# Patient Record
Sex: MALE | Race: BLACK OR AFRICAN AMERICAN | NOT HISPANIC OR LATINO | URBAN - METROPOLITAN AREA
[De-identification: names, ages, dates, MRNs, and addresses within clinical notes are randomized per-mention and may not be internally consistent; named-entity substitution may affect disease eponyms.]

---

## 2018-01-07 ENCOUNTER — INPATIENT (INPATIENT)
Facility: HOSPITAL | Age: 68
LOS: 0 days | Discharge: LEFT BEFORE DISCHARGE | End: 2018-01-07
Attending: INTERNAL MEDICINE

## 2018-01-07 DIAGNOSIS — I50.9 HEART FAILURE, UNSPECIFIED: ICD-10-CM

## 2018-01-11 DIAGNOSIS — I50.9 HEART FAILURE, UNSPECIFIED: ICD-10-CM

## 2018-02-17 ENCOUNTER — INPATIENT (INPATIENT)
Facility: HOSPITAL | Age: 68
LOS: 5 days | Discharge: HOME HEALTH | End: 2018-02-23
Attending: INTERNAL MEDICINE

## 2018-02-17 VITALS — RESPIRATION RATE: 18 BRPM

## 2018-02-17 LAB
ALBUMIN SERPL ELPH-MCNC: 3.1 G/DL — SIGNIFICANT CHANGE UP (ref 3–5.5)
ALP SERPL-CCNC: 67 U/L — SIGNIFICANT CHANGE UP (ref 30–115)
ALT FLD-CCNC: 92 U/L — HIGH (ref 0–41)
ANION GAP SERPL CALC-SCNC: 4 MMOL/L — LOW (ref 7–14)
APTT BLD: 25.9 SEC — LOW (ref 27–39.2)
AST SERPL-CCNC: 81 U/L — HIGH (ref 0–41)
B-TYPE NATRIURETIC PEPTIDE BNP RESULT: 1949 PG/ML — HIGH (ref 0–99)
BASOPHILS # BLD AUTO: 0.02 K/UL — SIGNIFICANT CHANGE UP (ref 0–0.2)
BASOPHILS NFR BLD AUTO: 0.5 % — SIGNIFICANT CHANGE UP (ref 0–1)
BILIRUB SERPL-MCNC: 0.8 MG/DL — SIGNIFICANT CHANGE UP (ref 0.2–1.2)
BUN SERPL-MCNC: 16 MG/DL — SIGNIFICANT CHANGE UP (ref 10–20)
CALCIUM SERPL-MCNC: 8.2 MG/DL — LOW (ref 8.5–10.1)
CHLORIDE SERPL-SCNC: 107 MMOL/L — SIGNIFICANT CHANGE UP (ref 98–110)
CK MB BLD-MCNC: 1 % — SIGNIFICANT CHANGE UP (ref 0–4)
CK MB CFR SERPL CALC: 2.4 NG/ML — SIGNIFICANT CHANGE UP (ref 0.6–6.3)
CK SERPL-CCNC: 298 U/L — HIGH (ref 0–225)
CO2 SERPL-SCNC: 24 MMOL/L — SIGNIFICANT CHANGE UP (ref 17–32)
CREAT SERPL-MCNC: 1.2 MG/DL — SIGNIFICANT CHANGE UP (ref 0.7–1.5)
D DIMER BLD IA.RAPID-MCNC: 375 NG/ML DDU — HIGH (ref 0–230)
EOSINOPHIL # BLD AUTO: 0.1 K/UL — SIGNIFICANT CHANGE UP (ref 0–0.7)
EOSINOPHIL NFR BLD AUTO: 2.7 % — SIGNIFICANT CHANGE UP (ref 0–8)
GLUCOSE SERPL-MCNC: 98 MG/DL — SIGNIFICANT CHANGE UP (ref 70–110)
HCT VFR BLD CALC: 36.4 % — LOW (ref 42–52)
HGB BLD-MCNC: 12.3 G/DL — LOW (ref 14–18)
IMM GRANULOCYTES NFR BLD AUTO: 0.3 % — SIGNIFICANT CHANGE UP (ref 0.1–0.3)
INR BLD: 1.19 RATIO — SIGNIFICANT CHANGE UP (ref 0.65–1.3)
LYMPHOCYTES # BLD AUTO: 0.96 K/UL — LOW (ref 1.2–3.4)
LYMPHOCYTES # BLD AUTO: 26.3 % — SIGNIFICANT CHANGE UP (ref 20.5–51.1)
MCHC RBC-ENTMCNC: 32.3 PG — HIGH (ref 27–31)
MCHC RBC-ENTMCNC: 33.8 G/DL — SIGNIFICANT CHANGE UP (ref 32–37)
MCV RBC AUTO: 95.5 FL — HIGH (ref 80–94)
MONOCYTES # BLD AUTO: 0.28 K/UL — SIGNIFICANT CHANGE UP (ref 0.1–0.6)
MONOCYTES NFR BLD AUTO: 7.7 % — SIGNIFICANT CHANGE UP (ref 1.7–9.3)
NEUTROPHILS # BLD AUTO: 2.28 K/UL — SIGNIFICANT CHANGE UP (ref 1.4–6.5)
NEUTROPHILS NFR BLD AUTO: 62.5 % — SIGNIFICANT CHANGE UP (ref 42.2–75.2)
NRBC # BLD: 0 /100 WBCS — SIGNIFICANT CHANGE UP (ref 0–0)
PLATELET # BLD AUTO: 157 K/UL — SIGNIFICANT CHANGE UP (ref 130–400)
POTASSIUM SERPL-MCNC: 4.4 MMOL/L — SIGNIFICANT CHANGE UP (ref 3.5–5)
POTASSIUM SERPL-SCNC: 4.4 MMOL/L — SIGNIFICANT CHANGE UP (ref 3.5–5)
PROT SERPL-MCNC: 5.3 G/DL — LOW (ref 6–8)
PROTHROM AB SERPL-ACNC: 12.9 SEC — HIGH (ref 9.95–12.87)
RBC # BLD: 3.81 M/UL — LOW (ref 4.7–6.1)
RBC # FLD: 13.6 % — SIGNIFICANT CHANGE UP (ref 11.5–14.5)
SODIUM SERPL-SCNC: 135 MMOL/L — SIGNIFICANT CHANGE UP (ref 135–146)
TROPONIN I SERPL-MCNC: 0.13 NG/ML — HIGH (ref 0–0.05)
WBC # BLD: 3.65 K/UL — LOW (ref 4.8–10.8)
WBC # FLD AUTO: 3.65 K/UL — LOW (ref 4.8–10.8)

## 2018-02-17 RX ORDER — SODIUM CHLORIDE 9 MG/ML
3 INJECTION INTRAMUSCULAR; INTRAVENOUS; SUBCUTANEOUS ONCE
Qty: 0 | Refills: 0 | Status: COMPLETED | OUTPATIENT
Start: 2018-02-17 | End: 2018-02-17

## 2018-02-17 RX ADMIN — SODIUM CHLORIDE 3 MILLILITER(S): 9 INJECTION INTRAMUSCULAR; INTRAVENOUS; SUBCUTANEOUS at 21:05

## 2018-02-18 DIAGNOSIS — R74.8 ABNORMAL LEVELS OF OTHER SERUM ENZYMES: ICD-10-CM

## 2018-02-18 DIAGNOSIS — R06.09 OTHER FORMS OF DYSPNEA: ICD-10-CM

## 2018-02-18 LAB
CK MB CFR SERPL CALC: 2 NG/ML — SIGNIFICANT CHANGE UP (ref 0.6–6.3)
HCT VFR BLD CALC: 36.7 % — LOW (ref 42–52)
HGB BLD-MCNC: 12.2 G/DL — LOW (ref 14–18)
MCHC RBC-ENTMCNC: 32.2 PG — HIGH (ref 27–31)
MCHC RBC-ENTMCNC: 33.2 G/DL — SIGNIFICANT CHANGE UP (ref 32–37)
MCV RBC AUTO: 96.8 FL — HIGH (ref 80–94)
NRBC # BLD: 0 /100 WBCS — SIGNIFICANT CHANGE UP (ref 0–0)
PLATELET # BLD AUTO: 151 K/UL — SIGNIFICANT CHANGE UP (ref 130–400)
RBC # BLD: 3.79 M/UL — LOW (ref 4.7–6.1)
RBC # FLD: 13.8 % — SIGNIFICANT CHANGE UP (ref 11.5–14.5)
TROPONIN I SERPL-MCNC: 0.14 NG/ML — HIGH (ref 0–0.05)
WBC # BLD: 3.51 K/UL — LOW (ref 4.8–10.8)
WBC # FLD AUTO: 3.51 K/UL — LOW (ref 4.8–10.8)

## 2018-02-18 RX ORDER — ASPIRIN/CALCIUM CARB/MAGNESIUM 324 MG
81 TABLET ORAL DAILY
Qty: 0 | Refills: 0 | Status: DISCONTINUED | OUTPATIENT
Start: 2018-02-18 | End: 2018-02-23

## 2018-02-18 RX ORDER — FUROSEMIDE 40 MG
40 TABLET ORAL DAILY
Qty: 0 | Refills: 0 | Status: DISCONTINUED | OUTPATIENT
Start: 2018-02-18 | End: 2018-02-20

## 2018-02-18 RX ORDER — ENOXAPARIN SODIUM 100 MG/ML
40 INJECTION SUBCUTANEOUS AT BEDTIME
Qty: 0 | Refills: 0 | Status: DISCONTINUED | OUTPATIENT
Start: 2018-02-18 | End: 2018-02-23

## 2018-02-18 RX ORDER — FUROSEMIDE 40 MG
20 TABLET ORAL ONCE
Qty: 0 | Refills: 0 | Status: COMPLETED | OUTPATIENT
Start: 2018-02-18 | End: 2018-02-18

## 2018-02-18 RX ORDER — INFLUENZA VIRUS VACCINE 15; 15; 15; 15 UG/.5ML; UG/.5ML; UG/.5ML; UG/.5ML
0.5 SUSPENSION INTRAMUSCULAR ONCE
Qty: 0 | Refills: 0 | Status: COMPLETED | OUTPATIENT
Start: 2018-02-18 | End: 2018-02-18

## 2018-02-18 RX ORDER — ASPIRIN/CALCIUM CARB/MAGNESIUM 324 MG
325 TABLET ORAL ONCE
Qty: 0 | Refills: 0 | Status: COMPLETED | OUTPATIENT
Start: 2018-02-18 | End: 2018-02-18

## 2018-02-18 RX ADMIN — Medication 40 MILLIGRAM(S): at 05:57

## 2018-02-18 RX ADMIN — Medication 20 MILLIGRAM(S): at 01:51

## 2018-02-18 RX ADMIN — Medication 325 MILLIGRAM(S): at 04:39

## 2018-02-18 RX ADMIN — ENOXAPARIN SODIUM 40 MILLIGRAM(S): 100 INJECTION SUBCUTANEOUS at 21:19

## 2018-02-18 RX ADMIN — Medication 81 MILLIGRAM(S): at 11:00

## 2018-02-18 NOTE — ED PROVIDER NOTE - MEDICAL DECISION MAKING DETAILS
Patient with new onset CHF and elevated troponin, consulted cardiology fellow Dr. Garzon, as recommended is admitted to Tele and cardiology will follow patient with medicine team.

## 2018-02-18 NOTE — ED PROVIDER NOTE - OBJECTIVE STATEMENT
Patient is BIB his sister for evaluation of MUELLER/orthopnea/PND x several days, denies any URI symptoms, denies any cp/abd pain, denies any other associated symptoms.

## 2018-02-18 NOTE — ED PROVIDER NOTE - PROGRESS NOTE DETAILS
patient remained stable in ED, discussed with cardiology fellow Dr. Garzon, recommended admission to Tele and serial troponins.   Discussed with Dr. George and MAR, Pt is admitted and patient care is transferred.

## 2018-02-18 NOTE — H&P ADULT - NSHPPHYSICALEXAM_GEN_ALL_CORE
PHYSICAL EXAM:  GENERAL: NAD, well-groomed, well-developed  HEAD:  Atraumatic, Normocephalic  EYES: EOMI, PERRLA, conjunctiva and sclera clear  NECK: Supple, No JVD, Normal thyroid  NERVOUS SYSTEM:  Alert & Oriented X3, Good concentration; Motor Strength 5/5 B/L upper and lower extremities; DTRs 2+ intact and symmetric  CHEST/LUNG: Clear to percussion bilaterally; No rales, rhonchi, wheezing, or rubs  HEART: Regular rate and rhythm; No murmurs, rubs, or gallops  ABDOMEN: Soft, Nontender, Nondistended; Bowel sounds present  EXTREMITIES:  2+ Peripheral Pulses, No clubbing, cyanosis, or edema  SKIN: No rashes or lesions PHYSICAL EXAM:  GENERAL: NAD, well-groomed, well-developed  HEAD:  Atraumatic, Normocephalic  EYES: EOMI, PERRLA, conjunctiva and sclera clear  NECK: Supple, No JVD, Normal thyroid  NERVOUS SYSTEM:  Alert & Oriented X3, Good concentration; Motor Strength 5/5 B/L upper and lower extremities; DTRs 2+ intact and symmetric  CHEST/LUNG: Clear to percussion bilaterally; Decreased respiratory effort.  HEART: Regular rate and rhythm; No murmurs, rubs, or gallops  ABDOMEN: Soft, Nontender, Nondistended; Bowel sounds present  EXTREMITIES:  2+ Peripheral Pulses, No clubbing, cyanosis, or edema  SKIN: No rashes or lesions

## 2018-02-18 NOTE — H&P ADULT - ATTENDING COMMENTS
seen and examined pt independently. agree with the above resident note except as documented below.     67 yr old male with no PMH p/w worsening MUELLER for last 2-3 days. denies chest pain/fever/chills/vomiting/diarrhea/palpitations/abd pain. pt p/w similar complaint last month, diagnosed with CHF but pt left AMA at that time. on physical exam, aaox3, bibasilar crackles, s1s2 regular no murmur, no LE edema/cyanosis. Labs showed mild elevation of troponin and transaminases. CT chest showed heart failure. unable to find ecg in chart.   A: Exacerbation of (biventricular) CHF -? systolic vs diastolic., elevated troponin r/o CAD., elevated transaminases likely due to RHF  P: iv lasix, ASA,  monitor Is&Os, low sodium diet, check TTE, lipids, HBA1C, trend CE, will need ischemia work up, cardiology consult, dvt prophylaxis. seen and examined pt independently. agree with the above resident note except as documented below.     67 yr old male with no PMH p/w worsening MUELLER for last 2-3 days. denies chest pain/fever/chills/vomiting/diarrhea/palpitations/abd pain. pt p/w similar complaint last month, diagnosed with CHF but pt left AMA at that time. on physical exam, aaox3, bibasilar crackles, s1s2 regular no murmur, no LE edema/cyanosis. Labs showed mild elevation of troponin and transaminases. CT chest showed heart failure. Ecg- SR, 1st degree AV block, left atrial enlargement, non specific IVCD (my interpretation)  A: Exacerbation of (biventricular) CHF -? systolic vs diastolic., elevated troponin r/o CAD., elevated transaminases likely due to RHF  P: iv lasix, ASA,  monitor Is&Os, low sodium diet, check TTE, lipids, HBA1C, trend CE, will need ischemia work up, cardiology consult, dvt prophylaxis.

## 2018-02-18 NOTE — H&P ADULT - NSHPLABSRESULTS_GEN_ALL_CORE
12.3   3.65  )-----------( 157      ( 17 Feb 2018 20:33 )             36.4     02-17    135  |  107  |  16  ----------------------------<  98  4.4   |  24  |  1.2    Ca    8.2<L>      17 Feb 2018 20:33    TPro  5.3<L>  /  Alb  3.1  /  TBili  0.8  /  DBili  x   /  AST  81<H>  /  ALT  92<H>  /  AlkPhos  67  02-17    PT/INR - ( 17 Feb 2018 20:33 )   PT: 12.90 sec;   INR: 1.19 ratio         PTT - ( 17 Feb 2018 20:33 )  PTT:25.9 sec  CARDIAC MARKERS ( 17 Feb 2018 20:33 )  0.13 ng/mL / x     / 298 U/L / x     / 2.4 ng/mL      CT Chest with IV contrast: 1.  No central or segmental pulmonary emboli. 2.  Cardiomegaly, bilateral upper lobe interlobular septal thickening,   bilateral pleural effusions and reflux of contrast into the IVC and hepatic veins are suggestive of right heart failure and fluid-overload   state.

## 2018-02-18 NOTE — ED PROVIDER NOTE - CARE PLAN
Principal Discharge DX:	Troponin level elevated  Secondary Diagnosis:	Chronic congestive heart failure, unspecified congestive heart failure type

## 2018-02-18 NOTE — H&P ADULT - NSHPREVIEWOFSYSTEMS_GEN_ALL_CORE
REVIEW OF SYSTEMS:  CONSTITUTIONAL: No fever, weight loss, or fatigue  EYES: No eye pain, visual disturbances, or discharge  ENMT:  No difficulty hearing, tinnitus, vertigo; No sinus or throat pain  NECK: No pain or stiffness  RESPIRATORY: (+) SOB, MUELLER. No cough, wheezing, chills or hemoptysis;   CARDIOVASCULAR: No chest pain, palpitations, dizziness, or leg swelling  GASTROINTESTINAL: No abdominal or epigastric pain. No nausea, vomiting, or hematemesis; No diarrhea or constipation.   GENITOURINARY: No dysuria, frequency, hematuria, or incontinence  NEUROLOGICAL: No headaches, memory loss, loss of strength, numbness, or tremors  SKIN: No itching, burning, rashes, or lesions   LYMPH NODES: No enlarged glands  MUSCULOSKELETAL: No joint pain or swelling; No muscle, back, or extremity pain

## 2018-02-18 NOTE — H&P ADULT - PROBLEM SELECTOR PLAN 2
- f/u Echo  - strict I+Os  - hold further lasix for now - f/u Echo  - strict I+Os, daily weight  - continue with lasix 40 mg iv once daily for now, adjust dose if clinical improvement - finding of Pulmonary Edema, right sided heart failure on CT Chest  - f/u Echo  - strict I+Os, daily weight  - continue with lasix 40 mg iv once daily for now, adjust dose if clinical improvement

## 2018-02-18 NOTE — H&P ADULT - PROBLEM SELECTOR PLAN 3
- likely angina equivalent  - consider further cardiac testing to rule out underlying coronary artery disease  - start on aspirin   - f/u lipid profile - consider further cardiac testing to rule out underlying coronary artery disease  - start on aspirin   - f/u lipid profile
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
## 2018-02-19 LAB
ALBUMIN SERPL ELPH-MCNC: 3.1 G/DL — SIGNIFICANT CHANGE UP (ref 3–5.5)
ALP SERPL-CCNC: 64 U/L — SIGNIFICANT CHANGE UP (ref 30–115)
ALT FLD-CCNC: 71 U/L — HIGH (ref 0–41)
ANION GAP SERPL CALC-SCNC: 8 MMOL/L — SIGNIFICANT CHANGE UP (ref 7–14)
AST SERPL-CCNC: 34 U/L — SIGNIFICANT CHANGE UP (ref 0–41)
BILIRUB DIRECT SERPL-MCNC: 0.1 MG/DL — SIGNIFICANT CHANGE UP (ref 0–0.2)
BILIRUB INDIRECT FLD-MCNC: 0.9 MG/DL — SIGNIFICANT CHANGE UP
BILIRUB SERPL-MCNC: 1 MG/DL — SIGNIFICANT CHANGE UP (ref 0.2–1.2)
BUN SERPL-MCNC: 19 MG/DL — SIGNIFICANT CHANGE UP (ref 10–20)
CALCIUM SERPL-MCNC: 8.4 MG/DL — LOW (ref 8.5–10.1)
CHLORIDE SERPL-SCNC: 108 MMOL/L — SIGNIFICANT CHANGE UP (ref 98–110)
CHOLEST SERPL-MCNC: 183 MG/DL — SIGNIFICANT CHANGE UP (ref 100–200)
CO2 SERPL-SCNC: 26 MMOL/L — SIGNIFICANT CHANGE UP (ref 17–32)
CREAT SERPL-MCNC: 1.2 MG/DL — SIGNIFICANT CHANGE UP (ref 0.7–1.5)
GLUCOSE SERPL-MCNC: 101 MG/DL — SIGNIFICANT CHANGE UP (ref 70–110)
HCT VFR BLD CALC: 40.3 % — LOW (ref 42–52)
HDLC SERPL-MCNC: 44 MG/DL — SIGNIFICANT CHANGE UP (ref 40–60)
HGB BLD-MCNC: 13.4 G/DL — LOW (ref 14–18)
LIPID PNL WITH DIRECT LDL SERPL: 132 MG/DL — HIGH (ref 50–100)
MAGNESIUM SERPL-MCNC: 2.2 MG/DL — SIGNIFICANT CHANGE UP (ref 1.8–2.4)
MCHC RBC-ENTMCNC: 32.8 PG — HIGH (ref 27–31)
MCHC RBC-ENTMCNC: 33.3 G/DL — SIGNIFICANT CHANGE UP (ref 32–37)
MCV RBC AUTO: 98.5 FL — HIGH (ref 80–94)
NRBC # BLD: 0 /100 WBCS — SIGNIFICANT CHANGE UP (ref 0–0)
PLATELET # BLD AUTO: 168 K/UL — SIGNIFICANT CHANGE UP (ref 130–400)
POTASSIUM SERPL-MCNC: 4.6 MMOL/L — SIGNIFICANT CHANGE UP (ref 3.5–5)
POTASSIUM SERPL-SCNC: 4.6 MMOL/L — SIGNIFICANT CHANGE UP (ref 3.5–5)
PROT SERPL-MCNC: 5.6 G/DL — LOW (ref 6–8)
RBC # BLD: 4.09 M/UL — LOW (ref 4.7–6.1)
RBC # FLD: 14 % — SIGNIFICANT CHANGE UP (ref 11.5–14.5)
SODIUM SERPL-SCNC: 142 MMOL/L — SIGNIFICANT CHANGE UP (ref 135–146)
TOTAL CHOLESTEROL/HDL RATIO MEASUREMENT: 4.2 RATIO — SIGNIFICANT CHANGE UP (ref 4–5.5)
TRIGL SERPL-MCNC: 53 MG/DL — SIGNIFICANT CHANGE UP (ref 40–150)
WBC # BLD: 3.07 K/UL — LOW (ref 4.8–10.8)
WBC # FLD AUTO: 3.07 K/UL — LOW (ref 4.8–10.8)

## 2018-02-19 RX ADMIN — Medication 81 MILLIGRAM(S): at 13:33

## 2018-02-19 RX ADMIN — Medication 40 MILLIGRAM(S): at 06:08

## 2018-02-19 RX ADMIN — ENOXAPARIN SODIUM 40 MILLIGRAM(S): 100 INJECTION SUBCUTANEOUS at 21:23

## 2018-02-19 NOTE — PROGRESS NOTE ADULT - ASSESSMENT
Problem/Plan - 1:  ·  Problem: Troponin level elevated.  0.13-.14, f/u cardio     Problem/Plan - 2:  ·  Problem: R/O Congestive heart failure.  Plan: - finding of Pulmonary Edema, right sided heart failure on CT Chest  - f/u Echo  - strict I+Os, daily weight  - continue with lasix 40 mg iv once daily      Problem/Plan - 3:  ·  Problem: R/O Coronary artery disease.  Plan: - consider further cardiac testing to rule out underlying coronary artery disease  - start on aspirin, f/u cardio  - f/u lipid profile.      Problem/Plan - 4:  ·  Problem: Elevated liver enzymes.  Plan: - likely secondary to congestive liver from right sided heart failure  f/u LDT

## 2018-02-19 NOTE — CONSULT NOTE ADULT - ASSESSMENT
-HFrEF, newly diagnosed, now patient is compensating, likely nonischemic cardiomyopathy but need to R/O ischemic etiology.   -Syncope, related to ? hypoglycemia  as by patient however underlying arrhythmia still in the differential    Plan:  -tele  -Start low dose BB metoprolol xl 25mg daily  -Start lisinopril 5 mg daily  -Switch to lasix 20 mg po daily  -consider life vest prior to discharge  -CT angio heart to evaluate for ischemic etiology  -Monitor kidney function and maintain electrolytes within normal ranges. -HFrEF, newly diagnosed, now patient is compensating, likely nonischemic cardiomyopathy but need to R/O ischemic etiology.   -Syncope, related to ? hypoglycemia  as by patient however underlying arrhythmia still in the differential    Plan:  -tele  -Start low dose BB metoprolol succinate xl 25mg daily  -Start lisinopril 5 mg daily  -Switch to lasix 20 mg po daily  - recommend [facility] Life Vest prior to discharge  -Coronary CT angio heart to evaluate for ischemic etiology  -Monitor kidney function and maintain electrolytes within normal ranges.

## 2018-02-19 NOTE — PROGRESS NOTE ADULT - SUBJECTIVE AND OBJECTIVE BOX
Denies chest pain    T(F): , Max: 97.8 (02-19-18 @ 04:46)  HR: 85 (02-19-18 @ 13:55) (85 - 94)  BP: 129/76 (02-19-18 @ 13:55)  RR: 18 (02-19-18 @ 13:55)  SpO2: 100% (02-18-18 @ 22:35)  General: No apparent distress  Cardiovascular: S1, S2  Gastrointestinal: Soft, Non-tender, Non-distended  Respiratory: Good air entry bilaterally  Musculoskeletal: Moves all extremities  Lymphatic: No edema  Neurologic: No gross motor deficit  Dermatologic: Skin dry  PT/INR - ( 17 Feb 2018 20:33 )   PT: 12.90 sec;   INR: 1.19 ratio      PTT - ( 17 Feb 2018 20:33 )  PTT:25.9 sec                        13.4   3.07  )-----------( 168      ( 19 Feb 2018 06:05 )             40.3     02-19    142  |  108  |  19  ----------------------------<  101  4.6   |  26  |  1.2    Ca    8.4<L>      19 Feb 2018 06:05  Mg     2.2     02-19    TPro  5.6<L>  /  Alb  3.1  /  TBili  1.0  /  DBili  0.1  /  AST  34  /  ALT  71<H>  /  AlkPhos  64  02-19
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
## 2018-02-19 NOTE — PROGRESS NOTE ADULT - SUBJECTIVE AND OBJECTIVE BOX
SUBJECTIVE:    Patient is a 67y old  Male who presents with a chief complaint of   Currently admitted to medicine with the primary diagnosis of Troponin level elevated     Today is hospital day 1d. This morning he is resting comfortably in bed and reports no new issues or overnight events.     PAST MEDICAL & SURGICAL HISTORY  PAST MEDICAL & SURGICAL HISTORY:  No pertinent past medical history  No significant past surgical history    SOCIAL HISTORY:    ALLERGIES:  penicillin (Rash)    MEDICATIONS:  STANDING MEDICATIONS  aspirin enteric coated 81 milliGRAM(s) Oral daily  enoxaparin Injectable 40 milliGRAM(s) SubCutaneous at bedtime  furosemide   Injectable 40 milliGRAM(s) IV Push daily    PRN MEDICATIONS    VITALS:   T(F): 97.8  HR: 94  BP: 132/83  RR: 18  SpO2: 100%    LABS:                        13.4   3.07  )-----------( 168      ( 19 Feb 2018 06:05 )             40.3     02-19    142  |  108  |  19  ----------------------------<  101  4.6   |  26  |  1.2    Ca    8.4<L>      19 Feb 2018 06:05  Mg     2.2     02-19    TPro  5.6<L>  /  Alb  3.1  /  TBili  1.0  /  DBili  0.1  /  AST  34  /  ALT  71<H>  /  AlkPhos  64  02-19    PT/INR - ( 17 Feb 2018 20:33 )   PT: 12.90 sec;   INR: 1.19 ratio         PTT - ( 17 Feb 2018 20:33 )  PTT:25.9 sec          CARDIAC MARKERS ( 18 Feb 2018 07:40 )  0.14 ng/mL / x     / x     / x     / 2.0 ng/mL  CARDIAC MARKERS ( 17 Feb 2018 20:33 )  0.13 ng/mL / x     / 298 U/L / x     / 2.4 ng/mL      RADIOLOGY:    PHYSICAL EXAM:  GEN: No acute distress  LUNGS: Clear to auscultation bilaterally   HEART: S1/S2 present. RRR.   ABD: Soft, non-tender, non-distended. Bowel sounds present  EXT: 2+ pedal pulses  NEURO: AAOX3

## 2018-02-20 DIAGNOSIS — I47.2 VENTRICULAR TACHYCARDIA: ICD-10-CM

## 2018-02-20 DIAGNOSIS — I50.9 HEART FAILURE, UNSPECIFIED: ICD-10-CM

## 2018-02-20 LAB
ANION GAP SERPL CALC-SCNC: 5 MMOL/L — LOW (ref 7–14)
BUN SERPL-MCNC: 16 MG/DL — SIGNIFICANT CHANGE UP (ref 10–20)
CALCIUM SERPL-MCNC: 8.8 MG/DL — SIGNIFICANT CHANGE UP (ref 8.5–10.1)
CHLORIDE SERPL-SCNC: 106 MMOL/L — SIGNIFICANT CHANGE UP (ref 98–110)
CO2 SERPL-SCNC: 29 MMOL/L — SIGNIFICANT CHANGE UP (ref 17–32)
CREAT SERPL-MCNC: 1.3 MG/DL — SIGNIFICANT CHANGE UP (ref 0.7–1.5)
GLUCOSE SERPL-MCNC: 101 MG/DL — SIGNIFICANT CHANGE UP (ref 70–110)
POTASSIUM SERPL-MCNC: 4.4 MMOL/L — SIGNIFICANT CHANGE UP (ref 3.5–5)
POTASSIUM SERPL-SCNC: 4.4 MMOL/L — SIGNIFICANT CHANGE UP (ref 3.5–5)
SODIUM SERPL-SCNC: 140 MMOL/L — SIGNIFICANT CHANGE UP (ref 135–146)

## 2018-02-20 RX ORDER — FUROSEMIDE 40 MG
20 TABLET ORAL DAILY
Qty: 0 | Refills: 0 | Status: DISCONTINUED | OUTPATIENT
Start: 2018-02-20 | End: 2018-02-23

## 2018-02-20 RX ORDER — LISINOPRIL 2.5 MG/1
5 TABLET ORAL DAILY
Qty: 0 | Refills: 0 | Status: DISCONTINUED | OUTPATIENT
Start: 2018-02-20 | End: 2018-02-23

## 2018-02-20 RX ORDER — METOPROLOL TARTRATE 50 MG
25 TABLET ORAL DAILY
Qty: 0 | Refills: 0 | Status: DISCONTINUED | OUTPATIENT
Start: 2018-02-20 | End: 2018-02-21

## 2018-02-20 RX ADMIN — Medication 25 MILLIGRAM(S): at 13:24

## 2018-02-20 RX ADMIN — ENOXAPARIN SODIUM 40 MILLIGRAM(S): 100 INJECTION SUBCUTANEOUS at 21:30

## 2018-02-20 RX ADMIN — Medication 81 MILLIGRAM(S): at 11:12

## 2018-02-20 RX ADMIN — Medication 40 MILLIGRAM(S): at 06:20

## 2018-02-20 RX ADMIN — LISINOPRIL 5 MILLIGRAM(S): 2.5 TABLET ORAL at 13:24

## 2018-02-20 RX ADMIN — Medication 20 MILLIGRAM(S): at 13:24

## 2018-02-20 NOTE — CONSULT NOTE ADULT - PROBLEM SELECTOR PROBLEM 1
Dyspnea on exertion Chronic congestive heart failure, unspecified congestive heart failure type R/O Chronic congestive heart failure, unspecified congestive heart failure type
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
## 2018-02-20 NOTE — PROGRESS NOTE ADULT - ASSESSMENT
Acute on chronic systolic CHF - lasix,  asa  right sided heart failure on CT Chest  25% EF on echo, strict I+Os, daily weight, 20 mg lasix PO daily, started on lisinopril, metoprolol, EP c/s-lifevest       NSTEMI - ordered CT angio.   - start on aspirin, f/u cardio  - f/u lipid profile.     dvt ppx: lovenox Acute Systolic CHF   - lasix,  asa   -started on lisinopril, metoprolol   -right sided heart failure on CT Chest  - 25% EF on echo  -strict I+Os, daily weight,   -  EP c/s-lifevest       NSTEMI  on admission - planned for CT angio.   - on aspirin, f/u cardio  - f/u lipid profile.     dvt ppx: lovenox
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
## 2018-02-20 NOTE — PROGRESS NOTE ADULT - ASSESSMENT
-HFrEF, newly diagnosed, now patient is compensating, likely nonischemic cardiomyopathy but need to R/O ischemic etiology.   -Syncope, related to ? hypoglycemia  as by patient however underlying arrhythmia still in the differential    Plan:  -tele  increase BB metoprolol succinate xl to 50 mg  C/w lisinopril 5 mg daily  -Switch to lasix 20 mg po daily  - recommend [facility] Life Vest prior to discharge  -Coronary CT angio heart to evaluate for ischemic etiology - pending for today  -Monitor kidney function and maintain electrolytes within normal ranges.

## 2018-02-20 NOTE — PROGRESS NOTE ADULT - SUBJECTIVE AND OBJECTIVE BOX
SUBJECTIVE:    Patient is a 67y old  Male who presents with a chief complaint of dyspnea on exertion.  Currently admitted to medicine with the primary diagnosis of Troponin level elevated.     Today is hospital day 2d. This morning he is resting comfortably in bed and reports no new issues or overnight events. Patient was ordered a CT coronary as per cardiology. EP was consulted for 25% EF and life vest.    PAST MEDICAL & SURGICAL HISTORY  PAST MEDICAL & SURGICAL HISTORY:  No pertinent past medical history  No significant past surgical history    SOCIAL HISTORY:    ALLERGIES:  penicillin (Rash)    MEDICATIONS:  STANDING MEDICATIONS  aspirin enteric coated 81 milliGRAM(s) Oral daily  enoxaparin Injectable 40 milliGRAM(s) SubCutaneous at bedtime  furosemide    Tablet 20 milliGRAM(s) Oral daily  lisinopril 5 milliGRAM(s) Oral daily  metoprolol succinate ER 25 milliGRAM(s) Oral daily    PRN MEDICATIONS    VITALS:   T(F): 98.1  HR: 95  BP: 134/85  RR: 18  SpO2: --    LABS:                        13.4   3.07  )-----------( 168      ( 19 Feb 2018 06:05 )             40.3     02-20    140  |  106  |  16  ----------------------------<  101  4.4   |  29  |  1.3    Ca    8.8      20 Feb 2018 07:11  Mg     2.2     02-19    TPro  5.6<L>  /  Alb  3.1  /  TBili  1.0  /  DBili  0.1  /  AST  34  /  ALT  71<H>  /  AlkPhos  64  02-19        PHYSICAL EXAM:  GEN: AAO x 3, NAD  HEENT: NC/AT, PERR  Neck: No JVD, no bruits  CV: Reg, S1-S2, no murmur  Lungs: CTAB  Abd: Soft, non-tender  Ext: No edema SUBJECTIVE:    Patient is a 67y old  Male who presents with a chief complaint of dyspnea on exertion.     Today is hospital day 2d. This morning he is resting comfortably in bed and reports no new issues or overnight events. Patient was ordered a CT coronary as per cardiology. EP was consulted for 25% EF and life vest.    PAST MEDICAL & SURGICAL HISTORY  PAST MEDICAL & SURGICAL HISTORY:  No pertinent past medical history  No significant past surgical history    SOCIAL HISTORY:    ALLERGIES:  penicillin (Rash)    MEDICATIONS:  STANDING MEDICATIONS  aspirin enteric coated 81 milliGRAM(s) Oral daily  enoxaparin Injectable 40 milliGRAM(s) SubCutaneous at bedtime  furosemide    Tablet 20 milliGRAM(s) Oral daily  lisinopril 5 milliGRAM(s) Oral daily  metoprolol succinate ER 25 milliGRAM(s) Oral daily    PRN MEDICATIONS    VITALS:   T(F): 98.1  HR: 95  BP: 134/85  RR: 18  SpO2: --    LABS:                        13.4   3.07  )-----------( 168      ( 19 Feb 2018 06:05 )             40.3     02-20    140  |  106  |  16  ----------------------------<  101  4.4   |  29  |  1.3    Ca    8.8      20 Feb 2018 07:11  Mg     2.2     02-19    TPro  5.6<L>  /  Alb  3.1  /  TBili  1.0  /  DBili  0.1  /  AST  34  /  ALT  71<H>  /  AlkPhos  64  02-19        PHYSICAL EXAM:  GEN: AAO x 3, NAD  HEENT: NC/AT, PERR  Neck: No JVD, no bruits  CV: Reg, S1-S2, no murmur  Lungs: CTAB  Abd: Soft, non-tender  Ext: No edema

## 2018-02-20 NOTE — CONSULT NOTE ADULT - SUBJECTIVE AND OBJECTIVE BOX
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 Interval 378 ms    QTC Calculation(Bezet) 477 ms    P Axis 82 degrees    R Axis 155 degrees    T Axis 74 degrees    Diagnosis Line Normal sinus rhythm  Left atrial enlargement  Left ventricular hypertrophy  Non-specific intra-ventricular conduction block  Anterolateral infarct , age undetermined  Abnormal ECG    Confirmed by ERWIN STUART MD ([id number]) on 2018 6:13:22 AM    < end of copied text >      LABS:                        13.4   3.07  )-----------( 168      ( 2018 06:05 )             40.3     02-20    140  |  106  |  16  ----------------------------<  101  4.4   |  29  |  1.3    Ca    8.8      2018 07:11  Mg     2.2         TPro  5.6<L>  /  Alb  3.1  /  TBili  1.0  /  DBili  0.1  /  AST  34  /  ALT  71<H>  /  AlkPhos  64     LIVER FUNCTIONS - ( 2018 06:05 )  Alb: 3.1 g/dL / Pro: 5.6 g/dL / ALK PHOS: 64 U/L / ALT: 71 U/L / AST: 34 U/L / GGT: x               Daily     Daily Weight in k.4 (2018 05:43)    RADIOLOGY & ADDITIONAL STUDIES:  < from: Xray Chest 2 Views PA/Lat (18 @ 19:59) >  EXAM:  XR CHEST PA LAT 2V            PROCEDURE DATE:  2018      INTERPRETATION:  Clinical History / Reason for exam: Chest pain.    Comparison : Chest radiograph dated 2018.    Technique/Positioning: Satisfactory.    Findings:    Support devices: None.    Cardiac/mediastinum/hilum: Unchanged cardiomegaly.    Lung parenchyma/Pleura: Mild interstitial edema. No lobar consolidation,   pleural effusion or pneumothorax.    Skeleton/soft tissues: No acute osseous abnormal rounded.    Impression:      1. Mild interstitial edema.    2. Unchanged cardiomegaly.    EDENILSON NOLAN M.D., ATTENDING RADIOLOGIST  This document has been electronically signed. 2018  9:48AM
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
## 2018-02-21 ENCOUNTER — TRANSCRIPTION ENCOUNTER (OUTPATIENT)
Age: 68
End: 2018-02-21

## 2018-02-21 LAB
ANION GAP SERPL CALC-SCNC: 5 MMOL/L — LOW (ref 7–14)
BUN SERPL-MCNC: 21 MG/DL — HIGH (ref 10–20)
CALCIUM SERPL-MCNC: 9.1 MG/DL — SIGNIFICANT CHANGE UP (ref 8.5–10.1)
CHLORIDE SERPL-SCNC: 105 MMOL/L — SIGNIFICANT CHANGE UP (ref 98–110)
CO2 SERPL-SCNC: 29 MMOL/L — SIGNIFICANT CHANGE UP (ref 17–32)
CREAT SERPL-MCNC: 1.3 MG/DL — SIGNIFICANT CHANGE UP (ref 0.7–1.5)
GLUCOSE SERPL-MCNC: 94 MG/DL — SIGNIFICANT CHANGE UP (ref 70–110)
HCT VFR BLD CALC: 44.6 % — SIGNIFICANT CHANGE UP (ref 42–52)
HGB BLD-MCNC: 14.6 G/DL — SIGNIFICANT CHANGE UP (ref 14–18)
INR BLD: 1.16 RATIO — SIGNIFICANT CHANGE UP (ref 0.65–1.3)
MCHC RBC-ENTMCNC: 31.9 PG — HIGH (ref 27–31)
MCHC RBC-ENTMCNC: 32.7 G/DL — SIGNIFICANT CHANGE UP (ref 32–37)
MCV RBC AUTO: 97.6 FL — HIGH (ref 80–94)
NRBC # BLD: 0 /100 WBCS — SIGNIFICANT CHANGE UP (ref 0–0)
PLATELET # BLD AUTO: 205 K/UL — SIGNIFICANT CHANGE UP (ref 130–400)
POTASSIUM SERPL-MCNC: 4.5 MMOL/L — SIGNIFICANT CHANGE UP (ref 3.5–5)
POTASSIUM SERPL-SCNC: 4.5 MMOL/L — SIGNIFICANT CHANGE UP (ref 3.5–5)
PROTHROM AB SERPL-ACNC: 12.6 SEC — SIGNIFICANT CHANGE UP (ref 9.95–12.87)
RBC # BLD: 4.57 M/UL — LOW (ref 4.7–6.1)
RBC # FLD: 13.6 % — SIGNIFICANT CHANGE UP (ref 11.5–14.5)
SODIUM SERPL-SCNC: 139 MMOL/L — SIGNIFICANT CHANGE UP (ref 135–146)
WBC # BLD: 3.92 K/UL — LOW (ref 4.8–10.8)
WBC # FLD AUTO: 3.92 K/UL — LOW (ref 4.8–10.8)

## 2018-02-21 RX ORDER — METOPROLOL TARTRATE 50 MG
50 TABLET ORAL DAILY
Qty: 0 | Refills: 0 | Status: DISCONTINUED | OUTPATIENT
Start: 2018-02-21 | End: 2018-02-23

## 2018-02-21 RX ADMIN — Medication 50 MILLIGRAM(S): at 11:21

## 2018-02-21 RX ADMIN — Medication 81 MILLIGRAM(S): at 12:15

## 2018-02-21 RX ADMIN — ENOXAPARIN SODIUM 40 MILLIGRAM(S): 100 INJECTION SUBCUTANEOUS at 21:54

## 2018-02-21 NOTE — PROGRESS NOTE ADULT - SUBJECTIVE AND OBJECTIVE BOX
SAMANTHA CHARLES  67y Male    INTERVAL HPI/OVERNIGHT EVENTS:    No SOB or chest pain. Wife at bedside  Very anxious.     T(F): 98 (18 @ 06:05), Max: 98.9 (18 @ 19:52)  HR: 79 (18 @ 06:05) (78 - 81)  BP: 101/71 (18 @ 06:05) (91/53 - 130/76)  RR: 19 (18 @ 06:05) (18 - 19)    Daily Weight in k.3 (2018 10:56)      PHYSICAL EXAM:  GENERAL: NAD, thin man  HEAD:  Atraumatic, Normocephalic  EYES:  conjunctiva and sclera clear  ENMT: Moist mucous membranes  NECK: Supple, No JVD  NERVOUS SYSTEM:  Alert & Oriented X3, Good concentration, anxious  CHEST/LUNG: Clear to percussion bilaterally; No rales, rhonchi, wheezing  HEART: Regular rate and rhythm; No murmurs appreciated  ABDOMEN: Soft, Nontender, Nondistended; Bowel sounds present  EXTREMITIES:  No edema      Consultant(s) Notes Reviewed:  [x ] YES  [ ] NO  Care Discussed with Other Providers on rounds [ x] YES  [ ] NO    Medications reviewed  Telemetry reviewed    LABS:                        14.6   3.92  )-----------( 205      ( 2018 07:37 )             44.6     02-    139  |  105  |  21<H>  ----------------------------<  94  4.5   |  29  |  1.3    Ca    9.1      2018 07:37      PT/INR - ( 2018 07:37 )   PT: 12.60 sec;   INR: 1.16 ratio          RADIOLOGY & ADDITIONAL TESTS:    report Personally Reviewed:  [x ] YES  [ ] NO    Case discussed with resident    Care discussed with pt/family

## 2018-02-21 NOTE — DISCHARGE NOTE ADULT - CARE PLAN
Principal Discharge DX:	Acute systolic congestive heart failure  Goal:	prevent complications  Assessment and plan of treatment:	continue aspirin, metoprolol, lisinopril, lasix as prescribed Principal Discharge DX:	Acute systolic congestive heart failure  Goal:	prevent complications  Assessment and plan of treatment:	continue aspirin, metoprolol, lisinopril, lasix as prescribed. F/u with cardiology

## 2018-02-21 NOTE — PROGRESS NOTE ADULT - SUBJECTIVE AND OBJECTIVE BOX
SUBJ:  Patient seen and examined. Discussed with EP, Dr. Guo.   No events overnight.      MEDICATIONS  (STANDING):  aspirin enteric coated 81 milliGRAM(s) Oral daily  enoxaparin Injectable 40 milliGRAM(s) SubCutaneous at bedtime  furosemide    Tablet 20 milliGRAM(s) Oral daily  lisinopril 5 milliGRAM(s) Oral daily  metoprolol succinate ER 25 milliGRAM(s) Oral daily    MEDICATIONS  (PRN):            Vital Signs Last 24 Hrs  T(C): 36.7 (21 Feb 2018 06:05), Max: 37.2 (20 Feb 2018 19:52)  T(F): 98 (21 Feb 2018 06:05), Max: 98.9 (20 Feb 2018 19:52)  HR: 79 (21 Feb 2018 06:05) (78 - 81)  BP: 101/71 (21 Feb 2018 06:05) (91/53 - 130/76)  BP(mean): --  RR: 19 (21 Feb 2018 06:05) (18 - 19)  SpO2: --      PHYSICAL EXAM:    GEN: AAO x 3, NAD  HEENT: NC/AT, PERR  Neck: No JVD, no bruits  CV: Reg, S1-S2, no murmur  Lungs: CTAB  Abd: Soft, non-tender  Ext: No edema      I&O's Summary  	    TELEMETRY: SR, NSVT x 1      TTE:  < from: Transthoracic Echocardiogram (02.19.18 @ 12:32) >  Summary:   1. Left ventricular ejection fraction, by visual estimation, is 20 to   25%.   2. Severely decreased global left ventricular systolic function.   3. Severely decreased segmental left ventricular systolic function.   4. Spectral Doppler shows pseudonormal pattern of left ventricular   myocardial filling (Grade II diastolic dysfunction).   5. Mild to moderate aortic regurgitation.    < end of copied text >      LABS:                        14.6   3.92  )-----------( 205      ( 21 Feb 2018 07:37 )             44.6     02-20    140  |  106  |  16  ----------------------------<  101  4.4   |  29  |  1.3    Ca    8.8      20 Feb 2018 07:11

## 2018-02-21 NOTE — PROGRESS NOTE ADULT - ASSESSMENT
-HFrEF, newly diagnosed, now patient is compensating, likely nonischemic cardiomyopathy but need to R/O ischemic etiology.   -Syncope, possible underlying arrhythmia in the differential    Plan:  - EP consult appreciated.  Patient was offered cath, but refused.  Proceed with coronary CTA today, as originally planned.   increase BB metoprolol succinate xl to 50 mg  C/w lisinopril 5 mg daily  -Switch to lasix 20 mg po daily  - Patient was offered AICD by EP, given high risk for SCD, at this time he is hesitant; will recommend at least WCD Life Vest prior to discharge for protection from SCD, if no ICD this admission.  - Cardiac MR ordered by EP - pending, will f/u the results.  - Monitor kidney function and maintain electrolytes within normal ranges.

## 2018-02-21 NOTE — DISCHARGE NOTE ADULT - PLAN OF CARE
prevent complications continue aspirin, metoprolol, lisinopril, lasix as prescribed continue aspirin, metoprolol, lisinopril, lasix as prescribed. F/u with cardiology

## 2018-02-21 NOTE — PROGRESS NOTE ADULT - ASSESSMENT
1. Acute HFrEF - workup in progress - volume status now improved  Cardio f/u appreciated  Pt refuses cardiac cath at this time  CTA of coronary arteries instead to be ordered  Cardiac MRI ordered  continue tele, lasix, ACE-I, Bblocker  DVT prophylaxis  pt OOB and ambulating  EP following  Consider Lifevest on discharge    2. Syncope - likely malignant arrhythmia as cause per EP

## 2018-02-21 NOTE — PROGRESS NOTE ADULT - SUBJECTIVE AND OBJECTIVE BOX
SUBJECTIVE:    Patient is a 67y old  Male who presents with a chief complaint of   Currently admitted to medicine with the primary diagnosis of Troponin level elevated     Today is hospital day 3d. This morning he is resting comfortably in bed and reports no new issues or overnight events. He is scheduled today for cardiac MRI, cardiac cath thursday.    PAST MEDICAL & SURGICAL HISTORY  PAST MEDICAL & SURGICAL HISTORY:  No pertinent past medical history  No significant past surgical history    SOCIAL HISTORY:    ALLERGIES:  penicillin (Rash)    MEDICATIONS:  STANDING MEDICATIONS  aspirin enteric coated 81 milliGRAM(s) Oral daily  enoxaparin Injectable 40 milliGRAM(s) SubCutaneous at bedtime  furosemide    Tablet 20 milliGRAM(s) Oral daily  lisinopril 5 milliGRAM(s) Oral daily  metoprolol succinate ER 25 milliGRAM(s) Oral daily    PRN MEDICATIONS    VITALS:   T(F): 98  HR: 79  BP: 101/71  RR: 19  SpO2: --    LABS:                        14.6   3.92  )-----------( 205      ( 21 Feb 2018 07:37 )             44.6     02-21    139  |  105  |  21<H>  ----------------------------<  94  4.5   |  29  |  1.3    Ca    9.1      21 Feb 2018 07:37      PT/INR - ( 21 Feb 2018 07:37 )   PT: 12.60 sec;   INR: 1.16 ratio                       RADIOLOGY:    PHYSICAL EXAM:  GEN: No acute distress  LUNGS: Clear to auscultation bilaterally   HEART: S1/S2 present. RRR.   ABD: Soft, non-tender, non-distended. Bowel sounds present  NEURO: AAOX3

## 2018-02-21 NOTE — DISCHARGE NOTE ADULT - PATIENT PORTAL LINK FT
You can access the [facility], offered by [facility], by registering with the following website: [email or web address]

## 2018-02-21 NOTE — DISCHARGE NOTE ADULT - CARE PROVIDER_API CALL
Kulwinder Choi (MD), Cardiovascular Disease; Internal Medicine; Interventional Cardiology; Nuclear Cardiology  [address] [place] [address]  Phone: [phone]  Fax: [phone]

## 2018-02-21 NOTE — DISCHARGE NOTE ADULT - MEDICATION SUMMARY - MEDICATIONS TO TAKE
I will START or STAY ON the medications listed below when I get home from the hospital:    aspirin 81 mg oral delayed release tablet  -- 1 tab(s) by mouth once a day  -- Indication: For Congestive heart failure    lisinopril 5 mg oral tablet  -- 1 tab(s) by mouth once a day  -- Indication: For Congestive heart failure    metoprolol succinate 50 mg oral tablet, extended release  -- 1 tab(s) by mouth once a day  -- Indication: For Congestive heart failure    furosemide 20 mg oral tablet  -- 1 tab(s) by mouth once a day  -- Indication: For Chronic congestive heart failure, unspecified congestive heart failure type I will START or STAY ON the medications listed below when I get home from the hospital:    aspirin 81 mg oral delayed release tablet  -- 1 tab(s) by mouth once a day  -- Indication: For Chronic congestive heart failure, unspecified congestive heart failure type    lisinopril 5 mg oral tablet  -- 1 tab(s) by mouth once a day  -- Indication: For Chronic congestive heart failure, unspecified congestive heart failure type    metoprolol succinate 50 mg oral tablet, extended release  -- 1 tab(s) by mouth once a day  -- Indication: For Chronic congestive heart failure, unspecified congestive heart failure type    furosemide 20 mg oral tablet  -- 1 tab(s) by mouth once a day  -- Indication: For Chronic congestive heart failure, unspecified congestive heart failure type

## 2018-02-21 NOTE — PROGRESS NOTE ADULT - ASSESSMENT
Acute Systolic CHF   - lasix,  asa   -started on lisinopril, metoprolol   -right sided heart failure on CT Chest  - 25% EF on echo  Patient was offered cath, but refused. Proceed with coronary CTA, as originally planned.   increased BB metoprolol succinate xl to 50 mg  C/w lisinopril 5 mg daily  -lasix 20 mg po daily,  Patient was offered AICD by EP, given high risk for SCD, at this time he is hesitant; will recommend at least WCD Life Vest prior to discharge for protection from SCD, if no ICD this admission.        dvt ppx: lovenox

## 2018-02-21 NOTE — DISCHARGE NOTE ADULT - HOSPITAL COURSE
67 year old male with no significant PMH not taking any medications, poor historian, presented with complains of shortness of breath and dyspnea on exertion for past few weeks.     1.Acute systolic CHF sec to non ischemic cardiomyopathy.  Pt refused cardiac cath.   CCTA showed normal coronaries. MRI heart showed 1. Findings of dilated nonischemic cardiomyopathy.   2. Severe decrease in biventricular ejection fraction, LVEF-14.6%; RVEF 18%.   3. No evidence of delayed gadolinium enhancement.     was put on lasix, metoprolol, lisinopril, ASA during hospital stay. Pt was very hesitant during the hospital in getting a cardiac workup and was accusing the staff of doing exams to just make money off him. He finally was willing to have the MRI and CTA coronaries but refused AICD. Pt is willing to get the lifevest. Dr. Guo is signing off on the lifevest. 67 year old male with no significant PMH not taking any medications presented with complains of shortness of breath and dyspnea on exertion for past few weeks.     1.Acute systolic CHF sec to non ischemic cardiomyopathy.  Pt refused cardiac cath.   CCTA showed normal coronaries. MRI heart showed 1. Findings of dilated nonischemic cardiomyopathy.   2. Severe decrease in biventricular ejection fraction, LVEF-14.6%; RVEF 18%.   3. No evidence of delayed gadolinium enhancement.      He was started  on lasix, metoprolol, lisinopril, ASA during hospital stay. Pt was very hesitant during the hospital in getting a cardiac workup. He finally was willing to have the MRI and CTA coronaries but refused AICD. Pt is willing to get the lifevest. Dr. Guo is signing off on the lifevest.

## 2018-02-22 RX ORDER — METOPROLOL TARTRATE 50 MG
50 TABLET ORAL ONCE
Qty: 0 | Refills: 0 | Status: COMPLETED | OUTPATIENT
Start: 2018-02-23 | End: 2018-02-23

## 2018-02-22 RX ORDER — METOPROLOL TARTRATE 50 MG
50 TABLET ORAL ONCE
Qty: 0 | Refills: 0 | Status: DISCONTINUED | OUTPATIENT
Start: 2018-02-22 | End: 2018-02-22

## 2018-02-22 RX ADMIN — Medication 81 MILLIGRAM(S): at 12:15

## 2018-02-22 RX ADMIN — Medication 20 MILLIGRAM(S): at 06:30

## 2018-02-22 RX ADMIN — LISINOPRIL 5 MILLIGRAM(S): 2.5 TABLET ORAL at 06:30

## 2018-02-22 RX ADMIN — Medication 50 MILLIGRAM(S): at 06:30

## 2018-02-22 RX ADMIN — ENOXAPARIN SODIUM 40 MILLIGRAM(S): 100 INJECTION SUBCUTANEOUS at 21:36

## 2018-02-22 NOTE — PROGRESS NOTE ADULT - SUBJECTIVE AND OBJECTIVE BOX
Patient is a 67y old  Male.  He was seen and examined.  Denies chest pain, sob, dizzines.    PAST MEDICAL & SURGICAL HISTORY:  No pertinent past medical history  No significant past surgical history      Allergies  penicillin (Rash)    MEDICATIONS  (STANDING):  aspirin enteric coated 81 milliGRAM(s) Oral daily  enoxaparin Injectable 40 milliGRAM(s) SubCutaneous at bedtime  furosemide    Tablet 20 milliGRAM(s) Oral daily  lisinopril 5 milliGRAM(s) Oral daily  metoprolol succinate ER 50 milliGRAM(s) Oral daily    MEDICATIONS  (PRN):    Vital Signs Last 24 Hrs  T(C): 35.9  T(F): 96.6  HR: 84  BP: 112/61  BP(mean): --  RR: 18  SpO2: --  O/E:  Awake, alert, not in distress.  HEENT: atraumatic, EOMI.  Chest: clear.  CVS: SIS2 +, no murmur.  P/A: Soft, BS+  CNS: non focal.  Ext: no edema feet.  Skin: no rash, no ulcers.  All systems reviewed positive findings as above.                      14.6   3.92<L> )-----------( 205      ( 21 Feb 2018 07:37 )             44.6     02-21    139  |  105  |  21<H>  ----------------------------<  94  4.5   |  29  |  1.3    Ca    9.1      21 Feb 2018 07:37PT/INR - ( 21 Feb 2018 07:37 )   PT: 12.60 sec;   INR: 1.16 ratio

## 2018-02-22 NOTE — PROGRESS NOTE ADULT - ASSESSMENT
67 year old male with no significant PMH not taking any medications, poor historian, presented with complains of shortness of breath and dyspnea on exertion for past few weeks.     1.Acute systolic CHF sec to non ischemic cardiomyopathy.  Pt refused cardiac cath  Pt was seen by EP-Awaiting CCTA  If NICM them may need AICD placement.  ct lasix, metoprolol, lisinopril, ASA.  give metoprolol tartrate 50 mg in the morning before the CTA coronary exam to keep HR in 60s, hold succinate    2. DVT prophylaxis.

## 2018-02-22 NOTE — PROGRESS NOTE ADULT - ASSESSMENT
67 year old male with no significant PMH not taking any medications, poor historian, presented with complains of shortness of breath and dyspnea on exertion for past few weeks.     1.Acute systolic CHF sec to non ischemic cardiomyopathy.  Pt refused cardiac cath  Pt was seen by EP-Awaiting CCTA  If NICM them may need AICD placement.  ct lasix, metoprolol, lisinopril, ASA.    2. DVT prophylaxis.

## 2018-02-22 NOTE — CHART NOTE - NSCHARTNOTEFT_GEN_A_CORE
Patient was NPO after midnight for CT coronaries and when I spoke to the patient this morning, he was refusing CT angio even after educating the patient about his severely reduced EF. He was claiming we are performing psychological torture. He was demanding to eat so I ordered him food. Dr. Flanagan stated she will go speak with patient after rounds and I will go over the MRI results with his wife as requested.
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
## 2018-02-22 NOTE — PROGRESS NOTE ADULT - SUBJECTIVE AND OBJECTIVE BOX
SUBJECTIVE:    Patient is a 67y old  Male who presents with a chief complaint of   Currently admitted to medicine with the primary diagnosis of Troponin level elevated     Today is hospital day 4d. This morning he is resting comfortably in bed and reports no new issues or overnight events. pt was refused CTA today and ate his breakfast but changed his mind to getting the CTA tomorrow morning. EP was called to set up the life vest before discharge planning.    PAST MEDICAL & SURGICAL HISTORY  PAST MEDICAL & SURGICAL HISTORY:  No pertinent past medical history  No significant past surgical history    SOCIAL HISTORY:    ALLERGIES:  penicillin (Rash)    MEDICATIONS:  STANDING MEDICATIONS  aspirin enteric coated 81 milliGRAM(s) Oral daily  enoxaparin Injectable 40 milliGRAM(s) SubCutaneous at bedtime  furosemide    Tablet 20 milliGRAM(s) Oral daily  lisinopril 5 milliGRAM(s) Oral daily  metoprolol succinate ER 50 milliGRAM(s) Oral daily    PRN MEDICATIONS    VITALS:   T(F): 96.6  HR: 84  BP: 112/61  RR: 18  SpO2: --    LABS:                        14.6   3.92  )-----------( 205      ( 21 Feb 2018 07:37 )             44.6     02-21    139  |  105  |  21<H>  ----------------------------<  94  4.5   |  29  |  1.3    Ca    9.1      21 Feb 2018 07:37      PT/INR - ( 21 Feb 2018 07:37 )   PT: 12.60 sec;   INR: 1.16 ratio        PHYSICAL EXAM:  GEN: No acute distress  LUNGS: Clear to auscultation bilaterally   HEART: S1/S2 present. RRR.   ABD: Soft, non-tender, non-distended. Bowel sounds present  EXT: palpable pulses, no edema  NEURO: AAOX3

## 2018-02-22 NOTE — PROGRESS NOTE ADULT - ATTENDING COMMENTS
I discussed with patient the placement of an ICD after completion of ischemic work-up. I discussed rationale, risks/benefits and alternatives. Patient expressed understanding of discussion. Patient refused ICD placement at this time.    He also refused coronary angiogram. He is willing to undergo CTA of coronaries to evaluate for obstructive CAD. Patient is asking for "lifeVest" until he decides whether he wants ICD or not.    I explained to patient risks of not having an implantable ICD including risk of arrhythmias and sudden death. HE expressed understanding and refused ICD.    Will follow patient with you

## 2018-02-23 VITALS
RESPIRATION RATE: 18 BRPM | TEMPERATURE: 96 F | SYSTOLIC BLOOD PRESSURE: 104 MMHG | HEART RATE: 69 BPM | DIASTOLIC BLOOD PRESSURE: 57 MMHG

## 2018-02-23 RX ORDER — ASPIRIN/CALCIUM CARB/MAGNESIUM 324 MG
1 TABLET ORAL
Qty: 90 | Refills: 0 | OUTPATIENT
Start: 2018-02-23 | End: 2018-05-23

## 2018-02-23 RX ORDER — LISINOPRIL 2.5 MG/1
1 TABLET ORAL
Qty: 90 | Refills: 0 | OUTPATIENT
Start: 2018-02-23 | End: 2018-05-23

## 2018-02-23 RX ORDER — FUROSEMIDE 40 MG
1 TABLET ORAL
Qty: 0 | Refills: 0 | COMMUNITY
Start: 2018-02-23

## 2018-02-23 RX ORDER — ASPIRIN/CALCIUM CARB/MAGNESIUM 324 MG
1 TABLET ORAL
Qty: 0 | Refills: 0 | COMMUNITY
Start: 2018-02-23

## 2018-02-23 RX ORDER — LISINOPRIL 2.5 MG/1
1 TABLET ORAL
Qty: 0 | Refills: 0 | COMMUNITY
Start: 2018-02-23

## 2018-02-23 RX ORDER — FUROSEMIDE 40 MG
1 TABLET ORAL
Qty: 90 | Refills: 0 | OUTPATIENT
Start: 2018-02-23 | End: 2018-05-23

## 2018-02-23 RX ORDER — METOPROLOL TARTRATE 50 MG
1 TABLET ORAL
Qty: 0 | Refills: 0 | COMMUNITY
Start: 2018-02-23

## 2018-02-23 RX ORDER — METOPROLOL TARTRATE 50 MG
1 TABLET ORAL
Qty: 90 | Refills: 0 | OUTPATIENT
Start: 2018-02-23 | End: 2018-05-23

## 2018-02-23 RX ADMIN — Medication 81 MILLIGRAM(S): at 12:27

## 2018-02-23 RX ADMIN — LISINOPRIL 5 MILLIGRAM(S): 2.5 TABLET ORAL at 06:23

## 2018-02-23 RX ADMIN — Medication 50 MILLIGRAM(S): at 07:45

## 2018-02-23 RX ADMIN — Medication 50 MILLIGRAM(S): at 06:23

## 2018-02-23 RX ADMIN — Medication 20 MILLIGRAM(S): at 06:23

## 2018-02-23 NOTE — PROGRESS NOTE ADULT - ASSESSMENT
-HFrEF, newly diagnosed, now patient is compensating, nonischemic cardiomyopathy -Syncope, possible underlying arrhythmia in the differential    Plan:  - EP consult appreciated.  -c/w metoprolol succinate xl  -C/w lisinopril 5 mg daily  -Switch to lasix 20 mg po daily  - Patient was offered AICD by EP, given high risk for SCD, at this time he is hesitant; will recommend at least WCD Life Vest prior to discharge for protection from SCD, if no ICD this admission.  - Cardiac MR ordered by EP - noted the results.  D/c home today.  F/u in 2-3 weeks
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
## 2018-02-26 PROBLEM — Z00.00 ENCOUNTER FOR PREVENTIVE HEALTH EXAMINATION: Status: ACTIVE | Noted: 2018-02-26

## 2018-02-28 DIAGNOSIS — I21.4 NON-ST ELEVATION (NSTEMI) MYOCARDIAL INFARCTION: ICD-10-CM

## 2018-02-28 DIAGNOSIS — I50.21 ACUTE SYSTOLIC (CONGESTIVE) HEART FAILURE: ICD-10-CM

## 2018-02-28 DIAGNOSIS — I42.9 CARDIOMYOPATHY, UNSPECIFIED: ICD-10-CM

## 2018-02-28 DIAGNOSIS — R06.02 SHORTNESS OF BREATH: ICD-10-CM

## 2018-02-28 DIAGNOSIS — I49.9 CARDIAC ARRHYTHMIA, UNSPECIFIED: ICD-10-CM

## 2018-02-28 DIAGNOSIS — I25.10 ATHEROSCLEROTIC HEART DISEASE OF NATIVE CORONARY ARTERY WITHOUT ANGINA PECTORIS: ICD-10-CM

## 2018-02-28 DIAGNOSIS — E16.2 HYPOGLYCEMIA, UNSPECIFIED: ICD-10-CM

## 2018-03-06 DIAGNOSIS — Z53.29 PROCEDURE AND TREATMENT NOT CARRIED OUT BECAUSE OF PATIENT'S DECISION FOR OTHER REASONS: ICD-10-CM

## 2018-03-12 ENCOUNTER — APPOINTMENT (OUTPATIENT)
Dept: CARDIOLOGY | Facility: CLINIC | Age: 68
End: 2018-03-12

## 2018-03-12 VITALS
BODY MASS INDEX: 19.18 KG/M2 | SYSTOLIC BLOOD PRESSURE: 116 MMHG | HEIGHT: 71 IN | DIASTOLIC BLOOD PRESSURE: 80 MMHG | HEART RATE: 77 BPM | WEIGHT: 137 LBS

## 2018-03-12 DIAGNOSIS — Z82.49 FAMILY HISTORY OF ISCHEMIC HEART DISEASE AND OTHER DISEASES OF THE CIRCULATORY SYSTEM: ICD-10-CM

## 2018-03-12 DIAGNOSIS — I50.20 UNSPECIFIED SYSTOLIC (CONGESTIVE) HEART FAILURE: ICD-10-CM

## 2018-03-12 RX ORDER — LISINOPRIL 5 MG/1
5 TABLET ORAL
Qty: 90 | Refills: 3 | Status: ACTIVE | COMMUNITY
Start: 1900-01-01 | End: 1900-01-01

## 2018-03-12 RX ORDER — ASPIRIN 81 MG
81 TABLET, DELAYED RELEASE (ENTERIC COATED) ORAL DAILY
Refills: 0 | Status: ACTIVE | COMMUNITY

## 2018-03-12 RX ORDER — METOPROLOL SUCCINATE 50 MG/1
50 TABLET, EXTENDED RELEASE ORAL DAILY
Qty: 90 | Refills: 3 | Status: ACTIVE | COMMUNITY
Start: 1900-01-01 | End: 1900-01-01

## 2018-04-15 ENCOUNTER — INPATIENT (INPATIENT)
Facility: HOSPITAL | Age: 68
LOS: 4 days | Discharge: HOME HEALTH | End: 2018-04-20
Attending: HOSPITALIST | Admitting: HOSPITALIST

## 2018-04-15 VITALS
DIASTOLIC BLOOD PRESSURE: 88 MMHG | TEMPERATURE: 97 F | RESPIRATION RATE: 18 BRPM | OXYGEN SATURATION: 99 % | SYSTOLIC BLOOD PRESSURE: 135 MMHG | HEART RATE: 78 BPM

## 2018-04-15 LAB
ALBUMIN SERPL ELPH-MCNC: 3.5 G/DL — SIGNIFICANT CHANGE UP (ref 3.5–5.2)
ALP SERPL-CCNC: 92 U/L — SIGNIFICANT CHANGE UP (ref 30–115)
ALT FLD-CCNC: 119 U/L — HIGH (ref 0–41)
ANION GAP SERPL CALC-SCNC: 15 MMOL/L — HIGH (ref 7–14)
AST SERPL-CCNC: 73 U/L — HIGH (ref 0–41)
BASOPHILS # BLD AUTO: 0.02 K/UL — SIGNIFICANT CHANGE UP (ref 0–0.2)
BASOPHILS NFR BLD AUTO: 0.5 % — SIGNIFICANT CHANGE UP (ref 0–1)
BILIRUB SERPL-MCNC: 0.9 MG/DL — SIGNIFICANT CHANGE UP (ref 0.2–1.2)
BUN SERPL-MCNC: 22 MG/DL — HIGH (ref 10–20)
CALCIUM SERPL-MCNC: 8.6 MG/DL — SIGNIFICANT CHANGE UP (ref 8.5–10.1)
CHLORIDE SERPL-SCNC: 103 MMOL/L — SIGNIFICANT CHANGE UP (ref 98–110)
CO2 SERPL-SCNC: 21 MMOL/L — SIGNIFICANT CHANGE UP (ref 17–32)
CREAT SERPL-MCNC: 1.3 MG/DL — SIGNIFICANT CHANGE UP (ref 0.7–1.5)
EOSINOPHIL # BLD AUTO: 0.03 K/UL — SIGNIFICANT CHANGE UP (ref 0–0.7)
EOSINOPHIL NFR BLD AUTO: 0.7 % — SIGNIFICANT CHANGE UP (ref 0–8)
GLUCOSE SERPL-MCNC: 106 MG/DL — HIGH (ref 70–99)
HCT VFR BLD CALC: 39.4 % — LOW (ref 42–52)
HGB BLD-MCNC: 13.4 G/DL — LOW (ref 14–18)
IMM GRANULOCYTES NFR BLD AUTO: 0.2 % — SIGNIFICANT CHANGE UP (ref 0.1–0.3)
LYMPHOCYTES # BLD AUTO: 1.11 K/UL — LOW (ref 1.2–3.4)
LYMPHOCYTES # BLD AUTO: 25.1 % — SIGNIFICANT CHANGE UP (ref 20.5–51.1)
MCHC RBC-ENTMCNC: 32.4 PG — HIGH (ref 27–31)
MCHC RBC-ENTMCNC: 34 G/DL — SIGNIFICANT CHANGE UP (ref 32–37)
MCV RBC AUTO: 95.4 FL — HIGH (ref 80–94)
MONOCYTES # BLD AUTO: 0.37 K/UL — SIGNIFICANT CHANGE UP (ref 0.1–0.6)
MONOCYTES NFR BLD AUTO: 8.4 % — SIGNIFICANT CHANGE UP (ref 1.7–9.3)
NEUTROPHILS # BLD AUTO: 2.89 K/UL — SIGNIFICANT CHANGE UP (ref 1.4–6.5)
NEUTROPHILS NFR BLD AUTO: 65.1 % — SIGNIFICANT CHANGE UP (ref 42.2–75.2)
NRBC # BLD: 0 /100 WBCS — SIGNIFICANT CHANGE UP (ref 0–0)
PLATELET # BLD AUTO: 141 K/UL — SIGNIFICANT CHANGE UP (ref 130–400)
POTASSIUM SERPL-MCNC: 5 MMOL/L — SIGNIFICANT CHANGE UP (ref 3.5–5)
POTASSIUM SERPL-SCNC: 5 MMOL/L — SIGNIFICANT CHANGE UP (ref 3.5–5)
PROT SERPL-MCNC: 5.8 G/DL — LOW (ref 6–8)
RBC # BLD: 4.13 M/UL — LOW (ref 4.7–6.1)
RBC # FLD: 13.9 % — SIGNIFICANT CHANGE UP (ref 11.5–14.5)
SODIUM SERPL-SCNC: 139 MMOL/L — SIGNIFICANT CHANGE UP (ref 135–146)
WBC # BLD: 4.43 K/UL — LOW (ref 4.8–10.8)
WBC # FLD AUTO: 4.43 K/UL — LOW (ref 4.8–10.8)

## 2018-04-15 RX ORDER — ATORVASTATIN CALCIUM 80 MG/1
40 TABLET, FILM COATED ORAL AT BEDTIME
Qty: 0 | Refills: 0 | Status: DISCONTINUED | OUTPATIENT
Start: 2018-04-15 | End: 2018-04-20

## 2018-04-15 RX ORDER — ASPIRIN/CALCIUM CARB/MAGNESIUM 324 MG
81 TABLET ORAL DAILY
Qty: 0 | Refills: 0 | Status: DISCONTINUED | OUTPATIENT
Start: 2018-04-15 | End: 2018-04-20

## 2018-04-15 RX ORDER — METOPROLOL TARTRATE 50 MG
25 TABLET ORAL DAILY
Qty: 0 | Refills: 0 | Status: DISCONTINUED | OUTPATIENT
Start: 2018-04-15 | End: 2018-04-20

## 2018-04-15 RX ORDER — METOPROLOL TARTRATE 50 MG
50 TABLET ORAL DAILY
Qty: 0 | Refills: 0 | Status: DISCONTINUED | OUTPATIENT
Start: 2018-04-15 | End: 2018-04-15

## 2018-04-15 RX ORDER — ASPIRIN/CALCIUM CARB/MAGNESIUM 324 MG
325 TABLET ORAL ONCE
Qty: 0 | Refills: 0 | Status: COMPLETED | OUTPATIENT
Start: 2018-04-15 | End: 2018-04-15

## 2018-04-15 RX ORDER — FUROSEMIDE 40 MG
20 TABLET ORAL DAILY
Qty: 0 | Refills: 0 | Status: DISCONTINUED | OUTPATIENT
Start: 2018-04-15 | End: 2018-04-20

## 2018-04-15 RX ORDER — LISINOPRIL 2.5 MG/1
5 TABLET ORAL DAILY
Qty: 0 | Refills: 0 | Status: DISCONTINUED | OUTPATIENT
Start: 2018-04-15 | End: 2018-04-20

## 2018-04-15 RX ADMIN — Medication 325 MILLIGRAM(S): at 20:47

## 2018-04-15 RX ADMIN — ATORVASTATIN CALCIUM 40 MILLIGRAM(S): 80 TABLET, FILM COATED ORAL at 21:59

## 2018-04-15 NOTE — ED PROVIDER NOTE - OBJECTIVE STATEMENT
67y m hx HTN presents w vision change. States that yesterday noticed that objects he looked at seemed "dim," now improved and brightening. Has had similar in the past which resolved spontaneously. + HA earlier this AM which has since resolved. No hearing or swallowing issues. No syncope. No CP, SOB. No abdominal pain, n/v/d. No head trauma. Patient has scheduled appt w eye MD tomorrow.

## 2018-04-15 NOTE — H&P ADULT - ASSESSMENT
68 y/o M with PMHx dilated nonischemic cardiomyopathy (echo from 2/2018 showed EF 14%), hx of noncompliance, very poor overall historian presenting for presenting for sudden onset vision changes; CT Head shows bilateral occipital lobe age-indeterminate infarcts; however, the right occipital lobe infarct appears acute to subacute and the left occipital lobe infarct appears chronic.    1. Vision Changes 2/2 Acute/Subacute R Occipital Infarct and Chronic L Occipital Lobe Infarct (Thombotic vs Embolitic, vs Systemic Hypoperfusion)   -will consult Neurology  -c/w ASA, check lipid panel (last one from 2/2018 only significant for ), start statin  -repeat CT Head if pt has worsening of visual sx  -check coags  -admit to telemetry for any arrhthymias throwing emboli, no afowill consult EP to interrogate events on life vest  -prevent episodes of hypotension [age] y/o M with PMHx dilated nonischemic cardiomyopathy (echo from 2/2018 showed EF 14%), hx of noncompliance, very poor overall historian presenting for presenting for sudden onset vision changes; CT Head shows bilateral occipital lobe age-indeterminate infarcts; however, the right occipital lobe infarct appears acute to subacute and the left occipital lobe infarct appears chronic.    1. Vision Changes 2/2 Acute/Subacute R Occipital Infarct and Chronic L Occipital Lobe Infarct (MOST likely due to Systemic Hypoperfusion vs Thombotic vs Embolitic)   -will consult Neurology  -c/w ASA, check lipid panel (last one from 2/2018 only significant for ), start statin  -repeat CT Head if pt has worsening of visual sx  -check coags, check A1c  -admit to telemetry for any arrhthymias throwing emboli, no afib noted on EKG, other EKG changes were there on prior one, will consult EP to interrogate events on life vest  -check 2 sets of cardiac enzymes  -prevent episodes of hypotension   -no need for PT, low sodium diet  2. Dilated nonischemic cardiomyopathy  -admit to tele, wife will bring in life vest todd  -c/w ACEi, BB, lasix, 1L fluid restriction daily  3. DVT PPx: hep 5000q8  4. HTN: c/w ACEi, BB  5. CKD Stage 3A likely 2/2 Poor Cardiac Function: avoid nephrotoxic agents [age] y/o M with PMHx dilated nonischemic cardiomyopathy (echo from 2/2018 showed EF 14%), hx of noncompliance, very poor overall historian presenting for presenting for sudden onset vision changes; CT Head shows bilateral occipital lobe age-indeterminate infarcts; however, the right occipital lobe infarct appears acute to subacute and the left occipital lobe infarct appears chronic.    1. Vision Changes 2/2 Acute/Subacute R Occipital Infarct and Chronic L Occipital Lobe Infarct (MOST likely due to Systemic Hypoperfusion vs Thombotic vs Embolitic)   -will consult Neurology  -c/w ASA, check lipid panel (last one from 2/2018 only significant for ), start statin  -repeat CT Head if pt has worsening of visual sx  -check coags, check A1c  -admit to telemetry for any arrythmias throwing emboli, no afib noted on EKG, other EKG changes were there on prior one, will consult EP to interrogate events on life vest  -check 2 sets of cardiac enzymes  -prevent episodes of hypotension   -no need for PT, low sodium diet  2. Dilated nonischemic cardiomyopathy  -admit to tele, wife will bring in life vest todd  -c/w ACEi, BB, lasix, 1L fluid restriction daily  3. DVT PPx: hep 5000q8  4. HTN: c/w ACEi, BB  5. CKD Stage 3A likely 2/2 Poor Cardiac Function: avoid nephrotoxic agents [age] y/o M with PMHx dilated nonischemic cardiomyopathy (echo from 2/2018 showed EF 14%), hx of noncompliance, very poor overall historian presenting for presenting for sudden onset vision changes; CT Head shows bilateral occipital lobe age-indeterminate infarcts; however, the right occipital lobe infarct appears acute to subacute and the left occipital lobe infarct appears chronic.    1. Vision Changes 2/2 Acute/Subacute R Occipital Infarct and Chronic L Occipital Lobe Infarct (MOST likely due to Systemic Hypoperfusion vs Thombotic vs Embolitic)   -will consult Neurology  -c/w ASA, check lipid panel (last one from 2/2018 only significant for ), start statin  -repeat CT Head if pt has worsening of visual sx  -check coags, check A1c  -admit to telemetry for any arrythmias throwing emboli, no afib noted on EKG, other EKG changes were there on prior one, will consult EP to interrogate events on life vest  -check 2 sets of cardiac enzymes  -prevent episodes of hypotension   -no need for PT, low sodium diet  2. Dilated nonischemic cardiomyopathy  -admit to tele, wife will bring in life vest todd  -c/w ACEi, BB (will cut dose in half so as to not drop BP too much in light of stroke), lasix, 1L fluid restriction daily  3. DVT PPx: hep 5000q8  4. HTN: c/w ACEi, BB  5. CKD Stage 3A likely 2/2 Poor Cardiac Function: avoid nephrotoxic agents

## 2018-04-15 NOTE — H&P ADULT - NSHPREVIEWOFSYSTEMS_GEN_ALL_CORE
General: denies constitutional sx  Cardiac: no chest pain, palpitations  Lungs: no resp distress, no cough, recent illness  Abd: no nausea, vomiting  LE: no swelling  Neuro: dimming of vision in both eyes, chronic floaters, +R temporal headache, no focal deficits or weakness in arms or legs

## 2018-04-15 NOTE — H&P ADULT - NSHPPHYSICALEXAM_GEN_ALL_CORE
General: denies any weight loss, fevers/chills  HEENT: no difficulty in speech  Cardiac: S1S2, RRR  Lungs: CTAB  Abd: NTND  LE: no swelling  Neuro: no focal deficits in upper or lower extremities, CN2-12 within normal limits, pupils equally reactive on both sides, peripheral vision equal in all fields

## 2018-04-15 NOTE — H&P ADULT - NSHPLABSRESULTS_GEN_ALL_CORE
13.4   4.43  )-----------( 141      ( 15 Apr 2018 16:17 )             39.4     04-15  139  |  103  |  22<H>  ----------------------------<  106<H>  5.0   |  21  |  1.3  Ca    8.6      15 Apr 2018 16:17  TPro  5.8<L>  /  Alb  3.5  /  TBili  0.9  /  DBili  x   /  AST  73<H>  /  ALT  119<H>  /  AlkPhos  92  04-15  LIVER FUNCTIONS - ( 15 Apr 2018 16:17 )  Alb: 3.5 g/dL / Pro: 5.8 g/dL / ALK PHOS: 92 U/L / ALT: 119 U/L / AST: 73 U/L / GGT: x           CT Head: Bilateral occipital lobe age-indeterminate infarcts; however, the right occipital lobe infarct appears acute to subacute and the left occipital lobe infarct appears chronic.    CT Coronaries 2/2018: normal  Cardiac MR 2/2018: dilated nonischemic cardiomyopathy, LVEF 14.6%, RVEF 18%. No evidence of delayed gadolinium enhancement.  2D Echo 2/2018: 1. Findings of dilated nonischemic cardiomyopathy.  2. Severe decrease in biventricular ejection fraction, LVEF-14.6%; RVEF 18%.  3. No evidence of delayed gadolinium enhancement.

## 2018-04-15 NOTE — H&P ADULT - HISTORY OF PRESENT ILLNESS
68 y/o M with PMHx systolic CHF (echo from 2/2018 showed EF 20-25%), hx of noncompliance, very poor overall historian presenting for presenting for sudden onset vision changes. Night before presentation he took a nap and woke up around 1230am and noticed his vision in both eyes was dimmer than usual. Pt denies one eye being worse than the other, he denies any prodrome, but does state that the vision change was associated with R temporal throbbing headache that lasted for a couple of seconds. Pt has chronic hx of floaters and does not note any change from baseline. At this time he denies any chest pain, difficulty breathing, focal deficits in arms or legs, no numbness/tingling. The following morning the pt states his vision improved in both eyes, but not back to baseline. He is still struggling to read. He sought medical attention bc he had a gut feeling that caused him to come to the ER today.  Of note, pt was admitted to [facility] in 2/2018 for shortness of breath, he was found to have systolic CHF at that time. He was offered AICD, but refused and was discharged with life vest. Pt states he wears it everyday, but did not have it on at the time of interview bc he forgot to put it on in his haste to come to ER. 66 y/o M with PMHx dilated nonischemic cardiomyopathy (echo from 2/2018 showed EF 14%), hx of noncompliance, very poor overall historian presenting for presenting for sudden onset vision changes. Night before presentation he took a nap and woke up around 1230am and noticed his vision in both eyes was dimmer than usual. Pt denies one eye being worse than the other, he denies any prodrome, but does state that the vision change was associated with R temporal throbbing headache that lasted for a couple of seconds. Pt has chronic hx of floaters and does not note any change from baseline. At this time he denies any chest pain, difficulty breathing, focal deficits in arms or legs, no numbness/tingling. The following morning the pt states his vision improved in both eyes, but not back to baseline. He is still struggling to read. He sought medical attention bc he had a gut feeling that caused him to come to the ER today.  Of note, pt was admitted to [facility] in 2/2018 for shortness of breath, he was found to have systolic CHF at that time. He was offered AICD, but refused and was discharged with life vest. Pt states he wears it everyday, but did not have it on at the time of interview bc he forgot to put it on in his haste to come to ER.

## 2018-04-15 NOTE — ED PROVIDER NOTE - MEDICAL DECISION MAKING DETAILS
patient w acute-subacute occipital infarct, + vision sx. will admit for neuro eval. patient agreeable to plan

## 2018-04-15 NOTE — ED PROVIDER NOTE - PHYSICAL EXAMINATION
VITAL SIGNS: I have reviewed nursing notes and confirm.  CONSTITUTIONAL: Well-developed; well-nourished; in no acute distress. comfortable appearing, conversing appropriately   SKIN: Skin exam is warm and dry  HEAD: Normocephalic; atraumatic.  EYES: EOM intact; conjunctiva and sclera clear. VA 20/40 b/l   ENT: No nasal discharge; airway clear.   NECK: Supple; non tender.  CARD: S1, S2 normal; Regular rate and rhythm.  RESP: No wheezes, rales or rhonchi.  ABD: Normal bowel sounds; soft; non-distended; non-tender  EXT: Normal ROM. No LE edema.  NEURO: a&Ox3, CN ii-xii intact, no dysmetria, no pronator drift, 5/5 strength UE and LE b/l, normal sensation, no aphasia, no dysarthria, no droop, normal gait   PSYCH: Cooperative, appropriate.

## 2018-04-15 NOTE — ED PROVIDER NOTE - NS ED ROS FT
Review of Systems    Constitutional: (-) fever  Cardiovascular: (-) chest pain, (-) syncope  Respiratory: (-) cough, (-) shortness of breath  Gastrointestinal: (-) vomiting, (-) diarrhea, (-) abdominal pain  Musculoskeletal: (-) neck pain, (-) back pain, (-) joint pain  Integumentary: (-) rash, (-) edema  Neurological: + headache, vision change (-) altered mental status    Except as documented in the HPI, all other systems are negative.

## 2018-04-15 NOTE — H&P ADULT - ATTENDING COMMENTS
I fully agree with the resident's history exam and assessment after my independent history exam and assessment.  I discussed the care at length with the patient and his wife in the hallway of the ER and explained the expected care plan.  I reviewed the neurology assessment and plan and will follow through in the next 24 hours.

## 2018-04-16 LAB
ANION GAP SERPL CALC-SCNC: 18 MMOL/L — HIGH (ref 7–14)
APTT BLD: 26.2 SEC — LOW (ref 27–39.2)
BUN SERPL-MCNC: 31 MG/DL — HIGH (ref 10–20)
CALCIUM SERPL-MCNC: 8.5 MG/DL — SIGNIFICANT CHANGE UP (ref 8.5–10.1)
CHLORIDE SERPL-SCNC: 104 MMOL/L — SIGNIFICANT CHANGE UP (ref 98–110)
CHOLEST SERPL-MCNC: 149 MG/DL — SIGNIFICANT CHANGE UP (ref 100–200)
CK MB CFR SERPL CALC: 2 NG/ML — SIGNIFICANT CHANGE UP (ref 0.6–6.3)
CK MB CFR SERPL CALC: 2.3 NG/ML — SIGNIFICANT CHANGE UP (ref 0.6–6.3)
CK SERPL-CCNC: 216 U/L — SIGNIFICANT CHANGE UP (ref 0–225)
CK SERPL-CCNC: 426 U/L — HIGH (ref 0–225)
CO2 SERPL-SCNC: 20 MMOL/L — SIGNIFICANT CHANGE UP (ref 17–32)
CREAT SERPL-MCNC: 1.4 MG/DL — SIGNIFICANT CHANGE UP (ref 0.7–1.5)
ESTIMATED AVERAGE GLUCOSE: 134 MG/DL — HIGH (ref 68–114)
GLUCOSE SERPL-MCNC: 95 MG/DL — SIGNIFICANT CHANGE UP (ref 70–99)
HBA1C BLD-MCNC: 6.3 % — HIGH (ref 4–5.6)
HDLC SERPL-MCNC: 36 MG/DL — LOW (ref 40–125)
INR BLD: 1.42 RATIO — HIGH (ref 0.65–1.3)
LIPID PNL WITH DIRECT LDL SERPL: 107 MG/DL — SIGNIFICANT CHANGE UP (ref 4–129)
MAGNESIUM SERPL-MCNC: 2 MG/DL — SIGNIFICANT CHANGE UP (ref 1.8–2.4)
POTASSIUM SERPL-MCNC: 4.8 MMOL/L — SIGNIFICANT CHANGE UP (ref 3.5–5)
POTASSIUM SERPL-SCNC: 4.8 MMOL/L — SIGNIFICANT CHANGE UP (ref 3.5–5)
PROTHROM AB SERPL-ACNC: 15.4 SEC — HIGH (ref 9.95–12.87)
SODIUM SERPL-SCNC: 142 MMOL/L — SIGNIFICANT CHANGE UP (ref 135–146)
TOTAL CHOLESTEROL/HDL RATIO MEASUREMENT: 4.1 RATIO — SIGNIFICANT CHANGE UP (ref 4–5.5)
TRIGL SERPL-MCNC: 78 MG/DL — SIGNIFICANT CHANGE UP (ref 10–149)
TROPONIN T SERPL-MCNC: <0.01 NG/ML — SIGNIFICANT CHANGE UP
TROPONIN T SERPL-MCNC: <0.01 NG/ML — SIGNIFICANT CHANGE UP

## 2018-04-16 RX ADMIN — Medication 81 MILLIGRAM(S): at 12:17
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
## 2018-04-16 NOTE — CONSULT NOTE ADULT - ATTENDING COMMENTS
Assessment:  This is a 67y Male with PMH as above, presenting with vision changes and HA, B/L occipital lobe infarcts seen on CT subacute/chronic, cryptogenic.  Pt refusing additional switch in medications despite risks outweighed by benefits.  Amenable to further w/u.    Plan:   - MRI Brain NC  - MRA Head/NEck  - Echo  - Telemetry  - check antiphospholipid abs, notch 3 gene mutation, hgbA1c, ESR, CRP, BAUTISTA, ANCA, SSA/B       04-16-18 @ 10:21

## 2018-04-16 NOTE — CONSULT NOTE ADULT - ASSESSMENT
Assessment:  This is a 67y Male with PMH as above, presenting with vision changes and HA, B/L occipital lobe infarcts seen on CT.   Plan:   - MRI Brain      04-16-18 @ 10:21

## 2018-04-17 RX ADMIN — Medication 25 MILLIGRAM(S): at 06:04

## 2018-04-17 RX ADMIN — Medication 20 MILLIGRAM(S): at 06:04

## 2018-04-17 RX ADMIN — Medication 81 MILLIGRAM(S): at 12:28

## 2018-04-17 NOTE — PROGRESS NOTE ADULT - ASSESSMENT
68 yo male with PMH as above, presenting with vision changes and HA, b/l occipital lobe infarcts, subacute/chronic seen on CT. No new complaints.    Plan:  - F/u MRI  - F/u HbA1c, antibodies  - BP control

## 2018-04-17 NOTE — PROGRESS NOTE ADULT - SUBJECTIVE AND OBJECTIVE BOX
Neurology Progress Note    Interval History:    No acute complaints. Pt denies HA, blurry vision, or diplopia, however he was unable to read words on the TV screen due to glare, although able to see clock without difficulty.       Vital Signs Last 24 Hrs  T(C): 35.9 (17 Apr 2018 08:02), Max: 35.9 (16 Apr 2018 16:43)  T(F): 96.7 (17 Apr 2018 08:02), Max: 96.7 (17 Apr 2018 08:02)  HR: 84 (17 Apr 2018 08:02) (84 - 86)  BP: 128/85 (17 Apr 2018 08:02) (112/74 - 155/84)  BP(mean): --  RR: 18 (17 Apr 2018 08:02) (18 - 18)  SpO2: 96% (16 Apr 2018 16:43) (96% - 96%)    Neurological Exam:   Mental status: Awake, alert and oriented x3.  Recent and remote memory intact.  Naming, repetition and comprehension intact.  Attention/concentration intact.  No dysarthria, no aphasia.  Fund of knowledge appropriate.    Cranial nerves: Pupils equally round and reactive to light, visual fields full, no nystagmus, extraocular muscles intact, V1 through V3 intact bilaterally and symmetric, face symmetric, hearing intact to finger rub, palate elevation symmetric, tongue was midline.  Motor:  MRC grading 5/5 b/l UE/LE.   strength 5/5.  Normal tone and bulk.  No abnormal movements.    Sensation: Intact to light touch, proprioception, and pinprick.   Coordination: No dysmetria on finger-to-nose and heel-to-shin.  No dysdiadokinesia.  Reflexes: 2+ in bilateral UE/LE, downgoing toes bilaterally. (-) Gann.  Gait: Narrow and steady. No ataxia.  Romberg negative    Medications:  MEDICATIONS  (STANDING):  aspirin enteric coated 81 milliGRAM(s) Oral daily  atorvastatin 40 milliGRAM(s) Oral at bedtime  furosemide    Tablet 20 milliGRAM(s) Oral daily  lisinopril 5 milliGRAM(s) Oral daily  metoprolol succinate ER 25 milliGRAM(s) Oral daily    MEDICATIONS  (PRN):  aluminum hydroxide/magnesium hydroxide/simethicone Suspension 30 milliLiter(s) Oral every 6 hours PRN Dyspepsia      Labs:  CBC Full  -  ( 15 Apr 2018 16:17 )  WBC Count : 4.43 K/uL  Hemoglobin : 13.4 g/dL  Hematocrit : 39.4 %  Platelet Count - Automated : 141 K/uL  Mean Cell Volume : 95.4 fL  Mean Cell Hemoglobin : 32.4 pg  Mean Cell Hemoglobin Concentration : 34.0 g/dL  Auto Neutrophil # : 2.89 K/uL  Auto Lymphocyte # : 1.11 K/uL  Auto Monocyte # : 0.37 K/uL  Auto Eosinophil # : 0.03 K/uL  Auto Basophil # : 0.02 K/uL  Auto Neutrophil % : 65.1 %  Auto Lymphocyte % : 25.1 %  Auto Monocyte % : 8.4 %  Auto Eosinophil % : 0.7 %  Auto Basophil % : 0.5 %    04-16    142  |  104  |  31<H>  ----------------------------<  95  4.8   |  20  |  1.4    Ca    8.5      16 Apr 2018 07:32  Mg     2.0     04-16    TPro  5.8<L>  /  Alb  3.5  /  TBili  0.9  /  DBili  x   /  AST  73<H>  /  ALT  119<H>  /  AlkPhos  92  04-15    LIVER FUNCTIONS - ( 15 Apr 2018 16:17 )  Alb: 3.5 g/dL / Pro: 5.8 g/dL / ALK PHOS: 92 U/L / ALT: 119 U/L / AST: 73 U/L / GGT: x           PT/INR - ( 16 Apr 2018 07:32 )   PT: 15.40 sec;   INR: 1.42 ratio         PTT - ( 16 Apr 2018 07:32 )  PTT:26.2 sec

## 2018-04-17 NOTE — PROVIDER CONTACT NOTE (OTHER) - ASSESSMENT
Patient tachycardic and noted with PVCS, bp: 121/74, pulse 158. Patient denies headache, dizziness, chest pain, sob, states that vision is improving.

## 2018-04-17 NOTE — PROGRESS NOTE ADULT - ASSESSMENT
[age] y/o M with PMHx dilated nonischemic cardiomyopathy (echo from 2/2018 showed EF 14%), presenting for sudden onset vision changes;     1. Vision Changes 2/2 Acute/Subacute R Occipital Infarct and Chronic L Occipital Lobe Infarct   -Neurology following   -Keep on Aspirin and Atorvastatin  -Pending MRI/MRA head and neck  -Pending 2D echo  -Hypercoagulable profile ordered    2. Dilated nonischemic cardiomyopathy    -Tele monitoring  -Keep Lisinopril and metoprolol  -Keep on Life Vest     3. CKD Stage 3A       stable    #DVT PPx: Hep SC

## 2018-04-17 NOTE — PROGRESS NOTE ADULT - SUBJECTIVE AND OBJECTIVE BOX
SAMANTHA CHARLES    Patient is a 67y old  Male who presents with a chief complaint of sudden onset vision loss,       Interval History/Overnight events:    States his vision loss is improving    T(C): 35.9 (04-17-18 @ 08:02), Max: 35.9 (04-16-18 @ 16:43)  HR: 84 (04-17-18 @ 08:02)  BP: 128/85 (04-17-18 @ 08:02)  RR: 18 (04-17-18 @ 08:02)  SpO2: 96% (04-16-18 @ 16:43)    PHYSICAL EXAM:    GENERAL: comfortable, not in acute distress  HEENT: Anicteric sclera, EOMI  CHEST/LUNG: Clear to auscultation bilaterally  HEART: Regular rate and rhythm; No murmurs, rubs, or gallops  ABDOMEN: Soft, Nontender, Nondistended; Bowel sounds present  EXTREMITIES: No clubbing, cyanosis, or edema      LABS:                          13.4   4.43  )-----------( 141      ( 15 Apr 2018 16:17 )             39.4     04-16    142  |  104  |  31<H>  ----------------------------<  95  4.8   |  20  |  1.4    Ca    8.5      16 Apr 2018 07:32  Mg     2.0     04-16    TPro  5.8<L>  /  Alb  3.5  /  TBili  0.9  /  DBili  x   /  AST  73<H>  /  ALT  119<H>  /  AlkPhos  92  04-15    PT/INR - ( 16 Apr 2018 07:32 )   PT: 15.40 sec;   INR: 1.42 ratio         PTT - ( 16 Apr 2018 07:32 )  PTT:26.2 sec  CARDIAC MARKERS ( 16 Apr 2018 07:32 )  x     / <0.01 ng/mL / 426 U/L / x     / 2.0 ng/mL  CARDIAC MARKERS ( 16 Apr 2018 00:59 )  x     / <0.01 ng/mL / 216 U/L / x     / 2.3 ng/mL          LIVER FUNCTIONS - ( 15 Apr 2018 16:17 )  Alb: 3.5 g/dL / Pro: 5.8 g/dL / ALK PHOS: 92 U/L / ALT: 119 U/L / AST: 73 U/L / GGT: x             MEDICATIONS:    MEDICATIONS  (STANDING):  aspirin enteric coated 81 milliGRAM(s) Oral daily  atorvastatin 40 milliGRAM(s) Oral at bedtime  furosemide    Tablet 20 milliGRAM(s) Oral daily  lisinopril 5 milliGRAM(s) Oral daily  metoprolol succinate ER 25 milliGRAM(s) Oral daily      MEDICATIONS  (PRN):  aluminum hydroxide/magnesium hydroxide/simethicone Suspension 30 milliLiter(s) Oral every 6 hours PRN Dyspepsia

## 2018-04-18 LAB
ANION GAP SERPL CALC-SCNC: 12 MMOL/L — SIGNIFICANT CHANGE UP (ref 7–14)
BASOPHILS # BLD AUTO: 0.02 K/UL — SIGNIFICANT CHANGE UP (ref 0–0.2)
BASOPHILS NFR BLD AUTO: 0.5 % — SIGNIFICANT CHANGE UP (ref 0–1)
BUN SERPL-MCNC: 25 MG/DL — HIGH (ref 10–20)
CALCIUM SERPL-MCNC: 8.7 MG/DL — SIGNIFICANT CHANGE UP (ref 8.5–10.1)
CHLORIDE SERPL-SCNC: 105 MMOL/L — SIGNIFICANT CHANGE UP (ref 98–110)
CK SERPL-CCNC: 172 U/L — SIGNIFICANT CHANGE UP (ref 0–225)
CO2 SERPL-SCNC: 27 MMOL/L — SIGNIFICANT CHANGE UP (ref 17–32)
CREAT SERPL-MCNC: 1.2 MG/DL — SIGNIFICANT CHANGE UP (ref 0.7–1.5)
CRP SERPL-MCNC: 0.2 MG/DL — SIGNIFICANT CHANGE UP (ref 0–0.4)
DRVVT SCREEN TO CONFIRM RATIO: SIGNIFICANT CHANGE UP
EOSINOPHIL # BLD AUTO: 0.22 K/UL — SIGNIFICANT CHANGE UP (ref 0–0.7)
EOSINOPHIL NFR BLD AUTO: 5.2 % — SIGNIFICANT CHANGE UP (ref 0–8)
ERYTHROCYTE [SEDIMENTATION RATE] IN BLOOD: 5 MM/HR — SIGNIFICANT CHANGE UP (ref 0–10)
ESTIMATED AVERAGE GLUCOSE: 134 MG/DL — HIGH (ref 68–114)
GLUCOSE SERPL-MCNC: 109 MG/DL — HIGH (ref 70–99)
HBA1C BLD-MCNC: 6.3 % — HIGH (ref 4–5.6)
HCT VFR BLD CALC: 42.1 % — SIGNIFICANT CHANGE UP (ref 42–52)
HGB BLD-MCNC: 14 G/DL — SIGNIFICANT CHANGE UP (ref 14–18)
IMM GRANULOCYTES NFR BLD AUTO: 0.2 % — SIGNIFICANT CHANGE UP (ref 0.1–0.3)
LA NT DPL PPP QL: SIGNIFICANT CHANGE UP
LYMPHOCYTES # BLD AUTO: 1.2 K/UL — SIGNIFICANT CHANGE UP (ref 1.2–3.4)
LYMPHOCYTES # BLD AUTO: 28.4 % — SIGNIFICANT CHANGE UP (ref 20.5–51.1)
MCHC RBC-ENTMCNC: 32.3 PG — HIGH (ref 27–31)
MCHC RBC-ENTMCNC: 33.3 G/DL — SIGNIFICANT CHANGE UP (ref 32–37)
MCV RBC AUTO: 97.2 FL — HIGH (ref 80–94)
MONOCYTES # BLD AUTO: 0.28 K/UL — SIGNIFICANT CHANGE UP (ref 0.1–0.6)
MONOCYTES NFR BLD AUTO: 6.6 % — SIGNIFICANT CHANGE UP (ref 1.7–9.3)
NEUTROPHILS # BLD AUTO: 2.5 K/UL — SIGNIFICANT CHANGE UP (ref 1.4–6.5)
NEUTROPHILS NFR BLD AUTO: 59.1 % — SIGNIFICANT CHANGE UP (ref 42.2–75.2)
NORMALIZED SCT PPP-RTO: SIGNIFICANT CHANGE UP
NORMALIZED SCT PPP-RTO: SIGNIFICANT CHANGE UP RATIO (ref 0–1.16)
PLATELET # BLD AUTO: 156 K/UL — SIGNIFICANT CHANGE UP (ref 130–400)
POTASSIUM SERPL-MCNC: 4.4 MMOL/L — SIGNIFICANT CHANGE UP (ref 3.5–5)
POTASSIUM SERPL-SCNC: 4.4 MMOL/L — SIGNIFICANT CHANGE UP (ref 3.5–5)
RBC # BLD: 4.33 M/UL — LOW (ref 4.7–6.1)
RBC # FLD: 14.1 % — SIGNIFICANT CHANGE UP (ref 11.5–14.5)
SODIUM SERPL-SCNC: 144 MMOL/L — SIGNIFICANT CHANGE UP (ref 135–146)
WBC # BLD: 4.23 K/UL — LOW (ref 4.8–10.8)
WBC # FLD AUTO: 4.23 K/UL — LOW (ref 4.8–10.8)

## 2018-04-18 RX ADMIN — Medication 20 MILLIGRAM(S): at 05:58

## 2018-04-18 RX ADMIN — LISINOPRIL 5 MILLIGRAM(S): 2.5 TABLET ORAL at 12:11

## 2018-04-18 RX ADMIN — Medication 25 MILLIGRAM(S): at 05:58

## 2018-04-18 RX ADMIN — Medication 81 MILLIGRAM(S): at 12:11

## 2018-04-18 NOTE — PROGRESS NOTE ADULT - SUBJECTIVE AND OBJECTIVE BOX
SUBJECTIVE:  Today is hospital day 3d.    Overnight Events: No acute events overnight. No tele events. Spoke with wife patient wore life vest inconsistently but called company and there were no events like A fib while wearing.     PAST MEDICAL & SURGICAL HISTORY  Systolic congestive heart failure, unspecified chronicity  No significant past surgical history    SOCIAL HISTORY:    [ ] Smoker    ALLERGIES:  penicillin (Rash)      MEDICATIONS:  STANDING MEDICATIONS  aspirin enteric coated 81 milliGRAM(s) Oral daily  atorvastatin 40 milliGRAM(s) Oral at bedtime  furosemide    Tablet 20 milliGRAM(s) Oral daily  lisinopril 5 milliGRAM(s) Oral daily  metoprolol succinate ER 25 milliGRAM(s) Oral daily      PRN MEDICATIONS  aluminum hydroxide/magnesium hydroxide/simethicone Suspension 30 milliLiter(s) Oral every 6 hours PRN    VITALS:   T(F): , Max: 96.7 (04-17-18 @ 16:29)  HR:  (81 - 85)  BP:  (125/85 - 130/93)  SpO2:  (99% - 99%)      PHYSICAL EXAM:  GEN:   LUNGS:   HEART:   ABD:   EXT:   NEURO: AAOX  Height (cm): 152.67 (04-17-18 @ 22:33)  Weight (kg): 59 (04-17-18 @ 22:33)  BMI (kg/m2): 25.3 (04-17-18 @ 22:33)  BSA (m2): 1.56 (04-17-18 @ 22:33)  LABS:                        14.0   4.23  )-----------( 156      ( 18 Apr 2018 08:40 )             42.1     04-18    144  |  105  |  25<H>  ----------------------------<  109<H>  4.4   |  27  |  1.2    Ca    8.7      18 Apr 2018 08:40    Creatine Kinase, Serum: 172 U/L (04-18-18 @ 08:40)  Sedimentation Rate, Erythrocyte: 5 mm/Hr (04-18-18 @ 08:40)      CARDIAC MARKERS ( 18 Apr 2018 08:40 )  x     / x     / 172 U/L / x     / x        RADIOLOGY:  < from: Transthoracic Echocardiogram (04.18.18 @ 11:15) >   1. Severely decreased global left ventricular systolic function.   2. LV Ejection Fraction by Tolentino's Method with a biplane EF of 23 %.   3. Spectral Doppler shows impaired relaxation pattern of left ventricular myocardial filling (Grade I diastolic dysfunction).   4. Mild to moderate mitral valve regurgitation.   5. Mild to moderate aortic regurgitation.   6. Mild to moderate pulmonic valve regurgitation.  < end of copied text > SUBJECTIVE:  Today is hospital day 3d.    Overnight Events: No acute events overnight. No tele events. Spoke with wife patient wore life vest inconsistently but called company and there were no events like A fib while wearing.     PAST MEDICAL & SURGICAL HISTORY  Systolic congestive heart failure, unspecified chronicity  No significant past surgical history    SOCIAL HISTORY:    [ ] Smoker    ALLERGIES:  penicillin (Rash)      MEDICATIONS:  STANDING MEDICATIONS  aspirin enteric coated 81 milliGRAM(s) Oral daily  atorvastatin 40 milliGRAM(s) Oral at bedtime  furosemide    Tablet 20 milliGRAM(s) Oral daily  lisinopril 5 milliGRAM(s) Oral daily  metoprolol succinate ER 25 milliGRAM(s) Oral daily      PRN MEDICATIONS  aluminum hydroxide/magnesium hydroxide/simethicone Suspension 30 milliLiter(s) Oral every 6 hours PRN    VITALS:   T(F): , Max: 96.7 (04-17-18 @ 16:29)  HR:  (81 - 85)  BP:  (125/85 - 130/93)  SpO2:  (99% - 99%)      PHYSICAL EXAM:  GEN: NAD  LUNGS: CTA B/L  HEART: RRR S1S2  ABD: (+)BS SOFT NT ND  EXT: (-)C/C/E  NEURO: AAOX 2  Height (cm): 152.67 (04-17-18 @ 22:33)  Weight (kg): 59 (04-17-18 @ 22:33)  BMI (kg/m2): 25.3 (04-17-18 @ 22:33)  BSA (m2): 1.56 (04-17-18 @ 22:33)  LABS:                        14.0   4.23  )-----------( 156      ( 18 Apr 2018 08:40 )             42.1     04-18    144  |  105  |  25<H>  ----------------------------<  109<H>  4.4   |  27  |  1.2    Ca    8.7      18 Apr 2018 08:40    Creatine Kinase, Serum: 172 U/L (04-18-18 @ 08:40)  Sedimentation Rate, Erythrocyte: 5 mm/Hr (04-18-18 @ 08:40)      CARDIAC MARKERS ( 18 Apr 2018 08:40 )  x     / x     / 172 U/L / x     / x        RADIOLOGY:  < from: Transthoracic Echocardiogram (04.18.18 @ 11:15) >   1. Severely decreased global left ventricular systolic function.   2. LV Ejection Fraction by Tolentino's Method with a biplane EF of 23 %.   3. Spectral Doppler shows impaired relaxation pattern of left ventricular myocardial filling (Grade I diastolic dysfunction).   4. Mild to moderate mitral valve regurgitation.   5. Mild to moderate aortic regurgitation.   6. Mild to moderate pulmonic valve regurgitation.  < end of copied text >

## 2018-04-18 NOTE — CONSULT NOTE ADULT - SUBJECTIVE AND OBJECTIVE BOX
68 y/o M with PMHx dilated nonischemic cardiomyopathy (echo from 2/2018 showed EF 20%), hx of noncompliance, very poor overall historian presenting for presenting for sudden onset vision changes. Patient was admitted in February 2018 for SOB and was found to have congestive heart failure and was started on ACE inhibitor and beta blocker and was discharged with a WCD. A CT angiography of the coronaries was done in February 2018 and showed normal coronaries, a cardiac MRI was done also at that time and showed Severe global hypokinesis with   severely decreased ejection fraction, 14.6% with no definite evidence of regional wall motion abnormality. Patient currently is presenting with visual changes and was found to have bilateral occipital infarcts.        PAST MEDICAL & SURGICAL HISTORY:  Systolic congestive heart failure, unspecified chronicity  No significant past surgical history      Vital Signs Last 24 Hrs  T(C): 35.7 (18 Apr 2018 05:07), Max: 35.9 (17 Apr 2018 16:29)  T(F): 96.3 (18 Apr 2018 05:07), Max: 96.7 (17 Apr 2018 16:29)  HR: 82 (18 Apr 2018 05:07) (82 - 85)  BP: 130/79 (18 Apr 2018 05:07) (125/85 - 130/79)  BP(mean): --  RR: 18 (18 Apr 2018 05:07) (18 - 18)  SpO2: 99% (17 Apr 2018 22:33) (99% - 99%)                          14.0   4.23  )-----------( 156      ( 18 Apr 2018 08:40 )             42.1     04-18    144  |  105  |  25<H>  ----------------------------<  109<H>  4.4   |  27  |  1.2    Ca    8.7      18 Apr 2018 08:40      CARDIAC MARKERS ( 18 Apr 2018 08:40 )  x     / x     / 172 U/L / x     / x            < from: Xray Chest 1 View AP/PA (04.15.18 @ 21:42) >  Impression:      Stable trace left pleural effusion with cardiomegaly.      < end of copied text >    < from: CT Head No Cont (04.15.18 @ 17:19) >  Impression:     Bilateral occipital lobe age-indeterminate infarcts; however, the right   occipital lobe infarct appears acute to subacute and the left occipital   lobe infarct appearschronic.    < end of copied text >    2D echo (February 2018)    Summary:   1. Left ventricular ejection fraction, by visual estimation, is 20 to   25%.   2. Severely decreased global left ventricular systolic function.   3. Severely decreased segmental left ventricular systolic function.   4. Spectral Doppler shows pseudonormal pattern of left ventricular   myocardial filling (Grade II diastolic dysfunction).   5. Mild to moderate aortic regurgitation.      MEDICATIONS  (STANDING):  aspirin enteric coated 81 milliGRAM(s) Oral daily  atorvastatin 40 milliGRAM(s) Oral at bedtime  furosemide    Tablet 20 milliGRAM(s) Oral daily  lisinopril 5 milliGRAM(s) Oral daily  metoprolol succinate ER 25 milliGRAM(s) Oral daily 68 y/o M with PMHx dilated nonischemic cardiomyopathy (echo from 2/2018 showed EF 20%), presenting for sudden onset vision changes. Patient was admitted in February 2018 for SOB and was found to have congestive heart failure and was started on ACE inhibitor and beta blocker and was discharged with a WCD. A CT angiography of the coronaries was done in February 2018 and showed normal coronaries, a cardiac MRI was done also at that time and showed Severe global hypokinesis with   severely decreased ejection fraction, 14.6% with no definite evidence of regional wall motion abnormality. Patient currently is presenting with visual changes and was found to have bilateral occipital infarcts. Patient's symptoms currently resolved with no visual field defects. Over the past 1 month patient denied any SOB or chest pain or dizziness. His exercise capacity is adequate.        PAST MEDICAL & SURGICAL HISTORY:  Systolic congestive heart failure, unspecified chronicity  No significant past surgical history      Vital Signs Last 24 Hrs  T(C): 35.7 (18 Apr 2018 05:07), Max: 35.9 (17 Apr 2018 16:29)  T(F): 96.3 (18 Apr 2018 05:07), Max: 96.7 (17 Apr 2018 16:29)  HR: 82 (18 Apr 2018 05:07) (82 - 85)  BP: 130/79 (18 Apr 2018 05:07) (125/85 - 130/79)  BP(mean): --  RR: 18 (18 Apr 2018 05:07) (18 - 18)  SpO2: 99% (17 Apr 2018 22:33) (99% - 99%)      Physical Examination:  GA: NAD, alert  Lungs: GBAE, Clear  Heart: S1, S2, RRR  Extremities: +1 edema                     14.0   4.23  )-----------( 156      ( 18 Apr 2018 08:40 )             42.1     04-18    144  |  105  |  25<H>  ----------------------------<  109<H>  4.4   |  27  |  1.2    Ca    8.7      18 Apr 2018 08:40      CARDIAC MARKERS ( 18 Apr 2018 08:40 )  x     / x     / 172 U/L / x     / x            < from: Xray Chest 1 View AP/PA (04.15.18 @ 21:42) >  Impression:      Stable trace left pleural effusion with cardiomegaly.      < end of copied text >    < from: CT Head No Cont (04.15.18 @ 17:19) >  Impression:     Bilateral occipital lobe age-indeterminate infarcts; however, the right   occipital lobe infarct appears acute to subacute and the left occipital   lobe infarct appearschronic.    < end of copied text >    2D echo (February 2018)    Summary:   1. Left ventricular ejection fraction, by visual estimation, is 20 to   25%.   2. Severely decreased global left ventricular systolic function.   3. Severely decreased segmental left ventricular systolic function.   4. Spectral Doppler shows pseudonormal pattern of left ventricular   myocardial filling (Grade II diastolic dysfunction).   5. Mild to moderate aortic regurgitation.      MEDICATIONS  (STANDING):  aspirin enteric coated 81 milliGRAM(s) Oral daily  atorvastatin 40 milliGRAM(s) Oral at bedtime  furosemide    Tablet 20 milliGRAM(s) Oral daily  lisinopril 5 milliGRAM(s) Oral daily  metoprolol succinate ER 25 milliGRAM(s) Oral daily

## 2018-04-18 NOTE — PROGRESS NOTE ADULT - ATTENDING COMMENTS
I fully agree with the resident's exam/assessment after my independent exam/assessment this morning.  We are awaiting an MRI to determine the next therapeutic steps.
I fully agree with the resident's exam/assessment/plan after my independent exam/assessment.  I am still waiting for the MRI result as well as the ECHO.    Cardio/Neuro to f/u

## 2018-04-18 NOTE — PROGRESS NOTE ADULT - ASSESSMENT
68 y/o M with PMHx dilated NICM (EF 15%), admitted for evaluation of sudden onset vision changes; Since admission pt had CT head demonstrating chronic b/L occipital lobe age-indeterminate infarcts and possible acute to subacute right occipital lobe infarct. Seen by neurology who recommend MRI/MRA head and neck as well as hypercoagulable workup.    # Vision Changes 2/2 Acute/Subacute R Occipital Infarct and Chronic L Occipital Lobe Infarct though symptoms are resolving. Unclear etiology with b/l infarcts. No afib, no LV thrombus on 2dEcho (Echo demonstrates 23%)  -Neurology following    -Keep on Aspirin and Atorvastatin  -Pending MRI/MRA head and neck  -Hypercoagulable profile ordered and drawn this morning  > antiphospholipid abs, notch 3 gene mutation, hgbA1c, ESR, CRP, BAUTISTA, ANCA, SSA/B    # Dilated nonischemic cardiomyopathy - EF 23% improved since previous will need echo in one month   - Tele monitoring, no events  - Keep Lisinopril and metoprolol  - Will need repeat Echo in one more to show improvement if no further improvement then eval for AICD  - furosemide    Tablet 20 milliGRAM(s) Oral daily  - lisinopril 5 milliGRAM(s) Oral daily  - metoprolol succinate ER 25 milliGRAM(s) Oral daily      # CKD Stage 3A - stable     #FENP:  Fluids: None tolerating po  Electrolytes:K+: 4.4, Na: 144, Mg: --, iPh:--  Nutrition: Diet, DASH/TLC:   Sodium & Cholesterol Restricted  1000mL Fluid Restriction ([id number]) (04-15-18 @ 21:47)  Prophylaxis: aspirin enteric coated 81 milliGRAM(s) Oral daily  #Dispo: Home once MRIs complete  #Code Status: Full Code  Any Questions Please Call  x [phone]

## 2018-04-18 NOTE — CONSULT NOTE ADULT - ASSESSMENT
68 y/o M with PMHx dilated nonischemic cardiomyopathy (echo from 2/2018 showed EF 20%), hx of noncompliance, very poor overall historian presenting for presenting for sudden onset vision changes. Patient was admitted in February 2018 for SOB and was found to have congestive heart failure and was started on ACE inhibitor and beta blocker and was discharged with a WCD. A CT angiography of the coronaries was done in February 2018 and showed normal coronaries, a cardiac MRI was done also at that time and showed Severe global hypokinesis with   severely decreased ejection fraction, 14.6% with no definite evidence of regional wall motion abnormality. Patient currently is presenting with visual changes and was found to have bilateral occipital infarcts. 66 y/o M with PMHx dilated nonischemic cardiomyopathy (echo from 2/2018 showed EF 20%), hx of noncompliance, very poor overall historian presenting for presenting for sudden onset vision changes. Patient was admitted in February 2018 for SOB and was found to have congestive heart failure and was started on ACE inhibitor and beta blocker and was discharged with a WCD. A CT angiography of the coronaries was done in February 2018 and showed normal coronaries, a cardiac MRI was done also at that time and showed Severe global hypokinesis with   severely decreased ejection fraction, 14.6% with no definite evidence of regional wall motion abnormality. Patient currently is presenting with visual changes and was found to have bilateral occipital infarcts.      1- Congestive heart failure: Continue with current medications. Patient is wearing his WCD at home. Obtain 2D echo. 68 y/o M with PMHx dilated nonischemic cardiomyopathy (echo from 2/2018 showed EF 20%), hx of noncompliance, very poor overall historian presenting for presenting for sudden onset vision changes. Patient was admitted in February 2018 for SOB and was found to have congestive heart failure and was started on ACE inhibitor and beta blocker and was discharged with a WCD. A CT angiography of the coronaries was done in February 2018 and showed normal coronaries, a cardiac MRI was done also at that time and showed Severe global hypokinesis with   severely decreased ejection fraction, 14.6% with no definite evidence of regional wall motion abnormality. Patient currently is presenting with visual changes and was found to have bilateral occipital infarcts.      1- Congestive heart failure: Continue with current medications. Patient is wearing his WCD at home. Obtain 2D echo.    Attending: Patient seen and examined. Discussed with the cardiology team on rounds.  Acute occipital CVA. Reportedly no AF from the device, but was worn inconsistently.  No AF on tele. Hypercoagulable w/u in progress.  C/w medical therapy for CHF, non-ischemic cardiomyopathy. Add spironolactone.  Neurology follow-up.  Obtain echo with bubble study.   F/u MRI/MRA.

## 2018-04-19 LAB
ALBUMIN SERPL ELPH-MCNC: 3.3 G/DL — LOW (ref 3.5–5.2)
ALP SERPL-CCNC: 67 U/L — SIGNIFICANT CHANGE UP (ref 30–115)
ALT FLD-CCNC: 49 U/L — HIGH (ref 0–41)
ANION GAP SERPL CALC-SCNC: 13 MMOL/L — SIGNIFICANT CHANGE UP (ref 7–14)
AST SERPL-CCNC: 18 U/L — SIGNIFICANT CHANGE UP (ref 0–41)
BASOPHILS # BLD AUTO: 0.02 K/UL — SIGNIFICANT CHANGE UP (ref 0–0.2)
BASOPHILS NFR BLD AUTO: 0.5 % — SIGNIFICANT CHANGE UP (ref 0–1)
BILIRUB DIRECT SERPL-MCNC: 0.2 MG/DL — SIGNIFICANT CHANGE UP (ref 0–0.2)
BILIRUB INDIRECT FLD-MCNC: 0.3 MG/DL — SIGNIFICANT CHANGE UP (ref 0.2–1.2)
BILIRUB SERPL-MCNC: 0.5 MG/DL — SIGNIFICANT CHANGE UP (ref 0.2–1.2)
BUN SERPL-MCNC: 26 MG/DL — HIGH (ref 10–20)
CALCIUM SERPL-MCNC: 8.2 MG/DL — LOW (ref 8.5–10.1)
CHLORIDE SERPL-SCNC: 106 MMOL/L — SIGNIFICANT CHANGE UP (ref 98–110)
CO2 SERPL-SCNC: 23 MMOL/L — SIGNIFICANT CHANGE UP (ref 17–32)
CREAT SERPL-MCNC: 1.2 MG/DL — SIGNIFICANT CHANGE UP (ref 0.7–1.5)
DSDNA AB FLD-ACNC: <0.2 AI — SIGNIFICANT CHANGE UP
ENA SS-A AB FLD IA-ACNC: <0.2 AI — SIGNIFICANT CHANGE UP
EOSINOPHIL # BLD AUTO: 0.24 K/UL — SIGNIFICANT CHANGE UP (ref 0–0.7)
EOSINOPHIL NFR BLD AUTO: 6.2 % — SIGNIFICANT CHANGE UP (ref 0–8)
GLUCOSE SERPL-MCNC: 125 MG/DL — HIGH (ref 70–99)
HCT VFR BLD CALC: 37 % — LOW (ref 42–52)
HGB BLD-MCNC: 12.2 G/DL — LOW (ref 14–18)
IMM GRANULOCYTES NFR BLD AUTO: 0 % — LOW (ref 0.1–0.3)
INR BLD: 1.25 RATIO — SIGNIFICANT CHANGE UP (ref 0.65–1.3)
LYMPHOCYTES # BLD AUTO: 1.13 K/UL — LOW (ref 1.2–3.4)
LYMPHOCYTES # BLD AUTO: 29 % — SIGNIFICANT CHANGE UP (ref 20.5–51.1)
MCHC RBC-ENTMCNC: 32.2 PG — HIGH (ref 27–31)
MCHC RBC-ENTMCNC: 33 G/DL — SIGNIFICANT CHANGE UP (ref 32–37)
MCV RBC AUTO: 97.6 FL — HIGH (ref 80–94)
MONOCYTES # BLD AUTO: 0.23 K/UL — SIGNIFICANT CHANGE UP (ref 0.1–0.6)
MONOCYTES NFR BLD AUTO: 5.9 % — SIGNIFICANT CHANGE UP (ref 1.7–9.3)
NEUTROPHILS # BLD AUTO: 2.27 K/UL — SIGNIFICANT CHANGE UP (ref 1.4–6.5)
NEUTROPHILS NFR BLD AUTO: 58.4 % — SIGNIFICANT CHANGE UP (ref 42.2–75.2)
NRBC # BLD: 0 /100 WBCS — SIGNIFICANT CHANGE UP (ref 0–0)
PLATELET # BLD AUTO: 140 K/UL — SIGNIFICANT CHANGE UP (ref 130–400)
POTASSIUM SERPL-MCNC: 3.9 MMOL/L — SIGNIFICANT CHANGE UP (ref 3.5–5)
POTASSIUM SERPL-SCNC: 3.9 MMOL/L — SIGNIFICANT CHANGE UP (ref 3.5–5)
PROT SERPL-MCNC: 5.2 G/DL — LOW (ref 6–8)
PROTHROM AB SERPL-ACNC: 13.6 SEC — HIGH (ref 9.95–12.87)
RBC # BLD: 3.79 M/UL — LOW (ref 4.7–6.1)
RBC # FLD: 14.1 % — SIGNIFICANT CHANGE UP (ref 11.5–14.5)
SODIUM SERPL-SCNC: 142 MMOL/L — SIGNIFICANT CHANGE UP (ref 135–146)
WBC # BLD: 3.89 K/UL — LOW (ref 4.8–10.8)
WBC # FLD AUTO: 3.89 K/UL — LOW (ref 4.8–10.8)

## 2018-04-19 RX ORDER — SPIRONOLACTONE 25 MG/1
12.5 TABLET, FILM COATED ORAL DAILY
Qty: 0 | Refills: 0 | Status: DISCONTINUED | OUTPATIENT
Start: 2018-04-19 | End: 2018-04-20

## 2018-04-19 RX ADMIN — Medication 81 MILLIGRAM(S): at 12:51

## 2018-04-19 RX ADMIN — Medication 25 MILLIGRAM(S): at 05:54

## 2018-04-19 RX ADMIN — Medication 20 MILLIGRAM(S): at 05:54

## 2018-04-19 NOTE — PROGRESS NOTE ADULT - SUBJECTIVE AND OBJECTIVE BOX
Neurology Progress Note    Interval History:      HPI:  68 y/o M with PMHx dilated nonischemic cardiomyopathy (echo from 2/2018 showed EF 14%), hx of noncompliance, very poor overall historian presenting for presenting for sudden onset vision changes. Night before presentation he took a nap and woke up around 1230am and noticed his vision in both eyes was dimmer than usual. Pt denies one eye being worse than the other, he denies any prodrome, but does state that the vision change was associated with R temporal throbbing headache that lasted for a couple of seconds. Pt has chronic hx of floaters and does not note any change from baseline. At this time he denies any chest pain, difficulty breathing, focal deficits in arms or legs, no numbness/tingling. The following morning the pt states his vision improved in both eyes, but not back to baseline. He is still struggling to read. He sought medical attention bc he had a gut feeling that caused him to come to the ER today.  Of note, pt was admitted to [facility] in 2/2018 for shortness of breath, he was found to have systolic CHF at that time. He was offered AICD, but refused and was discharged with life vest. Pt states he wears it everyday, but did not have it on at the time of interview bc he forgot to put it on in his haste to come to ER. (15 Apr 2018 20:50)      PAST MEDICAL & SURGICAL HISTORY:  Systolic congestive heart failure, unspecified chronicity  No significant past surgical history          Vital Signs Last 24 Hrs  T(C): 36.1 (19 Apr 2018 05:19), Max: 36.1 (18 Apr 2018 20:39)  T(F): 97 (19 Apr 2018 05:19), Max: 97 (18 Apr 2018 20:39)  HR: 84 (19 Apr 2018 05:19) (81 - 85)  BP: 126/76 (19 Apr 2018 05:19) (108/68 - 130/93)  BP(mean): --  RR: 19 (18 Apr 2018 20:39) (19 - 20)  SpO2: --    Neurological Exam:   Mental status: Awake, alert and oriented x3.  Recent and remote memory intact.  Naming, repetition and comprehension intact.  Attention/concentration decreased.  No dysarthria, no aphasia.  Fund of knowledge inappropriate for stated profession.    Cranial nerves: Pupils equally round and reactive to light,VF ? limited superior quadrants.  no nystagmus, extraocular muscles intact, V1 through V3 intact bilaterally and symmetric, face symmetric, hearing intact to finger rub, palate elevation symmetric, tongue was midline.  Motor:  MRC grading 5/5 b/l UE/LE.   strength 5/5.  Normal tone and bulk.  No abnormal movements.    Sensation: Intact to light touch, proprioception, and pinprick.   Coordination: No dysmetria on finger-to-nose and heel-to-shin.  No dysdiadokinesia.  Reflexes: 2+ in bilateral UE/LE, downgoing toes bilaterally.    NIHSS 0    aluminum hydroxide/magnesium hydroxide/simethicone Suspension 30 milliLiter(s) Oral every 6 hours PRN  aspirin enteric coated 81 milliGRAM(s) Oral daily  atorvastatin 40 milliGRAM(s) Oral at bedtime  furosemide    Tablet 20 milliGRAM(s) Oral daily  lisinopril 5 milliGRAM(s) Oral daily  metoprolol succinate ER 25 milliGRAM(s) Oral daily  spironolactone 12.5 milliGRAM(s) Oral daily    Labs:  CBC Full  -  ( 18 Apr 2018 08:40 )  WBC Count : 4.23 K/uL  Hemoglobin : 14.0 g/dL  Hematocrit : 42.1 %  Platelet Count - Automated : 156 K/uL  Mean Cell Volume : 97.2 fL  Mean Cell Hemoglobin : 32.3 pg  Mean Cell Hemoglobin Concentration : 33.3 g/dL  Auto Neutrophil # : 2.50 K/uL  Auto Lymphocyte # : 1.20 K/uL  Auto Monocyte # : 0.28 K/uL  Auto Eosinophil # : 0.22 K/uL  Auto Basophil # : 0.02 K/uL  Auto Neutrophil % : 59.1 %  Auto Lymphocyte % : 28.4 %  Auto Monocyte % : 6.6 %  Auto Eosinophil % : 5.2 %  Auto Basophil % : 0.5 %    04-18    144  |  105  |  25<H>  ----------------------------<  109<H>  4.4   |  27  |  1.2    Ca    8.7      18 Apr 2018 08:40    < from: Transthoracic Echocardiogram (04.18.18 @ 11:15) >  Summary:   1. Severely decreased global left ventricular systolic function.   2. LV Ejection Fraction by Tolentino's Method with a biplane EF of 23 %.   3. Spectral Doppler shows impaired relaxation pattern of left   ventricular myocardial filling (Grade I diastolic dysfunction).   4. Mild to moderate mitral valve regurgitation.   5. Mild to moderate aortic regurgitation.   6. Mild to moderate pulmonic valve regurgitation.  < end of copied text > Neurology Progress Note    Interval History:  CURRENTLY STABLE, STATES VISION IMPROVING.  "EXERCISES" IN HIS ROOM ON CARDIAC MONITOR.      HPI:  68 y/o M with PMHx dilated nonischemic cardiomyopathy (echo from 2/2018 showed EF 14%), hx of noncompliance, very poor overall historian presenting for presenting for sudden onset vision changes. Night before presentation he took a nap and woke up around 1230am and noticed his vision in both eyes was dimmer than usual. Pt denies one eye being worse than the other, he denies any prodrome, but does state that the vision change was associated with R temporal throbbing headache that lasted for a couple of seconds. Pt has chronic hx of floaters and does not note any change from baseline. At this time he denies any chest pain, difficulty breathing, focal deficits in arms or legs, no numbness/tingling. The following morning the pt states his vision improved in both eyes, but not back to baseline. He is still struggling to read. He sought medical attention bc he had a gut feeling that caused him to come to the ER today.  Of note, pt was admitted to [facility] in 2/2018 for shortness of breath, he was found to have systolic CHF at that time. He was offered AICD, but refused and was discharged with life vest. Pt states he wears it everyday, but did not have it on at the time of interview bc he forgot to put it on in his haste to come to ER. (15 Apr 2018 20:50)      PAST MEDICAL & SURGICAL HISTORY:  Systolic congestive heart failure, unspecified chronicity  No significant past surgical history      Vital Signs Last 24 Hrs  T(C): 36.1 (19 Apr 2018 05:19), Max: 36.1 (18 Apr 2018 20:39)  T(F): 97 (19 Apr 2018 05:19), Max: 97 (18 Apr 2018 20:39)  HR: 84 (19 Apr 2018 05:19) (81 - 85)  BP: 126/76 (19 Apr 2018 05:19) (108/68 - 130/93)  BP(mean): --  RR: 19 (18 Apr 2018 20:39) (19 - 20)  SpO2: --    Neurological Exam:   Mental status: Awake, alert and oriented x3.  Recent and remote memory intact.  Naming, repetition and comprehension intact.  Attention/concentration decreased.  No dysarthria, no aphasia.  Fund of knowledge inappropriate for stated profession.    Cranial nerves: Pupils equally round and reactive to light,VF ? limited superior quadrants.  no nystagmus, extraocular muscles intact, V1 through V3 intact bilaterally and symmetric, face symmetric, hearing intact to finger rub, palate elevation symmetric, tongue was midline.  Motor:  MRC grading 5/5 b/l UE/LE.   strength 5/5.  Normal tone and bulk.  No abnormal movements.    Sensation: Intact to light touch, proprioception, and pinprick.   Coordination: No dysmetria on finger-to-nose and heel-to-shin.  No dysdiadokinesia.  Reflexes: 2+ in bilateral UE/LE, downgoing toes bilaterally.    NIHSS 0    aluminum hydroxide/magnesium hydroxide/simethicone Suspension 30 milliLiter(s) Oral every 6 hours PRN  aspirin enteric coated 81 milliGRAM(s) Oral daily  atorvastatin 40 milliGRAM(s) Oral at bedtime  furosemide    Tablet 20 milliGRAM(s) Oral daily  lisinopril 5 milliGRAM(s) Oral daily  metoprolol succinate ER 25 milliGRAM(s) Oral daily  spironolactone 12.5 milliGRAM(s) Oral daily    Labs:  CBC Full  -  ( 18 Apr 2018 08:40 )  WBC Count : 4.23 K/uL  Hemoglobin : 14.0 g/dL  Hematocrit : 42.1 %  Platelet Count - Automated : 156 K/uL  Mean Cell Volume : 97.2 fL  Mean Cell Hemoglobin : 32.3 pg  Mean Cell Hemoglobin Concentration : 33.3 g/dL  Auto Neutrophil # : 2.50 K/uL  Auto Lymphocyte # : 1.20 K/uL  Auto Monocyte # : 0.28 K/uL  Auto Eosinophil # : 0.22 K/uL  Auto Basophil # : 0.02 K/uL  Auto Neutrophil % : 59.1 %  Auto Lymphocyte % : 28.4 %  Auto Monocyte % : 6.6 %  Auto Eosinophil % : 5.2 %  Auto Basophil % : 0.5 %    04-18    144  |  105  |  25<H>  ----------------------------<  109<H>  4.4   |  27  |  1.2    Ca    8.7      18 Apr 2018 08:40    < from: Transthoracic Echocardiogram (04.18.18 @ 11:15) >  Summary:   1. Severely decreased global left ventricular systolic function.   2. LV Ejection Fraction by Tolentino's Method with a biplane EF of 23 %.   3. Spectral Doppler shows impaired relaxation pattern of left   ventricular myocardial filling (Grade I diastolic dysfunction).   4. Mild to moderate mitral valve regurgitation.   5. Mild to moderate aortic regurgitation.   6. Mild to moderate pulmonic valve regurgitation.  < end of copied text >    < from: MR Angio Neck w/ IV Cont (04.18.18 @ 21:53) >  Impression:    Unremarkable MRA of the head and neck.    < end of copied text >

## 2018-04-19 NOTE — PROGRESS NOTE ADULT - SUBJECTIVE AND OBJECTIVE BOX
SUBJ:  Patient seen and examined. Clinically stable. No cardiac complaints.      MEDICATIONS  (STANDING):  aspirin enteric coated 81 milliGRAM(s) Oral daily  atorvastatin 40 milliGRAM(s) Oral at bedtime  furosemide    Tablet 20 milliGRAM(s) Oral daily  lisinopril 5 milliGRAM(s) Oral daily  metoprolol succinate ER 25 milliGRAM(s) Oral daily  spironolactone 12.5 milliGRAM(s) Oral daily    MEDICATIONS  (PRN):  aluminum hydroxide/magnesium hydroxide/simethicone Suspension 30 milliLiter(s) Oral every 6 hours PRN Dyspepsia            Vital Signs Last 24 Hrs  T(C): 36.1 (19 Apr 2018 05:19), Max: 36.1 (18 Apr 2018 20:39)  T(F): 97 (19 Apr 2018 05:19), Max: 97 (18 Apr 2018 20:39)  HR: 84 (19 Apr 2018 05:19) (81 - 85)  BP: 126/76 (19 Apr 2018 05:19) (108/68 - 130/93)  BP(mean): --  RR: 19 (18 Apr 2018 20:39) (19 - 20)  SpO2: --      PHYSICAL EXAM:    GEN: AAO x 3, NAD  HEENT: NC/AT, PERRL  Neck: No JVD, no bruits  CV: Reg, S1-S2, no murmur  Lungs: CTAB  Abd: Soft, non-tender  Ext: No edema      I&O's Summary  	      TTE:  < from: Transthoracic Echocardiogram (04.18.18 @ 11:15) >    Summary:   1. Severely decreased global left ventricular systolic function.   2. LV Ejection Fraction by Tolentino's Method with a biplane EF of 23 %.   3. Spectral Doppler shows impaired relaxation pattern of left   ventricular myocardial filling (Grade I diastolic dysfunction).   4. Mild to moderate mitral valve regurgitation.   5. Mild to moderate aortic regurgitation.   6. Mild to moderate pulmonic valve regurgitation.    < end of copied text >      LABS:                        14.0   4.23  )-----------( 156      ( 18 Apr 2018 08:40 )             42.1     04-18    144  |  105  |  25<H>  ----------------------------<  109<H>  4.4   |  27  |  1.2    Ca    8.7      18 Apr 2018 08:40      CARDIAC MARKERS ( 18 Apr 2018 08:40 )  x     / x     / 172 U/L / x     / x                BNP  RADIOLOGY & ADDITIONAL STUDIES:      IMPRESSION AND PLAN:

## 2018-04-19 NOTE — PROGRESS NOTE ADULT - SUBJECTIVE AND OBJECTIVE BOX
Patient is a 67y old  Male who presents with a chief complaint of sudden onset vision loss, +CT (15 Apr 2018 20:50)    Patient was seen and examined.  today denies any complains.    PAST MEDICAL & SURGICAL HISTORY:  Systolic congestive heart failure, unspecified chronicity  No significant past surgical history    Allergies  penicillin (Rash)    MEDICATIONS  (STANDING):  aspirin enteric coated 81 milliGRAM(s) Oral daily  atorvastatin 40 milliGRAM(s) Oral at bedtime  furosemide    Tablet 20 milliGRAM(s) Oral daily  lisinopril 5 milliGRAM(s) Oral daily  metoprolol succinate ER 25 milliGRAM(s) Oral daily  spironolactone 12.5 milliGRAM(s) Oral daily    MEDICATIONS  (PRN):  aluminum hydroxide/magnesium hydroxide/simethicone Suspension 30 milliLiter(s) Oral every 6 hours PRN Dyspepsia    Vital Signs Last 24 Hrs  T(C): 36.1  T(F): 97  HR: 89  BP: 126/61  BP(mean): --  RR: 20  SpO2: --  O/E:  Awake, alert, not in distress.  HEENT: atraumatic, EOMI.  Chest: clear.  CVS: SIS2 +, Systolic murmur.  P/A: Soft, BS+  CNS: non focal.  Ext: no edema feet.  Skin: no rash, no ulcers.  All systems reviewed positive findings as above.                              12.2<L>  3.89<L> )-----------( 140      ( 19 Apr 2018 08:25 )             37.0<L>                        14.0   4.23<L> )-----------( 156      ( 18 Apr 2018 08:40 )             42.1     04-19    142  |  106  |  26<H>  ----------------------------<  125<H>  3.9   |  23  |  1.2  04-18    144  |  105  |  25<H>  ----------------------------<  109<H>  4.4   |  27  |  1.2    Ca    8.2<L>      19 Apr 2018 08:25  Ca    8.7      18 Apr 2018 08:40    TPro  5.2<L>  /  Alb  3.3<L>  /  TBili  0.5  /  DBili  0.2  /  AST  18  /  ALT  49<H>  /  AlkPhos  67  04-19      CARDIAC MARKERS ( 18 Apr 2018 08:40 )  x     / x     / 172 U/L / x     / x          PT/INR - ( 19 Apr 2018 08:25 )   PT: 13.60 sec;   INR: 1.25 ratio       < from: Transthoracic Echocardiogram (04.18.18 @ 11:15) >  1. Severely decreased global left ventricular systolic function.   2. LV Ejection Fraction by Tolentino's Method with a biplane EF of 23 %.   3. Spectral Doppler shows impaired relaxation pattern of left   ventricular myocardial filling (Grade I diastolic dysfunction).   4. Mild to moderate mitral valve regurgitation.   5. Mild to moderate aortic regurgitation.   6. Mild to moderate pulmonic valve regurgitation.

## 2018-04-19 NOTE — PROGRESS NOTE ADULT - ASSESSMENT
S/p CVA.  Awating MRI/MRA and echo bubble study  C/w ASA, statin, BB, ACE-I  Not in CHF.  Tele to r/o AF - negative so far.  LifeVest, outpatient f/u with EP for possible AICD

## 2018-04-19 NOTE — PROGRESS NOTE ADULT - ASSESSMENT
68 y/o M with PMHx dilated NICM (EF 15%), admitted for evaluation of sudden onset vision changes; Since admission pt had CT head demonstrating chronic b/L occipital lobe age-indeterminate infarcts and possible acute to subacute right occipital lobe infarct. Seen by neurology who recommend MRI/MRA head and neck as well as hypercoagulable workup.    # Vision Changes 2/2 Acute/Subacute R Occipital Infarct and Chronic L Occipital Lobe Infarct though symptoms are resolving. Unclear etiology with b/l infarcts. No afib, no LV thrombus on 2dEcho (Echo demonstrates 23%)  -Neurology following    -Keep on Aspirin and Atorvastatin  -Pending MRI/MRA head and neck READ  -Hypercoagulable profile ordered and drawn this morning  > antiphospholipid abs, notch 3 gene mutation, ESR normal, CRP normal, BAUTISTA, ANCA, SSA/B    # Dilated nonischemic cardiomyopathy - EF 23% improved since previous will need echo in one month   - Tele monitoring, no events  - Keep Lisinopril and metoprolol  - Will need repeat Echo in one more to show improvement if no further improvement then eval for AICD  - furosemide    Tablet 20 milliGRAM(s) Oral daily  - lisinopril 5 milliGRAM(s) Oral daily  - metoprolol succinate ER 25 milliGRAM(s) Oral daily      # CKD Stage 3A - stable     #FENP:  Fluids: None tolerating po  Electrolytes:K+: 4.4, Na: 144, Mg: --, iPh:--  Nutrition: Diet, DASH/TLC:   Sodium & Cholesterol Restricted  1000mL Fluid Restriction ([id number]) (04-15-18 @ 21:47)  Prophylaxis: aspirin enteric coated 81 milliGRAM(s) Oral daily  #Dispo: Home once MRIs complete  #Code Status: Full Code  Any Questions Please Call  x [phone] 68 y/o M with PMHx dilated NICM (EF 15%), admitted for evaluation of sudden onset vision changes; Since admission pt had CT head demonstrating chronic b/L occipital lobe age-indeterminate infarcts and possible acute to subacute right occipital lobe infarct. Seen by neurology who recommend MRI/MRA head and neck as well as hypercoagulable workup.    # Vision Changes 2/2 Acute/Subacute R Occipital Infarct and Chronic L Occipital Lobe Infarct though symptoms are resolving. Unclear etiology with b/l infarcts. No afib recorded, no LV thrombus on 2dEcho (Echo demonstrates 23%)  -Neurology following    -Keep on Aspirin and Atorvastatin (started here)  -Pending MRI/MRA head and neck READ  -Hypercoagulable profile ordered and drawn this morning  > antiphospholipid abs, notch 3 gene mutation, ESR normal, CRP normal, BAUTISTA, ANCA, SSA/B  - Pending bubble study for paradoxical embolus    # Dilated nonischemic cardiomyopathy - EF 23% improved since previous will need echo in one month   - Tele monitoring, no events  - Keep Lisinopril and metoprolol  - Will need repeat Echo in one more to show improvement if no further improvement then eval for AICD  furosemide    Tablet 20 milliGRAM(s) Oral daily  lisinopril 5 milliGRAM(s) Oral daily  metoprolol succinate ER 25 milliGRAM(s) Oral daily  STARTED spironolactone 12.5 milliGRAM(s) Oral daily for objective class C    # CKD Stage 3A - stable     #FENP:  Fluids: None tolerating po  Electrolytes:K+: 4.4, Na: 144, Mg: --, iPh:--  Nutrition: Diet, DASH/TLC:   Sodium & Cholesterol Restricted  1000mL Fluid Restriction ([id number]) (04-15-18 @ 21:47)  Prophylaxis: aspirin enteric coated 81 milliGRAM(s) Oral daily  #Dispo: Home once MRI read and TTE with bubble completed  #Code Status: Full Code    Any Questions Please Call  x [phone]  Will Discuss case with Dr Gill

## 2018-04-19 NOTE — PROGRESS NOTE ADULT - ASSESSMENT
68 y/o M with PMHx dilated nonischemic cardiomyopathy (echo from 2/2018 showed EF 14%), hx of noncompliance, very poor overall historian presenting for presenting for sudden onset vision changes. Night before presentation he took a nap and woke up around 1230am and noticed his vision in both eyes was dimmer than usual. Pt denies one eye being worse than the other, he denies any prodrome, but does state that the vision change was associated with R temporal throbbing headache that lasted for a couple of seconds. Pt has chronic hx of floaters and does not note any change from baseline. At this time he denies any chest pain, difficulty breathing, focal deficits in arms or legs, no numbness/tingling. The following morning the pt states his vision improved in both eyes, but not back to baseline. He is still struggling to read. He sought medical attention bc he had a gut feeling that caused him to come to the ER today.  Of note, pt was admitted to [facility] in 2/2018 for shortness of breath, he was found to have systolic CHF at that time. He was offered AICD, but refused and was discharged with life vest. Pt states he wears it everyday, but did not have it on at the time of interview bc he forgot to put it on in his haste to come to ER. (15 Apr 2018 20:50)    1. B/l occipital lobe infarcts subacute/chr  - pt followed by neyrology  - FREDERICK w/ bubble study  - continue telemetry  - continue ASa, statin  - f/u hypercoagulable labs.    2.  Cardiomyopathy.  -C/w ASA, statin, BB, ACE-I.  - c/w lasix, aldactone.  - restrict fluids. low NA diet  -LifeVest, outpatient f/u with EP for possible AICD.

## 2018-04-19 NOTE — PROGRESS NOTE ADULT - SUBJECTIVE AND OBJECTIVE BOX
SUBJECTIVE:  Today is hospital day 4d.    Overnight Events:    PAST MEDICAL & SURGICAL HISTORY  Systolic congestive heart failure, unspecified chronicity  No significant past surgical history    SOCIAL HISTORY:    [ ] Smoker    ALLERGIES:  penicillin (Rash)      MEDICATIONS:  STANDING MEDICATIONS  aspirin enteric coated 81 milliGRAM(s) Oral daily  atorvastatin 40 milliGRAM(s) Oral at bedtime  furosemide    Tablet 20 milliGRAM(s) Oral daily  lisinopril 5 milliGRAM(s) Oral daily  metoprolol succinate ER 25 milliGRAM(s) Oral daily  spironolactone 12.5 milliGRAM(s) Oral daily    PRN MEDICATIONS  aluminum hydroxide/magnesium hydroxide/simethicone Suspension 30 milliLiter(s) Oral every 6 hours PRN    VITALS:   T(F): , Max: 97 (04-18-18 @ 20:39)  HR:  (81 - 85)  BP:  (108/68 - 130/93)  SpO2: --      PHYSICAL EXAM:  GEN:   LUNGS:   HEART:   ABD:   EXT:   NEURO: AAOX  Height (cm): 152.67 (04-17-18 @ 22:33)  Weight (kg): 59 (04-17-18 @ 22:33)  BMI (kg/m2): 25.3 (04-17-18 @ 22:33)  BSA (m2): 1.56 (04-17-18 @ 22:33)  LABS:                        14.0   4.23  )-----------( 156      ( 18 Apr 2018 08:40 )             42.1     04-18    144  |  105  |  25<H>  ----------------------------<  109<H>  4.4   |  27  |  1.2    Ca    8.7      18 Apr 2018 08:40  Hemoglobin A1C, Whole Blood: 6.3 % (04.18.18 @ 09:30)  Creatine Kinase, Serum: 172 U/L (04-18-18 @ 08:40)  Sedimentation Rate, Erythrocyte: 5 mm/Hr (04-18-18 @ 08:40)  C-Reactive Protein, Serum: 0.20 mg/dL (04-18-18 @ 08:40)    CARDIAC MARKERS ( 18 Apr 2018 08:40 )  x     / x     / 172 U/L / x     / x        Lipid Profile in AM (04.16.18 @ 07:32)    Total Cholesterol/HDL Ratio Measurement: 4.1 Ratio    Cholesterol, Serum: 149 mg/dL    Triglycerides, Serum: 78 mg/dL    HDL Cholesterol, Serum: 36 mg/dL    Direct LDL: 107: LDL Cholesterol --- Interpretive Comment (for adults 18 and over)        RADIOLOGY: SUBJECTIVE:  Today is hospital day 4d.    Overnight Events: No acute events Patient's vision improving/ MRIs completed not yet read, and waiting on TTE with bubble study.    PAST MEDICAL & SURGICAL HISTORY  Systolic congestive heart failure, unspecified chronicity  No significant past surgical history    SOCIAL HISTORY:  Never smoker,  Family history of NICM    ALLERGIES:  penicillin (Rash)      MEDICATIONS:  STANDING MEDICATIONS  aspirin enteric coated 81 milliGRAM(s) Oral daily  atorvastatin 40 milliGRAM(s) Oral at bedtime new this admission for stroke  furosemide    Tablet 20 milliGRAM(s) Oral daily  lisinopril 5 milliGRAM(s) Oral daily  metoprolol succinate ER 25 milliGRAM(s) Oral daily  spironolactone 12.5 milliGRAM(s) Oral daily    PRN MEDICATIONS  aluminum hydroxide/magnesium hydroxide/simethicone Suspension 30 milliLiter(s) Oral every 6 hours PRN    VITALS:   T(F): , Max: 97 (04-18-18 @ 20:39)  HR:  (81 - 85)  BP:  (108/68 - 130/93)  SpO2: --      PHYSICAL EXAM:  GEN:   LUNGS:   HEART:   ABD:   EXT:   NEURO: AAOX  Height (cm): 152.67 (04-17-18 @ 22:33)  Weight (kg): 59 (04-17-18 @ 22:33)  BMI (kg/m2): 25.3 (04-17-18 @ 22:33)  BSA (m2): 1.56 (04-17-18 @ 22:33)  LABS:                        14.0   4.23  )-----------( 156      ( 18 Apr 2018 08:40 )             42.1     04-18    144  |  105  |  25<H>  ----------------------------<  109<H>  4.4   |  27  |  1.2    Ca    8.7      18 Apr 2018 08:40  Hemoglobin A1C, Whole Blood: 6.3 % (04.18.18 @ 09:30)  Creatine Kinase, Serum: 172 U/L (04-18-18 @ 08:40)  Sedimentation Rate, Erythrocyte: 5 mm/Hr (04-18-18 @ 08:40)  C-Reactive Protein, Serum: 0.20 mg/dL (04-18-18 @ 08:40)    CARDIAC MARKERS ( 18 Apr 2018 08:40 )  x     / x     / 172 U/L / x     / x        Lipid Profile in AM (04.16.18 @ 07:32)    Total Cholesterol/HDL Ratio Measurement: 4.1 Ratio    Cholesterol, Serum: 149 mg/dL    Triglycerides, Serum: 78 mg/dL    HDL Cholesterol, Serum: 36 mg/dL    Direct LDL: 107: LDL Cholesterol --- Interpretive Comment (for adults 18 and over)    RADIOLOGY:  MRI pending read

## 2018-04-19 NOTE — PROGRESS NOTE ADULT - ASSESSMENT
66 yo male with PMH as above, presenting with vision changes and HA, b/l occipital lobe infarcts, subacute/chronic seen on CT. No new complaints.    Plan:  - F/u MRI  - F/u HbA1c, antibodies  - BP control 68 yo male with PMH as above, presenting with vision changes and HA, b/l occipital lobe infarcts, subacute/chronic with severe cardiomyopathy r/o cardioembolic source.  Has suspected underlying mild cognitive impairment.      Plan:  - FREDERICK w/ bubble study  - continue telemetry  - continue antiplts for now  - f/u hypercoagulable labs

## 2018-04-20 ENCOUNTER — TRANSCRIPTION ENCOUNTER (OUTPATIENT)
Age: 68
End: 2018-04-20

## 2018-04-20 VITALS — WEIGHT: 131.18 LBS

## 2018-04-20 LAB
ANA TITR SER: NEGATIVE — SIGNIFICANT CHANGE UP
BLD GP AB SCN SERPL QL: SIGNIFICANT CHANGE UP
PS IGA SER-ACNC: <20 U/ML — SIGNIFICANT CHANGE UP
PS IGG SER-ACNC: <10 U/ML — SIGNIFICANT CHANGE UP
PS IGM SER-ACNC: <25 U/ML — SIGNIFICANT CHANGE UP
TYPE + AB SCN PNL BLD: SIGNIFICANT CHANGE UP

## 2018-04-20 RX ORDER — ASPIRIN/CALCIUM CARB/MAGNESIUM 324 MG
1 TABLET ORAL
Qty: 0 | Refills: 0 | COMMUNITY
Start: 2018-04-20

## 2018-04-20 RX ORDER — SPIRONOLACTONE 25 MG/1
1 TABLET, FILM COATED ORAL
Qty: 0 | Refills: 0 | COMMUNITY
Start: 2018-04-20 | End: 2018-05-19

## 2018-04-20 RX ORDER — METOPROLOL TARTRATE 50 MG
1 TABLET ORAL
Qty: 30 | Refills: 0 | OUTPATIENT
Start: 2018-04-20 | End: 2018-05-19

## 2018-04-20 RX ORDER — FUROSEMIDE 40 MG
1 TABLET ORAL
Qty: 0 | Refills: 0 | COMMUNITY
Start: 2018-04-20

## 2018-04-20 RX ORDER — ATORVASTATIN CALCIUM 80 MG/1
1 TABLET, FILM COATED ORAL
Qty: 30 | Refills: 3 | OUTPATIENT
Start: 2018-04-20 | End: 2018-08-17

## 2018-04-20 RX ORDER — SPIRONOLACTONE 25 MG/1
0.5 TABLET, FILM COATED ORAL
Qty: 15 | Refills: 0 | OUTPATIENT
Start: 2018-04-20 | End: 2018-05-19

## 2018-04-20 RX ORDER — LISINOPRIL 2.5 MG/1
1 TABLET ORAL
Qty: 0 | Refills: 0 | COMMUNITY
Start: 2018-04-20

## 2018-04-20 RX ADMIN — Medication 20 MILLIGRAM(S): at 05:58

## 2018-04-20 RX ADMIN — Medication 81 MILLIGRAM(S): at 11:47

## 2018-04-20 RX ADMIN — Medication 25 MILLIGRAM(S): at 05:58

## 2018-04-20 NOTE — PROGRESS NOTE ADULT - ASSESSMENT
68 y/o M with PMHx dilated nonischemic cardiomyopathy (echo from 2/2018 showed EF 14%), hx of noncompliance, very poor overall historian presenting for presenting for sudden onset vision changes. Night before presentation he took a nap and woke up around 1230am and noticed his vision in both eyes was dimmer than usual. Pt denies one eye being worse than the other, he denies any prodrome, but does state that the vision change was associated with R temporal throbbing headache that lasted for a couple of seconds. Pt has chronic hx of floaters and does not note any change from baseline. At this time he denies any chest pain, difficulty breathing, focal deficits in arms or legs, no numbness/tingling. The following morning the pt states his vision improved in both eyes, but not back to baseline. He is still struggling to read. He sought medical attention bc he had a gut feeling that caused him to come to the ER today.  Of note, pt was admitted to [facility] in 2/2018 for shortness of breath, he was found to have systolic CHF at that time. He was offered AICD, but refused and was discharged with life vest. Pt states he wears it everyday, but did not have it on at the time of interview bc he forgot to put it on in his haste to come to ER. (15 Apr 2018 20:50)    1. B/l occipital lobe infarcts subacute/chr  - pt followed by neurology  - echo w/ bubble study   - continue telemetry  - continue ASa, statin  - f/u hypercoagulable labs.    2.  Cardiomyopathy.  -C/w ASA, statin, BB, ACE-I.  - c/w lasix, aldactone.  - restrict fluids. low NA diet  -LifeVest,   outpatient f/u with EP for possible AICD.    3. Noncompliant    planned for discharge if echo neg for thrombus

## 2018-04-20 NOTE — PROGRESS NOTE ADULT - SUBJECTIVE AND OBJECTIVE BOX
SUBJECTIVE:  Today is hospital day 5d.    Overnight Events: No acute events overnight.     PAST MEDICAL & SURGICAL HISTORY  Systolic congestive heart failure, unspecified chronicity  No significant past surgical history    SOCIAL HISTORY:    [ ] Smoker    ALLERGIES:  penicillin (Rash)      MEDICATIONS:  STANDING MEDICATIONS  aspirin enteric coated 81 milliGRAM(s) Oral daily  atorvastatin 40 milliGRAM(s) Oral at bedtime  furosemide    Tablet 20 milliGRAM(s) Oral daily  lisinopril 5 milliGRAM(s) Oral daily  metoprolol succinate ER 25 milliGRAM(s) Oral daily  spironolactone 12.5 milliGRAM(s) Oral daily    PRN MEDICATIONS  aluminum hydroxide/magnesium hydroxide/simethicone Suspension 30 milliLiter(s) Oral every 6 hours PRN    VITALS:   T(F): , Max: 97.3 (04-20-18 @ 04:59)  HR:  (82 - 89)  BP:  (120/697 - 130/70)  SpO2:  (97% - 97%)    PHYSICAL EXAM:  GEN:   LUNGS:   HEART:   ABD:   EXT:   NEURO: AAOX  Height (cm): 152.67 (04-20-18 @ 07:56)  Weight (kg): 59 (04-20-18 @ 07:56)  BMI (kg/m2): 25.3 (04-20-18 @ 07:56)  BSA (m2): 1.56 (04-20-18 @ 07:56)  LABS:                        12.2   3.89  )-----------( 140      ( 19 Apr 2018 08:25 )             37.0     04-19    142  |  106  |  26<H>  ----------------------------<  125<H>  3.9   |  23  |  1.2    Ca    8.2<L>      19 Apr 2018 08:25    TPro  5.2<L>  /  Alb  3.3<L>  /  TBili  0.5  /  DBili  0.2  /  AST  18  /  ALT  49<H>  /  AlkPhos  67  04-19    PT/INR - ( 19 Apr 2018 08:25 )   PT: 13.60 sec;   INR: 1.25 ratio                               RADIOLOGY: SUBJECTIVE:  Today is hospital day 5d.    Overnight Events: No acute events overnight. Pt went for tte studying pending read.    PAST MEDICAL & SURGICAL HISTORY  Systolic congestive heart failure, unspecified chronicity  No significant past surgical history    SOCIAL HISTORY:  never smoker    ALLERGIES:  penicillin (Rash)      MEDICATIONS:  STANDING MEDICATIONS  aspirin enteric coated 81 milliGRAM(s) Oral daily  atorvastatin 40 milliGRAM(s) Oral at bedtime  furosemide    Tablet 20 milliGRAM(s) Oral daily  lisinopril 5 milliGRAM(s) Oral daily  metoprolol succinate ER 25 milliGRAM(s) Oral daily  spironolactone 12.5 milliGRAM(s) Oral daily    PRN MEDICATIONS  aluminum hydroxide/magnesium hydroxide/simethicone Suspension 30 milliLiter(s) Oral every 6 hours PRN    VITALS:   T(F): , Max: 97.3 (04-20-18 @ 04:59)  HR:  (82 - 89)  BP:  (120/697 - 130/70)  SpO2:  (97% - 97%)    PHYSICAL EXAM:  GEN:   LUNGS: Clear  HEART: S1 S2  ABD: non tender, scaphoid  EXT: no LE edema  NEURO: AAOX3 moves all extremities, face symmetric, odd affect  Height (cm): 152.67 (04-20-18 @ 07:56)  Weight (kg): 59 (04-20-18 @ 07:56)  BMI (kg/m2): 25.3 (04-20-18 @ 07:56)  BSA (m2): 1.56 (04-20-18 @ 07:56)  LABS:                        12.2   3.89  )-----------( 140      ( 19 Apr 2018 08:25 )             37.0     04-19    142  |  106  |  26<H>  ----------------------------<  125<H>  3.9   |  23  |  1.2    Ca    8.2<L>      19 Apr 2018 08:25  TPro  5.2<L>  /  Alb  3.3<L>  /  TBili  0.5  /  DBili  0.2  /  AST  18  /  ALT  49<H>  /  AlkPhos  67  04-19  PT/INR - ( 19 Apr 2018 08:25 )   PT: 13.60 sec;   INR: 1.25 ratio    Magnesium, Serum: 2.0 mg/dL (04.16.18 @ 07:32)       RADIOLOGY:  F/U 2D echo today Bubble study etc.   MRA head/Neck  < from: MR Angio Neck w/ IV Cont (04.18.18 @ 21:53) >  Impression:    Unremarkable MRA of the head and neck.    < end of copied text >  < from: MR Angio Head No Cont (04.18.18 @ 21:23) >  Impression:    Unremarkable MRA of the head and neck.    < end of copied text >

## 2018-04-20 NOTE — DISCHARGE NOTE ADULT - NS AS DC STROKE ED MATERIALS
Call 911 for Stroke/Risk Factors for Stroke/Prescribed Medications/Stroke Education Booklet/Stroke Warning Signs and Symptoms/Need for Followup After Discharge

## 2018-04-20 NOTE — DISCHARGE NOTE ADULT - ADDITIONAL INSTRUCTIONS
All Medical Records you would like are available to you at:  [facility]: [phone]

## 2018-04-20 NOTE — DISCHARGE NOTE ADULT - MEDICATION SUMMARY - MEDICATIONS TO TAKE
I will START or STAY ON the medications listed below when I get home from the hospital:    spironolactone 25 mg oral tablet  -- 0.5 tab(s) by mouth once a day  -- Indication: For Heart Failure    aspirin 81 mg oral delayed release tablet  -- 1 tab(s) by mouth once a day  -- Indication: For heart health    lisinopril 5 mg oral tablet  -- 1 tab(s) by mouth once a day  -- Indication: For Heart Health    atorvastatin 40 mg oral tablet  -- 1 tab(s) by mouth once a day (at bedtime)  -- Indication: For Heart Health/ Stroke prevention    metoprolol succinate 25 mg oral tablet, extended release  -- 1 tab(s) by mouth once a day  -- Indication: For Heart Failure    furosemide 20 mg oral tablet  -- 1 tab(s) by mouth once a day  -- Indication: For Heart Failure

## 2018-04-20 NOTE — PROGRESS NOTE ADULT - SUBJECTIVE AND OBJECTIVE BOX
Patient is a 67y old  Male who presents with a chief complaint of sudden onset vision loss, +CT (20 Apr 2018 13:58)    Patient was seen and examined.  Denies any complains.    PAST MEDICAL & SURGICAL HISTORY:  Systolic congestive heart failure, unspecified chronicity  No significant past surgical history      Allergies  penicillin (Rash)    MEDICATIONS  (STANDING):  aspirin enteric coated 81 milliGRAM(s) Oral daily  atorvastatin 40 milliGRAM(s) Oral at bedtime  furosemide    Tablet 20 milliGRAM(s) Oral daily  lisinopril 5 milliGRAM(s) Oral daily  metoprolol succinate ER 25 milliGRAM(s) Oral daily  spironolactone 12.5 milliGRAM(s) Oral daily    MEDICATIONS  (PRN):  aluminum hydroxide/magnesium hydroxide/simethicone Suspension 30 milliLiter(s) Oral every 6 hours PRN Dyspepsia    Vital Signs Last 24 Hrs  T(C): 36.1  T(F): 97  HR: 89  BP: 128/75  BP(mean): --  RR: 20  SpO2: 97%  O/E:  Awake, alert, not in distress.  HEENT: atraumatic, EOMI.  Chest: clear.  CVS: SIS2 +, Systolic murmur.  P/A: Soft, BS+  CNS: non focal.  Ext: no edema feet.  Skin: no rash, no ulcers.  All systems reviewed positive findings as above.  CAPILLARY BLOOD GLUCOSE                            12.2<L>  3.89<L> )-----------( 140      ( 19 Apr 2018 08:25 )             37.0<L>    04-19    142  |  106  |  26<H>  ----------------------------<  125<H>  3.9   |  23  |  1.2    Ca    8.2<L>      19 Apr 2018 08:25    TPro  5.2<L>  /  Alb  3.3<L>  /  TBili  0.5  /  DBili  0.2  /  AST  18  /  ALT  49<H>  /  AlkPhos  67  04-19          PT/INR - ( 19 Apr 2018 08:25 )   PT: 13.60 sec;   INR: 1.25 ratio                   HPI:  68 y/o M with PMHx dilated nonischemic cardiomyopathy (echo from 2/2018 showed EF 14%), hx of noncompliance, very poor overall historian presenting for presenting for sudden onset vision changes. Night before presentation he took a nap and woke up around 1230am and noticed his vision in both eyes was dimmer than usual. Pt denies one eye being worse than the other, he denies any prodrome, but does state that the vision change was associated with R temporal throbbing headache that lasted for a couple of seconds. Pt has chronic hx of floaters and does not note any change from baseline. At this time he denies any chest pain, difficulty breathing, focal deficits in arms or legs, no numbness/tingling. The following morning the pt states his vision improved in both eyes, but not back to baseline. He is still struggling to read. He sought medical attention bc he had a gut feeling that caused him to come to the ER today.  Of note, pt was admitted to [facility] in 2/2018 for shortness of breath, he was found to have systolic CHF at that time. He was offered AICD, but refused and was discharged with life vest. Pt states he wears it everyday, but did not have it on at the time of interview bc he forgot to put it on in his haste to come to ER. (15 Apr 2018 20:50)

## 2018-04-20 NOTE — DISCHARGE NOTE ADULT - INSTRUCTIONS
Diet, DASH/TLC:   Sodium & Cholesterol Restricted  1000mL Fluid Restriction ([id number]) (04-15-18 @ 21:47)

## 2018-04-20 NOTE — DISCHARGE NOTE ADULT - CARE PLAN
Principal Discharge DX:	CVA (cerebral vascular accident)  Goal:	prevent future strokes  Assessment and plan of treatment:	You presented to the hospital with vision changes that were likely due to a stroke you had. The CT of your head demonstrated evidence of stroke some old and some new, results are below. It is not clear why you have these strokes.   You should follow up with neurology in one month.   < from: CT Head No Cont (04.15.18 @ 17:19) >    Bilateral occipital lobe age-indeterminate infarcts; however, the right   occipital lobe infarct appears acute to subacute and the left occipital   lobe infarct appears chronic.  < end of copied text >  Secondary Diagnosis:	Systolic congestive heart failure, unspecified chronicity  Goal:	prevent future complications  Assessment and plan of treatment:	in february you had a significantly reduced pumping function of your heart. Your hearts pumping function improved somewhat but it is still very low.  Please follow up in 1 month with Dr Choi to have you have your echocardiogram repeated to see if your heart function has improved  take your medication as prescribed  Wear the Life vest at all times unless instructed otherwise by your cardiologist.  Take you medications as prescribed and please bring all of your medications (bottles and all) with you to your appointments. Principal Discharge DX:	CVA (cerebral vascular accident)  Goal:	prevent future strokes  Assessment and plan of treatment:	You presented to the hospital with vision changes that were likely due to a stroke you had. The CT of your head demonstrated evidence of stroke some old and some new, results are below. It is not clear why you have these strokes.   You should follow up with neurology in one month.   < from: CT Head No Cont (04.15.18 @ 17:19) >    Bilateral occipital lobe age-indeterminate infarcts; however, the right   occipital lobe infarct appears acute to subacute and the left occipital   lobe infarct appears chronic.  < end of copied text >  Secondary Diagnosis:	Systolic congestive heart failure, unspecified chronicity  Goal:	prevent future complications  Assessment and plan of treatment:	in february you had a significantly reduced pumping function of your heart. Your hearts pumping function improved somewhat but it is still very low.  Please follow up in 1 month with Dr Choi to have you have your echocardiogram repeated to see if your heart function has improved  take your medication as prescribed  Wear the Life vest at all times except when bathing. unless instructed otherwise by your cardiologist.  Your are at risk for having sudden death from abnormal conduction in your heart and no matter how GOOD you might feel that day your are at risk for sudden cardiac death and must wear your life vest.  Take you medications as prescribed and please bring all of your medications (bottles and all) with you to your appointments.  You should see Dr Gou in a month as well for discuss of AICD placement.

## 2018-04-20 NOTE — PROGRESS NOTE ADULT - SUBJECTIVE AND OBJECTIVE BOX
Patient is a 67y old  Male who presents with a chief complaint of sudden onset vision loss, +CT (15 Apr 2018 20:50)    HPI:  68 y/o M with PMHx dilated nonischemic cardiomyopathy (echo from 2/2018 showed EF 14%), hx of noncompliance, very poor overall historian presenting for presenting for sudden onset vision changes. Night before presentation he took a nap and woke up around 1230am and noticed his vision in both eyes was dimmer than usual. Pt denies one eye being worse than the other, he denies any prodrome, but does state that the vision change was associated with R temporal throbbing headache that lasted for a couple of seconds. Pt has chronic hx of floaters and does not note any change from baseline. At this time he denies any chest pain, difficulty breathing, focal deficits in arms or legs, no numbness/tingling. The following morning the pt states his vision improved in both eyes, but not back to baseline. He is still struggling to read. He sought medical attention bc he had a gut feeling that caused him to come to the ER today.  Of note, pt was admitted to [facility] in 2/2018 for shortness of breath, he was found to have systolic CHF at that time. He was offered AICD, but refused and was discharged with life vest. Pt states he wears it everyday, but did not have it on at the time of interview bc he forgot to put it on in his haste to come to ER. (15 Apr 2018 20:50)      SUBJ:  Patient seen and examined. No events overnight.      MEDICATIONS  (STANDING):  aspirin enteric coated 81 milliGRAM(s) Oral daily  atorvastatin 40 milliGRAM(s) Oral at bedtime  furosemide    Tablet 20 milliGRAM(s) Oral daily  lisinopril 5 milliGRAM(s) Oral daily  metoprolol succinate ER 25 milliGRAM(s) Oral daily  spironolactone 12.5 milliGRAM(s) Oral daily    MEDICATIONS  (PRN):  aluminum hydroxide/magnesium hydroxide/simethicone Suspension 30 milliLiter(s) Oral every 6 hours PRN Dyspepsia            Vital Signs Last 24 Hrs  T(C): 36.3 (20 Apr 2018 04:59), Max: 36.3 (20 Apr 2018 04:59)  T(F): 97.3 (20 Apr 2018 04:59), Max: 97.3 (20 Apr 2018 04:59)  HR: 82 (20 Apr 2018 04:59) (82 - 89)  BP: 120/697 (20 Apr 2018 04:59) (120/697 - 130/70)  BP(mean): --  RR: 18 (20 Apr 2018 04:59) (18 - 20)  SpO2: 97% (20 Apr 2018 07:41) (97% - 97%)      PHYSICAL EXAM:    GEN: AAO x 3, NAD  HEENT: NC/AT, PERRL  Neck: No JVD, no bruits  CV: Reg, S1-S2, no murmur  Lungs: CTAB  Abd: Soft, non-tender  Ext: No edema      I&O's Summary  	    TELEMETRY:    ECG:    TTE:      LABS:                        12.2   3.89  )-----------( 140      ( 19 Apr 2018 08:25 )             37.0     04-19    142  |  106  |  26<H>  ----------------------------<  125<H>  3.9   |  23  |  1.2    Ca    8.2<L>      19 Apr 2018 08:25    TPro  5.2<L>  /  Alb  3.3<L>  /  TBili  0.5  /  DBili  0.2  /  AST  18  /  ALT  49<H>  /  AlkPhos  67  04-19    CARDIAC MARKERS ( 18 Apr 2018 08:40 )  x     / x     / 172 U/L / x     / x          PT/INR - ( 19 Apr 2018 08:25 )   PT: 13.60 sec;   INR: 1.25 ratio               BNP  RADIOLOGY & ADDITIONAL STUDIES:      IMPRESSION AND PLAN:

## 2018-04-20 NOTE — PROGRESS NOTE ADULT - ASSESSMENT
Recommend: C/w medical therapy.  Increase metoprolol to 50 mg.  C/w ACE-I, Aldactone  C/w ASA, statin  Neurology following  Obtain TTE with bubble study to r/o shunt and Definity contrast to r/o LV thrombus (not present on recent echo or prior cardiac MRI).  C/w LifeVest, EP f/u to consider AICD.

## 2018-04-20 NOTE — PROGRESS NOTE ADULT - ASSESSMENT
68 y/o M with PMHx dilated NICM (EF 15%), admitted for evaluation of sudden onset vision changes; Since admission pt had CT head demonstrating chronic b/L occipital lobe age-indeterminate infarcts and possible acute to subacute right occipital lobe infarct. Seen by neurology who recommend MRI/MRA head and neck as well as hypercoagulable workup.    Short List:  F/U Neuro if need MRI as MRA head neck only ordered  F/U Cardiology with TTE and plans for DC    # Vision Changes 2/2 Acute/Subacute R Occipital Infarct and Chronic L Occipital Lobe Infarct though symptoms are resolving. Unclear etiology with b/l infarcts. No afib recorded, no LV thrombus on 2dEcho (Echo demonstrates 23%)  -Neurology following   -Keep on Aspirin and Atorvastatin (started here)  -Hypercoagulable profile ordered and drawn this morning  > antiphospholipid abs, notch 3 gene mutation, ESR normal, CRP normal, BAUTISTA, ANCA, SSA/B  - Pending bubble study and contrast imaging for paradoxical embolus from PFO and LV thrombus respectively    # Dilated nonischemic cardiomyopathy - EF 23% improved since previous will need echo in one month   - Tele monitoring, no events  - Keep Lisinopril and metoprolol  - Will need repeat Echo in one more to show improvement if no further improvement then eval for AICD  furosemide    Tablet 20 milliGRAM(s) Oral daily  lisinopril 5 milliGRAM(s) Oral daily  metoprolol succinate ER 25 milliGRAM(s) Oral daily  STARTED spironolactone 12.5 milliGRAM(s) Oral daily for objective class C    # CKD Stage 3A - stable   #FENP:  Fluids: none  Electrolytes: replete prn  Nutrition: Diet, DASH/TLC:   Sodium & Cholesterol Restricted  1000mL Fluid Restriction ([id number]) (04-15-18 @ 21:47)  Prophylaxis: aspirin enteric coated 81 milliGRAM(s) Oral daily, Will discuss with attending why not on prophylactic AC  #Dispo: possible home today or over the weekend  #Code Status: Full Code    Any Questions Please Call  x [phone]

## 2018-04-20 NOTE — DISCHARGE NOTE ADULT - CARE PROVIDERS DIRECT ADDRESSES
,[email or web address],DirectAddress_Unknown

## 2018-04-20 NOTE — PROGRESS NOTE ADULT - PROVIDER SPECIALTY LIST ADULT
Cardiology
Cardiology
Hospitalist
Internal Medicine
Neurology
Neurology
Hospitalist

## 2018-04-20 NOTE — DISCHARGE NOTE ADULT - HOSPITAL COURSE
66 y/o M with PMHx dilated NICM (EF 15% new in febre no hx prior), admitted for evaluation of sudden onset vision changes; Since admission pt had CT head demonstrating chronic b/L occipital lobe age-indeterminate infarcts and possible acute to subacute right occipital lobe infarct. MRA head and neck unremarkable, did recommend MI though was not performed, per neuro would not . Started on statin.  Echo repeated this admission demonstrating EF 23% improved. Started on Spironolactone for heart function. TTE with bubble and contrast also performed demonstrating    Pt stable for discharge to follow up as outpt with providers listed. 66 y/o M with PMHx dilated NICM (EF 15% new in febre no hx prior), admitted for evaluation of sudden onset vision changes; Since admission pt had CT head demonstrating chronic b/L occipital lobe age-indeterminate infarcts and possible acute to subacute right occipital lobe infarct. MRA head and neck. Started on statin.  Echo repeated this admission demonstrating EF 23% improved. Started on Spironolactone for heart function. TTE with bubble and contrast also performed severe global hypokinesia . No thrombus seen.    Pt stable for discharge to follow up as outpt with providers listed.

## 2018-04-20 NOTE — DISCHARGE NOTE ADULT - CARE PROVIDER_API CALL
Kulwinder Choi), Cardiovascular Disease; Internal Medicine; Interventional Cardiology; Nuclear Cardiology  [address]  [place] [address]  Phone: [phone]  Fax: [phone]    Yang Guo), Cardiac Electrophysiology; Cardiovascular Disease; [facility]  [address] [place] [address]  Phone: [phone]  Fax: [phone]    Chris Duong), Physician Assistant Services  [address] [place] [address]  Phone: [phone]  Fax: [phone]

## 2018-04-20 NOTE — DISCHARGE NOTE ADULT - PATIENT PORTAL LINK FT
You can access the [facility] Patient Portal, offered by [facility], by registering with the following website: [email or web address]

## 2018-04-20 NOTE — DISCHARGE NOTE ADULT - PLAN OF CARE
You presented to the hospital with vision changes that were likely due to a stroke you had. The CT of your head demonstrated evidence of stroke some old and some new, results are below. It is not clear why you have these strokes.   You should follow up with neurology in one month.   < from: CT Head No Cont (04.15.18 @ 17:19) >    Bilateral occipital lobe age-indeterminate infarcts; however, the right   occipital lobe infarct appears acute to subacute and the left occipital   lobe infarct appears chronic.  < end of copied text > prevent future complications in february you had a significantly reduced pumping function of your heart. Your hearts pumping function improved somewhat but it is still very low.  Please follow up in 1 month with Dr Choi to have you have your echocardiogram repeated to see if your heart function has improved  take your medication as prescribed  Wear the Life vest at all times unless instructed otherwise by your cardiologist.  Take you medications as prescribed and please bring all of your medications (bottles and all) with you to your appointments. prevent future strokes in february you had a significantly reduced pumping function of your heart. Your hearts pumping function improved somewhat but it is still very low.  Please follow up in 1 month with Dr Choi to have you have your echocardiogram repeated to see if your heart function has improved  take your medication as prescribed  Wear the Life vest at all times except when bathing. unless instructed otherwise by your cardiologist.  Your are at risk for having sudden death from abnormal conduction in your heart and no matter how GOOD you might feel that day your are at risk for sudden cardiac death and must wear your life vest.  Take you medications as prescribed and please bring all of your medications (bottles and all) with you to your appointments.  You should see Dr Guo in a month as well for discuss of AICD placement.

## 2018-04-22 LAB
AUTO DIFF PNL BLD: NEGATIVE — SIGNIFICANT CHANGE UP
C-ANCA SER-ACNC: NEGATIVE — SIGNIFICANT CHANGE UP
P-ANCA SER-ACNC: NEGATIVE — SIGNIFICANT CHANGE UP

## 2018-04-24 ENCOUNTER — INPATIENT (INPATIENT)
Facility: HOSPITAL | Age: 68
LOS: 7 days | Discharge: HOME | End: 2018-05-02
Attending: HOSPITALIST | Admitting: HOSPITALIST

## 2018-04-24 VITALS
OXYGEN SATURATION: 99 % | HEART RATE: 82 BPM | DIASTOLIC BLOOD PRESSURE: 89 MMHG | RESPIRATION RATE: 18 BRPM | SYSTOLIC BLOOD PRESSURE: 135 MMHG

## 2018-04-24 LAB
ALBUMIN SERPL ELPH-MCNC: 3.7 G/DL — SIGNIFICANT CHANGE UP (ref 3.5–5.2)
ALP SERPL-CCNC: 111 U/L — SIGNIFICANT CHANGE UP (ref 30–115)
ALT FLD-CCNC: 130 U/L — HIGH (ref 0–41)
ANION GAP SERPL CALC-SCNC: 15 MMOL/L — HIGH (ref 7–14)
APTT BLD: 20 SEC — CRITICAL LOW (ref 27–39.2)
AST SERPL-CCNC: 82 U/L — HIGH (ref 0–41)
BASOPHILS # BLD AUTO: 0.02 K/UL — SIGNIFICANT CHANGE UP (ref 0–0.2)
BASOPHILS NFR BLD AUTO: 0.4 % — SIGNIFICANT CHANGE UP (ref 0–1)
BILIRUB SERPL-MCNC: 1.1 MG/DL — SIGNIFICANT CHANGE UP (ref 0.2–1.2)
BUN SERPL-MCNC: 33 MG/DL — HIGH (ref 10–20)
CALCIUM SERPL-MCNC: 9.1 MG/DL — SIGNIFICANT CHANGE UP (ref 8.5–10.1)
CHLORIDE SERPL-SCNC: 101 MMOL/L — SIGNIFICANT CHANGE UP (ref 98–110)
CO2 SERPL-SCNC: 21 MMOL/L — SIGNIFICANT CHANGE UP (ref 17–32)
CREAT SERPL-MCNC: 1.3 MG/DL — SIGNIFICANT CHANGE UP (ref 0.7–1.5)
EOSINOPHIL # BLD AUTO: 0.03 K/UL — SIGNIFICANT CHANGE UP (ref 0–0.7)
EOSINOPHIL NFR BLD AUTO: 0.6 % — SIGNIFICANT CHANGE UP (ref 0–8)
GAS PNL BLDV: SIGNIFICANT CHANGE UP
GLUCOSE SERPL-MCNC: 111 MG/DL — HIGH (ref 70–99)
HCT VFR BLD CALC: 40.5 % — LOW (ref 42–52)
HGB BLD-MCNC: 13.5 G/DL — LOW (ref 14–18)
IMM GRANULOCYTES NFR BLD AUTO: 0.4 % — HIGH (ref 0.1–0.3)
INR BLD: 1.46 RATIO — HIGH (ref 0.65–1.3)
LYMPHOCYTES # BLD AUTO: 1.24 K/UL — SIGNIFICANT CHANGE UP (ref 1.2–3.4)
LYMPHOCYTES # BLD AUTO: 23.7 % — SIGNIFICANT CHANGE UP (ref 20.5–51.1)
MCHC RBC-ENTMCNC: 32.4 PG — HIGH (ref 27–31)
MCHC RBC-ENTMCNC: 33.3 G/DL — SIGNIFICANT CHANGE UP (ref 32–37)
MCV RBC AUTO: 97.1 FL — HIGH (ref 80–94)
MONOCYTES # BLD AUTO: 0.32 K/UL — SIGNIFICANT CHANGE UP (ref 0.1–0.6)
MONOCYTES NFR BLD AUTO: 6.1 % — SIGNIFICANT CHANGE UP (ref 1.7–9.3)
NEUTROPHILS # BLD AUTO: 3.6 K/UL — SIGNIFICANT CHANGE UP (ref 1.4–6.5)
NEUTROPHILS NFR BLD AUTO: 68.8 % — SIGNIFICANT CHANGE UP (ref 42.2–75.2)
PLATELET # BLD AUTO: 187 K/UL — SIGNIFICANT CHANGE UP (ref 130–400)
POTASSIUM SERPL-MCNC: 5.4 MMOL/L — HIGH (ref 3.5–5)
POTASSIUM SERPL-SCNC: 5.4 MMOL/L — HIGH (ref 3.5–5)
PROT SERPL-MCNC: 6.4 G/DL — SIGNIFICANT CHANGE UP (ref 6–8)
PROTHROM AB SERPL-ACNC: 15.9 SEC — HIGH (ref 9.95–12.87)
RBC # BLD: 4.17 M/UL — LOW (ref 4.7–6.1)
RBC # FLD: 14.4 % — SIGNIFICANT CHANGE UP (ref 11.5–14.5)
SODIUM SERPL-SCNC: 137 MMOL/L — SIGNIFICANT CHANGE UP (ref 135–146)
TROPONIN T SERPL-MCNC: <0.01 NG/ML — SIGNIFICANT CHANGE UP
WBC # BLD: 5.23 K/UL — SIGNIFICANT CHANGE UP (ref 4.8–10.8)
WBC # FLD AUTO: 5.23 K/UL — SIGNIFICANT CHANGE UP (ref 4.8–10.8)

## 2018-04-24 RX ORDER — ASPIRIN/CALCIUM CARB/MAGNESIUM 324 MG
162 TABLET ORAL DAILY
Qty: 0 | Refills: 0 | Status: DISCONTINUED | OUTPATIENT
Start: 2018-04-25 | End: 2018-04-30

## 2018-04-24 RX ORDER — FUROSEMIDE 40 MG
40 TABLET ORAL DAILY
Qty: 0 | Refills: 0 | Status: DISCONTINUED | OUTPATIENT
Start: 2018-04-24 | End: 2018-05-02

## 2018-04-24 RX ORDER — ENOXAPARIN SODIUM 100 MG/ML
30 INJECTION SUBCUTANEOUS DAILY
Qty: 0 | Refills: 0 | Status: DISCONTINUED | OUTPATIENT
Start: 2018-04-24 | End: 2018-04-30

## 2018-04-24 RX ORDER — ATORVASTATIN CALCIUM 80 MG/1
40 TABLET, FILM COATED ORAL AT BEDTIME
Qty: 0 | Refills: 0 | Status: DISCONTINUED | OUTPATIENT
Start: 2018-04-24 | End: 2018-05-02

## 2018-04-24 RX ORDER — LISINOPRIL 2.5 MG/1
5 TABLET ORAL DAILY
Qty: 0 | Refills: 0 | Status: DISCONTINUED | OUTPATIENT
Start: 2018-04-24 | End: 2018-05-02

## 2018-04-24 RX ORDER — ASPIRIN/CALCIUM CARB/MAGNESIUM 324 MG
325 TABLET ORAL ONCE
Qty: 0 | Refills: 0 | Status: COMPLETED | OUTPATIENT
Start: 2018-04-24 | End: 2018-04-24

## 2018-04-24 RX ORDER — SPIRONOLACTONE 25 MG/1
25 TABLET, FILM COATED ORAL DAILY
Qty: 0 | Refills: 0 | Status: DISCONTINUED | OUTPATIENT
Start: 2018-04-24 | End: 2018-05-02

## 2018-04-24 RX ORDER — METOPROLOL TARTRATE 50 MG
25 TABLET ORAL DAILY
Qty: 0 | Refills: 0 | Status: DISCONTINUED | OUTPATIENT
Start: 2018-04-24 | End: 2018-04-26

## 2018-04-24 RX ADMIN — ATORVASTATIN CALCIUM 40 MILLIGRAM(S): 80 TABLET, FILM COATED ORAL at 21:06

## 2018-04-24 RX ADMIN — Medication 325 MILLIGRAM(S): at 21:06

## 2018-04-24 NOTE — ED PROVIDER NOTE - ATTENDING CONTRIBUTION TO CARE
68 yo male with PMH CHF with life vest, recent admission for TIA, HTN, HLD presents c/o onset blurred vison this AM upon awakening. Pt reports he felt a curtain and lost half his vision. Denies any eye pain, increased tearing.  No HA, focal weakness, dizziness, N/V or diplopia. No CP, SOB, palpitations or weakness. States he did not have symptoms when he went to bed last PM. VS noted, agree with exam as above. NEURO: AAO x 3, normal speech, no facial asymmetry, negative pronator drift, no nystagmus, sensory equal and intact.   A/P: Vision change -stroke code called, CT head, EKG, labs, telemetry monitoring, neuro eval in ED, admit

## 2018-04-24 NOTE — H&P ADULT - HISTORY OF PRESENT ILLNESS
66 yo M w/ PMH of HTN, b/l occipital stroke-recent d/c from hospital on 4/20, sCHF(EF 25%) on life west presented with c/o blurry vision in both eyes which started today AM waking up. It is equal in both eyes. He had similar complaint on last admission when he was found to have stroke. Since am, pt reports 85% improvement in symptom.    Pt denies any double vision, decreased vision in particular quadrant, numbness/weakness/decreased sensation in any part of body, change in his speech, problem with bowel/bladder, walking difficulty.     ROS + for SOB on lying down- has been using 3 pillows for last couple of nights. Denies chest pain, SOB on walking, leg swelling, palpitation.    In ED, VS stable on presentation. Stroke code was called--> NIHSS 0--> no tPA given--> admit to tele per neurology for MRI brain 68 yo M w/ PMH of HTN, b/l occipital stroke-recent d/c from hospital on 4/20, sCHF(EF 25%) on life west presented with c/o blurry vision in both eyes which started today AM waking up. It is equal in both eyes. He had similar complaint on last admission when he was found to have stroke. Since am, pt reports 85% improvement in symptom.    Pt denies any double vision, decreased vision in particular quadrant, numbness/weakness/decreased sensation in any part of body, change in his speech, problem with bowel/bladder, walking difficulty.     ROS + for SOB on lying down- has been using 3 pillows for last couple of nights. Denies chest pain, SOB on walking, leg swelling, palpitations.    In ED, VS stable on presentation. Stroke code was called--> NIHSS 0--> no tPA given--> admit to tele per neurology for MRI brain

## 2018-04-24 NOTE — ED PROVIDER NOTE - OBJECTIVE STATEMENT
68 yo M htn, hdl, TIA 2 weeks ago, life vest presents to ED today with changes in vision. Pt states he developed blurry vision with a curtain over his eyes that has gotten better since this am but still present. Denies nausea, vomiting, fever, chills, weakness, dizziness, loc, cp, bp, abd p, fever, chills, headache, urinary symptoms, uri symptoms.

## 2018-04-24 NOTE — H&P ADULT - PMH
Cerebrovascular accident (CVA) due to bilateral embolism of posterior cerebral arteries    Essential hypertension    Systolic congestive heart failure, unspecified chronicity

## 2018-04-24 NOTE — ED ADULT TRIAGE NOTE - ARRIVAL INFO ADDITIONAL COMMENTS
Pt came to ED with c/o blurry vision, was d/c'd from hospital this weekend dx TIA as per patient, brought to Critical Care seen by MD and put in crit 2

## 2018-04-24 NOTE — ED PROVIDER NOTE - PROGRESS NOTE DETAILS
Neurology evaluated pt. Requesting CTA head and neck. CTA negative. Unable to perform MRI due to Lifevest. Will admit pt for further work up and possible MRI as per medical team's recs. Plan to admit to tele, pt not candidate for stroke unit at this time.

## 2018-04-24 NOTE — PROGRESS NOTE ADULT - SUBJECTIVE AND OBJECTIVE BOX
***STROKE ALERT CONSULT NOTE    Chief Complaint: Visual changes    Last known normal time: 12 midnight 4/24/18    HPI:  68 yo M HTN, DLD, CHF with 20% EF presents to the ED with visual changes since waking this morning. Pt states he developed blurry vision and difficulty recognizing colors. He was admitted one week ago with vision changes and was found to have chronic and subacute PCA territory ischemic strokes. He was not given IV TPA for NIHSS 0. He has no visual field deficit.    PAST MEDICAL & SURGICAL HISTORY:  Systolic congestive heart failure, unspecified chronicity  No significant past surgical history    MEDICATIONS  (STANDING):    MEDICATIONS  (PRN):      Vital Signs Last 24 Hrs  T(C): 36.8 (24 Apr 2018 10:00), Max: 37.1 (24 Apr 2018 09:43)  T(F): 98.3 (24 Apr 2018 10:00), Max: 98.7 (24 Apr 2018 09:43)  HR: 70 (24 Apr 2018 11:03) (70 - 88)  BP: 137/83 (24 Apr 2018 11:03) (128/78 - 137/83)  BP(mean): --  RR: 18 (24 Apr 2018 11:03) (18 - 18)  SpO2: 99% (24 Apr 2018 11:03) (99% - 100%)    Neurologic Examination:  Awake alert oriented to self place time   pearla EOMI no gaze  no visual field deficit reports, describes difficulty discerning colors at times  no drift, no dysmetria of upper or lower extremities  no neglect or inattention  no sensation deficit  NIH Stroke Scale:0    Labs:   CBC Full  -  ( 24 Apr 2018 09:26 )  WBC Count : 5.23 K/uL  Hemoglobin : 13.5 g/dL  Hematocrit : 40.5 %  Platelet Count - Automated : 187 K/uL  Mean Cell Volume : 97.1 fL  Mean Cell Hemoglobin : 32.4 pg  Mean Cell Hemoglobin Concentration : 33.3 g/dL  Auto Neutrophil # : 3.60 K/uL  Auto Lymphocyte # : 1.24 K/uL  Auto Monocyte # : 0.32 K/uL  Auto Eosinophil # : 0.03 K/uL  Auto Basophil # : 0.02 K/uL  Auto Neutrophil % : 68.8 %  Auto Lymphocyte % : 23.7 %  Auto Monocyte % : 6.1 %  Auto Eosinophil % : 0.6 %  Auto Basophil % : 0.4 %    04-24    137  |  101  |  33<H>  ----------------------------<  111<H>  5.4<H>   |  21  |  1.3    Ca    9.1      24 Apr 2018 09:26    TPro  6.4  /  Alb  3.7  /  TBili  1.1  /  DBili  x   /  AST  82<H>  /  ALT  130<H>  /  AlkPhos  111  04-24    LIVER FUNCTIONS - ( 24 Apr 2018 09:26 )  Alb: 3.7 g/dL / Pro: 6.4 g/dL / ALK PHOS: 111 U/L / ALT: 130 U/L / AST: 82 U/L / GGT: x           PT/INR - ( 24 Apr 2018 09:26 )   PT: 15.90 sec;   INR: 1.46 ratio         PTT - ( 24 Apr 2018 09:26 )  PTT:20.0 sec      Neuroimaging:  NCHCT:  < from: CT Brain Stroke Protocol (04.24.18 @ 09:39) >    Impression:     Since CT head 4/15/2018    1.  No acute hemorrhage, mass effect or midline shift.    2.  Hypoattenuation in the right occipital lobe is less pronounced   compared to prior exam, likely evolution of prior infarction.     3.  Stable chronic left occipital infarction.    < end of copied text >    Assessment:    68 yo M HTN, DLD, CHF with 20% EF presents to the ED with visual changes since waking this morning. Pt states he developed blurry vision and difficulty recognizing colors. He was admitted one week ago with vision changes and was found to have chronic and subacute PCA territory ischemic strokes. He was not given IV TPA for NIHSS 0. He has no visual field deficit. Symptom etiology is unknown at this time and will be discussed post stroke workup.    Suggestion:  Asa 162 mg daily  Lipitor 80 mg qhs  MRI Brain w/o arabella  CTA head and neck  TTE with contrast  A1C, LDL  PT OT Rehab    Chadd Rios NP ***STROKE ALERT CONSULT NOTE    Chief Complaint: Visual changes    Last known normal time: 12 midnight 4/24/18    HPI:  68 yo M HTN, DLD, CHF with 20% EF presents to the ED with visual changes since waking this morning. Pt states he developed blurry vision and difficulty recognizing colors. He was admitted one week ago with vision changes and was found to have chronic and subacute PCA territory ischemic strokes. He was not given IV TPA for NIHSS 0. He has no visual field deficit.    PAST MEDICAL & SURGICAL HISTORY:  Systolic congestive heart failure, unspecified chronicity  No significant past surgical history    MEDICATIONS  (STANDING):    MEDICATIONS  (PRN):      Vital Signs Last 24 Hrs  T(C): 36.8 (24 Apr 2018 10:00), Max: 37.1 (24 Apr 2018 09:43)  T(F): 98.3 (24 Apr 2018 10:00), Max: 98.7 (24 Apr 2018 09:43)  HR: 70 (24 Apr 2018 11:03) (70 - 88)  BP: 137/83 (24 Apr 2018 11:03) (128/78 - 137/83)  BP(mean): --  RR: 18 (24 Apr 2018 11:03) (18 - 18)  SpO2: 99% (24 Apr 2018 11:03) (99% - 100%)    Neurologic Examination:  Awake alert oriented to self place time   pearla EOMI no gaze  no visual field deficit reports, describes difficulty discerning colors at times  no drift, no dysmetria of upper or lower extremities  no neglect or inattention  no sensation deficit  NIH Stroke Scale:0    Labs:   CBC Full  -  ( 24 Apr 2018 09:26 )  WBC Count : 5.23 K/uL  Hemoglobin : 13.5 g/dL  Hematocrit : 40.5 %  Platelet Count - Automated : 187 K/uL  Mean Cell Volume : 97.1 fL  Mean Cell Hemoglobin : 32.4 pg  Mean Cell Hemoglobin Concentration : 33.3 g/dL  Auto Neutrophil # : 3.60 K/uL  Auto Lymphocyte # : 1.24 K/uL  Auto Monocyte # : 0.32 K/uL  Auto Eosinophil # : 0.03 K/uL  Auto Basophil # : 0.02 K/uL  Auto Neutrophil % : 68.8 %  Auto Lymphocyte % : 23.7 %  Auto Monocyte % : 6.1 %  Auto Eosinophil % : 0.6 %  Auto Basophil % : 0.4 %    04-24    137  |  101  |  33<H>  ----------------------------<  111<H>  5.4<H>   |  21  |  1.3    Ca    9.1      24 Apr 2018 09:26    TPro  6.4  /  Alb  3.7  /  TBili  1.1  /  DBili  x   /  AST  82<H>  /  ALT  130<H>  /  AlkPhos  111  04-24    LIVER FUNCTIONS - ( 24 Apr 2018 09:26 )  Alb: 3.7 g/dL / Pro: 6.4 g/dL / ALK PHOS: 111 U/L / ALT: 130 U/L / AST: 82 U/L / GGT: x           PT/INR - ( 24 Apr 2018 09:26 )   PT: 15.90 sec;   INR: 1.46 ratio         PTT - ( 24 Apr 2018 09:26 )  PTT:20.0 sec      Neuroimaging:  NCHCT:  < from: CT Brain Stroke Protocol (04.24.18 @ 09:39) >    Impression:     Since CT head 4/15/2018    1.  No acute hemorrhage, mass effect or midline shift.    2.  Hypoattenuation in the right occipital lobe is less pronounced   compared to prior exam, likely evolution of prior infarction.     3.  Stable chronic left occipital infarction.    < end of copied text >    Assessment:    68 yo M HTN, DLD, CHF with 20% EF presents to the ED with visual changes since waking this morning. Pt states he developed blurry vision and difficulty recognizing colors. He was admitted one week ago with vision changes and was found to have chronic and subacute PCA territory ischemic strokes. He was not given IV TPA for NIHSS 0. He has no visual field deficit. Symptom etiology is unknown at this time and will be discussed post stroke workup.    Suggestion:  Asa 162 mg daily  Lipitor 80 mg qhs  MRI Brain w/o arabella  CTA head and neck  (no TTE needed had recent TTE)  A1C, LDL  PT OT Rehab    Chadd Rios NP ***STROKE ALERT CONSULT NOTE    Chief Complaint: Visual changes    Last known normal time: 12 midnight 4/24/18    HPI:  68 yo M HTN, DLD, CHF with 20% EF presents to the ED with visual changes since waking this morning. Pt states he developed blurry vision and difficulty recognizing colors. He was admitted one week ago with vision changes and was found to have chronic and subacute PCA territory ischemic strokes. He was not given IV TPA for NIHSS 0. He has no visual field deficit.    PAST MEDICAL & SURGICAL HISTORY:  Systolic congestive heart failure, unspecified chronicity  No significant past surgical history    MEDICATIONS  (STANDING):    MEDICATIONS  (PRN):      Vital Signs Last 24 Hrs  T(C): 36.8 (24 Apr 2018 10:00), Max: 37.1 (24 Apr 2018 09:43)  T(F): 98.3 (24 Apr 2018 10:00), Max: 98.7 (24 Apr 2018 09:43)  HR: 70 (24 Apr 2018 11:03) (70 - 88)  BP: 137/83 (24 Apr 2018 11:03) (128/78 - 137/83)  BP(mean): --  RR: 18 (24 Apr 2018 11:03) (18 - 18)  SpO2: 99% (24 Apr 2018 11:03) (99% - 100%)    Neurologic Examination:  Awake alert oriented to self place time   pearla EOMI no gaze  no visual field deficit reports, describes difficulty discerning colors at times  no drift, no dysmetria of upper or lower extremities  no neglect or inattention  no sensation deficit  NIH Stroke Scale:0    Labs:   CBC Full  -  ( 24 Apr 2018 09:26 )  WBC Count : 5.23 K/uL  Hemoglobin : 13.5 g/dL  Hematocrit : 40.5 %  Platelet Count - Automated : 187 K/uL  Mean Cell Volume : 97.1 fL  Mean Cell Hemoglobin : 32.4 pg  Mean Cell Hemoglobin Concentration : 33.3 g/dL  Auto Neutrophil # : 3.60 K/uL  Auto Lymphocyte # : 1.24 K/uL  Auto Monocyte # : 0.32 K/uL  Auto Eosinophil # : 0.03 K/uL  Auto Basophil # : 0.02 K/uL  Auto Neutrophil % : 68.8 %  Auto Lymphocyte % : 23.7 %  Auto Monocyte % : 6.1 %  Auto Eosinophil % : 0.6 %  Auto Basophil % : 0.4 %    04-24    137  |  101  |  33<H>  ----------------------------<  111<H>  5.4<H>   |  21  |  1.3    Ca    9.1      24 Apr 2018 09:26    TPro  6.4  /  Alb  3.7  /  TBili  1.1  /  DBili  x   /  AST  82<H>  /  ALT  130<H>  /  AlkPhos  111  04-24    LIVER FUNCTIONS - ( 24 Apr 2018 09:26 )  Alb: 3.7 g/dL / Pro: 6.4 g/dL / ALK PHOS: 111 U/L / ALT: 130 U/L / AST: 82 U/L / GGT: x           PT/INR - ( 24 Apr 2018 09:26 )   PT: 15.90 sec;   INR: 1.46 ratio         PTT - ( 24 Apr 2018 09:26 )  PTT:20.0 sec      Neuroimaging:  NCHCT:  < from: CT Brain Stroke Protocol (04.24.18 @ 09:39) >    Impression:     Since CT head 4/15/2018    1.  No acute hemorrhage, mass effect or midline shift.    2.  Hypoattenuation in the right occipital lobe is less pronounced   compared to prior exam, likely evolution of prior infarction.     3.  Stable chronic left occipital infarction.    < end of copied text >    Assessment:    68 yo M HTN, DLD, CHF with 20% EF presents to the ED with visual changes since waking this morning. Pt states he developed blurry vision and difficulty recognizing colors. He was admitted one week ago with vision changes and was found to have chronic and subacute PCA territory ischemic strokes. He was not given IV TPA for NIHSS 0. He has no visual field deficit. Symptom etiology is unknown at this time and will be discussed post stroke workup.    Suggestion:  Asa 162 mg daily  Lipitor 80 mg qhs  Telemetry monitoring  MRI Brain w/o arabella  CTA head and neck  (no TTE needed had recent TTE)  A1C, LDL  PT OT Rehab    Chadd Rios NP

## 2018-04-24 NOTE — H&P ADULT - ATTENDING COMMENTS
Pt seen and examined independently.  Wife at bedside. He states vision is improved.  He has pulse ox of 100% on RA per RN.  Neurology discussed case with neuroradiology and recommendation is for MRI of brain (not done last admission) and MRA of head and neck.     < from: MR Angio Neck w/ IV Cont (04.18.18 @ 21:53) >    Impression:    Unremarkable MRA of the head and neck.    < end of copied text >    Continue telemetry, ASA and statin for stroke management.  Complete hypercoagulable workup.    Continue lifevest and management for chronic HFrEF (nonischemic dilated cardiomyopathy).    I reviewed the resident's note and I agree with the physical exam, assessment and plan with additions/corrections as above. Pt seen and examined independently.  Wife at bedside. He states vision is improved.  He has pulse ox of 100% on RA per RN.  Neurology discussed case with neuroradiology and recommendation is for MRI of brain (not done last admission) and MRA of head and neck.     < from: MR Angio Neck w/ IV Cont (04.18.18 @ 21:53) >    Impression:    Unremarkable MRA of the head and neck.    < end of copied text >    Continue telemetry, ASA and statin for stroke management.  Complete hypercoagulable workup.    Continue lifevest and management for chronic HFrEF (nonischemic dilated cardiomyopathy).    Pt had abnormal LFTs last admission before it improved.   Trend LFTs - continue statin for now.    < from: US Abdomen Limited (04.24.18 @ 19:06) >    IMPRESSION:    Unremarkable appearance of the liver.    4 mm gallbladder polyp. Recommend a 12 month follow-up ultrasound.    Trace ascites.    < end of copied text >      I reviewed the resident's note and I agree with the physical exam, assessment and plan with additions/corrections as above.

## 2018-04-24 NOTE — H&P ADULT - NSHPSOCIALHISTORY_GEN_ALL_CORE
Social History:    Marital Status:  (  X )    (   ) Single    (   )    (  )   Occupation: [employer] teacher  Lives with: (  ) alone  (  ) children   ( X ) spouse   (  ) parents  (  ) other    Substance Use (street drugs): ( X ) never used  (  ) other:  Tobacco Usage:  ( X  ) never smoked   (   ) former smoker   (   ) current smoker  (     ) pack year  (        ) last cigarette date  Alcohol Usage: none Social History:    Marital Status:  (  X )    (   ) Single    (   )    (  )   Occupation:   Lives with: (  ) alone  (  ) children   ( X ) spouse   (  ) parents  (  ) other    Substance Use (street drugs): ( X ) never used  (  ) other:  Tobacco Usage:  ( X  ) never smoked   (   ) former smoker   (   ) current smoker  (     ) pack year  (        ) last cigarette date  Alcohol Usage: none

## 2018-04-24 NOTE — ED PROVIDER NOTE - PHYSICAL EXAMINATION
CONSTITUTIONAL: Well-developed; well-nourished; in no acute distress.   SKIN: warm, dry  HEAD: Normocephalic; atraumatic.  EYES: no conj injection  ENT: No nasal discharge; airway clear.  NECK: Supple; non tender.  CARD: S1, S2 normal; +systolic murmur. Regular rate and rhythm. +Life vest on.   RESP: No wheezes, rales or rhonchi.  ABD: soft ntnd  EXT: Normal ROM.    LYMPH: No acute cervical adenopathy.  NEURO: Alert, oriented, CN 2-12 intact, anthony intact, ftn intact, no slurred speech, 5/5 motor, nl gait, sensation intact throughout. Pt has some expressive aphasia but per nurse that saw him last time in ER, speech is unchanged.   PSYCH: Cooperative, appropriate.

## 2018-04-24 NOTE — H&P ADULT - NSHPPHYSICALEXAM_GEN_ALL_CORE
GENERAL: NAD, well-developed  HEAD:  Atraumatic, Normocephalic  EYES: EOMI, PERRLA, conjunctiva and sclera clear  NECK: Supple, No JVD  CHEST/LUNG: Clear to auscultation bilaterally; No wheeze  HEART: Regular rate and rhythm; No murmurs, rubs, or gallops  ABDOMEN: Soft, Nontender, Nondistended; Bowel sounds present  EXTREMITIES:  2+ Peripheral Pulses, trace b/l pitting edema  PSYCH: AAOx3  NEUROLOGY: non-focal  SKIN: No rashes or lesions GENERAL: NAD, thin man  HEAD:  Atraumatic, Normocephalic  EYES: EOMI, PERRLA, conjunctiva and sclera clear  NECK: Supple, No JVD  CHEST/LUNG: Clear to auscultation bilaterally; No wheeze, lifevest in place  HEART: Regular rate and rhythm; No murmurs, rubs, or gallops  ABDOMEN: Soft, Nontender, Nondistended; Bowel sounds present  EXTREMITIES:  2+ Peripheral Pulses, trace b/l pitting edema  PSYCH: AAOx3  NEUROLOGY: non-focal, vision improved now per pt, follows commands, gait not tested  SKIN: No rashes or lesions

## 2018-04-24 NOTE — ED PROVIDER NOTE - CARE PLAN
Principal Discharge DX:	CVA (cerebral vascular accident)  Secondary Diagnosis:	Systolic congestive heart failure, unspecified chronicity

## 2018-04-24 NOTE — H&P ADULT - NSHPLABSRESULTS_GEN_ALL_CORE
13.5   5.23  )-----------( 187      ( 24 Apr 2018 09:26 )             40.5       04-24    137  |  101  |  33<H>  ----------------------------<  111<H>  5.4<H>   |  21  |  1.3    Ca    9.1      24 Apr 2018 09:26    TPro  6.4  /  Alb  3.7  /  TBili  1.1  /  DBili  x   /  AST  82<H>  /  ALT  130<H>  /  AlkPhos  111  04-24                  PT/INR - ( 24 Apr 2018 09:26 )   PT: 15.90 sec;   INR: 1.46 ratio         PTT - ( 24 Apr 2018 09:26 )  PTT:20.0 sec    Lactate Trend      CARDIAC MARKERS ( 24 Apr 2018 09:26 )  x     / <0.01 ng/mL / x     / x     / x            CAPILLARY BLOOD GLUCOSE  111 (24 Apr 2018 10:58)    < from: CT Angio Neck w/ IV Cont (04.24.18 @ 11:26) >    Essentially unremarkable CT angiogram of the neck and head. No   significant vascular stenosis or occlusion.    Moderate bilateral pleural effusions.    < end of copied text >    < from: CT Angio Head w/ IV Cont (04.24.18 @ 11:26) >    Essentially unremarkable CT angiogram of the neck and head. No   significant vascular stenosis or occlusion.    Moderate bilateral pleural effusions.      < end of copied text >    < from: CT Head No Cont (04.15.18 @ 17:19) >    Bilateral occipital lobe age-indeterminate infarcts; however, the right   occipital lobe infarct appears acute to subacute and the left occipital   lobe infarct appearschronic.    < end of copied text >

## 2018-04-24 NOTE — H&P ADULT - ASSESSMENT
66 yo M w/ PMH of HTN, b/l occipital stroke-recent d/c from hospital on 4/20, sCHF(EF 25%) on life west presented with c/o blurry vision in both eyes which started today AM waking up. It is equal in both eyes. He had similar complaint on last admission when he was found to have stroke. Since am, pt reports 85% improvement in symptom.    Pt denies any double vision, decreased vision in particular quadrant, numbness/weakness/decreased sensation in any part of body, change in his speech, problem with bowel/bladder, walking difficulty.     ROS + for SOB on lying down- has been using 3 pillows for last couple of nights. Denies chest pain, SOB on walking, leg swelling, palpitation.    In ED, VS stable on presentation. Stroke code was called--> NIHSS 0--> no tPA given--> admit to tele per neurology for MRI brain    A & P:    # B/l Blurry vision possibly Acute CVA vs TIA  - admit to tele  - MRI NC per neurology  - increase aspirin to 162, c/w lipitor 40  - no need to repeat echo as pt had echo w/ bubble study few days ago, spoke w/ Chadd-neuro NP regarding it; said will ask Dr Hunter if we really need to repeat it. So f/u with Chadd--> spectra 2405  - A1c from 04/18/18--> 6.3 and lipid profile normal from 04/18/18--> no need to repeat    # mildly elevated liver enzymes and INR can be 2/2 lipitor  - c/w lipitor as pt needs it  - check RUQ sono and TSH to r/o other causes  - repeat level as outpatient    # DM  - A1c from 4/18/18--> 6.3  - monitor FS--> add insulin as needed  - carb consistent diet    # orthopnea/l pleural effusion on CT 2/2 sCHF  - no active exacerbation  - increase lasix to 40 po q24h for better symptom control  - OP EP eval for AICD--> c/w life west for now  - c/w metoprolol, spironolactone, lisinopril    # HTN-->c/w metoprolol, spironolactone, lisinopril    # DVT Px--> lovenox    # COde status- full code but won't be like to on vent for longer than 10 days--> advised pt to get living will done as putpatient 66 yo M w/ PMH of HTN, b/l occipital stroke-recent d/c from hospital on 4/20, sCHF(EF 25%) on life west presented with c/o blurry vision in both eyes which started today AM waking up. It is equal in both eyes. He had similar complaint on last admission when he was found to have stroke. Since am, pt reports 85% improvement in symptom.    Pt denies any double vision, decreased vision in particular quadrant, numbness/weakness/decreased sensation in any part of body, change in his speech, problem with bowel/bladder, walking difficulty.     ROS + for SOB on lying down- has been using 3 pillows for last couple of nights. Denies chest pain, SOB on walking, leg swelling, palpitation.    In ED, VS stable on presentation. Stroke code was called--> NIHSS 0--> no tPA given--> admit to tele per neurology for MRI brain    A & P:    # B/l Blurry vision possibly Acute CVA vs TIA  - admit to tele  - MRI NC per neurology--> I spoke with cardiology fellow over phone--> Life vest can be taken off temporarily while pt goes for MRI  - increase aspirin to 162, c/w lipitor 40  - no need to repeat echo as pt had echo w/ bubble study few days ago, spoke w/ Chadd-neuro NP regarding it; said will ask Dr Hunter if we really need to repeat it. So f/u with Chadd--> spectra 2405  - A1c from 04/18/18--> 6.3 and lipid profile normal from 04/18/18--> no need to repeat  - pt doesn't need PT OT rehab--> no focal deficit/difficulty walking present    # mildly elevated liver enzymes and INR can be 2/2 lipitor  - c/w lipitor as pt needs it  - check RUQ sono and TSH to r/o other causes  - repeat level as outpatient    # DM  - A1c from 4/18/18--> 6.3  - monitor FS--> add insulin as needed  - carb consistent diet    # orthopnea/l pleural effusion on CT 2/2 sCHF  - no active exacerbation  - increase lasix to 40 po q24h for better symptom control  - OP EP eval for AICD--> c/w life west for now  - c/w metoprolol, spironolactone, lisinopril    # HTN-->c/w metoprolol, spironolactone, lisinopril    # DVT Px--> lovenox    # COde status- full code but won't be like to on vent for longer than 10 days--> advised pt to get living will done as putpatient 66 yo M w/ PMH of HTN, b/l occipital stroke-recent d/c from hospital on 4/20, sCHF(EF 25%) on life west presented with c/o blurry vision in both eyes which started today AM waking up. It is equal in both eyes. He had similar complaint on last admission when he was found to have stroke. Since am, pt reports 85% improvement in symptom.    Pt denies any double vision, decreased vision in particular quadrant, numbness/weakness/decreased sensation in any part of body, change in his speech, problem with bowel/bladder, walking difficulty.     ROS + for SOB on lying down- has been using 3 pillows for last couple of nights. Denies chest pain, SOB on walking, leg swelling, palpitation.    In ED, VS stable on presentation. Stroke code was called--> NIHSS 0--> no tPA given--> admit to tele per neurology for MRI brain    A & P:    # B/l Blurry vision possibly Acute CVA vs TIA  - admit to tele  - MRI NC per neurology--> I spoke with cardiology fellow over phone--> Life vest can be taken off temporarily while pt goes for MRI  - increase aspirin to 162, c/w lipitor 40  - no need to repeat echo as pt had echo w/ bubble study few days ago, spoke w/ Chadd-neuro NP regarding it; said will ask Dr Hunter if we really need to repeat it. So f/u with Chadd--> spectra 2405  - A1c from 04/18/18--> 6.3 and lipid profile normal from 04/18/18--> no need to repeat  - pt doesn't need PT OT rehab--> no focal deficit/difficulty walking present    # mildly elevated liver enzymes and INR can be 2/2 lipitor  - c/w lipitor as pt needs it  - check RUQ sono and TSH to r/o other causes  - repeat level as outpatient    # DM  - A1c from 4/18/18--> 6.3  - monitor FS--> add insulin as needed  - carb consistent diet    # orthopnea/left pleural effusion on CT 2/2 sCHF  - no active exacerbation  - increase lasix to 40 po q24h for better symptom control  - OP EP eval for AICD--> c/w life west for now  - c/w metoprolol, spironolactone, lisinopril    # HTN-->c/w metoprolol, spironolactone, lisinopril    # DVT Px--> lovenox    # COde status- full code but won't be like to on vent for longer than 10 days--> advised pt to get living will done as outpatient

## 2018-04-25 LAB
ANION GAP SERPL CALC-SCNC: 11 MMOL/L — SIGNIFICANT CHANGE UP (ref 7–14)
BUN SERPL-MCNC: 34 MG/DL — HIGH (ref 10–20)
CALCIUM SERPL-MCNC: 8.6 MG/DL — SIGNIFICANT CHANGE UP (ref 8.5–10.1)
CHLORIDE SERPL-SCNC: 107 MMOL/L — SIGNIFICANT CHANGE UP (ref 98–110)
CO2 SERPL-SCNC: 21 MMOL/L — SIGNIFICANT CHANGE UP (ref 17–32)
CREAT SERPL-MCNC: 1.1 MG/DL — SIGNIFICANT CHANGE UP (ref 0.7–1.5)
GLUCOSE SERPL-MCNC: 125 MG/DL — HIGH (ref 70–99)
HCT VFR BLD CALC: 35.2 % — LOW (ref 42–52)
HGB BLD-MCNC: 12 G/DL — LOW (ref 14–18)
MCHC RBC-ENTMCNC: 32.5 PG — HIGH (ref 27–31)
MCHC RBC-ENTMCNC: 34.1 G/DL — SIGNIFICANT CHANGE UP (ref 32–37)
MCV RBC AUTO: 95.4 FL — HIGH (ref 80–94)
NRBC # BLD: 0 /100 WBCS — SIGNIFICANT CHANGE UP (ref 0–0)
PLATELET # BLD AUTO: 172 K/UL — SIGNIFICANT CHANGE UP (ref 130–400)
POTASSIUM SERPL-MCNC: 4.4 MMOL/L — SIGNIFICANT CHANGE UP (ref 3.5–5)
POTASSIUM SERPL-SCNC: 4.4 MMOL/L — SIGNIFICANT CHANGE UP (ref 3.5–5)
RBC # BLD: 3.69 M/UL — LOW (ref 4.7–6.1)
RBC # FLD: 14.3 % — SIGNIFICANT CHANGE UP (ref 11.5–14.5)
SODIUM SERPL-SCNC: 139 MMOL/L — SIGNIFICANT CHANGE UP (ref 135–146)
TSH SERPL-MCNC: 1.73 UIU/ML — SIGNIFICANT CHANGE UP (ref 0.27–4.2)
WBC # BLD: 4.41 K/UL — LOW (ref 4.8–10.8)
WBC # FLD AUTO: 4.41 K/UL — LOW (ref 4.8–10.8)

## 2018-04-25 RX ADMIN — Medication 162 MILLIGRAM(S): at 11:50

## 2018-04-25 RX ADMIN — SPIRONOLACTONE 25 MILLIGRAM(S): 25 TABLET, FILM COATED ORAL at 06:04

## 2018-04-25 RX ADMIN — Medication 25 MILLIGRAM(S): at 06:04

## 2018-04-25 RX ADMIN — Medication 40 MILLIGRAM(S): at 06:04

## 2018-04-25 RX ADMIN — ATORVASTATIN CALCIUM 40 MILLIGRAM(S): 80 TABLET, FILM COATED ORAL at 22:04

## 2018-04-25 RX ADMIN — ENOXAPARIN SODIUM 30 MILLIGRAM(S): 100 INJECTION SUBCUTANEOUS at 11:51

## 2018-04-25 RX ADMIN — LISINOPRIL 5 MILLIGRAM(S): 2.5 TABLET ORAL at 06:04

## 2018-04-25 NOTE — PROGRESS NOTE ADULT - SUBJECTIVE AND OBJECTIVE BOX
SAMANTHA CHARLES    Chief Complaint: Visual changes    Last known normal time: 12 midnight 4/24/18    HPI:  68 yo M HTN, DLD, CHF with 20% EF presents to the ED with visual changes since waking this morning. Pt states he developed blurry vision and difficulty recognizing colors. He was admitted one week ago with vision changes and was found to have chronic and subacute PCA territory ischemic strokes. He was not given IV TPA for NIHSS 0. He has no visual field deficit. Today he reports resolution of symptoms.      Social History: []  Drug Use: []   Alcohol Use: []   Tobacco Use:  [] Other:      Cerebrovascular Risk Factors:[] prior ischemic stroke  [] Afib  []CAD  [x]HTN  []DLD  []DM []PVD[x] chf    Essential hypertension  Cerebrovascular accident (CVA) due to bilateral embolism of posterior cerebral arteries  Systolic congestive heart failure, unspecified chronicity  No pertinent past medical history      Possible Location of Stroke:  Possible PCA territory  Possible Cause of Stroke:  unknown  [facility] Cervicocerebral Imaging:  CT Angio Neck w/ IV Cont:   EXAM:  CT ANGIO NECK (W)AW IC        EXAM:  CT ANGIO BRAIN (W)AW IC          Impression:    Essentially unremarkable CT angiogram of the neck and head. No   significant vascular stenosis or occlusion.    Moderate bilateral pleural effusions.    Relevant blood tests:  Direct LDL: 107 mg/dL [4 - 129] (04-16-18 @ 07:32)  Hemoglobin A1C, Whole Blood: 6.3 % (04-18-18 @ 09:30)  Hemoglobin A1C, Whole Blood: 6.3 % (04-16-18 @ 07:32)    Stroke Topography:  CT Brain Stroke Protocol:   EXAM:  CT BRAIN STROKE PROTOCOL        1.  No acute hemorrhage, mass effect or midline shift.    2.  Hypoattenuation in the right occipital lobe is less pronounced   compared to prior exam, likely evolution of prior infarction.     3.  Stable chronic left occipital infarction.    4.  If the patient continues to be symptomatic follow-up MRI of the brain   may be helpful for further evaluation.    Stroke Workup:    Cardiac Rhythm(Tele/Holter) & Duration:   24 hours             Events:none    Cardiac Structure:(TTE/FREDERICK +/-):No intracardiac thrombus/vegetation/akynesia/EF:  < from: Transthoracic Echocardiogram (04.20.18 @ 11:03) >  Summary:   1. Contrast Echo with Lumason shows severe global hypokinesia on apical   views and EF<25%. No thrombus seen.   2. Endocardial visualization was enhanced with intravenous echo contrast.    Home Medications:  aspirin 81 mg oral delayed release tablet: 1 tab(s) orally once a day (24 Apr 2018 15:57)  furosemide 20 mg oral tablet: 1 tab(s) orally once a day (24 Apr 2018 15:57)  lisinopril 5 mg oral tablet: 1 tab(s) orally once a day (24 Apr 2018 15:57)  spironolactone 25 mg oral tablet: 1 tab(s) orally once a day (24 Apr 2018 15:57)      MEDICATIONS  (STANDING):  aspirin  chewable 162 milliGRAM(s) Oral daily  atorvastatin 40 milliGRAM(s) Oral at bedtime  enoxaparin Injectable 30 milliGRAM(s) SubCutaneous daily  furosemide    Tablet 40 milliGRAM(s) Oral daily  lisinopril 5 milliGRAM(s) Oral daily  metoprolol succinate ER 25 milliGRAM(s) Oral daily  spironolactone 25 milliGRAM(s) Oral daily      Physical Exam:    Vital Signs Last 24 Hrs  T(C): 36.7 (25 Apr 2018 07:13), Max: 37.1 (24 Apr 2018 15:20)  T(F): 98 (25 Apr 2018 07:13), Max: 98.8 (24 Apr 2018 15:20)  HR: 75 (25 Apr 2018 07:13) (70 - 84)  BP: 100/68 (25 Apr 2018 07:13) (99/60 - 137/83)  BP(mean): --  RR: 18 (25 Apr 2018 07:13) (18 - 18)  SpO2: 100% (25 Apr 2018 07:13) (97% - 100%)    NIHSS 0      NIHSS on admission:   0       NIHSS yesterday:    0      NIHSS today:0    Current Functional status:          NIHSS:0          m-RS:0    Impression:  Assessment:    68 yo M HTN, DLD, CHF with 20% EF presents to the ED with visual changes since waking this morning. Pt states he developed blurry vision and difficulty recognizing colors. He was admitted one week ago with vision changes and was found to have chronic and subacute PCA territory ischemic strokes. He was not given IV TPA for NIHSS 0. He has no visual field deficit. Symptom etiology is unknown at this time. Patient has a lifevest. Patient can get MRI as outpatient.    Suggestion:  Asa 162 mg daily  Lipitor 80 mg qhs  Follow up with stroke clinic within one week, post MRI      Chadd Rios NP      -I Alejandra Blanco MD agree with the above. SAMANTHA CHARLES    Chief Complaint: Visual changes    Last known normal time: 12 midnight 4/24/18    HPI:  66 yo M HTN, DLD, CHF with 20% EF presents to the ED with visual changes since waking this morning. Pt states he developed blurry vision and difficulty recognizing colors. He was admitted one week ago with vision changes and was found to have chronic and subacute PCA territory ischemic strokes. He was not given IV TPA for NIHSS 0. He has no visual field deficit. Today he reports resolution of symptoms.      Social History: []  Drug Use: []   Alcohol Use: []   Tobacco Use:  [] Other:      Cerebrovascular Risk Factors:[] prior ischemic stroke  [] Afib  []CAD  [x]HTN  []DLD  []DM []PVD[x] chf    Essential hypertension  Cerebrovascular accident (CVA) due to bilateral embolism of posterior cerebral arteries  Systolic congestive heart failure, unspecified chronicity  No pertinent past medical history      Possible Location of Stroke:  Possible PCA territory  Possible Cause of Stroke:  unknown  [facility]ervicocerebral Imaging:  CT Angio Neck w/ IV Cont:   EXAM:  CT ANGIO NECK (W)AW IC        EXAM:  CT ANGIO BRAIN (W)AW IC          Impression:    Essentially unremarkable CT angiogram of the neck and head. No   significant vascular stenosis or occlusion.    Moderate bilateral pleural effusions.    Relevant blood tests:  Direct LDL: 107 mg/dL [4 - 129] (04-16-18 @ 07:32)  Hemoglobin A1C, Whole Blood: 6.3 % (04-18-18 @ 09:30)  Hemoglobin A1C, Whole Blood: 6.3 % (04-16-18 @ 07:32)    Stroke Topography:  CT Brain Stroke Protocol:   EXAM:  CT BRAIN STROKE PROTOCOL        1.  No acute hemorrhage, mass effect or midline shift.    2.  Hypoattenuation in the right occipital lobe is less pronounced   compared to prior exam, likely evolution of prior infarction.     3.  Stable chronic left occipital infarction.    4.  If the patient continues to be symptomatic follow-up MRI of the brain   may be helpful for further evaluation.    Stroke Workup:    Cardiac Rhythm(Tele/Holter) & Duration:   24 hours             Events:none    Cardiac Structure:(TTE/FREDERICK +/-):No intracardiac thrombus/vegetation/akynesia/EF:  < from: Transthoracic Echocardiogram (04.20.18 @ 11:03) >  Summary:   1. Contrast Echo with Lumason shows severe global hypokinesia on apical   views and EF<25%. No thrombus seen.   2. Endocardial visualization was enhanced with intravenous echo contrast.    Home Medications:  aspirin 81 mg oral delayed release tablet: 1 tab(s) orally once a day (24 Apr 2018 15:57)  furosemide 20 mg oral tablet: 1 tab(s) orally once a day (24 Apr 2018 15:57)  lisinopril 5 mg oral tablet: 1 tab(s) orally once a day (24 Apr 2018 15:57)  spironolactone 25 mg oral tablet: 1 tab(s) orally once a day (24 Apr 2018 15:57)      MEDICATIONS  (STANDING):  aspirin  chewable 162 milliGRAM(s) Oral daily  atorvastatin 40 milliGRAM(s) Oral at bedtime  enoxaparin Injectable 30 milliGRAM(s) SubCutaneous daily  furosemide    Tablet 40 milliGRAM(s) Oral daily  lisinopril 5 milliGRAM(s) Oral daily  metoprolol succinate ER 25 milliGRAM(s) Oral daily  spironolactone 25 milliGRAM(s) Oral daily      Physical Exam:    Vital Signs Last 24 Hrs  T(C): 36.7 (25 Apr 2018 07:13), Max: 37.1 (24 Apr 2018 15:20)  T(F): 98 (25 Apr 2018 07:13), Max: 98.8 (24 Apr 2018 15:20)  HR: 75 (25 Apr 2018 07:13) (70 - 84)  BP: 100/68 (25 Apr 2018 07:13) (99/60 - 137/83)  BP(mean): --  RR: 18 (25 Apr 2018 07:13) (18 - 18)  SpO2: 100% (25 Apr 2018 07:13) (97% - 100%)    NIHSS 0      NIHSS on admission:   0       NIHSS yesterday:    0      NIHSS today:0    Current Functional status:          NIHSS:0          m-RS:0    Impression:  Assessment:    66 yo M HTN, DLD, CHF with 20% EF presents to the ED with visual changes since waking this morning. Pt states he developed blurry vision and difficulty recognizing colors. He was admitted one week ago with vision changes and was found to have chronic and subacute PCA territory ischemic strokes. He was not given IV TPA for NIHSS 0. He has no visual field deficit. Symptom etiology is unknown at this time. Patient has a lifevest. Discussed with resident and he will discuss with cardiology to coordinate MRI. Discussed with medical team and attending.    Suggestion:  Asa 162 mg daily  Lipitor 80 mg qhs  MRI Brain  Vasculitis workup  PT OT rehab      Chadd Rios NP      -I Alejandra Blanco MD agree with the above. SAMANTHA CHARLES    Chief Complaint: Visual changes      HPI:  68 yo M HTN, DLD, CHF with 20% EF presents to the ED with visual changes since waking this morning. Pt states he developed blurry vision and difficulty recognizing colors. He was admitted one week ago with vision changes and was found to have chronic and subacute PCA territory ischemic strokes. He was not given IV TPA for NIHSS 0. He has no visual field deficit. Today he reports resolution of symptoms.      Social History: []  Drug Use: []   Alcohol Use: []   Tobacco Use:  [] Other:      Cerebrovascular Risk Factors:[] prior ischemic stroke  [] Afib  []CAD  [x]HTN  []DLD  []DM []PVD[x] chf    Essential hypertension  Cerebrovascular accident (CVA) due to bilateral embolism of posterior cerebral arteries  Systolic congestive heart failure, unspecified chronicity  No pertinent past medical history      Possible Location of Stroke:  Possible PCA territory  Possible Cause of Stroke:  unknown  [facility]ervicocerebral Imaging:  CT Angio Neck w/ IV Cont:   EXAM:  CT ANGIO NECK (W)AW IC        EXAM:  CT ANGIO BRAIN (W)AW IC          Impression:    Essentially unremarkable CT angiogram of the neck and head. No   significant vascular stenosis or occlusion.    Moderate bilateral pleural effusions.    Relevant blood tests:  Direct LDL: 107 mg/dL [4 - 129] (04-16-18 @ 07:32)  Hemoglobin A1C, Whole Blood: 6.3 % (04-18-18 @ 09:30)  Hemoglobin A1C, Whole Blood: 6.3 % (04-16-18 @ 07:32)    Stroke Topography:  CT Brain Stroke Protocol:   EXAM:  CT BRAIN STROKE PROTOCOL        1.  No acute hemorrhage, mass effect or midline shift.    2.  Hypoattenuation in the right occipital lobe is less pronounced   compared to prior exam, likely evolution of prior infarction.     3.  Stable chronic left occipital infarction.    4.  If the patient continues to be symptomatic follow-up MRI of the brain   may be helpful for further evaluation.    Stroke Workup:    Cardiac Rhythm(Tele/Holter) & Duration:   24 hours             Events:none    Cardiac Structure:(TTE/FREDERICK +/-):No intracardiac thrombus/vegetation/akynesia/EF:  < from: Transthoracic Echocardiogram (04.20.18 @ 11:03) >  Summary:   1. Contrast Echo with Lumason shows severe global hypokinesia on apical   views and EF<25%. No thrombus seen.   2. Endocardial visualization was enhanced with intravenous echo contrast.    Home Medications:  aspirin 81 mg oral delayed release tablet: 1 tab(s) orally once a day (24 Apr 2018 15:57)  furosemide 20 mg oral tablet: 1 tab(s) orally once a day (24 Apr 2018 15:57)  lisinopril 5 mg oral tablet: 1 tab(s) orally once a day (24 Apr 2018 15:57)  spironolactone 25 mg oral tablet: 1 tab(s) orally once a day (24 Apr 2018 15:57)      MEDICATIONS  (STANDING):  aspirin  chewable 162 milliGRAM(s) Oral daily  atorvastatin 40 milliGRAM(s) Oral at bedtime  enoxaparin Injectable 30 milliGRAM(s) SubCutaneous daily  furosemide    Tablet 40 milliGRAM(s) Oral daily  lisinopril 5 milliGRAM(s) Oral daily  metoprolol succinate ER 25 milliGRAM(s) Oral daily  spironolactone 25 milliGRAM(s) Oral daily      Physical Exam:    Vital Signs Last 24 Hrs  T(C): 36.7 (25 Apr 2018 07:13), Max: 37.1 (24 Apr 2018 15:20)  T(F): 98 (25 Apr 2018 07:13), Max: 98.8 (24 Apr 2018 15:20)  HR: 75 (25 Apr 2018 07:13) (70 - 84)  BP: 100/68 (25 Apr 2018 07:13) (99/60 - 137/83)  BP(mean): --  RR: 18 (25 Apr 2018 07:13) (18 - 18)  SpO2: 100% (25 Apr 2018 07:13) (97% - 100%)    NIHSS 0      NIHSS on admission:   0       NIHSS yesterday:    0      NIHSS today:0    Current Functional status:          NIHSS:0          m-RS:0    Impression:  Assessment:    68 yo M HTN, DLD, CHF with 20% EF presents to the ED with visual changes since waking this morning. Pt states he developed blurry vision and difficulty recognizing colors. He was admitted one week ago with vision changes and was found to have chronic and subacute PCA territory ischemic strokes. He was not given IV TPA for NIHSS 0. He has no visual field deficit. Symptom etiology is unknown at this time. Patient has a lifevest. Discussed with resident and he will discuss with cardiology to coordinate MRI. Discussed with medical team and attending.    Suggestion:  Asa 162 mg daily  Lipitor 80 mg qhs  MRI Brain  CNS Vasculitis workup  PT OT rehab      Chadd Rios NP      -I Alejandra Blanco MD agree with the above. SAMANTHA CHARLES    Chief Complaint: Visual changes      HPI:  68 yo man w/h/o HTN, DLD, CHF with 20% EF presents to the ED with visual changes since waking this morning. Pt states he developed blurry vision and difficulty recognizing colors. He was admitted one week ago with vision changes and was found to have chronic left PCA and subacute right PCA territory ischemic strokes. He was not given IV TPA for NIHSS 0. He has no visual field deficit today on confrontation. Today he reports resolution of symptoms.      Social History: []  Drug Use: []   Alcohol Use: []   Tobacco Use:  [] Other:      PMH:    Cerebrovascular Risk Factors:[] prior ischemic stroke  [] Afib  []CAD  [x]HTN  []DLD  []DM []PVD[x] and CHF    Essential hypertension  Ischemic strokes  Systolic congestive heart failure, unspecified chronicity        Possible Location of Stroke:  PCA territory    Possible Cause of Stroke:  unknown    [facility]ico[facility]rebral Imaging:  CT Angio Head and Neck:   Essentially unremarkable CT angiogram of the neck and head. No   significant vascular stenosis or occlusion.      Relevant blood tests:  Direct LDL: 107 mg/dL [4 - 129] (04-16-18 @ 07:32)  Hemoglobin A1C, Whole Blood: 6.3 % (04-18-18 @ 09:30)  Hemoglobin A1C, Whole Blood: 6.3 % (04-16-18 @ 07:32)    Cardiac Rhythm(Tele/Holter) & Duration:   24 hours             Events:none    Cardiac Structure:(TTE):No intracardiac thrombus/vegetation/akynesia/EF:  < from: Transthoracic Echocardiogram (04.20.18 @ 11:03) >  Summary:   1. Contrast Echo with Lumason shows severe global hypokinesia on apical   views and EF<25%. No thrombus seen.   2. Endocardial visualization was enhanced with intravenous echo contrast.    Home Medications:  aspirin 81 mg oral delayed release tablet: 1 tab(s) orally once a day (24 Apr 2018 15:57)  furosemide 20 mg oral tablet: 1 tab(s) orally once a day (24 Apr 2018 15:57)  lisinopril 5 mg oral tablet: 1 tab(s) orally once a day (24 Apr 2018 15:57)  spironolactone 25 mg oral tablet: 1 tab(s) orally once a day (24 Apr 2018 15:57)      MEDICATIONS  (STANDING):  aspirin  chewable 162 milliGRAM(s) Oral daily  atorvastatin 40 milliGRAM(s) Oral at bedtime  enoxaparin Injectable 30 milliGRAM(s) SubCutaneous daily  furosemide    Tablet 40 milliGRAM(s) Oral daily  lisinopril 5 milliGRAM(s) Oral daily  metoprolol succinate ER 25 milliGRAM(s) Oral daily  spironolactone 25 milliGRAM(s) Oral daily      Physical Exam:    Vital Signs Last 24 Hrs  T(C): 36.7 (25 Apr 2018 07:13), Max: 37.1 (24 Apr 2018 15:20)  T(F): 98 (25 Apr 2018 07:13), Max: 98.8 (24 Apr 2018 15:20)  HR: 75 (25 Apr 2018 07:13) (70 - 84)  BP: 100/68 (25 Apr 2018 07:13) (99/60 - 137/83)  BP(mean): --  RR: 18 (25 Apr 2018 07:13) (18 - 18)  SpO2: 100% (25 Apr 2018 07:13) (97% - 100%)    NIHSS 0      NIHSS on admission:   0       NIHSS yesterday:    0      NIHSS today:0    Current Functional status:          NIHSS:0          m-RS:0    Impression:  Assessment:    68 yo man w/h/o HTN, DLD, CHF(with 20% EF), and b/l PCA territory ischemic stroke  presented with visual distorbances and color perception problem which resolved over the past 24hours. Symptom etiology is unknown at this time. Brain MRI and MRA of neck was requested after disussing the case with Dr. Jamison and patient in detail. Patient has a lifevest.      Suggestion:  Asa 162 mg daily  Lipitor 80 mg qhs  MRI Brain  MRA of head and neck  Basic inflammatory workup, including ESR, Vas-CRP, BAUTISTA, ...  PT OT rehab      Chadd Rios NP      -I Alejandra Blanco MD agree with the above.

## 2018-04-25 NOTE — CONSULT NOTE ADULT - SUBJECTIVE AND OBJECTIVE BOX
HPI:  66 yo M w/ PMH of HTN, b/l occipital stroke-recent d/c from hospital on 4/20, sCHF(EF 25%) on [facility] presented with c/o blurry vision in both eyes which started today AM waking up. It is equal in both eyes. He had similar complaint on last admission when he was found to have stroke. Since am, pt reports 85% improvement in symptom.    Pt denies any double vision, decreased vision in particular quadrant, numbness/weakness/decreased sensation in any part of body, change in his speech, problem with bowel/bladder, walking difficulty.     ROS + for SOB on lying down- has been using 3 pillows for last couple of nights. Denies chest pain, SOB on walking, leg swelling, palpitation.    In ED, VS stable on presentation. Stroke code was called--> NIHSS 0--> no tPA given--> admit to tele per neurology for MRI brain (24 Apr 2018 15:31)      PAST MEDICAL & SURGICAL HISTORY:  Essential hypertension  Cerebrovascular accident (CVA) due to bilateral embolism of posterior cerebral arteries  Systolic congestive heart failure, unspecified chronicity  No significant past surgical history      Hospital Course:    TODAY'S SUBJECTIVE & REVIEW OF SYMPTOMS:     Constitutional WNL   Cardio WNL   Resp WNL   GI WNL  Heme WNL  Endo WNL  Skin WNL  MSK WNL  Neuro blurring of vision  Cognitive WNL  Psych WNL      MEDICATIONS  (STANDING):  aspirin  chewable 162 milliGRAM(s) Oral daily  atorvastatin 40 milliGRAM(s) Oral at bedtime  enoxaparin Injectable 30 milliGRAM(s) SubCutaneous daily  furosemide    Tablet 40 milliGRAM(s) Oral daily  lisinopril 5 milliGRAM(s) Oral daily  metoprolol succinate ER 25 milliGRAM(s) Oral daily  spironolactone 25 milliGRAM(s) Oral daily    MEDICATIONS  (PRN):      FAMILY HISTORY:  No pertinent family history in first degree relatives      Allergies    penicillin (Rash)    Intolerances        SOCIAL HISTORY:    [  ] Etoh  [  ] Smoking  [  ] Substance abuse     Home Environment:  [  ] Home Alone  [ x ] Lives with Family  [  ] Home Health Aid    Dwelling:  [  ] Apartment  [x  ] Private House  [  ] Adult Home  [  ] Skilled Nursing Facility      [  ] Short Term  [  ] Long Term  [x  ] Stairs       Elevator [  ]    FUNCTIONAL STATUS PTA: (Check all that apply)  Ambulation: [ x  ]Independent    [  ] Dependent     [  ] Non-Ambulatory  Assistive Device: [  ] SA Cane  [  ]  Q Cane  [  ] Walker  [  ]  Wheelchair  ADL : [x  ] Independent  [  ]  Dependent       Vital Signs Last 24 Hrs  T(C): 36.7 (25 Apr 2018 07:13), Max: 37.1 (24 Apr 2018 15:20)  T(F): 98 (25 Apr 2018 07:13), Max: 98.8 (24 Apr 2018 15:20)  HR: 75 (25 Apr 2018 07:13) (70 - 84)  BP: 100/68 (25 Apr 2018 07:13) (99/60 - 137/83)  BP(mean): --  RR: 18 (25 Apr 2018 07:13) (18 - 18)  SpO2: 100% (25 Apr 2018 07:13) (97% - 100%)      PHYSICAL EXAM: Alert & Oriented X3  GENERAL: NAD, well-groomed, well-developed  HEAD:  Atraumatic, Normocephalic  CHEST/LUNG: Clear   HEART: Regular   ABDOMEN: Soft, Nontender  EXTREMITIES:  no calf tenderness    NERVOUS SYSTEM:  Cranial Nerves 2-12 intact [  ] Abnormal  [  ]  ROM: WFL all extremities [ x ]  Abnormal [  ]  Motor Strength: WFL all extremities  [x  ]  Abnormal [  ]  Sensation: intact to light touch [ x ] Abnormal [  ]  Reflexes: Symmetric [  ]  Abnormal [  ]    FUNCTIONAL STATUS:  Bed Mobility: Independent [  ]  Supervision [  ]  Needs Assistance [ x ]  N/A [  ]  Transfers: Independent [  ]  Supervision [  ]  Needs Assistance [x  ]  N/A [  ]   Ambulation: Independent [  ]  Supervision [  ]  Needs Assistance [  ]  N/A [  ]  ADL: Independent [  ] Requires Assistance [  ] N/A [  ]      LABS:                        12.0   4.41  )-----------( 172      ( 25 Apr 2018 05:00 )             35.2     04-25    139  |  107  |  34<H>  ----------------------------<  125<H>  4.4   |  21  |  1.1    Ca    8.6      25 Apr 2018 05:00    TPro  6.4  /  Alb  3.7  /  TBili  1.1  /  DBili  x   /  AST  82<H>  /  ALT  130<H>  /  AlkPhos  111  04-24    PT/INR - ( 24 Apr 2018 09:26 )   PT: 15.90 sec;   INR: 1.46 ratio         PTT - ( 24 Apr 2018 09:26 )  PTT:20.0 sec      RADIOLOGY & ADDITIONAL STUDIES:    Assesment:

## 2018-04-25 NOTE — PROGRESS NOTE ADULT - ASSESSMENT
66 yo M w/ PMH of HTN, b/l occipital stroke-recent d/c from hospital on 4/20, sCHF(EF 25%) on [facility] presented with c/o blurry vision in both eyes which started yesterday AM waking up. It is equal in both eyes. He had similar complaint on last admission when he was found to have stroke. pt reports  improvement in symptom.  In ED, VS stable on presentation. Stroke code was called--> NIHSS 0--> no tPA given--> admit to tele per neurology for MRI brain    A & P:    # B/l Blurry vision possibly Acute CVA vs TIA  - admit to tele  -CT Head Stroke Protocol: < from: CT Brain Stroke Protocol (04.24.18 @ 09:39) >  Impression:     Since CT head 4/15/2018    1.  No acute hemorrhage, mass effect or midline shift.    2.  Hypoattenuation in the right occipital lobe is less pronounced   compared to prior exam, likely evolution of prior infarction.     3.  Stable chronic left occipital infarction.    4.  If the patient continues to be symptomatic follow-up MRI of the brain   may be helpful for further evaluation.    < end of copied text >    - MRI Brain NC: pending  -CTA head and Neck: Essentially unremarkable   - increase aspirin to 162, c/w lipitor 40  - no need to repeat echo as pt had echo w/ bubble study few days ago, spoke w/ Chadd-neuro NP regarding it; said will ask Dr Hunter if we really need to repeat it. So f/u with Chadd--> spectra [id number]  - A1c from 04/18/18--> 6.3 and lipid profile normal from 04/18/18--> no need to repeat  - pt doesn't need PT OT rehab--> no focal deficit/difficulty walking present    # mildly elevated liver enzymes and INR can be 2/2 lipitor  - c/w lipitor as pt needs it  - check RUQ sono and TSH to r/o other causes  - repeat level as outpatient    # DM  - A1c from 4/18/18--> 6.3  - monitor FS--> add insulin as needed  - carb consistent diet    # orthopnea/l pleural effusion on CT 2/2 sCHF  - no active exacerbation  - increase lasix to 40 po q24h for better symptom control  - OP EP eval for AICD--> c/w life [facility] for now  - c/w metoprolol, spironolactone, lisinopril    # HTN-->c/w metoprolol, spironolactone, lisinopril    # DVT Px--> lovenox    # COde status- full code but won't be like to on vent for longer than 10 days--> advised pt to get living will done as putpatient

## 2018-04-25 NOTE — PROGRESS NOTE ADULT - SUBJECTIVE AND OBJECTIVE BOX
SUBJECTIVE:    Patient is a 67y old Male who presents with a chief complaint of blurry vision recently d/c from [facility] 4/20 w/ dx of b/l occipital stroke  (24 Apr 2018 15:31)    Currently admitted to medicine with the primary diagnosis of CVA (cerebral vascular accident)     Today is hospital day 1d. This morning he is resting comfortably in bed and reports no new issues or overnight events.     PAST MEDICAL & SURGICAL HISTORY  Essential hypertension  Cerebrovascular accident (CVA) due to bilateral embolism of posterior cerebral arteries  Systolic congestive heart failure, unspecified chronicity  No significant past surgical history    SOCIAL HISTORY:  Negative for smoking/alcohol/drug use.     ALLERGIES:  penicillin (Rash)    MEDICATIONS:  STANDING MEDICATIONS  aspirin  chewable 162 milliGRAM(s) Oral daily  atorvastatin 40 milliGRAM(s) Oral at bedtime  enoxaparin Injectable 30 milliGRAM(s) SubCutaneous daily  furosemide    Tablet 40 milliGRAM(s) Oral daily  lisinopril 5 milliGRAM(s) Oral daily  metoprolol succinate ER 25 milliGRAM(s) Oral daily  spironolactone 25 milliGRAM(s) Oral daily    PRN MEDICATIONS    VITALS:   T(F): 98  HR: 75  BP: 100/68  RR: 18  SpO2: 100%    LABS:                        12.0   4.41  )-----------( 172      ( 25 Apr 2018 05:00 )             35.2     04-25    139  |  107  |  34<H>  ----------------------------<  125<H>  4.4   |  21  |  1.1    Ca    8.6      25 Apr 2018 05:00    TPro  6.4  /  Alb  3.7  /  TBili  1.1  /  DBili  x   /  AST  82<H>  /  ALT  130<H>  /  AlkPhos  111  04-24    PT/INR - ( 24 Apr 2018 09:26 )   PT: 15.90 sec;   INR: 1.46 ratio         PTT - ( 24 Apr 2018 09:26 )  PTT:20.0 sec      Troponin T, Serum: <0.01 ng/mL (04-24-18 @ 09:26)      CARDIAC MARKERS ( 24 Apr 2018 09:26 )  x     / <0.01 ng/mL / x     / x     / x          RADIOLOGY:    PHYSICAL EXAM:  GEN: No acute distress  LUNGS: Clear to auscultation bilaterally   HEART: S1/S2 present. RRR.   ABD: Soft, non-tender, non-distended. Bowel sounds present  EXT: NC/NC/NE/2+PP/JOYNER  NEURO: AAOX3

## 2018-04-25 NOTE — CONSULT NOTE ADULT - ASSESSMENT
IMPRESSION: Rehab of gait dysfunction / r/o cva      PRECAUTIONS: [  ] Cardiac  [  ] Respiratory  [  ] Seizures [  ] Contact Isolation  [  ] Droplet Isolation  [  ] Other    Weight Bearing Status:     RECOMMENDATION:    Out of Bed to Chair     DVT/Decubiti Prophylaxis    REHAB PLAN:     [ x  ] Bedside P/T 3-5 times a week   [   ]   Bedside O/T  2-3 times a week             [   ] No Rehab Therapy Indicated                   [   ]  Speech Therapy   Conditioning/ROM                                    ADL  Bed Mobility                                               Conditioning/ROM  Transfers                                                     Bed Mobility  Sitting /Standing Balance                         Transfers                                        Gait Training                                               Sitting/Standing Balance  Stair Training [   ]Applicable                    Home equipment Eval                                                                        Splinting  [   ] Only      GOALS:   ADL   [ x  ]   Independent                    Transfers  [ x  ] Independent                          Ambulation  [ x  ] Independent     [  x  ] With device                            [   ]  CG                                                         [   ]  CG                                                                  [   ] CG                            [    ] Min A                                                   [   ] Min A                                                              [   ] Min  A          DISCHARGE PLAN:   [   ]  Good candidate for Intensive Rehabilitation/Hospital based-4A [facility]                                             Will tolerate 3hrs Intensive Rehab Daily                                       [    ]  Short Term Rehab in Skilled Nursing Facility                                       [ x   ]  Home with Outpatient or  services                                         [    ]  Possible Candidate for Intensive Hospital based Rehab

## 2018-04-26 ENCOUNTER — TRANSCRIPTION ENCOUNTER (OUTPATIENT)
Age: 68
End: 2018-04-26

## 2018-04-26 LAB
ALBUMIN SERPL ELPH-MCNC: 3.3 G/DL — LOW (ref 3.5–5.2)
ALP SERPL-CCNC: 93 U/L — SIGNIFICANT CHANGE UP (ref 30–115)
ALT FLD-CCNC: 88 U/L — HIGH (ref 0–41)
ANION GAP SERPL CALC-SCNC: 11 MMOL/L — SIGNIFICANT CHANGE UP (ref 7–14)
AST SERPL-CCNC: 37 U/L — SIGNIFICANT CHANGE UP (ref 0–41)
BILIRUB DIRECT SERPL-MCNC: 0.2 MG/DL — SIGNIFICANT CHANGE UP (ref 0–0.2)
BILIRUB INDIRECT FLD-MCNC: 0.6 MG/DL — SIGNIFICANT CHANGE UP (ref 0.2–1.2)
BILIRUB SERPL-MCNC: 0.8 MG/DL — SIGNIFICANT CHANGE UP (ref 0.2–1.2)
BUN SERPL-MCNC: 29 MG/DL — HIGH (ref 10–20)
CALCIUM SERPL-MCNC: 8.5 MG/DL — SIGNIFICANT CHANGE UP (ref 8.5–10.1)
CHLORIDE SERPL-SCNC: 106 MMOL/L — SIGNIFICANT CHANGE UP (ref 98–110)
CO2 SERPL-SCNC: 25 MMOL/L — SIGNIFICANT CHANGE UP (ref 17–32)
CREAT SERPL-MCNC: 1.1 MG/DL — SIGNIFICANT CHANGE UP (ref 0.7–1.5)
GLUCOSE SERPL-MCNC: 90 MG/DL — SIGNIFICANT CHANGE UP (ref 70–99)
HCT VFR BLD CALC: 36.9 % — LOW (ref 42–52)
HGB BLD-MCNC: 12.3 G/DL — LOW (ref 14–18)
MAGNESIUM SERPL-MCNC: 2 MG/DL — SIGNIFICANT CHANGE UP (ref 1.8–2.4)
MCHC RBC-ENTMCNC: 32.5 PG — HIGH (ref 27–31)
MCHC RBC-ENTMCNC: 33.3 G/DL — SIGNIFICANT CHANGE UP (ref 32–37)
MCV RBC AUTO: 97.6 FL — HIGH (ref 80–94)
NRBC # BLD: 0 /100 WBCS — SIGNIFICANT CHANGE UP (ref 0–0)
PLATELET # BLD AUTO: 167 K/UL — SIGNIFICANT CHANGE UP (ref 130–400)
POTASSIUM SERPL-MCNC: 4.5 MMOL/L — SIGNIFICANT CHANGE UP (ref 3.5–5)
POTASSIUM SERPL-SCNC: 4.5 MMOL/L — SIGNIFICANT CHANGE UP (ref 3.5–5)
PROT SERPL-MCNC: 5.3 G/DL — LOW (ref 6–8)
RBC # BLD: 3.78 M/UL — LOW (ref 4.7–6.1)
RBC # FLD: 14.4 % — SIGNIFICANT CHANGE UP (ref 11.5–14.5)
SODIUM SERPL-SCNC: 142 MMOL/L — SIGNIFICANT CHANGE UP (ref 135–146)
WBC # BLD: 4.59 K/UL — LOW (ref 4.8–10.8)
WBC # FLD AUTO: 4.59 K/UL — LOW (ref 4.8–10.8)

## 2018-04-26 RX ORDER — METOPROLOL TARTRATE 50 MG
50 TABLET ORAL DAILY
Qty: 0 | Refills: 0 | Status: DISCONTINUED | OUTPATIENT
Start: 2018-04-26 | End: 2018-05-02

## 2018-04-26 RX ADMIN — Medication 40 MILLIGRAM(S): at 05:41

## 2018-04-26 RX ADMIN — ATORVASTATIN CALCIUM 40 MILLIGRAM(S): 80 TABLET, FILM COATED ORAL at 21:37

## 2018-04-26 RX ADMIN — LISINOPRIL 5 MILLIGRAM(S): 2.5 TABLET ORAL at 05:41

## 2018-04-26 RX ADMIN — Medication 25 MILLIGRAM(S): at 05:41

## 2018-04-26 RX ADMIN — Medication 162 MILLIGRAM(S): at 11:06

## 2018-04-26 RX ADMIN — SPIRONOLACTONE 25 MILLIGRAM(S): 25 TABLET, FILM COATED ORAL at 05:41

## 2018-04-26 NOTE — DISCHARGE NOTE ADULT - INSTRUCTIONS
Carbohydrate consistent low carb low sodium diet Carbohydrate consistent low carb low sodium diet. Fluid restriction of 1 Liter per day.

## 2018-04-26 NOTE — PROGRESS NOTE ADULT - SUBJECTIVE AND OBJECTIVE BOX
SAMANTHA CHARLES    Chief Complaint: Acute onset vision changes    HPI:  68 yo man w/h/o HTN, DLD, CHF with 20% EF presents to the ED with visual changes since waking this morning. Pt states he developed blurry vision and difficulty recognizing colors. He was admitted one week ago with vision changes and was found to have chronic left PCA and subacute right PCA territory ischemic strokes.He has no visual field deficit today on exam. Today he reports resolution of vision changes.      Relevant PMH:  [x] Prior ischemic stroke/TIA  [] Afib  []CAD  []HTN  []DLD  []DM []PVD []Obesity [] Sedintary lifestyle [x]CHF  []SOSA []Cancer Hx     Social History: [] Smoking []  Drug Use: []   Alcohol Use:   [] Other:      Possible Location of Stroke:  PCA territory  Possible Cause of Stroke:  Unknown at this time  Relavant Cervicocerebral Imaging:  Unremarkable CTA head and neck.       Relevant blood tests:  Direct LDL: 107 mg/dL [4 - 129] (04-16-18 @ 07:32)  Hemoglobin A1C, Whole Blood: 6.3 % (04-18-18 @ 09:30)  CRP 0.2  ESR 5  Phosphatidyl Serine Antibody IGA <20 IGG <10 IGM <25  Anti SSA,SSB <0.2  Anti nuclear titer: negative   P-anca, C-anca: negative    Relevant cardiac rhythm monitoring:  Telemetry monitoring for 48 hours and no events reported.  Relevant Cardiac Structure:(TTE/FREDERICK +/-):No intracardiac thrombus/vegetation/akynesia/EF:  TTE with contrast revealed severe global hypokinesia on apical views and EF<25%.     Home Medications:  aspirin 81 mg oral delayed release tablet: 1 tab(s) orally once a day (24 Apr 2018 15:57)  furosemide 20 mg oral tablet: 1 tab(s) orally once a day (24 Apr 2018 15:57)  lisinopril 5 mg oral tablet: 1 tab(s) orally once a day (24 Apr 2018 15:57)  spironolactone 25 mg oral tablet: 1 tab(s) orally once a day (24 Apr 2018 15:57)      MEDICATIONS  (STANDING):  aspirin  chewable 162 milliGRAM(s) Oral daily  atorvastatin 40 milliGRAM(s) Oral at bedtime  enoxaparin Injectable 30 milliGRAM(s) SubCutaneous daily  furosemide    Tablet 40 milliGRAM(s) Oral daily  lisinopril 5 milliGRAM(s) Oral daily  metoprolol succinate ER 25 milliGRAM(s) Oral daily  spironolactone 25 milliGRAM(s) Oral daily      PT/OT/Speech/Rehab/S&Swr: Outpatient or VNS services.    Exam:    Vital Signs Last 24 Hrs  T(C): 35.8 (26 Apr 2018 05:29), Max: 36.7 (25 Apr 2018 19:42)  T(F): 96.5 (26 Apr 2018 05:29), Max: 98 (25 Apr 2018 19:42)  HR: 73 (26 Apr 2018 05:29) (73 - 91)  BP: 141/62 (26 Apr 2018 05:29) (123/85 - 141/62)  BP(mean): --  RR: 19 (26 Apr 2018 05:29) (18 - 19)  SpO2: 98% (26 Apr 2018 05:45) (98% - 100%)      NIHSS on admission:     0     NIHSS yesterday:  0        NIHSS today:    0         m-RS: 0    Impression:  68 yo man w/h/o HTN, DLD, CHF with 20% EF presents to the ED with visual changes since waking this morning. Pt states he developed blurry vision and difficulty recognizing colors. He was admitted one week ago with vision changes and was found to have chronic left PCA and subacute right PCA territory ischemic strokes. He has no visual field deficit today on exam. Today he reports resolution of vision changes. Etiology of symptoms unknown at this time, could be cardioembolic, thrombotic, flow related or vessel to vessel. Will assess post MRI brain and MRA.    Suggestion:  Follow up MRI brain/MRA neck  Asa 162 mg daily  Lipitor 80 mg qhs  Follow up inflammatory workup.  PT OT rehab    Chadd Rios NP  [phone] SAMANTHA CHARLES    Chief Complaint: Acute onset vision changes    HPI:  66 yo man w/h/o HTN, DLD, CHF with 20% EF presents to the ED with visual changes since waking this morning. Pt states he developed blurry vision and difficulty recognizing colors. He was admitted one week ago with vision changes and was found to have chronic left PCA and subacute right PCA territory ischemic strokes.He has no visual field deficit today on exam. Today he reports resolution of vision changes.      Relevant PMH:  [x] Prior ischemic stroke/TIA  [] Afib  []CAD  []HTN  []DLD  []DM []PVD []Obesity [] Sedintary lifestyle [x]CHF  []SOSA []Cancer Hx     Social History: [] Smoking []  Drug Use: []   Alcohol Use:   [] Other:      Possible Location of Stroke:  PCA territory  Possible Cause of Stroke:  Unknown at this time  Relavant Cervicocerebral Imaging:  Unremarkable CTA head and neck.       Relevant blood tests:  Direct LDL: 107 mg/dL [4 - 129] (04-16-18 @ 07:32)  Hemoglobin A1C, Whole Blood: 6.3 % (04-18-18 @ 09:30)  CRP 0.2  ESR 5  Phosphatidyl Serine Antibody IGA <20 IGG <10 IGM <25  Anti SSA,SSB <0.2  Anti nuclear titer: negative   P-anca, C-anca: negative    Relevant cardiac rhythm monitoring:  Telemetry monitoring for 48 hours and no events reported.  Relevant Cardiac Structure:(TTE/FREDERICK +/-):No intracardiac thrombus/vegetation/akynesia/EF:  TTE with contrast revealed severe global hypokinesia on apical views and EF<25%.     Home Medications:  aspirin 81 mg oral delayed release tablet: 1 tab(s) orally once a day (24 Apr 2018 15:57)  furosemide 20 mg oral tablet: 1 tab(s) orally once a day (24 Apr 2018 15:57)  lisinopril 5 mg oral tablet: 1 tab(s) orally once a day (24 Apr 2018 15:57)  spironolactone 25 mg oral tablet: 1 tab(s) orally once a day (24 Apr 2018 15:57)      MEDICATIONS  (STANDING):  aspirin  chewable 162 milliGRAM(s) Oral daily  atorvastatin 40 milliGRAM(s) Oral at bedtime  enoxaparin Injectable 30 milliGRAM(s) SubCutaneous daily  furosemide    Tablet 40 milliGRAM(s) Oral daily  lisinopril 5 milliGRAM(s) Oral daily  metoprolol succinate ER 25 milliGRAM(s) Oral daily  spironolactone 25 milliGRAM(s) Oral daily      PT/OT/Speech/Rehab/S&Swr: Outpatient or VNS services.    Exam:    Vital Signs Last 24 Hrs  T(C): 35.8 (26 Apr 2018 05:29), Max: 36.7 (25 Apr 2018 19:42)  T(F): 96.5 (26 Apr 2018 05:29), Max: 98 (25 Apr 2018 19:42)  HR: 73 (26 Apr 2018 05:29) (73 - 91)  BP: 141/62 (26 Apr 2018 05:29) (123/85 - 141/62)  BP(mean): --  RR: 19 (26 Apr 2018 05:29) (18 - 19)  SpO2: 98% (26 Apr 2018 05:45) (98% - 100%)      NIHSS on admission:     0     NIHSS yesterday:  0        NIHSS today:    0         m-RS: 0    Impression:  66 yo man w/h/o HTN, DLD, CHF with 20% EF presents to the ED with visual changes since waking this morning. Pt states he developed blurry vision and difficulty recognizing colors. He was admitted one week ago with vision changes and was found to have chronic left PCA and subacute right PCA territory ischemic strokes. He has no visual field deficit today on exam. Today he reports resolution of vision changes. Etiology of symptoms unknown at this time. Will assess post MRI brain and MRA.    Suggestion:  Follow up MRI brain/MRA neck  Asa 162 mg daily  Lipitor 80 mg qhs  Follow up inflammatory workup.  PT OT rehab    Chadd Rios NP  [phone]

## 2018-04-26 NOTE — PROGRESS NOTE ADULT - ASSESSMENT
1. Visual disturbance in pt with acute/subacute b/l occipital infarcts diagnosed earlier this month - rule out new CVA vs TIA  MRI of brain  MRA of head and neck  continue ASA and statin  telemetry  f/u rest of vasculitis workup - pending  Neurology/stroke team following    2. Dilated nonischemic cardiomyopathy/ Acute over chronic HFrEF  continue lasix at 40mg daily (increased on admission)  daily weights  continue Bblocker, ACE-I, spironolactone  pt wearing Lifevest    3. Transaminitis - improving  continue statin    4. DVT prophylaxis

## 2018-04-26 NOTE — DISCHARGE NOTE ADULT - PLAN OF CARE
Continue monitoring and treatment Continue with Metoprolol, Spironolactone, Lasix and Lisinopril. Continue with Lifevest with plan for AICD placement with EP (Dr. Guo Service). You have been counseled on importance of maintaining Life Vest due to fear of SUDDEN CARDIAC DEATH. Electrophysiology (EP) consulted on this visit as well. Please follow up with EP within two weeks of discharge. Maintain Fluid restriction of 1 Liter per day Continued monitoring and treatment Continue medications as prescribed. Please follow up with your regular neurologist. You have been started on Eliquis 5mg by mouth twice a day, please continue as prescribed. Please continue medications as prescribed. Monitoring Please have a repeat Liver Function test done with your regular primary care physician ([facility]) within one week of discharge. If liver function tests have normalized ask PCP to consider increasing dosage of Atorvastatin to 80mg as opposed to current 40mg.

## 2018-04-26 NOTE — CONSULT NOTE ADULT - ASSESSMENT
-Transient visual disturbance r/o recurrent stroke?  -Non ischemic dilated cardiomyopathy currently stable, no evidence of decompensated heart failure or ACS    Plan;  Tele   Interrogate life vest to r/o afib or other severe arrhythmia  Continue ASA. statin  Continue BB, ACE   Continue lasix and spironolactone  Appreciate neurology follow up , awaiting MRI  Eventually will need EP follow for permanent AICD. -Transient visual disturbance r/o recurrent stroke?  -Non ischemic dilated cardiomyopathy currently stable, no evidence of decompensated heart failure or ACS    Plan;  Tele   Interrogate life vest to r/o afib or other severe arrhythmia  Continue ASA. statin  Continue BB, ACE, increase Toprol to 50 mg.  Continue lasix and spironolactone  Appreciate neurology follow up , awaiting MRI  Will need EP follow for permanent AICD, after the MRI/MRA are done. Please consult Dr. Guo for EP f/u, likely AICD as outpt.

## 2018-04-26 NOTE — DISCHARGE NOTE ADULT - ADDITIONAL INSTRUCTIONS
Please take medications as prescribed. Please follow up with EP and Cardiology within two week os discharge. Please follow up with PCP within one week of discharge. If symptoms worsen or reoccur, please call 911 or report to the nearest Emergency Medicine Department.

## 2018-04-26 NOTE — DISCHARGE NOTE ADULT - MEDICATION SUMMARY - MEDICATIONS TO CHANGE
I will SWITCH the dose or number of times a day I take the medications listed below when I get home from the hospital:    furosemide 20 mg oral tablet  -- 1 tab(s) by mouth once a day    metoprolol succinate 25 mg oral tablet, extended release  -- 1 tab(s) by mouth once a day

## 2018-04-26 NOTE — DISCHARGE NOTE ADULT - CARE PROVIDERS DIRECT ADDRESSES
,[email or web address],DirectAddress_Unknown,[email or web address],DirectAddress_Unknown

## 2018-04-26 NOTE — DISCHARGE NOTE ADULT - PATIENT PORTAL LINK FT
You can access the [facility], offered by [facility], by registering with the following website: [email or web address]

## 2018-04-26 NOTE — PROGRESS NOTE ADULT - ASSESSMENT
68 yo M with PMH of HTN, b/l occipital stroke-recent d/c from hospital on 4/20, sCHF(EF 25%) on life west presented with complaint of blurry vision in both eyes which started the morning of presentation when he woke up. In ED, Stroke Code was called. NIHSS 0. No tPA was given. Patient was admitted to telemetry as per neurology for further monitoring.    IMPRESSION:  # Acute CVA vs TIA:  Neurology is following: NIHSS: 0  Continue  mg PO QD and Lipitor 80mg PO QHS  CT Brain Stroke Protocol (04.24.18): Since CT head 4/15/2018. No acute hemorrhage, mass effect or midline shift. Stable chronic left occipital infarction.  CT Angio Head w/ IV Cont (04.24.18): Essentially unremarkable CT angiogram of the neck and head. No significant vascular stenosis or occlusion.  MRI Brain w/o arabella, pending  Transthoracic Echocardiogram (04.20.18): Contrast Echo with Lumason shows severe global hypokinesia on apical views and EF<25%. No thrombus seen.  Hemoglobin A1c (4/18/18): 6.3  Lipid Panel (4/18/18): wnl   PT/OT not needed. No focal deficits.    # Transaminitis with elevated INR: possibly 2/2 Lipitor  Continue Lipitor  RUQ Sonogram:  Repeat BMP within 1 week of discharge    # DM:  Hemoglobin A1c (4/18/18): 6.3  Monitor FS  ISS if needed  Continue carb consistent diet    # sCHF: stable  Left pleural effusion on CT  Lasix has been increased to 40mg po q24h  OP EP eval for AICD. Continue with Lifevest for now  Continue with Metoprolol, Spironolactone, and Lisinopril    # HTN: Continue with Metoprolol, Spironolactone, and Lisinopril    # DVT Px: Lovenox    # Code status 66 yo M with PMH of HTN, b/l occipital stroke-recent d/c from hospital on 4/20, sCHF(EF 25%) on life west presented with complaint of blurry vision in both eyes which started the morning of presentation when he woke up. In ED, Stroke Code was called. NIHSS 0. No tPA was given. Patient was admitted to telemetry as per neurology for further monitoring.    IMPRESSION:  # Acute CVA vs TIA:  Neurology is following: NIHSS: 0  Continue  mg PO QD and Lipitor 80mg PO QHS  CT Brain Stroke Protocol (04.24.18): Since CT head 4/15/2018. No acute hemorrhage, mass effect or midline shift. Stable chronic left occipital infarction.  CT Angio Head w/ IV Cont (04.24.18): Essentially unremarkable CT angiogram of the neck and head. No significant vascular stenosis or occlusion.  MRI Brain w/o arabella, pending  Transthoracic Echocardiogram (04.20.18): Contrast Echo with Lumason shows severe global hypokinesia on apical views and EF<25%. No thrombus seen.  Hemoglobin A1c (4/18/18): 6.3  Lipid Panel (4/18/18): wnl   PT/OT not needed. No focal deficits.    # Transaminitis with elevated INR: possibly 2/2 Lipitor  Continue Lipitor  RUQ Sonogram: Unremarkable. Gallbladder polyp noted. Recommend follow up in 12 months.   Repeat BMP within 1 week of discharge    # DM:  Hemoglobin A1c (4/18/18): 6.3  Monitor FS  ISS if needed  Continue carb consistent diet    # sCHF: stable  Left pleural effusion on CT  Lasix has been increased to 40mg po q24h  OP EP eval for AICD. Continue with Lifevest for now.   Continue with Metoprolol, Spironolactone, and Lisinopril    # HTN: Continue with Metoprolol, Spironolactone, and Lisinopril    # DVT Px: Lovenox    # Code status

## 2018-04-26 NOTE — DISCHARGE NOTE ADULT - NS AS ACTIVITY OBS
As tolerated As tolerated. Please avoid strenuous and intense physical therapy unless otherwise recommended by cardiology.

## 2018-04-26 NOTE — DISCHARGE NOTE ADULT - MEDICATION SUMMARY - MEDICATIONS TO TAKE
I will START or STAY ON the medications listed below when I get home from the hospital:    spironolactone 25 mg oral tablet  -- 1 tab(s) by mouth once a day  -- Indication: For CHF    aspirin 81 mg oral delayed release tablet  -- 1 tab(s) by mouth once a day  -- Indication: For CAD    lisinopril 5 mg oral tablet  -- 1 tab(s) by mouth once a day  -- Indication: For HTN    apixaban 5 mg oral tablet  -- 1 tab(s) by mouth every 12 hours  -- Indication: For ANTICOAGULATION    atorvastatin 40 mg oral tablet  -- 1 tab(s) by mouth once a day (at bedtime)  -- Indication: For STROKE/DM    metoprolol succinate 50 mg oral tablet, extended release  -- 1 tab(s) by mouth once a day  -- Indication: For HTN/CHF    furosemide 40 mg oral tablet  -- 1 tab(s) by mouth once a day  -- Indication: For CHF I will START or STAY ON the medications listed below when I get home from the hospital:    spironolactone 25 mg oral tablet  -- 1 tab(s) by mouth once a day  -- Indication: For CHF    lisinopril 5 mg oral tablet  -- 1 tab(s) by mouth once a day  -- Indication: For HTN    apixaban 5 mg oral tablet  -- 1 tab(s) by mouth every 12 hours  -- Indication: For ANTICOAGULATION    atorvastatin 40 mg oral tablet  -- 1 tab(s) by mouth once a day (at bedtime)  -- Indication: For STROKE/DM    metoprolol succinate 50 mg oral tablet, extended release  -- 1 tab(s) by mouth once a day  -- Indication: For HTN/CHF    furosemide 40 mg oral tablet  -- 1 tab(s) by mouth once a day  -- Indication: For CHF

## 2018-04-26 NOTE — CONSULT NOTE ADULT - SUBJECTIVE AND OBJECTIVE BOX
HISTORY OF PRESENT ILLNESS:   This is a 67 years old male patient  with  PMHx of non ischemic dilated nonischemic cardiomyopathy (echo from 2/2018 showed EF 20%), HTN , recently admitted to hospital for occipital stroke discharged on 4/20 . cardiac workup didnt show cardio embolic etiology.   Patient was admitted in February 2018 for SOB and was found to have congestive heart failure and was started on ACE inhibitor and beta blocker and was discharged with a life vest . A CT angiography of the coronaries was done in February 2018 and showed normal coronaries, a cardiac MRI was done also at that time and showed Severe global hypokinesis with   severely decreased ejection fraction 14.6% with no definite evidence of regional wall motion abnormality.   Patient readmitted in 4/24 for similar symptoms of blurry vision and temporary loss of color vision. evaluated by neuro an pending repeat MRI brain. Currently patient is asymptomatic, denies chest pain , shortness of breath or palpitation. At baseline he is able to walk few blocks and one flight of stairs.       PAST MEDICAL & SURGICAL HISTORY:  Essential hypertension  Cerebrovascular accident (CVA) due to bilateral embolism of posterior cerebral arteries  Systolic congestive heart failure, unspecified chronicity  No significant past surgical history    FAMILY HISTORY:  No pertinent family history in first degree relatives    Allergies  penicillin (Rash)      	  Home Medications:  aspirin 81 mg oral delayed release tablet: 1 tab(s) orally once a day (24 Apr 2018 15:57)  furosemide 20 mg oral tablet: 1 tab(s) orally once a day (24 Apr 2018 15:57)  lisinopril 5 mg oral tablet: 1 tab(s) orally once a day (24 Apr 2018 15:57)  spironolactone 25 mg oral tablet: 1 tab(s) orally once a day (24 Apr 2018 15:57)    MEDICATIONS  (STANDING):  aspirin  chewable 162 milliGRAM(s) Oral daily  atorvastatin 40 milliGRAM(s) Oral at bedtime  enoxaparin Injectable 30 milliGRAM(s) SubCutaneous daily  furosemide    Tablet 40 milliGRAM(s) Oral daily  lisinopril 5 milliGRAM(s) Oral daily  metoprolol succinate ER 25 milliGRAM(s) Oral daily  spironolactone 25 milliGRAM(s) Oral daily            PHYSICAL EXAM:  T(C): 35.8 (04-26-18 @ 05:29), Max: 36.7 (04-25-18 @ 19:42)  HR: 73 (04-26-18 @ 05:29) (73 - 91)  BP: 141/62 (04-26-18 @ 05:29) (123/85 - 141/62)  RR: 19 (04-26-18 @ 05:29) (18 - 19)  SpO2: 98% (04-26-18 @ 05:45) (98% - 100%)    General Appearance: Normal	  Cardiovascular: Normal S1 S2, no murmur  Respiratory: Lungs clear to auscultation	  Psychiatry: A & O x 3,   Gastrointestinal:  Soft, Non-tender  Extremities:  Mary Anne +2         LABS:	 	                        12.3   4.59  )-----------( 167      ( 26 Apr 2018 06:07 )             36.9     04-26    142  |  106  |  29<H>  ----------------------------<  90  4.5   |  25  |  1.1  04-25    139  |  107  |  34<H>  ----------------------------<  125<H>  4.4   |  21  |  1.1    Ca    8.5      26 Apr 2018 06:07  Ca    8.6      25 Apr 2018 05:00  Mg     2.0     04-26    TPro  5.3<L>  /  Alb  3.3<L>  /  TBili  0.8  /  DBili  0.2  /  AST  37  /  ALT  88<H>  /  AlkPhos  93  04-26        CARDIAC MARKERS:  Troponin T, Serum: <0.01 ng/mL (04-24 @ 09:26)      	    ECG:  < from: 12 Lead ECG (04.24.18 @ 09:49) >  Ventricular Rate 78 BPM    Atrial Rate 78 BPM    P-R Interval 176 ms    QRS Duration 130 ms    Q-T Interval 442 ms    QTC Calculation(Bezet) 503 ms    P Axis 71 degrees    R Axis 259 degrees    T Axis 78 degrees    Diagnosis Line Normal sinus rhythm  Left atrial enlargement  Non-specific intra-ventricular conduction block  Anterolateral infarct , age undetermined  Abnormal ECG    < end of copied text >  	  RADIOLOGY:  < from: CT Angio Neck w/ IV Cont (04.24.18 @ 11:26) >  Impression:    Essentially unremarkable CT angiogram of the neck and head. No   significant vascular stenosis or occlusion.    < end of copied text >  	  < from: CT Brain Stroke Protocol (04.24.18 @ 09:39) >  Impression:     Since CT head 4/15/2018    1.  No acute hemorrhage, mass effect or midline shift.    2.  Hypoattenuation in the right occipital lobe is less pronounced   compared to prior exam, likely evolution of prior infarction.     3.  Stable chronic left occipital infarction.    4.  If the patient continues to be symptomatic follow-up MRI of the brain   may be helpful for further evaluation.    < end of copied text >        PREVIOUS DIAGNOSTIC TESTING:    [ ] Echocardiogram:  [ ]  Catheterization:  [ ] Stress Test: HISTORY OF PRESENT ILLNESS:   This is a 67 years old male patient  with  PMHx of non ischemic dilated nonischemic cardiomyopathy (echo from 2/2018 showed EF 20%), HTN , recently admitted to hospital for occipital stroke discharged on 4/20 . cardiac workup didnt show cardio embolic etiology.   Patient was admitted in February 2018 for SOB and was found to have congestive heart failure and was started on ACE inhibitor and beta blocker and was discharged with a life vest . A CT angiography of the coronaries was done in February 2018 and showed normal coronaries, a cardiac MRI was done also at that time and showed Severe global hypokinesis with   severely decreased ejection fraction 14.6% with no definite evidence of regional wall motion abnormality.   Patient readmitted in 4/24 for similar symptoms of blurry vision and temporary loss of color vision. evaluated by neuro an pending repeat MRI brain. Currently patient is asymptomatic, denies chest pain , shortness of breath or palpitation. At baseline he is able to walk few blocks and one flight of stairs.       PAST MEDICAL & SURGICAL HISTORY:  Essential hypertension  Cerebrovascular accident (CVA) due to bilateral embolism of posterior cerebral arteries  Systolic congestive heart failure, unspecified chronicity  No significant past surgical history    FAMILY HISTORY:  No pertinent family history in first degree relatives    Allergies  penicillin (Rash)      	  Home Medications:  aspirin 81 mg oral delayed release tablet: 1 tab(s) orally once a day (24 Apr 2018 15:57)  furosemide 20 mg oral tablet: 1 tab(s) orally once a day (24 Apr 2018 15:57)  lisinopril 5 mg oral tablet: 1 tab(s) orally once a day (24 Apr 2018 15:57)  spironolactone 25 mg oral tablet: 1 tab(s) orally once a day (24 Apr 2018 15:57)    MEDICATIONS  (STANDING):  aspirin  chewable 162 milliGRAM(s) Oral daily  atorvastatin 40 milliGRAM(s) Oral at bedtime  enoxaparin Injectable 30 milliGRAM(s) SubCutaneous daily  furosemide    Tablet 40 milliGRAM(s) Oral daily  lisinopril 5 milliGRAM(s) Oral daily  metoprolol succinate ER 25 milliGRAM(s) Oral daily  spironolactone 25 milliGRAM(s) Oral daily            PHYSICAL EXAM:  T(C): 35.8 (04-26-18 @ 05:29), Max: 36.7 (04-25-18 @ 19:42)  HR: 73 (04-26-18 @ 05:29) (73 - 91)  BP: 141/62 (04-26-18 @ 05:29) (123/85 - 141/62)  RR: 19 (04-26-18 @ 05:29) (18 - 19)  SpO2: 98% (04-26-18 @ 05:45) (98% - 100%)    General Appearance: Normal	  Cardiovascular: Normal S1 S2, no murmur  Respiratory: Lungs clear to auscultation	  Psychiatry: A & O x 3,   Gastrointestinal:  Soft, Non-tender  Extremities:  Mary Anne +2         LABS:	 	                        12.3   4.59  )-----------( 167      ( 26 Apr 2018 06:07 )             36.9     04-26    142  |  106  |  29<H>  ----------------------------<  90  4.5   |  25  |  1.1  04-25    139  |  107  |  34<H>  ----------------------------<  125<H>  4.4   |  21  |  1.1    Ca    8.5      26 Apr 2018 06:07  Ca    8.6      25 Apr 2018 05:00  Mg     2.0     04-26    TPro  5.3<L>  /  Alb  3.3<L>  /  TBili  0.8  /  DBili  0.2  /  AST  37  /  ALT  88<H>  /  AlkPhos  93  04-26        CARDIAC MARKERS:  Troponin T, Serum: <0.01 ng/mL (04-24 @ 09:26)      	    ECG:  < from: 12 Lead ECG (04.24.18 @ 09:49) >  Ventricular Rate 78 BPM    Atrial Rate 78 BPM    P-R Interval 176 ms    QRS Duration 130 ms    Q-T Interval 442 ms    QTC Calculation(Bezet) 503 ms    P Axis 71 degrees    R Axis 259 degrees    T Axis 78 degrees    Diagnosis Line Normal sinus rhythm  Left atrial enlargement  Non-specific intra-ventricular conduction block  Anterolateral infarct , age undetermined  Abnormal ECG    < end of copied text >  	  RADIOLOGY:  < from: CT Angio Neck w/ IV Cont (04.24.18 @ 11:26) >  Impression:    Essentially unremarkable CT angiogram of the neck and head. No   significant vascular stenosis or occlusion.    < end of copied text >  	  < from: CT Brain Stroke Protocol (04.24.18 @ 09:39) >  Impression:     Since CT head 4/15/2018    1.  No acute hemorrhage, mass effect or midline shift.    2.  Hypoattenuation in the right occipital lobe is less pronounced   compared to prior exam, likely evolution of prior infarction.     3.  Stable chronic left occipital infarction.    4.  If the patient continues to be symptomatic follow-up MRI of the brain   may be helpful for further evaluation.    < end of copied text >    < from: MR Cardiac w/wo IV Cont (02.21.18 @ 14:00) >  IMPRESSION:    1. Findings of dilated nonischemic cardiomyopathy.  2. Severe decrease in biventricular ejection fraction,LVEF-14.6%; RVEF   18%.  3. No evidence of delayed gadolinium enhancement    < end of copied text >      PREVIOUS DIAGNOSTIC TESTING:    [ ] Echocardiogram:  < from: Transthoracic Echocardiogram (04.20.18 @ 11:03) >  Summary:   1. Contrast Echo with Lumason shows severe global hypokinesia on apical   views and EF<25%. No thrombus seen.   2. Endocardial visualization was enhanced with intravenous echo contrast.    < end of copied text >      CT angio Heart 2/2018    IMPRESSION:    Normal coronary arteries

## 2018-04-26 NOTE — PROGRESS NOTE ADULT - SUBJECTIVE AND OBJECTIVE BOX
SUBJECTIVE:Patient is a 67y old  Male who presents with a chief complaint of rule out acute stroke (24 Apr 2018 15:31)  . Today is hospital day 2d     PAST MEDICAL & SURGICAL HISTORY  PAST MEDICAL & SURGICAL HISTORY:  Essential hypertension  Cerebrovascular accident (CVA) due to bilateral embolism of posterior cerebral arteries  Systolic congestive heart failure, unspecified chronicity  No significant past surgical history    ALLERGIES:  penicillin (Rash)    MEDICATIONS:  STANDING MEDICATIONS  aspirin  chewable 162 milliGRAM(s) Oral daily  atorvastatin 40 milliGRAM(s) Oral at bedtime  enoxaparin Injectable 30 milliGRAM(s) SubCutaneous daily  furosemide    Tablet 40 milliGRAM(s) Oral daily  lisinopril 5 milliGRAM(s) Oral daily  metoprolol succinate ER 25 milliGRAM(s) Oral daily  spironolactone 25 milliGRAM(s) Oral daily    PRN MEDICATIONS    VITALS:   T(F): 96.5  HR: 73  BP: 141/62  RR: 19  SpO2: 98%    LABS:                        12.3   4.59  )-----------( 167      ( 26 Apr 2018 06:07 )             36.9     04-25    139  |  107  |  34<H>  ----------------------------<  125<H>  4.4   |  21  |  1.1    Ca    8.6      25 Apr 2018 05:00    TPro  6.4  /  Alb  3.7  /  TBili  1.1  /  DBili  x   /  AST  82<H>  /  ALT  130<H>  /  AlkPhos  111  04-24    PT/INR - ( 24 Apr 2018 09:26 )   PT: 15.90 sec;   INR: 1.46 ratio      PTT - ( 24 Apr 2018 09:26 )  PTT:20.0 sec    CARDIAC MARKERS ( 24 Apr 2018 09:26 )  x     / <0.01 ng/mL / x     / x     / x        PHYSICAL EXAM:  GEN:   LUNGS: Clear to auscultation bilaterally   HEART: S1/S2 present. RRR.   ABD: Soft, non-tender, non-distended. Bowel sounds present  EXT: SUBJECTIVE:Patient is a 67y old  Male who presents with a chief complaint of rule out acute stroke (24 Apr 2018 15:31)  . Today is hospital day 2d. Patient seen and examined. NAD. Pending MRI. No acute distress overnight or over night events. f/u with Neurology today.     PAST MEDICAL & SURGICAL HISTORY  PAST MEDICAL & SURGICAL HISTORY:  Essential hypertension  Cerebrovascular accident (CVA) due to bilateral embolism of posterior cerebral arteries  Systolic congestive heart failure, unspecified chronicity  No significant past surgical history    ALLERGIES:  penicillin (Rash)    MEDICATIONS:  STANDING MEDICATIONS  aspirin  chewable 162 milliGRAM(s) Oral daily  atorvastatin 40 milliGRAM(s) Oral at bedtime  enoxaparin Injectable 30 milliGRAM(s) SubCutaneous daily  furosemide    Tablet 40 milliGRAM(s) Oral daily  lisinopril 5 milliGRAM(s) Oral daily  metoprolol succinate ER 25 milliGRAM(s) Oral daily  spironolactone 25 milliGRAM(s) Oral daily    PRN MEDICATIONS    VITALS:   T(F): 96.5  HR: 73  BP: 141/62  RR: 19  SpO2: 98%    LABS:                        12.3   4.59  )-----------( 167      ( 26 Apr 2018 06:07 )             36.9     04-25    139  |  107  |  34<H>  ----------------------------<  125<H>  4.4   |  21  |  1.1    Ca    8.6      25 Apr 2018 05:00    TPro  6.4  /  Alb  3.7  /  TBili  1.1  /  DBili  x   /  AST  82<H>  /  ALT  130<H>  /  AlkPhos  111  04-24    PT/INR - ( 24 Apr 2018 09:26 )   PT: 15.90 sec;   INR: 1.46 ratio      PTT - ( 24 Apr 2018 09:26 )  PTT:20.0 sec    CARDIAC MARKERS ( 24 Apr 2018 09:26 )  x     / <0.01 ng/mL / x     / x     / x        PHYSICAL EXAM:  GEN: NAD  LUNGS: Clear to auscultation bilaterally   HEART: S1/S2 present. RRR.   ABD: Soft, non-tender, non-distended. Bowel sounds present  EXT: no b/l LE edema

## 2018-04-26 NOTE — DISCHARGE NOTE ADULT - HOSPITAL COURSE
66 yo M with PMH of HTN, b/l occipital stroke (recent discharge from hospital on 4/20 for acute CVA), and sCHF (EF 25%) on Life Vest presented with complaint of blurry vision in both eyes which started the morning of presentation upon awakening. In ED, Stroke Code was called. NIHSS 0. No tPA was given. Patient was admitted to telemetry as per neurology for further monitoring.  Patient was treated for the following conditions:  1. sCHF: stable  Left pleural effusion on CT  Continue with Metoprolol, Spironolactone, Lasix and Lisinopril  Continue with Lifevest with plan for AICD placement with EP (Dr. Guo Service). Patietn non-complaint at times and counseled on importance of maintain Life Vest.   EP consulted on this visit as well. OP follow up.   2. Chronic left PCA and Subacute right PCA territory ischemic strokes - stable  Neurology is following: NIHSS: 0 on admission  Continue  mg PO QD and Atorvastatin 40mg PO QD (transaminitis on this admission will f/u with PMD and Neuro as outpatient)  ·	CT Brain Stroke Protocol: No acute hemorrhage, mass effect or midline shift. Stable chronic left occipital infarction.  ·	CT Angio Head w/ IV Cont: Essentially unremarkable CT angiogram of the neck and head. No significant vascular stenosis or occlusion.  ·	MRI/ MRA: unremarkable  ·	TTE: Contrast Echo with Lumason shows severe global hypokinesia on apical views and EF<25%. No thrombus seen.  ·	rEEG normal  3. Transaminitis with elevated INR: Most likely 2/2 Statin  Continue Statin at current dosage. Repeat BMP within one week of discharge with PCP. If transaminitis is improved consider increasing dosage to Lipitor 80mg PO QD.   ·	RUQ Sonogram: Unremarkable. Gallbladder polyp noted. Recommend follow up in 12 months.   4. DM:  Hemoglobin A1c (4/18/18): 6.3  Continue carb consistent diet  No insulin therapy on this admission  5. HTN: Continue with Metoprolol, Spironolactone, and Lisinopril 66 yo M with PMH of HTN, b/l occipital stroke (recent discharge from hospital on 4/20 for acute CVA), and sCHF (EF 25%) on Life Vest presented with complaint of blurry vision in both eyes which started the morning of presentation upon awakening. In ED, Stroke Code was called. NIHSS 0. No tPA was given. Patient was admitted to telemetry as per neurology for further monitoring.  Patient was treated for the following conditions:  1. sCHF: stable  Left pleural effusion on CT  Continue with Metoprolol, Spironolactone, Lasix and Lisinopril  Continue with Lifevest with plan for AICD placement with EP (Dr. Guo Service). Patietn non-complaint at times and counseled on importance of maintain Life Vest.   EP consulted on this visit as well. OP follow up.   2. Chronic left PCA and Subacute right PCA territory ischemic strokes - stable  Neurology is following: NIHSS: 0 on admission  Continue  mg PO QD and Atorvastatin 40mg PO QD (transaminitis on this admission will f/u with PMD and Neuro as outpatient)  ·	CT Brain Stroke Protocol: No acute hemorrhage, mass effect or midline shift. Stable chronic left occipital infarction.  ·	CT Angio Head w/ IV Cont: Essentially unremarkable CT angiogram of the neck and head. No significant vascular stenosis or occlusion.  ·	MRI/ MRA: unremarkable  ·	TTE: Contrast Echo with Lumason shows severe global hypokinesia on apical views and EF<25%. No thrombus seen.  ·	rEEG normal  3. Transaminitis with elevated INR: Most likely 2/2 Statin  Continue Statin at current dosage. Repeat LFT within one week of discharge with PCP. If transaminitis is improved consider increasing dosage to Lipitor 80mg PO QD.   ·	RUQ Sonogram: Unremarkable. Gallbladder polyp noted. Recommend follow up in 12 months.   4. DM:  Hemoglobin A1c (4/18/18): 6.3  Continue carb consistent diet  No insulin therapy on this admission  5. HTN: Continue with Metoprolol, Spironolactone, and Lisinopril

## 2018-04-26 NOTE — DISCHARGE NOTE ADULT - CARE PLAN
Principal Discharge DX:	Systolic congestive heart failure, unspecified chronicity  Goal:	Continue monitoring and treatment  Assessment and plan of treatment:	Continue with Metoprolol, Spironolactone, Lasix and Lisinopril. Continue with Lifevest with plan for AICD placement with EP (Dr. Sari Mccormack). You have been counseled on importance of maintaining Life Vest due to fear of SUDDEN CARDIAC DEATH. Electrophysiology (EP) consulted on this visit as well. Please follow up with EP within two weeks of discharge. Maintain Fluid restriction of 1 Liter per day  Secondary Diagnosis:	Cerebrovascular accident (CVA), unspecified mechanism  Goal:	Continued monitoring and treatment  Assessment and plan of treatment:	Continue medications as prescribed. Please follow up with your regular neurologist. You have been started on Eliquis 5mg by mouth twice a day, please continue as prescribed.  Secondary Diagnosis:	Essential hypertension  Goal:	Continue monitoring and treatment  Assessment and plan of treatment:	Please continue medications as prescribed.  Secondary Diagnosis:	Transaminitis  Goal:	Monitoring  Assessment and plan of treatment:	Please have a repeat Liver Function test done with your regular primary care physician ([facility]) within one week of discharge. If liver function tests have normalized ask PCP to consider increasing dosage of Atorvastatin to 80mg as opposed to current 40mg.

## 2018-04-26 NOTE — DISCHARGE NOTE ADULT - CARE PROVIDER_API CALL
Kulwinder Choi), Cardiovascular Disease; Internal Medicine; Interventional Cardiology; Nuclear Cardiology  [address]  [address]  [place] [address]  Phone: [phone]  Fax: [phone]    Alejandra Blanco), Neurology; Vascular Neurology  [address] [facility]  [address]  [place], [place] [id number]  Phone: [phone]  Fax: [phone]    Yang Guo), Cardiac Electrophysiology; Cardiovascular Disease; [facility]  [address]  [place] [address]  Phone: [phone]  Fax: [phone]    Gary Bernard  [facility]  [address]  [place] [address]  Phone: [phone]  Fax: (   )    -

## 2018-04-26 NOTE — DISCHARGE NOTE ADULT - PROVIDER TOKENS
TOKEN:'[id number]',TOKEN:'[id number]',TOKEN:'[id number]',FREE:[LAST:[Marco Antonio],FIRST:[Gary],PHONE:[phone]],FAX:[(   )    -],ADDRESS:[[address]  [place] [address]]]

## 2018-04-26 NOTE — PROGRESS NOTE ADULT - SUBJECTIVE AND OBJECTIVE BOX
SAMANTHA CHARLES  67y Male    INTERVAL HPI/OVERNIGHT EVENTS:    He states his vision is fine. Awaiting MRI/MRA.    T(F): 96.5 (04-26-18 @ 05:29), Max: 98 (04-25-18 @ 19:42)  HR: 73 (04-26-18 @ 05:29) (73 - 91)  BP: 141/62 (04-26-18 @ 05:29) (123/85 - 141/62)  RR: 19 (04-26-18 @ 05:29) (18 - 19)  SpO2: 98% (04-26-18 @ 05:45) (98% - 100%)    I&O's Summary      Daily     Daily     CAPILLARY BLOOD GLUCOSE            PHYSICAL EXAM:  GENERAL: NAD  HEAD:  Normocephalic  EYES:  conjunctiva and sclera clear  ENMT: Moist mucous membranes  NECK: Supple, No JVD  NERVOUS SYSTEM:  Alert & Oriented X3, Good concentration  CHEST/LUNG: Clear to percussion bilaterally; No rales, rhonchi, wheezing  HEART: Regular rate and rhythm; No murmurs  ABDOMEN: Soft, Nontender, Nondistended; Bowel sounds present  EXTREMITIES:  2+ Peripheral Pulses, No edema  SKIN: No rashes     Consultant(s) Notes Reviewed:  [x ] YES  [ ] NO  Care Discussed with Consultants/Other Providers [ x] YES  [ ] NO    MEDICATIONS  (STANDING):  aspirin  chewable 162 milliGRAM(s) Oral daily  atorvastatin 40 milliGRAM(s) Oral at bedtime  enoxaparin Injectable 30 milliGRAM(s) SubCutaneous daily  furosemide    Tablet 40 milliGRAM(s) Oral daily  lisinopril 5 milliGRAM(s) Oral daily  metoprolol succinate ER 25 milliGRAM(s) Oral daily  spironolactone 25 milliGRAM(s) Oral daily    MEDICATIONS  (PRN):    EKG reviewed  Telemetry reviewed    LABS:                        12.3   4.59  )-----------( 167      ( 26 Apr 2018 06:07 )             36.9     04-26    142  |  106  |  29<H>  ----------------------------<  90  4.5   |  25  |  1.1    Ca    8.5      26 Apr 2018 06:07  Mg     2.0     04-26    TPro  5.3<L>  /  Alb  3.3<L>  /  TBili  0.8  /  DBili  0.2  /  AST  37  /  ALT  88<H>  /  AlkPhos  93  04-26              RADIOLOGY & ADDITIONAL TESTS:    Imaging or report Personally Reviewed:  [ ] YES  [ ] NO    Case discussed with resident    Care discussed with pt/family SAMANTHA CHARLES  67y Male    INTERVAL HPI/OVERNIGHT EVENTS:    He states his vision is fine. Awaiting MRI/MRA.    T(F): 96.5 (04-26-18 @ 05:29), Max: 98 (04-25-18 @ 19:42)  HR: 73 (04-26-18 @ 05:29) (73 - 91)  BP: 141/62 (04-26-18 @ 05:29) (123/85 - 141/62)  RR: 19 (04-26-18 @ 05:29) (18 - 19)  SpO2: 98% (04-26-18 @ 05:45) (98% - 100%) on RA      PHYSICAL EXAM:  GENERAL: NAD  HEAD:  Normocephalic  EYES:  conjunctiva and sclera clear  ENMT: Moist mucous membranes  NECK: Supple, No JVD  NERVOUS SYSTEM:  Alert, awake, Good concentration  CHEST/LUNG: Clear to percussion bilaterally  Lifevest in place  HEART: Regular rate and rhythm  ABDOMEN: Soft, Nontender, Nondistended  EXTREMITIES:  Pedal and ankle edema b/l  SKIN: No rashes     Consultant(s) Notes Reviewed:  [x ] YES  [ ] NO  Care Discussed with Consultants/Other Providers [ x] YES  [ ] NO    MEDICATIONS  (STANDING):  aspirin  chewable 162 milliGRAM(s) Oral daily  atorvastatin 40 milliGRAM(s) Oral at bedtime  enoxaparin Injectable 30 milliGRAM(s) SubCutaneous daily  furosemide    Tablet 40 milliGRAM(s) Oral daily  lisinopril 5 milliGRAM(s) Oral daily  metoprolol succinate ER 25 milliGRAM(s) Oral daily  spironolactone 25 milliGRAM(s) Oral daily    MEDICATIONS  (PRN):    Telemetry reviewed - no events    LABS:                        12.3   4.59  )-----------( 167      ( 26 Apr 2018 06:07 )             36.9     04-26    142  |  106  |  29<H>  ----------------------------<  90  4.5   |  25  |  1.1    Ca    8.5      26 Apr 2018 06:07  Mg     2.0     04-26    TPro  5.3<L>  /  Alb  3.3<L>  /  TBili  0.8  /  DBili  0.2  /  AST  37  /  ALT  88<H>  /  AlkPhos  93  04-26          Case discussed with resident    Care discussed with pt

## 2018-04-26 NOTE — DISCHARGE NOTE ADULT - MEDICATION SUMMARY - MEDICATIONS TO STOP TAKING
I will STOP taking the medications listed below when I get home from the hospital:  None I will STOP taking the medications listed below when I get home from the hospital:    aspirin 81 mg oral delayed release tablet  -- 1 tab(s) by mouth once a day

## 2018-04-27 LAB
ANION GAP SERPL CALC-SCNC: 14 MMOL/L — SIGNIFICANT CHANGE UP (ref 7–14)
BUN SERPL-MCNC: 25 MG/DL — HIGH (ref 10–20)
CALCIUM SERPL-MCNC: 8.4 MG/DL — LOW (ref 8.5–10.1)
CHLORIDE SERPL-SCNC: 105 MMOL/L — SIGNIFICANT CHANGE UP (ref 98–110)
CO2 SERPL-SCNC: 20 MMOL/L — SIGNIFICANT CHANGE UP (ref 17–32)
CREAT SERPL-MCNC: 1.1 MG/DL — SIGNIFICANT CHANGE UP (ref 0.7–1.5)
GLUCOSE SERPL-MCNC: 105 MG/DL — HIGH (ref 70–99)
HCT VFR BLD CALC: 36.9 % — LOW (ref 42–52)
HGB BLD-MCNC: 12.5 G/DL — LOW (ref 14–18)
MCHC RBC-ENTMCNC: 32.6 PG — HIGH (ref 27–31)
MCHC RBC-ENTMCNC: 33.9 G/DL — SIGNIFICANT CHANGE UP (ref 32–37)
MCV RBC AUTO: 96.3 FL — HIGH (ref 80–94)
NRBC # BLD: 0 /100 WBCS — SIGNIFICANT CHANGE UP (ref 0–0)
PLATELET # BLD AUTO: 169 K/UL — SIGNIFICANT CHANGE UP (ref 130–400)
POTASSIUM SERPL-MCNC: 4.1 MMOL/L — SIGNIFICANT CHANGE UP (ref 3.5–5)
POTASSIUM SERPL-SCNC: 4.1 MMOL/L — SIGNIFICANT CHANGE UP (ref 3.5–5)
RBC # BLD: 3.83 M/UL — LOW (ref 4.7–6.1)
RBC # FLD: 14.3 % — SIGNIFICANT CHANGE UP (ref 11.5–14.5)
SODIUM SERPL-SCNC: 139 MMOL/L — SIGNIFICANT CHANGE UP (ref 135–146)
WBC # BLD: 4.07 K/UL — LOW (ref 4.8–10.8)
WBC # FLD AUTO: 4.07 K/UL — LOW (ref 4.8–10.8)

## 2018-04-27 RX ADMIN — Medication 162 MILLIGRAM(S): at 11:22

## 2018-04-27 RX ADMIN — Medication 40 MILLIGRAM(S): at 05:45

## 2018-04-27 RX ADMIN — ATORVASTATIN CALCIUM 40 MILLIGRAM(S): 80 TABLET, FILM COATED ORAL at 21:19

## 2018-04-27 RX ADMIN — Medication 50 MILLIGRAM(S): at 05:45

## 2018-04-27 NOTE — PROGRESS NOTE ADULT - SUBJECTIVE AND OBJECTIVE BOX
Patient is a 67y old  Male who presents with a chief complaint of rule out acute stroke (26 Apr 2018 11:41)    HPI:  66 yo M w/ PMH of HTN, b/l occipital stroke-recent d/c from hospital on 4/20, sCHF(EF 25%) on life west presented with c/o blurry vision in both eyes which started today AM waking up. It is equal in both eyes. He had similar complaint on last admission when he was found to have stroke. Since am, pt reports 85% improvement in symptom.    Pt denies any double vision, decreased vision in particular quadrant, numbness/weakness/decreased sensation in any part of body, change in his speech, problem with bowel/bladder, walking difficulty.     ROS + for SOB on lying down- has been using 3 pillows for last couple of nights. Denies chest pain, SOB on walking, leg swelling, palpitations.    In ED, VS stable on presentation. Stroke code was called--> NIHSS 0--> no tPA given--> admit to tele per neurology for MRI brain (24 Apr 2018 15:31)      SUBJ:  Patient seen and examined. No events overnight      MEDICATIONS  (STANDING):  aspirin  chewable 162 milliGRAM(s) Oral daily  atorvastatin 40 milliGRAM(s) Oral at bedtime  enoxaparin Injectable 30 milliGRAM(s) SubCutaneous daily  furosemide    Tablet 40 milliGRAM(s) Oral daily  lisinopril 5 milliGRAM(s) Oral daily  metoprolol succinate ER 50 milliGRAM(s) Oral daily  spironolactone 25 milliGRAM(s) Oral daily    MEDICATIONS  (PRN):            Vital Signs Last 24 Hrs  T(C): 35.8 (27 Apr 2018 05:34), Max: 36 (26 Apr 2018 13:25)  T(F): 96.4 (27 Apr 2018 05:34), Max: 96.8 (26 Apr 2018 13:25)  HR: 81 (27 Apr 2018 05:34) (80 - 92)  BP: 118/77 (27 Apr 2018 05:34) (107/69 - 127/86)  BP(mean): --  RR: 18 (27 Apr 2018 05:34) (18 - 18)  SpO2: 98% (26 Apr 2018 17:07) (98% - 98%)      PHYSICAL EXAM:    GEN: AAO x 3, NAD  HEENT: NC/AT, PERRL  Neck: No JVD, no bruits  CV: Reg, S1-S2, no murmur  Lungs: CTAB  Abd: Soft, non-tender  Ext: No edema      I&O's Summary  	    TELEMETRY:    ECG:    TTE:      LABS:                        12.5   4.07  )-----------( 169      ( 27 Apr 2018 06:03 )             36.9     04-27    139  |  105  |  25<H>  ----------------------------<  105<H>  4.1   |  20  |  1.1    Ca    8.4<L>      27 Apr 2018 06:03  Mg     2.0     04-26    TPro  5.3<L>  /  Alb  3.3<L>  /  TBili  0.8  /  DBili  0.2  /  AST  37  /  ALT  88<H>  /  AlkPhos  93  04-26              BNP  RADIOLOGY & ADDITIONAL STUDIES:      IMPRESSION AND PLAN:

## 2018-04-27 NOTE — PROGRESS NOTE ADULT - SUBJECTIVE AND OBJECTIVE BOX
SAMANTHA CHARLES  67y Male    INTERVAL HPI/OVERNIGHT EVENTS:    No complaints. Awaiting MRI/MRA    T(F): 96.4 (04-27-18 @ 05:34), Max: 96.8 (04-26-18 @ 13:25)  HR: 81 (04-27-18 @ 05:34) (80 - 92)  BP: 118/77 (04-27-18 @ 05:34) (107/69 - 127/86)  RR: 18 (04-27-18 @ 05:34) (18 - 18)  SpO2: 98% (04-26-18 @ 17:07) (98% - 98%) on RA    CAPILLARY BLOOD GLUCOSE  101 (27 Apr 2018 07:45)  103 (26 Apr 2018 16:49)  102 (26 Apr 2018 11:31)      PHYSICAL EXAM:  GENERAL: NAD  HEAD:  Normocephalic  EYES:  conjunctiva and sclera clear  ENMT: Moist mucous membranes  NECK: Supple, No JVD  NERVOUS SYSTEM:  Alert & Oriented X3, Good concentration  CHEST/LUNG: Clear to percussion bilaterally, decreased BS  HEART: Regular rate and rhythm; No murmurs  ABDOMEN: Soft, Nontender, Nondistended  EXTREMITIES:  Ankle and pedal edema b/l - improving  SKIN: No rashes     Consultant(s) Notes Reviewed:  [x ] YES  [ ] NO  Care Discussed with Consultants/Other Providers [ x] YES  [ ] NO    MEDICATIONS  (STANDING):  aspirin  chewable 162 milliGRAM(s) Oral daily  atorvastatin 40 milliGRAM(s) Oral at bedtime  enoxaparin Injectable 30 milliGRAM(s) SubCutaneous daily  furosemide    Tablet 40 milliGRAM(s) Oral daily  lisinopril 5 milliGRAM(s) Oral daily  metoprolol succinate ER 50 milliGRAM(s) Oral daily  spironolactone 25 milliGRAM(s) Oral daily    MEDICATIONS  (PRN):    Telemetry reviewed    LABS:                        12.5   4.07  )-----------( 169      ( 27 Apr 2018 06:03 )             36.9     04-27    139  |  105  |  25<H>  ----------------------------<  105<H>  4.1   |  20  |  1.1    Ca    8.4<L>      27 Apr 2018 06:03  Mg     2.0     04-26    TPro  5.3<L>  /  Alb  3.3<L>  /  TBili  0.8  /  DBili  0.2  /  AST  37  /  ALT  88<H>  /  AlkPhos  93  04-26          Case discussed with resident    Care discussed with pt

## 2018-04-27 NOTE — PROGRESS NOTE ADULT - SUBJECTIVE AND OBJECTIVE BOX
SUBJECTIVE:Patient is a 67y old  Male who presents with a chief complaint of rule out acute stroke (26 Apr 2018 11:41)  . Today is hospital day 3d. Patient seen and examined. NAD. No acute events overnihgt. MRi pending. EP follow up pending, EP consulted.     PAST MEDICAL & SURGICAL HISTORY  PAST MEDICAL & SURGICAL HISTORY:  Essential hypertension  Cerebrovascular accident (CVA) due to bilateral embolism of posterior cerebral arteries  Systolic congestive heart failure, unspecified chronicity  No significant past surgical history    ALLERGIES:  penicillin (Rash)    MEDICATIONS:  STANDING MEDICATIONS  aspirin  chewable 162 milliGRAM(s) Oral daily  atorvastatin 40 milliGRAM(s) Oral at bedtime  enoxaparin Injectable 30 milliGRAM(s) SubCutaneous daily  furosemide    Tablet 40 milliGRAM(s) Oral daily  lisinopril 5 milliGRAM(s) Oral daily  metoprolol succinate ER 50 milliGRAM(s) Oral daily  spironolactone 25 milliGRAM(s) Oral daily    PRN MEDICATIONS    VITALS:   T(F): 96.4  HR: 81  BP: 118/77  RR: 18  SpO2: 98%    LABS:                        12.5   4.07  )-----------( 169      ( 27 Apr 2018 06:03 )             36.9     04-27    139  |  105  |  25<H>  ----------------------------<  105<H>  4.1   |  20  |  1.1    Ca    8.4<L>      27 Apr 2018 06:03  Mg     2.0     04-26    TPro  5.3<L>  /  Alb  3.3<L>  /  TBili  0.8  /  DBili  0.2  /  AST  37  /  ALT  88<H>  /  AlkPhos  93  04-26    PHYSICAL EXAM:  GEN: NAD  LUNGS: Clear to auscultation bilaterally   HEART: S1/S2 present. RRR.   ABD: Soft, non-tender, non-distended. Bowel sounds present  EXT: no b/l LE edema

## 2018-04-27 NOTE — CONSULT NOTE ADULT - SUBJECTIVE AND OBJECTIVE BOX
HPI:  68 yo M w/ PMH of HTN, b/l occipital stroke-recent d/c from hospital on 4/20, sCHF(EF 25%) on [facility] presented with c/o blurry vision in both eyes which started today AM waking up. It is equal in both eyes. He had similar complaint on last admission when he was found to have stroke. Since am, pt reports 85% improvement in symptom.    Pt denies any double vision, decreased vision in particular quadrant, numbness/weakness/decreased sensation in any part of body, change in his speech, problem with bowel/bladder, walking difficulty.     ROS + for SOB on lying down- has been using 3 pillows for last couple of nights. Denies chest pain, SOB on walking, leg swelling, palpitations.    In ED, VS stable on presentation. Stroke code was called--> NIHSS 0--> no tPA given--> admit to tele per neurology for MRI brain (24 Apr 2018 15:31)      Patient is a 67y old  Male who presents with a chief complaint of rule out acute stroke (26 Apr 2018 11:41)        PAST MEDICAL & SURGICAL HISTORY:  Essential hypertension  Cerebrovascular accident (CVA) due to bilateral embolism of posterior cerebral arteries  Systolic congestive heart failure, unspecified chronicity  No significant past surgical history    PREVIOUS DIAGNOSTIC TESTING:      ECHO: < from: Transthoracic Echocardiogram (04.20.18 @ 11:03) >  EXAM:  2-D ECHO (TTE) COMPLETE    PROCEDURE DATE:  04/20/2018    Summary:   1. Contrast Echo with Lumason shows severe global hypokinesia on apical   views and EF<25%. No thrombus seen.   2. Endocardial visualization was enhanced with intravenous echo contrast.    MEDICATIONS  (STANDING):  aspirin  chewable 162 milliGRAM(s) Oral daily  atorvastatin 40 milliGRAM(s) Oral at bedtime  enoxaparin Injectable 30 milliGRAM(s) SubCutaneous daily  furosemide    Tablet 40 milliGRAM(s) Oral daily  lisinopril 5 milliGRAM(s) Oral daily  metoprolol succinate ER 50 milliGRAM(s) Oral daily  spironolactone 25 milliGRAM(s) Oral daily    MEDICATIONS  (PRN):   Home Medications:  aspirin 81 mg oral delayed release tablet: 1 tab(s) orally once a day (24 Apr 2018 15:57)  furosemide 20 mg oral tablet: 1 tab(s) orally once a day (24 Apr 2018 15:57)  lisinopril 5 mg oral tablet: 1 tab(s) orally once a day (24 Apr 2018 15:57)  spironolactone 25 mg oral tablet: 1 tab(s) orally once a day (24 Apr 2018 15:57)    FAMILY HISTORY:  No pertinent family history in first degree relatives    SOCIAL HISTORY:  CIGARETTES:  ALCOHOL:    Vital Signs Last 24 Hrs  T(C): 36.2 (27 Apr 2018 14:14), Max: 36.2 (27 Apr 2018 14:14)  T(F): 97.1 (27 Apr 2018 14:14), Max: 97.1 (27 Apr 2018 14:14)  HR: 80 (27 Apr 2018 14:14) (80 - 92)  BP: 134/86 (27 Apr 2018 14:14) (107/69 - 134/86)  BP(mean): --  RR: 18 (27 Apr 2018 14:14) (18 - 18)  SpO2: 98% (26 Apr 2018 17:07) (98% - 98%)    INTERPRETATION OF TELEMETRY:    ECG: < from: 12 Lead ECG (04.24.18 @ 09:49) >  Ventricular Rate 78 BPM  Atrial Rate 78 BPM  P-R Interval 176 ms  QRS Duration 130 ms  Q-T Interval 442 ms  QTC Calculation(Bezet) 503 ms  P Axis 71 degrees  R Axis 259 degrees  T Axis 78 degrees  Diagnosis Line Normal sinus rhythm  Left atrial enlargement  Non-specific intra-ventricular conduction block  Anterolateral infarct , age undetermined  Abnormal ECG  < end of copied text >    LABS:                     12.5   4.07  )-----------( 169      ( 27 Apr 2018 06:03 )             36.9     04-27    139  |  105  |  25<H>  ----------------------------<  105<H>  4.1   |  20  |  1.1    Ca    8.4<L>      27 Apr 2018 06:03  Mg     2.0     04-26    TPro  5.3<L>  /  Alb  3.3<L>  /  TBili  0.8  /  DBili  0.2  /  AST  37  /  ALT  88<H>  /  AlkPhos  93  04-26    LIVER FUNCTIONS - ( 26 Apr 2018 06:07 )  Alb: 3.3 g/dL / Pro: 5.3 g/dL / ALK PHOS: 93 U/L / ALT: 88 U/L / AST: 37 U/L / GGT: x HPI:  68 yo M w/ PMH of HTN, b/l occipital stroke-recent d/c from hospital on 4/20, sCHF(EF 25%) on life [facility] presented with c/o blurry vision in both eyes which started today AM waking up. It is equal in both eyes. He had similar complaint on last admission when he was found to have stroke. Since am, pt reports 85% improvement in symptom.    Pt denies any double vision, decreased vision in particular quadrant, numbness/weakness/decreased sensation in any part of body, change in his speech, problem with bowel/bladder, walking difficulty.     ROS + for SOB on lying down- has been using 3 pillows for last couple of nights. Denies chest pain, SOB on walking, leg swelling, palpitations.    In ED, VS stable on presentation. Stroke code was called--> NIHSS 0--> no tPA given--> admit to tele per neurology for MRI brain (24 Apr 2018 15:31)      Patient is a 67y old  Male who presents with a chief complaint of rule out acute stroke (26 Apr 2018 11:41)    66 y/o male with phms as indicated admitted c/o bilateral blurry vision which has intermittent improvement; Pt LVEF 25% given life vest along with GMDT as bridge for prevention of SCD    PAST MEDICAL & SURGICAL HISTORY:  Essential hypertension  Cerebrovascular accident (CVA) due to bilateral embolism of posterior cerebral arteries  Systolic congestive heart failure, unspecified chronicity  No significant past surgical history    PREVIOUS DIAGNOSTIC TESTING:      ECHO: < from: Transthoracic Echocardiogram (04.20.18 @ 11:03) >  EXAM:  2-D ECHO (TTE) COMPLETE    PROCEDURE DATE:  04/20/2018    Summary:   1. Contrast Echo with Lumason shows severe global hypokinesia on apical   views and EF<25%. No thrombus seen.   2. Endocardial visualization was enhanced with intravenous echo contrast.    MEDICATIONS  (STANDING):  aspirin  chewable 162 milliGRAM(s) Oral daily  atorvastatin 40 milliGRAM(s) Oral at bedtime  enoxaparin Injectable 30 milliGRAM(s) SubCutaneous daily  furosemide    Tablet 40 milliGRAM(s) Oral daily  lisinopril 5 milliGRAM(s) Oral daily  metoprolol succinate ER 50 milliGRAM(s) Oral daily  spironolactone 25 milliGRAM(s) Oral daily    MEDICATIONS  (PRN):   Home Medications:  aspirin 81 mg oral delayed release tablet: 1 tab(s) orally once a day (24 Apr 2018 15:57)  furosemide 20 mg oral tablet: 1 tab(s) orally once a day (24 Apr 2018 15:57)  lisinopril 5 mg oral tablet: 1 tab(s) orally once a day (24 Apr 2018 15:57)  spironolactone 25 mg oral tablet: 1 tab(s) orally once a day (24 Apr 2018 15:57)    FAMILY HISTORY:  No pertinent family history in first degree relatives    SOCIAL HISTORY:  CIGARETTES:  ALCOHOL:    Vital Signs Last 24 Hrs  T(C): 36.2 (27 Apr 2018 14:14), Max: 36.2 (27 Apr 2018 14:14)  T(F): 97.1 (27 Apr 2018 14:14), Max: 97.1 (27 Apr 2018 14:14)  HR: 80 (27 Apr 2018 14:14) (80 - 92)  BP: 134/86 (27 Apr 2018 14:14) (107/69 - 134/86)  BP(mean): --  RR: 18 (27 Apr 2018 14:14) (18 - 18)  SpO2: 98% (26 Apr 2018 17:07) (98% - 98%)    INTERPRETATION OF TELEMETRY:    ECG: < from: 12 Lead ECG (04.24.18 @ 09:49) >  Ventricular Rate 78 BPM  Atrial Rate 78 BPM  P-R Interval 176 ms  QRS Duration 130 ms  Q-T Interval 442 ms  QTC Calculation(Bezet) 503 ms  P Axis 71 degrees  R Axis 259 degrees  T Axis 78 degrees  Diagnosis Line Normal sinus rhythm  Left atrial enlargement  Non-specific intra-ventricular conduction block  Anterolateral infarct , age undetermined  Abnormal ECG  < end of copied text >    LABS:                     12.5   4.07  )-----------( 169      ( 27 Apr 2018 06:03 )             36.9     04-27    139  |  105  |  25<H>  ----------------------------<  105<H>  4.1   |  20  |  1.1    Ca    8.4<L>      27 Apr 2018 06:03  Mg     2.0     04-26    TPro  5.3<L>  /  Alb  3.3<L>  /  TBili  0.8  /  DBili  0.2  /  AST  37  /  ALT  88<H>  /  AlkPhos  93  04-26    LIVER FUNCTIONS - ( 26 Apr 2018 06:07 )  Alb: 3.3 g/dL / Pro: 5.3 g/dL / ALK PHOS: 93 U/L / ALT: 88 U/L / AST: 37 U/L / GGT: x

## 2018-04-27 NOTE — PROGRESS NOTE ADULT - ASSESSMENT
1. Visual disturbance in pt with acute/subacute b/l occipital infarcts diagnosed earlier this month - rule out new CVA vs TIA  MRI of brain  MRA of head and neck  continue ASA and statin  telemetry  f/u rest of vasculitis workup - pending  Neurology/stroke team following    2. Dilated nonischemic cardiomyopathy/ Acute over chronic HFrEF  continue lasix at 40mg daily (increased on admission)  daily weights  continue Bblocker, ACE-I, spironolactone  pt wearing Lifevest  cardio f/u appreciated - EP consult re: AICD    3. Transaminitis - improved  continue statin    4. DVT prophylaxis 1. Visual disturbance in pt with acute/subacute b/l occipital infarcts diagnosed earlier this month - rule out new CVA vs TIA  MRI of brain  MRA of head and neck  continue ASA and statin  telemetry  Neurology/stroke team following    2. Dilated nonischemic cardiomyopathy/ Acute over chronic HFrEF  continue lasix at 40mg daily (increased on admission)  daily weights  continue Bblocker, ACE-I, spironolactone  pt wearing Lifevest  cardio f/u appreciated - EP consult re: AICD    3. Transaminitis - improved  continue statin    4. DVT prophylaxis

## 2018-04-27 NOTE — CONSULT NOTE ADULT - ASSESSMENT
HTN  CVA  due to bilateral embolism of posterior cerebral arteries  Systolic CHF LVEF 25 HFrEF 25%  Non ischemic cardiomyopathy   HTN  CVA    Pt has life vest as bridge to ICD implant, patient wants resolution of visual disturbances prior to ICD implant   - Continue HF medical therapies  - MRI/MRA and EEG pending per Neuro's recommendations

## 2018-04-27 NOTE — PROGRESS NOTE ADULT - ASSESSMENT
66 yo M with PMH of HTN, b/l occipital stroke-recent d/c from hospital on 4/20, sCHF(EF 25%) on life west presented with complaint of blurry vision in both eyes which started the morning of presentation when he woke up. In ED, Stroke Code was called. NIHSS 0. No tPA was given. Patient was admitted to telemetry as per neurology for further monitoring.    IMPRESSION:  # Acute CVA vs TIA:  Neurology is following: NIHSS: 0  Continue  mg PO QD and Lipitor 80mg PO QHS  CT Brain Stroke Protocol (04.24.18): Since CT head 4/15/2018. No acute hemorrhage, mass effect or midline shift. Stable chronic left occipital infarction.  CT Angio Head w/ IV Cont (04.24.18): Essentially unremarkable CT angiogram of the neck and head. No significant vascular stenosis or occlusion.  MRI Brain w/o arabella, pending  Transthoracic Echocardiogram (04.20.18): Contrast Echo with Lumason shows severe global hypokinesia on apical views and EF<25%. No thrombus seen.  Hemoglobin A1c (4/18/18): 6.3  Lipid Panel (4/18/18): wnl   PT/OT not needed. No focal deficits.    # Transaminitis with elevated INR: possibly 2/2 Lipitor  Continue Lipitor  RUQ Sonogram: Unremarkable. Gallbladder polyp noted. Recommend follow up in 12 months.   Repeat BMP within 1 week of discharge    # DM:  Hemoglobin A1c (4/18/18): 6.3  Monitor FS  ISS if needed  Continue carb consistent diet    # sCHF: stable  Left pleural effusion on CT  Lasix has been increased to 40mg po q24h  Continue with Metoprolol, Spironolactone, and Lisinopril  EP consulted for AICD. Continue with Lifevest for now.     # HTN: Continue with Metoprolol, Spironolactone, and Lisinopril    # DVT Px: Lovenox    # Code status

## 2018-04-27 NOTE — PROGRESS NOTE ADULT - SUBJECTIVE AND OBJECTIVE BOX
SAMANTHA CHARLES    Chief Complaint: Acute onset vision changes    HPI:  68 yo man w/h/o HTN, DLD, CHF with 20% EF presents to the ED with visual changes since waking this morning. Pt states he developed blurry vision and difficulty recognizing colors. He was admitted one week ago with vision changes and was found to have chronic left PCA and subacute right PCA territory ischemic strokes.He has no visual field deficit today on exam. Today he reports resolution of vision changes. Overnight he experienced an event described as euphoric and experienced people coming in and out of his room.      Relevant PMH:  [x] Prior ischemic stroke/TIA  [] Afib  []CAD  []HTN  []DLD  []DM []PVD []Obesity [] Sedintary lifestyle [x]CHF  []SOSA []Cancer Hx     Social History: [] Smoking []  Drug Use: []   Alcohol Use:   [] Other:      Possible Location of Stroke:  PCA territory  Possible Cause of Stroke:  Unknown at this time  Relavant Cervicocerebral Imaging:  Unremarkable CTA head and neck.       Relevant blood tests:  Direct LDL: 107 mg/dL [4 - 129] (04-16-18 @ 07:32)  Hemoglobin A1C, Whole Blood: 6.3 % (04-18-18 @ 09:30)  CRP 0.2  ESR 5  Phosphatidyl Serine Antibody IGA <20 IGG <10 IGM <25  Anti SSA,SSB <0.2  Anti nuclear titer: negative   P-anca, C-anca: negative    Relevant cardiac rhythm monitoring:  Telemetry monitoring for 72 hours and no events reported.  Relevant Cardiac Structure:(TTE/FREDERICK +/-):No intracardiac thrombus/vegetation/akynesia/EF:  TTE with contrast revealed severe global hypokinesia on apical views and EF<25%.       Home Medications:  aspirin 81 mg oral delayed release tablet: 1 tab(s) orally once a day (24 Apr 2018 15:57)  furosemide 20 mg oral tablet: 1 tab(s) orally once a day (24 Apr 2018 15:57)  lisinopril 5 mg oral tablet: 1 tab(s) orally once a day (24 Apr 2018 15:57)  spironolactone 25 mg oral tablet: 1 tab(s) orally once a day (24 Apr 2018 15:57)      MEDICATIONS  (STANDING):  aspirin  chewable 162 milliGRAM(s) Oral daily  atorvastatin 40 milliGRAM(s) Oral at bedtime  enoxaparin Injectable 30 milliGRAM(s) SubCutaneous daily  furosemide    Tablet 40 milliGRAM(s) Oral daily  lisinopril 5 milliGRAM(s) Oral daily  metoprolol succinate ER 50 milliGRAM(s) Oral daily  spironolactone 25 milliGRAM(s) Oral daily      PT/OT/Speech/Rehab/S&Swr: Outpatient or VNS services.    Exam:    Vital Signs Last 24 Hrs  T(C): 35.8 (27 Apr 2018 05:34), Max: 36 (26 Apr 2018 13:25)  T(F): 96.4 (27 Apr 2018 05:34), Max: 96.8 (26 Apr 2018 13:25)  HR: 81 (27 Apr 2018 05:34) (80 - 92)  BP: 118/77 (27 Apr 2018 05:34) (107/69 - 127/86)  BP(mean): --  RR: 18 (27 Apr 2018 05:34) (18 - 18)  SpO2: 98% (26 Apr 2018 17:07) (98% - 98%)    NIHSS      LOC:       1a:    0 1b(Questions):   0        1c(Instructions):   0          Best Gaze:0  Visual:0  Motor:      0           RUE:0     RLE:   0  LUE:  0   LLE:  0   FACE:  0   Limb Ataxia:0  Sensory:     0  Language:   0    Dysarthria:   0       Extinction and Inattention:0    NIHSS on admission:     0     NIHSS yesterday:   0       NIHSS today:     0        m-RS:0    Impression:  68 yo man w/h/o HTN, DLD, CHF with 20% EF presents to the ED with visual changes since waking this morning. Pt states he developed blurry vision and difficulty recognizing colors. He was admitted one week ago with vision changes and was found to have chronic left PCA and subacute right PCA territory ischemic strokes.He has no visual field deficit today on exam. Today he reports resolution of vision changes. Overnight he experienced an event described as vivid, euphoric and experienced people coming in and out of his room while he was awake. Inflammatory workup was negative. Etiology of symptoms is unclear at this point, will follow up post MRI/MRA,     Suggestion:  -Routine EEG  -Continue ASA  -Please make Lipitor 80 mg if not contraindicated.  -Follow up MRI/MRA  -PT OT Rehab    Chadd Rios NP  [phone] SAMANTHA CHARLES    Chief Complaint: Acute onset vision changes    HPI:  66 yo man w/h/o HTN, DLD, CHF with 20% EF presents to the ED with visual changes since waking this morning. Pt states he developed blurry vision and difficulty recognizing colors. He was admitted one week ago with vision changes and was found to have chronic left PCA and subacute right PCA territory ischemic strokes.He has no visual field deficit today on exam. Today he reports resolution of vision changes. Overnight he experienced an event described as euphoric and experienced people coming in and out of his room.      Relevant PMH:  [x] Prior ischemic stroke/TIA  [] Afib  []CAD  []HTN  []DLD  []DM []PVD []Obesity [] Sedintary lifestyle [x]CHF  []SOSA []Cancer Hx     Social History: [] Smoking []  Drug Use: []   Alcohol Use:   [] Other:      Possible Location of Stroke:  PCA territory  Possible Cause of Stroke:  Unknown at this time  Relavant Cervicocerebral Imaging:  Unremarkable CTA head and neck.       Relevant blood tests:  Direct LDL: 107 mg/dL [4 - 129] (04-16-18 @ 07:32)  Hemoglobin A1C, Whole Blood: 6.3 % (04-18-18 @ 09:30)  CRP 0.2  ESR 5  Phosphatidyl Serine Antibody IGA <20 IGG <10 IGM <25  Anti SSA,SSB <0.2  Anti nuclear titer: negative   P-anca, C-anca: negative    Relevant cardiac rhythm monitoring:  Telemetry monitoring for 72 hours and no events reported.  Relevant Cardiac Structure:(TTE/FREDERICK +/-):No intracardiac thrombus/vegetation/akynesia/EF:  TTE with contrast revealed severe global hypokinesia on apical views and EF<25%.       Home Medications:  aspirin 81 mg oral delayed release tablet: 1 tab(s) orally once a day (24 Apr 2018 15:57)  furosemide 20 mg oral tablet: 1 tab(s) orally once a day (24 Apr 2018 15:57)  lisinopril 5 mg oral tablet: 1 tab(s) orally once a day (24 Apr 2018 15:57)  spironolactone 25 mg oral tablet: 1 tab(s) orally once a day (24 Apr 2018 15:57)      MEDICATIONS  (STANDING):  aspirin  chewable 162 milliGRAM(s) Oral daily  atorvastatin 40 milliGRAM(s) Oral at bedtime  enoxaparin Injectable 30 milliGRAM(s) SubCutaneous daily  furosemide    Tablet 40 milliGRAM(s) Oral daily  lisinopril 5 milliGRAM(s) Oral daily  metoprolol succinate ER 50 milliGRAM(s) Oral daily  spironolactone 25 milliGRAM(s) Oral daily      PT/OT/Speech/Rehab/S&Swr: Outpatient or VNS services.    Exam:    Vital Signs Last 24 Hrs  T(C): 35.8 (27 Apr 2018 05:34), Max: 36 (26 Apr 2018 13:25)  T(F): 96.4 (27 Apr 2018 05:34), Max: 96.8 (26 Apr 2018 13:25)  HR: 81 (27 Apr 2018 05:34) (80 - 92)  BP: 118/77 (27 Apr 2018 05:34) (107/69 - 127/86)  BP(mean): --  RR: 18 (27 Apr 2018 05:34) (18 - 18)  SpO2: 98% (26 Apr 2018 17:07) (98% - 98%)    NIHSS      LOC:       1a:    0 1b(Questions):   0        1c(Instructions):   0          Best Gaze:0  Visual:0  Motor:      0           RUE:0     RLE:   0  LUE:  0   LLE:  0   FACE:  0   Limb Ataxia:0  Sensory:     0  Language:   0    Dysarthria:   0       Extinction and Inattention:0    NIHSS on admission:     0     NIHSS yesterday:   0       NIHSS today:     0        m-RS:0    Impression:  66 yo man w/h/o HTN, DLD, CHF with 20% EF presents to the ED with visual changes since waking this morning. Pt states he developed blurry vision and difficulty recognizing colors. He was admitted one week ago with vision changes and was found to have chronic left PCA and subacute right PCA territory ischemic strokes.He has no visual field deficit today on exam. Today he reports resolution of vision changes. Overnight he experienced an event described as vivid, euphoric and experienced people coming in and out of his room while he was awake. Inflammatory workup was negative. Etiology of symptoms is unclear at this point, will follow up post MRI/MRA,     Suggestion:  -Routine EEG to r/o seizure causing his last night possible hallucination  -Continue ASA  -Please make Lipitor 80 mg if not contraindicated.  -Follow up MRI/MRA  -PT OT Rehab    Chadd Rios NP  [phone]    I, Alejandra Blanco, examined the patient and discussed this case, including plan of management with the Stroke team.

## 2018-04-28 DIAGNOSIS — I42.0 DILATED CARDIOMYOPATHY: ICD-10-CM

## 2018-04-28 DIAGNOSIS — Z88.0 ALLERGY STATUS TO PENICILLIN: ICD-10-CM

## 2018-04-28 DIAGNOSIS — I63.9 CEREBRAL INFARCTION, UNSPECIFIED: ICD-10-CM

## 2018-04-28 DIAGNOSIS — Z79.82 LONG TERM (CURRENT) USE OF ASPIRIN: ICD-10-CM

## 2018-04-28 DIAGNOSIS — H53.8 OTHER VISUAL DISTURBANCES: ICD-10-CM

## 2018-04-28 DIAGNOSIS — I13.0 HYPERTENSIVE HEART AND CHRONIC KIDNEY DISEASE WITH HEART FAILURE AND STAGE 1 THROUGH STAGE 4 CHRONIC KIDNEY DISEASE, OR UNSPECIFIED CHRONIC KIDNEY DISEASE: ICD-10-CM

## 2018-04-28 DIAGNOSIS — N18.3 CHRONIC KIDNEY DISEASE, STAGE 3 (MODERATE): ICD-10-CM

## 2018-04-28 DIAGNOSIS — I50.20 UNSPECIFIED SYSTOLIC (CONGESTIVE) HEART FAILURE: ICD-10-CM

## 2018-04-28 LAB
ALBUMIN SERPL ELPH-MCNC: 3.3 G/DL — LOW (ref 3.5–5.2)
ALP SERPL-CCNC: 95 U/L — SIGNIFICANT CHANGE UP (ref 30–115)
ALT FLD-CCNC: 63 U/L — HIGH (ref 0–41)
ANION GAP SERPL CALC-SCNC: 14 MMOL/L — SIGNIFICANT CHANGE UP (ref 7–14)
AST SERPL-CCNC: 23 U/L — SIGNIFICANT CHANGE UP (ref 0–41)
BILIRUB SERPL-MCNC: 0.7 MG/DL — SIGNIFICANT CHANGE UP (ref 0.2–1.2)
BUN SERPL-MCNC: 26 MG/DL — HIGH (ref 10–20)
CALCIUM SERPL-MCNC: 8.3 MG/DL — LOW (ref 8.5–10.1)
CHLORIDE SERPL-SCNC: 105 MMOL/L — SIGNIFICANT CHANGE UP (ref 98–110)
CO2 SERPL-SCNC: 21 MMOL/L — SIGNIFICANT CHANGE UP (ref 17–32)
CREAT SERPL-MCNC: 1 MG/DL — SIGNIFICANT CHANGE UP (ref 0.7–1.5)
GLUCOSE SERPL-MCNC: 92 MG/DL — SIGNIFICANT CHANGE UP (ref 70–99)
HCT VFR BLD CALC: 38.6 % — LOW (ref 42–52)
HGB BLD-MCNC: 13.1 G/DL — LOW (ref 14–18)
MCHC RBC-ENTMCNC: 32.7 PG — HIGH (ref 27–31)
MCHC RBC-ENTMCNC: 33.9 G/DL — SIGNIFICANT CHANGE UP (ref 32–37)
MCV RBC AUTO: 96.3 FL — HIGH (ref 80–94)
NRBC # BLD: 0 /100 WBCS — SIGNIFICANT CHANGE UP (ref 0–0)
PLATELET # BLD AUTO: 170 K/UL — SIGNIFICANT CHANGE UP (ref 130–400)
POTASSIUM SERPL-MCNC: 3.9 MMOL/L — SIGNIFICANT CHANGE UP (ref 3.5–5)
POTASSIUM SERPL-SCNC: 3.9 MMOL/L — SIGNIFICANT CHANGE UP (ref 3.5–5)
PROT SERPL-MCNC: 5.5 G/DL — LOW (ref 6–8)
RBC # BLD: 4.01 M/UL — LOW (ref 4.7–6.1)
RBC # FLD: 14.6 % — HIGH (ref 11.5–14.5)
SODIUM SERPL-SCNC: 140 MMOL/L — SIGNIFICANT CHANGE UP (ref 135–146)
WBC # BLD: 5.36 K/UL — SIGNIFICANT CHANGE UP (ref 4.8–10.8)
WBC # FLD AUTO: 5.36 K/UL — SIGNIFICANT CHANGE UP (ref 4.8–10.8)

## 2018-04-28 RX ADMIN — Medication 50 MILLIGRAM(S): at 05:50

## 2018-04-28 RX ADMIN — ENOXAPARIN SODIUM 30 MILLIGRAM(S): 100 INJECTION SUBCUTANEOUS at 12:23

## 2018-04-28 RX ADMIN — ATORVASTATIN CALCIUM 40 MILLIGRAM(S): 80 TABLET, FILM COATED ORAL at 21:08

## 2018-04-28 RX ADMIN — Medication 162 MILLIGRAM(S): at 12:24

## 2018-04-28 RX ADMIN — Medication 40 MILLIGRAM(S): at 05:50

## 2018-04-28 RX ADMIN — SPIRONOLACTONE 25 MILLIGRAM(S): 25 TABLET, FILM COATED ORAL at 05:50

## 2018-04-28 RX ADMIN — LISINOPRIL 5 MILLIGRAM(S): 2.5 TABLET ORAL at 05:50

## 2018-04-28 NOTE — PROGRESS NOTE ADULT - ASSESSMENT
1. Visual disturbance in pt with acute/subacute b/l occipital infarcts diagnosed earlier this month - rule out new CVA vs TIA  MRI of brain  MRA of head and neck  continue ASA and statin  telemetry  Neurology/stroke team following    2. Dilated nonischemic cardiomyopathy/ Acute over chronic HFrEF  continue lasix at 40mg daily (increased on admission)  daily weights  continue Bblocker, ACE-I, spironolactone  pt wearing Lifevest  EP consult appreciated - plan for BiV-ICD after neuro workup is complete    3. Transaminitis - improved  continue statin    4. DVT prophylaxis

## 2018-04-28 NOTE — PROGRESS NOTE ADULT - SUBJECTIVE AND OBJECTIVE BOX
SAMANTHA CHARLES  67y Male    INTERVAL HPI/OVERNIGHT EVENTS:    No complaints. Awaiting MRI/MRA.    T(F): 98.1 (04-28-18 @ 05:27), Max: 98.1 (04-28-18 @ 05:27)  HR: 89 (04-28-18 @ 05:27) (80 - 93)  BP: 132/81 (04-28-18 @ 05:27) (120/66 - 156/78)  RR: 18 (04-28-18 @ 05:27) (18 - 18)  SpO2: 100% (04-27-18 @ 18:02) (100% - 100%)      CAPILLARY BLOOD GLUCOSE  84 (28 Apr 2018 07:32)  113 (27 Apr 2018 21:03)  147 (27 Apr 2018 15:54)  109 (27 Apr 2018 11:30)      PHYSICAL EXAM:  GENERAL: NAD  HEAD:  Normocephalic  EYES:  conjunctiva and sclera clear  ENMT: Moist mucous membranes  NECK: Supple, No JVD  NERVOUS SYSTEM:  Alert & Oriented X3, Good concentration  CHEST/LUNG: Clear to percussion bilaterally; lifevest in place  HEART: Regular rate and rhythm  ABDOMEN: Soft, Nontender, Nondistended  EXTREMITIES:  Improving pedal and ankle edema  SKIN: No rashes     Consultant(s) Notes Reviewed:  [x ] YES  [ ] NO  Care Discussed with Consultants/Other Providers [ x] YES  [ ] NO    MEDICATIONS  (STANDING):  aspirin  chewable 162 milliGRAM(s) Oral daily  atorvastatin 40 milliGRAM(s) Oral at bedtime  enoxaparin Injectable 30 milliGRAM(s) SubCutaneous daily  furosemide    Tablet 40 milliGRAM(s) Oral daily  lisinopril 5 milliGRAM(s) Oral daily  metoprolol succinate ER 50 milliGRAM(s) Oral daily  spironolactone 25 milliGRAM(s) Oral daily    MEDICATIONS  (PRN):      Telemetry reviewed    LABS:                        13.1   5.36  )-----------( 170      ( 28 Apr 2018 05:43 )             38.6     04-28    140  |  105  |  26<H>  ----------------------------<  92  3.9   |  21  |  1.0    Ca    8.3<L>      28 Apr 2018 05:43    TPro  5.5<L>  /  Alb  3.3<L>  /  TBili  0.7  /  DBili  x   /  AST  23  /  ALT  63<H>  /  AlkPhos  95  04-28          Care discussed with pt

## 2018-04-28 NOTE — PROGRESS NOTE ADULT - SUBJECTIVE AND OBJECTIVE BOX
SUBJECTIVE:Patient is a 67y old  Male who presents with a chief complaint of rule out acute stroke (26 Apr 2018 11:41)  . Today is hospital day 4d. Patient seen and examined. NAD. No acute events during the day. Pending MRI report.     PAST MEDICAL & SURGICAL HISTORY  PAST MEDICAL & SURGICAL HISTORY:  Essential hypertension  Cerebrovascular accident (CVA) due to bilateral embolism of posterior cerebral arteries  Systolic congestive heart failure, unspecified chronicity  No significant past surgical history    ALLERGIES:  penicillin (Rash)    MEDICATIONS:  STANDING MEDICATIONS  aspirin  chewable 162 milliGRAM(s) Oral daily  atorvastatin 40 milliGRAM(s) Oral at bedtime  enoxaparin Injectable 30 milliGRAM(s) SubCutaneous daily  furosemide    Tablet 40 milliGRAM(s) Oral daily  lisinopril 5 milliGRAM(s) Oral daily  metoprolol succinate ER 50 milliGRAM(s) Oral daily  spironolactone 25 milliGRAM(s) Oral daily    PRN MEDICATIONS    VITALS:   T(F): 97.3  HR: 95  BP: 149/70  RR: 18  SpO2: 100%    LABS:                        13.1   5.36  )-----------( 170      ( 28 Apr 2018 05:43 )             38.6     04-28    140  |  105  |  26<H>  ----------------------------<  92  3.9   |  21  |  1.0    Ca    8.3<L>      28 Apr 2018 05:43    TPro  5.5<L>  /  Alb  3.3<L>  /  TBili  0.7  /  DBili  x   /  AST  23  /  ALT  63<H>  /  AlkPhos  95  04-28    PHYSICAL EXAM:  GEN: NAD  LUNGS: Clear to auscultation bilaterally   HEART: S1/S2 present. RRR.   ABD: Soft, non-tender, non-distended. Bowel sounds present  EXT: b/l LE edema

## 2018-04-29 LAB
ANION GAP SERPL CALC-SCNC: 13 MMOL/L — SIGNIFICANT CHANGE UP (ref 7–14)
BUN SERPL-MCNC: 22 MG/DL — HIGH (ref 10–20)
CALCIUM SERPL-MCNC: 8.5 MG/DL — SIGNIFICANT CHANGE UP (ref 8.5–10.1)
CHLORIDE SERPL-SCNC: 103 MMOL/L — SIGNIFICANT CHANGE UP (ref 98–110)
CO2 SERPL-SCNC: 25 MMOL/L — SIGNIFICANT CHANGE UP (ref 17–32)
CREAT SERPL-MCNC: 1.1 MG/DL — SIGNIFICANT CHANGE UP (ref 0.7–1.5)
GLUCOSE SERPL-MCNC: 89 MG/DL — SIGNIFICANT CHANGE UP (ref 70–99)
HCT VFR BLD CALC: 39.9 % — LOW (ref 42–52)
HGB BLD-MCNC: 13.3 G/DL — LOW (ref 14–18)
MCHC RBC-ENTMCNC: 32.4 PG — HIGH (ref 27–31)
MCHC RBC-ENTMCNC: 33.3 G/DL — SIGNIFICANT CHANGE UP (ref 32–37)
MCV RBC AUTO: 97.1 FL — HIGH (ref 80–94)
NRBC # BLD: 0 /100 WBCS — SIGNIFICANT CHANGE UP (ref 0–0)
PLATELET # BLD AUTO: 176 K/UL — SIGNIFICANT CHANGE UP (ref 130–400)
POTASSIUM SERPL-MCNC: 4 MMOL/L — SIGNIFICANT CHANGE UP (ref 3.5–5)
POTASSIUM SERPL-SCNC: 4 MMOL/L — SIGNIFICANT CHANGE UP (ref 3.5–5)
RBC # BLD: 4.11 M/UL — LOW (ref 4.7–6.1)
RBC # FLD: 14.6 % — HIGH (ref 11.5–14.5)
SODIUM SERPL-SCNC: 141 MMOL/L — SIGNIFICANT CHANGE UP (ref 135–146)
WBC # BLD: 4.41 K/UL — LOW (ref 4.8–10.8)
WBC # FLD AUTO: 4.41 K/UL — LOW (ref 4.8–10.8)

## 2018-04-29 RX ADMIN — Medication 162 MILLIGRAM(S): at 11:39

## 2018-04-29 RX ADMIN — LISINOPRIL 5 MILLIGRAM(S): 2.5 TABLET ORAL at 05:31

## 2018-04-29 RX ADMIN — ENOXAPARIN SODIUM 30 MILLIGRAM(S): 100 INJECTION SUBCUTANEOUS at 11:39

## 2018-04-29 RX ADMIN — Medication 40 MILLIGRAM(S): at 05:31

## 2018-04-29 RX ADMIN — Medication 50 MILLIGRAM(S): at 05:31

## 2018-04-29 RX ADMIN — ATORVASTATIN CALCIUM 40 MILLIGRAM(S): 80 TABLET, FILM COATED ORAL at 21:16

## 2018-04-29 RX ADMIN — SPIRONOLACTONE 25 MILLIGRAM(S): 25 TABLET, FILM COATED ORAL at 05:31

## 2018-04-29 NOTE — PROGRESS NOTE ADULT - SUBJECTIVE AND OBJECTIVE BOX
SAMANTHA CHARLES  67y Male    INTERVAL HPI/OVERNIGHT EVENTS:    T(F): 97.1 (04-29-18 @ 05:10), Max: 97.3 (04-28-18 @ 20:35)  HR: 83 (04-29-18 @ 05:10) (83 - 95)  BP: 125/74 (04-29-18 @ 05:10) (125/74 - 149/70)  RR: 18 (04-29-18 @ 05:10) (18 - 18)  SpO2: 100% (04-28-18 @ 22:34) (100% - 100%)      PHYSICAL EXAM:  GENERAL: NAD  HEAD:  Normocephalic  EYES:  conjunctiva and sclera clear  ENMT: Moist mucous membranes  NECK: Supple, No JVD  NERVOUS SYSTEM:  Alert & Oriented X3, Good concentration  CHEST/LUNG: Clear to percussion bilaterally with mild crackles at bases, lifevest in place  HEART: Regular rate and rhythm; No murmurs  ABDOMEN: Soft, Nontender, Nondistended; Bowel sounds present  EXTREMITIES:  pedal/ankle edema b/l  SKIN: No rashes     Consultant(s) Notes Reviewed:  [x ] YES  [ ] NO    MEDICATIONS  (STANDING):  aspirin  chewable 162 milliGRAM(s) Oral daily  atorvastatin 40 milliGRAM(s) Oral at bedtime  enoxaparin Injectable 30 milliGRAM(s) SubCutaneous daily  furosemide    Tablet 40 milliGRAM(s) Oral daily  lisinopril 5 milliGRAM(s) Oral daily  metoprolol succinate ER 50 milliGRAM(s) Oral daily  spironolactone 25 milliGRAM(s) Oral daily    MEDICATIONS  (PRN):    Telemetry reviewed    LABS:                        13.1   5.36  )-----------( 170      ( 28 Apr 2018 05:43 )             38.6     04-28    140  |  105  |  26<H>  ----------------------------<  92  3.9   |  21  |  1.0    Ca    8.3<L>      28 Apr 2018 05:43    TPro  5.5<L>  /  Alb  3.3<L>  /  TBili  0.7  /  DBili  x   /  AST  23  /  ALT  63<H>  /  AlkPhos  95  04-28              RADIOLOGY & ADDITIONAL TESTS:    Imaging or report Personally Reviewed:  [ x] YES  [ ] NO    < from: MR Angio Neck w/ IV Cont (04.28.18 @ 16:38) >  Impression:    Unremarkable MRA of the head and neck. No significant changes.    < end of copied text >  < from: MR Angio Neck w/ IV Cont (04.28.18 @ 16:38) >  Impression:    Unremarkable MRA of the head and neck. No significant changes.    < end of copied text >    < from: MR Head No Cont (04.28.18 @ 16:37) >  IMPRESSION:    Subacute right occipital lobe infarction and chronic left occipital lobe   infarction.    No acute infarction seen.    < end of copied text >      Care discussed with pt

## 2018-04-29 NOTE — DIETITIAN INITIAL EVALUATION ADULT. - ENERGY NEEDS
Calories: 8321-2049 kcals/day (MSJ x 1.3-1.5) to at least maintain CBW in pt w/ BMI of 18.1  Protein: 65-77 g/day (1.1-1.3 g/kg ABW)  Fluids: 1ml/kcal

## 2018-04-29 NOTE — PROGRESS NOTE ADULT - SUBJECTIVE AND OBJECTIVE BOX
Neurology follow up  Patient seen and examined at bedside ,denies any complaints , states that his vision has improved . No focal deficits on exam    PMH  Essential hypertension  Cerebrovascular accident (CVA) due to bilateral embolism of posterior cerebral arteries  Systolic congestive heart failure, unspecified chronicity  No pertinent past medical history          Pertinent Medical History/Social History/Family History/other:     Social History: []  Drug Use: []   Alcohol Use: []   Tobacco Use:  [] Other:      Cerebrovascular Risk Factors:[x] prior ischemic stroke  [] Afib  []CAD  [x]HTN  []DLD  []DM []PVD      Stroke Workup:    Cardiac Rhythm(Tele/Holter) & Duration: 6 days                  Events: none        home Medications:  aspirin 81 mg oral delayed release tablet: 1 tab(s) orally once a day (2018 15:57)  furosemide 20 mg oral tablet: 1 tab(s) orally once a day (2018 15:57)  lisinopril 5 mg oral tablet: 1 tab(s) orally once a day (2018 15:57)  spironolactone 25 mg oral tablet: 1 tab(s) orally once a day (2018 15:57)      MEDICATIONS  (STANDING):  aspirin  chewable 162 milliGRAM(s) Oral daily  atorvastatin 40 milliGRAM(s) Oral at bedtime  enoxaparin Injectable 30 milliGRAM(s) SubCutaneous daily  furosemide    Tablet 40 milliGRAM(s) Oral daily  lisinopril 5 milliGRAM(s) Oral daily  metoprolol succinate ER 50 milliGRAM(s) Oral daily  spironolactone 25 milliGRAM(s) Oral daily      Last 24 hour events: none    Physical Exam:    Vital Signs Last 24 Hrs  T(C): 36.3 (2018 20:35), Max: 36.7 (2018 05:27)  T(F): 97.3 (2018 20:35), Max: 98.1 (2018 05:27)  HR: 95 (2018 20:35) (85 - 95)  BP: 149/70 (2018 20:35) (128/80 - 149/70)  BP(mean): --  RR: 18 (2018 20:35) (18 - 18)  SpO2: 100% (2018 22:34) (100% - 100%)    NIHSS  LOC:  0   QUES: 0    COMM:  0   VF:  0   GAZE:  0   RUE:0     RLE:0     LUE: 0    LLE:  0   SENS: 0    LAN    SPEECH:  0   ATAXIA: 0    NEGLECT:  0   FACE:0    NIHSS on admission:  0        NIHSS yesterday: 0         NIHSS today:0      Current Functional status:  independent        NIHSS:  0        m-RS:0

## 2018-04-29 NOTE — PROGRESS NOTE ADULT - ASSESSMENT
66 yo man w/h/o HTN, DLD, CHF with 20% EF presented to ed for visual changes. Pt states he developed blurry vision and difficulty recognizing colors. He was admitted one week ago with vision changes and was found to have chronic left PCA and subacute right PCA territory ischemic strokes. He has no visual field deficit  on exam at this time. No acute events Overnight  .Inflammatory workup was negative.     Plan  -follow up pending  EEG to r/o seizure   -Continue ASA and lipitor  -Follow up pending results of  MRI/MRA  - PT OT Rehab  -Neuro attending note to follow

## 2018-04-29 NOTE — PROGRESS NOTE ADULT - ASSESSMENT
1. Visual disturbance in pt with acute/subacute b/l occipital infarcts diagnosed earlier this month   MRI of brain - no acute infarct  MRA of head and neck - unremarkable and unchanged  continue ASA and statin  telemetry  Neurology/stroke team f/u  EEG ordered    2. Dilated nonischemic cardiomyopathy/ Acute over chronic HFrEF  continue lasix at 40mg daily (increased on admission)  daily weights  continue Bblocker, ACE-I, spironolactone  pt wearing Lifevest  EP consult appreciated - plan for BiV-ICD after neuro workup is complete - per pt, probably Tuesday    3. Transaminitis - improved  continue statin    4. DVT prophylaxis

## 2018-04-30 RX ORDER — ASPIRIN/CALCIUM CARB/MAGNESIUM 324 MG
81 TABLET ORAL DAILY
Qty: 0 | Refills: 0 | Status: DISCONTINUED | OUTPATIENT
Start: 2018-04-30 | End: 2018-05-02

## 2018-04-30 RX ORDER — APIXABAN 2.5 MG/1
5 TABLET, FILM COATED ORAL EVERY 12 HOURS
Qty: 0 | Refills: 0 | Status: DISCONTINUED | OUTPATIENT
Start: 2018-04-30 | End: 2018-05-01

## 2018-04-30 RX ADMIN — ENOXAPARIN SODIUM 30 MILLIGRAM(S): 100 INJECTION SUBCUTANEOUS at 11:07

## 2018-04-30 RX ADMIN — Medication 162 MILLIGRAM(S): at 11:07

## 2018-04-30 RX ADMIN — SPIRONOLACTONE 25 MILLIGRAM(S): 25 TABLET, FILM COATED ORAL at 05:36

## 2018-04-30 RX ADMIN — LISINOPRIL 5 MILLIGRAM(S): 2.5 TABLET ORAL at 05:36

## 2018-04-30 RX ADMIN — Medication 50 MILLIGRAM(S): at 05:36

## 2018-04-30 RX ADMIN — ATORVASTATIN CALCIUM 40 MILLIGRAM(S): 80 TABLET, FILM COATED ORAL at 21:08

## 2018-04-30 RX ADMIN — Medication 40 MILLIGRAM(S): at 05:36

## 2018-04-30 NOTE — PROGRESS NOTE ADULT - ATTENDING COMMENTS
Patient is seen and examined independently on 4/30, I agree with resident note above and plan of care -edited and corrected where applicable- with addition:    . NO evidence of acute CVA at this point, s/p neuro f/u   . NO evidence of seizures on EEG  . Will f/u with EP re: AICD timing (inpatient vs outpatient)

## 2018-04-30 NOTE — PROGRESS NOTE ADULT - SUBJECTIVE AND OBJECTIVE BOX
SAMANTHA CHARLES    Chief Complaint: Acute onset vision changes    HPI:  68 yo man w/h/o HTN, DLD, CHF with 20% EF presents to the ED with visual changes since waking this morning. Pt states he developed blurry vision and difficulty recognizing colors. He was admitted one week ago with vision changes and was found to have chronic left PCA and subacute right PCA territory ischemic strokes. He has no visual field deficit today on exam. MRI brain showed only known chronic and subacute infarcts, no new infarction. MRA head and neck were unremarkable.       Relevant PMH:  [x] Prior ischemic stroke/TIA  [] Afib  []CAD  []HTN  []DLD  []DM []PVD []Obesity [] Sedintary lifestyle [x]CHF  []SOSA []Cancer Hx     Social History: [] Smoking []  Drug Use: []   Alcohol Use:   [] Other:      Possible Location of Stroke:  PCA territory  Possible Cause of Stroke:  Most likely cardioembolic because of low ejection fraction due to severe CHF.  Relavant Cervicocerebral Imaging:  Unremarkable CTA head and neck.  Unremarkable MRA head and neck.    EEG: normal       Relevant blood tests:  Direct LDL: 107 mg/dL [4 - 129] (04-16-18 @ 07:32)  Hemoglobin A1C, Whole Blood: 6.3 % (04-18-18 @ 09:30)  CRP 0.2  ESR 5  Phosphatidyl Serine Antibody IGA <20 IGG <10 IGM <25  Anti SSA,SSB <0.2  Anti nuclear titer: negative   P-anca, C-anca: negative    Relevant cardiac rhythm monitoring:  Telemetry monitoring for >96 hours and no events reported.  Relevant Cardiac Structure:(TTE/FREDERICK +/-):No intracardiac thrombus/vegetation/akynesia/EF:  TTE with contrast revealed severe global hypokinesia on apical views and EF<25%.     Relevant blood tests:  Direct LDL: 107 mg/dL [4 - 129] (04-16-18 @ 07:32)  Hemoglobin A1C, Whole Blood: 6.3 % (04-18-18 @ 09:30)    Home Medications:  aspirin 81 mg oral delayed release tablet: 1 tab(s) orally once a day (24 Apr 2018 15:57)  furosemide 20 mg oral tablet: 1 tab(s) orally once a day (24 Apr 2018 15:57)  lisinopril 5 mg oral tablet: 1 tab(s) orally once a day (24 Apr 2018 15:57)  spironolactone 25 mg oral tablet: 1 tab(s) orally once a day (24 Apr 2018 15:57)      MEDICATIONS  (STANDING):  apixaban 5 milliGRAM(s) Oral every 12 hours  aspirin  chewable 162 milliGRAM(s) Oral daily  atorvastatin 40 milliGRAM(s) Oral at bedtime  enoxaparin Injectable 30 milliGRAM(s) SubCutaneous daily  furosemide    Tablet 40 milliGRAM(s) Oral daily  lisinopril 5 milliGRAM(s) Oral daily  metoprolol succinate ER 50 milliGRAM(s) Oral daily  spironolactone 25 milliGRAM(s) Oral daily      PT/OT/Speech/Rehab/S&Swr: Outpatient VNS services    Exam:    Vital Signs Last 24 Hrs  T(C): 36.1 (30 Apr 2018 05:16), Max: 36.2 (29 Apr 2018 20:10)  T(F): 97 (30 Apr 2018 05:16), Max: 97.1 (29 Apr 2018 20:10)  HR: 81 (30 Apr 2018 05:16) (78 - 83)  BP: 122/65 (30 Apr 2018 05:16) (103/56 - 122/77)  BP(mean): --  RR: 18 (30 Apr 2018 05:16) (18 - 18)  SpO2: 99% (29 Apr 2018 20:35) (99% - 99%)    NIHSS      LOC:       1a:  0   1b(Questions):     0      1c(Instructions):     0        Best Gaze:0  Visual:0  Motor:                 RUE: 0    RLE:   0  LUE:   0  LLE:    0 FACE:   0  Limb Ataxia:0  Sensory:     0  Language:   0    Dysarthria:  0        Extinction and Inattention:0    NIHSS on admission:    0      NIHSS yesterday:   0       NIHSS today:  0           m-RS:0    Impression:  68 yo man w/h/o HTN, DLD, CHF with 20% EF presents to the ED with visual changes since waking this morning. Pt states he developed blurry vision and difficulty recognizing colors. He was admitted one week ago with vision changes and was found to have chronic left PCA and subacute right PCA territory ischemic strokes. He has no visual field deficit today on exam, EEG was normal. Inflammatory workup was negative. MRI brain showed only known chronic and subacute bilateral PCA territory infarcts, no new infarction. MRA head and neck were unremarkable. Most likely cardioembolic because of low ejection fraction due to severe CHF.    Suggestion:  -From a stroke prospective may switch ASA to Eliquis provided the blood pressure is and will be kept less than 140/80, and the patient doesn't need ASA from another prospective.  -Please make Lipitor 80 mg if not contraindicated.  -PT OT Rehab  -May follow with the stroke clinic in 2-4 weeks      Chadd Rios NP  [phone] SAMANTHA CHARLES    Chief Complaint: Acute onset vision changes    HPI:  68 yo man w/h/o HTN, DLD, CHF with 20% EF presents to the ED with visual changes since waking this morning. Pt states he developed blurry vision and difficulty recognizing colors. He was admitted one week ago with vision changes and was found to have chronic left PCA and subacute right PCA territory ischemic strokes. He has no visual field deficit today on exam. MRI brain showed only known chronic and subacute infarcts, no new infarction. MRA head and neck were unremarkable.       Relevant PMH:  [x] Prior ischemic stroke/TIA  [] Afib  []CAD  []HTN  []DLD  []DM []PVD []Obesity [] Sedintary lifestyle [x]CHF  []SOSA []Cancer Hx     Social History: [] Smoking []  Drug Use: []   Alcohol Use:   [] Other:      Possible Location of Stroke:  PCA territory  Possible Cause of Stroke:  Most likely cardioembolic because of low ejection fraction due to severe CHF.  Relavant Cervicocerebral Imaging:  Unremarkable CTA head and neck.  Unremarkable MRA head and neck.    EEG: normal       Relevant blood tests:  Direct LDL: 107 mg/dL [4 - 129] (04-16-18 @ 07:32)  Hemoglobin A1C, Whole Blood: 6.3 % (04-18-18 @ 09:30)  CRP 0.2  ESR 5  Phosphatidyl Serine Antibody IGA <20 IGG <10 IGM <25  Anti SSA,SSB <0.2  Anti nuclear titer: negative   P-anca, C-anca: negative    Relevant cardiac rhythm monitoring:  Telemetry monitoring for >96 hours and no events reported.  Relevant Cardiac Structure:(TTE/FREDERICK +/-):No intracardiac thrombus/vegetation/akynesia/EF:  TTE with contrast revealed severe global hypokinesia on apical views and EF<25%.     Relevant blood tests:  Direct LDL: 107 mg/dL [4 - 129] (04-16-18 @ 07:32)  Hemoglobin A1C, Whole Blood: 6.3 % (04-18-18 @ 09:30)    Home Medications:  aspirin 81 mg oral delayed release tablet: 1 tab(s) orally once a day (24 Apr 2018 15:57)  furosemide 20 mg oral tablet: 1 tab(s) orally once a day (24 Apr 2018 15:57)  lisinopril 5 mg oral tablet: 1 tab(s) orally once a day (24 Apr 2018 15:57)  spironolactone 25 mg oral tablet: 1 tab(s) orally once a day (24 Apr 2018 15:57)      MEDICATIONS  (STANDING):  apixaban 5 milliGRAM(s) Oral every 12 hours  aspirin  chewable 162 milliGRAM(s) Oral daily  atorvastatin 40 milliGRAM(s) Oral at bedtime  enoxaparin Injectable 30 milliGRAM(s) SubCutaneous daily  furosemide    Tablet 40 milliGRAM(s) Oral daily  lisinopril 5 milliGRAM(s) Oral daily  metoprolol succinate ER 50 milliGRAM(s) Oral daily  spironolactone 25 milliGRAM(s) Oral daily      PT/OT/Speech/Rehab/S&Swr: Outpatient VNS services    Exam:    Vital Signs Last 24 Hrs  T(C): 36.1 (30 Apr 2018 05:16), Max: 36.2 (29 Apr 2018 20:10)  T(F): 97 (30 Apr 2018 05:16), Max: 97.1 (29 Apr 2018 20:10)  HR: 81 (30 Apr 2018 05:16) (78 - 83)  BP: 122/65 (30 Apr 2018 05:16) (103/56 - 122/77)  BP(mean): --  RR: 18 (30 Apr 2018 05:16) (18 - 18)  SpO2: 99% (29 Apr 2018 20:35) (99% - 99%)    NIHSS      LOC:       1a:  0   1b(Questions):     0      1c(Instructions):     0        Best Gaze:0  Visual:0  Motor:                 RUE: 0    RLE:   0  LUE:   0  LLE:    0 FACE:   0  Limb Ataxia:0  Sensory:     0  Language:   0    Dysarthria:  0        Extinction and Inattention:0    NIHSS on admission:    0      NIHSS yesterday:   0       NIHSS today:  0           m-RS:0    Impression:  68 yo man w/h/o HTN, DLD, CHF with 20% EF presents to the ED with visual changes since waking this morning. Pt states he developed blurry vision and difficulty recognizing colors. He was admitted one week ago with vision changes and was found to have chronic left PCA and subacute right PCA territory ischemic strokes. He has no visual field deficit today on exam, EEG was normal. Inflammatory workup was negative. His CTA of head and neck failed to show significant plaques. MRI brain showed only known chronic and subacute bilateral PCA territory infarcts, no new infarction. MRA head and neck were unremarkable. Most likely cardioembolic because of low ejection fraction due to severe CHF.    Suggestion:  -From the stroke prospective may switch ASA to Eliquis provided the blood pressure will be kept less than 140/80.  -Please make Lipitor 80 mg if not contraindicated.  -PT OT Rehab  -Needs to follow with the stroke clinic in 2-4 weeks  -Needs to follow with Neuro-ophthomology clinic within few weeks.      - We spent more than 45 minutes to review the chart, gather necessary information, evaluate the patient and discuss the case with primary team and counseling the patient to his satisfaction.  Chadd Rios NP Neurovascular/Stroke  [phone]    I, Alejandra Blanco, examined the patient and discussed this case, including plan of management with the Stroke team.

## 2018-04-30 NOTE — PROGRESS NOTE ADULT - SUBJECTIVE AND OBJECTIVE BOX
SUBJECTIVE:Patient is a 67y old  Male who presents with a chief complaint of rule out acute stroke (26 Apr 2018 11:41)  . Today is hospital day 6d. Patient seen and examined. NAD. No acute events overnight. MRA and MRI noted. Biventricular ICD placement tomorrow.     PAST MEDICAL & SURGICAL HISTORY  PAST MEDICAL & SURGICAL HISTORY:  Essential hypertension  Cerebrovascular accident (CVA) due to bilateral embolism of posterior cerebral arteries  Systolic congestive heart failure, unspecified chronicity  No significant past surgical history    SOCIAL HISTORY:    ALLERGIES:  penicillin (Rash)    MEDICATIONS:  STANDING MEDICATIONS  aspirin  chewable 162 milliGRAM(s) Oral daily  atorvastatin 40 milliGRAM(s) Oral at bedtime  enoxaparin Injectable 30 milliGRAM(s) SubCutaneous daily  furosemide    Tablet 40 milliGRAM(s) Oral daily  lisinopril 5 milliGRAM(s) Oral daily  metoprolol succinate ER 50 milliGRAM(s) Oral daily  spironolactone 25 milliGRAM(s) Oral daily    PRN MEDICATIONS    VITALS:   T(F): 97  HR: 81  BP: 122/65  RR: 18  SpO2: 99%    LABS:                        13.3   4.41  )-----------( 176      ( 29 Apr 2018 08:06 )             39.9     04-29    141  |  103  |  22<H>  ----------------------------<  89  4.0   |  25  |  1.1    Ca    8.5      29 Apr 2018 08:06    PHYSICAL EXAM:  GEN: NAD  LUNGS: Clear to auscultation bilaterally   HEART: S1/S2 present. RRR.   ABD: Soft, non-tender, non-distended. Bowel sounds present  EXT: b/l LE edema pedal and ankles.

## 2018-04-30 NOTE — PROGRESS NOTE ADULT - ASSESSMENT
66 yo M with PMH of HTN, b/l occipital stroke-recent d/c from hospital on 4/20, sCHF(EF 25%) on life west presented with complaint of blurry vision in both eyes which started the morning of presentation when he woke up. In ED, Stroke Code was called. NIHSS 0. No tPA was given. Patient was admitted to telemetry as per neurology for further monitoring.    IMPRESSION:  # sCHF: stable  Left pleural effusion on CT  Continue with Metoprolol, Spironolactone, Lasix and Lisinopril  Continue with Lifevest for now - he is wearing it today  EP recommendations noted and appreciated: ICD implant once neurologic workup is completed.     # Chronic left PCA and Subacute right PCA territory ischemic strokes - stable  Neurology is following: NIHSS: 0 on admission  Continue  mg PO QD and Lipitor 80mg PO QHS  CT Brain Stroke Protocol (04.24.18): Since CT head 4/15/2018. No acute hemorrhage, mass effect or midline shift. Stable chronic left occipital infarction.  CT Angio Head w/ IV Cont (04.24.18): Essentially unremarkable CT angiogram of the neck and head. No significant vascular stenosis or occlusion.  MRI/ MRA: unrekable  Transthoracic Echocardiogram (04.20.18): Contrast Echo with Lumason shows severe global hypokinesia on apical views and EF<25%. No thrombus seen.  rEEG normal    # Transaminitis with elevated INR: possibly 2/2 Lipitor  Continue Lipitor  RUQ Sonogram: Unremarkable. Gallbladder polyp noted. Recommend follow up in 12 months.   Repeat BMP within 1 week of discharge    # DM:  Hemoglobin A1c (4/18/18): 6.3  Monitor FS  ISS if needed  Continue carb consistent diet    # HTN: Continue with Metoprolol, Spironolactone, and Lisinopril    # DVT Px: Lovenox    # Code status

## 2018-05-01 ENCOUNTER — APPOINTMENT (OUTPATIENT)
Dept: CARDIOLOGY | Facility: CLINIC | Age: 68
End: 2018-05-01

## 2018-05-01 LAB
ANION GAP SERPL CALC-SCNC: 14 MMOL/L — SIGNIFICANT CHANGE UP (ref 7–14)
BUN SERPL-MCNC: 20 MG/DL — SIGNIFICANT CHANGE UP (ref 10–20)
CALCIUM SERPL-MCNC: 8.2 MG/DL — LOW (ref 8.5–10.1)
CHLORIDE SERPL-SCNC: 105 MMOL/L — SIGNIFICANT CHANGE UP (ref 98–110)
CO2 SERPL-SCNC: 22 MMOL/L — SIGNIFICANT CHANGE UP (ref 17–32)
CREAT SERPL-MCNC: 1 MG/DL — SIGNIFICANT CHANGE UP (ref 0.7–1.5)
GLUCOSE SERPL-MCNC: 128 MG/DL — HIGH (ref 70–99)
HCT VFR BLD CALC: 38.7 % — LOW (ref 42–52)
HGB BLD-MCNC: 12.8 G/DL — LOW (ref 14–18)
MCHC RBC-ENTMCNC: 32.1 PG — HIGH (ref 27–31)
MCHC RBC-ENTMCNC: 33.1 G/DL — SIGNIFICANT CHANGE UP (ref 32–37)
MCV RBC AUTO: 97 FL — HIGH (ref 80–94)
NRBC # BLD: 0 /100 WBCS — SIGNIFICANT CHANGE UP (ref 0–0)
PLATELET # BLD AUTO: 181 K/UL — SIGNIFICANT CHANGE UP (ref 130–400)
POTASSIUM SERPL-MCNC: 4.2 MMOL/L — SIGNIFICANT CHANGE UP (ref 3.5–5)
POTASSIUM SERPL-SCNC: 4.2 MMOL/L — SIGNIFICANT CHANGE UP (ref 3.5–5)
RBC # BLD: 3.99 M/UL — LOW (ref 4.7–6.1)
RBC # FLD: 14.4 % — SIGNIFICANT CHANGE UP (ref 11.5–14.5)
SODIUM SERPL-SCNC: 141 MMOL/L — SIGNIFICANT CHANGE UP (ref 135–146)
WBC # BLD: 3.99 K/UL — LOW (ref 4.8–10.8)
WBC # FLD AUTO: 3.99 K/UL — LOW (ref 4.8–10.8)

## 2018-05-01 RX ADMIN — Medication 50 MILLIGRAM(S): at 05:45

## 2018-05-01 RX ADMIN — LISINOPRIL 5 MILLIGRAM(S): 2.5 TABLET ORAL at 05:45

## 2018-05-01 RX ADMIN — APIXABAN 5 MILLIGRAM(S): 2.5 TABLET, FILM COATED ORAL at 10:15

## 2018-05-01 RX ADMIN — SPIRONOLACTONE 25 MILLIGRAM(S): 25 TABLET, FILM COATED ORAL at 05:46

## 2018-05-01 RX ADMIN — ATORVASTATIN CALCIUM 40 MILLIGRAM(S): 80 TABLET, FILM COATED ORAL at 21:37

## 2018-05-01 RX ADMIN — Medication 81 MILLIGRAM(S): at 12:34

## 2018-05-01 RX ADMIN — Medication 40 MILLIGRAM(S): at 05:45

## 2018-05-01 NOTE — PROGRESS NOTE ADULT - ASSESSMENT
1.	Visual disturbances/ subacute ischemic findings/ No acute findings on MRA/MRI. Likely residual clinical symptoms from previous events.   2.	No evidence of seizure activity on EEG.  3.	Dilated (Non-ischemic) CMP, with LifeVest currently inquiring re: timing of AICD.  4.	Non-adherence with medical therapy: Patient counseled in person regarding current medical status. He is at high risk for fatal arrythmia and sudden death. He is not fully compliant with instruction (refusing to put his lifvest at times and reluctant some medications). he is A&O x3 and able to understand consequences of his actions. 1.	Visual disturbances/ subacute ischemic findings/ No acute findings on MRA/MRI. Likely residual clinical symptoms from previous events (subacute CVA).   2.	No evidence of seizure activity on EEG.  3.	Dilated (Non-ischemic) CMP, with LifeVest currently inquiring re: timing of AICD.  4.	Non-adherence with medical therapy: Patient counseled in person regarding current medical status. He is at high risk for fatal arrythmia and sudden death. He is not fully compliant with instruction (refusing to put his lifvest at times and reluctant some medications). he is A&O x3 and able to understand consequences of his actions.   5.	Transaminitis ,was attributed to high-intensity statins.       ·	PLAN  ·	Keep Atorvastatin at current dose (40 mg) due to recovering transaminitis; might consider increasing dose in future if liver function permits  ·	EP to inform medical team regarding timing of AICD (inpatient during this procedure Vs outpatient)  ·	Keep statins/AP Tx/ AC Tx (eliquis)  ·	D/C planning if no further EP w/u as inpatient  ·	D/W PGY1  ·	Total time for counseling/ d/c planning/ resident supervision: 35 mins.

## 2018-05-01 NOTE — PROGRESS NOTE ADULT - SUBJECTIVE AND OBJECTIVE BOX
SUBJECTIVE:Patient is a 67y old  Male who presents with a chief complaint of rule out acute stroke (26 Apr 2018 11:41)  . Today is hospital day 7d. Patient seen and examined. NAD. Non-compliant at times with life vest. He is wearing it today. Also, refused Eliquis earlier in morning. Importance of medication explained. Patient then took medication. He is aware of plan.  Pending EP scheduling for ICD placement.     PAST MEDICAL & SURGICAL HISTORY  PAST MEDICAL & SURGICAL HISTORY:  Essential hypertension  Cerebrovascular accident (CVA) due to bilateral embolism of posterior cerebral arteries  Systolic congestive heart failure, unspecified chronicity  No significant past surgical history    ALLERGIES:  penicillin (Rash)    MEDICATIONS:  STANDING MEDICATIONS  apixaban 5 milliGRAM(s) Oral every 12 hours  aspirin  chewable 81 milliGRAM(s) Oral daily  atorvastatin 40 milliGRAM(s) Oral at bedtime  furosemide    Tablet 40 milliGRAM(s) Oral daily  lisinopril 5 milliGRAM(s) Oral daily  metoprolol succinate ER 50 milliGRAM(s) Oral daily  spironolactone 25 milliGRAM(s) Oral daily    PRN MEDICATIONS    VITALS:   T(F): 95.5  HR: 87  BP: 127/87  RR: 18  SpO2: 99%    LABS:                        12.8   3.99  )-----------( 181      ( 01 May 2018 08:49 )             38.7     PHYSICAL EXAM:  GEN: NAD  LUNGS: Clear to auscultation bilaterally   HEART: S1/S2 present. RRR.   ABD: Soft, non-tender, non-distended. Bowel sounds present  EXT: no b/l LE edema

## 2018-05-01 NOTE — PROGRESS NOTE ADULT - SUBJECTIVE AND OBJECTIVE BOX
SAMANTHA CHARLES  67y, Male  Allergy: penicillin (Rash)    Hospital Day: 7d    Patient seen and examined earlier today.     PMH/PSH:  PAST MEDICAL & SURGICAL HISTORY:  Essential hypertension  Cerebrovascular accident (CVA) due to bilateral embolism of posterior cerebral arteries  Systolic congestive heart failure, unspecified chronicity  No significant past surgical history      LAST 24-Hr EVENTS:    VITALS:  T(F): 95.5 (05-01-18 @ 05:38), Max: 96.8 (04-30-18 @ 19:24)  HR: 87 (05-01-18 @ 05:38)  BP: 127/87 (05-01-18 @ 05:38) (118/77 - 135/86)  RR: 18 (05-01-18 @ 05:38)  SpO2: 99% (05-01-18 @ 00:47)      RADIOLOGY & ADDITIONAL TESTS:  < from: Transthoracic Echocardiogram (04.20.18 @ 11:03) >   1. Contrast Echo with Lumason shows severe global hypokinesia on apical   views and EF<25%. No thrombus seen.   2. Endocardial visualization was enhanced with intravenous echo contrast.    < from: MR Head No Cont (04.28.18 @ 16:37) >  Subacute right occipital lobe infarction and chronic left occipital lobe   infarction.    No acute infarction seen.    < from: MR Angio Neck w/ IV Cont (04.28.18 @ 16:38) >  Unremarkable MRA of the head and neck. No significant changes.      MEDICATIONS:  MEDICATIONS  (STANDING):  apixaban 5 milliGRAM(s) Oral every 12 hours  aspirin  chewable 81 milliGRAM(s) Oral daily  atorvastatin 40 milliGRAM(s) Oral at bedtime  furosemide    Tablet 40 milliGRAM(s) Oral daily  lisinopril 5 milliGRAM(s) Oral daily  metoprolol succinate ER 50 milliGRAM(s) Oral daily  spironolactone 25 milliGRAM(s) Oral daily            PHYSICAL EXAM:  GENERAL: A&O x3,  in NAD/P,   NECK: No Swelling, No JVD.   CHEST/LUNG: Good air entry, No crackles, No rhonchi, No wheezing.  HEART: RRR, No murmurs.  ABDOMEN: Soft, Nontender, Nondistended; Bowel sounds present.  EXTREMITIES:  Peripheral Pulses Present , No clubbing, No edema SAMANTHA CHARLES  67y, Male  Allergy: penicillin (Rash)    Hospital Day: 7d    Patient seen and examined earlier today.     PMH/PSH:  PAST MEDICAL & SURGICAL HISTORY:  Essential hypertension  Cerebrovascular accident (CVA) due to bilateral embolism of posterior cerebral arteries  Systolic congestive heart failure, unspecified chronicity  No significant past surgical history      LAST 24-Hr EVENTS:  No complaints  Awaiting EP input re: AICD scheduling    VITALS:  T(F): 95.5 (05-01-18 @ 05:38), Max: 96.8 (04-30-18 @ 19:24)  HR: 87 (05-01-18 @ 05:38)  BP: 127/87 (05-01-18 @ 05:38) (118/77 - 135/86)  RR: 18 (05-01-18 @ 05:38)  SpO2: 99% (05-01-18 @ 00:47)      RADIOLOGY & ADDITIONAL TESTS:  < from: Transthoracic Echocardiogram (04.20.18 @ 11:03) >   1. Contrast Echo with Lumason shows severe global hypokinesia on apical   views and EF<25%. No thrombus seen.   2. Endocardial visualization was enhanced with intravenous echo contrast.    < from: MR Head No Cont (04.28.18 @ 16:37) >  Subacute right occipital lobe infarction and chronic left occipital lobe   infarction.    No acute infarction seen.    < from: MR Angio Neck w/ IV Cont (04.28.18 @ 16:38) >  Unremarkable MRA of the head and neck. No significant changes.      MEDICATIONS:  MEDICATIONS  (STANDING):  apixaban 5 milliGRAM(s) Oral every 12 hours  aspirin  chewable 81 milliGRAM(s) Oral daily  atorvastatin 40 milliGRAM(s) Oral at bedtime  furosemide    Tablet 40 milliGRAM(s) Oral daily  lisinopril 5 milliGRAM(s) Oral daily  metoprolol succinate ER 50 milliGRAM(s) Oral daily  spironolactone 25 milliGRAM(s) Oral daily            PHYSICAL EXAM:  GENERAL: A&O x3,  in NAD/P, Frail  NECK: No Swelling, No JVD.   CHEST/LUNG: Good air entry, No crackles, No rhonchi, No wheezing.  HEART: RRR, No murmurs.  ABDOMEN: Soft, Nontender, Nondistended; Bowel sounds present.  EXTREMITIES:  Peripheral Pulses Present , No clubbing, No edema

## 2018-05-01 NOTE — PROGRESS NOTE ADULT - ASSESSMENT
66 yo M with PMH of HTN, b/l occipital stroke-recent d/c from hospital on 4/20, sCHF(EF 25%) on life west presented with complaint of blurry vision in both eyes which started the morning of presentation when he woke up. In ED, Stroke Code was called. NIHSS 0. No tPA was given. Patient was admitted to telemetry as per neurology for further monitoring.    IMPRESSION:    # sCHF: stable  Left pleural effusion on CT  Continue with Metoprolol, Spironolactone, Lasix and Lisinopril  Continue with Lifevest for now - he is wearing it today, however non-compliant at times.   EP recommendations noted and appreciated: ICD implant to be done on this admission. Spoke with EP today. They will round later today and call me back to let me know if he is on the schedule for tomorrow.     # Chronic left PCA and Subacute right PCA territory ischemic strokes - stable  Neurology is following: NIHSS: 0 on admission  Continue  mg PO QD and Atorvastatin 40mg PO QD (transaminitis on this admission will f/u with PMD and Neuro as outpatient)  - CT Brain Stroke Protocol: No acute hemorrhage, mass effect or midline shift. Stable chronic left occipital infarction.  - CT Angio Head w/ IV Cont: Essentially unremarkable CT angiogram of the neck and head. No significant vascular stenosis or occlusion.  - MRI/ MRA: unremarkable  - TTE: Contrast Echo with Lumason shows severe global hypokinesia on apical views and EF<25%. No thrombus seen.  - rEEG normal    # Transaminitis with elevated INR: possibly 2/2 Statin  Continue Statin  - RUQ Sonogram: Unremarkable. Gallbladder polyp noted. Recommend follow up in 12 months.   Repeat BMP within 1 week of discharge    # DM:  Hemoglobin A1c (4/18/18): 6.3  Monitor FS  ISS if needed  Continue carb consistent diet    # HTN: Continue with Metoprolol, Spironolactone, and Lisinopril    # DVT Px: Lovenox    # Code status

## 2018-05-01 NOTE — PROGRESS NOTE ADULT - PROVIDER SPECIALTY LIST ADULT
Cardiology
Hospitalist
Internal Medicine
Neurology
Hospitalist

## 2018-05-02 VITALS
RESPIRATION RATE: 18 BRPM | SYSTOLIC BLOOD PRESSURE: 127 MMHG | DIASTOLIC BLOOD PRESSURE: 63 MMHG | TEMPERATURE: 96 F | HEART RATE: 69 BPM

## 2018-05-02 LAB
ANION GAP SERPL CALC-SCNC: 10 MMOL/L — SIGNIFICANT CHANGE UP (ref 7–14)
APTT BLD: 28.7 SEC — SIGNIFICANT CHANGE UP (ref 27–39.2)
BLD GP AB SCN SERPL QL: SIGNIFICANT CHANGE UP
BUN SERPL-MCNC: 22 MG/DL — HIGH (ref 10–20)
CALCIUM SERPL-MCNC: 8 MG/DL — LOW (ref 8.5–10.1)
CHLORIDE SERPL-SCNC: 107 MMOL/L — SIGNIFICANT CHANGE UP (ref 98–110)
CO2 SERPL-SCNC: 26 MMOL/L — SIGNIFICANT CHANGE UP (ref 17–32)
CREAT SERPL-MCNC: 1.1 MG/DL — SIGNIFICANT CHANGE UP (ref 0.7–1.5)
GLUCOSE SERPL-MCNC: 95 MG/DL — SIGNIFICANT CHANGE UP (ref 70–99)
HCT VFR BLD CALC: 39.1 % — LOW (ref 42–52)
HGB BLD-MCNC: 12.7 G/DL — LOW (ref 14–18)
INR BLD: 1.36 RATIO — HIGH (ref 0.65–1.3)
MCHC RBC-ENTMCNC: 31.9 PG — HIGH (ref 27–31)
MCHC RBC-ENTMCNC: 32.5 G/DL — SIGNIFICANT CHANGE UP (ref 32–37)
MCV RBC AUTO: 98.2 FL — HIGH (ref 80–94)
NRBC # BLD: 0 /100 WBCS — SIGNIFICANT CHANGE UP (ref 0–0)
PLATELET # BLD AUTO: 175 K/UL — SIGNIFICANT CHANGE UP (ref 130–400)
POTASSIUM SERPL-MCNC: 4.3 MMOL/L — SIGNIFICANT CHANGE UP (ref 3.5–5)
POTASSIUM SERPL-SCNC: 4.3 MMOL/L — SIGNIFICANT CHANGE UP (ref 3.5–5)
PROTHROM AB SERPL-ACNC: 14.8 SEC — HIGH (ref 9.95–12.87)
RBC # BLD: 3.98 M/UL — LOW (ref 4.7–6.1)
RBC # FLD: 14.3 % — SIGNIFICANT CHANGE UP (ref 11.5–14.5)
SODIUM SERPL-SCNC: 143 MMOL/L — SIGNIFICANT CHANGE UP (ref 135–146)
TYPE + AB SCN PNL BLD: SIGNIFICANT CHANGE UP
WBC # BLD: 3.87 K/UL — LOW (ref 4.8–10.8)
WBC # FLD AUTO: 3.87 K/UL — LOW (ref 4.8–10.8)

## 2018-05-02 RX ORDER — APIXABAN 2.5 MG/1
1 TABLET, FILM COATED ORAL
Qty: 60 | Refills: 0 | OUTPATIENT
Start: 2018-05-02 | End: 2018-05-31

## 2018-05-02 RX ORDER — METOPROLOL TARTRATE 50 MG
1 TABLET ORAL
Qty: 30 | Refills: 0 | OUTPATIENT
Start: 2018-05-02 | End: 2018-05-31

## 2018-05-02 RX ORDER — APIXABAN 2.5 MG/1
5 TABLET, FILM COATED ORAL EVERY 12 HOURS
Qty: 0 | Refills: 0 | Status: DISCONTINUED | OUTPATIENT
Start: 2018-05-02 | End: 2018-05-02

## 2018-05-02 RX ORDER — FUROSEMIDE 40 MG
1 TABLET ORAL
Qty: 30 | Refills: 0 | OUTPATIENT
Start: 2018-05-02 | End: 2018-05-31

## 2018-05-02 RX ORDER — APIXABAN 2.5 MG/1
2.5 TABLET, FILM COATED ORAL EVERY 12 HOURS
Qty: 0 | Refills: 0 | Status: DISCONTINUED | OUTPATIENT
Start: 2018-05-02 | End: 2018-05-02

## 2018-05-02 RX ADMIN — APIXABAN 5 MILLIGRAM(S): 2.5 TABLET, FILM COATED ORAL at 19:00

## 2018-05-02 RX ADMIN — Medication 50 MILLIGRAM(S): at 05:38

## 2018-05-02 RX ADMIN — Medication 81 MILLIGRAM(S): at 11:11

## 2018-05-02 RX ADMIN — SPIRONOLACTONE 25 MILLIGRAM(S): 25 TABLET, FILM COATED ORAL at 05:38

## 2018-05-02 RX ADMIN — Medication 40 MILLIGRAM(S): at 05:38

## 2018-05-02 NOTE — CHART NOTE - NSCHARTNOTEFT_GEN_A_CORE
PGY1 on call    -Received a phone call from EP regarding Mr Villavicencio.  Unable to do procedure due to scheduling issues in the next couple days.     -Advised to discharge patient with Life vest and plan for procedure as outpatient.

## 2018-05-07 ENCOUNTER — APPOINTMENT (OUTPATIENT)
Dept: CARDIOLOGY | Facility: CLINIC | Age: 68
End: 2018-05-07

## 2018-05-07 VITALS
HEART RATE: 76 BPM | HEIGHT: 71 IN | DIASTOLIC BLOOD PRESSURE: 62 MMHG | WEIGHT: 133 LBS | BODY MASS INDEX: 18.62 KG/M2 | SYSTOLIC BLOOD PRESSURE: 100 MMHG

## 2018-05-07 DIAGNOSIS — Z86.79 PERSONAL HISTORY OF OTHER DISEASES OF THE CIRCULATORY SYSTEM: ICD-10-CM

## 2018-05-07 DIAGNOSIS — I42.8 OTHER CARDIOMYOPATHIES: ICD-10-CM

## 2018-05-07 DIAGNOSIS — R55 SYNCOPE AND COLLAPSE: ICD-10-CM

## 2018-05-07 RX ORDER — ATORVASTATIN CALCIUM 40 MG/1
40 TABLET, FILM COATED ORAL
Qty: 30 | Refills: 3 | Status: ACTIVE | COMMUNITY

## 2018-05-07 RX ORDER — APIXABAN 5 MG/1
5 TABLET, FILM COATED ORAL TWICE DAILY
Refills: 0 | Status: ACTIVE | COMMUNITY

## 2018-05-07 RX ORDER — SPIRONOLACTONE 25 MG/1
25 TABLET ORAL DAILY
Qty: 30 | Refills: 6 | Status: ACTIVE | COMMUNITY

## 2018-05-08 DIAGNOSIS — T46.6X5A ADVERSE EFFECT OF ANTIHYPERLIPIDEMIC AND ANTIARTERIOSCLEROTIC DRUGS, INITIAL ENCOUNTER: ICD-10-CM

## 2018-05-08 DIAGNOSIS — Z79.82 LONG TERM (CURRENT) USE OF ASPIRIN: ICD-10-CM

## 2018-05-08 DIAGNOSIS — I42.0 DILATED CARDIOMYOPATHY: ICD-10-CM

## 2018-05-08 DIAGNOSIS — H53.8 OTHER VISUAL DISTURBANCES: ICD-10-CM

## 2018-05-08 DIAGNOSIS — I11.0 HYPERTENSIVE HEART DISEASE WITH HEART FAILURE: ICD-10-CM

## 2018-05-08 DIAGNOSIS — E78.5 HYPERLIPIDEMIA, UNSPECIFIED: ICD-10-CM

## 2018-05-08 DIAGNOSIS — Z88.0 ALLERGY STATUS TO PENICILLIN: ICD-10-CM

## 2018-05-08 DIAGNOSIS — Z86.73 PERSONAL HISTORY OF TRANSIENT ISCHEMIC ATTACK (TIA), AND CEREBRAL INFARCTION WITHOUT RESIDUAL DEFICITS: ICD-10-CM

## 2018-05-08 DIAGNOSIS — H53.9 UNSPECIFIED VISUAL DISTURBANCE: ICD-10-CM

## 2018-05-08 DIAGNOSIS — E11.9 TYPE 2 DIABETES MELLITUS WITHOUT COMPLICATIONS: ICD-10-CM

## 2018-05-08 DIAGNOSIS — R74.0 NONSPECIFIC ELEVATION OF LEVELS OF TRANSAMINASE AND LACTIC ACID DEHYDROGENASE [LDH]: ICD-10-CM

## 2018-05-08 DIAGNOSIS — I50.22 CHRONIC SYSTOLIC (CONGESTIVE) HEART FAILURE: ICD-10-CM

## 2018-05-08 DIAGNOSIS — R74.8 ABNORMAL LEVELS OF OTHER SERUM ENZYMES: ICD-10-CM

## 2018-05-11 DIAGNOSIS — I69.398 OTHER SEQUELAE OF CEREBRAL INFARCTION: ICD-10-CM

## 2018-06-01 ENCOUNTER — RX RENEWAL (OUTPATIENT)
Age: 68
End: 2018-06-01

## 2018-06-01 RX ORDER — FUROSEMIDE 40 MG/1
40 TABLET ORAL
Qty: 30 | Refills: 6 | Status: ACTIVE | COMMUNITY
Start: 1900-01-01 | End: 1900-01-01

## 2018-06-13 ENCOUNTER — APPOINTMENT (OUTPATIENT)
Dept: CARDIOLOGY | Facility: CLINIC | Age: 68
End: 2018-06-13

## 2018-06-18 ENCOUNTER — APPOINTMENT (OUTPATIENT)
Dept: CARDIOLOGY | Facility: CLINIC | Age: 68
End: 2018-06-18

## 2018-07-30 ENCOUNTER — OUTPATIENT (OUTPATIENT)
Dept: OUTPATIENT SERVICES | Facility: HOSPITAL | Age: 68
LOS: 1 days | Discharge: HOME | End: 2018-07-30

## 2018-07-30 DIAGNOSIS — R79.9 ABNORMAL FINDING OF BLOOD CHEMISTRY, UNSPECIFIED: ICD-10-CM

## 2018-07-31 PROBLEM — I10 ESSENTIAL (PRIMARY) HYPERTENSION: Chronic | Status: ACTIVE | Noted: 2018-04-24

## 2018-07-31 PROBLEM — I50.20 UNSPECIFIED SYSTOLIC (CONGESTIVE) HEART FAILURE: Chronic | Status: ACTIVE | Noted: 2018-04-15

## 2018-07-31 PROBLEM — I63.433: Chronic | Status: ACTIVE | Noted: 2018-04-24

## 2018-08-07 ENCOUNTER — OUTPATIENT (OUTPATIENT)
Dept: OUTPATIENT SERVICES | Facility: HOSPITAL | Age: 68
LOS: 1 days | Discharge: HOME | End: 2018-08-07

## 2018-08-08 DIAGNOSIS — N39.0 URINARY TRACT INFECTION, SITE NOT SPECIFIED: ICD-10-CM

## 2018-08-10 ENCOUNTER — OUTPATIENT (OUTPATIENT)
Dept: OUTPATIENT SERVICES | Facility: HOSPITAL | Age: 68
LOS: 1 days | Discharge: HOME | End: 2018-08-10

## 2018-08-10 ENCOUNTER — EMERGENCY (EMERGENCY)
Facility: HOSPITAL | Age: 68
LOS: 0 days | Discharge: NURSING, REHAB OR LONG-TERM CARE | End: 2018-08-11
Attending: EMERGENCY MEDICINE | Admitting: EMERGENCY MEDICINE

## 2018-08-10 VITALS
HEART RATE: 94 BPM | RESPIRATION RATE: 17 BRPM | SYSTOLIC BLOOD PRESSURE: 109 MMHG | OXYGEN SATURATION: 100 % | DIASTOLIC BLOOD PRESSURE: 63 MMHG

## 2018-08-10 VITALS
DIASTOLIC BLOOD PRESSURE: 68 MMHG | HEART RATE: 95 BPM | RESPIRATION RATE: 18 BRPM | OXYGEN SATURATION: 100 % | SYSTOLIC BLOOD PRESSURE: 112 MMHG | TEMPERATURE: 97 F

## 2018-08-10 DIAGNOSIS — I11.0 HYPERTENSIVE HEART DISEASE WITH HEART FAILURE: ICD-10-CM

## 2018-08-10 DIAGNOSIS — I50.20 UNSPECIFIED SYSTOLIC (CONGESTIVE) HEART FAILURE: ICD-10-CM

## 2018-08-10 DIAGNOSIS — Z88.0 ALLERGY STATUS TO PENICILLIN: ICD-10-CM

## 2018-08-10 DIAGNOSIS — R45.6 VIOLENT BEHAVIOR: ICD-10-CM

## 2018-08-10 DIAGNOSIS — Z79.82 LONG TERM (CURRENT) USE OF ASPIRIN: ICD-10-CM

## 2018-08-10 DIAGNOSIS — Z79.899 OTHER LONG TERM (CURRENT) DRUG THERAPY: ICD-10-CM

## 2018-08-10 DIAGNOSIS — Z86.73 PERSONAL HISTORY OF TRANSIENT ISCHEMIC ATTACK (TIA), AND CEREBRAL INFARCTION WITHOUT RESIDUAL DEFICITS: ICD-10-CM

## 2018-08-10 DIAGNOSIS — R26.9 UNSPECIFIED ABNORMALITIES OF GAIT AND MOBILITY: ICD-10-CM
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
## 2018-08-10 NOTE — ED ADULT NURSE NOTE - NSIMPLEMENTINTERV_GEN_ALL_ED
Implemented All Fall Risk Interventions:  [place] to call system. Call bell, personal items and telephone within reach. Instruct patient to call for assistance. Room bathroom lighting operational. Non-slip footwear when patient is off stretcher. Physically safe environment: no spills, clutter or unnecessary equipment. Stretcher in lowest position, wheels locked, appropriate side rails in place. Provide visual cue, wrist band, yellow gown, etc. Monitor gait and stability. Monitor for mental status changes and reorient to person, place, and time. Review medications for side effects contributing to fall risk. Reinforce activity limits and safety measures with patient and family.

## 2018-08-10 NOTE — ED PROVIDER NOTE - NS ED ROS FT
Review of Systems:  	•	CONSTITUTIONAL - no fever, no diaphoresis, no chills  	•	SKIN - no rash  	•	EYES - no eye pain, no blurry vision  	•	ENT - no change in hearing, no sore throat, no ear pain or tinnitus  	•	RESPIRATORY - no shortness of breath, no cough  	•	CARDIAC - no chest pain, no palpitations  	•	GI - no abd pain, no nausea, no vomiting, no diarrhea, no constipation  	•	GENITO-URINARY - no discharge, no dysuria; no hematuria, no increased urinary frequency  	•	MUSCULOSKELETAL - no joint paint, no swelling, no redness  	•	NEUROLOGIC - no weakness, no headache, no paresthesias, no LOC

## 2018-08-11 NOTE — ED ADULT NURSE REASSESSMENT NOTE - NS ED NURSE REASSESS COMMENT FT1
Pt AAOx4 breathing RA w/ normal WOB. Pt ambulate w/ steady gait; no aggressive behaviors displayed. Pt D/C.

## 2018-08-12 ENCOUNTER — EMERGENCY (EMERGENCY)
Facility: HOSPITAL | Age: 68
LOS: 0 days | Discharge: NURSING, REHAB OR LONG-TERM CARE | End: 2018-08-13
Attending: EMERGENCY MEDICINE | Admitting: EMERGENCY MEDICINE

## 2018-08-12 VITALS
RESPIRATION RATE: 18 BRPM | OXYGEN SATURATION: 95 % | TEMPERATURE: 96 F | SYSTOLIC BLOOD PRESSURE: 105 MMHG | DIASTOLIC BLOOD PRESSURE: 58 MMHG | HEART RATE: 76 BPM | WEIGHT: 132.06 LBS

## 2018-08-12 DIAGNOSIS — R41.89 OTHER SYMPTOMS AND SIGNS INVOLVING COGNITIVE FUNCTIONS AND AWARENESS: ICD-10-CM

## 2018-08-12 DIAGNOSIS — Z88.1 ALLERGY STATUS TO OTHER ANTIBIOTIC AGENTS STATUS: ICD-10-CM

## 2018-08-12 DIAGNOSIS — F03.90 UNSPECIFIED DEMENTIA WITHOUT BEHAVIORAL DISTURBANCE: ICD-10-CM

## 2018-08-12 DIAGNOSIS — Z88.0 ALLERGY STATUS TO PENICILLIN: ICD-10-CM

## 2018-08-12 DIAGNOSIS — Z79.899 OTHER LONG TERM (CURRENT) DRUG THERAPY: ICD-10-CM

## 2018-08-12 DIAGNOSIS — R45.1 RESTLESSNESS AND AGITATION: ICD-10-CM

## 2018-08-12 DIAGNOSIS — Z79.82 LONG TERM (CURRENT) USE OF ASPIRIN: ICD-10-CM

## 2018-08-12 DIAGNOSIS — I11.0 HYPERTENSIVE HEART DISEASE WITH HEART FAILURE: ICD-10-CM

## 2018-08-12 DIAGNOSIS — I50.9 HEART FAILURE, UNSPECIFIED: ICD-10-CM

## 2018-08-12 DIAGNOSIS — F32.9 MAJOR DEPRESSIVE DISORDER, SINGLE EPISODE, UNSPECIFIED: ICD-10-CM

## 2018-08-12 DIAGNOSIS — Z86.73 PERSONAL HISTORY OF TRANSIENT ISCHEMIC ATTACK (TIA), AND CEREBRAL INFARCTION WITHOUT RESIDUAL DEFICITS: ICD-10-CM

## 2018-08-12 DIAGNOSIS — D64.9 ANEMIA, UNSPECIFIED: ICD-10-CM

## 2018-08-12 LAB
ALBUMIN SERPL ELPH-MCNC: 2.2 G/DL — LOW (ref 3.5–5.2)
ALP SERPL-CCNC: 168 U/L — HIGH (ref 30–115)
ALT FLD-CCNC: 22 U/L — SIGNIFICANT CHANGE UP (ref 0–41)
ANION GAP SERPL CALC-SCNC: 11 MMOL/L — SIGNIFICANT CHANGE UP (ref 7–14)
AST SERPL-CCNC: 18 U/L — SIGNIFICANT CHANGE UP (ref 0–41)
BASOPHILS # BLD AUTO: 0.01 K/UL — SIGNIFICANT CHANGE UP (ref 0–0.2)
BASOPHILS NFR BLD AUTO: 0.1 % — SIGNIFICANT CHANGE UP (ref 0–1)
BILIRUB SERPL-MCNC: 0.9 MG/DL — SIGNIFICANT CHANGE UP (ref 0.2–1.2)
BUN SERPL-MCNC: 15 MG/DL — SIGNIFICANT CHANGE UP (ref 10–20)
CALCIUM SERPL-MCNC: 7.9 MG/DL — LOW (ref 8.5–10.1)
CHLORIDE SERPL-SCNC: 103 MMOL/L — SIGNIFICANT CHANGE UP (ref 98–110)
CO2 SERPL-SCNC: 27 MMOL/L — SIGNIFICANT CHANGE UP (ref 17–32)
CREAT SERPL-MCNC: 0.6 MG/DL — LOW (ref 0.7–1.5)
EOSINOPHIL # BLD AUTO: 0.03 K/UL — SIGNIFICANT CHANGE UP (ref 0–0.7)
EOSINOPHIL NFR BLD AUTO: 0.4 % — SIGNIFICANT CHANGE UP (ref 0–8)
GLUCOSE SERPL-MCNC: 104 MG/DL — HIGH (ref 70–99)
HCT VFR BLD CALC: 26.1 % — LOW (ref 42–52)
HGB BLD-MCNC: 8.5 G/DL — LOW (ref 14–18)
IMM GRANULOCYTES NFR BLD AUTO: 0.4 % — HIGH (ref 0.1–0.3)
LYMPHOCYTES # BLD AUTO: 0.7 K/UL — LOW (ref 1.2–3.4)
LYMPHOCYTES # BLD AUTO: 10 % — LOW (ref 20.5–51.1)
MCHC RBC-ENTMCNC: 31.4 PG — HIGH (ref 27–31)
MCHC RBC-ENTMCNC: 32.6 G/DL — SIGNIFICANT CHANGE UP (ref 32–37)
MCV RBC AUTO: 96.3 FL — HIGH (ref 80–94)
MONOCYTES # BLD AUTO: 0.41 K/UL — SIGNIFICANT CHANGE UP (ref 0.1–0.6)
MONOCYTES NFR BLD AUTO: 5.9 % — SIGNIFICANT CHANGE UP (ref 1.7–9.3)
NEUTROPHILS # BLD AUTO: 5.81 K/UL — SIGNIFICANT CHANGE UP (ref 1.4–6.5)
NEUTROPHILS NFR BLD AUTO: 83.2 % — HIGH (ref 42.2–75.2)
NRBC # BLD: 0 /100 WBCS — SIGNIFICANT CHANGE UP (ref 0–0)
PLATELET # BLD AUTO: 325 K/UL — SIGNIFICANT CHANGE UP (ref 130–400)
POTASSIUM SERPL-MCNC: 4.6 MMOL/L — SIGNIFICANT CHANGE UP (ref 3.5–5)
POTASSIUM SERPL-SCNC: 4.6 MMOL/L — SIGNIFICANT CHANGE UP (ref 3.5–5)
PROT SERPL-MCNC: 5.3 G/DL — LOW (ref 6–8)
RBC # BLD: 2.71 M/UL — LOW (ref 4.7–6.1)
RBC # FLD: 16 % — HIGH (ref 11.5–14.5)
SODIUM SERPL-SCNC: 141 MMOL/L — SIGNIFICANT CHANGE UP (ref 135–146)
WBC # BLD: 6.99 K/UL — SIGNIFICANT CHANGE UP (ref 4.8–10.8)
WBC # FLD AUTO: 6.99 K/UL — SIGNIFICANT CHANGE UP (ref 4.8–10.8)

## 2018-08-12 NOTE — ED ADULT NURSE NOTE - NSIMPLEMENTINTERV_GEN_ALL_ED
Implemented All Fall with Harm Risk Interventions:  [place] to call system. Call bell, personal items and telephone within reach. Instruct patient to call for assistance. Room bathroom lighting operational. Non-slip footwear when patient is off stretcher. Physically safe environment: no spills, clutter or unnecessary equipment. Stretcher in lowest position, wheels locked, appropriate side rails in place. Provide visual cue, wrist band, yellow gown, etc. Monitor gait and stability. Monitor for mental status changes and reorient to person, place, and time. Review medications for side effects contributing to fall risk. Reinforce activity limits and safety measures with patient and family. Provide visual clues: red socks.

## 2018-08-12 NOTE — ED ADULT NURSE REASSESSMENT NOTE - NS ED NURSE REASSESS COMMENT FT1
Pt was given urinal and wife at the bedside discarded the urine, labs sent and pt was informed that he needs to provide another specimen for collection. HEATHER Florian advised.

## 2018-08-12 NOTE — ED BEHAVIORAL HEALTH ASSESSMENT NOTE - SUMMARY
68 year old AA male  with h/o / memory /cognitive impairment ,   was bought  to ED form [facility] due to his agitated and aggressive behavior.  currently his behavior is calm . denies suicidal thoughts.

## 2018-08-12 NOTE — ED BEHAVIORAL HEALTH ASSESSMENT NOTE - HPI (INCLUDE ILLNESS QUALITY, SEVERITY, DURATION, TIMING, CONTEXT, MODIFYING FACTORS, ASSOCIATED SIGNS AND SYMPTOMS)
68 year old  AA male  with h/o cognitive impairment  and multiple  medical problems , residing at [facility] ,   is brought  to ED by EMS  after  staff  called 911 as pt was agitated , running after the staff , threatening  with tea cup .    In ER , Pt is calm , cooperative . He is poor historian  and states that staff bothers him. He has memory and cognitive impairment  and does not remember  what  happened at the Residence. He denies suicidal/ homicidal thoughts. He denies feeling depress , but affect is very  blunted 68 year old  AA male  with h/o cognitive impairment  and multiple  medical problems , residing at [facility] ,   is brought  to ED by EMS  after  staff  called 911 as pt was agitated , running after the staff , threatening  with tea cup .    In ER , Pt is calm , cooperative . He is poor historian  and states that staff bothers him. He has memory and cognitive impairment  and does not remember  what  happened at the Residence. He denies suicidal/ homicidal thoughts. He denies feeling depress , but affect is very  blunted. No overt psychosis noted

## 2018-08-12 NOTE — ED PROVIDER NOTE - ATTENDING CONTRIBUTION TO CARE
67 yo M PMHx noted presents from residence for evaluation of agitation to staff today.  Pt seen for same few days ago.  On exam pt in NAD alert and awake, oriented person and place, no signs of trauma, Lungs cTA B/L no wrr, abd osft nt nd

## 2018-08-12 NOTE — ED PROVIDER NOTE - MEDICAL DECISION MAKING DETAILS
psych requested urine and labs, seen by psych and cleared.  Pt with anemia Hgb was 10 2 days ago.  Will need out-patient work up.

## 2018-08-13 VITALS
TEMPERATURE: 97 F | OXYGEN SATURATION: 97 % | RESPIRATION RATE: 18 BRPM | DIASTOLIC BLOOD PRESSURE: 72 MMHG | SYSTOLIC BLOOD PRESSURE: 105 MMHG | HEART RATE: 98 BPM

## 2018-08-13 LAB
APPEARANCE UR: CLEAR — SIGNIFICANT CHANGE UP
BACTERIA # UR AUTO: ABNORMAL
BILIRUB UR-MCNC: ABNORMAL
COD CRY URNS QL: NEGATIVE — SIGNIFICANT CHANGE UP
COLOR SPEC: SIGNIFICANT CHANGE UP
COMMENT - URINE: SIGNIFICANT CHANGE UP
COMMENT - URINE: SIGNIFICANT CHANGE UP
DIFF PNL FLD: ABNORMAL
EPI CELLS # UR: ABNORMAL /HPF
GLUCOSE UR QL: NEGATIVE MG/DL — SIGNIFICANT CHANGE UP
GRAN CASTS # UR COMP ASSIST: NEGATIVE — SIGNIFICANT CHANGE UP
HYALINE CASTS # UR AUTO: NEGATIVE — SIGNIFICANT CHANGE UP
KETONES UR-MCNC: NEGATIVE — SIGNIFICANT CHANGE UP
LEUKOCYTE ESTERASE UR-ACNC: NEGATIVE — SIGNIFICANT CHANGE UP
NITRITE UR-MCNC: NEGATIVE — SIGNIFICANT CHANGE UP
PH UR: 5.5 — SIGNIFICANT CHANGE UP (ref 5–8)
PROT UR-MCNC: 30 MG/DL
RBC CASTS # UR COMP ASSIST: ABNORMAL /HPF
SP GR SPEC: 1.02 — SIGNIFICANT CHANGE UP (ref 1.01–1.03)
TRI-PHOS CRY UR QL COMP ASSIST: NEGATIVE — SIGNIFICANT CHANGE UP
URATE CRY FLD QL MICRO: NEGATIVE — SIGNIFICANT CHANGE UP
UROBILINOGEN FLD QL: 4 MG/DL (ref 0.2–0.2)
WBC UR QL: SIGNIFICANT CHANGE UP /HPF

## 2018-08-17 ENCOUNTER — INPATIENT (INPATIENT)
Facility: HOSPITAL | Age: 68
LOS: 9 days | Discharge: HOME HEALTH | End: 2018-08-27
Attending: INTERNAL MEDICINE | Admitting: INTERNAL MEDICINE
Payer: MEDICARE

## 2018-08-17 VITALS — HEART RATE: 75 BPM | OXYGEN SATURATION: 100 %

## 2018-08-17 DIAGNOSIS — Z95.810 PRESENCE OF AUTOMATIC (IMPLANTABLE) CARDIAC DEFIBRILLATOR: Chronic | ICD-10-CM

## 2018-08-17 LAB
ALBUMIN SERPL ELPH-MCNC: 2.5 G/DL — LOW (ref 3.5–5.2)
ALP SERPL-CCNC: 216 U/L — HIGH (ref 30–115)
ALT FLD-CCNC: 25 U/L — SIGNIFICANT CHANGE UP (ref 0–41)
ANION GAP SERPL CALC-SCNC: 15 MMOL/L — HIGH (ref 7–14)
APTT BLD: 28.7 SEC — SIGNIFICANT CHANGE UP (ref 27–39.2)
AST SERPL-CCNC: 33 U/L — SIGNIFICANT CHANGE UP (ref 0–41)
B PERT IGG+IGM PNL SER: ABNORMAL
BASOPHILS # BLD AUTO: 0.01 K/UL — SIGNIFICANT CHANGE UP (ref 0–0.2)
BASOPHILS NFR BLD AUTO: 0.1 % — SIGNIFICANT CHANGE UP (ref 0–1)
BILIRUB SERPL-MCNC: 0.8 MG/DL — SIGNIFICANT CHANGE UP (ref 0.2–1.2)
BUN SERPL-MCNC: 16 MG/DL — SIGNIFICANT CHANGE UP (ref 10–20)
CALCIUM SERPL-MCNC: 8.3 MG/DL — LOW (ref 8.5–10.1)
CHLORIDE SERPL-SCNC: 98 MMOL/L — SIGNIFICANT CHANGE UP (ref 98–110)
CO2 SERPL-SCNC: 24 MMOL/L — SIGNIFICANT CHANGE UP (ref 17–32)
COLOR FLD: SIGNIFICANT CHANGE UP
CREAT SERPL-MCNC: 0.7 MG/DL — SIGNIFICANT CHANGE UP (ref 0.7–1.5)
DIGOXIN SERPL-MCNC: <0.3 NG/ML — LOW (ref 0.8–2)
EOSINOPHIL # BLD AUTO: 0.02 K/UL — SIGNIFICANT CHANGE UP (ref 0–0.7)
EOSINOPHIL NFR BLD AUTO: 0.3 % — SIGNIFICANT CHANGE UP (ref 0–8)
FLUID INTAKE SUBSTANCE CLASS: SIGNIFICANT CHANGE UP
FLUID SEGMENTED GRANULOCYTES: 86 % — SIGNIFICANT CHANGE UP
GAS PNL BLDV: SIGNIFICANT CHANGE UP
GLUCOSE SERPL-MCNC: 99 MG/DL — SIGNIFICANT CHANGE UP (ref 70–99)
HCT VFR BLD CALC: 33.4 % — LOW (ref 42–52)
HGB BLD-MCNC: 10.8 G/DL — LOW (ref 14–18)
IMM GRANULOCYTES NFR BLD AUTO: 0.4 % — HIGH (ref 0.1–0.3)
INR BLD: 1.52 RATIO — HIGH (ref 0.65–1.3)
LYMPHOCYTES # BLD AUTO: 1.28 K/UL — SIGNIFICANT CHANGE UP (ref 1.2–3.4)
LYMPHOCYTES # BLD AUTO: 17 % — LOW (ref 20.5–51.1)
LYMPHOCYTES # FLD: 12 — SIGNIFICANT CHANGE UP
MCHC RBC-ENTMCNC: 31.6 PG — HIGH (ref 27–31)
MCHC RBC-ENTMCNC: 32.3 G/DL — SIGNIFICANT CHANGE UP (ref 32–37)
MCV RBC AUTO: 97.7 FL — HIGH (ref 80–94)
MONOCYTES # BLD AUTO: 0.57 K/UL — SIGNIFICANT CHANGE UP (ref 0.1–0.6)
MONOCYTES NFR BLD AUTO: 7.6 % — SIGNIFICANT CHANGE UP (ref 1.7–9.3)
MONOS+MACROS # FLD: 1 % — SIGNIFICANT CHANGE UP
MRSA PCR RESULT.: POSITIVE
NEUTROPHILS # BLD AUTO: 5.6 K/UL — SIGNIFICANT CHANGE UP (ref 1.4–6.5)
NEUTROPHILS NFR BLD AUTO: 74.6 % — SIGNIFICANT CHANGE UP (ref 42.2–75.2)
NT-PROBNP SERPL-SCNC: HIGH PG/ML (ref 0–300)
OTHER CELLS FLD MANUAL: SIGNIFICANT CHANGE UP %
PLATELET # BLD AUTO: 366 K/UL — SIGNIFICANT CHANGE UP (ref 130–400)
POTASSIUM SERPL-MCNC: 5.6 MMOL/L — HIGH (ref 3.5–5)
POTASSIUM SERPL-SCNC: 5.6 MMOL/L — HIGH (ref 3.5–5)
PROT SERPL-MCNC: 6.4 G/DL — SIGNIFICANT CHANGE UP (ref 6–8)
PROTHROM AB SERPL-ACNC: 16.4 SEC — HIGH (ref 9.95–12.87)
RBC # BLD: 3.42 M/UL — LOW (ref 4.7–6.1)
RBC # FLD: 16.2 % — HIGH (ref 11.5–14.5)
RCV VOL RI: HIGH /UL (ref 0–5)
SODIUM SERPL-SCNC: 137 MMOL/L — SIGNIFICANT CHANGE UP (ref 135–146)
TOTAL NUCLEATED CELL COUNT, BODY FLUID: HIGH /UL (ref 0–5)
TROPONIN T SERPL-MCNC: 0.01 NG/ML — SIGNIFICANT CHANGE UP
TUBE TYPE: SIGNIFICANT CHANGE UP
WBC # BLD: 7.51 K/UL — SIGNIFICANT CHANGE UP (ref 4.8–10.8)
WBC # FLD AUTO: 7.51 K/UL — SIGNIFICANT CHANGE UP (ref 4.8–10.8)

## 2018-08-17 PROCEDURE — 93970 EXTREMITY STUDY: CPT | Mod: 26

## 2018-08-17 RX ORDER — HEPARIN SODIUM 5000 [USP'U]/ML
10 INJECTION INTRAVENOUS; SUBCUTANEOUS
Qty: 25000 | Refills: 0 | Status: DISCONTINUED | OUTPATIENT
Start: 2018-08-17 | End: 2018-08-17

## 2018-08-17 RX ORDER — IVABRADINE 7.5 MG/1
5 TABLET, FILM COATED ORAL
Qty: 0 | Refills: 0 | Status: DISCONTINUED | OUTPATIENT
Start: 2018-08-17 | End: 2018-08-27

## 2018-08-17 RX ORDER — DIGOXIN 250 MCG
1 TABLET ORAL
Qty: 0 | Refills: 0 | COMMUNITY

## 2018-08-17 RX ORDER — MEROPENEM 1 G/30ML
1000 INJECTION INTRAVENOUS EVERY 8 HOURS
Qty: 0 | Refills: 0 | Status: DISCONTINUED | OUTPATIENT
Start: 2018-08-17 | End: 2018-08-19

## 2018-08-17 RX ORDER — FUROSEMIDE 40 MG
1.5 TABLET ORAL
Qty: 0 | Refills: 0 | COMMUNITY

## 2018-08-17 RX ORDER — MEROPENEM 1 G/30ML
1000 INJECTION INTRAVENOUS ONCE
Qty: 0 | Refills: 0 | Status: COMPLETED | OUTPATIENT
Start: 2018-08-17 | End: 2018-08-17

## 2018-08-17 RX ORDER — CARVEDILOL PHOSPHATE 80 MG/1
3.12 CAPSULE, EXTENDED RELEASE ORAL EVERY 12 HOURS
Qty: 0 | Refills: 0 | Status: DISCONTINUED | OUTPATIENT
Start: 2018-08-17 | End: 2018-08-27

## 2018-08-17 RX ORDER — HEPARIN SODIUM 5000 [USP'U]/ML
1000 INJECTION INTRAVENOUS; SUBCUTANEOUS
Qty: 25000 | Refills: 0 | Status: DISCONTINUED | OUTPATIENT
Start: 2018-08-17 | End: 2018-08-18

## 2018-08-17 RX ORDER — DIGOXIN 250 MCG
0.12 TABLET ORAL DAILY
Qty: 0 | Refills: 0 | Status: DISCONTINUED | OUTPATIENT
Start: 2018-08-17 | End: 2018-08-27

## 2018-08-17 RX ORDER — FUROSEMIDE 40 MG
20 TABLET ORAL DAILY
Qty: 0 | Refills: 0 | Status: DISCONTINUED | OUTPATIENT
Start: 2018-08-17 | End: 2018-08-17

## 2018-08-17 RX ORDER — ACETAMINOPHEN 500 MG
650 TABLET ORAL EVERY 6 HOURS
Qty: 0 | Refills: 0 | Status: DISCONTINUED | OUTPATIENT
Start: 2018-08-17 | End: 2018-08-27

## 2018-08-17 RX ORDER — ASPIRIN/CALCIUM CARB/MAGNESIUM 324 MG
81 TABLET ORAL DAILY
Qty: 0 | Refills: 0 | Status: DISCONTINUED | OUTPATIENT
Start: 2018-08-17 | End: 2018-08-27

## 2018-08-17 RX ORDER — SPIRONOLACTONE 25 MG/1
50 TABLET, FILM COATED ORAL
Qty: 0 | Refills: 0 | Status: DISCONTINUED | OUTPATIENT
Start: 2018-08-17 | End: 2018-08-27

## 2018-08-17 RX ORDER — ACETAMINOPHEN 500 MG
2 TABLET ORAL
Qty: 0 | Refills: 0 | COMMUNITY

## 2018-08-17 RX ORDER — HEPARIN SODIUM 5000 [USP'U]/ML
3500 INJECTION INTRAVENOUS; SUBCUTANEOUS ONCE
Qty: 0 | Refills: 0 | Status: COMPLETED | OUTPATIENT
Start: 2018-08-17 | End: 2018-08-17

## 2018-08-17 RX ORDER — VANCOMYCIN HCL 1 G
1000 VIAL (EA) INTRAVENOUS ONCE
Qty: 0 | Refills: 0 | Status: COMPLETED | OUTPATIENT
Start: 2018-08-17 | End: 2018-08-17

## 2018-08-17 RX ORDER — FUROSEMIDE 40 MG
40 TABLET ORAL
Qty: 0 | Refills: 0 | Status: DISCONTINUED | OUTPATIENT
Start: 2018-08-17 | End: 2018-08-17

## 2018-08-17 RX ORDER — VANCOMYCIN HCL 1 G
1250 VIAL (EA) INTRAVENOUS EVERY 8 HOURS
Qty: 0 | Refills: 0 | Status: DISCONTINUED | OUTPATIENT
Start: 2018-08-17 | End: 2018-08-18

## 2018-08-17 RX ORDER — FUROSEMIDE 40 MG
40 TABLET ORAL EVERY 12 HOURS
Qty: 0 | Refills: 0 | Status: DISCONTINUED | OUTPATIENT
Start: 2018-08-17 | End: 2018-08-19

## 2018-08-17 RX ORDER — SACUBITRIL AND VALSARTAN 24; 26 MG/1; MG/1
1 TABLET, FILM COATED ORAL
Qty: 0 | Refills: 0 | Status: DISCONTINUED | OUTPATIENT
Start: 2018-08-17 | End: 2018-08-27

## 2018-08-17 RX ORDER — ENOXAPARIN SODIUM 100 MG/ML
40 INJECTION SUBCUTANEOUS DAILY
Qty: 0 | Refills: 0 | Status: DISCONTINUED | OUTPATIENT
Start: 2018-08-17 | End: 2018-08-17

## 2018-08-17 RX ADMIN — Medication 250 MILLIGRAM(S): at 16:08

## 2018-08-17 RX ADMIN — Medication 40 MILLIGRAM(S): at 21:36

## 2018-08-17 RX ADMIN — HEPARIN SODIUM 3500 UNIT(S): 5000 INJECTION INTRAVENOUS; SUBCUTANEOUS at 20:19

## 2018-08-17 RX ADMIN — MEROPENEM 100 MILLIGRAM(S): 1 INJECTION INTRAVENOUS at 16:08

## 2018-08-17 RX ADMIN — Medication 0.12 MILLIGRAM(S): at 21:37

## 2018-08-17 RX ADMIN — Medication 166.67 MILLIGRAM(S): at 21:36

## 2018-08-17 RX ADMIN — SPIRONOLACTONE 50 MILLIGRAM(S): 25 TABLET, FILM COATED ORAL at 21:36

## 2018-08-17 RX ADMIN — MEROPENEM 100 MILLIGRAM(S): 1 INJECTION INTRAVENOUS at 21:37

## 2018-08-17 RX ADMIN — HEPARIN SODIUM 10 UNIT(S)/HR: 5000 INJECTION INTRAVENOUS; SUBCUTANEOUS at 20:19

## 2018-08-17 NOTE — H&P ADULT - NSHPLABSRESULTS_GEN_ALL_CORE
LABS                        10.8<L>  7.51  )-----------( 366      ( 08-17 @ 12:26 )             33.4<L>    08-17    137  |  98  |  16  ----------------------------<  99  5.6<H>   |  24  |  0.7    Ca    8.3<L>      17 Aug 2018 12:26    TPro  6.4  /  Alb  2.5<L>  /  TBili  0.8  /  DBili  x   /  AST  33  /  ALT  25  /  AlkPhos  216<H>  08-17    PT/INR - ( 17 Aug 2018 12:26 )   PT: 16.40 sec;   INR: 1.52 ratio      PTT - ( 17 Aug 2018 12:26 )  PTT:28.7 sec    CARDIAC MARKERS ( 17 Aug 2018 12:26 )  x     / 0.01 ng/mL / x     / x     / x        IMAGING  < from: Xray Chest 1 View-PORTABLE IMMEDIATE (08.17.18 @ 13:15) >    Impression:      1. Large right and small left pleural effusions.    2. Cardiomegaly.    < end of copied text >

## 2018-08-17 NOTE — ED ADULT NURSE NOTE - NSIMPLEMENTINTERV_GEN_ALL_ED
Implemented All Fall with Harm Risk Interventions:  [place] to call system. Call bell, personal items and telephone within reach. Instruct patient to call for assistance. Room bathroom lighting operational. Non-slip footwear when patient is off stretcher. Physically safe environment: no spills, clutter or unnecessary equipment. Stretcher in lowest position, wheels locked, appropriate side rails in place. Provide visual cue, wrist band, yellow gown, etc. Monitor gait and stability. Monitor for mental status changes and reorient to person, place, and time. Review medications for side effects contributing to fall risk. Reinforce activity limits and safety measures with patient and family. Provide visual clues: red socks.

## 2018-08-17 NOTE — ED PROVIDER NOTE - PROGRESS NOTE DETAILS
I personally evaluated the patient. I reviewed the Resident’s or Physician Assistant’s note (as assigned above), and agree with the findings and plan except as documented in my note.  69 Y/O M HTN, AFIB (NOT ON AC), CHF, CVA SENT TO ED FROM SNF WITH SOB AND HYPOXIA. PT GIVEN NEB TX IN SNF WITH NO IMPROVEMENT. AS PER WIFE AT BEDSIDE, PT WAS WELL YESTERDAY AND SOB BEGAN TODAY. PT UNABLE TO GIVE HX DUE TO RESP DEISTRSS. VITALS NOTED. ALERT SEVERE RESP DISTRESS. + TACHYPNEA. d/w pulm fellow Aminah -- patient will likely need pigtail, will f/u CT results for loculated effusion on R lung. PULMONARY CONSULTED FOR LARGE R PLEURAL EFFUSION. ULTRASOUND AT BEDSIDE WITH LOCULATED EFFUSION. DR. TAVERAS WILL REVIEW CT SCAN. DR. TAVERAS AT BEDSIDE FOR THORACENTESIS. DR. COOLEY AT BEDSIDE, PIGTAIL CATHETER PLACED. LIKELY EMPYEMA, ABX ORDERED. AS PER BETHANY KITCHEN, ADMIT TO ICU.

## 2018-08-17 NOTE — CONSULT NOTE ADULT - SUBJECTIVE AND OBJECTIVE BOX
Consultation Note  =====================================================    HPI: 68y Male w/ hx of Afib not on AC, CHF (EF 23%) w/ AICD, CVA presents w/ SOB since this AM. Recent hx of AICD placement at [facility], subsequently went to [facility] for rehabilitation. Patient denies any respiratory issue prior to this am. He sat 77% on NRB at NH, on arrival to ED, respiratory distress improved w/ BiPAP. Bedside US significant for right side loculated pleural effusion. Thoracentesis by Pulm team was done bedside, chest tube was inserted to suction. Denies fever, chills, CP, abdominal pain, dysuria. (17 Aug 2018 17:22)    Was found to have loculated pleural effusion, bedside thoracocentesis done in ED, pigtail catheter placed by thoracic team with drainage of 400mL of serous fluid. Currently connected to pleurovac on suction. Improvement on CXR. Admitted with empyema.     PAST MEDICAL & SURGICAL HISTORY:  Muscle weakness (generalized)  Atrial fibrillation, unspecified type  Oxygen dependent  Essential hypertension  Cerebrovascular accident (CVA) due to bilateral embolism of posterior cerebral arteries  Systolic congestive heart failure, unspecified chronicity  AICD (automatic cardioverter/defibrillator) present    Home Meds: Home Medications:  Aldactone 50 mg oral tablet: 1 tab(s) orally 2 times a day (17 Aug 2018 17:44)  aspirin 81 mg oral tablet, chewable: 1 tab(s) orally once a day (17 Aug 2018 17:44)  Coreg 3.125 mg oral tablet: 1 tab(s) orally 2 times a day (17 Aug 2018 17:44)  Corlanor 5 mg oral tablet: 1 tab(s) orally 2 times a day (with meals) (17 Aug 2018 17:44)  digoxin 125 mcg (0.125 mg) oral tablet: 1 tab(s) orally once a day (at bedtime) (17 Aug 2018 17:44)  Entresto 24 mg-26 mg oral tablet: 1 tab(s) orally 2 times a day (17 Aug 2018 17:44)  furosemide 20 mg oral tablet: 1 tab(s) orally once a day (17 Aug 2018 17:44)  furosemide 40 mg oral tablet: 1 tab(s) orally 2 times a day (17 Aug 2018 17:44)  Tylenol 325 mg oral tablet: 2 tab(s) orally every 6 hours, As Needed (17 Aug 2018 17:44)    Allergies: Allergies    cephalexin ([place])  penicillin (Rash)    Intolerances      Soc:   Denies Tobacco/EtOH/Substance use  Tobacco: [  ] yes  EtOH: [  ] yes  Substance use: [  ] yes  Advanced Directives: Presumed Full Code     ROS:    REVIEW OF SYSTEMS    [ ] A ten-point review of systems was otherwise negative except as noted.  [ ] Due to altered mental status/intubation, subjective information were not able to be obtained from the patient. History was obtained, to the extent possible, from review of the chart and collateral sources of information.      CURRENT MEDICATIONS:   --------------------------------------------------------------------------------------  Neurologic Medications  acetaminophen   Tablet. 650 milliGRAM(s) Oral every 6 hours PRN Mild Pain (1 - 3)    Cardiovascular Medications  carvedilol 3.125 milliGRAM(s) Oral every 12 hours  digoxin     Tablet 0.125 milliGRAM(s) Oral daily  furosemide   Injectable 40 milliGRAM(s) IV Push every 12 hours  ivabradine 5 milliGRAM(s) Oral two times a day  sacubitril 24 mG/valsartan 26 mG 1 Tablet(s) Oral two times a day  spironolactone 50 milliGRAM(s) Oral two times a day    Hematologic/Oncologic Medications  aspirin  chewable 81 milliGRAM(s) Oral daily  heparin  Infusion 1000 Unit(s)/Hr IV Continuous <Continuous>  heparin  Injectable 3500 Unit(s) IV Push once    Antimicrobial/Immunologic Medications  meropenem  IVPB 1000 milliGRAM(s) IV Intermittent every 8 hours  vancomycin  IVPB 1250 milliGRAM(s) IV Intermittent every 8 hours  -------------------------------------------------------------------------------------  VITAL SIGNS, INS/OUTS (last 24 hours):  --------------------------------------------------------------------------------------  ICU Vital Signs Last 24 Hrs  T(C): 37.4 (17 Aug 2018 15:11), Max: 37.9 (17 Aug 2018 12:24)  T(F): 99.3 (17 Aug 2018 15:11), Max: 100.3 (17 Aug 2018 12:24)  HR: 72 (17 Aug 2018 16:10) (70 - 75)  BP: 103/64 (17 Aug 2018 16:10) (96/60 - 112/72)  RR: 20 (17 Aug 2018 16:10) (15 - 34)  SpO2: 100% (17 Aug 2018 16:10) (95% - 100%)    --------------------------------------------------------------------------------------  PHYSICAL EXAM  General: NAD, AAOx3, calm and cooperative  HEENT: NCAT, JESSY, EOMI, Trachea ML, Neck supple  Cardiac: RRR S1, S2, no Murmurs, rubs or gallops  Respiratory: Decreased breath sounds R lung base, increased respiratory effort  Pigtail catheter in place draining serous fluid, no bleeding, no air leak  Abdomen: Soft, non-distended, non-tender, +bowel sounds  Skin: Warm/dry, normal color    LABS  --------------------------------------------------------------------------------------  Labs:  CAPILLARY BLOOD GLUCOS                        10.8   7.51  )-----------( 366      ( 17 Aug 2018 12:26 )             33.4       Auto Immature Granulocyte %: 0.4 % (08-17-18 @ 12:26)  Auto Neutrophil %: 74.6 % (08-17-18 @ 12:26)    08-17  137  |  98  |  16  ----------------------------<  99  5.6<H>   |  24  |  0.7    Calcium, Total Serum: 8.3 mg/dL (08-17-18 @ 12:26)    LFTs:           6.4  | 0.8  | 33       ------------------[216     ( 17 Aug 2018 12:26 )  2.5  | x    | 25          Lipase:x      Amylase:x         Blood Gas Venous - Lactate: 2.2 mmoL/L (08-17-18 @ 12:35)    Coags:  16.40  ----< 1.52    ( 17 Aug 2018 12:26 )     28.7      CARDIAC MARKERS ( 17 Aug 2018 12:26 )  x     / 0.01 ng/mL / x     / x     / x      --------------------------------------------------------------------------------------  IMAGING RESULTS  < from: Xray Chest 1 View-PORTABLE IMMEDIATE (08.17.18 @ 13:15) >  Findings:    Support devices: Left-sided AICD with leads projecting over the   ventricles and right atrium. Please note battery pack limits evaluation   of the underlying lung. EKG leads overlie the chest.  Cardiac/mediastinum/hilum: Enlarged cardiac silhouette.  Lung parenchyma/Pleura: Large right and small left pleural effusions,   left subpulmonic effusion noted.  Skeleton/soft tissues: Degenerative change in the shoulders.    Impression:    1. Large right and small left pleural effusions.  2. Cardiomegaly.  < end of copied text >    < from: CT Angio Chest w/ IV Cont (08.17.18 @ 17:40) >  IMPRESSION:  1.  Small pulmonary embolism in the right interlobar artery.  2.  Right moderate to large partially loculated pleural effusion with   near complete atelectasis of the right lower lobe. Right pleural catheter   in place with adjacent gas.  3.  Small left pleural effusion with compressive atelectasis.  4.  Partially imaged abdominal ascites.  < end of copied text >  ---------------------------------------------------------------------------------------

## 2018-08-17 NOTE — H&P ADULT - HISTORY OF PRESENT ILLNESS
67 yo M w/ hx of Afib not on AC, CHF (EF 23%) w/ AICD, CVA presents w/ SOB since this AM. Recent hx of AICD placement at [facility], subsequently went to [facility] for rehabilitation. Patient denies any respiratory issue prior to this am. He sat 77% on NRB at NH, on arrival to ED, respiratory distress improved w/ BiPAP. Bedside US significant for right side loculated pleural effusion. Thoracentesis by Pulm team was done bedside, chest tube was inserted to suction. Denies fever, chills, CP, abdominal pain, dysuria.

## 2018-08-17 NOTE — CONSULT NOTE ADULT - ASSESSMENT
Assessment:  Right sided loculated pleural effusion  Possible underlying PNA ?aspiration  HFrEF on home oxygen  Afib not on anticoagulation      Plan:  Diagnostic thoracentesis to r/o empyema and then subsequent chest tube if needed  vancomycin meropenem for now, check nares for MRSA  diuretics for HF  check 2d echo  DVT ppx Assessment:  Right sided loculated pleural effusion  Possible underlying PNA ?aspiration  HFrEF on home oxygen  Afib not on anticoagulation      Plan:  Diagnostic thoracentesis to r/o empyema and then subsequent chest tube if needed  vancomycin meropenem for now, check nares for MRSA  diuretics for HF  check 2d echo  DVT ppx  Patient would benefit from ICU monitoring for now. IMPRESSION:    Possible empyema / Pneumonia   Chronic hypoxemic respiratory failure in home O2  HF REF  HO ROCÍO not on AC    PLAN:    CNS: No depressants    HEENT: Oral care    PULMONARY:  HOB @ 45 degrees.  Right thoracentesis.  Will likely need chest tube need Chest tube     CARDIOVASCULAR: Maximize cardiac therapy.  Avoid volume overload    GI: GI prophylaxis.  Feeding     RENAL:  Follow up lytes.  Correct as needed    INFECTIOUS DISEASE: Follow up cultures.  Meropenem and Vanc for now     HEMATOLOGICAL:  DVT prophylaxis.    ENDOCRINE:  Follow up FS.  Insulin protocol if needed.    MUSCULOSKELETAL:    ICU monitoring post chest tube

## 2018-08-17 NOTE — ED PROVIDER NOTE - MEDICAL DECISION MAKING DETAILS
69 Y/O M HTN, AFIB, VHF, CVA, WITH SOB FROM SNF. LARGE R PLEURAL EFFUSION ON CXR. THORACENTESIS SHOWED EMPYEMA. CHEST TUBE PLACED BY THORACIC SURGEON. PT ADMITTED TO ICU.

## 2018-08-17 NOTE — H&P ADULT - ASSESSMENT
67 yo M w/ hx of Afib not on AC, CHF (EF 23%) w/ AICD, CVA presents w/ SOB.    #R loculated pleural effusion w/ possible underlying PNA, r/o empyema  -s/p thoracentesis and pigtail insertion  -pigtail to low wall suctioning  -f/u w/ CT surgery, tpa to break up loculation tomorrow  -f/u fluid studies  -c/w Vanco and Santi, f/u Vanco trough prior to 4th dose  -f/u CT chest  -check nasal MRSA    #Decompensated CHF  -BNP ~68657  -2D echo 04/2018 EF 23%, G1DD, mod MR, mod AR  -repeat 2d echo  -Lasix 40 IV BID  -c/w Entresto, Corlanor, Aldactone, Coreg  -c/w digoxin, f/u level  -cardio c/s    #hx of CVA - c/w ASA  #Afib not on AC - was on Eliquis but stopped d/t intolerance, BB for rate control    #Activity - bedrest  #DVT ppx - Lovenox  #Diet - DASH/TLC  #Code status - full code  #Dispo - from NH

## 2018-08-17 NOTE — ED PROVIDER NOTE - CRITICAL CARE PROVIDED
consult w/ pt's family directly relating to pts condition/direct patient care (not related to procedure)/documentation/consultation with other physicians/interpretation of diagnostic studies/additional history taking

## 2018-08-17 NOTE — PROCEDURE NOTE - PROCEDURE
<<-----Click on this checkbox to enter Procedure Chest tube insertion  08/17/2018    Active  [id number]

## 2018-08-17 NOTE — ED PROVIDER NOTE - CARE PLAN
Principal Discharge DX:	Shortness of breath  Secondary Diagnosis:	Atrial fibrillation, unspecified type  Secondary Diagnosis:	AICD (automatic cardioverter/defibrillator) present Principal Discharge DX:	Empyema lung  Secondary Diagnosis:	Atrial fibrillation, unspecified type  Secondary Diagnosis:	Respiratory distress

## 2018-08-17 NOTE — CHART NOTE - NSCHARTNOTEFT_GEN_A_CORE
I was called by Radiology regarding a finding on CT scan; Right interlobar pulmonary embolus. Patient currently hemodynamically stable.    Vital Signs Last 24 Hrs  Weight 70kg  T(C): 37.4 (17 Aug 2018 15:11), Max: 37.9 (17 Aug 2018 12:24)  T(F): 99.3 (17 Aug 2018 15:11), Max: 100.3 (17 Aug 2018 12:24)  HR: 72 (17 Aug 2018 16:10) (70 - 75)  BP: 103/64 (17 Aug 2018 16:10) (96/60 - 112/72)  BP(mean): --  RR: 20 (17 Aug 2018 16:10) (15 - 34)  SpO2: 100% (17 Aug 2018 16:10) (95% - 100%)                          10.8   7.51  )-----------( 366      ( 17 Aug 2018 12:26 )             33.4     08-17    137  |  98  |  16  ----------------------------<  99  5.6<H>   |  24  |  0.7    Ca    8.3<L>      17 Aug 2018 12:26    TPro  6.4  /  Alb  2.5<L>  /  TBili  0.8  /  DBili  x   /  AST  33  /  ALT  25  /  AlkPhos  216<H>  08-17    PT/INR - ( 17 Aug 2018 12:26 )   PT: 16.40 sec;   INR: 1.52 ratio    PTT - ( 17 Aug 2018 12:26 )  PTT:28.7 sec    CARDIAC MARKERS ( 17 Aug 2018 12:26 )  Trop 0.01 ng/mL / CK x     / CKMB x         Serum Pro-Brain Natriuretic Peptide (08.17.18 @ 12:26)    Serum Pro-Brain Natriuretic Peptide: 22908 pg/mL      < from: CT Angio Chest w/ IV Cont (08.17.18 @ 17:40) >  1.  Small pulmonary embolism in the right interlobar artery.  2.  Right moderate to large partially loculated pleural effusion with near complete atelectasis of the right lower lobe. Right pleural catheter in place with adjacent gas.  3.  Small left pleural effusion with compressive atelectasis.  4.  Partially imaged abdominal ascites.  < end of copied text >      67 yo M w/ hx of Afib not on AC, CHF (EF 23%) w/ AICD, CVA presents w/ SOB.  Right interlobar pulmonary embolism  Right sided loculated pleural effusion  Possible underlying PNA ?aspiration  HFrEF on home oxygen  Afib not on anticoagulation    Start Heparin bolus of 3000 + drip at 1000  Follow up cardiac enzymes at 8pm  Follow up PTT at 11:30pm  Diagnostic thoracentesis to r/o empyema s/p chest tube  vancomycin meropenem for now, check nares for MRSA  diuretics for HF  check 2d echo  DVT ppx; heparin drip  Patient would benefit from ICU monitoring for now. I was called by Radiology regarding a finding on CT scan; Right interlobar pulmonary embolus. Patient currently hemodynamically stable.    Vital Signs Last 24 Hrs  Weight 70kg  T(C): 37.4 (17 Aug 2018 15:11), Max: 37.9 (17 Aug 2018 12:24)  T(F): 99.3 (17 Aug 2018 15:11), Max: 100.3 (17 Aug 2018 12:24)  HR: 72 (17 Aug 2018 16:10) (70 - 75)  BP: 103/64 (17 Aug 2018 16:10) (96/60 - 112/72)  BP(mean): --  RR: 20 (17 Aug 2018 16:10) (15 - 34)  SpO2: 100% (17 Aug 2018 16:10) (95% - 100%)                          10.8   7.51  )-----------( 366      ( 17 Aug 2018 12:26 )             33.4     08-17    137  |  98  |  16  ----------------------------<  99  5.6<H>   |  24  |  0.7    Ca    8.3<L>      17 Aug 2018 12:26    TPro  6.4  /  Alb  2.5<L>  /  TBili  0.8  /  DBili  x   /  AST  33  /  ALT  25  /  AlkPhos  216<H>  08-17    PT/INR - ( 17 Aug 2018 12:26 )   PT: 16.40 sec;   INR: 1.52 ratio    PTT - ( 17 Aug 2018 12:26 )  PTT:28.7 sec    CARDIAC MARKERS ( 17 Aug 2018 12:26 )  Trop 0.01 ng/mL / CK x     / CKMB x         Serum Pro-Brain Natriuretic Peptide (08.17.18 @ 12:26)    Serum Pro-Brain Natriuretic Peptide: 05922 pg/mL      < from: CT Angio Chest w/ IV Cont (08.17.18 @ 17:40) >  1.  Small pulmonary embolism in the right interlobar artery.  2.  Right moderate to large partially loculated pleural effusion with near complete atelectasis of the right lower lobe. Right pleural catheter in place with adjacent gas.  3.  Small left pleural effusion with compressive atelectasis.  4.  Partially imaged abdominal ascites.  < end of copied text >      69 yo M w/ hx of Afib not on AC, CHF (EF 23%) w/ AICD, CVA presents w/ SOB.  Right interlobar pulmonary embolism  Right sided loculated pleural effusion  Possible underlying PNA ?aspiration  HFrEF on home oxygen  Afib not on anticoagulation    Start Heparin bolus of 3500 + drip at 1000  Follow up cardiac enzymes at 8pm  Follow up PTT at 11:30pm  Diagnostic thoracentesis to r/o empyema s/p chest tube  vancomycin meropenem for now, check nares for MRSA  diuretics for HF  check 2d echo  DVT ppx; heparin drip  Patient would benefit from ICU monitoring for now. I was called by Radiology regarding a finding on CT scan; Right interlobar pulmonary embolus. Patient currently hemodynamically stable.    Vital Signs Last 24 Hrs  Weight 70kg  T(C): 37.4 (17 Aug 2018 15:11), Max: 37.9 (17 Aug 2018 12:24)  T(F): 99.3 (17 Aug 2018 15:11), Max: 100.3 (17 Aug 2018 12:24)  HR: 72 (17 Aug 2018 16:10) (70 - 75)  BP: 103/64 (17 Aug 2018 16:10) (96/60 - 112/72)  BP(mean): --  RR: 20 (17 Aug 2018 16:10) (15 - 34)  SpO2: 100% (17 Aug 2018 16:10) (95% - 100%)                          10.8   7.51  )-----------( 366      ( 17 Aug 2018 12:26 )             33.4     08-17    137  |  98  |  16  ----------------------------<  99  5.6<H>   |  24  |  0.7    Ca    8.3<L>      17 Aug 2018 12:26    TPro  6.4  /  Alb  2.5<L>  /  TBili  0.8  /  DBili  x   /  AST  33  /  ALT  25  /  AlkPhos  216<H>  08-17    PT/INR - ( 17 Aug 2018 12:26 )   PT: 16.40 sec;   INR: 1.52 ratio    PTT - ( 17 Aug 2018 12:26 )  PTT:28.7 sec    CARDIAC MARKERS ( 17 Aug 2018 12:26 )  Trop 0.01 ng/mL / CK x     / CKMB x         Serum Pro-Brain Natriuretic Peptide (08.17.18 @ 12:26)    Serum Pro-Brain Natriuretic Peptide: 57567 pg/mL      < from: CT Angio Chest w/ IV Cont (08.17.18 @ 17:40) >  1.  Small pulmonary embolism in the right interlobar artery.  2.  Right moderate to large partially loculated pleural effusion with near complete atelectasis of the right lower lobe. Right pleural catheter in place with adjacent gas.  3.  Small left pleural effusion with compressive atelectasis.  4.  Partially imaged abdominal ascites.  < end of copied text >      69 yo M w/ hx of Afib not on AC, CHF (EF 23%) w/ AICD, CVA presents w/ SOB.  Right interlobar pulmonary embolism  Right sided loculated pleural effusion  Possible underlying PNA ?aspiration  HFrEF on home oxygen  Afib not on anticoagulation    Start Heparin bolus of 3500 + drip at 1000  Follow up cardiac enzymes at 8pm  Follow up PTT at 11:30pm  Follow up VDUS stat  Diagnostic thoracentesis to r/o empyema s/p chest tube  vancomycin meropenem for now, check nares for MRSA  diuretics for HF  check 2d echo  DVT ppx; heparin drip  Patient would benefit from ICU monitoring for now.

## 2018-08-17 NOTE — ED PROVIDER NOTE - OBJECTIVE STATEMENT
68yM with PMH aicd 2/2 afib, not on AC, HTN, p/w gradual onset SOB since this morning, bibems. patient had one nebulizer treatment without improvement at home. he does use home oxygen and is on digoxin for chf. no  CP, palpitations, fever/chills, abd pain, ha or vision changes.

## 2018-08-17 NOTE — CONSULT NOTE ADULT - SUBJECTIVE AND OBJECTIVE BOX
Patient is a 68y old  Male who presents with a chief complaint of     HPI: 68M with increasing shortness of breath brought in by his family. He was recently admitted to [facility] for worsening heart failure and an AICD was placed. Since then he deconditioned and was placed in [facility] where he comes in from now. He was in respiratory distress so he was placed on BiPAP and his status improved. On chest xray he was found to have a pleural effusion. Per his wife he did not have fevers, but he was having some black sputum production.      PAST MEDICAL & SURGICAL HISTORY:  Muscle weakness (generalized)  Atrial fibrillation, unspecified type  Oxygen dependent  Essential hypertension  Cerebrovascular accident (CVA) due to bilateral embolism of posterior cerebral arteries  Systolic congestive heart failure, unspecified chronicity  AICD (automatic cardioverter/defibrillator) present      SOCIAL HX:   Smoking   never                      ETOH     never                       Other    FAMILY HISTORY:  No pertinent family history in first degree relatives  .  No cardiovascular or pulmonary family history     Review of System:  See HPI    Allergies    cephalexin (Flushing)  penicillin (Rash)    Intolerances          PHYSICAL EXAM  Vital Signs Last 24 Hrs  T(C): 37.9 (17 Aug 2018 12:24), Max: 37.9 (17 Aug 2018 12:24)  T(F): 100.3 (17 Aug 2018 12:24), Max: 100.3 (17 Aug 2018 12:24)  HR: 73 (17 Aug 2018 13:06) (73 - 75)  BP: 112/64 (17 Aug 2018 13:06) (96/60 - 112/64)  RR: 19 (17 Aug 2018 13:06) (19 - 34)  SpO2: 98% (17 Aug 2018 13:06) (95% - 100%)    General: In NAD  HEENT: JESSY             Lymphatic system: No cervical LN     Lungs: Decreased right sided breath sounds, left sided anterior breath sound are clear.  Cardiovascular: Regular  Gastrointestinal: Soft.  + BS   Musculoskeletal: No Clubbing.  Full range of motion.. Moves all extremities  Skin: Warm.  Intact  Neurological: No motor or sensory deficit       LABS:                          10.8   7.51  )-----------( 366      ( 17 Aug 2018 12:26 )             33.4                                               08-17    137  |  98  |  16  ----------------------------<  99  5.6<H>   |  24  |  0.7    Ca    8.3<L>      17 Aug 2018 12:26    TPro  6.4  /  Alb  2.5<L>  /  TBili  0.8  /  DBili  x   /  AST  33  /  ALT  25  /  AlkPhos  216<H>  08-17      PT/INR - ( 17 Aug 2018 12:26 )   PT: 16.40 sec;   INR: 1.52 ratio         PTT - ( 17 Aug 2018 12:26 )  PTT:28.7 sec                                           CARDIAC MARKERS ( 17 Aug 2018 12:26 )  x     / 0.01 ng/mL / x     / x     / x                                                LIVER FUNCTIONS - ( 17 Aug 2018 12:26 )  Alb: 2.5 g/dL / Pro: 6.4 g/dL / ALK PHOS: 216 U/L / ALT: 25 U/L / AST: 33 U/L / GGT: x                                              Bedside ultrasound: right sided effusion with extensive loculations                                                Chest Xray: large right sided effusion,     MEDICATIONS  (STANDING):    MEDICATIONS  (PRN):

## 2018-08-17 NOTE — ED PROVIDER NOTE - CONSTITUTIONAL, MLM
normal... Uncomfortable appearing, thin older gentleman, awake, alert, oriented to person, place, time/situation. mild/moderate recruitment of accessory muscles of respiration.

## 2018-08-17 NOTE — CONSULT NOTE ADULT - ASSESSMENT
ASSESSMENT:  68y Male with loculated pleural effusion, bedside thoracocentesis done in ED, pigtail catheter placed by thoracic team with drainage of 400mL of serous fluid. Currently connected to pleurovac on suction. Improvement on CXR. Admitted with empyema.     PLAN:   Pigtail catheter to suction  Strict I+O  IVF  Labs, CBC  AM CXR  Monitor for SOB, respiratory difficulty, monitor with pulse ox  Chest PT  Will re-evaluate pigtail O/P in AM  Possible TPA to lyse loculations  CT surgery team following    Above plan discussed with Dr. Hollingsworth , patient and patients family, and Primary medical team    --------------------------------------------------------------------------------------    08-17-18 @ 19:54

## 2018-08-17 NOTE — H&P ADULT - NSHPPHYSICALEXAM_GEN_ALL_CORE
GEN: NAD  HEENT: NCAT  CV: RRR  RESP: diminished breath sounds RLL  ABD: NTND  EXT: no c/c/e  NEURO: no focal deficits

## 2018-08-17 NOTE — PROCEDURE NOTE - NSPROCDETAILS_GEN_ALL_CORE
location identified, draped/prepped, sterile technique used, needle inserted/introduced
secured in place

## 2018-08-17 NOTE — ED ADULT NURSE NOTE - PMH
Atrial fibrillation, unspecified type    Cerebrovascular accident (CVA) due to bilateral embolism of posterior cerebral arteries    Essential hypertension    Muscle weakness (generalized)    Oxygen dependent    Systolic congestive heart failure, unspecified chronicity

## 2018-08-18 LAB
ALBUMIN FLD-MCNC: 1 G/DL — SIGNIFICANT CHANGE UP
ALBUMIN SERPL ELPH-MCNC: 2.3 G/DL — LOW (ref 3.5–5.2)
ALP SERPL-CCNC: 178 U/L — HIGH (ref 30–115)
ALT FLD-CCNC: 19 U/L — SIGNIFICANT CHANGE UP (ref 0–41)
ANION GAP SERPL CALC-SCNC: 14 MMOL/L — SIGNIFICANT CHANGE UP (ref 7–14)
APTT BLD: 46.5 SEC — HIGH (ref 27–39.2)
APTT BLD: 48.3 SEC — HIGH (ref 27–39.2)
APTT BLD: SIGNIFICANT CHANGE UP SEC (ref 27–39.2)
AST SERPL-CCNC: 13 U/L — SIGNIFICANT CHANGE UP (ref 0–41)
BASOPHILS # BLD AUTO: 0.01 K/UL — SIGNIFICANT CHANGE UP (ref 0–0.2)
BASOPHILS NFR BLD AUTO: 0.2 % — SIGNIFICANT CHANGE UP (ref 0–1)
BILIRUB SERPL-MCNC: 0.6 MG/DL — SIGNIFICANT CHANGE UP (ref 0.2–1.2)
BUN SERPL-MCNC: 18 MG/DL — SIGNIFICANT CHANGE UP (ref 10–20)
CALCIUM SERPL-MCNC: 8.1 MG/DL — LOW (ref 8.5–10.1)
CHLORIDE SERPL-SCNC: 100 MMOL/L — SIGNIFICANT CHANGE UP (ref 98–110)
CO2 SERPL-SCNC: 25 MMOL/L — SIGNIFICANT CHANGE UP (ref 17–32)
CREAT SERPL-MCNC: 0.8 MG/DL — SIGNIFICANT CHANGE UP (ref 0.7–1.5)
EOSINOPHIL # BLD AUTO: 0.05 K/UL — SIGNIFICANT CHANGE UP (ref 0–0.7)
EOSINOPHIL NFR BLD AUTO: 0.8 % — SIGNIFICANT CHANGE UP (ref 0–8)
GLUCOSE FLD-MCNC: <2 MG/DL — SIGNIFICANT CHANGE UP
GLUCOSE SERPL-MCNC: 115 MG/DL — HIGH (ref 70–99)
GRAM STN FLD: SIGNIFICANT CHANGE UP
HCT VFR BLD CALC: 28.6 % — LOW (ref 42–52)
HGB BLD-MCNC: 9.1 G/DL — LOW (ref 14–18)
IMM GRANULOCYTES NFR BLD AUTO: 0.3 % — SIGNIFICANT CHANGE UP (ref 0.1–0.3)
INR BLD: 1.66 RATIO — HIGH (ref 0.65–1.3)
INR BLD: SIGNIFICANT CHANGE UP RATIO (ref 0.65–1.3)
LDH SERPL L TO P-CCNC: 2900 U/L — SIGNIFICANT CHANGE UP
LYMPHOCYTES # BLD AUTO: 1.18 K/UL — LOW (ref 1.2–3.4)
LYMPHOCYTES # BLD AUTO: 18.7 % — LOW (ref 20.5–51.1)
MCHC RBC-ENTMCNC: 30.5 PG — SIGNIFICANT CHANGE UP (ref 27–31)
MCHC RBC-ENTMCNC: 31.8 G/DL — LOW (ref 32–37)
MCV RBC AUTO: 96 FL — HIGH (ref 80–94)
MONOCYTES # BLD AUTO: 0.36 K/UL — SIGNIFICANT CHANGE UP (ref 0.1–0.6)
MONOCYTES NFR BLD AUTO: 5.7 % — SIGNIFICANT CHANGE UP (ref 1.7–9.3)
NEUTROPHILS # BLD AUTO: 4.7 K/UL — SIGNIFICANT CHANGE UP (ref 1.4–6.5)
NEUTROPHILS NFR BLD AUTO: 74.3 % — SIGNIFICANT CHANGE UP (ref 42.2–75.2)
NIGHT BLUE STAIN TISS: SIGNIFICANT CHANGE UP
NRBC # BLD: 0 /100 WBCS — SIGNIFICANT CHANGE UP (ref 0–0)
PH FLD: 7.3 — SIGNIFICANT CHANGE UP
PLATELET # BLD AUTO: 302 K/UL — SIGNIFICANT CHANGE UP (ref 130–400)
POTASSIUM SERPL-MCNC: 4.7 MMOL/L — SIGNIFICANT CHANGE UP (ref 3.5–5)
POTASSIUM SERPL-SCNC: 4.7 MMOL/L — SIGNIFICANT CHANGE UP (ref 3.5–5)
PROT FLD-MCNC: 3 G/DL — SIGNIFICANT CHANGE UP
PROT SERPL-MCNC: 5.6 G/DL — LOW (ref 6–8)
PROTHROM AB SERPL-ACNC: 17.8 SEC — HIGH (ref 9.95–12.87)
PROTHROM AB SERPL-ACNC: SIGNIFICANT CHANGE UP SEC (ref 9.95–12.87)
RBC # BLD: 2.98 M/UL — LOW (ref 4.7–6.1)
RBC # FLD: 16.2 % — HIGH (ref 11.5–14.5)
SODIUM SERPL-SCNC: 139 MMOL/L — SIGNIFICANT CHANGE UP (ref 135–146)
SPECIMEN SOURCE: SIGNIFICANT CHANGE UP
SPECIMEN SOURCE: SIGNIFICANT CHANGE UP
WBC # BLD: 6.32 K/UL — SIGNIFICANT CHANGE UP (ref 4.8–10.8)
WBC # FLD AUTO: 6.32 K/UL — SIGNIFICANT CHANGE UP (ref 4.8–10.8)

## 2018-08-18 RX ORDER — VANCOMYCIN HCL 1 G
750 VIAL (EA) INTRAVENOUS EVERY 8 HOURS
Qty: 0 | Refills: 0 | Status: DISCONTINUED | OUTPATIENT
Start: 2018-08-18 | End: 2018-08-19

## 2018-08-18 RX ORDER — HEPARIN SODIUM 5000 [USP'U]/ML
1300 INJECTION INTRAVENOUS; SUBCUTANEOUS
Qty: 25000 | Refills: 0 | Status: DISCONTINUED | OUTPATIENT
Start: 2018-08-18 | End: 2018-08-19

## 2018-08-18 RX ORDER — ALTEPLASE 100 MG
10 KIT INTRAVENOUS ONCE
Qty: 0 | Refills: 0 | Status: COMPLETED | OUTPATIENT
Start: 2018-08-18 | End: 2018-08-18

## 2018-08-18 RX ORDER — VANCOMYCIN HCL 1 G
750 VIAL (EA) INTRAVENOUS EVERY 12 HOURS
Qty: 0 | Refills: 0 | Status: DISCONTINUED | OUTPATIENT
Start: 2018-08-18 | End: 2018-08-18

## 2018-08-18 RX ORDER — HEPARIN SODIUM 5000 [USP'U]/ML
2000 INJECTION INTRAVENOUS; SUBCUTANEOUS ONCE
Qty: 0 | Refills: 0 | Status: DISCONTINUED | OUTPATIENT
Start: 2018-08-18 | End: 2018-08-18

## 2018-08-18 RX ORDER — HEPARIN SODIUM 5000 [USP'U]/ML
1200 INJECTION INTRAVENOUS; SUBCUTANEOUS
Qty: 25000 | Refills: 0 | Status: DISCONTINUED | OUTPATIENT
Start: 2018-08-18 | End: 2018-08-18

## 2018-08-18 RX ADMIN — Medication 250 MILLIGRAM(S): at 22:37

## 2018-08-18 RX ADMIN — MEROPENEM 100 MILLIGRAM(S): 1 INJECTION INTRAVENOUS at 22:36

## 2018-08-18 RX ADMIN — Medication 250 MILLIGRAM(S): at 14:44

## 2018-08-18 RX ADMIN — CARVEDILOL PHOSPHATE 3.12 MILLIGRAM(S): 80 CAPSULE, EXTENDED RELEASE ORAL at 18:00

## 2018-08-18 RX ADMIN — IVABRADINE 5 MILLIGRAM(S): 7.5 TABLET, FILM COATED ORAL at 06:06

## 2018-08-18 RX ADMIN — SACUBITRIL AND VALSARTAN 1 TABLET(S): 24; 26 TABLET, FILM COATED ORAL at 06:05

## 2018-08-18 RX ADMIN — CARVEDILOL PHOSPHATE 3.12 MILLIGRAM(S): 80 CAPSULE, EXTENDED RELEASE ORAL at 06:05

## 2018-08-18 RX ADMIN — SACUBITRIL AND VALSARTAN 1 TABLET(S): 24; 26 TABLET, FILM COATED ORAL at 18:00

## 2018-08-18 RX ADMIN — HEPARIN SODIUM 12 UNIT(S)/HR: 5000 INJECTION INTRAVENOUS; SUBCUTANEOUS at 08:25

## 2018-08-18 RX ADMIN — MEROPENEM 100 MILLIGRAM(S): 1 INJECTION INTRAVENOUS at 06:05

## 2018-08-18 RX ADMIN — IVABRADINE 5 MILLIGRAM(S): 7.5 TABLET, FILM COATED ORAL at 17:47

## 2018-08-18 RX ADMIN — ALTEPLASE 3000 MILLIGRAM(S): KIT at 13:54

## 2018-08-18 RX ADMIN — Medication 166.67 MILLIGRAM(S): at 06:05

## 2018-08-18 RX ADMIN — Medication 81 MILLIGRAM(S): at 11:49

## 2018-08-18 RX ADMIN — MEROPENEM 100 MILLIGRAM(S): 1 INJECTION INTRAVENOUS at 14:44

## 2018-08-18 RX ADMIN — Medication 40 MILLIGRAM(S): at 06:05

## 2018-08-18 RX ADMIN — SPIRONOLACTONE 50 MILLIGRAM(S): 25 TABLET, FILM COATED ORAL at 06:05

## 2018-08-18 RX ADMIN — Medication 0.12 MILLIGRAM(S): at 06:05

## 2018-08-18 NOTE — PROGRESS NOTE ADULT - SUBJECTIVE AND OBJECTIVE BOX
Patient is a 68y old Male who presents with a chief complaint of SOB (17 Aug 2018 17:22)    Currently admitted to medicine with the primary diagnosis of Shortness of breath    Today is hospital day 1d.    BRIEF HOSPITAL COURSE:     EVENTS LAST 24HRS:     PAST MEDICAL & SURGICAL HISTORY  Muscle weakness (generalized)  Atrial fibrillation, unspecified type  Oxygen dependent  Essential hypertension  Cerebrovascular accident (CVA) due to bilateral embolism of posterior cerebral arteries  Systolic congestive heart failure, unspecified chronicity  AICD (automatic cardioverter/defibrillator) present      SOCIAL HISTORY:      REVIEW OF SYSTEMS:  See HPI    ALLERGIES:  cephalexin (Flushing)  penicillin (Rash)    MEDICATIONS:  STANDING MEDICATIONS  aspirin  chewable 81 milliGRAM(s) Oral daily  carvedilol 3.125 milliGRAM(s) Oral every 12 hours  digoxin     Tablet 0.125 milliGRAM(s) Oral daily  furosemide   Injectable 40 milliGRAM(s) IV Push every 12 hours  heparin  Infusion 1300 Unit(s)/Hr IV Continuous <Continuous>  ivabradine 5 milliGRAM(s) Oral two times a day  meropenem  IVPB 1000 milliGRAM(s) IV Intermittent every 8 hours  sacubitril 24 mG/valsartan 26 mG 1 Tablet(s) Oral two times a day  spironolactone 50 milliGRAM(s) Oral two times a day  vancomycin  IVPB 750 milliGRAM(s) IV Intermittent every 8 hours    PRN MEDICATIONS  acetaminophen   Tablet. 650 milliGRAM(s) Oral every 6 hours PRN    VITALS:         ICU Vital Signs Last 24 Hrs:    T(C): 36.6 (18 Aug 2018 20:00), Max: 36.6 (18 Aug 2018 20:00)  T(F): 97.9 (18 Aug 2018 20:00), Max: 97.9 (18 Aug 2018 20:00)  HR: 78 (18 Aug 2018 23:00) (66 - 88)  BP: 106/65 (18 Aug 2018 23:00) (94/58 - 139/62)  BP(mean): 81 (18 Aug 2018 23:00) (71 - 102)  ABP: --  ABP(mean): --  RR: 43 (18 Aug 2018 23:00) (14 - 46)  SpO2: 100% (18 Aug 2018 23:00) (71% - 100%)          I&O's Detail:      17 Aug 2018 07:01  -  18 Aug 2018 07:00  --------------------------------------------------------  IN:    heparin Infusion: 120 mL    IV PiggyBack: 650 mL  Total IN: 770 mL    OUT:    Chest Tube: 625 mL    Voided: 950 mL  Total OUT: 1575 mL    Total NET: -805 mL      18 Aug 2018 07:01  -  18 Aug 2018 23:37  --------------------------------------------------------  IN:    heparin Infusion: 10 mL    heparin Infusion: 84 mL    heparin Infusion: 78 mL    IV PiggyBack: 550 mL    Oral Fluid: 220 mL  Total IN: 942 mL    OUT:    Chest Tube: 985 mL    Voided: 700 mL  Total OUT: 1685 mL    Total NET: -743 mL          LABS:                        9.1    6.32  )-----------( 302      ( 18 Aug 2018 04:33 )             28.6     08-18    139  |  100  |  18  ----------------------------<  115<H>  4.7   |  25  |  0.8    Ca    8.1<L>      18 Aug 2018 04:33    TPro  5.6<L>  /  Alb  2.3<L>  /  TBili  0.6  /  DBili  x   /  AST  13  /  ALT  19  /  AlkPhos  178<H>  08-18      CARDIAC MARKERS ( 17 Aug 2018 12:26 )  x     / 0.01 ng/mL / x     / x     / x          CAPILLARY BLOOD GLUCOSE  133 (18 Aug 2018 22:00)  PT/INR - ( 18 Aug 2018 20:15 )   PT: TNP sec;   INR: TNP ratio    PTT - ( 18 Aug 2018 20:15 )  PTT:TNP sec    CULTURES:  Culture Results:   Few Streptococcus agalactiae (Group B) (08-17 @ 15:16)  Culture - Body Fluid with Gram Stain (08.17.18 @ 15:16)    Gram Stain:   Moderate polymorphonuclear leukocytes seen per low power field  Rare Gram positive cocci in pairs seen per oil power field    Specimen Source: .Body Fluid Pleural Fluid    Culture Results:   Few Streptococcus agalactiae (Group B)        PHYSICAL EXAM:    General: No acute distress.  Alert, oriented, interactive, nonfocal.    HEENT: Pupils equal, reactive to light symmetrically.    PULM: Clear to auscultation bilaterally, no significant sputum production.    CVS: Regular rate and rhythm, no murmurs, rubs, or gallops.    ABD: Soft, nondistended, nontender, no masses.    EXT: No edema, nontender.    SKIN: Warm and well perfused, no rashes noted.    RADIOLOGY: Patient is a 68y old Male who presents with a chief complaint of SOB (17 Aug 2018 17:22)    Currently admitted to medicine with the primary diagnosis of Shortness of breath    Today is hospital day 1d.    BRIEF HOSPITAL COURSE:   67 yo M w/ hx of Afib not on AC, CHF (EF 23%) w/ AICD, CVA presents w/ SOB since this AM. Recent hx of AICD placement at [facility], subsequently went to [facility] for rehabilitation. Patient denies any respiratory issue prior to this am. He sat 77% on NRB at [facility].     In ED, patient in tachypnic to 30s, in respiratory distress which improved w/ BiPAP. Bedside US significant for right side loculated pleural effusion. Thoracentesis by Pulm team was done bedside, chest tube was inserted to suction. Denies fever, chills, CP, abdominal pain, dysuria. Never had this before. No hx of smoking.      EVENTS LAST 24HRS:   Pt seen and examined at bedside.  Denied CP, palpitations, abd pain, N/V. Denied hx of liver disease or EtOH use.   Patient denied feeling SOB but noted to be breathing at 30s, but SaO2 100% on NC 2L.     PAST MEDICAL & SURGICAL HISTORY  Muscle weakness (generalized)  Atrial fibrillation, unspecified type  Oxygen dependent  Essential hypertension  Cerebrovascular accident (CVA) due to bilateral embolism of posterior cerebral arteries  Systolic congestive heart failure, unspecified chronicity  AICD (automatic cardioverter/defibrillator) present    SOCIAL HISTORY:  Denied smoking, EtOH, drug use.     REVIEW OF SYSTEMS:  See HPI    ALLERGIES:  cephalexin (Flushing)  penicillin (Rash)    MEDICATIONS:  STANDING MEDICATIONS  aspirin  chewable 81 milliGRAM(s) Oral daily  carvedilol 3.125 milliGRAM(s) Oral every 12 hours  digoxin     Tablet 0.125 milliGRAM(s) Oral daily  furosemide   Injectable 40 milliGRAM(s) IV Push every 12 hours  heparin  Infusion 1300 Unit(s)/Hr IV Continuous <Continuous>  ivabradine 5 milliGRAM(s) Oral two times a day  meropenem  IVPB 1000 milliGRAM(s) IV Intermittent every 8 hours  sacubitril 24 mG/valsartan 26 mG 1 Tablet(s) Oral two times a day  spironolactone 50 milliGRAM(s) Oral two times a day  vancomycin  IVPB 750 milliGRAM(s) IV Intermittent every 8 hours    PRN MEDICATIONS  acetaminophen   Tablet. 650 milliGRAM(s) Oral every 6 hours PRN    VITALS:   ICU Vital Signs Last 24 Hrs:    T(C): 36.6 (18 Aug 2018 20:00), Max: 36.6 (18 Aug 2018 20:00)  T(F): 97.9 (18 Aug 2018 20:00), Max: 97.9 (18 Aug 2018 20:00)  HR: 78 (18 Aug 2018 23:00) (66 - 88)  BP: 106/65 (18 Aug 2018 23:00) (94/58 - 139/62)  BP(mean): 81 (18 Aug 2018 23:00) (71 - 102)  ABP: --  ABP(mean): --  RR: 43 (18 Aug 2018 23:00) (14 - 46)  SpO2: 100% (18 Aug 2018 23:00) (71% - 100%)    I&O's Detail:  17 Aug 2018 07:01  -  18 Aug 2018 07:00  --------------------------------------------------------  IN:    heparin Infusion: 120 mL    IV PiggyBack: 650 mL  Total IN: 770 mL    OUT:    Chest Tube: 625 mL    Voided: 950 mL  Total OUT: 1575 mL  Total NET: -805 mL    18 Aug 2018 07:01  -  18 Aug 2018 23:37  --------------------------------------------------------  IN:    heparin Infusion: 10 mL    heparin Infusion: 84 mL    heparin Infusion: 78 mL    IV PiggyBack: 550 mL    Oral Fluid: 220 mL  Total IN: 942 mL    OUT:    Chest Tube: 985 mL    Voided: 700 mL  Total OUT: 1685 mL    Total NET: -743 mL    LABS:                        9.1    6.32  )-----------( 302      ( 18 Aug 2018 04:33 )             28.6     08-18    139  |  100  |  18  ----------------------------<  115<H>  4.7   |  25  |  0.8    Ca    8.1<L>      18 Aug 2018 04:33    TPro  5.6<L>  /  Alb  2.3<L>  /  TBili  0.6  /  DBili  x   /  AST  13  /  ALT  19  /  AlkPhos  178<H>  08-18      CARDIAC MARKERS ( 17 Aug 2018 12:26 )  x     / 0.01 ng/mL / x     / x     / x          CAPILLARY BLOOD GLUCOSE  133 (18 Aug 2018 22:00)  PT/INR - ( 18 Aug 2018 20:15 )   PT: TNP sec;   INR: TNP ratio    PTT - ( 18 Aug 2018 20:15 )  PTT:TNP sec    CULTURES:  Culture Results:   Few Streptococcus agalactiae (Group B) (08-17 @ 15:16)  Culture - Body Fluid with Gram Stain (08.17.18 @ 15:16)    Gram Stain:   Moderate polymorphonuclear leukocytes seen per low power field  Rare Gram positive cocci in pairs seen per oil power field    Specimen Source: .Body Fluid Pleural Fluid    Culture Results:   Few Streptococcus agalactiae (Group B)        PHYSICAL EXAM:  General: Thin AAM lying in bed on 2L NC, Not in acute distress. Tachypnea to 30s.  Alert, oriented, interactive, nonfocal.  HEENT: Pupils equal, reactive to light symmetrically. Mild scleral icterus noted.  PULM: Clear to auscultation bilaterally, R chest tube in placed with >2000cc in drain.  CVS: Regular rate and rhythm, no murmurs  ABD: Soft, nontender, mild distention, tympanic to percussion, +BS  EXT: No edema, nontender.  SKIN: Warm and well perfused, no rashes noted.    RADIOLOGY:     < from: CT Angio Chest w/ IV Cont (08.17.18 @ 17:40) >  1.  Small pulmonary embolism in the right interlobar artery.  2.  Right moderate to large partially loculated pleural effusion with   near complete atelectasis of the right lower lobe. Right pleural catheter   in place with adjacent gas.  3.  Small left pleural effusion with compressive atelectasis.  4.  Partially imaged abdominal ascites.    < end of copied text >  < from: Xray Chest 1 View- PORTABLE-Routine (08.18.18 @ 06:01) >  Cardiomegaly, unchanged.    Bilateral opacities, unchanged.    Right-sided chest tube.    Left-sided permanent pacemaker.    < end of copied text >

## 2018-08-18 NOTE — PROGRESS NOTE ADULT - ASSESSMENT
69 yo M w/ hx of Afib not on AC, CHF (EF 23%) w/ AICD, CVA admitted with R loculated pleural effusion s/p R chest tube insertion.     #R loculated pleural effusion w/ possible underlying PNA, r/o empyema  - s/p thoracentesis and R pigtail insertion  - pigtail to low wall suctioning  - tPA pushed today 8/18, drained ~500-600 serosanguinous fluid at that time  - >2000 noted at 23:30 to be in the drain  - Pleural Fluid 8/17: LDH 2900, pH 7.3, prt 3 } serum prt 5.3; EXUDATIVE   - Pleural Culture 8/17: Strep agalactiae, moderate neutrophils   - c/w Vanco and Santi, f/u Vanco trough prior to 4th dose  - nasal MRSA +ve, pt now on contact    #R PE on CT Angio  - c/w Heparin drip, goal PTT 60-80.  - monitor vitals closely    #Decompensated CHF  - BNP ~65534  - 2D echo 04/2018 EF 23%, G1DD, mod MR, mod AR  - repeat 2d echo  - Lasix 40 IV BID  -c/w Entresto, Corlanor, Aldactone, Coreg  -c/w digoxin, f/u level  -cardio c/s    #Hx of CVA   - c/w ASA  #Afib not on AC   - was on Eliquis but stopped d/t intolerance  - c/w BB for rate control    #MISC:   - Activity - bedrest  - DVT ppx - Lovenox  - Diet - DASH/TLC  - full code  - Dispo - from [facility] 67 yo M w/ hx of Afib not on AC, CHF (EF 23%) w/ AICD, CVA admitted with R loculated pleural effusion s/p R chest tube insertion.     #R loculated pleural effusion w/ possible underlying PNA, r/o empyema  - s/p thoracentesis and R pigtail insertion  - pigtail to low wall suctioning  - tPA pushed today 8/18, drained ~500-600 serosanguinous fluid at that time  - >1600 noted at 23:30 to be in the drain  - Pleural Fluid 8/17: LDH 2900, pH 7.3, prt 3 } serum prt 5.3; EXUDATIVE   - Pleural Culture 8/17: Strep agalactiae, moderate neutrophils   - c/w Vanco and Santi, f/u Vanco trough prior to 4th dose  - nasal MRSA +ve, pt now on contact    #R PE on CT Angio  - c/w Heparin drip, goal PTT 60-80.  - monitor vitals closely    #Decompensated CHF  - BNP ~83163  - 2D echo 04/2018 EF 23%, G1DD, mod MR, mod AR  - repeat 2d echo  - Lasix 40 IV BID  -c/w Entresto, Corlanor, Aldactone, Coreg  -c/w digoxin, f/u level  -cardio c/s    #Hx of CVA   - c/w ASA  #Afib not on AC   - was on Eliquis but stopped d/t intolerance  - c/w BB for rate control    #MISC:   - Activity - bedrest  - DVT ppx - Lovenox  - Diet - DASH/TLC  - full code  - Dispo - from [facility]

## 2018-08-18 NOTE — PROGRESS NOTE ADULT - ASSESSMENT
69 yo M w/ hx of Afib not on AC, CHF (EF 23%) w/ AICD, CVA presents w/ SOB.    #R loculated pleural effusion w/ possible underlying PNA, r/o empyema  -s/p thoracentesis and pigtail insertion  -pigtail to low wall suctioning  -f/u w/ CT surgery, tpa to break up loculation tomorrow  -f/u fluid studies  -c/w Vanco and Santi, f/u Vanco trough prior to 4th dose  -check nasal MRSA  -CT chest showed Right interlobar pulmonary embolus.  -Startedon      #Decompensated CHF  -BNP ~63133  -2D echo 04/2018 EF 23%, G1DD, mod MR, mod AR  -repeat 2d echo  -Lasix 40 IV BID  -c/w Entresto, Corlanor, Aldactone, Coreg  -c/w digoxin, f/u level  -cardio c/s    #hx of CVA - c/w ASA  #Afib not on AC - was on Eliquis but stopped d/t intolerance, BB for rate control    #Activity - bedrest  #DVT ppx - Lovenox  #Diet - DASH/TLC  #Code status - full code  #Dispo - from [facility]

## 2018-08-18 NOTE — PROGRESS NOTE ADULT - SUBJECTIVE AND OBJECTIVE BOX
SAMANTHA CHARLES  68y Male   [id number]    Hospital Day:   Post Operative Day:  Procedure:  Patient is a 68y old  Male who presents with a chief complaint of SOB (17 Aug 2018 17:22)    PAST MEDICAL & SURGICAL HISTORY:  Muscle weakness (generalized)  Atrial fibrillation, unspecified type  Oxygen dependent  Essential hypertension  Cerebrovascular accident (CVA) due to bilateral embolism of posterior cerebral arteries  Systolic congestive heart failure, unspecified chronicity  AICD (automatic cardioverter/defibrillator) present      Events of the Last 24h:  Pt was found to have loculated pleural effusion, bedside thoracocentesis, Right pigtail catheter was placed with a drainage of 400ml of serous fluid, connected to suction. Otherwise patient reports no complaints, alert, resting comfortably in bed.     Vital Signs Last 24 Hrs  T(C): 37.7 (17 Aug 2018 20:00), Max: 37.9 (17 Aug 2018 12:24)  T(F): 99.9 (17 Aug 2018 20:00), Max: 100.3 (17 Aug 2018 12:24)  HR: 84 (18 Aug 2018 02:37) (70 - 84)  BP: 101/75 (18 Aug 2018 02:37) (94/58 - 112/72)  BP(mean): 86 (18 Aug 2018 02:37) (71 - 88)  RR: 22 (18 Aug 2018 02:37) (15 - 40)  SpO2: 100% (18 Aug 2018 02:37) (71% - 100%)        Diet, DASH/TLC:   Sodium & Cholesterol Restricted (08-17-18 @ 18:18)      I&O's Summary    17 Aug 2018 07:01  -  18 Aug 2018 03:57  --------------------------------------------------------  IN: 360 mL / OUT: 1475 mL / NET: -1115 mL     I&O's Detail    17 Aug 2018 07:01  -  18 Aug 2018 03:57  --------------------------------------------------------  IN:    heparin Infusion: 60 mL    IV PiggyBack: 300 mL  Total IN: 360 mL    OUT:    Chest Tube: 625 mL    Voided: 850 mL  Total OUT: 1475 mL    Total NET: -1115 mL          MEDICATIONS  (STANDING):  aspirin  chewable 81 milliGRAM(s) Oral daily  carvedilol 3.125 milliGRAM(s) Oral every 12 hours  digoxin     Tablet 0.125 milliGRAM(s) Oral daily  furosemide   Injectable 40 milliGRAM(s) IV Push every 12 hours  heparin  Infusion 1000 Unit(s)/Hr (10 mL/Hr) IV Continuous <Continuous>  ivabradine 5 milliGRAM(s) Oral two times a day  meropenem  IVPB 1000 milliGRAM(s) IV Intermittent every 8 hours  sacubitril 24 mG/valsartan 26 mG 1 Tablet(s) Oral two times a day  spironolactone 50 milliGRAM(s) Oral two times a day  vancomycin  IVPB 1250 milliGRAM(s) IV Intermittent every 8 hours    MEDICATIONS  (PRN):  acetaminophen   Tablet. 650 milliGRAM(s) Oral every 6 hours PRN Mild Pain (1 - 3)      PHYSICAL EXAM  General: NAD, AAOx3, calm and cooperative  HEENT: NCAT, JESSY, EOMI, Trachea ML, Neck supple  Cardiac: RRR S1, S2, no Murmurs, rubs or gallops  Respiratory: Decreased breath sounds R lung base, increased respiratory effort  Pigtail catheter in place draining serous fluid, no bleeding, no air leak  Abdomen: Soft, non-distended, non-tender, +bowel sounds  Skin: Warm/dry, normal color                          10.8   7.51  )-----------( 366      ( 17 Aug 2018 12:26 )             33.4        CBC Full  -  ( 17 Aug 2018 12:26 )  WBC Count : 7.51 K/uL  Hemoglobin : 10.8 g/dL  Hematocrit : 33.4 %  Platelet Count - Automated : 366 K/uL  Mean Cell Volume : 97.7 fL  Mean Cell Hemoglobin : 31.6 pg  Mean Cell Hemoglobin Concentration : 32.3 g/dL  Auto Neutrophil # : 5.60 K/uL  Auto Lymphocyte # : 1.28 K/uL  Auto Monocyte # : 0.57 K/uL  Auto Eosinophil # : 0.02 K/uL  Auto Basophil # : 0.01 K/uL  Auto Neutrophil % : 74.6 %  Auto Lymphocyte % : 17.0 %  Auto Monocyte % : 7.6 %  Auto Eosinophil % : 0.3 %  Auto Basophil % : 0.1 %               137   |  98    |  16                 Ca: 8.3    BMP:   ----------------------------< 99     Mg: x     (08-17-18 @ 12:26)             5.6    |  24    | 0.7                Ph: x        LFT:     TPro: 6.4 / Alb: 2.5 / TBili: 0.8 / DBili: x / AST: 33 / ALT: 25 / AlkPhos: 216   (08-17-18 @ 12:26)    LIVER FUNCTIONS - ( 17 Aug 2018 12:26 )  Alb: 2.5 g/dL / Pro: 6.4 g/dL / ALK PHOS: 216 U/L / ALT: 25 U/L / AST: 33 U/L / GGT: x           PT/INR - ( 17 Aug 2018 12:26 )   PT: 16.40 sec;   INR: 1.52 ratio         PTT - ( 17 Aug 2018 12:26 )  PTT:28.7 sec  CARDIAC MARKERS ( 17 Aug 2018 12:26 )  x     / 0.01 ng/mL / x     / x     / x SAMANTHA CHARLES  68y Male     Hospital Day:   Post Operative Day:  Procedure:  Patient is a 68y old  Male who presents with a chief complaint of SOB (17 Aug 2018 17:22)    PAST MEDICAL & SURGICAL HISTORY:  Muscle weakness (generalized)  Atrial fibrillation, unspecified type  Oxygen dependent  Essential hypertension  Cerebrovascular accident (CVA) due to bilateral embolism of posterior cerebral arteries  Systolic congestive heart failure, unspecified chronicity  AICD (automatic cardioverter/defibrillator) present      Events of the Last 24h:  Pt was found to have loculated pleural effusion, bedside thoracocentesis, Right pigtail catheter was placed with a drainage of 400ml of serous fluid, connected to suction. Otherwise patient reports no complaints, alert, resting comfortably in bed.     Vital Signs Last 24 Hrs  T(C): 37.7 (17 Aug 2018 20:00), Max: 37.9 (17 Aug 2018 12:24)  T(F): 99.9 (17 Aug 2018 20:00), Max: 100.3 (17 Aug 2018 12:24)  HR: 84 (18 Aug 2018 02:37) (70 - 84)  BP: 101/75 (18 Aug 2018 02:37) (94/58 - 112/72)  BP(mean): 86 (18 Aug 2018 02:37) (71 - 88)  RR: 22 (18 Aug 2018 02:37) (15 - 40)  SpO2: 100% (18 Aug 2018 02:37) (71% - 100%)        Diet, DASH/TLC:   Sodium & Cholesterol Restricted (08-17-18 @ 18:18)      I&O's Summary    17 Aug 2018 07:01  -  18 Aug 2018 03:57  --------------------------------------------------------  IN: 360 mL / OUT: 1475 mL / NET: -1115 mL     I&O's Detail    17 Aug 2018 07:01  -  18 Aug 2018 03:57  --------------------------------------------------------  IN:    heparin Infusion: 60 mL    IV PiggyBack: 300 mL  Total IN: 360 mL    OUT:    Chest Tube: 625 mL    Voided: 850 mL  Total OUT: 1475 mL    Total NET: -1115 mL          MEDICATIONS  (STANDING):  aspirin  chewable 81 milliGRAM(s) Oral daily  carvedilol 3.125 milliGRAM(s) Oral every 12 hours  digoxin     Tablet 0.125 milliGRAM(s) Oral daily  furosemide   Injectable 40 milliGRAM(s) IV Push every 12 hours  heparin  Infusion 1000 Unit(s)/Hr (10 mL/Hr) IV Continuous <Continuous>  ivabradine 5 milliGRAM(s) Oral two times a day  meropenem  IVPB 1000 milliGRAM(s) IV Intermittent every 8 hours  sacubitril 24 mG/valsartan 26 mG 1 Tablet(s) Oral two times a day  spironolactone 50 milliGRAM(s) Oral two times a day  vancomycin  IVPB 1250 milliGRAM(s) IV Intermittent every 8 hours    MEDICATIONS  (PRN):  acetaminophen   Tablet. 650 milliGRAM(s) Oral every 6 hours PRN Mild Pain (1 - 3)      PHYSICAL EXAM  General: NAD, AAOx3, calm and cooperative  HEENT: NCAT, JESSY, EOMI, Trachea ML, Neck supple  Cardiac: RRR S1, S2, no Murmurs, rubs or gallops  Respiratory: Decreased breath sounds R lung base, increased respiratory effort  Pigtail catheter in place draining serous fluid, no bleeding, no air leak  Abdomen: Soft, non-distended, non-tender, +bowel sounds  Skin: Warm/dry, normal color                          10.8   7.51  )-----------( 366      ( 17 Aug 2018 12:26 )             33.4        CBC Full  -  ( 17 Aug 2018 12:26 )  WBC Count : 7.51 K/uL  Hemoglobin : 10.8 g/dL  Hematocrit : 33.4 %  Platelet Count - Automated : 366 K/uL  Mean Cell Volume : 97.7 fL  Mean Cell Hemoglobin : 31.6 pg  Mean Cell Hemoglobin Concentration : 32.3 g/dL  Auto Neutrophil # : 5.60 K/uL  Auto Lymphocyte # : 1.28 K/uL  Auto Monocyte # : 0.57 K/uL  Auto Eosinophil # : 0.02 K/uL  Auto Basophil # : 0.01 K/uL  Auto Neutrophil % : 74.6 %  Auto Lymphocyte % : 17.0 %  Auto Monocyte % : 7.6 %  Auto Eosinophil % : 0.3 %  Auto Basophil % : 0.1 %               137   |  98    |  16                 Ca: 8.3    BMP:   ----------------------------< 99     Mg: x     (08-17-18 @ 12:26)             5.6    |  24    | 0.7                Ph: x        LFT:     TPro: 6.4 / Alb: 2.5 / TBili: 0.8 / DBili: x / AST: 33 / ALT: 25 / AlkPhos: 216   (08-17-18 @ 12:26)    LIVER FUNCTIONS - ( 17 Aug 2018 12:26 )  Alb: 2.5 g/dL / Pro: 6.4 g/dL / ALK PHOS: 216 U/L / ALT: 25 U/L / AST: 33 U/L / GGT: x           PT/INR - ( 17 Aug 2018 12:26 )   PT: 16.40 sec;   INR: 1.52 ratio         PTT - ( 17 Aug 2018 12:26 )  PTT:28.7 sec  CARDIAC MARKERS ( 17 Aug 2018 12:26 )  x     / 0.01 ng/mL / x     / x     / x

## 2018-08-18 NOTE — PROGRESS NOTE ADULT - ASSESSMENT
IMPRESSION:    Possible empyema / Pneumonia   Chronic hypoxemic respiratory failure in home O2  HF REF  HO AFIB not on AC  VTE     PLAN:    CNS: No depressants    HEENT: Oral care    PULMONARY:  HOB @ 45 degrees.  Will likely need chest tube need Chest tube     CARDIOVASCULAR: Continue maximizing  cardiac therapy.  Avoid volume overload    GI: GI prophylaxis.  Feeding     RENAL:  Follow up lytes.  Correct as needed    INFECTIOUS DISEASE: Follow up cultures.  Meropenem and Vanc for now     HEMATOLOGICAL:  DVT prophylaxis. FU coags     ENDOCRINE:  Follow up FS.  Insulin protocol if needed.    MUSCULOSKELETAL:    Possible transfer PM

## 2018-08-18 NOTE — PROGRESS NOTE ADULT - SUBJECTIVE AND OBJECTIVE BOX
Patient is a 68y old  Male who presents with a chief complaint of SOB (17 Aug 2018 17:22)        Over Night Events:  SP pig tail.  Off pressors.         ROS:  See HPI    PHYSICAL EXAM    ICU Vital Signs Last 24 Hrs  T(C): 37.7 (17 Aug 2018 20:00), Max: 37.9 (17 Aug 2018 12:24)  T(F): 99.9 (17 Aug 2018 20:00), Max: 100.3 (17 Aug 2018 12:24)  HR: 78 (18 Aug 2018 07:07) (70 - 88)  BP: 99/67 (18 Aug 2018 07:07) (94/58 - 120/92)  BP(mean): 82 (18 Aug 2018 07:07) (71 - 102)  ABP: --  ABP(mean): --  RR: 14 (18 Aug 2018 07:07) (14 - 40)  SpO2: 92% (18 Aug 2018 07:07) (71% - 100%)      General: in NAD   HEENT: JESSY             Lymphatic system: No cervical LN   Lungs: Bilateral BS.  Decreased BAS right base   Cardiovascular: Irregular   Gastrointestinal: Soft, Positive BS  Extremities: No clubbing.  Moves extremities.  Full Range of motion   Skin: Warm, intact  Neurological: No motor or sensory deficit       08-17-18 @ 07:01  -  08-18-18 @ 07:00  --------------------------------------------------------  IN:    heparin Infusion: 120 mL    IV PiggyBack: 650 mL  Total IN: 770 mL    OUT:    Chest Tube: 625 mL    Voided: 950 mL  Total OUT: 1575 mL    Total NET: -805 mL      08-18-18 @ 07:01  -  08-18-18 @ 08:16  --------------------------------------------------------  IN:  Total IN: 0 mL    OUT:    Voided: 200 mL  Total OUT: 200 mL    Total NET: -200 mL          LABS:                            9.1    6.32  )-----------( 302      ( 18 Aug 2018 04:33 )             28.6                                               08-18    139  |  100  |  18  ----------------------------<  115<H>  4.7   |  25  |  0.8    Ca    8.1<L>      18 Aug 2018 04:33    TPro  5.6<L>  /  Alb  2.3<L>  /  TBili  0.6  /  DBili  x   /  AST  13  /  ALT  19  /  AlkPhos  178<H>  08-18      PT/INR - ( 18 Aug 2018 04:33 )   PT: 17.80 sec;   INR: 1.66 ratio         PTT - ( 18 Aug 2018 04:33 )  PTT:46.5 sec                                           CARDIAC MARKERS ( 17 Aug 2018 12:26 )  x     / 0.01 ng/mL / x     / x     / x                                                LIVER FUNCTIONS - ( 18 Aug 2018 04:33 )  Alb: 2.3 g/dL / Pro: 5.6 g/dL / ALK PHOS: 178 U/L / ALT: 19 U/L / AST: 13 U/L / GGT: x                                                  Culture - Body Fluid with Gram Stain (collected 17 Aug 2018 15:16)  Source: .Body Fluid Pleural Fluid  Gram Stain (18 Aug 2018 07:31):    Moderate polymorphonuclear leukocytes seen per low power field    Rare Gram positive cocci in pairs seen per oil power field                                                                                           MEDICATIONS  (STANDING):  aspirin  chewable 81 milliGRAM(s) Oral daily  carvedilol 3.125 milliGRAM(s) Oral every 12 hours  digoxin     Tablet 0.125 milliGRAM(s) Oral daily  furosemide   Injectable 40 milliGRAM(s) IV Push every 12 hours  heparin  Infusion 1000 Unit(s)/Hr (10 mL/Hr) IV Continuous <Continuous>  ivabradine 5 milliGRAM(s) Oral two times a day  meropenem  IVPB 1000 milliGRAM(s) IV Intermittent every 8 hours  sacubitril 24 mG/valsartan 26 mG 1 Tablet(s) Oral two times a day  spironolactone 50 milliGRAM(s) Oral two times a day  vancomycin  IVPB 1250 milliGRAM(s) IV Intermittent every 8 hours    MEDICATIONS  (PRN):  acetaminophen   Tablet. 650 milliGRAM(s) Oral every 6 hours PRN Mild Pain (1 - 3)      Xrays:       Loculated effusion                                                                               ECHO

## 2018-08-18 NOTE — PROGRESS NOTE ADULT - SUBJECTIVE AND OBJECTIVE BOX
SUBJECTIVE:    Patient is a 68y old Male who presents with a chief complaint of SOB (17 Aug 2018 17:22)    Currently admitted to medicine with the primary diagnosis of Shortness of breath     Today is hospital day 1d.   OVERNIGHT EVENTS no acute events   Today, patient is    PAST MEDICAL & SURGICAL HISTORY  Muscle weakness (generalized)  Atrial fibrillation, unspecified type  Oxygen dependent  Essential hypertension  Cerebrovascular accident (CVA) due to bilateral embolism of posterior cerebral arteries  Systolic congestive heart failure, unspecified chronicity  AICD (automatic cardioverter/defibrillator) present          ALLERGIES:  cephalexin (Flushing)  penicillin (Rash)    MEDICATIONS:  STANDING MEDICATIONS  aspirin  chewable 81 milliGRAM(s) Oral daily  carvedilol 3.125 milliGRAM(s) Oral every 12 hours  digoxin     Tablet 0.125 milliGRAM(s) Oral daily  furosemide   Injectable 40 milliGRAM(s) IV Push every 12 hours  heparin  Infusion 1000 Unit(s)/Hr IV Continuous <Continuous>  ivabradine 5 milliGRAM(s) Oral two times a day  meropenem  IVPB 1000 milliGRAM(s) IV Intermittent every 8 hours  sacubitril 24 mG/valsartan 26 mG 1 Tablet(s) Oral two times a day  spironolactone 50 milliGRAM(s) Oral two times a day  vancomycin  IVPB 1250 milliGRAM(s) IV Intermittent every 8 hours    PRN MEDICATIONS  acetaminophen   Tablet. 650 milliGRAM(s) Oral every 6 hours PRN    VITALS:   T(F): 99.9  HR: 78  BP: 110/68  RR: 33  SpO2: 100%          LABS:                        10.8   7.51  )-----------( 366      ( 17 Aug 2018 12:26 )             33.4     08-17    137  |  98  |  16  ----------------------------<  99  5.6<H>   |  24  |  0.7    Ca    8.3<L>      17 Aug 2018 12:26    TPro  6.4  /  Alb  2.5<L>  /  TBili  0.8  /  DBili  x   /  AST  33  /  ALT  25  /  AlkPhos  216<H>  08-17    PT/INR - ( 17 Aug 2018 12:26 )   PT: 16.40 sec;   INR: 1.52 ratio         PTT - ( 17 Aug 2018 12:26 )  PTT:28.7 sec      Troponin T, Serum: 0.01 ng/mL (08-17-18 @ 12:26)      CARDIAC MARKERS ( 17 Aug 2018 12:26 )  x     / 0.01 ng/mL / x     / x     / x          RADIOLOGY:    PHYSICAL EXAM:  GEN:   LUNGS:   HEART:   ABD:   EXT:   NEURO:

## 2018-08-19 LAB
-  CLINDAMYCIN: SIGNIFICANT CHANGE UP
-  PENICILLIN: SIGNIFICANT CHANGE UP
-  VANCOMYCIN: SIGNIFICANT CHANGE UP
ALBUMIN SERPL ELPH-MCNC: 2.4 G/DL — LOW (ref 3.5–5.2)
ALP SERPL-CCNC: 152 U/L — HIGH (ref 30–115)
ALT FLD-CCNC: 16 U/L — SIGNIFICANT CHANGE UP (ref 0–41)
ANION GAP SERPL CALC-SCNC: 13 MMOL/L — SIGNIFICANT CHANGE UP (ref 7–14)
APTT BLD: 55.6 SEC — HIGH (ref 27–39.2)
APTT BLD: 80.3 SEC — CRITICAL HIGH (ref 27–39.2)
AST SERPL-CCNC: 11 U/L — SIGNIFICANT CHANGE UP (ref 0–41)
BILIRUB SERPL-MCNC: 0.7 MG/DL — SIGNIFICANT CHANGE UP (ref 0.2–1.2)
BLD GP AB SCN SERPL QL: SIGNIFICANT CHANGE UP
BUN SERPL-MCNC: 17 MG/DL — SIGNIFICANT CHANGE UP (ref 10–20)
CALCIUM SERPL-MCNC: 8.1 MG/DL — LOW (ref 8.5–10.1)
CHLORIDE SERPL-SCNC: 100 MMOL/L — SIGNIFICANT CHANGE UP (ref 98–110)
CO2 SERPL-SCNC: 26 MMOL/L — SIGNIFICANT CHANGE UP (ref 17–32)
CREAT SERPL-MCNC: 0.8 MG/DL — SIGNIFICANT CHANGE UP (ref 0.7–1.5)
DIGOXIN SERPL-MCNC: 0.4 NG/ML — LOW (ref 0.8–2)
GLUCOSE SERPL-MCNC: 121 MG/DL — HIGH (ref 70–99)
HCT VFR BLD CALC: 28.9 % — LOW (ref 42–52)
HCT VFR BLD CALC: 29.9 % — LOW (ref 42–52)
HCT VFR BLD CALC: 31.7 % — LOW (ref 42–52)
HGB BLD-MCNC: 10.1 G/DL — LOW (ref 14–18)
HGB BLD-MCNC: 9.3 G/DL — LOW (ref 14–18)
HGB BLD-MCNC: 9.5 G/DL — LOW (ref 14–18)
INR BLD: 1.54 RATIO — HIGH (ref 0.65–1.3)
INR BLD: 1.72 RATIO — HIGH (ref 0.65–1.3)
MAGNESIUM SERPL-MCNC: 1.8 MG/DL — SIGNIFICANT CHANGE UP (ref 1.8–2.4)
MCHC RBC-ENTMCNC: 30.1 PG — SIGNIFICANT CHANGE UP (ref 27–31)
MCHC RBC-ENTMCNC: 30.4 PG — SIGNIFICANT CHANGE UP (ref 27–31)
MCHC RBC-ENTMCNC: 30.8 PG — SIGNIFICANT CHANGE UP (ref 27–31)
MCHC RBC-ENTMCNC: 31.8 G/DL — LOW (ref 32–37)
MCHC RBC-ENTMCNC: 31.9 G/DL — LOW (ref 32–37)
MCHC RBC-ENTMCNC: 32.2 G/DL — SIGNIFICANT CHANGE UP (ref 32–37)
MCV RBC AUTO: 94.6 FL — HIGH (ref 80–94)
MCV RBC AUTO: 95.7 FL — HIGH (ref 80–94)
MCV RBC AUTO: 95.8 FL — HIGH (ref 80–94)
METHOD TYPE: SIGNIFICANT CHANGE UP
NRBC # BLD: 0 /100 WBCS — SIGNIFICANT CHANGE UP (ref 0–0)
PLATELET # BLD AUTO: 236 K/UL — SIGNIFICANT CHANGE UP (ref 130–400)
PLATELET # BLD AUTO: 290 K/UL — SIGNIFICANT CHANGE UP (ref 130–400)
PLATELET # BLD AUTO: 343 K/UL — SIGNIFICANT CHANGE UP (ref 130–400)
POTASSIUM SERPL-MCNC: 4.6 MMOL/L — SIGNIFICANT CHANGE UP (ref 3.5–5)
POTASSIUM SERPL-SCNC: 4.6 MMOL/L — SIGNIFICANT CHANGE UP (ref 3.5–5)
PROT SERPL-MCNC: 5.5 G/DL — LOW (ref 6–8)
PROTHROM AB SERPL-ACNC: 16.6 SEC — HIGH (ref 9.95–12.87)
PROTHROM AB SERPL-ACNC: 18.5 SEC — HIGH (ref 9.95–12.87)
RBC # BLD: 3.02 M/UL — LOW (ref 4.7–6.1)
RBC # BLD: 3.12 M/UL — LOW (ref 4.7–6.1)
RBC # BLD: 3.35 M/UL — LOW (ref 4.7–6.1)
RBC # FLD: 16.1 % — HIGH (ref 11.5–14.5)
RBC # FLD: 16.2 % — HIGH (ref 11.5–14.5)
RBC # FLD: 16.3 % — HIGH (ref 11.5–14.5)
SODIUM SERPL-SCNC: 139 MMOL/L — SIGNIFICANT CHANGE UP (ref 135–146)
TYPE + AB SCN PNL BLD: SIGNIFICANT CHANGE UP
WBC # BLD: 5.6 K/UL — SIGNIFICANT CHANGE UP (ref 4.8–10.8)
WBC # BLD: 5.87 K/UL — SIGNIFICANT CHANGE UP (ref 4.8–10.8)
WBC # BLD: 8.12 K/UL — SIGNIFICANT CHANGE UP (ref 4.8–10.8)
WBC # FLD AUTO: 5.6 K/UL — SIGNIFICANT CHANGE UP (ref 4.8–10.8)
WBC # FLD AUTO: 5.87 K/UL — SIGNIFICANT CHANGE UP (ref 4.8–10.8)
WBC # FLD AUTO: 8.12 K/UL — SIGNIFICANT CHANGE UP (ref 4.8–10.8)

## 2018-08-19 RX ORDER — ALTEPLASE 100 MG
10 KIT INTRAVENOUS ONCE
Qty: 0 | Refills: 0 | Status: DISCONTINUED | OUTPATIENT
Start: 2018-08-20 | End: 2018-08-20

## 2018-08-19 RX ORDER — MEROPENEM 1 G/30ML
1000 INJECTION INTRAVENOUS ONCE
Qty: 0 | Refills: 0 | Status: DISCONTINUED | OUTPATIENT
Start: 2018-08-19 | End: 2018-08-19

## 2018-08-19 RX ORDER — ALTEPLASE 100 MG
10 KIT INTRAVENOUS ONCE
Qty: 0 | Refills: 0 | Status: COMPLETED | OUTPATIENT
Start: 2018-08-19 | End: 2018-08-19

## 2018-08-19 RX ORDER — FUROSEMIDE 40 MG
40 TABLET ORAL DAILY
Qty: 0 | Refills: 0 | Status: DISCONTINUED | OUTPATIENT
Start: 2018-08-19 | End: 2018-08-21

## 2018-08-19 RX ORDER — MEROPENEM 1 G/30ML
1000 INJECTION INTRAVENOUS EVERY 8 HOURS
Qty: 0 | Refills: 0 | Status: DISCONTINUED | OUTPATIENT
Start: 2018-08-19 | End: 2018-08-22

## 2018-08-19 RX ORDER — ENOXAPARIN SODIUM 100 MG/ML
70 INJECTION SUBCUTANEOUS ONCE
Qty: 0 | Refills: 0 | Status: COMPLETED | OUTPATIENT
Start: 2018-08-19 | End: 2018-08-19

## 2018-08-19 RX ORDER — VANCOMYCIN HCL 1 G
750 VIAL (EA) INTRAVENOUS EVERY 12 HOURS
Qty: 0 | Refills: 0 | Status: DISCONTINUED | OUTPATIENT
Start: 2018-08-19 | End: 2018-08-20

## 2018-08-19 RX ORDER — AMPICILLIN SODIUM AND SULBACTAM SODIUM 250; 125 MG/ML; MG/ML
INJECTION, POWDER, FOR SUSPENSION INTRAMUSCULAR; INTRAVENOUS
Qty: 0 | Refills: 0 | Status: DISCONTINUED | OUTPATIENT
Start: 2018-08-19 | End: 2018-08-19

## 2018-08-19 RX ORDER — ENOXAPARIN SODIUM 100 MG/ML
70 INJECTION SUBCUTANEOUS EVERY 12 HOURS
Qty: 0 | Refills: 0 | Status: DISCONTINUED | OUTPATIENT
Start: 2018-08-19 | End: 2018-08-21

## 2018-08-19 RX ADMIN — SACUBITRIL AND VALSARTAN 1 TABLET(S): 24; 26 TABLET, FILM COATED ORAL at 18:55

## 2018-08-19 RX ADMIN — SACUBITRIL AND VALSARTAN 1 TABLET(S): 24; 26 TABLET, FILM COATED ORAL at 05:40

## 2018-08-19 RX ADMIN — CARVEDILOL PHOSPHATE 3.12 MILLIGRAM(S): 80 CAPSULE, EXTENDED RELEASE ORAL at 05:39

## 2018-08-19 RX ADMIN — MEROPENEM 100 MILLIGRAM(S): 1 INJECTION INTRAVENOUS at 21:11

## 2018-08-19 RX ADMIN — ALTEPLASE 3000 MILLIGRAM(S): KIT at 11:44

## 2018-08-19 RX ADMIN — Medication 40 MILLIGRAM(S): at 06:15

## 2018-08-19 RX ADMIN — Medication 81 MILLIGRAM(S): at 11:53

## 2018-08-19 RX ADMIN — Medication 250 MILLIGRAM(S): at 06:15

## 2018-08-19 RX ADMIN — SPIRONOLACTONE 50 MILLIGRAM(S): 25 TABLET, FILM COATED ORAL at 05:40

## 2018-08-19 RX ADMIN — IVABRADINE 5 MILLIGRAM(S): 7.5 TABLET, FILM COATED ORAL at 05:40

## 2018-08-19 RX ADMIN — MEROPENEM 100 MILLIGRAM(S): 1 INJECTION INTRAVENOUS at 05:40

## 2018-08-19 RX ADMIN — MEROPENEM 100 MILLIGRAM(S): 1 INJECTION INTRAVENOUS at 14:06

## 2018-08-19 RX ADMIN — ENOXAPARIN SODIUM 70 MILLIGRAM(S): 100 INJECTION SUBCUTANEOUS at 18:55

## 2018-08-19 RX ADMIN — Medication 0.12 MILLIGRAM(S): at 05:39

## 2018-08-19 RX ADMIN — Medication 40 MILLIGRAM(S): at 11:15

## 2018-08-19 RX ADMIN — Medication 250 MILLIGRAM(S): at 18:55

## 2018-08-19 RX ADMIN — ENOXAPARIN SODIUM 70 MILLIGRAM(S): 100 INJECTION SUBCUTANEOUS at 11:27

## 2018-08-19 RX ADMIN — IVABRADINE 5 MILLIGRAM(S): 7.5 TABLET, FILM COATED ORAL at 18:52

## 2018-08-19 NOTE — CHART NOTE - NSCHARTNOTEFT_GEN_A_CORE
67 yo M w/ hx of Afib not on AC, CHF (EF 23%) w/ AICD, CVA presents w/ SOB since this AM. Recent hx of AICD placement at [facility], subsequently went to [facility] for rehabilitation. Patient denies any respiratory issue prior to this am. He sat 77% on NRB at NH.     In ED, patient in tachypnic to 30s, in respiratory distress which improved w/ BiPAP. Bedside US significant for right side loculated pleural effusion. Thoracentesis by Pulm team was done bedside, chest tube was inserted to suction. Denies fever, chills, CP, abdominal pain, dysuria. Never had this before. No hx of smoking.    8/18: Drain >1600 by midnight  8/19: Chest tube to be changed out        #R loculated pleural effusion w/ possible underlying PNA, r/o empyema  - s/p thoracentesis and R pigtail insertion  - tPA pushed 8/18, 8/19 - to be PUSHED 8/20   - Pleural Fluid exudative, Strep agalactiae - awaiting sensitivities    - on unasyn 3g q6h  - nasal MRSA +ve, pt now on contact    #R PE on CT Angio  - c/w lovenox 70mg q12h    #Decompensated CHF  - repeat 2d echo  - Lasix 40 IV daily  - c/w Entresto, Corlanor, Aldactone, Coreg  - f/u cardio consult recs (placed)    #Hx of CVA  - c/w ASA  #Afib not on AC - c/w BB for rate control 67 yo M w/ hx of Afib not on AC, CHF (EF 23%) w/ AICD, CVA presents w/ SOB since this AM. Recent hx of AICD placement at [facility], subsequently went to [facility] for rehabilitation. Patient denies any respiratory issue prior to this am. He sat 77% on NRB at NH.     In ED, patient in tachypnic to 30s, in respiratory distress which improved w/ BiPAP. Bedside US significant for right side loculated pleural effusion. Thoracentesis by Pulm team was done bedside, chest tube was inserted to suction. Denies fever, chills, CP, abdominal pain, dysuria. Never had this before. No hx of smoking.    8/18: Drain >1600 by midnight  8/19: Chest tube to be changed out        #R loculated pleural effusion w/ possible underlying PNA, r/o empyema  - s/p thoracentesis and R pigtail insertion  - tPA pushed 8/18, 8/19 - to be PUSHED 8/20   - Pleural Fluid exudative, Strep agalactiae - awaiting sensitivities    - c/w meropenem, vancomycin (pt allergic to Unasyn, has anaphylaxis rxn to PCN)  - nasal MRSA +ve, pt now on contact    #R PE on CT Angio  - c/w lovenox 70mg q12h    #Decompensated CHF  - repeat 2d echo  - Lasix 40 IV daily  - c/w Entresto, Corlanor, Aldactone, Coreg  - f/u cardio consult recs (placed)    #Hx of CVA  - c/w ASA  #Afib not on AC - c/w BB for rate control 69 yo M w/ hx of Afib not on AC, CHF (EF 23%) w/ AICD, CVA presents w/ SOB since this AM. Recent hx of AICD placement at [facility], subsequently went to [facility] for rehabilitation. Patient denies any respiratory issue prior to this am. He sat 77% on NRB at NH.     In ED, patient in tachypnic to 30s, in respiratory distress which improved w/ BiPAP. Bedside US significant for right side loculated pleural effusion. Thoracentesis by Pulm team was done bedside, chest tube was inserted to suction. Denies fever, chills, CP, abdominal pain, dysuria. Never had this before. No hx of smoking.    8/18: Drain >1600 by midnight  8/19: Chest tube to be changed out        #R loculated pleural effusion 2/2 empyema  - s/p thoracentesis and R pigtail insertion  - tPA (1/3) pushed on 8/18, drained ~500-600 cc serosanguinous fluid, day total ~1700cc  - tPA (2/3) pushed on 8/19, drained ~280cc fluid. Total 700 in AM  - tPA today (3/3) planned  - Pleural Fluid exudative, Strep agalactiae - awaiting sensitivities    - c/w meropenem (pt allergic to Unasyn, has anaphylaxis rxn to PCN)  - nasal MRSA +ve, pt now on contact    #R PE on CT Angio  - c/w lovenox 70mg q12h  - Duplex 8/17 Prelim: Extensive R DVT, L calf DVT     #Decompensated CHF  - ECHO 8/18: EF <20%.  - Lasix 40 IV daily  - c/w Entresto, Corlanor, Aldactone, Coreg  - f/u cardio consult recs (placed)    #Hx of CVA  - c/w ASA  #Afib not on AC - c/w BB for rate control 69 yo M w/ hx of Afib not on AC, CHF (EF 23%) w/ AICD, CVA presents w/ SOB since this AM. Recent hx of AICD placement at [facility], subsequently went to [facility] for rehabilitation. Patient denies any respiratory issue prior to this am. He sat 77% on NRB at NH. In ED, patient in tachypnic to 30s, in respiratory distress which improved w/ BiPAP. Bedside US significant for right side loculated pleural effusion. Thoracentesis by Pulm team was done bedside, chest tube was inserted to suction. Denies fever, chills, CP, abdominal pain, dysuria. Never had this before. No hx of smoking.    8/18: Drain >1700 from chest tube, serosanguinous , tPA 1/3  8/19: Drain changed out, >700 from chest tube still serosanguinous, tPA pushed 2/3  8/20: tPA will be pushed today 3/3        #R loculated pleural effusion 2/2 empyema  - s/p thoracentesis and R pigtail insertion  - tPA (1/3) pushed on 8/18, drained ~500-600 cc serosanguinous fluid, day total ~1700cc  - tPA (2/3) pushed on 8/19, drained ~280cc fluid. Total 700 in AM  - tPA today (3/3) planned  - Pleural Fluid exudative, Strep agalactiae - awaiting sensitivities    - c/w meropenem (pt allergic to Unasyn, has anaphylaxis rxn to PCN)  - nasal MRSA +ve, pt now on contact    #R PE on CT Angio  - c/w lovenox 70mg q12h  - Duplex 8/17 Prelim: Extensive R DVT, L calf DVT     #Decompensated CHF  - ECHO 8/18: EF <20%.  - Lasix 40 IV daily  - c/w Entresto, Corlanor, Aldactone, Coreg  - f/u cardio consult recs (placed)    #Hx of CVA  - c/w ASA  #Afib not on AC - c/w BB for rate control 67 yo M w/ hx of Afib not on AC, CHF (EF 23%) w/ AICD, CVA presents w/ SOB since this AM. Recent hx of AICD placement at [facility], subsequently went to [facility] for rehabilitation. Patient denies any respiratory issue prior to this am. He sat 77% on NRB at NH. In ED, patient in tachypnic to 30s, in respiratory distress which improved w/ BiPAP. Bedside US significant for right side loculated pleural effusion. Thoracentesis by Pulm team was done bedside, chest tube was inserted to suction. Denies fever, chills, CP, abdominal pain, dysuria. Never had this before. No hx of smoking.    8/18: Drain >1700 from chest tube, serosanguinous , tPA 1/3  8/19: Drain changed out, >700 from chest tube still serosanguinous, tPA pushed 2/3  8/20: tPA pushed at 3:30pm day 3/3        #R loculated pleural effusion 2/2 empyema  - s/p thoracentesis and R pigtail insertion  - tPA (1/3) pushed on 8/18, drained ~500-600 cc serosanguinous fluid, day total ~1700cc  - tPA (2/3) pushed on 8/19, drained ~280cc fluid. Total 700 in AM  - tPA today (3/3) planned  - Pleural Fluid exudative, Strep agalactiae - awaiting sensitivities    - c/w meropenem (pt allergic to Unasyn, has anaphylaxis rxn to PCN)  - nasal MRSA +ve, pt now on contact    #R PE on CT Angio  - c/w lovenox 70mg q12h  - Duplex 8/17 Prelim: Extensive R DVT, L calf DVT     #Decompensated CHF  - ECHO 8/18: EF <20%.  - Lasix 40 IV daily  - c/w Entresto, Corlanor, Aldactone, Coreg  - f/u cardio consult recs (placed)    #Hx of CVA  - c/w ASA  #Afib not on AC - c/w BB for rate control    Signed out to Dr. Alexandre [phone] on 3B.

## 2018-08-19 NOTE — DOWNTIME INTERRUPTION NOTE - WHICH MANUAL FORMS INITIATED?
Unable to scan barcodes for medications Unable to scan barcodes for medications. Downtime issues still ongoing

## 2018-08-19 NOTE — PROGRESS NOTE ADULT - SUBJECTIVE AND OBJECTIVE BOX
Patient is a 68y old Male who presents with a chief complaint of SOB (17 Aug 2018 17:22)    Currently admitted to medicine with the primary diagnosis of Shortness of breath    Today is hospital day 2d.    BRIEF HOSPITAL COURSE:   67 yo M w/ hx of Afib not on AC, CHF (EF 23%) w/ AICD, CVA presents w/ SOB since this AM. Recent hx of AICD placement at [facility], subsequently went to [facility] for rehabilitation. Patient denies any respiratory issue prior to this am. He sat 77% on NRB at NH.     In ED, patient in tachypnic to 30s, in respiratory distress which improved w/ BiPAP. Bedside US significant for right side loculated pleural effusion. Thoracentesis by Pulm team was done bedside, chest tube was inserted to suction. Denies fever, chills, CP, abdominal pain, dysuria. Never had this before. No hx of smoking.    8/18: Drain >2000 by midnight, will need to be changed  8/19: DRAIN NEEDS TO BE CHANGED    EVENTS LAST 24HRS:   Pt seen and examined at bedside.  Denied CP, palpitations, N/V, fever.  States SOB improved, still tachypnic to 30's but denying SOB/pain.    PAST MEDICAL & SURGICAL HISTORY  Muscle weakness (generalized)  Atrial fibrillation, unspecified type  Oxygen dependent  Essential hypertension  Cerebrovascular accident (CVA) due to bilateral embolism of posterior cerebral arteries  Systolic congestive heart failure, unspecified chronicity  AICD (automatic cardioverter/defibrillator) present    SOCIAL HISTORY:  Denies any smoking hx.  Denies EtOH/drug use.    REVIEW OF SYSTEMS:  See HPI    ALLERGIES:  cephalexin (Flushing)  penicillin (Rash)    MEDICATIONS:  STANDING MEDICATIONS  aspirin  chewable 81 milliGRAM(s) Oral daily  carvedilol 3.125 milliGRAM(s) Oral every 12 hours  digoxin     Tablet 0.125 milliGRAM(s) Oral daily  furosemide   Injectable 40 milliGRAM(s) IV Push every 12 hours  heparin  Infusion 1300 Unit(s)/Hr IV Continuous <Continuous>  ivabradine 5 milliGRAM(s) Oral two times a day  meropenem  IVPB 1000 milliGRAM(s) IV Intermittent every 8 hours  sacubitril 24 mG/valsartan 26 mG 1 Tablet(s) Oral two times a day  spironolactone 50 milliGRAM(s) Oral two times a day  vancomycin  IVPB 750 milliGRAM(s) IV Intermittent every 8 hours    PRN MEDICATIONS  acetaminophen   Tablet. 650 milliGRAM(s) Oral every 6 hours PRN    VITALS:         ICU Vital Signs Last 24 Hrs:    T(C): 36 (19 Aug 2018 04:00), Max: 36.6 (18 Aug 2018 20:00)  T(F): 96.8 (19 Aug 2018 04:00), Max: 97.9 (18 Aug 2018 20:00)  HR: 74 (19 Aug 2018 06:00) (66 - 88)  BP: 113/74 (19 Aug 2018 06:00) (99/67 - 139/62)  BP(mean): 92 (19 Aug 2018 06:00) (79 - 99)  ABP: --  ABP(mean): --  RR: 29 (19 Aug 2018 06:00) (14 - 46)  SpO2: 100% (19 Aug 2018 06:00) (81% - 100%)          I&O's Detail:      17 Aug 2018 07:01  -  18 Aug 2018 07:00  --------------------------------------------------------  IN:    heparin Infusion: 120 mL    IV PiggyBack: 650 mL  Total IN: 770 mL    OUT:    Chest Tube: 625 mL    Voided: 950 mL  Total OUT: 1575 mL    Total NET: -805 mL      18 Aug 2018 07:01  -  19 Aug 2018 06:38  --------------------------------------------------------  IN:    heparin Infusion: 10 mL    heparin Infusion: 84 mL    heparin Infusion: 189 mL    IV PiggyBack: 850 mL    Oral Fluid: 220 mL  Total IN: 1353 mL    OUT:    Chest Tube: 1405 mL    Voided: 700 mL  Total OUT: 2105 mL    Total NET: -752 mL          LABS:                        9.5    5.87  )-----------( x        ( 19 Aug 2018 04:13 )             29.9     08-18    139  |  100  |  18  ----------------------------<  115<H>  4.7   |  25  |  0.8    Ca    8.1<L>      18 Aug 2018 04:33    TPro  5.6<L>  /  Alb  2.3<L>  /  TBili  0.6  /  DBili  x   /  AST  13  /  ALT  19  /  AlkPhos  178<H>  08-18      CARDIAC MARKERS ( 17 Aug 2018 12:26 )  x     / 0.01 ng/mL / x     / x     / x          CAPILLARY BLOOD GLUCOSE  133 (18 Aug 2018 22:00)        PT/INR - ( 19 Aug 2018 04:13 )   PT: 18.50 sec;   INR: 1.72 ratio         PTT - ( 19 Aug 2018 04:13 )  PTT:80.3 sec    CULTURES:  Culture Results:   Few Streptococcus agalactiae (Group B) (08-17 @ 15:16)      PHYSICAL EXAM:    General: AAF thin, temporal wasting, lying in bed in with 3L NC; Alert, oriented, interactive, nonfocal.  HEENT: Pupils equal, reactive to light symmetrically.   PULM: Clear to auscultation bilaterally, RLL reduced breath sounds; tachypnea 30s, SaO2 100%  CVS: Regular rate and rhythm, no murmurs  ABD: Soft, nondistended, nontender, no masses.  EXT: No peripheral edema, nontender.  SKIN: Warm and well perfused    RADIOLOGY:     < from: Xray Chest 1 View- PORTABLE-Routine (08.18.18 @ 06:01) >  Cardiomegaly, unchanged.    Bilateral opacities, unchanged.    Right-sided chest tube.    Left-sided permanent pacemaker.    < end of copied text >  < from: CT Angio Chest w/ IV Cont (08.17.18 @ 17:40) >  1.  Small pulmonary embolism in the right interlobar artery.  2.  Right moderate to large partially loculated pleural effusion with   near complete atelectasis of the right lower lobe. Right pleural catheter   in place with adjacent gas.  3.  Small left pleural effusion with compressive atelectasis.  4.  Partially imaged abdominal ascites.    < end of copied text >  < from: CT Angio Neck w/ IV Cont (04.24.18 @ 11:26) >  Essentially unremarkable CT angiogram of the neck and head. No   significant vascular stenosis or occlusion.    Moderate bilateral pleural effusions.    < end of copied text >

## 2018-08-19 NOTE — PROGRESS NOTE ADULT - ASSESSMENT
67 yo M w/ hx of Afib not on AC, CHF (EF 23%) w/ AICD, CVA admitted with R loculated pleural effusion s/p R chest tube insertion.     #R loculated pleural effusion w/ possible underlying PNA, r/o empyema  - s/p thoracentesis and R pigtail insertion  - pigtail to low wall suctioning  - tPA pushed today 8/18, drained ~500-600 serosanguinous fluid at that time  - H/H @ 9.5 (not downtrending at this time despite pleural fluid drainage) -> f/u 8pm CBC.  - transfuse if Hbg <7, symptomatic  - >2000 noted at 23:30 to be in the drain; NEEDS TO BE CHANGED!  - Pleural Fluid 8/17: LDH 2900, pH 7.3, prt 3 } serum prt 5.3; EXUDATIVE   - Pleural Culture 8/17: Strep agalactiae, moderate neutrophils   - c/w Vanco and Santi, f/u Vanco trough prior to 4th dose  - nasal MRSA +ve, pt now on contact    #R PE on CT Angio  - c/w Heparin drip, goal PTT 60-80. AM PTT 80.3  - PTT reordered for 11AM and 8PM  - monitor vitals closely    #Decompensated CHF  - BNP ~02816  - 2D echo 04/2018 EF 23%, G1DD, mod MR, mod AR  - repeat 2d echo  - Lasix 40 IV BID  - c/w Entresto, Corlanor, Aldactone, Coreg  - c/w digoxin, f/u level @ 11am.  - f/u cardio consult recs (placed)    #Hx of CVA   - c/w ASA  #Afib not on AC   - was on Eliquis but stopped d/t intolerance  - c/w BB for rate control    #MISC:   - Activity - bedrest  - DVT ppx - Lovenox  - Diet - DASH/TLC  - full code  - Dispo - from [facility] 69 yo M w/ hx of Afib not on AC, CHF (EF 23%) w/ AICD, CVA admitted with R loculated pleural effusion s/p R chest tube insertion.     #R loculated pleural effusion w/ possible underlying PNA, r/o empyema  - s/p thoracentesis and R pigtail insertion  - pigtail to low wall suctioning  - tPA pushed today 8/18, drained ~500-600 serosanguinous fluid at that time  - tPA today and tmw  - H/H @ 9.5 (not downtrending at this time despite pleural fluid drainage) -> f/u 8pm CBC.  - transfuse if Hbg <7, symptomatic  - >1600 noted at 23:30 to be in the drain  - Pleural Fluid 8/17: LDH 2900, pH 7.3, prt 3 } serum prt 5.3; EXUDATIVE   - Pleural Culture 8/17: Strep agalactiae, moderate neutrophils   - start unasyn 3g q6h, stop jose/vanc  - nasal MRSA +ve, pt now on contact    #R PE on CT Angio  - d/c heparin drip, start lovenox 70mg q12h  - f/u duplex results.  - monitor vitals closely    #Decompensated CHF  - BNP ~73685  - 2D echo 04/2018 EF 23%, G1DD, mod MR, mod AR  - repeat 2d echo  - Lasix 40 IV daily  - c/w Entresto, Corlanor, Aldactone, Coreg  - c/w digoxin, f/u level @ 11am.  - f/u cardio consult recs (placed)    #Hx of CVA   - c/w ASA  #Afib not on AC   - was on Eliquis but stopped d/t intolerance  - c/w BB for rate control    #MISC:   - Activity - bedrest  - DVT ppx - Lovenox  - Diet - DASH/TLC  - full code  - Dispo - from [facility] 69 yo M w/ hx of Afib not on AC, CHF (EF 23%) w/ AICD, CVA admitted with R loculated pleural effusion s/p R chest tube insertion.     #R loculated pleural effusion w/ possible underlying PNA, r/o empyema  - s/p thoracentesis and R pigtail insertion  - pigtail to low wall suctioning  - tPA pushed today 8/18, drained ~500-600 serosanguinous fluid at that time  - tPA today and tmw  - H/H @ 9.5 (not downtrending at this time despite pleural fluid drainage) -> f/u 8pm CBC.  - transfuse if Hbg <7, symptomatic  - >1600 noted at 23:30 to be in the drain  - Pleural Fluid 8/17: LDH 2900, pH 7.3, prt 3 } serum prt 5.3; EXUDATIVE   - Pleural Culture 8/17: Strep agalactiae, moderate neutrophils   - c/w meropenem, vancomycin (pt allergic to Unasyn, has anaphylaxis rxn to PCN)  - nasal MRSA +ve, pt now on contact    #R PE on CT Angio  - d/c heparin drip, start lovenox 70mg q12h  - f/u duplex results.  - monitor vitals closely    #Decompensated CHF  - BNP ~55122  - 2D echo 04/2018 EF 23%, G1DD, mod MR, mod AR  - repeat 2d echo  - Lasix 40 IV daily  - c/w Entresto, Corlanor, Aldactone, Coreg  - c/w digoxin, f/u level @ 11am.  - f/u cardio consult recs (placed)    #Hx of CVA   - c/w ASA  #Afib not on AC   - was on Eliquis but stopped d/t intolerance  - c/w BB for rate control    #MISC:   - Activity - bedrest  - DVT ppx - Lovenox  - Diet - DASH/TLC  - full code  - Dispo - from [facility]

## 2018-08-19 NOTE — PROGRESS NOTE ADULT - SUBJECTIVE AND OBJECTIVE BOX
Progress Note: General Surgery  Patient: SAMANTHA CHARLES , 68y (1950)Male   MRN: [id number]  Location: [place] [address] A  Visit: 08-17-18 Inpatient  Date: 08-19-18 @ 00:11  Hospital Day: 3  Post-op Day: 2    Procedure/Diagnosis: s/p R pigtail placement for loculated pleural effusion    Events/ 24h: No acute events overnight. Pain controlled.    Vitals: T(F): 96.3 (08-19-18 @ 00:00), Max: 97.9 (08-18-18 @ 20:00)  HR: 74 (08-19-18 @ 00:00)  BP: 109/71 (08-18-18 @ 23:30) (97/61 - 139/62)  RR: 37 (08-19-18 @ 00:00)  SpO2: 100% (08-19-18 @ 00:00)    In:   08-17-18 @ 07:01  -  08-18-18 @ 07:00  --------------------------------------------------------  IN: 770 mL    08-18-18 @ 07:01  -  08-19-18 @ 00:11  --------------------------------------------------------  IN: 1268 mL      Out:   08-17-18 @ 07:01  -  08-18-18 @ 07:00  --------------------------------------------------------  OUT:    Chest Tube: 625 mL    Voided: 950 mL  Total OUT: 1575 mL      08-18-18 @ 07:01  -  08-19-18 @ 00:11  --------------------------------------------------------  OUT:    Chest Tube: 985 mL    Voided: 700 mL  Total OUT: 1685 mL        Net:   08-17-18 @ 07:01  -  08-18-18 @ 07:00  --------------------------------------------------------  NET: -805 mL    08-18-18 @ 07:01  -  08-19-18 @ 00:11  --------------------------------------------------------  NET: -417 mL        Diet: Diet, DASH/TLC:   Sodium & Cholesterol Restricted (08-17-18 @ 18:18)    IV Fluids:     Physical Examination:  General Appearance: NAD  HEENT: EOMI, sclera non-icteric.  Heart: RRR   Lungs: CTABL. R pig tail in place to suction  Abdomen:  Soft, nontender, nondistended. Obese. No rigidity, guarding, or rebound tenderness.   MSK/Extremities: Warm & well-perfused. No significant deformity or joint abnormality. Peripheral pulses intact.  Skin: Warm, dry. No jaundice.       Medications: [Standing]  aspirin  chewable 81 milliGRAM(s) Oral daily  carvedilol 3.125 milliGRAM(s) Oral every 12 hours  digoxin     Tablet 0.125 milliGRAM(s) Oral daily  furosemide   Injectable 40 milliGRAM(s) IV Push every 12 hours  heparin  Infusion 1300 Unit(s)/Hr (13 mL/Hr) IV Continuous <Continuous>  ivabradine 5 milliGRAM(s) Oral two times a day  meropenem  IVPB 1000 milliGRAM(s) IV Intermittent every 8 hours  sacubitril 24 mG/valsartan 26 mG 1 Tablet(s) Oral two times a day  spironolactone 50 milliGRAM(s) Oral two times a day  vancomycin  IVPB 750 milliGRAM(s) IV Intermittent every 8 hours    DVT Prophylaxis: heparin  Infusion 1300 Unit(s)/Hr IV Continuous <Continuous>    GI Prophylaxis:   Antibiotics: meropenem  IVPB 1000 milliGRAM(s) IV Intermittent every 8 hours  vancomycin  IVPB 750 milliGRAM(s) IV Intermittent every 8 hours    Anticoagulation:   Medications:[PRN]  acetaminophen   Tablet. 650 milliGRAM(s) Oral every 6 hours PRN      Labs:                        9.1    6.32  )-----------( 302      ( 18 Aug 2018 04:33 )             28.6     08-18    139  |  100  |  18  ----------------------------<  115<H>  4.7   |  25  |  0.8    Ca    8.1<L>      18 Aug 2018 04:33    TPro  5.6<L>  /  Alb  2.3<L>  /  TBili  0.6  /  DBili  x   /  AST  13  /  ALT  19  /  AlkPhos  178<H>  08-18    LIVER FUNCTIONS - ( 18 Aug 2018 04:33 )  Alb: 2.3 g/dL / Pro: 5.6 g/dL / ALK PHOS: 178 U/L / ALT: 19 U/L / AST: 13 U/L / GGT: x           PT/INR - ( 18 Aug 2018 20:15 )   PT: TNP sec;   INR: TNP ratio         PTT - ( 18 Aug 2018 20:15 )  PTT:TNP sec    CARDIAC MARKERS ( 17 Aug 2018 12:26 )  x     / 0.01 ng/mL / x     / x     / x          Urine/Micro:    Culture - Body Fluid with Gram Stain (collected 17 Aug 2018 15:16)  Source: .Body Fluid Pleural Fluid  Gram Stain (18 Aug 2018 07:31):    Moderate polymorphonuclear leukocytes seen per low power field    Rare Gram positive cocci in pairs seen per oil power field  Preliminary Report (18 Aug 2018 21:55):    Few Streptococcus agalactiae (Group B)    Culture - Acid Fast - Body Fluid w/Smear (collected 17 Aug 2018 15:16)  Source: .Body Fluid Pleural Fluid      Imaging:     AM CXR: BL opacities unchanged    Assessment:  68y Male patient admitted s/p R pigtail placement for loculated pleural effusion    Plan:    AM CXR as above  tPA pushed today 8/18, drained ~500-600 serosanguinous fluid at that time  >2000 noted at 23:30 to be in the drain  Fluid LDH >2900  Growing S. agalactiae   Cont abx  repeat 2d echo  OOBAT  IS  Pain control    Date/Time: 08-19-18 @ 00:11

## 2018-08-19 NOTE — PROGRESS NOTE ADULT - ASSESSMENT
IMPRESSION:    Empyema sp pig tail  Pneumonia   Chronic hypoxemic respiratory failure in home O2  HF REF  HO AFIB not on AC  VTE     PLAN:    CNS: No depressants    HEENT: Oral care    PULMONARY:  HOB @ 45 degrees.  3 total days of Intra pleural TPA.      CARDIOVASCULAR: Continue maximizing  cardiac therapy.  Avoid volume overload.  Lasix Q 24 hours     GI: GI prophylaxis.  Feeding     RENAL:  Follow up lytes.  Correct as needed    INFECTIOUS DISEASE: Follow up cultures.  change ABX to Unasyn     HEMATOLOGICAL:  DVT TX.  FU LE Duplex.  DC IV Heparin and switch to LMWH    ENDOCRINE:  Follow up FS.    MUSCULOSKELETAL:    Transfer to floor

## 2018-08-19 NOTE — PROGRESS NOTE ADULT - SUBJECTIVE AND OBJECTIVE BOX
Patient is a 68y old  Male who presents with a chief complaint of SOB (17 Aug 2018 17:22)        Over Night Events:  Did well overnight.  SP intrapleural TPA.  Off pressors.  Tolerating PO         ROS:  See HPI    PHYSICAL EXAM    ICU Vital Signs Last 24 Hrs  T(C): 36 (19 Aug 2018 04:00), Max: 36.6 (18 Aug 2018 20:00)  T(F): 96.8 (19 Aug 2018 04:00), Max: 97.9 (18 Aug 2018 20:00)  HR: 70 (19 Aug 2018 06:30) (66 - 88)  BP: 111/77 (19 Aug 2018 06:30) (100/61 - 139/62)  BP(mean): 87 (19 Aug 2018 06:30) (79 - 99)  ABP: --  ABP(mean): --  RR: 36 (19 Aug 2018 06:30) (17 - 46)  SpO2: 100% (19 Aug 2018 06:30) (81% - 100%)      General: In NAD   HEENT: JESSY             Lymphatic system: No cervical LN   Lungs: Bilateral BS  Cardiovascular: Regular   Gastrointestinal: Soft, Positive BS  Extremities: No clubbing.  Moves extremities.  Full Range of motion   Skin: Warm, intact  Neurological: No motor or sensory deficit       08-18-18 @ 07:01  -  08-19-18 @ 07:00  --------------------------------------------------------  IN:    heparin Infusion: 189 mL    heparin Infusion: 10 mL    heparin Infusion: 84 mL    IV PiggyBack: 850 mL    Oral Fluid: 220 mL  Total IN: 1353 mL    OUT:    Chest Tube: 1405 mL    Voided: 700 mL  Total OUT: 2105 mL    Total NET: -752 mL          LABS:                            9.5    5.87  )-----------( 343      ( 19 Aug 2018 04:13 )             29.9                                               08-19    139  |  100  |  17  ----------------------------<  121<H>  4.6   |  26  |  0.8    Ca    8.1<L>      19 Aug 2018 04:13  Mg     1.8     08-19    TPro  5.5<L>  /  Alb  2.4<L>  /  TBili  0.7  /  DBili  x   /  AST  11  /  ALT  16  /  AlkPhos  152<H>  08-19      PT/INR - ( 19 Aug 2018 04:13 )   PT: 18.50 sec;   INR: 1.72 ratio         PTT - ( 19 Aug 2018 04:13 )  PTT:80.3 sec                                           CARDIAC MARKERS ( 17 Aug 2018 12:26 )  x     / 0.01 ng/mL / x     / x     / x                                                LIVER FUNCTIONS - ( 19 Aug 2018 04:13 )  Alb: 2.4 g/dL / Pro: 5.5 g/dL / ALK PHOS: 152 U/L / ALT: 16 U/L / AST: 11 U/L / GGT: x                                                  Culture - Body Fluid with Gram Stain (collected 17 Aug 2018 15:16)  Source: .Body Fluid Pleural Fluid  Gram Stain (18 Aug 2018 07:31):    Moderate polymorphonuclear leukocytes seen per low power field    Rare Gram positive cocci in pairs seen per oil power field  Preliminary Report (18 Aug 2018 21:55):    Few Streptococcus agalactiae (Group B)    Culture - Acid Fast - Body Fluid w/Smear (collected 17 Aug 2018 15:16)  Source: .Body Fluid Pleural Fluid                                                                                           MEDICATIONS  (STANDING):  aspirin  chewable 81 milliGRAM(s) Oral daily  carvedilol 3.125 milliGRAM(s) Oral every 12 hours  digoxin     Tablet 0.125 milliGRAM(s) Oral daily  furosemide   Injectable 40 milliGRAM(s) IV Push every 12 hours  heparin  Infusion 1300 Unit(s)/Hr (14 mL/Hr) IV Continuous <Continuous>  ivabradine 5 milliGRAM(s) Oral two times a day  meropenem  IVPB 1000 milliGRAM(s) IV Intermittent every 8 hours  sacubitril 24 mG/valsartan 26 mG 1 Tablet(s) Oral two times a day  spironolactone 50 milliGRAM(s) Oral two times a day  vancomycin  IVPB 750 milliGRAM(s) IV Intermittent every 8 hours    MEDICATIONS  (PRN):  acetaminophen   Tablet. 650 milliGRAM(s) Oral every 6 hours PRN Mild Pain (1 - 3)      Xrays:               Improved effusion right side                                                                       ECHO

## 2018-08-20 LAB
ALBUMIN SERPL ELPH-MCNC: 2.1 G/DL — LOW (ref 3.5–5.2)
ALP SERPL-CCNC: 138 U/L — HIGH (ref 30–115)
ALT FLD-CCNC: 14 U/L — SIGNIFICANT CHANGE UP (ref 0–41)
ANION GAP SERPL CALC-SCNC: 12 MMOL/L — SIGNIFICANT CHANGE UP (ref 7–14)
AST SERPL-CCNC: 11 U/L — SIGNIFICANT CHANGE UP (ref 0–41)
BILIRUB SERPL-MCNC: 0.6 MG/DL — SIGNIFICANT CHANGE UP (ref 0.2–1.2)
BUN SERPL-MCNC: 15 MG/DL — SIGNIFICANT CHANGE UP (ref 10–20)
CALCIUM SERPL-MCNC: 7.9 MG/DL — LOW (ref 8.5–10.1)
CHLORIDE SERPL-SCNC: 100 MMOL/L — SIGNIFICANT CHANGE UP (ref 98–110)
CO2 SERPL-SCNC: 26 MMOL/L — SIGNIFICANT CHANGE UP (ref 17–32)
CREAT SERPL-MCNC: 0.6 MG/DL — LOW (ref 0.7–1.5)
GLUCOSE SERPL-MCNC: 97 MG/DL — SIGNIFICANT CHANGE UP (ref 70–99)
HCT VFR BLD CALC: 27.6 % — LOW (ref 42–52)
HGB BLD-MCNC: 8.8 G/DL — LOW (ref 14–18)
MAGNESIUM SERPL-MCNC: 1.8 MG/DL — SIGNIFICANT CHANGE UP (ref 1.8–2.4)
MCHC RBC-ENTMCNC: 30.1 PG — SIGNIFICANT CHANGE UP (ref 27–31)
MCHC RBC-ENTMCNC: 31.9 G/DL — LOW (ref 32–37)
MCV RBC AUTO: 94.5 FL — HIGH (ref 80–94)
NRBC # BLD: 0 /100 WBCS — SIGNIFICANT CHANGE UP (ref 0–0)
PLATELET # BLD AUTO: 292 K/UL — SIGNIFICANT CHANGE UP (ref 130–400)
POTASSIUM SERPL-MCNC: 4.2 MMOL/L — SIGNIFICANT CHANGE UP (ref 3.5–5)
POTASSIUM SERPL-SCNC: 4.2 MMOL/L — SIGNIFICANT CHANGE UP (ref 3.5–5)
PROT SERPL-MCNC: 5 G/DL — LOW (ref 6–8)
RBC # BLD: 2.92 M/UL — LOW (ref 4.7–6.1)
RBC # FLD: 16 % — HIGH (ref 11.5–14.5)
SODIUM SERPL-SCNC: 138 MMOL/L — SIGNIFICANT CHANGE UP (ref 135–146)
WBC # BLD: 4.15 K/UL — LOW (ref 4.8–10.8)
WBC # FLD AUTO: 4.15 K/UL — LOW (ref 4.8–10.8)

## 2018-08-20 RX ORDER — ALTEPLASE 100 MG
10 KIT INTRAVENOUS ONCE
Qty: 0 | Refills: 0 | Status: DISCONTINUED | OUTPATIENT
Start: 2018-08-20 | End: 2018-08-20

## 2018-08-20 RX ADMIN — SACUBITRIL AND VALSARTAN 1 TABLET(S): 24; 26 TABLET, FILM COATED ORAL at 05:16

## 2018-08-20 RX ADMIN — Medication 81 MILLIGRAM(S): at 13:17

## 2018-08-20 RX ADMIN — MEROPENEM 100 MILLIGRAM(S): 1 INJECTION INTRAVENOUS at 22:54

## 2018-08-20 RX ADMIN — ENOXAPARIN SODIUM 70 MILLIGRAM(S): 100 INJECTION SUBCUTANEOUS at 05:16

## 2018-08-20 RX ADMIN — SACUBITRIL AND VALSARTAN 1 TABLET(S): 24; 26 TABLET, FILM COATED ORAL at 17:45

## 2018-08-20 RX ADMIN — CARVEDILOL PHOSPHATE 3.12 MILLIGRAM(S): 80 CAPSULE, EXTENDED RELEASE ORAL at 05:15

## 2018-08-20 RX ADMIN — Medication 0.12 MILLIGRAM(S): at 05:15

## 2018-08-20 RX ADMIN — SPIRONOLACTONE 50 MILLIGRAM(S): 25 TABLET, FILM COATED ORAL at 05:16

## 2018-08-20 RX ADMIN — CARVEDILOL PHOSPHATE 3.12 MILLIGRAM(S): 80 CAPSULE, EXTENDED RELEASE ORAL at 17:44

## 2018-08-20 RX ADMIN — ENOXAPARIN SODIUM 70 MILLIGRAM(S): 100 INJECTION SUBCUTANEOUS at 17:44

## 2018-08-20 RX ADMIN — IVABRADINE 5 MILLIGRAM(S): 7.5 TABLET, FILM COATED ORAL at 05:17

## 2018-08-20 RX ADMIN — SPIRONOLACTONE 50 MILLIGRAM(S): 25 TABLET, FILM COATED ORAL at 17:45

## 2018-08-20 RX ADMIN — MEROPENEM 100 MILLIGRAM(S): 1 INJECTION INTRAVENOUS at 05:16

## 2018-08-20 RX ADMIN — MEROPENEM 100 MILLIGRAM(S): 1 INJECTION INTRAVENOUS at 13:17

## 2018-08-20 RX ADMIN — Medication 250 MILLIGRAM(S): at 05:16

## 2018-08-20 RX ADMIN — IVABRADINE 5 MILLIGRAM(S): 7.5 TABLET, FILM COATED ORAL at 17:45

## 2018-08-20 NOTE — PROGRESS NOTE ADULT - ASSESSMENT
67 yo M w/ hx of Afib not on AC, CHF (EF 23%) w/ AICD, CVA admitted with R loculated pleural effusion s/p R chest tube insertion.     #R loculated pleural effusion w/ possible underlying PNA, r/o empyema  - s/p thoracentesis and R pigtail insertion  - pigtail to low wall suctioning  - tPA pushed on 8/18, drained ~500-600 cc serosanguinous fluid.  - tPA pushed on 8/19, drained ~280cc fluid.   - H/H 8.8 this morning.   - transfuse if Hbg <7, symptomatic  - Pleural Fluid 8/17: LDH 2900, pH 7.3, prt 3 } serum prt 5.3; EXUDATIVE   - Pleural Culture 8/17: Strep agalactiae, moderate neutrophils   - c/w meropenem, vancomycin (pt allergic to Unasyn, has anaphylaxis rxn to PCN)  - nasal MRSA +ve, pt now on contact    #R PE on CT Angio  - c/w lovenox 70mg q12h  -d/c'd heparin drip yesterday   - f/u duplex 8/17 preliminary report: Acute-appearing deep venous thrombosis of the right common femoral,   femoral, popliteal, gastrocnemius, and peroneal veins. Thrombus is present in a left posterior tibial (calf) vein branch.  - monitor vitals closely    #Decompensated CHF  - BNP ~24781  - 2D echo 04/2018 EF 23%, G1DD, mod MR, mod AR  - repeat 2d echo  - Lasix 40 IV daily  - c/w Entresto, Corlanor, Aldactone, Coreg  - c/w digoxin  - f/u cardio consult recs (placed)    #Hx of CVA   - c/w ASA    #Afib not on AC   - was on Eliquis but stopped d/t intolerance  - c/w BB for rate control    #MISC:   - Activity - bedrest  - DVT ppx - Lovenox  - Diet - DASH/TLC  - full code  - Dispo - from [facility] 69 yo M w/ hx of Afib not on AC, CHF (EF 23%) w/ AICD, CVA admitted with R loculated pleural effusion s/p R chest tube insertion.     #R loculated pleural effusion w/ possible underlying PNA, r/o empyema  - s/p thoracentesis and R pigtail insertion  - pigtail to low wall suctioning  - tPA (1/3) pushed on 8/18, drained ~500-600 cc serosanguinous fluid, day total ~1700cc  - tPA (2/3) pushed on 8/19, drained ~280cc fluid. Total 700 in AM  - tPA today (3/3) planned  - H/H 8.8 this morning.   - transfuse if Hbg <7, symptomatic  - Pleural Fluid 8/17: LDH 2900, pH 7.3, prt 3 } serum prt 5.3; EXUDATIVE   - Pleural Culture 8/17: Strep agalactiae, moderate neutrophils   - c/w meropenem (pt allergic to Unasyn, has anaphylaxis rxn to PCN)  - nasal MRSA +ve, pt now on contact    #R PE on CT Angio  - c/w lovenox 70mg q12h  -d/c'd heparin drip yesterday   - Duplex 8/17 Prelim: Extensive R DVT, L calf DVT   - monitor vitals closely    #Decompensated CHF  - BNP ~88492  - 2D echo 04/2018 EF 23%, G1DD, mod MR, mod AR  - ECHO 8/18: EF <20%.  - Lasix 40 IV daily  - c/w Entresto, Corlanor, Aldactone, Coreg  - c/w digoxin (lv <0.4)  - f/u cardio consult recs (placed)    #Hx of CVA   - c/w ASA  #Afib not on AC   - was on Eliquis but stopped d/t intolerance  - c/w BB for rate control    #MISC:   - Activity - bedrest  - DVT ppx - Lovenox  - Diet - DASH/TLC  - full code  - Dispo - from [facility]    - DOWNGRADE TO FLOOR

## 2018-08-20 NOTE — PROGRESS NOTE ADULT - ASSESSMENT
IMPRESSION:    Empyema sp pig tail  Pneumonia   Chronic hypoxemic respiratory failure in home O2  HF REF  HO AFIB not on AC  VTE     PLAN:    CNS: No depressants    HEENT: Oral care    PULMONARY:  HOB @ 45 degrees.  3 total days of Intra pleural TPA.      CARDIOVASCULAR: Continue maximizing  cardiac therapy.  Avoid volume overload.  Lasix Q 24 hours     GI: GI prophylaxis.  Feeding     RENAL:  Follow up lytes.  Correct as needed    INFECTIOUS DISEASE: Follow up cultures.  Continue Meropenem     HEMATOLOGICAL:  DVT TX.     ENDOCRINE:  Follow up FS.    MUSCULOSKELETAL:    Transfer to floor

## 2018-08-20 NOTE — PROGRESS NOTE ADULT - SUBJECTIVE AND OBJECTIVE BOX
Progress Note: General Surgery  Patient: SAMANTHA CHARLES , 68y (1950)Male   MRN: [id number]  Location: [place] [address] A  Visit: 08-17-18 Inpatient  Date: 08-20-18 @ 02:55  Hospital Day: 3  Post-op Day: 2    Procedure/Diagnosis: s/p R pigtail placement for loculated pleural effusion/empyema  Events over 24h: Chest tube put out 280 mL of fluid after TPA pushed via chest tube. ERNST overnight.    Vitals: T(F): 97.3 (08-19-18 @ 23:30), Max: 98 (08-19-18 @ 20:00)  HR: 68 (08-20-18 @ 01:00)  BP: 96/50 (08-20-18 @ 01:00) (74/60 - 125/75)  RR: 19 (08-20-18 @ 01:00)  SpO2: 100% (08-20-18 @ 01:00)      Diet: Diet, DASH/TLC:   Sodium & Cholesterol Restricted (08-17-18 @ 18:18)    IV Fluids: yes no , Type:   Bowel Function: Bowel movement [  ] Flatus [  ]   Intake and Output:   08-18-18 @ 07:01  -  08-19-18 @ 07:00  --------------------------------------------------------  IN: 1353 mL    08-19-18 @ 07:01  -  08-20-18 @ 02:55  --------------------------------------------------------  IN: 300 mL       08-18-18 @ 07:01  -  08-19-18 @ 07:00  --------------------------------------------------------  OUT:    Chest Tube: 1405 mL    Voided: 700 mL  Total OUT: 2105 mL      08-19-18 @ 07:01  -  08-20-18 @ 02:55  --------------------------------------------------------  OUT:    Chest Tube: 550 mL    Voided: 1600 mL  Total OUT: 2150 mL        08-18-18 @ 07:01  -  08-19-18 @ 07:00  --------------------------------------------------------  NET: -752 mL    08-19-18 @ 07:01  -  08-20-18 @ 02:55  --------------------------------------------------------  NET: -1850 mL        Physical Examination:  General: In NAD   HEENT: JESSY             Lymphatic system: No cervical LN   Lungs: Bilateral BS  Cardiovascular: Regular   Gastrointestinal: Soft, Positive BS  Extremities: No clubbing.  Moves extremities.  Full Range of motion   Skin: Warm, intact  Neurological: No motor or sensory deficit     Medications: [Standing]  alteplase Pulse Lysis/Angiojet 10 milliGRAM(s) IntraCatheter. once  aspirin  chewable 81 milliGRAM(s) Oral daily  carvedilol 3.125 milliGRAM(s) Oral every 12 hours  digoxin     Tablet 0.125 milliGRAM(s) Oral daily  enoxaparin Injectable 70 milliGRAM(s) SubCutaneous every 12 hours  furosemide   Injectable 40 milliGRAM(s) IV Push daily  ivabradine 5 milliGRAM(s) Oral two times a day  meropenem  IVPB 1000 milliGRAM(s) IV Intermittent every 8 hours  sacubitril 24 mG/valsartan 26 mG 1 Tablet(s) Oral two times a day  spironolactone 50 milliGRAM(s) Oral two times a day  vancomycin  IVPB 750 milliGRAM(s) IV Intermittent every 12 hours    DVT Prophylaxis: enoxaparin Injectable 70 milliGRAM(s) SubCutaneous every 12 hours    GI Prophylaxis:   Antibiotics: meropenem  IVPB 1000 milliGRAM(s) IV Intermittent every 8 hours  vancomycin  IVPB 750 milliGRAM(s) IV Intermittent every 12 hours    Anticoagulation:   Medications:[PRN]  acetaminophen   Tablet. 650 milliGRAM(s) Oral every 6 hours PRN    Labs:                        10.1   8.12  )-----------( 290      ( 19 Aug 2018 21:10 )             31.7   08-19    139  |  100  |  17  ----------------------------<  121<H>  4.6   |  26  |  0.8    Ca    8.1<L>      19 Aug 2018 04:13  Mg     1.8     08-19    TPro  5.5<L>  /  Alb  2.4<L>  /  TBili  0.7  /  DBili  x   /  AST  11  /  ALT  16  /  AlkPhos  152<H>  08-19  LIVER FUNCTIONS - ( 19 Aug 2018 04:13 )  Alb: 2.4 g/dL / Pro: 5.5 g/dL / ALK PHOS: 152 U/L / ALT: 16 U/L / AST: 11 U/L / GGT: x         PT/INR - ( 19 Aug 2018 04:13 )   PT: 18.50 sec;   INR: 1.72 ratio         PTT - ( 19 Aug 2018 04:13 )  PTT:80.3 sec    Urine/Micro:    Culture - Body Fluid with Gram Stain (collected 17 Aug 2018 15:16)  Source: .Body Fluid Pleural Fluid  Gram Stain (18 Aug 2018 07:31):    Moderate polymorphonuclear leukocytes seen per low power field    Rare Gram positive cocci in pairs seen per oil power field  Preliminary Report (18 Aug 2018 21:55):    Few Streptococcus agalactiae (Group B)  Organism: Streptococcus agalactiae (Group B) (19 Aug 2018 18:29)  Organism: Streptococcus agalactiae (Group B) (19 Aug 2018 18:29)    Culture - Acid Fast - Body Fluid w/Smear (collected 17 Aug 2018 15:16)  Source: .Body Fluid Pleural Fluid      Imaging:  < from: Xray Chest 1 View- PORTABLE-Routine (08.19.18 @ 05:55) >  Decreased right pleural effusion/opacity.

## 2018-08-20 NOTE — CONSULT NOTE ADULT - SUBJECTIVE AND OBJECTIVE BOX
Patient is a 68y old  Male who presents with a chief complaint of SOB (17 Aug 2018 17:22)        PAST MEDICAL & SURGICAL HISTORY:  Muscle weakness (generalized)  Atrial fibrillation, unspecified type  Oxygen dependent  Essential hypertension  Cerebrovascular accident (CVA) due to bilateral embolism of posterior cerebral arteries  Systolic congestive heart failure, unspecified chronicity  AICD (automatic cardioverter/defibrillator) present      HPI:  pt admitted as above with sob and had CT for empeyema and PNA, pt now improved and now nsr but anemic.               PREVIOUS DIAGNOSTIC TESTING:      ECHO  FINDINGS:    STRESS  FINDINGS:    CATHETERIZATION  FINDINGS:    ELECTROPHYSIOLOGY STUDY  FINDINGS:    CAROTID ULTRASOUND:  FINDINGS    VENOUS DUPLEX SCAN:  FINDINGS:    CHEST CT PULMONARY ANGIO with IV Contrast:  FINDINGS:  MEDICATIONS  (STANDING):  alteplase Pulse Lysis/Angiojet 10 milliGRAM(s) IntraCatheter. once  aspirin  chewable 81 milliGRAM(s) Oral daily  carvedilol 3.125 milliGRAM(s) Oral every 12 hours  digoxin     Tablet 0.125 milliGRAM(s) Oral daily  enoxaparin Injectable 70 milliGRAM(s) SubCutaneous every 12 hours  furosemide   Injectable 40 milliGRAM(s) IV Push daily  ivabradine 5 milliGRAM(s) Oral two times a day  meropenem  IVPB 1000 milliGRAM(s) IV Intermittent every 8 hours  sacubitril 24 mG/valsartan 26 mG 1 Tablet(s) Oral two times a day  spironolactone 50 milliGRAM(s) Oral two times a day    MEDICATIONS  (PRN):  acetaminophen   Tablet. 650 milliGRAM(s) Oral every 6 hours PRN Mild Pain (1 - 3)      FAMILY HISTORY:  No pertinent family history in first degree relatives      SOCIAL HISTORY:    CIGARETTES:    ALCOHOL:    REVIEW OF SYSTEMS:      RESPIRATORY: SEE HPI   CARDIOVASCULAR: SEE HPI   GASTROINTESTINAL: No abdominal pain  NEUROLOGICAL: grossly intact motor and sensory   ENDOCRINE: No heat or cold intolerance, or hair loss  MUSCULOSKELETAL: No joint pain or swelling, muscle, back, or extremity pain  HEME/LYMPH: No easy bruising or bleeding gums      Vital Signs Last 24 Hrs  T(C): 36.4 (20 Aug 2018 08:00), Max: 36.7 (19 Aug 2018 20:00)  T(F): 97.6 (20 Aug 2018 08:00), Max: 98 (19 Aug 2018 20:00)  HR: 64 (20 Aug 2018 09:00) (58 - 74)  BP: 101/62 (20 Aug 2018 07:00) (84/54 - 113/75)  BP(mean): 77 (20 Aug 2018 07:00) (64 - 90)  RR: 22 (20 Aug 2018 09:) (10 - 36)  SpO2: 100% (20 Aug 2018 09:) (92% - 100%)    PHYSICAL EXAM:    GENERAL: In no apparent distress, well nourished, and hydrated.  HEAD:  Atraumatic, Normocephalic  EYES: EOMI, PERRLA, conjunctiva and sclera clear  ENMT: No tonsillar erythema, exudates, or enlargement; Moist mucous membranes, Good dentition, No lesions  NECK: Supple and normal thyroid.  No JVD or carotid bruit.  Carotid pulse is 2+ bilaterally.  HEART: Regular rate and rhythm; No murmurs, rubs, or gallops.  PULMONARY: Clear to auscultation and perfusion.  No rales, wheezing, or rhonchi bilaterally.  ABDOMEN: Soft, Nontender, Nondistended; Bowel sounds present  EXTREMITIES:  2+ Peripheral Pulses, No clubbing, cyanosis, or edema  LYMPH: No lymphadenopathy noted  NEUROLOGICAL: Grossly nonfocal          INTERPRETATION OF TELEMETRY:    ECG:    I&O's Detail    19 Aug 2018 07:01  -  20 Aug 2018 07:00  --------------------------------------------------------  IN:    IV PiggyBack: 600 mL    Oral Fluid: 220 mL  Total IN: 820 mL    OUT:    Chest Tube: 725 mL    Voided: 1875 mL  Total OUT: 2600 mL    Total NET: -1780 mL          LABS:                        8.8    4.15  )-----------( 292      ( 20 Aug 2018 04:17 )             27.6     08-20    138  |  100  |  15  ----------------------------<  97  4.2   |  26  |  0.6<L>    Ca    7.9<L>      20 Aug 2018 04:17  Mg     1.8     -    TPro  5.0<L>  /  Alb  2.1<L>  /  TBili  0.6  /  DBili  x   /  AST  11  /  ALT  14  /  AlkPhos  138<H>  08-20        PT/INR - ( 19 Aug 2018 04:13 )   PT: 18.50 sec;   INR: 1.72 ratio         PTT - ( 19 Aug 2018 04:13 )  PTT:80.3 sec    BNP  I&O's Detail    19 Aug 2018 07:01  -  20 Aug 2018 07:00  --------------------------------------------------------  IN:    IV PiggyBack: 600 mL    Oral Fluid: 220 mL  Total IN: 820 mL    OUT:    Chest Tube: 725 mL    Voided: 1875 mL  Total OUT: 2600 mL    Total NET: -1780 mL        Daily     Daily Weight in k.6 (20 Aug 2018 00:00)    RADIOLOGY & ADDITIONAL STUDIES:

## 2018-08-20 NOTE — PROGRESS NOTE ADULT - ATTENDING COMMENTS
68y old h/o generalized muscle weakness, AF, O2 dependent, htn, CVA, systolic CHF, s/p AICD,  presents with a chief complaint of SOB  Patient noted to have loculated right pleural effusion, s/p bedside thoracocentesis, Right pigtail catheter was placed with a drainage of 400ml of serous fluid, connected to suction.  overnight patient drained 600cc with much better reexpansion of the lung  plan is to proceed with TPA injection  case d/w Dr. Jett  probably wouldn't need a surgical intervention
68y old h/o generalized muscle weakness, AF, O2 dependent, htn, CVA, systolic CHF, s/p AICD,  presents with a chief complaint of SOB  Patient noted to have loculated right pleural effusion, s/p bedside thoracocentesis, Right pigtail catheter was placed with a drainage of 400ml of serous fluid, connected to suction.  Patient's lung didn't reexpand  plan is to proceed with TPA injection  case d/w Dr. Jett  patient may need large CT  patient is not a good surgical candidate
68y old h/o generalized muscle weakness, AF, O2 dependent, htn, CVA, systolic CHF, s/p AICD,  presents with a chief complaint of SOB  Patient noted to have loculated right pleural effusion, s/p bedside thoracocentesis, Right pigtail catheter was placed with a drainage of at least 1.5L of serous fluid  pt s/p multiple injections of TPA through the tube  CXR improved dramatically  d/w Dr. Jett: patient probably wouldn't need a surgical intervention

## 2018-08-20 NOTE — CONSULT NOTE ADULT - ASSESSMENT
hfref chronic stable   empyema   aicd   htn   paf       pts hr stable and controlled, anemic at this time will assess for AC as outpt based on afib burden seen on ICD   f/u pulmonary   continue all current cv medications   f/u with dr. jacome outpt after D/C.   recall as needed. hfref chronic stable   empyema   aicd   htn   paf   DVT and pulmonary embolism       pts hr stable and controlled,   anemic at this time   reviewed chart and saw pt had PE/acute appearing DVT   so start eliquis 10 mg po q12 x 7 days then 5 mg po q12   stop lovenox evening dose and start eliquis 10   watch h/h   recall as needed.

## 2018-08-20 NOTE — PROGRESS NOTE ADULT - SUBJECTIVE AND OBJECTIVE BOX
Patient is a 68y old  Male who presents with a chief complaint of SOB (17 Aug 2018 17:22)        Over Night Events:  Did well overnight.  SP 2nd dose of intrapleural TPA.  Off pressors.  On 2 liters O2./          ROS:  See HPI    PHYSICAL EXAM    ICU Vital Signs Last 24 Hrs  T(C): 36.4 (20 Aug 2018 06:00), Max: 36.7 (19 Aug 2018 20:00)  T(F): 97.6 (20 Aug 2018 06:00), Max: 98 (19 Aug 2018 20:00)  HR: 64 (20 Aug 2018 06:30) (58 - 74)  BP: 107/66 (20 Aug 2018 06:30) (84/54 - 113/75)  BP(mean): 83 (20 Aug 2018 06:30) (64 - 90)  ABP: --  ABP(mean): --  RR: 15 (20 Aug 2018 06:30) (10 - 36)  SpO2: 100% (20 Aug 2018 06:30) (92% - 100%)      General: In NAD   HEENT: JESSY             Lymphatic system: No cervical LN   Lungs: Bilateral BS  Cardiovascular: Regular   Gastrointestinal: Soft, Positive BS  Extremities: No clubbing.  Moves extremities.  Full Range of motion   Skin: Warm, intact  Neurological: No motor or sensory deficit       08-19-18 @ 07:01  -  08-20-18 @ 07:00  --------------------------------------------------------  IN:    IV PiggyBack: 600 mL    Oral Fluid: 220 mL  Total IN: 820 mL    OUT:    Chest Tube: 725 mL    Voided: 1875 mL  Total OUT: 2600 mL    Total NET: -1780 mL          LABS:                            8.8    4.15  )-----------( 292      ( 20 Aug 2018 04:17 )             27.6                                               08-20    138  |  100  |  15  ----------------------------<  97  4.2   |  26  |  0.6<L>    Ca    7.9<L>      20 Aug 2018 04:17  Mg     1.8     08-20    TPro  5.0<L>  /  Alb  2.1<L>  /  TBili  0.6  /  DBili  x   /  AST  11  /  ALT  14  /  AlkPhos  138<H>  08-20      PT/INR - ( 19 Aug 2018 04:13 )   PT: 18.50 sec;   INR: 1.72 ratio         PTT - ( 19 Aug 2018 04:13 )  PTT:80.3 sec                                                                                     LIVER FUNCTIONS - ( 20 Aug 2018 04:17 )  Alb: 2.1 g/dL / Pro: 5.0 g/dL / ALK PHOS: 138 U/L / ALT: 14 U/L / AST: 11 U/L / GGT: x                                                  Culture - Body Fluid with Gram Stain (collected 17 Aug 2018 15:16)  Source: .Body Fluid Pleural Fluid  Gram Stain (18 Aug 2018 07:31):    Moderate polymorphonuclear leukocytes seen per low power field    Rare Gram positive cocci in pairs seen per oil power field  Preliminary Report (18 Aug 2018 21:55):    Few Streptococcus agalactiae (Group B)  Organism: Streptococcus agalactiae (Group B) (19 Aug 2018 18:29)  Organism: Streptococcus agalactiae (Group B) (19 Aug 2018 18:29)    Culture - Acid Fast - Body Fluid w/Smear (collected 17 Aug 2018 15:16)  Source: .Body Fluid Pleural Fluid                                                                                           MEDICATIONS  (STANDING):  alteplase Pulse Lysis/Angiojet 10 milliGRAM(s) IntraCatheter. once  aspirin  chewable 81 milliGRAM(s) Oral daily  carvedilol 3.125 milliGRAM(s) Oral every 12 hours  digoxin     Tablet 0.125 milliGRAM(s) Oral daily  enoxaparin Injectable 70 milliGRAM(s) SubCutaneous every 12 hours  furosemide   Injectable 40 milliGRAM(s) IV Push daily  ivabradine 5 milliGRAM(s) Oral two times a day  meropenem  IVPB 1000 milliGRAM(s) IV Intermittent every 8 hours  sacubitril 24 mG/valsartan 26 mG 1 Tablet(s) Oral two times a day  spironolactone 50 milliGRAM(s) Oral two times a day  vancomycin  IVPB 750 milliGRAM(s) IV Intermittent every 12 hours    MEDICATIONS  (PRN):  acetaminophen   Tablet. 650 milliGRAM(s) Oral every 6 hours PRN Mild Pain (1 - 3)      Xrays:                                                                                     ECHO

## 2018-08-20 NOTE — PROGRESS NOTE ADULT - ASSESSMENT
Assessment:  68y Male patient admitted S/P R pigtail placement for loculated pleural effusion/empyema, with the above physical exam, labs, and imaging findings.    Plan:  -Keep pigtail on suction  -f/u pigtail output    Date/Time: 08-20-18 @ 02:55 Assessment:  68y Male patient admitted S/P R pigtail placement for loculated pleural effusion/empyema, with the above physical exam, labs, and imaging findings.    Plan:  -Keep pigtail on suction  -f/u pigtail output  -f/u culture      Date/Time: 08-20-18 @ 02:55 Assessment:  68y Male patient admitted S/P R pigtail placement for loculated pleural effusion/empyema, with the above physical exam, labs, and imaging findings.    Plan:  -Keep pigtail on suction  -f/u pigtail output  -f/u culture  -no plan for further surgical intervention, please reconsult as needed    Date/Time: 08-20-18 @ 02:55

## 2018-08-20 NOTE — PROGRESS NOTE ADULT - SUBJECTIVE AND OBJECTIVE BOX
SUBJECTIVE:    Patient is a 68y old Male who presents with a chief complaint of SOB (17 Aug 2018 17:22)    Currently admitted to medicine with the primary diagnosis of Shortness of breath     Today is hospital day 3d.   OVERNIGHT EVENTS no acute events overnight   Today, patient denies CP, SOB, fever, nausea or vomiting.     PAST MEDICAL & SURGICAL HISTORY  Muscle weakness (generalized)  Atrial fibrillation, unspecified type  Oxygen dependent  Essential hypertension  Cerebrovascular accident (CVA) due to bilateral embolism of posterior cerebral arteries  Systolic congestive heart failure, unspecified chronicity  AICD (automatic cardioverter/defibrillator) present          ALLERGIES:  cephalexin (Flushing)  penicillin (Rash)    MEDICATIONS:  STANDING MEDICATIONS  alteplase Pulse Lysis/Angiojet 10 milliGRAM(s) IntraCatheter. once  aspirin  chewable 81 milliGRAM(s) Oral daily  carvedilol 3.125 milliGRAM(s) Oral every 12 hours  digoxin     Tablet 0.125 milliGRAM(s) Oral daily  enoxaparin Injectable 70 milliGRAM(s) SubCutaneous every 12 hours  furosemide   Injectable 40 milliGRAM(s) IV Push daily  ivabradine 5 milliGRAM(s) Oral two times a day  meropenem  IVPB 1000 milliGRAM(s) IV Intermittent every 8 hours  sacubitril 24 mG/valsartan 26 mG 1 Tablet(s) Oral two times a day  spironolactone 50 milliGRAM(s) Oral two times a day  vancomycin  IVPB 750 milliGRAM(s) IV Intermittent every 12 hours    PRN MEDICATIONS  acetaminophen   Tablet. 650 milliGRAM(s) Oral every 6 hours PRN    VITALS:   T(F): 97.3  HR: 66  BP: 106/68  RR: 23  SpO2: 100%          LABS:                        8.8    4.15  )-----------( 292      ( 20 Aug 2018 04:17 )             27.6     08-19    139  |  100  |  17  ----------------------------<  121<H>  4.6   |  26  |  0.8    Ca    8.1<L>      19 Aug 2018 04:13  Mg     1.8     08-19    TPro  5.5<L>  /  Alb  2.4<L>  /  TBili  0.7  /  DBili  x   /  AST  11  /  ALT  16  /  AlkPhos  152<H>  08-19    PT/INR - ( 19 Aug 2018 04:13 )   PT: 18.50 sec;   INR: 1.72 ratio         PTT - ( 19 Aug 2018 04:13 )  PTT:80.3 sec          Culture - Body Fluid with Gram Stain (collected 17 Aug 2018 15:16)  Source: .Body Fluid Pleural Fluid  Gram Stain (18 Aug 2018 07:31):    Moderate polymorphonuclear leukocytes seen per low power field    Rare Gram positive cocci in pairs seen per oil power field  Preliminary Report (18 Aug 2018 21:55):    Few Streptococcus agalactiae (Group B)  Organism: Streptococcus agalactiae (Group B) (19 Aug 2018 18:29)  Organism: Streptococcus agalactiae (Group B) (19 Aug 2018 18:29)    Culture - Acid Fast - Body Fluid w/Smear (collected 17 Aug 2018 15:16)  Source: .Body Fluid Pleural Fluid          RADIOLOGY:    PHYSICAL EXAM:  GEN: NAD  LUNGS: decreased breath sounds on RLL, no wheezing/crackles  HEART: RRR s1s2 present no murmurs  ABD: soft nontender nondistended +BS  EXT: no edema   NEURO: aao x 3 Patient is a 68y old Male who presents with a chief complaint of SOB (17 Aug 2018 17:22)    Currently admitted to medicine with the primary diagnosis of Shortness of breath    Today is hospital day 3d.    BRIEF HOSPITAL COURSE:   67 yo M w/ hx of Afib not on AC, CHF (EF 23%) w/ AICD, CVA presents w/ SOB since this AM. Recent hx of AICD placement at [facility], subsequently went to [facility] for rehabilitation. Patient denies any respiratory issue prior to this am. He sat 77% on NRB at NH.     In ED, patient in tachypnic to 30s, in respiratory distress which improved w/ BiPAP. Bedside US significant for right side loculated pleural effusion. Thoracentesis by Pulm team was done bedside, chest tube was inserted to suction. Denies fever, chills, CP, abdominal pain, dysuria. Never had this before. No hx of smoking.    8/18: Drain >1700 from chest tube, serosanguinous , tPA 1/3  8/19: Drain changed out, >700 from chest tube still serosanguinous, tPA pushed 2/3  8/20: tPA will be pushed today 3/3    EVENTS LAST 24HRS:   Pt seen and examined at bedside.  Denied CP, palpitations, N/V, fever.  States SOB improved, still tachypnic in 20s improved.   No complaints, eating well.    PAST MEDICAL & SURGICAL HISTORY  Muscle weakness (generalized)  Atrial fibrillation, unspecified type  Oxygen dependent  Essential hypertension  Cerebrovascular accident (CVA) due to bilateral embolism of posterior cerebral arteries  Systolic congestive heart failure, unspecified chronicity  AICD (automatic cardioverter/defibrillator) present      SOCIAL HISTORY:  Denies any smoking hx.  Denies EtOH/drug use.      REVIEW OF SYSTEMS:  See HPI    ALLERGIES:  cephalexin (Flushing)  penicillin (Rash)    MEDICATIONS:  STANDING MEDICATIONS  alteplase Pulse Lysis/Angiojet 10 milliGRAM(s) IntraCatheter. once  aspirin  chewable 81 milliGRAM(s) Oral daily  carvedilol 3.125 milliGRAM(s) Oral every 12 hours  digoxin     Tablet 0.125 milliGRAM(s) Oral daily  enoxaparin Injectable 70 milliGRAM(s) SubCutaneous every 12 hours  furosemide   Injectable 40 milliGRAM(s) IV Push daily  ivabradine 5 milliGRAM(s) Oral two times a day  meropenem  IVPB 1000 milliGRAM(s) IV Intermittent every 8 hours  sacubitril 24 mG/valsartan 26 mG 1 Tablet(s) Oral two times a day  spironolactone 50 milliGRAM(s) Oral two times a day    PRN MEDICATIONS  acetaminophen   Tablet. 650 milliGRAM(s) Oral every 6 hours PRN    VITALS:         ICU Vital Signs Last 24 Hrs:    T(C): 36.4 (20 Aug 2018 08:00), Max: 36.7 (19 Aug 2018 20:00)  T(F): 97.6 (20 Aug 2018 08:00), Max: 98 (19 Aug 2018 20:00)  HR: 64 (20 Aug 2018 09:00) (58 - 74)  BP: 101/62 (20 Aug 2018 07:00) (84/54 - 113/75)  BP(mean): 77 (20 Aug 2018 07:00) (64 - 90)  ABP: --  ABP(mean): --  RR: 22 (20 Aug 2018 09:00) (10 - 36)  SpO2: 100% (20 Aug 2018 09:00) (92% - 100%)          I&O's Detail:      19 Aug 2018 07:01  -  20 Aug 2018 07:00  --------------------------------------------------------  IN:    IV PiggyBack: 600 mL    Oral Fluid: 220 mL  Total IN: 820 mL    OUT:    Chest Tube: 725 mL    Voided: 1875 mL  Total OUT: 2600 mL    Total NET: -1780 mL          LABS:                        8.8    4.15  )-----------( 292      ( 20 Aug 2018 04:17 )             27.6     08-20    138  |  100  |  15  ----------------------------<  97  4.2   |  26  |  0.6<L>    Ca    7.9<L>      20 Aug 2018 04:17  Mg     1.8     08-20    TPro  5.0<L>  /  Alb  2.1<L>  /  TBili  0.6  /  DBili  x   /  AST  11  /  ALT  14  /  AlkPhos  138<H>  08-20          CAPILLARY BLOOD GLUCOSE  133 (18 Aug 2018 22:00)        PT/INR - ( 19 Aug 2018 04:13 )   PT: 18.50 sec;   INR: 1.72 ratio         PTT - ( 19 Aug 2018 04:13 )  PTT:80.3 sec    CULTURES:  Culture Results:   Few Streptococcus agalactiae (Group B) (08-17 @ 15:16)      PHYSICAL EXAM:    General: AAF thin, temporal wasting, lying in bed in with 3L NC; Alert, oriented, interactive, nonfocal.  HEENT: Pupils equal, reactive to light symmetrically.   PULM: Clear to auscultation bilaterally, RLL reduced breath sounds; tachypnea 30s, SaO2 100%  CVS: Regular rate and rhythm, no murmurs  ABD: Soft, nondistended, nontender, no masses.  EXT: No peripheral edema, nontender.  SKIN: Warm and well perfused    RADIOLOGY:     < from: Xray Chest 1 View- PORTABLE-Routine (08.18.18 @ 06:01) >  Cardiomegaly, unchanged.    Bilateral opacities, unchanged.    Right-sided chest tube.    Left-sided permanent pacemaker.    < end of copied text >  < from: CT Angio Chest w/ IV Cont (08.17.18 @ 17:40) >  1.  Small pulmonary embolism in the right interlobar artery.  2.  Right moderate to large partially loculated pleural effusion with   near complete atelectasis of the right lower lobe. Right pleural catheter   in place with adjacent gas.  3.  Small left pleural effusion with compressive atelectasis.  4.  Partially imaged abdominal ascites.    < end of copied text >  < from: CT Angio Neck w/ IV Cont (04.24.18 @ 11:26) >  Essentially unremarkable CT angiogram of the neck and head. No   significant vascular stenosis or occlusion.    Moderate bilateral pleural effusions.    < end of copied text >  < from: Xray Chest 1 View- PORTABLE-Routine (08.20.18 @ 04:31) >  Stable to decreased right basilar opacity/effusion.    < end of copied text >

## 2018-08-21 LAB
ALBUMIN SERPL ELPH-MCNC: 2.2 G/DL — LOW (ref 3.5–5.2)
ALP SERPL-CCNC: 137 U/L — HIGH (ref 30–115)
ALT FLD-CCNC: 17 U/L — SIGNIFICANT CHANGE UP (ref 0–41)
ANION GAP SERPL CALC-SCNC: 9 MMOL/L — SIGNIFICANT CHANGE UP (ref 7–14)
APTT BLD: 45.4 SEC — HIGH (ref 27–39.2)
AST SERPL-CCNC: 14 U/L — SIGNIFICANT CHANGE UP (ref 0–41)
BILIRUB SERPL-MCNC: 0.5 MG/DL — SIGNIFICANT CHANGE UP (ref 0.2–1.2)
BUN SERPL-MCNC: 13 MG/DL — SIGNIFICANT CHANGE UP (ref 10–20)
CALCIUM SERPL-MCNC: 8 MG/DL — LOW (ref 8.5–10.1)
CHLORIDE SERPL-SCNC: 101 MMOL/L — SIGNIFICANT CHANGE UP (ref 98–110)
CO2 SERPL-SCNC: 32 MMOL/L — SIGNIFICANT CHANGE UP (ref 17–32)
CREAT SERPL-MCNC: 0.6 MG/DL — LOW (ref 0.7–1.5)
GLUCOSE SERPL-MCNC: 80 MG/DL — SIGNIFICANT CHANGE UP (ref 70–99)
HCT VFR BLD CALC: 28.3 % — LOW (ref 42–52)
HGB BLD-MCNC: 9 G/DL — LOW (ref 14–18)
INR BLD: 1.53 RATIO — HIGH (ref 0.65–1.3)
MAGNESIUM SERPL-MCNC: 1.9 MG/DL — SIGNIFICANT CHANGE UP (ref 1.8–2.4)
MCHC RBC-ENTMCNC: 30.9 PG — SIGNIFICANT CHANGE UP (ref 27–31)
MCHC RBC-ENTMCNC: 31.8 G/DL — LOW (ref 32–37)
MCV RBC AUTO: 97.3 FL — HIGH (ref 80–94)
NRBC # BLD: 0 /100 WBCS — SIGNIFICANT CHANGE UP (ref 0–0)
PLATELET # BLD AUTO: 309 K/UL — SIGNIFICANT CHANGE UP (ref 130–400)
POTASSIUM SERPL-MCNC: 4.1 MMOL/L — SIGNIFICANT CHANGE UP (ref 3.5–5)
POTASSIUM SERPL-SCNC: 4.1 MMOL/L — SIGNIFICANT CHANGE UP (ref 3.5–5)
PROT SERPL-MCNC: 5.2 G/DL — LOW (ref 6–8)
PROTHROM AB SERPL-ACNC: 16.5 SEC — HIGH (ref 9.95–12.87)
RBC # BLD: 2.91 M/UL — LOW (ref 4.7–6.1)
RBC # FLD: 15.9 % — HIGH (ref 11.5–14.5)
SODIUM SERPL-SCNC: 142 MMOL/L — SIGNIFICANT CHANGE UP (ref 135–146)
WBC # BLD: 4.07 K/UL — LOW (ref 4.8–10.8)
WBC # FLD AUTO: 4.07 K/UL — LOW (ref 4.8–10.8)

## 2018-08-21 RX ORDER — FUROSEMIDE 40 MG
40 TABLET ORAL DAILY
Qty: 0 | Refills: 0 | Status: DISCONTINUED | OUTPATIENT
Start: 2018-08-21 | End: 2018-08-27

## 2018-08-21 RX ORDER — ENOXAPARIN SODIUM 100 MG/ML
70 INJECTION SUBCUTANEOUS EVERY 12 HOURS
Qty: 0 | Refills: 0 | Status: DISCONTINUED | OUTPATIENT
Start: 2018-08-21 | End: 2018-08-23

## 2018-08-21 RX ORDER — APIXABAN 2.5 MG/1
10 TABLET, FILM COATED ORAL EVERY 12 HOURS
Qty: 0 | Refills: 0 | Status: DISCONTINUED | OUTPATIENT
Start: 2018-08-21 | End: 2018-08-21

## 2018-08-21 RX ADMIN — ENOXAPARIN SODIUM 70 MILLIGRAM(S): 100 INJECTION SUBCUTANEOUS at 18:53

## 2018-08-21 RX ADMIN — Medication 81 MILLIGRAM(S): at 14:38

## 2018-08-21 RX ADMIN — Medication 40 MILLIGRAM(S): at 18:51

## 2018-08-21 RX ADMIN — CARVEDILOL PHOSPHATE 3.12 MILLIGRAM(S): 80 CAPSULE, EXTENDED RELEASE ORAL at 05:56

## 2018-08-21 RX ADMIN — MEROPENEM 100 MILLIGRAM(S): 1 INJECTION INTRAVENOUS at 14:37

## 2018-08-21 RX ADMIN — SPIRONOLACTONE 50 MILLIGRAM(S): 25 TABLET, FILM COATED ORAL at 18:50

## 2018-08-21 RX ADMIN — IVABRADINE 5 MILLIGRAM(S): 7.5 TABLET, FILM COATED ORAL at 18:52

## 2018-08-21 RX ADMIN — SPIRONOLACTONE 50 MILLIGRAM(S): 25 TABLET, FILM COATED ORAL at 05:56

## 2018-08-21 RX ADMIN — Medication 650 MILLIGRAM(S): at 06:18

## 2018-08-21 RX ADMIN — Medication 0.12 MILLIGRAM(S): at 05:56

## 2018-08-21 RX ADMIN — Medication 40 MILLIGRAM(S): at 05:56

## 2018-08-21 RX ADMIN — MEROPENEM 100 MILLIGRAM(S): 1 INJECTION INTRAVENOUS at 06:14

## 2018-08-21 RX ADMIN — IVABRADINE 5 MILLIGRAM(S): 7.5 TABLET, FILM COATED ORAL at 05:57

## 2018-08-21 RX ADMIN — ENOXAPARIN SODIUM 70 MILLIGRAM(S): 100 INJECTION SUBCUTANEOUS at 05:57

## 2018-08-21 RX ADMIN — CARVEDILOL PHOSPHATE 3.12 MILLIGRAM(S): 80 CAPSULE, EXTENDED RELEASE ORAL at 18:51

## 2018-08-21 RX ADMIN — MEROPENEM 100 MILLIGRAM(S): 1 INJECTION INTRAVENOUS at 22:28

## 2018-08-21 RX ADMIN — SACUBITRIL AND VALSARTAN 1 TABLET(S): 24; 26 TABLET, FILM COATED ORAL at 18:50

## 2018-08-21 RX ADMIN — Medication 650 MILLIGRAM(S): at 00:10

## 2018-08-21 RX ADMIN — SACUBITRIL AND VALSARTAN 1 TABLET(S): 24; 26 TABLET, FILM COATED ORAL at 05:59

## 2018-08-21 NOTE — PROGRESS NOTE ADULT - ASSESSMENT
67 yo M w/ hx of Afib not on AC, CHF (EF 23%) w/ AICD, CVA admitted with R loculated pleural effusion s/p R chest tube insertion.     #R loculated pleural effusion w/ possible underlying PNA, r/o empyema  - s/p thoracentesis and R pigtail insertion  - pigtail to low wall suctioning.  - 580cc serosanguinous fluid drained overnight, continuously draining during the day  - pending ID consult for abx  - pulmonary f/u for chest tube and PTX, patient clinically stable  - will repeat CXR tmrw   - Pleural Fluid 8/17: LDH 2900, pH 7.3, prt 3 } serum prt 5.3; EXUDATIVE   - Pleural Culture 8/17: Strep agalactiae, moderate neutrophils   - c/w meropenem for now  - nasal MRSA +ve, pt now on contact    #R PE on CT Angio  - c/w lovenox 70mg q12h  - will switch to Eliquis 10mgBID x7 days, then 5mgBID once chest tube is out  - Duplex 8/17 : Extensive R DVT, L calf DVT       #Decompensated CHF  - converted IV to PO lasix 40 qd  - ECHO 8/18: EF <20%.  - c/w Entresto, Corlanor, Aldactone, Coreg  - c/w digoxin (lv <0.4)    # normo to macro anemia  - f/u B12 and Fol and TSH    #Hx of CVA   - c/w ASA    #Afib not on AC   - c/w BB for rate control    # low albumin prob suppressed from acute dz  check Urine TP:Cr ratio to r/o proteinuria    #MISC:   - DVT ppx - Lovenox  - Diet - DASH/TLC  - full code  - Dispo - from NH 67 yo M w/ hx of Afib not on AC, CHF (EF 23%) w/ AICD, CVA admitted with R loculated pleural effusion s/p R chest tube insertion.     #R loculated pleural effusion w/ possible underlying PNA, r/o empyema  - s/p thoracentesis and R pigtail insertion  - pigtail to low wall suctioning.  - 580cc serosanguinous fluid drained overnight, continuously draining during the day  - pending ID consult for abx  - pulmonary f/u for chest tube and PTX, patient clinically stable  - daily CXR. will repeat CXR tmrw   - Pleural Fluid 8/17: LDH 2900, pH 7.3, prt 3 } serum prt 5.3; EXUDATIVE   - Pleural Culture 8/17: Strep agalactiae, moderate neutrophils   - c/w meropenem for now  - nasal MRSA +ve, pt now on contact    #R PE on CT Angio  - c/w lovenox 70mg q12h  - will switch to Eliquis 10mgBID x7 days, then 5mg BID once chest tube is out  - Duplex 8/17: Extensive R DVT, L calf DVT       #Decompensated CHF  - converted IV to PO lasix 40 qd  - ECHO 8/18: EF <20%.  - c/w Entresto, Corlanor, Aldactone, Coreg  - c/w digoxin (lv <0.4)    # normo to macro anemia  - f/u B12 and Fol and TSH    #Hx of CVA   - c/w ASA    #Afib not on AC   - c/w BB for rate control    # low albumin prob suppressed from acute dz  check Urine TP:Cr ratio to r/o proteinuria    #MISC:   - DVT ppx - Lovenox  - Diet - DASH/TLC  - full code  - Dispo - from NH

## 2018-08-21 NOTE — PROGRESS NOTE ADULT - ASSESSMENT
# empyema - Grp B strep / r pl eff / PTX  still on jose - need to d/w pulm plan for abx (drug, dose and duration)  will pt be d/c'd on IV vs oral abx for this?  is ID consult needed or will pulm manage?  drainage is still high from CT and still on suction and PTX is present on last CXR - CT Sx has signed off - will d/w pulm  try to wean off O2 if poss  repeat CXR todd    # acute PE; acute, extensive DVT as above 9right leg prox) and small calf DVT on left  pt on eliquis now  need to ask if this ok w/ pulm as output from chest still bloody  ? if LMWH should be used fro now and then convert once chest tube out    # chr sys CHF / mod AI / Mod IL and TR / AICD / AFib  cont current meds  converted lasix to oral  seems stable    # normo to macro anemia  check B12 and Fol and TSH    # low albumin prob suppressed from acute dz  check Urine TP:Cr ratio to r/o proteinuria    # hx CVA - stable, no residual    # prior decond,   once CT out will need to see if pt can walk; might need rehab eval, will see later    Dispo: will see if pt will go back to SNF or can go home; still much to do w/ chest tube, PTX, abx, etc.

## 2018-08-21 NOTE — PROGRESS NOTE ADULT - SUBJECTIVE AND OBJECTIVE BOX
Patient is a 68y old  Male who presents with a chief complaint of SOB (17 Aug 2018 17:22)        SUBJECTIVE: No new complaints.  No fevers.        REVIEW OF SYSTEMS:  See HPI    PHYSICAL EXAM  Vital Signs Last 24 Hrs  T(C): 35.6 (21 Aug 2018 04:35), Max: 36.6 (20 Aug 2018 12:00)  T(F): 96 (21 Aug 2018 04:35), Max: 97.9 (20 Aug 2018 12:00)  HR: 63 (21 Aug 2018 04:35) (60 - 68)  BP: 107/62 (21 Aug 2018 04:35) (98/52 - 107/62)  BP(mean): 81 (20 Aug 2018 17:00) (71 - 81)  RR: 22 (21 Aug 2018 04:35) (15 - 28)  SpO2: 99% (21 Aug 2018 07:30) (92% - 100%)    General: In NAD  HEENT: JESSY               Lymphatic system: No Cervical LN    Respiratory: Garo BS  Cardiovascular: Regular  Gastrointestinal: Soft. + BS  Musculoskeletal: No clubbing.  moves all extremities.  Full range of motion    Skin: Warm.  Intact  Neurological: No motor or sensory deficit      08-20-18 @ 07:01  -  08-21-18 @ 07:00  --------------------------------------------------------  IN:    IV PiggyBack: 100 mL    Oral Fluid: 1440 mL  Total IN: 1540 mL    OUT:    Chest Tube: 325 mL    Voided: 600 mL  Total OUT: 925 mL    Total NET: 615 mL      08-21-18 @ 07:01  -  08-21-18 @ 10:28  --------------------------------------------------------  IN:  Total IN: 0 mL    OUT:    Chest Tube: 580 mL  Total OUT: 580 mL    Total NET: -580 mL          LABS:                          8.8    4.15  )-----------( 292      ( 20 Aug 2018 04:17 )             27.6                                               08-20    138  |  100  |  15  ----------------------------<  97  4.2   |  26  |  0.6<L>    Ca    7.9<L>      20 Aug 2018 04:17  Mg     1.8     08-20    TPro  5.0<L>  /  Alb  2.1<L>  /  TBili  0.6  /  DBili  x   /  AST  11  /  ALT  14  /  AlkPhos  138<H>  08-20      PT/INR - ( 21 Aug 2018 08:30 )   PT: 16.50 sec;   INR: 1.53 ratio                                                                                              LIVER FUNCTIONS - ( 20 Aug 2018 04:17 )  Alb: 2.1 g/dL / Pro: 5.0 g/dL / ALK PHOS: 138 U/L / ALT: 14 U/L / AST: 11 U/L / GGT: x                                                                                                MEDICATIONS  (STANDING):  aspirin  chewable 81 milliGRAM(s) Oral daily  carvedilol 3.125 milliGRAM(s) Oral every 12 hours  digoxin     Tablet 0.125 milliGRAM(s) Oral daily  enoxaparin Injectable 70 milliGRAM(s) SubCutaneous every 12 hours  ivabradine 5 milliGRAM(s) Oral two times a day  meropenem  IVPB 1000 milliGRAM(s) IV Intermittent every 8 hours  sacubitril 24 mG/valsartan 26 mG 1 Tablet(s) Oral two times a day  spironolactone 50 milliGRAM(s) Oral two times a day    MEDICATIONS  (PRN):  acetaminophen   Tablet. 650 milliGRAM(s) Oral every 6 hours PRN Mild Pain (1 - 3)

## 2018-08-21 NOTE — PROGRESS NOTE ADULT - SUBJECTIVE AND OBJECTIVE BOX
SUBJECTIVE:    Patient is a 68y old Male who presents with a chief complaint of SOB (17 Aug 2018 17:22)    Currently admitted to medicine with the primary diagnosis of Empyema lung     Today is hospital day 4d. No fevers. Drained 580cc of serosanguinous fluid overnight. Continuous drainage during day.     PAST MEDICAL & SURGICAL HISTORY  Muscle weakness (generalized)  Atrial fibrillation, unspecified type  Oxygen dependent  Essential hypertension  Cerebrovascular accident (CVA) due to bilateral embolism of posterior cerebral arteries  Systolic congestive heart failure, unspecified chronicity  AICD (automatic cardioverter/defibrillator) present    SOCIAL HISTORY:  Negative for smoking/alcohol/drug use.     ALLERGIES:  cephalexin (Flushing)  penicillin (Rash)    MEDICATIONS:  STANDING MEDICATIONS  aspirin  chewable 81 milliGRAM(s) Oral daily  carvedilol 3.125 milliGRAM(s) Oral every 12 hours  digoxin     Tablet 0.125 milliGRAM(s) Oral daily  enoxaparin Injectable 70 milliGRAM(s) SubCutaneous every 12 hours  furosemide    Tablet 40 milliGRAM(s) Oral daily  ivabradine 5 milliGRAM(s) Oral two times a day  meropenem  IVPB 1000 milliGRAM(s) IV Intermittent every 8 hours  sacubitril 24 mG/valsartan 26 mG 1 Tablet(s) Oral two times a day  spironolactone 50 milliGRAM(s) Oral two times a day    PRN MEDICATIONS  acetaminophen   Tablet. 650 milliGRAM(s) Oral every 6 hours PRN    VITALS:   T(F): 95.6  HR: 67  BP: 109/59  RR: 19  SpO2: 99%    LABS:                        9.0    4.07  )-----------( 309      ( 21 Aug 2018 08:30 )             28.3     08-21    142  |  101  |  13  ----------------------------<  80  4.1   |  32  |  0.6<L>    Ca    8.0<L>      21 Aug 2018 08:30  Mg     1.9     08-21    TPro  5.2<L>  /  Alb  2.2<L>  /  TBili  0.5  /  DBili  x   /  AST  14  /  ALT  17  /  AlkPhos  137<H>  08-21    PT/INR - ( 21 Aug 2018 08:30 )   PT: 16.50 sec;   INR: 1.53 ratio         PTT - ( 21 Aug 2018 08:30 )  PTT:45.4 sec      Culture - Body Fluid with Gram Stain (collected 08-17-18 @ 15:16)  Source: .Body Fluid Pleural Fluid  Gram Stain (08-18-18 @ 07:31):    Moderate polymorphonuclear leukocytes seen per low power field    Rare Gram positive cocci in pairs seen per oil power field  Preliminary Report (08-18-18 @ 21:55):    Few Streptococcus agalactiae (Group B)  Organism: Streptococcus agalactiae (Group B) (08-19-18 @ 18:29)  Organism: Streptococcus agalactiae (Group B) (08-19-18 @ 18:29)      -  Clindamycin: S      -  Penicillin: S Predicts results for ampicillin, amoxicillin, amoxicillin/clavulanate, ampicillin/sulbactam, 1st, 2nd and 3rd generation cephalosporins and carbapenems.      -  Vancomycin: S      Method Type: KB    MRSA/MSSA PCR (08.17.18 @ 18:20)    MRSA PCR Result.: Positive: geovanny Das  By: Real-Time PCR (Polymerase Reaction Method)  (of [facility])        RADIOLOGY:    < from: Xray Chest 1 View- PORTABLE-Routine (08.21.18 @ 06:44) >  IMPRESSION:    Increased right basilar pneumothorax with a maximum air gap up to 1.5 cm.     Decreased right base opacity/effusion.    Support devices as above.    < end of copied text >    < from: VA Duplex Lower Ext Vein Scan, Bilat (08.17.18 @ 20:56) >  Impression:    Acute-appearing deep venous thrombosis of the right common femoral,   femoral, popliteal, gastrocnemius, and peroneal veins.    Thrombus is present in a left posterior tibial (calf) vein branch.    < end of copied text >    < from: CT Angio Chest w/ IV Cont (08.17.18 @ 17:40) >  IMPRESSION:  1.  Small pulmonary embolism in the right interlobar artery.  2.  Right moderate to large partially loculated pleural effusion with   near complete atelectasis of the right lower lobe. Right pleural catheter   in place with adjacent gas.  3.  Small left pleural effusion with compressive atelectasis.  4.  Partially imaged abdominal ascites.    < end of copied text >      < from: Transthoracic Echocardiogram (08.18.18 @ 10:02) >  Summary:   1. Left ventricular ejection fraction, by visual estimation, is <20%.   2. Mild-moderate tricuspid regurgitation.   3. Mild to moderate aortic regurgitation.   4. Moderate pulmonic valve regurgitation.            PHYSICAL EXAM:  GEN: No acute distress  LUNGS: Clear to auscultation bilaterally   CHEST: + right CT to pleurovac w/ suction  HEART: S1/S2 present. RRR.   ABD: Soft, non-tender, non-distended. Bowel sounds present  EXT: NC/NC/NE/2+PP/JOYNER/Skin Intact.   NEURO: AAOX3

## 2018-08-21 NOTE — PROGRESS NOTE ADULT - ASSESSMENT
IMPRESSION:    Empyema sp pig tail  Pneumonia   Chronic hypoxemic respiratory failure in home O2  HF REF  HO AFIB not on AC  VTE   Trapped lung ?    PLAN:    CNS: No depressants    HEENT: Oral care    PULMONARY:  HOB @ 45 degrees. Continue drainage and ABX     CARDIOVASCULAR: Continue maximizing  cardiac therapy.  Avoid volume overload.  Lasix Q 24 hours     GI: GI prophylaxis.  Feeding     RENAL:  Follow up lytes.  Correct as needed    INFECTIOUS DISEASE: Follow up cultures.  Continue Meropenem     HEMATOLOGICAL:  DVT TX.     ENDOCRINE:  Follow up FS.    MUSCULOSKELETAL:    DW house staff

## 2018-08-21 NOTE — PROGRESS NOTE ADULT - SUBJECTIVE AND OBJECTIVE BOX
GEOVANNY GARCIA  68y  Male  ***My note supercedes ALL resident notes that I sign.  My corrections for their notes are in my note.***    I can be reached directly on [employer] [address]. My office number is [phone]. My personal cell number is [phone].    INTERVAL EVENTS: Here for f/u of empyema. Pt says he feels 100% better. Much less SOB. Pt was hospitalized for CHF and then sent to SNF for rehab. Returned for SOB and found to have empyema and PE. Pt is able to eat/drink. No urine/stool issues. Pt has been OOB to chair. Pt feels that he might not need SNF after all of this. He has no pain, no N/V/D. Pt has no O2 at home, but had been on O2 on prior admit and to NH.    T(F): 96 (08-21-18 @ 04:35), Max: 97.9 (08-20-18 @ 16:00)  HR: 63 (08-21-18 @ 04:35) (62 - 68)  BP: 107/62 (08-21-18 @ 04:35) (98/52 - 107/62)  RR: 22 (08-21-18 @ 04:35) (18 - 28)  SpO2: 99% (08-21-18 @ 07:30) (92% - 100%)    General: In NAD   HEENT: PERRL; EOMI; + NC O2; mouth clr  Neck no JVD, no nodes        Lungs: Bilateral BS  Chest: + right CT to pleurovac w/ suction (580 cc output in past 24 hrs)  Cardiovascular: Regular s1 s2 no murmur  Gastrointestinal: Soft, Positive BS, NT/ND, no masses  Extremities: No clubbing.  no cyanosis; no edema  Neurological: No motor or sensory deficit     LABS:                        9.0     (    97.3   4.07  )-----------( ---------      309      ( 21 Aug 2018 08:30 )             28.3    (    15.9     142   (   101   (   80      08-21-18 @ 08:30  ----------------------               4.1   (   32   (   13                             -----                        0.6  Ca  8.0   Mg  1.9    P   --     LFT  5.2  (  0.5  (  14       08-21-18 @ 08:30  -------------------------  2.2  (  137  (  17    PT/INR - ( 21 Aug 2018 08:30 )   PT: 16.50 sec;   INR: 1.53 ratio    PTT - ( 21 Aug 2018 08:30 )  PTT:45.4 sec      Culture - Body Fluid with Gram Stain (collected 08-17-18 @ 15:16)  Source: .Body Fluid Pleural Fluid  Gram Stain (08-18-18 @ 07:31):    Moderate polymorphonuclear leukocytes seen per low power field    Rare Gram positive cocci in pairs seen per oil power field  Preliminary Report (08-18-18 @ 21:55):    Few Streptococcus agalactiae (Group B)  Organism: Streptococcus agalactiae (Group B) (08-19-18 @ 18:29)  Organism: Streptococcus agalactiae (Group B) (08-19-18 @ 18:29)      -  Clindamycin: S      -  Penicillin: S Predicts results for ampicillin, amoxicillin, amoxicillin/clavulanate, ampicillin/sulbactam, 1st, 2nd and 3rd generation cephalosporins and carbapenems.      -  Vancomycin: S      Method Type: KB    MRSA/MSSA PCR (08.17.18 @ 18:20)    MRSA PCR Result.: Positive: Notes  geovanny garcia  By: Real-Time PCR (Polymerase Reaction Method)  (of [facility])    RADIOLOGY & ADDITIONAL TESTS:  < from: Xray Chest 1 View- PORTABLE-Routine (08.21.18 @ 06:44) >  IMPRESSION:    Increased right basilar pneumothorax with a maximum air gap up to 1.5 cm.     Decreased right base opacity/effusion.    Support devices as above.    < end of copied text >    < from: VA Duplex Lower Ext Vein Scan, Bilat (08.17.18 @ 20:56) >  Impression:    Acute-appearing deep venous thrombosis of the right common femoral,   femoral, popliteal, gastrocnemius, and peroneal veins.    Thrombus is present in a left posterior tibial (calf) vein branch.    < end of copied text >    < from: CT Angio Chest w/ IV Cont (08.17.18 @ 17:40) >  IMPRESSION:  1.  Small pulmonary embolism in the right interlobar artery.  2.  Right moderate to large partially loculated pleural effusion with   near complete atelectasis of the right lower lobe. Right pleural catheter   in place with adjacent gas.  3.  Small left pleural effusion with compressive atelectasis.  4.  Partially imaged abdominal ascites.    < end of copied text >      < from: Transthoracic Echocardiogram (08.18.18 @ 10:02) >  Summary:   1. Left ventricular ejection fraction, by visual estimation, is <20%.   2. Mild-moderate tricuspid regurgitation.   3. Mild to moderate aortic regurgitation.   4. Moderate pulmonic valve regurgitation.    < end of copied text >      MEDICATIONS:  meropenem  IVPB 1000 milliGRAM(s) IV Intermittent every 8 hours    acetaminophen   Tablet. 650 milliGRAM(s) Oral every 6 hours PRN  apixaban 10 milliGRAM(s) Oral every 12 hours  aspirin  chewable 81 milliGRAM(s) Oral daily  carvedilol 3.125 milliGRAM(s) Oral every 12 hours  digoxin     Tablet 0.125 milliGRAM(s) Oral daily  furosemide    Tablet 40 milliGRAM(s) Oral daily  ivabradine 5 milliGRAM(s) Oral two times a day  sacubitril 24 mG/valsartan 26 mG 1 Tablet(s) Oral two times a day  spironolactone 50 milliGRAM(s) Oral two times a day

## 2018-08-22 LAB
ALBUMIN SERPL ELPH-MCNC: 2.5 G/DL — LOW (ref 3.5–5.2)
ALP SERPL-CCNC: 146 U/L — HIGH (ref 30–115)
ALT FLD-CCNC: 20 U/L — SIGNIFICANT CHANGE UP (ref 0–41)
ANION GAP SERPL CALC-SCNC: 9 MMOL/L — SIGNIFICANT CHANGE UP (ref 7–14)
APTT BLD: 44.8 SEC — HIGH (ref 27–39.2)
AST SERPL-CCNC: 17 U/L — SIGNIFICANT CHANGE UP (ref 0–41)
BASOPHILS # BLD AUTO: 0.01 K/UL — SIGNIFICANT CHANGE UP (ref 0–0.2)
BASOPHILS NFR BLD AUTO: 0.3 % — SIGNIFICANT CHANGE UP (ref 0–1)
BILIRUB SERPL-MCNC: 0.6 MG/DL — SIGNIFICANT CHANGE UP (ref 0.2–1.2)
BUN SERPL-MCNC: 11 MG/DL — SIGNIFICANT CHANGE UP (ref 10–20)
CALCIUM SERPL-MCNC: 8.4 MG/DL — LOW (ref 8.5–10.1)
CHLORIDE SERPL-SCNC: 100 MMOL/L — SIGNIFICANT CHANGE UP (ref 98–110)
CO2 SERPL-SCNC: 34 MMOL/L — HIGH (ref 17–32)
CREAT SERPL-MCNC: 0.6 MG/DL — LOW (ref 0.7–1.5)
CULTURE RESULTS: SIGNIFICANT CHANGE UP
EOSINOPHIL # BLD AUTO: 0.1 K/UL — SIGNIFICANT CHANGE UP (ref 0–0.7)
EOSINOPHIL NFR BLD AUTO: 2.8 % — SIGNIFICANT CHANGE UP (ref 0–8)
GLUCOSE SERPL-MCNC: 76 MG/DL — SIGNIFICANT CHANGE UP (ref 70–99)
HCT VFR BLD CALC: 28.7 % — LOW (ref 42–52)
HGB BLD-MCNC: 8.9 G/DL — LOW (ref 14–18)
IMM GRANULOCYTES NFR BLD AUTO: 0.6 % — HIGH (ref 0.1–0.3)
INR BLD: 1.44 RATIO — HIGH (ref 0.65–1.3)
LYMPHOCYTES # BLD AUTO: 0.84 K/UL — LOW (ref 1.2–3.4)
LYMPHOCYTES # BLD AUTO: 23.5 % — SIGNIFICANT CHANGE UP (ref 20.5–51.1)
MAGNESIUM SERPL-MCNC: 1.9 MG/DL — SIGNIFICANT CHANGE UP (ref 1.8–2.4)
MCHC RBC-ENTMCNC: 30.1 PG — SIGNIFICANT CHANGE UP (ref 27–31)
MCHC RBC-ENTMCNC: 31 G/DL — LOW (ref 32–37)
MCV RBC AUTO: 97 FL — HIGH (ref 80–94)
MONOCYTES # BLD AUTO: 0.21 K/UL — SIGNIFICANT CHANGE UP (ref 0.1–0.6)
MONOCYTES NFR BLD AUTO: 5.9 % — SIGNIFICANT CHANGE UP (ref 1.7–9.3)
NEUTROPHILS # BLD AUTO: 2.4 K/UL — SIGNIFICANT CHANGE UP (ref 1.4–6.5)
NEUTROPHILS NFR BLD AUTO: 66.9 % — SIGNIFICANT CHANGE UP (ref 42.2–75.2)
NRBC # BLD: 0 /100 WBCS — SIGNIFICANT CHANGE UP (ref 0–0)
ORGANISM # SPEC MICROSCOPIC CNT: SIGNIFICANT CHANGE UP
ORGANISM # SPEC MICROSCOPIC CNT: SIGNIFICANT CHANGE UP
PLATELET # BLD AUTO: 335 K/UL — SIGNIFICANT CHANGE UP (ref 130–400)
POTASSIUM SERPL-MCNC: 4.6 MMOL/L — SIGNIFICANT CHANGE UP (ref 3.5–5)
POTASSIUM SERPL-SCNC: 4.6 MMOL/L — SIGNIFICANT CHANGE UP (ref 3.5–5)
PROT SERPL-MCNC: 5.6 G/DL — LOW (ref 6–8)
PROTHROM AB SERPL-ACNC: 15.5 SEC — HIGH (ref 9.95–12.87)
RBC # BLD: 2.96 M/UL — LOW (ref 4.7–6.1)
RBC # FLD: 15.9 % — HIGH (ref 11.5–14.5)
SODIUM SERPL-SCNC: 143 MMOL/L — SIGNIFICANT CHANGE UP (ref 135–146)
SPECIMEN SOURCE: SIGNIFICANT CHANGE UP
WBC # BLD: 3.58 K/UL — LOW (ref 4.8–10.8)
WBC # FLD AUTO: 3.58 K/UL — LOW (ref 4.8–10.8)

## 2018-08-22 RX ADMIN — Medication 40 MILLIGRAM(S): at 05:20

## 2018-08-22 RX ADMIN — ENOXAPARIN SODIUM 70 MILLIGRAM(S): 100 INJECTION SUBCUTANEOUS at 18:06

## 2018-08-22 RX ADMIN — MEROPENEM 100 MILLIGRAM(S): 1 INJECTION INTRAVENOUS at 05:18

## 2018-08-22 RX ADMIN — Medication 81 MILLIGRAM(S): at 11:19

## 2018-08-22 RX ADMIN — MEROPENEM 100 MILLIGRAM(S): 1 INJECTION INTRAVENOUS at 13:33

## 2018-08-22 RX ADMIN — SPIRONOLACTONE 50 MILLIGRAM(S): 25 TABLET, FILM COATED ORAL at 05:18

## 2018-08-22 RX ADMIN — SPIRONOLACTONE 50 MILLIGRAM(S): 25 TABLET, FILM COATED ORAL at 18:06

## 2018-08-22 RX ADMIN — IVABRADINE 5 MILLIGRAM(S): 7.5 TABLET, FILM COATED ORAL at 18:08

## 2018-08-22 RX ADMIN — SACUBITRIL AND VALSARTAN 1 TABLET(S): 24; 26 TABLET, FILM COATED ORAL at 05:20

## 2018-08-22 RX ADMIN — Medication 100 MILLIGRAM(S): at 21:42

## 2018-08-22 RX ADMIN — CARVEDILOL PHOSPHATE 3.12 MILLIGRAM(S): 80 CAPSULE, EXTENDED RELEASE ORAL at 18:06

## 2018-08-22 RX ADMIN — CARVEDILOL PHOSPHATE 3.12 MILLIGRAM(S): 80 CAPSULE, EXTENDED RELEASE ORAL at 05:20

## 2018-08-22 RX ADMIN — IVABRADINE 5 MILLIGRAM(S): 7.5 TABLET, FILM COATED ORAL at 06:34

## 2018-08-22 RX ADMIN — SACUBITRIL AND VALSARTAN 1 TABLET(S): 24; 26 TABLET, FILM COATED ORAL at 18:07

## 2018-08-22 RX ADMIN — ENOXAPARIN SODIUM 70 MILLIGRAM(S): 100 INJECTION SUBCUTANEOUS at 05:21

## 2018-08-22 RX ADMIN — Medication 0.12 MILLIGRAM(S): at 05:20

## 2018-08-22 NOTE — DIETITIAN INITIAL EVALUATION ADULT. - PHYSICAL APPEARANCE
underweight/BMI 18.7 (123#, 68in). # at previous [facility] admit in May. Nutrition focused physical exam completed and noted moderate-severe muscle wasting in clavicle region, moderate-severe subcutaneous fat loss in mid-upper arm region and moderate muscle wasting in temporal region. Stage II pressure ulcer to sacrum. 1+ pitting edema b/l ankles.

## 2018-08-22 NOTE — DIETITIAN INITIAL EVALUATION ADULT. - SIGNS/SYMPTOMS
moderate-severe muscle wasting, moderate-severe subcutaneous fat loss and >7.5% wt loss x 3 months stage II pressure ulcer

## 2018-08-22 NOTE — PROVIDER CONTACT NOTE (OTHER) - SITUATION
when RN was rounding with pt and medicating pt, pt told RN that he was not paying attention and accidently ingested a tablespoon worth of the CHG wash

## 2018-08-22 NOTE — PROGRESS NOTE ADULT - SUBJECTIVE AND OBJECTIVE BOX
SAMANTHA CHARLES  68y  Male  ***My note supercedes ALL resident notes that I sign.  My corrections for their notes are in my note.***    I can be reached directly on [employer] [id number]. My office number is [phone]. My personal cell number is [phone].    INTERVAL EVENTS: Here for f/u of sob and empyema. Pt looks great. He is OOB and eating. No complaints. Even came off O2. Pleurovac container has been full since yesterday and needs to be changed - I spoke to pulm fellow.    T(F): 96.8 (08-22-18 @ 13:07), Max: 97.6 (08-21-18 @ 20:52)  HR: 65 (08-22-18 @ 13:07) (65 - 68)  BP: 90/58 (08-22-18 @ 13:07) (90/58 - 109/59)  RR: 18 (08-22-18 @ 05:16) (18 - 20)  SpO2: 95% (08-22-18 @ 08:46) (95% - 99%)    General: In NAD   HEENT: PERRL; EOMI; + NC O2; mouth clr  Neck no JVD, no nodes        Lungs: Bilateral BS  Chest: + right CT to pleurovac (full) w/ suction  Cardiovascular: Regular s1 s2 no murmur  Gastrointestinal: Soft, Positive BS, NT/ND, no masses  Extremities: No clubbing.  no cyanosis; no edema  Neurological: No motor or sensory deficit     LABS:                        8.9     (    97.0   3.58  )-----------( ---------      335      ( 22 Aug 2018 08:27 )             28.7    (    15.9     WBC Count: 3.58 K/uL (08-22-18 @ 08:27)  WBC Count: 4.07 K/uL (08-21-18 @ 08:30)  WBC Count: 4.15 K/uL (08-20-18 @ 04:17)  WBC Count: 8.12 K/uL (08-19-18 @ 21:10)  WBC Count: 5.87 K/uL (08-19-18 @ 04:13)  WBC Count: 5.60 K/uL (08-18-18 @ 23:35)  WBC Count: 6.32 K/uL (08-18-18 @ 04:33)    143   (   100   (   76      08-22-18 @ 08:27  ----------------------               4.6   (   34   (   11                             -----                        0.6  Ca  8.4   Mg  1.9    P   --     LFT  5.6  (  0.6  (  17       08-22-18 @ 08:27  -------------------------  2.5  (  146  (  20    PT/INR - ( 22 Aug 2018 08:27 )   PT: 15.50 sec;   INR: 1.44 ratio    PTT - ( 22 Aug 2018 08:27 )  PTT:44.8 sec    Culture - Body Fluid with Gram Stain (collected 08-17-18 @ 15:16)  Source: .Body Fluid Pleural Fluid  Gram Stain (08-18-18 @ 07:31):    Moderate polymorphonuclear leukocytes seen per low power field    Rare Gram positive cocci in pairs seen per oil power field  Preliminary Report (08-18-18 @ 21:55):    Few Streptococcus agalactiae (Group B)  Organism: Streptococcus agalactiae (Group B) (08-19-18 @ 18:29)  Organism: Streptococcus agalactiae (Group B) (08-19-18 @ 18:29)      -  Clindamycin: S      -  Penicillin: S Predicts results for ampicillin, amoxicillin, amoxicillin/clavulanate, ampicillin/sulbactam, 1st, 2nd and 3rd generation cephalosporins and carbapenems.      -  Vancomycin: S      Method Type: KB    RADIOLOGY & ADDITIONAL TESTS:  < from: Xray Chest 1 View- PORTABLE-Routine (08.22.18 @ 08:31) >  IMPRESSION:      Decreased small right basilar pneumothorax. Persistent right basilar   opacity/effusion.     Stable right pleural catheter.    < end of copied text >    MEDICATIONS:  meropenem  IVPB 1000 milliGRAM(s) IV Intermittent every 8 hours    acetaminophen   Tablet. 650 milliGRAM(s) Oral every 6 hours PRN  aspirin  chewable 81 milliGRAM(s) Oral daily  carvedilol 3.125 milliGRAM(s) Oral every 12 hours  digoxin     Tablet 0.125 milliGRAM(s) Oral daily  enoxaparin Injectable 70 milliGRAM(s) SubCutaneous every 12 hours  furosemide    Tablet 40 milliGRAM(s) Oral daily  ivabradine 5 milliGRAM(s) Oral two times a day  sacubitril 24 mG/valsartan 26 mG 1 Tablet(s) Oral two times a day  spironolactone 50 milliGRAM(s) Oral two times a day

## 2018-08-22 NOTE — DIETITIAN INITIAL EVALUATION ADULT. - OTHER INFO
Pt presented with c/o and primary dx of SOB. Pt initially admitted to ICU and tx to medical unit on 8/19. Hospital course complicated by Empyema s/p thoracentesis and chest tube placement on admission. Supplemental O2 via NC. Acute PE. Chronic systolic CHF with hx of AICD. Normocytic to macrocytic anemia. AFib. Low albumin likely suppressed by acute disease. hx of CVA with prior deconditioning. Reason for assessment: Stage II pressure ulcer discovered on RD screen. Documentation began after admission and RD rescreen not completed. Pt meets criteria for malnutrition, see dx below.

## 2018-08-22 NOTE — DIETITIAN INITIAL EVALUATION ADULT. - ORAL INTAKE PTA
poor/Pt reports decreased appetite and PO intake likely d/t increased SOB when eating and recent ACID placement PTA. States he consumed multiple small meals throughout day but <50% at each meal. supplemented with ensure 1-2 per day. NKFA.

## 2018-08-22 NOTE — CONSULT NOTE ADULT - ASSESSMENT
Pt is a 69 y/o male with h/o Afib (no AC), CHF (EF 23%, s/p AICD), and CVA, who presents from NH with SOB 2/2 R loculated pleural effusion. CT in, still draining about 1L/24hrs, pt improving clinically.     #empyema  -ceftriaxone IV 1 g q24, Keflex allergy doubtful from history  -c/w chest tube as per pulm Pt is a 67 y/o male with h/o Afib (no AC), CHF (EF 23%, s/p AICD), and CVA, who presents from NH with SOB 2/2 R loculated pleural effusion. CT in, still draining about 1L/24hrs, pt improving clinically.     #empyema  -clinda 900mg q8h IV and can transition to PO on d/c  -c/w chest tube as per pulm

## 2018-08-22 NOTE — PROGRESS NOTE ADULT - ASSESSMENT
# empyema - Grp B strep / r pl eff / trapped or non-expanded lung  still on jose - need to d/w pulm plan for abx (drug, dose and duration); f/u w/ ID eval  will pt be d/c'd on IV vs oral abx for this?  drainage is still high from CT and still on suction - CT Sx has signed off - will d/w pulm ongoing plan  lung is a little more expanded on today's CXR  off O2  repeat CXR todd    # neutropenia - worse  prob from meropenem  pt has all to ancef and PCN  ? use clinda or levaquin (they have anaerobic coverage too) - could use both together  f/u w/ ID and pulm    # acute PE; acute, extensive DVT as above (right leg prox) and small calf DVT on left  pt on LMWH for now; will go to [facility] after CT out    # chr sys CHF / mod AI / Mod SC and TR / AICD / AFib  cont current meds  seems stable    # normo to macro anemia  check B12 and Fol and TSH    # low albumin prob suppressed from acute dz  check Urine TP:Cr ratio to r/o proteinuria    # hx CVA - stable, no residual    # prior decond,   once CT out will need to see if pt can walk; might need rehab eval, will see later    Dispo: will see if pt will go back to SNF or can go home; still much to do w/ chest tube and abx, etc.

## 2018-08-22 NOTE — DIETITIAN INITIAL EVALUATION ADULT. - FACTORS AFF FOOD INTAKE
c/o poor appetite but improved since admission. denies GI abnormalities or difficulty chewing/swallowing. LBM 8/21.

## 2018-08-22 NOTE — PROVIDER CONTACT NOTE (OTHER) - BACKGROUND
pt has no complaints of any pain or discomfort, [facility] ([id number]) called and coming to assess the pt

## 2018-08-22 NOTE — DIETITIAN INITIAL EVALUATION ADULT. - ENERGY NEEDS
total kcal = 2126-1005 kcal/day (MSJ x 1.2-1.5 d/t stage II PU in malnourished pt).   total protein = 70-84 g/day (1.25-1.5 g/kg CBW).  total fluid = per LIP

## 2018-08-22 NOTE — PROGRESS NOTE ADULT - ASSESSMENT
69 yo M w/ hx of Afib not on AC, CHF (EF 23%) w/ AICD, CVA admitted with R loculated pleural effusion s/p R chest tube insertion.     #R loculated pleural effusion w/ possible underlying PNA, r/o empyema  - s/p thoracentesis and R pigtail insertion  - pigtail to low wall suctioning - still draining from chest tube  - changed jose to IV Levaquin 750mg qd and IV Clinda 900mg q8  - pulmonary following for chest tube - will take out chest tube once drains 50-100cc  - daily AM CXR - lung expanding on today's cxr  -off nasal cannula   - Pleural Fluid 8/17: LDH 2900, pH 7.3, prt 3 } serum prt 5.3; EXUDATIVE   - Pleural Culture 8/17: Strep agalactiae, moderate neutrophils   - nasal MRSA +ve, pt now on contact    #R PE on CT Angio  - c/w lovenox 70mg q12h  - will switch to Eliquis 10mgBID x7 days, then 5mg BID once chest tube is out  - Duplex 8/17: Extensive R DVT, L calf DVT       #Decompensated CHF  - continue PO lasix 40 qd  - ECHO 8/18: EF <20%.  - c/w Entresto, Corlanor, Aldactone, Coreg  - c/w digoxin (lv <0.4)    # normo to macro anemia  - f/u B12 and Fol and TSH    #Hx of CVA   - c/w ASA    #Afib not on AC   - c/w BB for rate control    # low albumin prob suppressed from acute dz  f/u Urine TP:Cr ratio to r/o proteinuria    #MISC:   - DVT ppx - Lovenox  - Diet - DASH/TLC  - full code  - Dispo -either home or go back to NH

## 2018-08-22 NOTE — CONSULT NOTE ADULT - ATTENDING COMMENTS
68M  Afib  CVA  HF AICD    Admitted with SOB found to have a pleural effusion/empyema s/p thora 8/17 and pleural fluid cx with GBS  Required pressors  Duplex + R com fem DVT  lactic acidosis, resolved  PCN allergy hives, keflex- reports nonspecific symptoms    - Change to Clinda 900mg q8h, once stable can change to PO (covers both GBS and anaerobes)  - D/C levaquin    Spectra [id number]

## 2018-08-22 NOTE — CONSULT NOTE ADULT - SUBJECTIVE AND OBJECTIVE BOX
________________________________________________________________________________  DAILY PROGRESS NOTE:    ================== SUBJECTIVE ==================    SAMANTHA AMEEN  /   68y  /  Male  /  MRN#: [id number]  Pt is a 69 y/o male with h/o Afib (no AC), CHF (EF 23%, s/p AICD), and CVA, who presents from [facility] with worsening SOB. Patient was found to have an O2 sat of 77% on non-rebreather at NS and was subsequently sent to the ED. In the ED, the patient’s respiratory distress improved with BIPAP and an US showed a large right-sided loculated pleural effusion. Pulm inserted a chest tube and sent cultures. The patient does not recall ever having pneumonia. He denies fevers, chills, cough, or URI symptoms. ROS otherwise negative.     Allergies: Hives with penicillin, does not recall allergy to cephalexin    =================== OBJECTIVE ===================    VITAL SIGNS: Last 24 Hours  T(C): 36 (22 Aug 2018 13:07), Max: 36.4 (21 Aug 2018 20:52)  T(F): 96.8 (22 Aug 2018 13:07), Max: 97.6 (21 Aug 2018 20:52)  HR: 65 (22 Aug 2018 13:07) (65 - 68)  BP: 90/58 (22 Aug 2018 13:07) (90/58 - 107/58)  BP(mean): --  RR: 18 (22 Aug 2018 05:16) (18 - 20)  SpO2: 95% (22 Aug 2018 08:46) (95% - 99%)    08-21-18 @ 07:01  -  08-22-18 @ 07:00  --------------------------------------------------------  IN: 0 mL / OUT: 980 mL / NET: -980 mL      Gen: thin man lying comfortably in bed with NC and chest tube draining serosanguinous fluid  HEENT: NCAT  Neck: supple no lymphadenopathy  CV: irregular rhythm  Pulm: Chest tube inserted on R side  Abd: soft, nontender, nondistended    ===================== LABS =====================                        8.9    3.58  )-----------( 335      ( 22 Aug 2018 08:27 )             28.7     08-22    143  |  100  |  11  ----------------------------<  76  4.6   |  34<H>  |  0.6<L>    Ca    8.4<L>      22 Aug 2018 08:27  Mg     1.9     08-22    TPro  5.6<L>  /  Alb  2.5<L>  /  TBili  0.6  /  DBili  x   /  AST  17  /  ALT  20  /  AlkPhos  146<H>  08-22    PT/INR - ( 22 Aug 2018 08:27 )   PT: 15.50 sec;   INR: 1.44 ratio         PTT - ( 22 Aug 2018 08:27 )  PTT:44.8 sec    ================= MICROBIOLOGY ================  Culture - Body Fluid with Gram Stain (08.17.18 @ 15:16): Pleural fluid     -  Penicillin: S Predicts results for ampicillin, amoxicillin, amoxicillin/clavulanate, ampicillin/sulbactam, 1st, 2nd and 3rd generation cephalosporins and carbapenems.    -  Vancomycin: S    Gram Stain:   Moderate polymorphonuclear leukocytes seen per low power field  Rare Gram positive cocci in pairs seen per oil power field    -  Clindamycin: S    Specimen Source: .Body Fluid Pleural Fluid    Culture Results:   Few Streptococcus agalactiae (Group B)    Organism Identification: Streptococcus agalactiae (Group B)    Organism: Streptococcus agalactiae (Group B)    Method Type: KB    ================== IMAGING TO DATE ==================  < from: Xray Chest 1 View- PORTABLE-Routine (08.22.18 @ 08:31) >  Decreased small right basilar pneumothorax. Persistent right basilar   opacity/effusion.     Stable right pleural catheter.    < end of copied text >    < from: CT Angio Chest w/ IV Cont (08.17.18 @ 17:40) >  1.  Small pulmonary embolism in the right interlobar artery.  2.  Right moderate to large partially loculated pleural effusion with   near complete atelectasis of the right lower lobe. Right pleural catheter   in place with adjacent gas.  3.  Small left pleural effusion with compressive atelectasis.  4.  Partially imaged abdominal ascites.    < end of copied text >    ================== ALLERGIES ===================  cephalexin (Flushing)  penicillin (Rash)    ==================== MEDS =====================  aspirin  chewable 81 milliGRAM(s) Oral daily  carvedilol 3.125 milliGRAM(s) Oral every 12 hours  digoxin     Tablet 0.125 milliGRAM(s) Oral daily  enoxaparin Injectable 70 milliGRAM(s) SubCutaneous every 12 hours  furosemide    Tablet 40 milliGRAM(s) Oral daily  ivabradine 5 milliGRAM(s) Oral two times a day  meropenem  IVPB 1000 milliGRAM(s) IV Intermittent every 8 hours    sacubitril 24 mG/valsartan 26 mG 1 Tablet(s) Oral two times a day  spironolactone 50 milliGRAM(s) Oral two times a day    PRN MEDICATIONS  acetaminophen   Tablet. 650 milliGRAM(s) Oral every 6 hours PRN

## 2018-08-22 NOTE — CHART NOTE - NSCHARTNOTEFT_GEN_A_CORE
Called by RN as patient accidently took a sip of CHG bath solution. Called [facility] poison control and had a phone discussion with a representative. Was told that with such minimal ingestion there is no indication for ingestion. Only side effect to monitor for is mild upset stomach but likelihood is low.

## 2018-08-22 NOTE — CHART NOTE - NSCHARTNOTEFT_GEN_A_CORE
Upon Nutritional Assessment by the Registered Dietitian your patient was determined to meet criteria / has evidence of the following diagnosis/diagnoses:          [ ]  Mild Protein Calorie Malnutrition        [ ]  Moderate Protein Calorie Malnutrition        [x] Severe Protein Calorie Malnutrition        [ ] Unspecified Protein Calorie Malnutrition        [x] Underweight / BMI <19        [ ] Morbid Obesity / BMI > 40    Underweight BMI: 18.7     Findings as based on:  •  Comprehensive nutrition assessment and consultation  •  Food and nutrition related history provided by patient   •  Nutrition focused physical assessment     Severe Protein-Calorie Malnutrition Indicators:  1. >7.5% unintentional wt loss x 3 months.  2. Moderate-severe muscle wasting (clavicle and temporal regions).  3. Moderate-severe subcutaneous fat loss (mid-upper arm region).    Treatment:    The following diet has been recommended:   1. Add Ensure compact TID.   2. Continue DASH/TLC diet.      PROVIDER Section:     By signing this assessment you are acknowledging and agree with the diagnosis/diagnoses assigned by the Registered Dietitian    Comments:

## 2018-08-22 NOTE — PROGRESS NOTE ADULT - SUBJECTIVE AND OBJECTIVE BOX
Patient is a 68y old  Male who presents with a chief complaint of SOB (17 Aug 2018 17:22)        SUBJECTIVE: states he feels much better, denies chest pain or dyspnea, remains afebrile      REVIEW OF SYSTEMS:  See HPI    PHYSICAL EXAM  Vital Signs Last 24 Hrs  T(C): 36.4 (22 Aug 2018 05:16), Max: 36.4 (21 Aug 2018 20:52)  T(F): 97.6 (22 Aug 2018 05:16), Max: 97.6 (21 Aug 2018 20:52)  HR: 67 (22 Aug 2018 05:16) (67 - 68)  BP: 105/69 (22 Aug 2018 05:16) (105/69 - 109/59)  RR: 18 (22 Aug 2018 05:16) (18 - 20)  SpO2: 95% (22 Aug 2018 08:46) (95% - 99%)    General: In NAD  HEENT: JESSY               Lymphatic system: No Cervical LN    Respiratory: Garo BS, right sided pigtail at midaxillary line  Cardiovascular: Regular  Gastrointestinal: Soft. + BS  Musculoskeletal: No clubbing.  moves all extremities.  Full range of motion    Skin: Warm.  Intact  Neurological: No motor or sensory deficit      08-21-18 @ 07:01  -  08-22-18 @ 07:00  --------------------------------------------------------  IN:  Total IN: 0 mL    OUT:    Chest Tube: 980 mL  Total OUT: 980 mL    Total NET: -980 mL          LABS:                          9.0    4.07  )-----------( 309      ( 21 Aug 2018 08:30 )             28.3                                               08-21    142  |  101  |  13  ----------------------------<  80  4.1   |  32  |  0.6<L>    Ca    8.0<L>      21 Aug 2018 08:30  Mg     1.9     08-21    TPro  5.2<L>  /  Alb  2.2<L>  /  TBili  0.5  /  DBili  x   /  AST  14  /  ALT  17  /  AlkPhos  137<H>  08-21      PT/INR - ( 21 Aug 2018 08:30 )   PT: 16.50 sec;   INR: 1.53 ratio         PTT - ( 21 Aug 2018 08:30 )  PTT:45.4 sec                                                                                     LIVER FUNCTIONS - ( 21 Aug 2018 08:30 )  Alb: 2.2 g/dL / Pro: 5.2 g/dL / ALK PHOS: 137 U/L / ALT: 17 U/L / AST: 14 U/L / GGT: x                                                 CXR: pigrtail inplace, reduced right sided effusion,                                                MEDICATIONS  (STANDING):  aspirin  chewable 81 milliGRAM(s) Oral daily  carvedilol 3.125 milliGRAM(s) Oral every 12 hours  digoxin     Tablet 0.125 milliGRAM(s) Oral daily  enoxaparin Injectable 70 milliGRAM(s) SubCutaneous every 12 hours  furosemide    Tablet 40 milliGRAM(s) Oral daily  ivabradine 5 milliGRAM(s) Oral two times a day  meropenem  IVPB 1000 milliGRAM(s) IV Intermittent every 8 hours  sacubitril 24 mG/valsartan 26 mG 1 Tablet(s) Oral two times a day  spironolactone 50 milliGRAM(s) Oral two times a day    MEDICATIONS  (PRN):  acetaminophen   Tablet. 650 milliGRAM(s) Oral every 6 hours PRN Mild Pain (1 - 3)

## 2018-08-22 NOTE — PROGRESS NOTE ADULT - ASSESSMENT
IMPRESSION:    Empyema sp pig tail  Pneumonia   Chronic hypoxemic respiratory failure in home O2  HF REF  HO AFIB not on AC  VTE   Trapped lung ?    PLAN:    CNS: No depressants    HEENT: Oral care    PULMONARY:  HOB @ 45 degrees. Continue drainage and ABX, collection chamber needs to be changed.     CARDIOVASCULAR: Continue maximizing  cardiac therapy.  Avoid volume overload.  Lasix Q 24 hours     GI: GI prophylaxis.  Feeding     RENAL:  Follow up lytes.  Correct as needed    INFECTIOUS DISEASE: Follow up cultures.  Unasyn would suffice given culture results    HEMATOLOGICAL:  DVT TX.     ENDOCRINE:  Follow up FS.    MUSCULOSKELETAL:    DW house staff IMPRESSION:    Empyema sp pig tail  Pneumonia   Chronic hypoxemic respiratory failure in home O2  HF REF  HO AFIB not on AC  VTE   Trapped lung ?    PLAN:    CNS: No depressants    HEENT: Oral care    PULMONARY:  HOB @ 45 degrees. Continue drainage and ABX, collection chamber needs to be changed.     CARDIOVASCULAR: Continue maximizing  cardiac therapy.  Avoid volume overload.  Lasix Q 24 hours     GI: GI prophylaxis.  Feeding     RENAL:  Follow up lytes.  Correct as needed    INFECTIOUS DISEASE: Follow up cultures.  clindamycin, levaquin would suffice given culture results    HEMATOLOGICAL:  DVT TX.     ENDOCRINE:  Follow up FS.    MUSCULOSKELETAL:    DW house staff

## 2018-08-22 NOTE — DIETITIAN INITIAL EVALUATION ADULT. - DIET TYPE
DASH/TLC (sodium and cholesterol restricted diet)/Observed pt breakfast tray with 100% intake. Pt reports significantly improved intake since tx to medicine unit. Observed muscle wasting and fat loss. Agreeable to supplements.

## 2018-08-23 LAB
ALBUMIN SERPL ELPH-MCNC: 2.1 G/DL — LOW (ref 3.5–5.2)
ALP SERPL-CCNC: 139 U/L — HIGH (ref 30–115)
ALT FLD-CCNC: 23 U/L — SIGNIFICANT CHANGE UP (ref 0–41)
ANION GAP SERPL CALC-SCNC: 11 MMOL/L — SIGNIFICANT CHANGE UP (ref 7–14)
APTT BLD: 38.4 SEC — SIGNIFICANT CHANGE UP (ref 27–39.2)
AST SERPL-CCNC: 23 U/L — SIGNIFICANT CHANGE UP (ref 0–41)
BASOPHILS # BLD AUTO: 0.01 K/UL — SIGNIFICANT CHANGE UP (ref 0–0.2)
BASOPHILS NFR BLD AUTO: 0.2 % — SIGNIFICANT CHANGE UP (ref 0–1)
BILIRUB SERPL-MCNC: 0.5 MG/DL — SIGNIFICANT CHANGE UP (ref 0.2–1.2)
BUN SERPL-MCNC: 14 MG/DL — SIGNIFICANT CHANGE UP (ref 10–20)
CALCIUM SERPL-MCNC: 8.3 MG/DL — LOW (ref 8.5–10.1)
CHLORIDE SERPL-SCNC: 100 MMOL/L — SIGNIFICANT CHANGE UP (ref 98–110)
CO2 SERPL-SCNC: 29 MMOL/L — SIGNIFICANT CHANGE UP (ref 17–32)
CREAT SERPL-MCNC: 0.6 MG/DL — LOW (ref 0.7–1.5)
EOSINOPHIL # BLD AUTO: 0.1 K/UL — SIGNIFICANT CHANGE UP (ref 0–0.7)
EOSINOPHIL NFR BLD AUTO: 2.3 % — SIGNIFICANT CHANGE UP (ref 0–8)
FOLATE SERPL-MCNC: 10.8 NG/ML — SIGNIFICANT CHANGE UP
GLUCOSE SERPL-MCNC: 85 MG/DL — SIGNIFICANT CHANGE UP (ref 70–99)
HCT VFR BLD CALC: 26.6 % — LOW (ref 42–52)
HGB BLD-MCNC: 8.6 G/DL — LOW (ref 14–18)
IMM GRANULOCYTES NFR BLD AUTO: 0.2 % — SIGNIFICANT CHANGE UP (ref 0.1–0.3)
INR BLD: 1.47 RATIO — HIGH (ref 0.65–1.3)
LYMPHOCYTES # BLD AUTO: 1.28 K/UL — SIGNIFICANT CHANGE UP (ref 1.2–3.4)
LYMPHOCYTES # BLD AUTO: 29.8 % — SIGNIFICANT CHANGE UP (ref 20.5–51.1)
MAGNESIUM SERPL-MCNC: 1.9 MG/DL — SIGNIFICANT CHANGE UP (ref 1.8–2.4)
MCHC RBC-ENTMCNC: 30.5 PG — SIGNIFICANT CHANGE UP (ref 27–31)
MCHC RBC-ENTMCNC: 32.3 G/DL — SIGNIFICANT CHANGE UP (ref 32–37)
MCV RBC AUTO: 94.3 FL — HIGH (ref 80–94)
MONOCYTES # BLD AUTO: 0.29 K/UL — SIGNIFICANT CHANGE UP (ref 0.1–0.6)
MONOCYTES NFR BLD AUTO: 6.8 % — SIGNIFICANT CHANGE UP (ref 1.7–9.3)
NEUTROPHILS # BLD AUTO: 2.6 K/UL — SIGNIFICANT CHANGE UP (ref 1.4–6.5)
NEUTROPHILS NFR BLD AUTO: 60.7 % — SIGNIFICANT CHANGE UP (ref 42.2–75.2)
NRBC # BLD: 0 /100 WBCS — SIGNIFICANT CHANGE UP (ref 0–0)
PLATELET # BLD AUTO: 335 K/UL — SIGNIFICANT CHANGE UP (ref 130–400)
POTASSIUM SERPL-MCNC: 4.5 MMOL/L — SIGNIFICANT CHANGE UP (ref 3.5–5)
POTASSIUM SERPL-SCNC: 4.5 MMOL/L — SIGNIFICANT CHANGE UP (ref 3.5–5)
PROT SERPL-MCNC: 5.1 G/DL — LOW (ref 6–8)
PROTHROM AB SERPL-ACNC: 15.8 SEC — HIGH (ref 9.95–12.87)
RBC # BLD: 2.82 M/UL — LOW (ref 4.7–6.1)
RBC # FLD: 15.9 % — HIGH (ref 11.5–14.5)
SODIUM SERPL-SCNC: 140 MMOL/L — SIGNIFICANT CHANGE UP (ref 135–146)
TSH SERPL-MCNC: 2.88 UIU/ML — SIGNIFICANT CHANGE UP (ref 0.27–4.2)
VIT B12 SERPL-MCNC: 911 PG/ML — SIGNIFICANT CHANGE UP (ref 232–1245)
WBC # BLD: 4.29 K/UL — LOW (ref 4.8–10.8)
WBC # FLD AUTO: 4.29 K/UL — LOW (ref 4.8–10.8)

## 2018-08-23 RX ORDER — ENOXAPARIN SODIUM 100 MG/ML
60 INJECTION SUBCUTANEOUS
Qty: 0 | Refills: 0 | Status: DISCONTINUED | OUTPATIENT
Start: 2018-08-23 | End: 2018-08-27

## 2018-08-23 RX ORDER — LACTOBACILLUS ACIDOPHILUS 100MM CELL
1 CAPSULE ORAL EVERY 8 HOURS
Qty: 0 | Refills: 0 | Status: DISCONTINUED | OUTPATIENT
Start: 2018-08-23 | End: 2018-08-27

## 2018-08-23 RX ADMIN — Medication 40 MILLIGRAM(S): at 05:32

## 2018-08-23 RX ADMIN — Medication 81 MILLIGRAM(S): at 12:02

## 2018-08-23 RX ADMIN — Medication 1 TABLET(S): at 21:24

## 2018-08-23 RX ADMIN — CARVEDILOL PHOSPHATE 3.12 MILLIGRAM(S): 80 CAPSULE, EXTENDED RELEASE ORAL at 05:32

## 2018-08-23 RX ADMIN — Medication 1 TABLET(S): at 15:16

## 2018-08-23 RX ADMIN — CARVEDILOL PHOSPHATE 3.12 MILLIGRAM(S): 80 CAPSULE, EXTENDED RELEASE ORAL at 17:34

## 2018-08-23 RX ADMIN — SPIRONOLACTONE 50 MILLIGRAM(S): 25 TABLET, FILM COATED ORAL at 05:32

## 2018-08-23 RX ADMIN — SPIRONOLACTONE 50 MILLIGRAM(S): 25 TABLET, FILM COATED ORAL at 17:35

## 2018-08-23 RX ADMIN — Medication 0.12 MILLIGRAM(S): at 05:32

## 2018-08-23 RX ADMIN — Medication 100 MILLIGRAM(S): at 15:16

## 2018-08-23 RX ADMIN — SACUBITRIL AND VALSARTAN 1 TABLET(S): 24; 26 TABLET, FILM COATED ORAL at 17:35

## 2018-08-23 RX ADMIN — ENOXAPARIN SODIUM 60 MILLIGRAM(S): 100 INJECTION SUBCUTANEOUS at 17:34

## 2018-08-23 RX ADMIN — Medication 100 MILLIGRAM(S): at 05:33

## 2018-08-23 RX ADMIN — Medication 100 MILLIGRAM(S): at 21:24

## 2018-08-23 RX ADMIN — ENOXAPARIN SODIUM 70 MILLIGRAM(S): 100 INJECTION SUBCUTANEOUS at 05:34

## 2018-08-23 RX ADMIN — IVABRADINE 5 MILLIGRAM(S): 7.5 TABLET, FILM COATED ORAL at 18:43

## 2018-08-23 RX ADMIN — SACUBITRIL AND VALSARTAN 1 TABLET(S): 24; 26 TABLET, FILM COATED ORAL at 05:32

## 2018-08-23 RX ADMIN — IVABRADINE 5 MILLIGRAM(S): 7.5 TABLET, FILM COATED ORAL at 05:32

## 2018-08-23 NOTE — PROGRESS NOTE ADULT - SUBJECTIVE AND OBJECTIVE BOX
SUBJECTIVE:    Patient is a 68y old Male who presents with a chief complaint of SOB (17 Aug 2018 17:22)    Currently admitted to medicine with the primary diagnosis of Empyema lung     Today is hospital day 6d. Ingested some CHG overnight by accident. Denied abdominal pain/nausea. Feeling better this morning. CT drained 70cc in past 24 hours.     PAST MEDICAL & SURGICAL HISTORY  Muscle weakness (generalized)  Atrial fibrillation, unspecified type  Oxygen dependent  Essential hypertension  Cerebrovascular accident (CVA) due to bilateral embolism of posterior cerebral arteries  Systolic congestive heart failure, unspecified chronicity  AICD (automatic cardioverter/defibrillator) present    SOCIAL HISTORY:  Negative for smoking/alcohol/drug use.     ALLERGIES:  cephalexin (Flushing)  penicillin (Rash)    MEDICATIONS:  STANDING MEDICATIONS  aspirin  chewable 81 milliGRAM(s) Oral daily  carvedilol 3.125 milliGRAM(s) Oral every 12 hours  clindamycin IVPB 900 milliGRAM(s) IV Intermittent every 8 hours  digoxin     Tablet 0.125 milliGRAM(s) Oral daily  enoxaparin Injectable 60 milliGRAM(s) SubCutaneous two times a day  furosemide    Tablet 40 milliGRAM(s) Oral daily  ivabradine 5 milliGRAM(s) Oral two times a day  sacubitril 24 mG/valsartan 26 mG 1 Tablet(s) Oral two times a day  spironolactone 50 milliGRAM(s) Oral two times a day    PRN MEDICATIONS  acetaminophen   Tablet. 650 milliGRAM(s) Oral every 6 hours PRN    VITALS:   T(F): 98.2  HR: 68  BP: 101/59  RR: 18  SpO2: 95%    LABS:                        8.6    4.29  )-----------( 335      ( 23 Aug 2018 06:03 )             26.6     08-23    140  |  100  |  14  ----------------------------<  85  4.5   |  29  |  0.6<L>    Ca    8.3<L>      23 Aug 2018 06:03  Mg     1.9     08-23    TPro  5.1<L>  /  Alb  2.1<L>  /  TBili  0.5  /  DBili  x   /  AST  23  /  ALT  23  /  AlkPhos  139<H>  08-23    PT/INR - ( 23 Aug 2018 06:03 )   PT: 15.80 sec;   INR: 1.47 ratio         PTT - ( 23 Aug 2018 06:03 )  PTT:38.4 sec              RADIOLOGY:    EXAM:  XR CHEST PORTABLE ROUTINE 1V            PROCEDURE DATE:  08/23/2018    IMPRESSION:      Stable right basilar pneumothorax and opacity/effusion.      PHYSICAL EXAM:  GEN: No acute distress  LUNGS: Clear to auscultation bilaterally   CHEST: chest tube hooked up to suction  HEART: S1/S2 present. RRR.   ABD: Soft, non-tender, non-distended. Bowel sounds present  EXT: NC/NC/NE/2+PP/JOYNER/Skin Intact.   NEURO: AAOX3

## 2018-08-23 NOTE — PROGRESS NOTE ADULT - SUBJECTIVE AND OBJECTIVE BOX
SAMANTHA CHARLES  68y, Male      OVERNIGHT EVENTS:  afebrile  no acute events overnight    ROS negative except as per above    VITALS:  T(F): 98.2, Max: 98.2 (08-23-18 @ 05:31)  HR: 68  BP: 101/59  RR: 18Vital Signs Last 24 Hrs  T(C): 36.8 (23 Aug 2018 05:31), Max: 36.8 (23 Aug 2018 05:31)  T(F): 98.2 (23 Aug 2018 05:31), Max: 98.2 (23 Aug 2018 05:31)  HR: 68 (23 Aug 2018 05:31) (59 - 80)  BP: 101/59 (23 Aug 2018 05:31) (92/55 - 101/59)  BP(mean): --  RR: 18 (23 Aug 2018 05:31) (16 - 18)  SpO2: 95% (22 Aug 2018 19:52) (95% - 95%)    PHYSICAL EXAM  Gen: Awake and alert, non-toxic appearing, NAD  HEENT: NCAT. EOMI. MMM.   CV: RRR, no murmurs  Lungs: CTAB, no w/r/r, CT in place  Abd: Soft. NTND  Extr: wwp, no edema  Skin: no rash  Neuro: No focal deficits  Lines: clean      TESTS & MEASUREMENTS:                        8.6    4.29  )-----------( 335      ( 23 Aug 2018 06:03 )             26.6     08-23    140  |  100  |  14  ----------------------------<  85  4.5   |  29  |  0.6<L>    Ca    8.3<L>      23 Aug 2018 06:03  Mg     1.9     08-23    TPro  5.1<L>  /  Alb  2.1<L>  /  TBili  0.5  /  DBili  x   /  AST  23  /  ALT  23  /  AlkPhos  139<H>  08-23    LIVER FUNCTIONS - ( 23 Aug 2018 06:03 )  Alb: 2.1 g/dL / Pro: 5.1 g/dL / ALK PHOS: 139 U/L / ALT: 23 U/L / AST: 23 U/L / GGT: x             Culture - Body Fluid with Gram Stain (collected 08-17-18 @ 15:16)  Source: .Body Fluid Pleural Fluid  Gram Stain (08-18-18 @ 07:31):    Moderate polymorphonuclear leukocytes seen per low power field    Rare Gram positive cocci in pairs seen per oil power field  Final Report (08-22-18 @ 19:26):    Few Streptococcus agalactiae (Group B)  Organism: Streptococcus agalactiae (Group B) (08-22-18 @ 19:26)  Organism: Streptococcus agalactiae (Group B) (08-22-18 @ 19:26)      -  Clindamycin: S      -  Penicillin: S Predicts results for ampicillin, amoxicillin, amoxicillin/clavulanate, ampicillin/sulbactam, 1st, 2nd and 3rd generation cephalosporins and carbapenems.      -  Vancomycin: S      Method Type: KB    Culture - Acid Fast - Body Fluid w/Smear (collected 08-17-18 @ 15:16)  Source: .Body Fluid Pleural Fluid          RADIOLOGY & ADDITIONAL TESTS:    ANTIBIOTICS:    clindamycin IVPB   100 mL/Hr IV Intermittent (08-23-18 @ 05:33)   100 mL/Hr IV Intermittent (08-22-18 @ 21:42)    levoFLOXacin IVPB   100 mL/Hr IV Intermittent (08-22-18 @ 18:02)    meropenem  IVPB   100 mL/Hr IV Intermittent (08-19-18 @ 05:40)   100 mL/Hr IV Intermittent (08-18-18 @ 22:36)   100 mL/Hr IV Intermittent (08-18-18 @ 14:44)   100 mL/Hr IV Intermittent (08-18-18 @ 06:05)   100 mL/Hr IV Intermittent (08-17-18 @ 21:37)    meropenem  IVPB   100 mL/Hr IV Intermittent (08-17-18 @ 16:08)    meropenem  IVPB   100 mL/Hr IV Intermittent (08-22-18 @ 13:33)   100 mL/Hr IV Intermittent (08-22-18 @ 05:18)   100 mL/Hr IV Intermittent (08-21-18 @ 22:28)   100 mL/Hr IV Intermittent (08-21-18 @ 14:37)   100 mL/Hr IV Intermittent (08-21-18 @ 06:14)   100 mL/Hr IV Intermittent (08-20-18 @ 22:54)   100 mL/Hr IV Intermittent (08-20-18 @ 13:17)   100 mL/Hr IV Intermittent (08-20-18 @ 05:16)   100 mL/Hr IV Intermittent (08-19-18 @ 21:11)   100 mL/Hr IV Intermittent (08-19-18 @ 14:06)    vancomycin  IVPB   250 mL/Hr IV Intermittent (08-17-18 @ 16:08)    vancomycin  IVPB   166.67 mL/Hr IV Intermittent (08-18-18 @ 06:05)   166.67 mL/Hr IV Intermittent (08-17-18 @ 21:36)    vancomycin  IVPB   250 mL/Hr IV Intermittent (08-20-18 @ 05:16)   250 mL/Hr IV Intermittent (08-19-18 @ 18:55)    vancomycin  IVPB   250 mL/Hr IV Intermittent (08-19-18 @ 06:15)   250 mL/Hr IV Intermittent (08-18-18 @ 22:37)   250 mL/Hr IV Intermittent (08-18-18 @ 14:44)        clindamycin IVPB 900 milliGRAM(s) IV Intermittent every 8 hours

## 2018-08-23 NOTE — PROGRESS NOTE ADULT - ASSESSMENT
Assessment:  68y Male patient admitted w/ RUQ pain with biliary colic admitted for acute cholecystitis r/o ACS , with the above physical exam, labs, and imaging findings.    Plan:  -MRCP today  -trend LFTs  -possible OR for lap von

## 2018-08-23 NOTE — PROGRESS NOTE ADULT - SUBJECTIVE AND OBJECTIVE BOX
Progress Note: General Surgery  Patient: SAMANTHA CHARLES , 68y (1950)Male   MRN: [id number]  Location: [address]  Visit: 08-17-18 Inpatient  Date: 08-23-18 @ 11:29  Hospital Day: 1    Procedure/Diagnosis: RUQ pain with biliary colic admitted for acute cholecystitis r/o ACS  Events over 24h: ERNST overnight. Pt was supposed to get MRCP yesterday but unable to because pt was claustrophobic. Trend trop were -ve and ECHO was done yesterday with good 50-55% ejection fraction.    Vitals: T(F): 98.2 (08-23-18 @ 05:31), Max: 98.2 (08-23-18 @ 05:31)  HR: 68 (08-23-18 @ 05:31)  BP: 101/59 (08-23-18 @ 05:31) (90/58 - 101/59)  RR: 18 (08-23-18 @ 05:31)  SpO2: 95% (08-22-18 @ 19:52)      Diet: Diet, DASH/TLC:   Sodium & Cholesterol Restricted (08-17-18 @ 18:18)    IV Fluids: yes no , Type:   Bowel Function: Bowel movement [  ] Flatus [  ]   Intake and Output:   08-22-18 @ 07:01  -  08-23-18 @ 07:00  --------------------------------------------------------  IN: 0 mL    08-23-18 @ 07:01  -  08-23-18 @ 11:29  --------------------------------------------------------  IN: 0 mL       08-22-18 @ 07:01  -  08-23-18 @ 07:00  --------------------------------------------------------  OUT:    Chest Tube: 45 mL  Total OUT: 45 mL      08-23-18 @ 07:01  -  08-23-18 @ 11:29  --------------------------------------------------------  OUT:    Chest Tube: 18 mL  Total OUT: 18 mL        08-22-18 @ 07:01  -  08-23-18 @ 07:00  --------------------------------------------------------  NET: -45 mL    08-23-18 @ 07:01  -  08-23-18 @ 11:29  --------------------------------------------------------  NET: -18 mL        Physical Examination:  General: In NAD   HEENT: NC/AT             Lymphatic system: No cervical LN   Lungs: Bilateral BS  Cardiovascular: Regular   Gastrointestinal: Soft, Positive BS  Extremities: No clubbing.  Moves extremities.  Full Range of motion   Skin: Warm, intact  Neurological: No motor or sensory deficit       Medications: [Standing]  aspirin  chewable 81 milliGRAM(s) Oral daily  carvedilol 3.125 milliGRAM(s) Oral every 12 hours  clindamycin IVPB 900 milliGRAM(s) IV Intermittent every 8 hours  digoxin     Tablet 0.125 milliGRAM(s) Oral daily  enoxaparin Injectable 60 milliGRAM(s) SubCutaneous two times a day  furosemide    Tablet 40 milliGRAM(s) Oral daily  ivabradine 5 milliGRAM(s) Oral two times a day  sacubitril 24 mG/valsartan 26 mG 1 Tablet(s) Oral two times a day  spironolactone 50 milliGRAM(s) Oral two times a day    DVT Prophylaxis: enoxaparin Injectable 60 milliGRAM(s) SubCutaneous two times a day    GI Prophylaxis:   Antibiotics: clindamycin IVPB 900 milliGRAM(s) IV Intermittent every 8 hours    Anticoagulation:   Medications:[PRN]  acetaminophen   Tablet. 650 milliGRAM(s) Oral every 6 hours PRN    Labs:                        8.6    4.29  )-----------( 335      ( 23 Aug 2018 06:03 )             26.6   08-23    140  |  100  |  14  ----------------------------<  85  4.5   |  29  |  0.6<L>    Ca    8.3<L>      23 Aug 2018 06:03  Mg     1.9     08-23    TPro  5.1<L>  /  Alb  2.1<L>  /  TBili  0.5  /  DBili  x   /  AST  23  /  ALT  23  /  AlkPhos  139<H>  08-23  LIVER FUNCTIONS - ( 23 Aug 2018 06:03 )  Alb: 2.1 g/dL / Pro: 5.1 g/dL / ALK PHOS: 139 U/L / ALT: 23 U/L / AST: 23 U/L / GGT: x         PT/INR - ( 23 Aug 2018 06:03 )   PT: 15.80 sec;   INR: 1.47 ratio         PTT - ( 23 Aug 2018 06:03 )  PTT:38.4 sec    Urine/Micro:    Imaging:  None/24h

## 2018-08-23 NOTE — PROGRESS NOTE ADULT - SUBJECTIVE AND OBJECTIVE BOX
Patient is a 68y old  Male who presents with a chief complaint of SOB (17 Aug 2018 17:22)        SUBJECTIVE: feels better, chest tube drainage was 75cc overnight after chamber was changed.      REVIEW OF SYSTEMS:  See HPI    PHYSICAL EXAM  Vital Signs Last 24 Hrs  T(C): 36.8 (23 Aug 2018 05:31), Max: 36.8 (23 Aug 2018 05:31)  T(F): 98.2 (23 Aug 2018 05:31), Max: 98.2 (23 Aug 2018 05:31)  HR: 68 (23 Aug 2018 05:31) (59 - 80)  BP: 101/59 (23 Aug 2018 05:31) (90/58 - 101/59)  RR: 18 (23 Aug 2018 05:31) (16 - 18)  SpO2: 95% (22 Aug 2018 19:52) (95% - 95%)    General: In NAD  HEENT: JESSY               Lymphatic system: No Cervical LN    Respiratory: Garo BS, no wheezing, right pigtail catheter in place.  Cardiovascular: Regular  Gastrointestinal: Soft. + BS  Musculoskeletal: No clubbing.  moves all extremities.  Full range of motion    Skin: Warm.  Intact  Neurological: No motor or sensory deficit      08-22-18 @ 07:01  -  08-23-18 @ 07:00  --------------------------------------------------------  IN:  Total IN: 0 mL    OUT:    Chest Tube: 45 mL  Total OUT: 45 mL    Total NET: -45 mL      08-23-18 @ 07:01  -  08-23-18 @ 10:28  --------------------------------------------------------  IN:  Total IN: 0 mL    OUT:    Chest Tube: 18 mL  Total OUT: 18 mL    Total NET: -18 mL          LABS:                          8.6    4.29  )-----------( 335      ( 23 Aug 2018 06:03 )             26.6                                               08-23    140  |  100  |  14  ----------------------------<  85  4.5   |  29  |  0.6<L>    Ca    8.3<L>      23 Aug 2018 06:03  Mg     1.9     08-23    TPro  5.1<L>  /  Alb  2.1<L>  /  TBili  0.5  /  DBili  x   /  AST  23  /  ALT  23  /  AlkPhos  139<H>  08-23      PT/INR - ( 23 Aug 2018 06:03 )   PT: 15.80 sec;   INR: 1.47 ratio         PTT - ( 23 Aug 2018 06:03 )  PTT:38.4 sec                                                                                     LIVER FUNCTIONS - ( 23 Aug 2018 06:03 )  Alb: 2.1 g/dL / Pro: 5.1 g/dL / ALK PHOS: 139 U/L / ALT: 23 U/L / AST: 23 U/L / GGT: x                                                CXR: pigtail cateheter on the right, right lower hemithorax with persistent pneumothorax                                            MEDICATIONS  (STANDING):  aspirin  chewable 81 milliGRAM(s) Oral daily  carvedilol 3.125 milliGRAM(s) Oral every 12 hours  clindamycin IVPB 900 milliGRAM(s) IV Intermittent every 8 hours  digoxin     Tablet 0.125 milliGRAM(s) Oral daily  enoxaparin Injectable 60 milliGRAM(s) SubCutaneous two times a day  furosemide    Tablet 40 milliGRAM(s) Oral daily  ivabradine 5 milliGRAM(s) Oral two times a day  sacubitril 24 mG/valsartan 26 mG 1 Tablet(s) Oral two times a day  spironolactone 50 milliGRAM(s) Oral two times a day    MEDICATIONS  (PRN):  acetaminophen   Tablet. 650 milliGRAM(s) Oral every 6 hours PRN Mild Pain (1 - 3)

## 2018-08-23 NOTE — PROGRESS NOTE ADULT - ASSESSMENT
Assessment:  68y Male patient admitted S/P R pigtail placement for loculated pleural effusion, with the above physical exam, labs, and imaging findings.    Plan:  -chest tube to suction  -f/u CT drain output    Date/Time: 08-23-18 @ 11:17

## 2018-08-23 NOTE — PROGRESS NOTE ADULT - ASSESSMENT
IMPRESSION:    Empyema sp pig tail  Pneumonia   Chronic hypoxemic respiratory failure in home O2  HF REF  HO AFIB not on AC  VTE   Trapped lung ?    PLAN:    CNS: No depressants    HEENT: Oral care    PULMONARY:  HOB @ 45 degrees. Place chest tube to water seal and repeat chest xray. Continue abx regimen, which can  be changed to oral as an outpatient.    CARDIOVASCULAR: Continue maximizing  cardiac therapy.  Avoid volume overload.  Lasix Q 24 hours     GI: GI prophylaxis.  Feeding     RENAL:  Follow up lytes.  Correct as needed    INFECTIOUS DISEASE: Follow up cultures.  clindamycin, levaquin would suffice given culture results    HEMATOLOGICAL:  DVT TX. now on lovenox will change to eliquis upon discharge    ENDOCRINE:  Follow up FS.    MUSCULOSKELETAL:    DW house staff IMPRESSION:    Empyema sp pig tail  Pneumonia   Chronic hypoxemic respiratory failure on home O2  HF REF  HO AFIB not on AC  VTE   Trapped lung ?    PLAN:    CNS: No depressants    HEENT: Oral care    PULMONARY:  HOB @ 45 degrees. Chest tube per CTS. Continue abx regimen, which can  be changed to oral as an outpatient.    CARDIOVASCULAR: Continue maximizing  cardiac therapy.  Avoid volume overload.  Lasix Q 24 hours     GI: GI prophylaxis.  Feeding     RENAL:  Follow up lytes.  Correct as needed    INFECTIOUS DISEASE: Follow up cultures.  clindamycin, levaquin would suffice given culture results    HEMATOLOGICAL:  DVT TX. now on lovenox will change to eliquis upon discharge    ENDOCRINE:  Follow up FS.    MUSCULOSKELETAL:    DW house staff

## 2018-08-23 NOTE — PROGRESS NOTE ADULT - SUBJECTIVE AND OBJECTIVE BOX
Progress Note: General Surgery  Patient: SAMANTHA CHARLES , 68y (1950)Male   MRN: [id number]  Location: [address]  Visit: 08-17-18 Inpatient  Date: 08-23-18 @ 11:17  Hospital Day: 7  Post-op Day: 6    Procedure/Diagnosis: s/p R pigtail placement for loculated pleural effusion  Events over 24h: ERNST overnight.    Vitals: T(F): 98.2 (08-23-18 @ 05:31), Max: 98.2 (08-23-18 @ 05:31)  HR: 68 (08-23-18 @ 05:31)  BP: 101/59 (08-23-18 @ 05:31) (90/58 - 101/59)  RR: 18 (08-23-18 @ 05:31)  SpO2: 95% (08-22-18 @ 19:52)      Diet: Diet, DASH/TLC:   Sodium & Cholesterol Restricted (08-17-18 @ 18:18)    IV Fluids: yes no , Type:   Bowel Function: Bowel movement [  ] Flatus [  ]   Intake and Output:   08-22-18 @ 07:01  -  08-23-18 @ 07:00  --------------------------------------------------------  IN: 0 mL    08-23-18 @ 07:01  -  08-23-18 @ 11:17  --------------------------------------------------------  IN: 0 mL       08-22-18 @ 07:01  -  08-23-18 @ 07:00  --------------------------------------------------------  OUT:    Chest Tube: 45 mL  Total OUT: 45 mL      08-23-18 @ 07:01  -  08-23-18 @ 11:17  --------------------------------------------------------  OUT:    Chest Tube: 18 mL  Total OUT: 18 mL        08-22-18 @ 07:01  -  08-23-18 @ 07:00  --------------------------------------------------------  NET: -45 mL    08-23-18 @ 07:01  -  08-23-18 @ 11:17  --------------------------------------------------------  NET: -18 mL        Physical Examination:  General Appearance: NAD, alert and cooperative  HEENT: NCAT, WNL  Heart: S1 and S2. No murmurs. Rhythm is regular irregular.   Lungs: Clear to auscultation BL without rales, rhonchi, wheezing, crackles or diminished breath sounds.  Abdomen:  Positive bowel sounds. Soft, nondistended, andry-incisional tenderness nontender. Obese. No rigidity, guarding, or rebound tenderness. No masses palpated. No McBurney point tenderness. No Mchugh's sign. No Rovsing's sign.   MSK/Extremities: ROM intact BL upper/lower extremities. No joint erythema or tenderness. Normal gait. No significant deformity or joint abnormality. No edema. Peripheral pulses intact. No varicosities. WNL  Neuro: CN II-XII intact. Strength and sensation symmetric and intact throughout. Reflexes 2+ throughout. Cerebellar testing normal. Sensation grossly intact.  Skin: Warm/dry, Normal color, No jaundice.   Incisions/Wounds: Dressings in place, clean, dry and intact, no signs of infection/active bleeding/drainage    Medications: [Standing]  aspirin  chewable 81 milliGRAM(s) Oral daily  carvedilol 3.125 milliGRAM(s) Oral every 12 hours  clindamycin IVPB 900 milliGRAM(s) IV Intermittent every 8 hours  digoxin     Tablet 0.125 milliGRAM(s) Oral daily  enoxaparin Injectable 60 milliGRAM(s) SubCutaneous two times a day  furosemide    Tablet 40 milliGRAM(s) Oral daily  ivabradine 5 milliGRAM(s) Oral two times a day  sacubitril 24 mG/valsartan 26 mG 1 Tablet(s) Oral two times a day  spironolactone 50 milliGRAM(s) Oral two times a day    DVT Prophylaxis: enoxaparin Injectable 60 milliGRAM(s) SubCutaneous two times a day    GI Prophylaxis:   Antibiotics: clindamycin IVPB 900 milliGRAM(s) IV Intermittent every 8 hours    Anticoagulation:   Medications:[PRN]  acetaminophen   Tablet. 650 milliGRAM(s) Oral every 6 hours PRN    Labs:                        8.6    4.29  )-----------( 335      ( 23 Aug 2018 06:03 )             26.6   08-23    140  |  100  |  14  ----------------------------<  85  4.5   |  29  |  0.6<L>    Ca    8.3<L>      23 Aug 2018 06:03  Mg     1.9     08-23    TPro  5.1<L>  /  Alb  2.1<L>  /  TBili  0.5  /  DBili  x   /  AST  23  /  ALT  23  /  AlkPhos  139<H>  08-23  LIVER FUNCTIONS - ( 23 Aug 2018 06:03 )  Alb: 2.1 g/dL / Pro: 5.1 g/dL / ALK PHOS: 139 U/L / ALT: 23 U/L / AST: 23 U/L / GGT: x         PT/INR - ( 23 Aug 2018 06:03 )   PT: 15.80 sec;   INR: 1.47 ratio         PTT - ( 23 Aug 2018 06:03 )  PTT:38.4 sec    Urine/Micro:    Imaging:  None/24h    Assessment:  68y Male patient admitted S/P *** , with the above physical exam, labs, and imaging findings.    Plan:      Date/Time: 08-23-18 @ 11:17 Progress Note: General Surgery  Patient: SAMANTHA CHARLES , 68y (1950)Male   MRN: [id number]  Location: [address]  Visit: 08-17-18 Inpatient  Date: 08-23-18 @ 11:17  Hospital Day: 7  Post-op Day: 6    Procedure/Diagnosis: s/p R pigtail placement for loculated pleural effusion  Events over 24h: ERNST overnight. No air leak detected.    Vitals: T(F): 98.2 (08-23-18 @ 05:31), Max: 98.2 (08-23-18 @ 05:31)  HR: 68 (08-23-18 @ 05:31)  BP: 101/59 (08-23-18 @ 05:31) (90/58 - 101/59)  RR: 18 (08-23-18 @ 05:31)  SpO2: 95% (08-22-18 @ 19:52)      Diet: Diet, DASH/TLC:   Sodium & Cholesterol Restricted (08-17-18 @ 18:18)    IV Fluids: yes no , Type:   Bowel Function: Bowel movement [  ] Flatus [  ]   Intake and Output:   08-22-18 @ 07:01  -  08-23-18 @ 07:00  --------------------------------------------------------  IN: 0 mL    08-23-18 @ 07:01  -  08-23-18 @ 11:17  --------------------------------------------------------  IN: 0 mL       08-22-18 @ 07:01  -  08-23-18 @ 07:00  --------------------------------------------------------  OUT:    Chest Tube: 45 mL  Total OUT: 45 mL      08-23-18 @ 07:01 - 08-23-18 @ 11:17  --------------------------------------------------------  OUT:    Chest Tube: 18 mL  Total OUT: 18 mL        08-22-18 @ 07:01 - 08-23-18 @ 07:00  --------------------------------------------------------  NET: -45 mL    08-23-18 @ 07:01 - 08-23-18 @ 11:17  --------------------------------------------------------  NET: -18 mL        Physical Examination:  General: In NAD   HEENT: NCAT.          Lymphatic system: No cervical LN   Lungs: Bilateral BS  Cardiovascular: Regular   Gastrointestinal: Soft, Positive BS  Extremities: No clubbing.  Moves extremities.  Full Range of motion   Skin: Warm, intact  Neurological: No motor or sensory deficit     Medications: [Standing]  aspirin  chewable 81 milliGRAM(s) Oral daily  carvedilol 3.125 milliGRAM(s) Oral every 12 hours  clindamycin IVPB 900 milliGRAM(s) IV Intermittent every 8 hours  digoxin     Tablet 0.125 milliGRAM(s) Oral daily  enoxaparin Injectable 60 milliGRAM(s) SubCutaneous two times a day  furosemide    Tablet 40 milliGRAM(s) Oral daily  ivabradine 5 milliGRAM(s) Oral two times a day  sacubitril 24 mG/valsartan 26 mG 1 Tablet(s) Oral two times a day  spironolactone 50 milliGRAM(s) Oral two times a day    DVT Prophylaxis: enoxaparin Injectable 60 milliGRAM(s) SubCutaneous two times a day    GI Prophylaxis:   Antibiotics: clindamycin IVPB 900 milliGRAM(s) IV Intermittent every 8 hours    Anticoagulation:   Medications:[PRN]  acetaminophen   Tablet. 650 milliGRAM(s) Oral every 6 hours PRN    Labs:                        8.6    4.29  )-----------( 335      ( 23 Aug 2018 06:03 )             26.6   08-23    140  |  100  |  14  ----------------------------<  85  4.5   |  29  |  0.6<L>    Ca    8.3<L>      23 Aug 2018 06:03  Mg     1.9     08-23    TPro  5.1<L>  /  Alb  2.1<L>  /  TBili  0.5  /  DBili  x   /  AST  23  /  ALT  23  /  AlkPhos  139<H>  08-23  LIVER FUNCTIONS - ( 23 Aug 2018 06:03 )  Alb: 2.1 g/dL / Pro: 5.1 g/dL / ALK PHOS: 139 U/L / ALT: 23 U/L / AST: 23 U/L / GGT: x         PT/INR - ( 23 Aug 2018 06:03 )   PT: 15.80 sec;   INR: 1.47 ratio         PTT - ( 23 Aug 2018 06:03 )  PTT:38.4 sec    Urine/Micro:    Imaging:  None/24h

## 2018-08-23 NOTE — PROGRESS NOTE ADULT - ASSESSMENT
# empyema - Grp B strep / r pl eff / trapped or non-expanded lung  abx will be clinda 900 mg iv q8 for now, then 450mg po q8 on d/c and total tx is 4-6 weeks (8/18 (day after CT placed) to 9/18, to start, and then re-assess as outpt)  give lactobacillus 1 cap po q8 w/ clinda for this long therapy  drainage from CT is < 100cc; pulm plans to place pt to water seal, obtain another x-ray and likely pull CT  lung is still not fully expanded on today's CXR  off O2  repeat CXR todd    # neutropenia - better of jose  prob from meropenem    # acute PE; acute, extensive DVT as above (right leg prox) and small calf DVT on left  pt on LMWH for now; will go to [facility] after CT out    # chr sys CHF / mod AI / Mod MT and TR / AICD / AFib  cont current meds  seems stable    # normo to macro anemia  B12 and Fol and TSH all normal    # low albumin prob suppressed from acute dz  check Urine TP:Cr ratio to r/o proteinuria (had only trace protein in u/a on 8/13)    # hx CVA - stable, no residual    # prior decond,   once CT out will need to see if pt can walk; might need rehab eval, will see later    Dispo: will see if pt will go back to SNF or can go home todd; try to d/c chest tube today # empyema - Grp B strep / r pl eff / trapped or non-expanded lung  abx will be clinda 900 mg iv q8 for now, then 450mg po q8 on d/c and total tx is 4-6 weeks (8/18 (day after CT placed) to 9/18, to start, and then re-assess as outpt w/ updated CT Chest)  add levaquin 750mg po q24 (per pulm) and tx w/ clinda as above  give lactobacillus 1 cap po q8 w/ clinda for this long therapy  drainage from CT is < 100cc; pulm plans to place pt to water seal, obtain another x-ray and likely pull CT  lung is still not fully expanded on today's CXR  off O2  repeat CXR todd    # neutropenia - better of jose  prob from meropenem    # acute PE; acute, extensive DVT as above (right leg prox) and small calf DVT on left  pt on LMWH for now; will go to [facility] after CT out    # chr sys CHF / mod AI / Mod [place] and TR / AICD / AFib  cont current meds  seems stable    # normo to macro anemia  B12 and Fol and TSH all normal    # low albumin prob suppressed from acute dz  check Urine TP:Cr ratio to r/o proteinuria (had only trace protein in u/a on 8/13)    # hx CVA - stable, no residual    # prior decond,   once CT out will need to see if pt can walk; might need rehab eval, will see later    Dispo: will see if pt will go back to SNF or can go home todd; try to d/c chest tube today

## 2018-08-23 NOTE — PROGRESS NOTE ADULT - SUBJECTIVE AND OBJECTIVE BOX
SAMANTHA CHARLES  68y  Male  ***My note supercedes ALL resident notes that I sign.  My corrections for their notes are in my note.***    I can be reached directly on [id number]. My office number is [phone]. My personal cell number is [phone].    INTERVAL EVENTS: Here for f/u of empyema. Pt doing well. Had a good night. Pt is getting up OOB. Pt can eat/drink fine. CT output is 70 cc over 24 hrs.    T(F): 98.2 (08-23-18 @ 05:31), Max: 98.2 (08-23-18 @ 05:31)  HR: 68 (08-23-18 @ 05:31) (59 - 80)  BP: 101/59 (08-23-18 @ 05:31) (90/58 - 101/59)  RR: 18 (08-23-18 @ 05:31) (16 - 18)  SpO2: 95% (08-22-18 @ 19:52) (95% - 95%)    General: In NAD   HEENT: PERRL; EOMI; + NC O2; mouth clr  Neck no JVD, no nodes        Lungs: Bilateral BS  Chest: + right CT to pleurovac (full) w/ suction  Cardiovascular: Regular s1 s2 no murmur  Gastrointestinal: Soft, Positive BS, NT/ND, no masses  Extremities: No clubbing.  no cyanosis; no edema  Neurological: No motor or sensory deficit     LABS:                        8.6     (    94.3   4.29  )-----------( ---------      335      ( 23 Aug 2018 06:03 )             26.6    (    15.9     WBC Count: 4.29 K/uL (08-23-18 @ 06:03) - better of jose  WBC Count: 3.58 K/uL (08-22-18 @ 08:27)  WBC Count: 4.07 K/uL (08-21-18 @ 08:30)  WBC Count: 4.15 K/uL (08-20-18 @ 04:17)  WBC Count: 8.12 K/uL (08-19-18 @ 21:10)  WBC Count: 5.87 K/uL (08-19-18 @ 04:13)  WBC Count: 5.60 K/uL (08-18-18 @ 23:35)    140   (   100   (   85      08-23-18 @ 06:03  ----------------------               4.5   (   29   (   14                             -----                        0.6  Ca  8.3   Mg  1.9    P   --     LFT  5.1  (  0.5  (  23       08-23-18 @ 06:03  -------------------------  2.1  (  139  (  23    PT/INR - ( 23 Aug 2018 06:03 )   PT: 15.80 sec;   INR: 1.47 ratio    PTT - ( 23 Aug 2018 06:03 )  PTT:38.4 sec      Culture - Body Fluid with Gram Stain (collected 08-17-18 @ 15:16)  Source: .Body Fluid Pleural Fluid  Gram Stain (08-18-18 @ 07:31):    Moderate polymorphonuclear leukocytes seen per low power field    Rare Gram positive cocci in pairs seen per oil power field  Final Report (08-22-18 @ 19:26):    Few Streptococcus agalactiae (Group B)  Organism: Streptococcus agalactiae (Group B) (08-22-18 @ 19:26)  Organism: Streptococcus agalactiae (Group B) (08-22-18 @ 19:26)      -  Clindamycin: S      -  Penicillin: S Predicts results for ampicillin, amoxicillin, amoxicillin/clavulanate, ampicillin/sulbactam, 1st, 2nd and 3rd generation cephalosporins and carbapenems.      -  Vancomycin: S      Method Type: KB    RADIOLOGY & ADDITIONAL TESTS:  < from: Xray Chest 1 View- PORTABLE-Routine (08.23.18 @ 06:44) >  IMPRESSION:      Stable right basilar pneumothorax and opacity/effusion.    < end of copied text >      MEDICATIONS:  clindamycin IVPB 900 milliGRAM(s) IV Intermittent every 8 hours    acetaminophen   Tablet. 650 milliGRAM(s) Oral every 6 hours PRN  aspirin  chewable 81 milliGRAM(s) Oral daily  carvedilol 3.125 milliGRAM(s) Oral every 12 hours  digoxin     Tablet 0.125 milliGRAM(s) Oral daily  enoxaparin Injectable 60 milliGRAM(s) SubCutaneous two times a day  furosemide    Tablet 40 milliGRAM(s) Oral daily  ivabradine 5 milliGRAM(s) Oral two times a day  sacubitril 24 mG/valsartan 26 mG 1 Tablet(s) Oral two times a day  spironolactone 50 milliGRAM(s) Oral two times a day

## 2018-08-23 NOTE — PROGRESS NOTE ADULT - ASSESSMENT
67 yo M w/ hx of Afib not on AC, CHF (EF 23%) w/ AICD, CVA admitted with R loculated pleural effusion s/p R chest tube insertion.     #R loculated pleural effusion w/ possible underlying PNA, r/o empyema  - s/p thoracentesis and R pigtail insertion  - pigtail to low wall suctioning - still draining from chest tube  - D/c IV Levaquin 750mg qd   - continue IV Clinda 900mg q8  - pulmonary f/u for chest tube    - daily AM CXR - unchanged cxr -   - doing well off oxygen   - Pleural Fluid 8/17: LDH 2900, pH 7.3, prt 3 } serum prt 5.3; EXUDATIVE   - Pleural Culture 8/17: Strep agalactiae, moderate neutrophils   - nasal MRSA +ve,     #R PE on CT Angio  - c/w lovenox 60mg q12h  - will switch to Eliquis 10mgBID x7 days, then 5mg BID once chest tube is out  - Duplex 8/17: Extensive R DVT, L calf DVT       #Decompensated CHF  - continue PO lasix 40 qd  - ECHO 8/18: EF <20%.  - c/w Entresto, Corlanor, Aldactone, Coreg  - c/w digoxin (lv <0.4)    # normo to macro anemia  - B12 and Fol and TSH all WNL    #Hx of CVA   - c/w ASA    #Afib not on AC   - c/w BB for rate control    # low albumin prob suppressed from acute dz  f/u Urine TP:Cr ratio to r/o proteinuria    #MISC:   - DVT ppx - Lovenox  - Diet - DASH/TLC  - full code  - Dispo -either home or go back to NH; possible discharge tomorrow 69 yo M w/ hx of Afib not on AC, CHF (EF 23%) w/ AICD, CVA admitted with R loculated pleural effusion s/p R chest tube insertion.     #R loculated pleural effusion w/ possible underlying PNA, r/o empyema  - s/p thoracentesis and R pigtail insertion  - pigtail to low wall suctioning - still draining from chest tube  - Continue IV Levaquin 750mg qd   - Continue IV Clinda 900mg q8  - Chest tube changed to waterseal   - Will get CXR at 8pm (6hrs after being off suction to evaluate)  - daily AM CXR    - doing well off oxygen   - Pleural Fluid 8/17: LDH 2900, pH 7.3, prt 3 } serum prt 5.3; EXUDATIVE   - Pleural Culture 8/17: Strep agalactiae, moderate neutrophils   - nasal MRSA +ve,     #R PE on CT Angio  - c/w lovenox 60mg q12h  - will switch to Eliquis 10mgBID x7 days, then 5mg BID once chest tube is out  - Duplex 8/17: Extensive R DVT, L calf DVT       #Decompensated CHF  - continue PO lasix 40 qd  - ECHO 8/18: EF <20%.  - c/w Entresto, Corlanor, Aldactone, Coreg  - c/w digoxin (lv <0.4)    # normo to macro anemia  - B12 and Fol and TSH all WNL    #Hx of CVA   - c/w ASA    #Afib not on AC   - c/w BB for rate control    # low albumin prob suppressed from acute dz  f/u Urine TP:Cr ratio to r/o proteinuria    #MISC:   - DVT ppx - Lovenox  - Diet - DASH/TLC  - full code  - Dispo -either home or go back to NH; possible discharge tomorrow

## 2018-08-23 NOTE — PROGRESS NOTE ADULT - ASSESSMENT
68M  Afib  CVA  HF AICD    Admitted with SOB found to have a pleural effusion/empyema s/p thora 8/17 and pleural fluid cx with GBS  Required pressors  Duplex + R com fem DVT  lactic acidosis, resolved  PCN allergy hives, keflex- reports nonspecific symptoms    - Clinda 900mg q8h, once stable can change to PO (covers both GBS and anaerobes), pt will need prolonged course with repeat CT Chest    Spectra [phone]

## 2018-08-24 LAB
ALBUMIN SERPL ELPH-MCNC: 2.3 G/DL — LOW (ref 3.5–5.2)
ALP SERPL-CCNC: 148 U/L — HIGH (ref 30–115)
ALT FLD-CCNC: 33 U/L — SIGNIFICANT CHANGE UP (ref 0–41)
ANION GAP SERPL CALC-SCNC: 8 MMOL/L — SIGNIFICANT CHANGE UP (ref 7–14)
APTT BLD: 44.8 SEC — HIGH (ref 27–39.2)
AST SERPL-CCNC: 34 U/L — SIGNIFICANT CHANGE UP (ref 0–41)
BASOPHILS # BLD AUTO: 0.02 K/UL — SIGNIFICANT CHANGE UP (ref 0–0.2)
BASOPHILS NFR BLD AUTO: 0.5 % — SIGNIFICANT CHANGE UP (ref 0–1)
BILIRUB SERPL-MCNC: 0.5 MG/DL — SIGNIFICANT CHANGE UP (ref 0.2–1.2)
BUN SERPL-MCNC: 14 MG/DL — SIGNIFICANT CHANGE UP (ref 10–20)
CALCIUM SERPL-MCNC: 8.2 MG/DL — LOW (ref 8.5–10.1)
CHLORIDE SERPL-SCNC: 100 MMOL/L — SIGNIFICANT CHANGE UP (ref 98–110)
CO2 SERPL-SCNC: 30 MMOL/L — SIGNIFICANT CHANGE UP (ref 17–32)
CREAT SERPL-MCNC: 0.6 MG/DL — LOW (ref 0.7–1.5)
EOSINOPHIL # BLD AUTO: 0.08 K/UL — SIGNIFICANT CHANGE UP (ref 0–0.7)
EOSINOPHIL NFR BLD AUTO: 1.9 % — SIGNIFICANT CHANGE UP (ref 0–8)
GLUCOSE SERPL-MCNC: 90 MG/DL — SIGNIFICANT CHANGE UP (ref 70–99)
HCT VFR BLD CALC: 27.1 % — LOW (ref 42–52)
HGB BLD-MCNC: 8.5 G/DL — LOW (ref 14–18)
IMM GRANULOCYTES NFR BLD AUTO: 0.2 % — SIGNIFICANT CHANGE UP (ref 0.1–0.3)
INR BLD: 1.46 RATIO — HIGH (ref 0.65–1.3)
LYMPHOCYTES # BLD AUTO: 0.95 K/UL — LOW (ref 1.2–3.4)
LYMPHOCYTES # BLD AUTO: 22.4 % — SIGNIFICANT CHANGE UP (ref 20.5–51.1)
MAGNESIUM SERPL-MCNC: 2 MG/DL — SIGNIFICANT CHANGE UP (ref 1.8–2.4)
MCHC RBC-ENTMCNC: 30 PG — SIGNIFICANT CHANGE UP (ref 27–31)
MCHC RBC-ENTMCNC: 31.4 G/DL — LOW (ref 32–37)
MCV RBC AUTO: 95.8 FL — HIGH (ref 80–94)
MONOCYTES # BLD AUTO: 0.33 K/UL — SIGNIFICANT CHANGE UP (ref 0.1–0.6)
MONOCYTES NFR BLD AUTO: 7.8 % — SIGNIFICANT CHANGE UP (ref 1.7–9.3)
NEUTROPHILS # BLD AUTO: 2.85 K/UL — SIGNIFICANT CHANGE UP (ref 1.4–6.5)
NEUTROPHILS NFR BLD AUTO: 67.2 % — SIGNIFICANT CHANGE UP (ref 42.2–75.2)
NRBC # BLD: 0 /100 WBCS — SIGNIFICANT CHANGE UP (ref 0–0)
PLATELET # BLD AUTO: 338 K/UL — SIGNIFICANT CHANGE UP (ref 130–400)
POTASSIUM SERPL-MCNC: 4.6 MMOL/L — SIGNIFICANT CHANGE UP (ref 3.5–5)
POTASSIUM SERPL-SCNC: 4.6 MMOL/L — SIGNIFICANT CHANGE UP (ref 3.5–5)
PROT SERPL-MCNC: 5.4 G/DL — LOW (ref 6–8)
PROTHROM AB SERPL-ACNC: 15.7 SEC — HIGH (ref 9.95–12.87)
RBC # BLD: 2.83 M/UL — LOW (ref 4.7–6.1)
RBC # FLD: 16.2 % — HIGH (ref 11.5–14.5)
SODIUM SERPL-SCNC: 138 MMOL/L — SIGNIFICANT CHANGE UP (ref 135–146)
WBC # BLD: 4.24 K/UL — LOW (ref 4.8–10.8)
WBC # FLD AUTO: 4.24 K/UL — LOW (ref 4.8–10.8)

## 2018-08-24 RX ADMIN — ENOXAPARIN SODIUM 60 MILLIGRAM(S): 100 INJECTION SUBCUTANEOUS at 05:27

## 2018-08-24 RX ADMIN — Medication 1 TABLET(S): at 05:27

## 2018-08-24 RX ADMIN — Medication 100 MILLIGRAM(S): at 21:12

## 2018-08-24 RX ADMIN — CARVEDILOL PHOSPHATE 3.12 MILLIGRAM(S): 80 CAPSULE, EXTENDED RELEASE ORAL at 18:54

## 2018-08-24 RX ADMIN — IVABRADINE 5 MILLIGRAM(S): 7.5 TABLET, FILM COATED ORAL at 05:27

## 2018-08-24 RX ADMIN — SPIRONOLACTONE 50 MILLIGRAM(S): 25 TABLET, FILM COATED ORAL at 18:54

## 2018-08-24 RX ADMIN — Medication 40 MILLIGRAM(S): at 05:27

## 2018-08-24 RX ADMIN — SPIRONOLACTONE 50 MILLIGRAM(S): 25 TABLET, FILM COATED ORAL at 05:27

## 2018-08-24 RX ADMIN — Medication 1 TABLET(S): at 21:12

## 2018-08-24 RX ADMIN — Medication 1 TABLET(S): at 14:57

## 2018-08-24 RX ADMIN — SACUBITRIL AND VALSARTAN 1 TABLET(S): 24; 26 TABLET, FILM COATED ORAL at 18:54

## 2018-08-24 RX ADMIN — Medication 0.12 MILLIGRAM(S): at 05:27

## 2018-08-24 RX ADMIN — SACUBITRIL AND VALSARTAN 1 TABLET(S): 24; 26 TABLET, FILM COATED ORAL at 05:26

## 2018-08-24 RX ADMIN — Medication 81 MILLIGRAM(S): at 14:57

## 2018-08-24 RX ADMIN — CARVEDILOL PHOSPHATE 3.12 MILLIGRAM(S): 80 CAPSULE, EXTENDED RELEASE ORAL at 05:27

## 2018-08-24 RX ADMIN — ENOXAPARIN SODIUM 60 MILLIGRAM(S): 100 INJECTION SUBCUTANEOUS at 18:54

## 2018-08-24 RX ADMIN — Medication 100 MILLIGRAM(S): at 14:59

## 2018-08-24 RX ADMIN — Medication 100 MILLIGRAM(S): at 05:26

## 2018-08-24 NOTE — PROGRESS NOTE ADULT - ASSESSMENT
# daily labs are NOT needed in this pt    # empyema - Grp B strep / r pl eff / trapped or non-expanded lung  abx will be clinda 900 mg iv q8 for now, then 450mg po q8 on d/c and total tx is 4-6 weeks (8/18 (day after CT placed) to 9/18, to start, and then re-assess as outpt w/ updated CT Chest)  added levaquin 750mg iv q24 (per pulm), will change to po upon d/c and tx w/ clinda as above  give lactobacillus 1 cap po q8 w/ clinda for this long therapy  drainage from CT is zero now;  pulm will likely send pt home w/ CT and one-way valve; will need home care  lung is still not fully expanded on today's CXR  off O2  f/u pulm rec    # neutropenia - better of jose    # acute PE; acute, extensive DVT as above (right leg prox) and small calf DVT on left  pt on LMWH for now; will go to [facility] upon d/c    # chr sys CHF / mod AI / Mod [place] and TR / AICD / AFib  cont current meds; seems stable    # normo to macro anemia  B12 and Fol and TSH all normal  prob chr dz and suppression from current illness  no further w/u    # low albumin prob suppressed from acute dz  no proteinuria    # hx CVA - stable, no residual    # prior decond,   pt can walk fine, so he will likely go home, once ok w/ pulm    Dispo: d/c plans are per pulm team; cont current mngmt as above

## 2018-08-24 NOTE — PROGRESS NOTE ADULT - ASSESSMENT
A/P:  SAMANTHA CHARLES is a 68yMale HD8/POD7 from Chest tube insertion, thoracentesis.     Plan:   -will d/c home on pneumostat, place before day before discharge (we will need notification from primary team regarding discharge)  -will need VNS for monitoring of pneumostat

## 2018-08-24 NOTE — PROGRESS NOTE ADULT - SUBJECTIVE AND OBJECTIVE BOX
Patient is a 68y old  Male who presents with a chief complaint of SOB (17 Aug 2018 17:22)        SUBJECTIVE: feels better not dyspneic, not on supplemental oxygen      REVIEW OF SYSTEMS:  See HPI    PHYSICAL EXAM  Vital Signs Last 24 Hrs  T(C): 35.9 (24 Aug 2018 13:02), Max: 36.4 (24 Aug 2018 05:24)  T(F): 96.7 (24 Aug 2018 13:02), Max: 97.6 (24 Aug 2018 05:24)  HR: 77 (24 Aug 2018 13:02) (66 - 89)  BP: 104/64 (24 Aug 2018 13:02) (90/51 - 123/59)  RR: 18 (24 Aug 2018 13:02) (17 - 18)  SpO2: 97% (23 Aug 2018 19:50) (96% - 97%)    General: In NAD  HEENT: JESSY               Lymphatic system: No Cervical LN    Respiratory: Garo BS, no wheezing, rhonchi, crackles, + pigatil catheter on the right.  Cardiovascular: Regular  Gastrointestinal: Soft. + BS  Musculoskeletal: No clubbing.  moves all extremities.  Full range of motion    Skin: Warm.  Intact  Neurological: No motor or sensory deficit      08-23-18 @ 07:01  -  08-24-18 @ 07:00  --------------------------------------------------------  IN:  Total IN: 0 mL    OUT:    Chest Tube: 68 mL  Total OUT: 68 mL    Total NET: -68 mL      08-24-18 @ 07:01  -  08-24-18 @ 13:10  --------------------------------------------------------  IN:    Oral Fluid: 240 mL  Total IN: 240 mL    OUT:    Voided: 200 mL  Total OUT: 200 mL    Total NET: 40 mL          LABS:                          8.5    4.24  )-----------( 338      ( 24 Aug 2018 07:31 )             27.1                                               08-24    138  |  100  |  14  ----------------------------<  90  4.6   |  30  |  0.6<L>    Ca    8.2<L>      24 Aug 2018 07:31  Mg     2.0     08-24    TPro  5.4<L>  /  Alb  2.3<L>  /  TBili  0.5  /  DBili  x   /  AST  34  /  ALT  33  /  AlkPhos  148<H>  08-24      PT/INR - ( 24 Aug 2018 07:31 )   PT: 15.70 sec;   INR: 1.46 ratio         PTT - ( 24 Aug 2018 07:31 )  PTT:44.8 sec                                              LIVER FUNCTIONS - ( 24 Aug 2018 07:31 )  Alb: 2.3 g/dL / Pro: 5.4 g/dL / ALK PHOS: 148 U/L / ALT: 33 U/L / AST: 34 U/L / GGT: x                                                 CXR: < from: Xray Chest 1 View- PORTABLE-Routine (08.24.18 @ 06:13) >  IMPRESSION:    Unchanged right hydropneumothorax and adjacent opacity    < end of copied text >     US lung: no significant fluid collection, consolidation noted. on the right.    MEDICATIONS  (STANDING):  aspirin  chewable 81 milliGRAM(s) Oral daily  carvedilol 3.125 milliGRAM(s) Oral every 12 hours  clindamycin IVPB 900 milliGRAM(s) IV Intermittent every 8 hours  digoxin     Tablet 0.125 milliGRAM(s) Oral daily  enoxaparin Injectable 60 milliGRAM(s) SubCutaneous two times a day  furosemide    Tablet 40 milliGRAM(s) Oral daily  ivabradine 5 milliGRAM(s) Oral two times a day  lactobacillus acidophilus 1 Tablet(s) Oral every 8 hours  levoFLOXacin IVPB 750 milliGRAM(s) IV Intermittent every 24 hours  sacubitril 24 mG/valsartan 26 mG 1 Tablet(s) Oral two times a day  spironolactone 50 milliGRAM(s) Oral two times a day    MEDICATIONS  (PRN):  acetaminophen   Tablet. 650 milliGRAM(s) Oral every 6 hours PRN Mild Pain (1 - 3)

## 2018-08-24 NOTE — PROGRESS NOTE ADULT - SUBJECTIVE AND OBJECTIVE BOX
SAMANTHA CHARLES  68y  Male  ***My note supercedes ALL resident notes that I sign.  My corrections for their notes are in my note.***    I can be reached directly on [employer]. My office number is [phone]. My personal cell number is [phone].    INTERVAL EVENTS: Here for f/u of empyema. Pt doing quite well. Walked on unit yesterday when CT was not on suction. Pt had no complaints.    T(F): 96.7 (08-24-18 @ 13:02), Max: 97.6 (08-24-18 @ 05:24)  HR: 77 (08-24-18 @ 13:02) (71 - 89)  BP: 104/64 (08-24-18 @ 13:02) (90/51 - 123/59)  RR: 18 (08-24-18 @ 13:02) (17 - 18)  SpO2: 97% (08-23-18 @ 19:50) (96% - 97%)    General: In NAD   HEENT: PERRL; EOMI; + NC O2; mouth clr  Neck no JVD, no nodes        Lungs: Bilateral BS  Chest: + right CT to pleurovac w/ suction - no output in 24 hrs  Cardiovascular: Regular s1 s2 no murmur  Gastrointestinal: Soft, Positive BS, NT/ND, no masses  Extremities: No clubbing.  no cyanosis; no edema  Neurological: No motor or sensory deficit     LABS:                        8.5     (    95.8   4.24  )-----------( ---------      338      ( 24 Aug 2018 07:31 )             27.1    (    16.2     138   (   100   (   90      08-24-18 @ 07:31  ----------------------               4.6   (   30   (   14                             -----                        0.6  Ca  8.2   Mg  2.0    P   --     LFT  5.4  (  0.5  (  34       08-24-18 @ 07:31  -------------------------  2.3  (  148  (  33    PT/INR - ( 24 Aug 2018 07:31 )   PT: 15.70 sec;   INR: 1.46 ratio    PTT - ( 24 Aug 2018 07:31 )  PTT:44.8 sec    RADIOLOGY & ADDITIONAL TESTS:  < from: Xray Chest 1 View- PORTABLE-Routine (08.24.18 @ 06:13) >    IMPRESSION:    Unchanged right hydropneumothorax and adjacent opacity    < end of copied text >      MEDICATIONS:  clindamycin IVPB 900 milliGRAM(s) IV Intermittent every 8 hours  levoFLOXacin IVPB 750 milliGRAM(s) IV Intermittent every 24 hours    acetaminophen   Tablet. 650 milliGRAM(s) Oral every 6 hours PRN  aspirin  chewable 81 milliGRAM(s) Oral daily  carvedilol 3.125 milliGRAM(s) Oral every 12 hours  digoxin     Tablet 0.125 milliGRAM(s) Oral daily  enoxaparin Injectable 60 milliGRAM(s) SubCutaneous two times a day  furosemide    Tablet 40 milliGRAM(s) Oral daily  ivabradine 5 milliGRAM(s) Oral two times a day  lactobacillus acidophilus 1 Tablet(s) Oral every 8 hours  sacubitril 24 mG/valsartan 26 mG 1 Tablet(s) Oral two times a day  spironolactone 50 milliGRAM(s) Oral two times a day

## 2018-08-24 NOTE — PROGRESS NOTE ADULT - ASSESSMENT
68M  Afib  CVA  HF AICD    Admitted with SOB found to have a pleural effusion/empyema s/p thora 8/17 and pleural fluid cx with GBS  Required pressors  Duplex + R com fem DVT  lactic acidosis, resolved  PCN allergy hives, keflex- reports nonspecific symptoms    - Clinda 900mg q8h, once stable can change to PO (covers both GBS and anaerobes) clinda 600mg q8h PO, pt will need prolonged course with repeat CT Chest  - Levaquin per Pulm  - Pulm following    Spectra [phone]

## 2018-08-24 NOTE — PROGRESS NOTE ADULT - ASSESSMENT
69 yo M w/ hx of Afib not on AC, CHF (EF 23%) w/ AICD, CVA admitted with R loculated pleural effusion s/p R chest tube insertion.     #R loculated pleural effusion w/ possible underlying PNA, r/o empyema  - s/p thoracentesis and R pigtail insertion  - pigtail to low wall suctioning - still draining from chest tube  - Continue IV Levaquin 750mg qd   - Continue IV Clinda 900mg q8  - Chest tube back to suction today because lung reaccumulating   - will f/u with pulm - may do another round of tPA  - daily AM CXR    - doing well off oxygen   - Pleural Fluid 8/17: LDH 2900, pH 7.3, prt 3 } serum prt 5.3; EXUDATIVE   - Pleural Culture 8/17: Strep agalactiae, moderate neutrophils   - nasal MRSA +ve,     #R PE on CT Angio  - c/w lovenox 60mg q12h  - will switch to Eliquis 10mgBID x7 days, then 5mg BID once chest tube is out  - Duplex 8/17: Extensive R DVT, L calf DVT       #Decompensated CHF  - continue PO lasix 40 qd  - ECHO 8/18: EF <20%.  - c/w Entresto, Corlanor, Aldactone, Coreg  - c/w digoxin (lv <0.4)    # normo to macro anemia  - B12 and Fol and TSH all WNL    #Hx of CVA   - c/w ASA    #Afib not on AC   - c/w BB for rate control    # low albumin prob suppressed from acute dz  f/u Urine TP:Cr ratio to r/o proteinuria    #MISC:   - DVT ppx - Lovenox  - Diet - DASH/TLC  - full code  - Dispo -either home or go back to NH;     Will discharge on 450mg po q8 and total tx is 4-6 weeks (8/18 (day after CT placed) to 9/18, to start, and then re-assess as outpt w/ updated CT Chest)    Will d/c home on pneumostat, place before day before discharge CALL THORACIC SURGERY ([facility]) TO UPDATE WHEN DISCHARGING PATIENT.   Will need VNS for monitoring of pneumostat

## 2018-08-24 NOTE — PROGRESS NOTE ADULT - SUBJECTIVE AND OBJECTIVE BOX
GENERAL SURGERY PROGRESS NOTE     [facility] [address] day :7d  POD: 7  Procedure: Chest tube insertion  Thoracentesis  Surgical Attending: Darrick  Overnight events: No acute events overnight. Chest tube was placed to water seal overnight by pulmonary.    T(F): 97.6 (08-24-18 @ 05:24), Max: 97.6 (08-24-18 @ 05:24)  HR: 89 (08-24-18 @ 05:24) (66 - 89)  BP: 123/59 (08-24-18 @ 05:24) (90/51 - 123/59)  ABP: --  ABP(mean): --  RR: 17 (08-24-18 @ 05:24) (17 - 18)  SpO2: 97% (08-23-18 @ 19:50) (96% - 97%)      08-23-18 @ 07:01  -  08-24-18 @ 07:00  --------------------------------------------------------  IN:  Total IN: 0 mL    OUT:    Chest Tube: 68 mL  Total OUT: 68 mL    Total NET: -68 mL      GI proph:    AC/ proph: aspirin  chewable 81 milliGRAM(s) Oral daily    ABx: clindamycin IVPB 900 milliGRAM(s) IV Intermittent every 8 hours  levoFLOXacin IVPB 750 milliGRAM(s) IV Intermittent every 24 hours      PHYSICAL EXAM:  GENERAL: NAD, well-appearing  CHEST/LUNG: Clear to auscultation bilaterally, right side chest tube in place to water seal  HEART: Regular rate and rhythm  ABDOMEN: Soft, Nontender, Nondistended;   EXTREMITIES:  No clubbing, cyanosis, or edema      LABS  Labs:  CAPILLARY BLOOD GLUCOSE                              8.5    4.24  )-----------( 338      ( 24 Aug 2018 07:31 )             27.1       Auto Neutrophil %: 67.2 % (08-24-18 @ 07:31)  Auto Immature Granulocyte %: 0.2 % (08-24-18 @ 07:31)    08-24    138  |  100  |  14  ----------------------------<  90  4.6   |  30  |  0.6<L>      Calcium, Total Serum: 8.2 mg/dL (08-24-18 @ 07:31)      LFTs:             5.4  | 0.5  | 34       ------------------[148     ( 24 Aug 2018 07:31 )  2.3  | x    | 33          Lipase:x      Amylase:x             Coags:     15.70  ----< 1.46    ( 24 Aug 2018 07:31 )     44.8        RADIOLOGY & ADDITIONAL TESTS:  < from: Xray Chest 1 View- PORTABLE-Routine (08.23.18 @ 06:44) >  IMPRESSION:      Stable right basilar pneumothorax and opacity/effusion.    < end of copied text >

## 2018-08-24 NOTE — PROGRESS NOTE ADULT - SUBJECTIVE AND OBJECTIVE BOX
SAMANTHA CHARLES  68y, Male      OVERNIGHT EVENTS:  afebrile  no acute events overnight    ROS negative except as per above    VITALS:  T(F): 96.7, Max: 97.6 (08-24-18 @ 05:24)  HR: 77  BP: 104/64  RR: 18Vital Signs Last 24 Hrs  T(C): 35.9 (24 Aug 2018 13:02), Max: 36.4 (24 Aug 2018 05:24)  T(F): 96.7 (24 Aug 2018 13:02), Max: 97.6 (24 Aug 2018 05:24)  HR: 77 (24 Aug 2018 13:02) (71 - 89)  BP: 104/64 (24 Aug 2018 13:02) (90/51 - 123/59)  BP(mean): --  RR: 18 (24 Aug 2018 13:02) (17 - 18)  SpO2: 97% (23 Aug 2018 19:50) (96% - 97%)    PHYSICAL EXAM  Gen: Awake and alert, non-toxic appearing, NAD  HEENT: NCAT. EOMI. MMM.   CV: RRR, no murmurs  Lungs: CTAB, no w/r/r, CT in place  Abd: Soft. NTND  Extr: wwp, no edema  Skin: no rash  Neuro: No focal deficits  Lines: clean    TESTS & MEASUREMENTS:                        8.5    4.24  )-----------( 338      ( 24 Aug 2018 07:31 )             27.1     08-24    138  |  100  |  14  ----------------------------<  90  4.6   |  30  |  0.6<L>    Ca    8.2<L>      24 Aug 2018 07:31  Mg     2.0     08-24    TPro  5.4<L>  /  Alb  2.3<L>  /  TBili  0.5  /  DBili  x   /  AST  34  /  ALT  33  /  AlkPhos  148<H>  08-24    LIVER FUNCTIONS - ( 24 Aug 2018 07:31 )  Alb: 2.3 g/dL / Pro: 5.4 g/dL / ALK PHOS: 148 U/L / ALT: 33 U/L / AST: 34 U/L / GGT: x                 RADIOLOGY & ADDITIONAL TESTS:    ANTIBIOTICS:    clindamycin IVPB   100 mL/Hr IV Intermittent (08-24-18 @ 14:59)   100 mL/Hr IV Intermittent (08-24-18 @ 05:26)   100 mL/Hr IV Intermittent (08-23-18 @ 21:24)   100 mL/Hr IV Intermittent (08-23-18 @ 15:16)   100 mL/Hr IV Intermittent (08-23-18 @ 05:33)   100 mL/Hr IV Intermittent (08-22-18 @ 21:42)    levoFLOXacin IVPB   100 mL/Hr IV Intermittent (08-23-18 @ 17:36)    levoFLOXacin IVPB   100 mL/Hr IV Intermittent (08-22-18 @ 18:02)    meropenem  IVPB   100 mL/Hr IV Intermittent (08-19-18 @ 05:40)   100 mL/Hr IV Intermittent (08-18-18 @ 22:36)   100 mL/Hr IV Intermittent (08-18-18 @ 14:44)   100 mL/Hr IV Intermittent (08-18-18 @ 06:05)   100 mL/Hr IV Intermittent (08-17-18 @ 21:37)    meropenem  IVPB   100 mL/Hr IV Intermittent (08-17-18 @ 16:08)    meropenem  IVPB   100 mL/Hr IV Intermittent (08-22-18 @ 13:33)   100 mL/Hr IV Intermittent (08-22-18 @ 05:18)   100 mL/Hr IV Intermittent (08-21-18 @ 22:28)   100 mL/Hr IV Intermittent (08-21-18 @ 14:37)   100 mL/Hr IV Intermittent (08-21-18 @ 06:14)   100 mL/Hr IV Intermittent (08-20-18 @ 22:54)   100 mL/Hr IV Intermittent (08-20-18 @ 13:17)   100 mL/Hr IV Intermittent (08-20-18 @ 05:16)   100 mL/Hr IV Intermittent (08-19-18 @ 21:11)   100 mL/Hr IV Intermittent (08-19-18 @ 14:06)    vancomycin  IVPB   250 mL/Hr IV Intermittent (08-17-18 @ 16:08)    vancomycin  IVPB   166.67 mL/Hr IV Intermittent (08-18-18 @ 06:05)   166.67 mL/Hr IV Intermittent (08-17-18 @ 21:36)    vancomycin  IVPB   250 mL/Hr IV Intermittent (08-20-18 @ 05:16)   250 mL/Hr IV Intermittent (08-19-18 @ 18:55)    vancomycin  IVPB   250 mL/Hr IV Intermittent (08-19-18 @ 06:15)   250 mL/Hr IV Intermittent (08-18-18 @ 22:37)   250 mL/Hr IV Intermittent (08-18-18 @ 14:44)        clindamycin IVPB 900 milliGRAM(s) IV Intermittent every 8 hours  levoFLOXacin IVPB 750 milliGRAM(s) IV Intermittent every 24 hours

## 2018-08-24 NOTE — PROGRESS NOTE ADULT - SUBJECTIVE AND OBJECTIVE BOX
SUBJECTIVE:    Patient is a 68y old Male who presents with a chief complaint of SOB (17 Aug 2018 17:22)    Currently admitted to medicine with the primary diagnosis of Empyema lung     Today is hospital day 7d. Will put CT back on suction because seems like lung is reaccumulating . Patient feels great otherwise    PAST MEDICAL & SURGICAL HISTORY  Muscle weakness (generalized)  Atrial fibrillation, unspecified type  Oxygen dependent  Essential hypertension  Cerebrovascular accident (CVA) due to bilateral embolism of posterior cerebral arteries  Systolic congestive heart failure, unspecified chronicity  AICD (automatic cardioverter/defibrillator) present    SOCIAL HISTORY:  Negative for smoking/alcohol/drug use.     ALLERGIES:  cephalexin (Flushing)  penicillin (Rash)    MEDICATIONS:  STANDING MEDICATIONS  aspirin  chewable 81 milliGRAM(s) Oral daily  carvedilol 3.125 milliGRAM(s) Oral every 12 hours  clindamycin IVPB 900 milliGRAM(s) IV Intermittent every 8 hours  digoxin     Tablet 0.125 milliGRAM(s) Oral daily  enoxaparin Injectable 60 milliGRAM(s) SubCutaneous two times a day  furosemide    Tablet 40 milliGRAM(s) Oral daily  ivabradine 5 milliGRAM(s) Oral two times a day  lactobacillus acidophilus 1 Tablet(s) Oral every 8 hours  levoFLOXacin IVPB 750 milliGRAM(s) IV Intermittent every 24 hours  sacubitril 24 mG/valsartan 26 mG 1 Tablet(s) Oral two times a day  spironolactone 50 milliGRAM(s) Oral two times a day    PRN MEDICATIONS  acetaminophen   Tablet. 650 milliGRAM(s) Oral every 6 hours PRN    VITALS:   T(F): 97.6  HR: 89  BP: 123/59  RR: 17  SpO2: 97%    LABS:                        8.5    4.24  )-----------( 338      ( 24 Aug 2018 07:31 )             27.1     08-24    138  |  100  |  14  ----------------------------<  90  4.6   |  30  |  0.6<L>    Ca    8.2<L>      24 Aug 2018 07:31  Mg     2.0     08-24    TPro  5.4<L>  /  Alb  2.3<L>  /  TBili  0.5  /  DBili  x   /  AST  34  /  ALT  33  /  AlkPhos  148<H>  08-24    PT/INR - ( 24 Aug 2018 07:31 )   PT: 15.70 sec;   INR: 1.46 ratio         PTT - ( 24 Aug 2018 07:31 )  PTT:44.8 sec              RADIOLOGY:    EXAM:  XR CHEST PORTABLE URGENT 1V            PROCEDURE DATE:  08/23/2018        IMPRESSION:      Unchanged right hydropneumothorax and adjacent opacity.     Stable left opacity/effusion.          PHYSICAL EXAM:  GEN: No acute distress  LUNGS: Clear to auscultation bilaterally   CHEST: chest tube on suction  HEART: S1/S2 present. RRR.   ABD: Soft, non-tender, non-distended. Bowel sounds present  EXT: NC/NC/NE/2+PP/JOYNER/Skin Intact.   NEURO: AAOX3

## 2018-08-24 NOTE — PROGRESS NOTE ADULT - ASSESSMENT
IMPRESSION:    Empyema sp pig tail  Pneumonia   Chronic hypoxemic respiratory failure on home O2  HF REF  HO AFIB not on AC  VTE   Trapped lung ?    PLAN:    CNS: No depressants    HEENT: Oral care    PULMONARY:  HOB @ 45 degrees. Chest tube per CTS. Continue abx regimen, which can  be changed to oral as an outpatient. Will likely need valvle and outpatient follow up for chest tube management. No significant effusion at this time for repeat tPA.    CARDIOVASCULAR: Continue maximizing  cardiac therapy.  Avoid volume overload.  Lasix Q 24 hours     GI: GI prophylaxis.  Feeding     RENAL:  Follow up lytes.  Correct as needed    INFECTIOUS DISEASE: Follow up cultures.  clindamycin, levaquin would suffice given culture results    HEMATOLOGICAL:  DVT TX. now on lovenox will change to eliquis upon discharge    ENDOCRINE:  Follow up FS.    MUSCULOSKELETAL:    DW house staff IMPRESSION:    Empyema sp pig tail  Pneumonia treated  Chronic hypoxemic respiratory failure on home O2  HF REF  HO AFIB not on AC  VTE   Trapped lung ?    PLAN:    CNS: No depressants    HEENT: Oral care    PULMONARY:  HOB @ 45 degrees. Chest tube per CTS. Continue abx regimen, which can  be changed to oral as an outpatient.     CARDIOVASCULAR: Continue maximizing  cardiac therapy.  Avoid volume overload.  Lasix Q 24 hours     GI: GI prophylaxis.  Feeding     RENAL:  Follow up lytes.  Correct as needed    INFECTIOUS DISEASE: Follow up cultures.  Continue Clindamycin and Levaquin     HEMATOLOGICAL:  DVT TX. now on lovenox will change to eliquis upon discharge    ENDOCRINE:  Follow up FS.    MUSCULOSKELETAL:    DW house staff

## 2018-08-25 LAB
BASOPHILS # BLD AUTO: 0.01 K/UL — SIGNIFICANT CHANGE UP (ref 0–0.2)
BASOPHILS NFR BLD AUTO: 0.2 % — SIGNIFICANT CHANGE UP (ref 0–1)
EOSINOPHIL # BLD AUTO: 0.06 K/UL — SIGNIFICANT CHANGE UP (ref 0–0.7)
EOSINOPHIL NFR BLD AUTO: 1.5 % — SIGNIFICANT CHANGE UP (ref 0–8)
HCT VFR BLD CALC: 28.2 % — LOW (ref 42–52)
HGB BLD-MCNC: 8.8 G/DL — LOW (ref 14–18)
IMM GRANULOCYTES NFR BLD AUTO: 0.5 % — HIGH (ref 0.1–0.3)
INR BLD: 1.51 RATIO — HIGH (ref 0.65–1.3)
LYMPHOCYTES # BLD AUTO: 0.78 K/UL — LOW (ref 1.2–3.4)
LYMPHOCYTES # BLD AUTO: 19.4 % — LOW (ref 20.5–51.1)
MCHC RBC-ENTMCNC: 30.3 PG — SIGNIFICANT CHANGE UP (ref 27–31)
MCHC RBC-ENTMCNC: 31.2 G/DL — LOW (ref 32–37)
MCV RBC AUTO: 97.2 FL — HIGH (ref 80–94)
MONOCYTES # BLD AUTO: 0.22 K/UL — SIGNIFICANT CHANGE UP (ref 0.1–0.6)
MONOCYTES NFR BLD AUTO: 5.5 % — SIGNIFICANT CHANGE UP (ref 1.7–9.3)
NEUTROPHILS # BLD AUTO: 2.93 K/UL — SIGNIFICANT CHANGE UP (ref 1.4–6.5)
NEUTROPHILS NFR BLD AUTO: 72.9 % — SIGNIFICANT CHANGE UP (ref 42.2–75.2)
NRBC # BLD: 0 /100 WBCS — SIGNIFICANT CHANGE UP (ref 0–0)
PLATELET # BLD AUTO: 343 K/UL — SIGNIFICANT CHANGE UP (ref 130–400)
PROTHROM AB SERPL-ACNC: 16.2 SEC — HIGH (ref 9.95–12.87)
RBC # BLD: 2.9 M/UL — LOW (ref 4.7–6.1)
RBC # FLD: 16.9 % — HIGH (ref 11.5–14.5)
WBC # BLD: 4.02 K/UL — LOW (ref 4.8–10.8)
WBC # FLD AUTO: 4.02 K/UL — LOW (ref 4.8–10.8)

## 2018-08-25 RX ADMIN — Medication 100 MILLIGRAM(S): at 13:38

## 2018-08-25 RX ADMIN — SACUBITRIL AND VALSARTAN 1 TABLET(S): 24; 26 TABLET, FILM COATED ORAL at 17:23

## 2018-08-25 RX ADMIN — Medication 100 MILLIGRAM(S): at 21:21

## 2018-08-25 RX ADMIN — Medication 1 TABLET(S): at 13:38

## 2018-08-25 RX ADMIN — Medication 1 TABLET(S): at 21:21

## 2018-08-25 RX ADMIN — SPIRONOLACTONE 50 MILLIGRAM(S): 25 TABLET, FILM COATED ORAL at 05:47

## 2018-08-25 RX ADMIN — IVABRADINE 5 MILLIGRAM(S): 7.5 TABLET, FILM COATED ORAL at 06:19

## 2018-08-25 RX ADMIN — Medication 100 MILLIGRAM(S): at 05:46

## 2018-08-25 RX ADMIN — CARVEDILOL PHOSPHATE 3.12 MILLIGRAM(S): 80 CAPSULE, EXTENDED RELEASE ORAL at 17:23

## 2018-08-25 RX ADMIN — CARVEDILOL PHOSPHATE 3.12 MILLIGRAM(S): 80 CAPSULE, EXTENDED RELEASE ORAL at 05:47

## 2018-08-25 RX ADMIN — ENOXAPARIN SODIUM 60 MILLIGRAM(S): 100 INJECTION SUBCUTANEOUS at 17:24

## 2018-08-25 RX ADMIN — Medication 40 MILLIGRAM(S): at 05:47

## 2018-08-25 RX ADMIN — Medication 0.12 MILLIGRAM(S): at 05:47

## 2018-08-25 RX ADMIN — SACUBITRIL AND VALSARTAN 1 TABLET(S): 24; 26 TABLET, FILM COATED ORAL at 05:47

## 2018-08-25 RX ADMIN — Medication 81 MILLIGRAM(S): at 11:26

## 2018-08-25 RX ADMIN — Medication 1 TABLET(S): at 05:47

## 2018-08-25 RX ADMIN — IVABRADINE 5 MILLIGRAM(S): 7.5 TABLET, FILM COATED ORAL at 17:23

## 2018-08-25 RX ADMIN — ENOXAPARIN SODIUM 60 MILLIGRAM(S): 100 INJECTION SUBCUTANEOUS at 05:48

## 2018-08-25 RX ADMIN — SPIRONOLACTONE 50 MILLIGRAM(S): 25 TABLET, FILM COATED ORAL at 17:23

## 2018-08-25 NOTE — PROGRESS NOTE ADULT - SUBJECTIVE AND OBJECTIVE BOX
GENERAL SURGERY PROGRESS NOTE    Patient: SAMANTHA CHARLES , 68y ([birth year])Male   MRN: [id number]  Location: [address]  Visit: 08-17-18 Inpatient  Date: 08-25-18 @ 08:44        Procedure/Dx/Injuries: s/p right pigtail placement for loculated pleural effusion, empyema    Events of past 24 hours: No acute events overnight. Pt denies CP, SOB, n/v, fever/chills, other pain/discomfort/complaints. Pt states he's feeling well and admits to ambulating w/o difficulty. Pt resting comfortably in bed.     PAST MEDICAL & SURGICAL HISTORY:  Muscle weakness (generalized)  Atrial fibrillation, unspecified type  Oxygen dependent  Essential hypertension  Cerebrovascular accident (CVA) due to bilateral embolism of posterior cerebral arteries  Systolic congestive heart failure, unspecified chronicity  AICD (automatic cardioverter/defibrillator) present      Vitals: T(F): 98.6 (08-25-18 @ 05:20), Max: 98.6 (08-25-18 @ 05:20)  HR: 83 (08-25-18 @ 05:20)  BP: 107/59 (08-25-18 @ 05:20)  RR: 20 (08-25-18 @ 05:20)  SpO2: 99% (08-25-18 @ 05:20)      Pain (0-10):            Pain Control Adequate: [] YES [] N    Diet, DASH/TLC:   Sodium & Cholesterol Restricted      Fluids:     I & O's:    08-24-18 @ 07:01  -  08-25-18 @ 07:00  --------------------------------------------------------  IN:    Oral Fluid: 240 mL  Total IN: 240 mL    OUT:    Voided: 200 mL  Total OUT: 200 mL    Total NET: 40 mL        Bowel Movement: : [] YES [] NO  Flatus: : [] YES [] NO    PHYSICAL EXAM  GEN: NAD  CV: RRR, no murmurs/rubs  LUNGS: CTAB, nonlabored, no wheeze/rales/ronchi, pigtail in place w/o airleak  ABD: soft, nt, nd, +BS  EXT: FROM, no limitations  WOUND/INCISION: c/d/i    MEDICATIONS  (STANDING):  aspirin  chewable 81 milliGRAM(s) Oral daily  carvedilol 3.125 milliGRAM(s) Oral every 12 hours  clindamycin IVPB 900 milliGRAM(s) IV Intermittent every 8 hours  digoxin     Tablet 0.125 milliGRAM(s) Oral daily  enoxaparin Injectable 60 milliGRAM(s) SubCutaneous two times a day  furosemide    Tablet 40 milliGRAM(s) Oral daily  ivabradine 5 milliGRAM(s) Oral two times a day  lactobacillus acidophilus 1 Tablet(s) Oral every 8 hours  levoFLOXacin IVPB 750 milliGRAM(s) IV Intermittent every 24 hours  sacubitril 24 mG/valsartan 26 mG 1 Tablet(s) Oral two times a day  spironolactone 50 milliGRAM(s) Oral two times a day    MEDICATIONS  (PRN):  acetaminophen   Tablet. 650 milliGRAM(s) Oral every 6 hours PRN Mild Pain (1 - 3)      DVT PROPHYLAXIS: [] YES [] NO   GI PROPHYLAXIS: [] YES [] NO   ANTICOAGULATION: [] YES [] NO   ANTIBIOTICS: [] YES [] NO clindamycin IVPB 900 milliGRAM(s)  levoFLOXacin IVPB 750 milliGRAM(s)        LAB/STUDIES:  CAPILLARY BLOOD GLUCOSE                              8.5    4.24  )-----------( 338      ( 24 Aug 2018 07:31 )             27.1     08-24    138  |  100  |  14  ----------------------------<  90  4.6   |  30  |  0.6<L>    Ca    8.2<L>      24 Aug 2018 07:31  Mg     2.0     08-24    TPro  5.4<L>  /  Alb  2.3<L>  /  TBili  0.5  /  DBili  x   /  AST  34  /  ALT  33  /  AlkPhos  148<H>  08-24               5.4  | 0.5  | 34       ------------------[148     ( 24 Aug 2018 07:31 )  2.3  | x    | 33          Lipase:x      Amylase:x        PT/INR - ( 24 Aug 2018 07:31 )   PT: 15.70 sec;   INR: 1.46 ratio         PTT - ( 24 Aug 2018 07:31 )  PTT:44.8 sec            IMAGING:

## 2018-08-25 NOTE — PROGRESS NOTE ADULT - SUBJECTIVE AND OBJECTIVE BOX
SAMANTHA CHARLES  68y  Male  ***My note supercedes ALL resident notes that I sign.  My corrections for their notes are in my note.***    I can be reached directly on [employer]. My office number is [phone]. My personal cell number is [phone].    INTERVAL EVENTS: Here for f/u of empyema. Pt doing great. No issues. Chest tube to water seal now. Zero output over 36+ hrs. No cough. Pt can walk.    T(F): 98.6 (08-25-18 @ 05:20), Max: 98.6 (08-25-18 @ 05:20)  HR: 83 (08-25-18 @ 05:20) (77 - 87)  BP: 107/59 (08-25-18 @ 05:20) (97/60 - 107/59)  RR: 20 (08-25-18 @ 05:20) (18 - 20)  SpO2: 99% (08-25-18 @ 05:20) (92% - 99%)    General: In NAD   HEENT: PERRL; EOMI; + NC O2; mouth clr  Neck no JVD, no nodes        Lungs: Bilateral BS  Chest: + right CT to pleurovac - no output in 36+ hrs  Cardiovascular: Regular s1 s2 no murmur  Gastrointestinal: Soft, Positive BS, NT/ND, no masses  Extremities: No clubbing.  no cyanosis; no edema  Neurological: No motor or sensory deficit     LABS:                        8.5     (    95.8   4.24  )-----------( ---------      338      ( 24 Aug 2018 07:31 )             27.1    (    16.2       PT/INR - ( 24 Aug 2018 07:31 )   PT: 15.70 sec;   INR: 1.46 ratio    PTT - ( 24 Aug 2018 07:31 )  PTT:44.8 sec    RADIOLOGY & ADDITIONAL TESTS:      MEDICATIONS:  clindamycin IVPB 900 milliGRAM(s) IV Intermittent every 8 hours  levoFLOXacin IVPB 750 milliGRAM(s) IV Intermittent every 24 hours    acetaminophen   Tablet. 650 milliGRAM(s) Oral every 6 hours PRN  aspirin  chewable 81 milliGRAM(s) Oral daily  carvedilol 3.125 milliGRAM(s) Oral every 12 hours  digoxin     Tablet 0.125 milliGRAM(s) Oral daily  enoxaparin Injectable 60 milliGRAM(s) SubCutaneous two times a day  furosemide    Tablet 40 milliGRAM(s) Oral daily  ivabradine 5 milliGRAM(s) Oral two times a day  lactobacillus acidophilus 1 Tablet(s) Oral every 8 hours  sacubitril 24 mG/valsartan 26 mG 1 Tablet(s) Oral two times a day  spironolactone 50 milliGRAM(s) Oral two times a day

## 2018-08-25 NOTE — PROGRESS NOTE ADULT - SUBJECTIVE AND OBJECTIVE BOX
SUBJECTIVE:    Patient is a 68y old Male who presents with a chief complaint of SOB (17 Aug 2018 17:22)    Currently admitted to medicine with the primary diagnosis of Empyema lung     Today is hospital day 8d. This morning he is resting comfortably in bed and reports no new issues or overnight events.     PAST MEDICAL & SURGICAL HISTORY  Muscle weakness (generalized)  Atrial fibrillation, unspecified type  Oxygen dependent  Essential hypertension  Cerebrovascular accident (CVA) due to bilateral embolism of posterior cerebral arteries  Systolic congestive heart failure, unspecified chronicity  AICD (automatic cardioverter/defibrillator) present    SOCIAL HISTORY:  Negative for smoking/alcohol/drug use.     ALLERGIES:  cephalexin ([place])  penicillin (Rash)    MEDICATIONS:  STANDING MEDICATIONS  aspirin  chewable 81 milliGRAM(s) Oral daily  carvedilol 3.125 milliGRAM(s) Oral every 12 hours  clindamycin IVPB 900 milliGRAM(s) IV Intermittent every 8 hours  digoxin     Tablet 0.125 milliGRAM(s) Oral daily  enoxaparin Injectable 60 milliGRAM(s) SubCutaneous two times a day  furosemide    Tablet 40 milliGRAM(s) Oral daily  ivabradine 5 milliGRAM(s) Oral two times a day  lactobacillus acidophilus 1 Tablet(s) Oral every 8 hours  levoFLOXacin IVPB 750 milliGRAM(s) IV Intermittent every 24 hours  sacubitril 24 mG/valsartan 26 mG 1 Tablet(s) Oral two times a day  spironolactone 50 milliGRAM(s) Oral two times a day    PRN MEDICATIONS  acetaminophen   Tablet. 650 milliGRAM(s) Oral every 6 hours PRN    VITALS:   T(F): 98.6  HR: 83  BP: 107/59  RR: 20  SpO2: 99%    LABS:                        8.5    4.24  )-----------( 338      ( 24 Aug 2018 07:31 )             27.1     08-24    138  |  100  |  14  ----------------------------<  90  4.6   |  30  |  0.6<L>    Ca    8.2<L>      24 Aug 2018 07:31  Mg     2.0     08-24    TPro  5.4<L>  /  Alb  2.3<L>  /  TBili  0.5  /  DBili  x   /  AST  34  /  ALT  33  /  AlkPhos  148<H>  08-24    PT/INR - ( 24 Aug 2018 07:31 )   PT: 15.70 sec;   INR: 1.46 ratio         PTT - ( 24 Aug 2018 07:31 )  PTT:44.8 sec              RADIOLOGY:    PHYSICAL EXAM:  GEN: No acute distress  LUNGS: Clear to auscultation bilaterally   HEART: S1/S2 present. RRR.   ABD: Soft, non-tender, non-distended. Bowel sounds present  EXT: NC/NC/NE/2+PP/JOYNER/Skin Intact.   NEURO: AAOX3

## 2018-08-25 NOTE — PROGRESS NOTE ADULT - ASSESSMENT
69 yo M w/ hx of Afib not on AC, CHF (EF 23%) w/ AICD, CVA admitted with R loculated pleural effusion s/p R chest tube insertion.     #R loculated pleural effusion w/ possible underlying PNA, r/o empyema  - s/p thoracentesis and R pigtail insertion  - pigtail to low wall suctioning - still draining from chest tube  - Continue IV Levaquin 750mg qd   - Continue IV Clinda 900mg q8  - Continue Chest tube suction   - f/u with pulm; will reassess patient on Monday  - daily AM CXR    - doing well off oxygen   - Pleural Fluid 8/17: LDH 2900, pH 7.3, prt 3 } serum prt 5.3; EXUDATIVE   - Pleural Culture 8/17: Strep agalactiae, moderate neutrophils   - nasal MRSA +ve,     #R PE on CT Angio  - c/w lovenox 60mg q12h  - will switch to Eliquis 10mgBID x7 days, then 5mg BID once chest tube is out  - Duplex 8/17: Extensive R DVT, L calf DVT       #Decompensated CHF  - continue PO lasix 40 qd  - ECHO 8/18: EF <20%.  - c/w Entresto, Corlanor, Aldactone, Coreg  - c/w digoxin (lv <0.4)    # normo to macro anemia  - B12 and Fol and TSH all WNL    #Hx of CVA   - c/w ASA    #Afib not on AC   - c/w BB for rate control    # low albumin prob suppressed from acute dz  f/u Urine TP:Cr ratio to r/o proteinuria    #MISC:   - DVT ppx - Lovenox  - Diet - DASH/TLC  - full code  - Dispo -either home or go back to NH;     Will discharge on 450mg po q8 and total tx is 4-6 weeks (8/18 (day after CT placed) to 9/18, to start, and then re-assess as outpt w/ updated CT Chest)    Will d/c home on pneumostat, place before day before discharge CALL THORACIC SURGERY ([facility]) TO UPDATE WHEN DISCHARGING PATIENT.   Will need VNS for monitoring of pneumostat 67 yo M w/ hx of Afib not on AC, CHF (EF 23%) w/ AICD, CVA admitted with R loculated pleural effusion s/p R chest tube insertion.     #R loculated pleural effusion w/ possible underlying PNA, r/o empyema  - s/p thoracentesis and R pigtail insertion  - pigtail to low wall suctioning - still draining from chest tube  - Continue IV Levaquin 750mg qd   - Continue IV Clinda 900mg q8  - Continue Chest tube suction   - f/u with pulm; will reassess patient on Monday  - daily AM CXR    - doing well off oxygen   - Pleural Fluid 8/17: LDH 2900, pH 7.3, prt 3 } serum prt 5.3; EXUDATIVE   - Pleural Culture 8/17: Strep agalactiae, moderate neutrophils   - nasal MRSA +ve,     #R PE on CT Angio  - c/w lovenox 60mg q12h  - will switch to Eliquis 10mgBID x7 days, then 5mg BID once chest tube is out  - Duplex 8/17: Extensive R DVT, L calf DVT       #Decompensated CHF  - continue PO lasix 40 qd  - ECHO 8/18: EF <20%.  - c/w Entresto, Corlanor, Aldactone, Coreg  - c/w digoxin (lv <0.4)    # normo to macro anemia  - B12 and Fol and TSH all WNL    #Hx of CVA   - c/w ASA    #Afib not on AC   - c/w BB for rate control    # low albumin prob suppressed from acute dz  f/u Urine TP:Cr ratio to r/o proteinuria    #MISC:   - DVT ppx - Lovenox  - Diet - DASH/TLC  - full code  - Dispo -either home or go back to NH;     Will discharge on 450mg po q8 and total tx is 4-6 weeks (8/18 (day after CT placed) to 9/18, to start, and then re-assess as outpt w/ updated CT Chest)    Will d/c home on pneumostat, place before day before discharge CALL THORACIC SURGERY ([facility]) TO UPDATE WHEN DISCHARGING PATIENT.   Will need VNS for monitoring of pneumostat    Dont need daily labs on this pt

## 2018-08-25 NOTE — PROGRESS NOTE ADULT - ASSESSMENT
# daily labs are NOT needed in this pt    # empyema - Grp B strep / r pl eff / trapped or non-expanded lung  abx will be clinda 900 mg iv q8 for now, then 450mg po q8 on d/c and total tx is 4-6 weeks (8/18 (day after CT placed) to 9/18, to start, and then re-assess as outpt w/ updated CT Chest)  added levaquin 750mg iv q24 (per pulm), will change to po upon d/c and tx w/ clinda as above  give lactobacillus 1 cap po q8 w/ clinda for this long therapy  drainage from CT is zero now;  pulm will likely send pt home w/ CT and one-way valve; will need home care  lung is still not fully expanded on recent CXR  off O2  f/u pulm rec and CT sx - need d/c planning w/ them    # neutropenia - better of jose    # acute PE; acute, extensive DVT as above (right leg prox) and small calf DVT on left  pt on LMWH for now; will go to [facility] upon d/c    # chr sys CHF / mod AI / Mod [place] and TR / AICD / AFib  cont current meds; seems stable    # normo to macro anemia  B12 and Fol and TSH all normal  prob chr dz and suppression from current illness  no further w/u    # low albumin prob suppressed from acute dz  no proteinuria    # hx CVA - stable, no residual    # prior decond,   pt can walk fine, so he will likely go home, once ok w/ pulm    Dispo: d/c plans are per pulm team; cont current mngmt as above

## 2018-08-26 RX ADMIN — ENOXAPARIN SODIUM 60 MILLIGRAM(S): 100 INJECTION SUBCUTANEOUS at 17:10

## 2018-08-26 RX ADMIN — SACUBITRIL AND VALSARTAN 1 TABLET(S): 24; 26 TABLET, FILM COATED ORAL at 17:10

## 2018-08-26 RX ADMIN — SPIRONOLACTONE 50 MILLIGRAM(S): 25 TABLET, FILM COATED ORAL at 06:01

## 2018-08-26 RX ADMIN — SPIRONOLACTONE 50 MILLIGRAM(S): 25 TABLET, FILM COATED ORAL at 17:10

## 2018-08-26 RX ADMIN — ENOXAPARIN SODIUM 60 MILLIGRAM(S): 100 INJECTION SUBCUTANEOUS at 06:03

## 2018-08-26 RX ADMIN — SACUBITRIL AND VALSARTAN 1 TABLET(S): 24; 26 TABLET, FILM COATED ORAL at 06:01

## 2018-08-26 RX ADMIN — Medication 81 MILLIGRAM(S): at 12:13

## 2018-08-26 RX ADMIN — CARVEDILOL PHOSPHATE 3.12 MILLIGRAM(S): 80 CAPSULE, EXTENDED RELEASE ORAL at 17:10

## 2018-08-26 RX ADMIN — Medication 40 MILLIGRAM(S): at 06:00

## 2018-08-26 RX ADMIN — Medication 1 TABLET(S): at 22:27

## 2018-08-26 RX ADMIN — Medication 1 TABLET(S): at 14:35

## 2018-08-26 RX ADMIN — CARVEDILOL PHOSPHATE 3.12 MILLIGRAM(S): 80 CAPSULE, EXTENDED RELEASE ORAL at 06:01

## 2018-08-26 RX ADMIN — IVABRADINE 5 MILLIGRAM(S): 7.5 TABLET, FILM COATED ORAL at 06:01

## 2018-08-26 RX ADMIN — Medication 100 MILLIGRAM(S): at 06:01

## 2018-08-26 RX ADMIN — Medication 100 MILLIGRAM(S): at 14:38

## 2018-08-26 RX ADMIN — Medication 0.12 MILLIGRAM(S): at 06:00

## 2018-08-26 RX ADMIN — Medication 1 TABLET(S): at 06:01

## 2018-08-26 RX ADMIN — IVABRADINE 5 MILLIGRAM(S): 7.5 TABLET, FILM COATED ORAL at 17:11

## 2018-08-26 RX ADMIN — Medication 100 MILLIGRAM(S): at 22:27

## 2018-08-26 NOTE — PROGRESS NOTE ADULT - ASSESSMENT
69 y/o M s/p right pigtail placement for loculated pleural effusion, empyema    PLAN:  -  for VNS  - disposition  - when discharging, plan to d/c on pneumostat

## 2018-08-26 NOTE — PROGRESS NOTE ADULT - SUBJECTIVE AND OBJECTIVE BOX
SAMANTHA CHARLES  68y  Male  ***My note supercedes ALL resident notes that I sign.  My corrections for their notes are in my note.***    I can be reached directly on [email or web address]. My office number is [phone]. My personal cell number is [phone].    INTERVAL EVENTS: Here for f/u of empyema. Pt doing great. No Complaints.    T(F): 97.4 (08-26-18 @ 06:40), Max: 98.7 (08-25-18 @ 14:23)  HR: 97 (08-26-18 @ 06:40) (87 - 97)  BP: 127/67 (08-26-18 @ 06:40) (103/60 - 127/67)  RR: 18 (08-26-18 @ 06:40) (18 - 18)  SpO2: --    General: In NAD   HEENT: PERRL; EOMI; + NC O2; mouth clr  Neck no JVD, no nodes        Lungs: Bilateral BS  Chest: + right CT to pleurovac and suction - output 5 cc overnight  Cardiovascular: Regular s1 s2 no murmur  Gastrointestinal: Soft, Positive BS, NT/ND, no masses  Extremities: No clubbing.  no cyanosis; no edema  Neurological: No motor or sensory deficit     LABS:                        8.8     (    97.2   4.02  )-----------( ---------      343      ( 25 Aug 2018 08:31 )             28.2    (    16.9     PT/INR - ( 25 Aug 2018 08:31 )   PT: 16.20 sec;   INR: 1.51 ratio      RADIOLOGY & ADDITIONAL TESTS:      MEDICATIONS:  clindamycin IVPB 900 milliGRAM(s) IV Intermittent every 8 hours  levoFLOXacin IVPB 750 milliGRAM(s) IV Intermittent every 24 hours    acetaminophen   Tablet. 650 milliGRAM(s) Oral every 6 hours PRN  aspirin  chewable 81 milliGRAM(s) Oral daily  carvedilol 3.125 milliGRAM(s) Oral every 12 hours  digoxin     Tablet 0.125 milliGRAM(s) Oral daily  enoxaparin Injectable 60 milliGRAM(s) SubCutaneous two times a day  furosemide    Tablet 40 milliGRAM(s) Oral daily  ivabradine 5 milliGRAM(s) Oral two times a day  lactobacillus acidophilus 1 Tablet(s) Oral every 8 hours  sacubitril 24 mG/valsartan 26 mG 1 Tablet(s) Oral two times a day  spironolactone 50 milliGRAM(s) Oral two times a day

## 2018-08-26 NOTE — PROGRESS NOTE ADULT - SUBJECTIVE AND OBJECTIVE BOX
GENERAL SURGERY PROGRESS NOTE    Patient: SAMANTHA CHARLES , 68y ([birth year])Male   MRN: [id number]  Location: [address]  Visit: 08-17-18 Inpatient  Date: 08-26-18 @ 01:36    Procedure/Dx/Injuries: s/p right pigtail placement for loculated pleural effusion, empyema    Events of past 24 hours: No acute events overnight. Pt denies CP, SOB, n/v, fever/chills, other pain/discomfort/complaints. Pt states he's feeling well and says he's ambulating w/o difficulty. Pt resting comfortably in bed. Pt's CT is to suction.      PAST MEDICAL & SURGICAL HISTORY:  Muscle weakness (generalized)  Atrial fibrillation, unspecified type  Oxygen dependent  Essential hypertension  Cerebrovascular accident (CVA) due to bilateral embolism of posterior cerebral arteries  Systolic congestive heart failure, unspecified chronicity  AICD (automatic cardioverter/defibrillator) present      Vitals: T(F): 98.6 (08-25-18 @ 21:15), Max: 98.7 (08-25-18 @ 14:23)  HR: 88 (08-25-18 @ 21:15)  BP: 103/60 (08-25-18 @ 21:15)  RR: 18 (08-25-18 @ 14:23)  SpO2: 99% (08-25-18 @ 05:20)      Pain (0-10):            Pain Control Adequate: [] YES [] N    Diet, DASH/TLC:   Sodium & Cholesterol Restricted      Fluids:     I & O's:    08-24-18 @ 07:01  -  08-25-18 @ 07:00  --------------------------------------------------------  IN:    Oral Fluid: 240 mL  Total IN: 240 mL    OUT:    Voided: 200 mL  Total OUT: 200 mL    Total NET: 40 mL        Bowel Movement: : [] YES [] NO  Flatus: : [] YES [] NO    PHYSICAL EXAM  GEN: NAD  CV: RRR, no murmurs/rubs  LUNGS: CTAB, nonlabored, no wheeze/rales/ronchi, pigtail in place w/o airleak  ABD: soft, nt, nd, +BS  EXT: FROM, no limitations  WOUND/INCISION: c/d/i      MEDICATIONS  (STANDING):  aspirin  chewable 81 milliGRAM(s) Oral daily  carvedilol 3.125 milliGRAM(s) Oral every 12 hours  clindamycin IVPB 900 milliGRAM(s) IV Intermittent every 8 hours  digoxin     Tablet 0.125 milliGRAM(s) Oral daily  enoxaparin Injectable 60 milliGRAM(s) SubCutaneous two times a day  furosemide    Tablet 40 milliGRAM(s) Oral daily  ivabradine 5 milliGRAM(s) Oral two times a day  lactobacillus acidophilus 1 Tablet(s) Oral every 8 hours  levoFLOXacin IVPB 750 milliGRAM(s) IV Intermittent every 24 hours  sacubitril 24 mG/valsartan 26 mG 1 Tablet(s) Oral two times a day  spironolactone 50 milliGRAM(s) Oral two times a day    MEDICATIONS  (PRN):  acetaminophen   Tablet. 650 milliGRAM(s) Oral every 6 hours PRN Mild Pain (1 - 3)      DVT PROPHYLAXIS: [] YES [] NO   GI PROPHYLAXIS: [] YES [] NO   ANTICOAGULATION: [] YES [] NO   ANTIBIOTICS: [] YES [] NO clindamycin IVPB 900 milliGRAM(s)  levoFLOXacin IVPB 750 milliGRAM(s)        LAB/STUDIES:  CAPILLARY BLOOD GLUCOSE                              8.8    4.02  )-----------( 343      ( 25 Aug 2018 08:31 )             28.2     08-24    138  |  100  |  14  ----------------------------<  90  4.6   |  30  |  0.6<L>    Ca    8.2<L>      24 Aug 2018 07:31  Mg     2.0     08-24    TPro  5.4<L>  /  Alb  2.3<L>  /  TBili  0.5  /  DBili  x   /  AST  34  /  ALT  33  /  AlkPhos  148<H>  08-24               5.4  | 0.5  | 34       ------------------[148     ( 24 Aug 2018 07:31 )  2.3  | x    | 33          Lipase:x      Amylase:x        PT/INR - ( 25 Aug 2018 08:31 )   PT: 16.20 sec;   INR: 1.51 ratio         PTT - ( 24 Aug 2018 07:31 )  PTT:44.8 sec            IMAGING:

## 2018-08-26 NOTE — PROGRESS NOTE ADULT - ASSESSMENT
# daily labs are NOT needed in this pt    # empyema - Grp B strep / r pl eff / trapped or non-expanded lung  abx will be clinda 900 mg iv q8 for now, then 450mg po q8 on d/c and total tx is 4-6 weeks (8/18 (day after CT placed) to 9/18, to start, and then re-assess as outpt w/ updated CT Chest)  added levaquin 750mg iv q24 (per pulm), will change to po upon d/c and tx w/ clinda as above  give lactobacillus 1 cap po q8 w/ clinda for this long therapy  drainage from CT is very low;  pulm will likely send pt home w/ CT and one-way valve; will need home care  lung is still not fully expanded on recent CXR - cxr in am todd  off O2  f/u pulm rec and CT sx - need d/c planning w/ them    # neutropenia - stable    # acute PE; acute, extensive DVT as above (right leg prox) and small calf DVT on left  pt on LMWH for now; will go to [facility] upon d/c    # chr sys CHF / mod AI / Mod [place] and TR / AICD / AFib  cont current meds; seems stable    # normo to macro anemia  B12 and Fol and TSH all normal  prob chr dz and suppression from current illness  no further w/u    # low albumin prob suppressed from acute dz  no proteinuria    # hx CVA - stable, no residual    # prior decond,   pt can walk fine, so he will likely go home, once ok w/ pulm    Dispo: d/c plans are per pulm team; cont current mngmt as above (? d/c todd)

## 2018-08-27 ENCOUNTER — TRANSCRIPTION ENCOUNTER (OUTPATIENT)
Age: 68
End: 2018-08-27

## 2018-08-27 VITALS
TEMPERATURE: 96 F | DIASTOLIC BLOOD PRESSURE: 75 MMHG | RESPIRATION RATE: 18 BRPM | SYSTOLIC BLOOD PRESSURE: 100 MMHG | HEART RATE: 85 BPM

## 2018-08-27 RX ORDER — SACUBITRIL AND VALSARTAN 24; 26 MG/1; MG/1
1 TABLET, FILM COATED ORAL
Qty: 0 | Refills: 0 | COMMUNITY

## 2018-08-27 RX ORDER — DIGOXIN 250 MCG
1 TABLET ORAL
Qty: 60 | Refills: 0
Start: 2018-08-27 | End: 2018-10-25

## 2018-08-27 RX ORDER — ASPIRIN/CALCIUM CARB/MAGNESIUM 324 MG
1 TABLET ORAL
Qty: 0 | Refills: 0 | COMMUNITY

## 2018-08-27 RX ORDER — SPIRONOLACTONE 25 MG/1
1 TABLET, FILM COATED ORAL
Qty: 0 | Refills: 0 | COMMUNITY

## 2018-08-27 RX ORDER — CIPROFLOXACIN LACTATE 400MG/40ML
1 VIAL (ML) INTRAVENOUS
Qty: 30 | Refills: 0
Start: 2018-08-27 | End: 2018-09-25

## 2018-08-27 RX ORDER — DIGOXIN 250 MCG
1 TABLET ORAL
Qty: 0 | Refills: 0 | COMMUNITY

## 2018-08-27 RX ORDER — APIXABAN 2.5 MG/1
2 TABLET, FILM COATED ORAL
Qty: 28 | Refills: 0 | OUTPATIENT
Start: 2018-08-27 | End: 2018-09-02

## 2018-08-27 RX ORDER — CARVEDILOL PHOSPHATE 80 MG/1
1 CAPSULE, EXTENDED RELEASE ORAL
Qty: 0 | Refills: 0 | COMMUNITY

## 2018-08-27 RX ORDER — SACUBITRIL AND VALSARTAN 24; 26 MG/1; MG/1
1 TABLET, FILM COATED ORAL
Qty: 120 | Refills: 0 | OUTPATIENT
Start: 2018-08-27 | End: 2018-10-25

## 2018-08-27 RX ORDER — ACETAMINOPHEN 500 MG
2 TABLET ORAL
Qty: 160 | Refills: 0
Start: 2018-08-27 | End: 2018-09-15

## 2018-08-27 RX ORDER — CARVEDILOL PHOSPHATE 80 MG/1
1 CAPSULE, EXTENDED RELEASE ORAL
Qty: 120 | Refills: 0
Start: 2018-08-27 | End: 2018-10-25

## 2018-08-27 RX ORDER — FUROSEMIDE 40 MG
1 TABLET ORAL
Qty: 0 | Refills: 0 | COMMUNITY

## 2018-08-27 RX ORDER — FUROSEMIDE 40 MG
1 TABLET ORAL
Qty: 60 | Refills: 0
Start: 2018-08-27 | End: 2018-10-25

## 2018-08-27 RX ORDER — ACETAMINOPHEN 500 MG
2 TABLET ORAL
Qty: 0 | Refills: 0 | COMMUNITY

## 2018-08-27 RX ORDER — SPIRONOLACTONE 25 MG/1
1 TABLET, FILM COATED ORAL
Qty: 120 | Refills: 0
Start: 2018-08-27 | End: 2018-10-25

## 2018-08-27 RX ORDER — ASPIRIN/CALCIUM CARB/MAGNESIUM 324 MG
1 TABLET ORAL
Qty: 60 | Refills: 0
Start: 2018-08-27 | End: 2018-10-25

## 2018-08-27 RX ORDER — LACTOBACILLUS ACIDOPHILUS 100MM CELL
1 CAPSULE ORAL
Qty: 90 | Refills: 0
Start: 2018-08-27 | End: 2018-09-25

## 2018-08-27 RX ADMIN — SACUBITRIL AND VALSARTAN 1 TABLET(S): 24; 26 TABLET, FILM COATED ORAL at 06:03

## 2018-08-27 RX ADMIN — SPIRONOLACTONE 50 MILLIGRAM(S): 25 TABLET, FILM COATED ORAL at 06:03

## 2018-08-27 RX ADMIN — Medication 81 MILLIGRAM(S): at 14:36

## 2018-08-27 RX ADMIN — Medication 40 MILLIGRAM(S): at 06:04

## 2018-08-27 RX ADMIN — CARVEDILOL PHOSPHATE 3.12 MILLIGRAM(S): 80 CAPSULE, EXTENDED RELEASE ORAL at 06:04

## 2018-08-27 RX ADMIN — Medication 0.12 MILLIGRAM(S): at 06:04

## 2018-08-27 RX ADMIN — Medication 1 TABLET(S): at 06:05

## 2018-08-27 RX ADMIN — Medication 100 MILLIGRAM(S): at 14:35

## 2018-08-27 RX ADMIN — Medication 1 TABLET(S): at 14:36

## 2018-08-27 RX ADMIN — ENOXAPARIN SODIUM 60 MILLIGRAM(S): 100 INJECTION SUBCUTANEOUS at 06:03

## 2018-08-27 RX ADMIN — Medication 100 MILLIGRAM(S): at 06:01

## 2018-08-27 RX ADMIN — IVABRADINE 5 MILLIGRAM(S): 7.5 TABLET, FILM COATED ORAL at 06:04

## 2018-08-27 NOTE — PROGRESS NOTE ADULT - ASSESSMENT
# daily labs are NOT needed in this pt    # empyema - Grp B strep / r pl eff / trapped or non-expanded lung  abx will be clinda 450mg po q8 on d/c and total tx is 4-6 weeks (8/18 (day after CT placed) to 9/18, to start, and then re-assess as outpt w/ updated CT Chest)  cont levaquin 750mg po q24 (per pulm), and tx w/ clinda as above  give lactobacillus 1 cap po q8 w/ clinda for this long therapy  CT sx capped pigtail w/ Pneumostat device - pt and wife taught how to charge neg pressure twice a day w/ 60 cc luer-lock syringe  visiting RN set up for him  lung is still not fully expanded - will be followed up as outpt w/ CT Sx and pulm  off O2    # neutropenia - stable    # acute PE; acute, extensive DVT as above (right leg prox) and small calf DVT on left  will go to Eliquis upon d/c - 10mg q12 for 14 doses, then 5mg po q12 for at least 6mo - pulm f/u as outpt for this    # chr sys CHF / mod AI / Mod CT and TR / AICD / AFib  cont current meds; seems very stable    # normo to macro anemia  B12 and Fol and TSH all normal  prob chr dz and suppression from current illness  no further w/u    # low albumin prob suppressed from acute dz  no proteinuria    # hx CVA - stable, no residual    # prior decond,   pt can walk fine, so he will go home    Dispo: d/c home today w/ above plans

## 2018-08-27 NOTE — PROGRESS NOTE ADULT - SUBJECTIVE AND OBJECTIVE BOX
Patient is a 68y old  Male who presents with a chief complaint of SOB.        SUBJECTIVE:  Feels better. Pneumostat placed by CT surgery    REVIEW OF SYSTEMS:  See HPI    PHYSICAL EXAM  Vital Signs Last 24 Hrs  T(C): 36.5 (27 Aug 2018 07:00), Max: 36.8 (26 Aug 2018 20:44)  T(F): 97.7 (27 Aug 2018 07:00), Max: 98.2 (26 Aug 2018 20:44)  HR: 103 (27 Aug 2018 07:00) (86 - 103)  BP: 123/69 (27 Aug 2018 07:00) (107/56 - 123/69)  RR: 18 (27 Aug 2018 07:00) (18 - 18)  SpO2: 100% (27 Aug 2018 09:32) (100% - 100%)    General: In NAD  HEENT: JESSY               Lymphatic system: No Cervical LN    Respiratory: Garo BS  Cardiovascular: Regular  Gastrointestinal: Soft. + BS  Musculoskeletal: No clubbing.  moves all extremities.  Full range of motion    Skin: Warm.  Intact  Neurological: No motor or sensory deficit        LABS:                                                                                                                                                                                                                                    MEDICATIONS  (STANDING):  aspirin  chewable 81 milliGRAM(s) Oral daily  carvedilol 3.125 milliGRAM(s) Oral every 12 hours  clindamycin IVPB 900 milliGRAM(s) IV Intermittent every 8 hours  digoxin     Tablet 0.125 milliGRAM(s) Oral daily  enoxaparin Injectable 60 milliGRAM(s) SubCutaneous two times a day  furosemide    Tablet 40 milliGRAM(s) Oral daily  ivabradine 5 milliGRAM(s) Oral two times a day  lactobacillus acidophilus 1 Tablet(s) Oral every 8 hours  levoFLOXacin IVPB 750 milliGRAM(s) IV Intermittent every 24 hours  sacubitril 24 mG/valsartan 26 mG 1 Tablet(s) Oral two times a day  spironolactone 50 milliGRAM(s) Oral two times a day    MEDICATIONS  (PRN):  acetaminophen   Tablet. 650 milliGRAM(s) Oral every 6 hours PRN Mild Pain (1 - 3)

## 2018-08-27 NOTE — PROGRESS NOTE ADULT - ASSESSMENT
IMPRESSION:    Empyema sp pig tail  Pneumonia treated  Chronic hypoxemic respiratory failure on home O2  HF REF  HO AFIB not on AC  VTE   Trapped lung ?    PLAN:    CNS: No depressants    HEENT: Oral care    PULMONARY:  HOB @ 45 degrees. Chest tube per CTS. Continue abx regimen as outpatient until evidence that empyema resolved. Would provide 2 weeks of antibiotics and then repeat imaging will be done.    CARDIOVASCULAR: Continue maximizing  cardiac therapy.  Avoid volume overload.  Lasix Q 24 hours     GI: GI prophylaxis.  Feeding     RENAL:  Follow up lytes.  Correct as needed    INFECTIOUS DISEASE: Follow up cultures.  Continue Clindamycin and Levaquin     HEMATOLOGICAL:  DVT TX. now on lovenox will change to eliquis upon discharge    ENDOCRINE:  Follow up FS.    MUSCULOSKELETAL:    DW house staff   Follow up as outpatient in 2 weeks.

## 2018-08-27 NOTE — DISCHARGE NOTE ADULT - [id number]_GEN_ALL_CORE
medication teaching and assessment/rehabilitation services/observation and assessment/teaching and training/wound care and assessment

## 2018-08-27 NOTE — DISCHARGE NOTE ADULT - INSTRUCTIONS
Patient: Adrian Sarmiento Date of Service: 2017     : 1962 Attending: Lemuel Vernon MD   54 year old male      HYPERBARIC OXYGEN THERAPY PROCEDURE NOTE    Hyperbaric Treatment Number: 15 - Hyperbaric treatment scheduled once a day.   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   Consult/Update Date: 2017      PROCEDURE:  Pre-Hyperbaric Oxygen Therapy Treatment timeout was completed.    Treatment Profile: 45 fsw X 90 min  Treatment Start Time: 815  Treatment End Time: 1015  Descent Time (min): 10 min  Air break total time: 10 min  Ascent time (min): 10    Hyperbaric oxygen therapy was monitored during entire course of treatment by the supervising physician.    HISTORY:  This is a 54 year old diabetic man who was treated for a cellulitis of his left leg at some point in the past several weeks.  He followed up with Dr. Trinh on 2017 and at that time, there was no evidence of any cellulitis.  It was noted at that time that he had changed to a different type of Insulin at the request of his insurance company.  The patient thinks that type of Insulin did not work very well for him and his blood sugars went up.  The patient did reasonably well until 2 days ago.  He noticed that his left ankle was swollen.  His ankle was red and his third toe on the left foot had this white macerated flakey tissue there.  He went to the emergency room.  He was found to have a chronic neurotrophic ulcer on the foot.  There was no evidence of any infection on their examination.  There was no evidence of a third toe ulcer on their examination.  Now, the patient tells me quite emphatically that no one even looked at his foot, which I find somewhat hard to believe.  Nonetheless, he had called Dr. Trinh yesterday, and Dr. Trinh referred him to me.  I had him come in urgently this afternoon for evaluation.  His maximum temperature has been  in the 99 degree range.  He has not had any fevers or chills other than this.  He has no nausea, vomiting.  His blood sugars have been a little high.    There is felt that the toe is nonsalvageable. It was felt also that he would not be functional with just a toe amputation given the anatomy of his foot and therefore underwent a transmetatarsal amputation. The transmetatarsal amputation was complicated by the presence of a subcutaneous hematoma which stretched and compromised flap. He required a second surgery for debridement and evacuation of the hematoma and removal of the necrotic portions of the flap. Hyperbaric therapy is being done to preserve the reminiscence of this flap and prevent higher amputations which would leave him severely disabled    Risk Assessment at Consult:    Risk assessment was performed at time of consult/comprehensive evaluation on 6/27/2017 Specifically noted risks include:    Diabetic on glucose lowering medications     Interval History:     The patient reports no complaints. He has been discharged from the hospital and is returning now for his outpatient therapy.    Pertinent Reviewed:    Allergies and Medical History    PHYSICAL EXAM:  Daily Risk Assessment:  Finger Stick Glucose:     Glucose (mg/dL)   Date Value   07/11/2017 327       Vital signs:    Temp: 98 °F (36.7 °C)     Pulse: 87     BP: (!) 201/95     Resp: 20    General appearance:  alert, cooperative, oriented  Head:   normocephalic without obvious abnormality and sinuses nontender to percussion  Ears:   left ear TEED  scale 0 and right ear TEED  scale 0  Mouth:   mucous membranes moist and patent  Lungs:  clear to auscultation bilaterally   Some minor blood in canal    POST TREATMENT:    Patient denies complaints    Patient tolerated the hyperbaric treatment without problems or side effects no Change in the TM post treatment. No pain    Treatment extras: None    Post-treatment Exam:  No change in appearance or examination  from pre-treatment exam.      MEDICAL DECISION MAKING:  Based on recent clinical evaluation, the patient is improving. The eschar is well-demarcated. The tissue around it is showing increased granulation tissue and less inflammation. While the eschar is improving but still not at the point where he is out of the wounds in terms of progression.    Indication of this are:  Improvement of tissue ischemia/hypoxia.    No evidence of advancing ischemia or tissue necrosis.   Improved tissue viability.   Increased granulation tissue.   Decreased wound size.   Improvement in pain control.      Therefore, we will continue treatment as the patient will likely benefit from further hyperbaric oxygen therapy.     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      Plan discussed.  Patient stable at time of discharge.  All questions were answered prior to discharge.     Plan is to extend him to 20 treatments      D Musa DO   DASH diet

## 2018-08-27 NOTE — PROGRESS NOTE ADULT - ASSESSMENT
SUBJECTIVE:    Patient is a 68y old  Male who presents with a chief complaint of SOB (17 Aug 2018 17:22)    Currently admitted to medicine with the primary diagnosis of Empyema lung     Today is hospital day 11d. This morning he is resting comfortably in bed and reports no new issues or overnight events.     PAST MEDICAL & SURGICAL HISTORY  PAST MEDICAL & SURGICAL HISTORY:  Muscle weakness (generalized)  Atrial fibrillation, unspecified type  Oxygen dependent  Essential hypertension  Cerebrovascular accident (CVA) due to bilateral embolism of posterior cerebral arteries  Systolic congestive heart failure, unspecified chronicity  AICD (automatic cardioverter/defibrillator) present    SOCIAL HISTORY:    ALLERGIES:  cephalexin (Flushing)  penicillin (Rash)    MEDICATIONS:  STANDING MEDICATIONS  aspirin  chewable 81 milliGRAM(s) Oral daily  carvedilol 3.125 milliGRAM(s) Oral every 12 hours  clindamycin IVPB 900 milliGRAM(s) IV Intermittent every 8 hours  digoxin     Tablet 0.125 milliGRAM(s) Oral daily  enoxaparin Injectable 60 milliGRAM(s) SubCutaneous two times a day  furosemide    Tablet 40 milliGRAM(s) Oral daily  ivabradine 5 milliGRAM(s) Oral two times a day  lactobacillus acidophilus 1 Tablet(s) Oral every 8 hours  levoFLOXacin IVPB 750 milliGRAM(s) IV Intermittent every 24 hours  sacubitril 24 mG/valsartan 26 mG 1 Tablet(s) Oral two times a day  spironolactone 50 milliGRAM(s) Oral two times a day    PRN MEDICATIONS  acetaminophen   Tablet. 650 milliGRAM(s) Oral every 6 hours PRN    VITALS:   T(F): 97.7  HR: 103  BP: 123/69  RR: 18  SpO2: 100%    LABS:                        RADIOLOGY:    PHYSICAL EXAM:  GEN: No acute distress  HEENT: NCAT  LUNGS: Clear to auscultation bilaterally. R chest tube w pneumostat   HEART: S1/S2 present. RRR.   ABD: Soft, non-tender, non-distended. Bowel sounds present  EXT: no c/c/e  NEURO: AAOX3    Assessment and Plan:  67 yo M w/ hx of Afib not on AC, CHF (EF 23%) w/ AICD, CVA admitted with R loculated pleural effusion s/p R chest tube insertion.     # daily labs are NOT needed in this pt    # empyema - Grp B strep / r pl eff / trapped or non-expanded lung  -abx will be clinda 900 mg iv q8 for now, then 450mg po q8 on d/c and total tx is 4-6 weeks (8/18 (day after CT placed) to 9/18, to start, and then re-assess as outpt w/ updated CT Chest)  -added levaquin 750mg iv q24 (per pulm), will change to po upon d/c and tx w/ clinda as above  -give lactobacillus 1 cap po q8 w/ clinda for this long therapy  -drainage from CT is very low;  -pulm will likely send pt home w/ CT and one-way valve; will need home care  -lung is still not fully expanded on recent CXR - cxr in am todd  -off O2  -f/u pulm rec and CT sx - need d/c planning w/ them    # neutropenia - stable    # acute PE; acute, extensive DVT as above (right leg prox) and small calf DVT on left  pt on LMWH for now; will go to [facility] upon d/c    # chr sys CHF / mod AI / Mod NE and TR / AICD / AFib  cont current meds; seems stable    # normo to macro anemia  B12 and Fol and TSH all normal  prob chr dz and suppression from current illness  no further w/u    # low albumin prob suppressed from acute dz  no proteinuria    # hx CVA - stable, no residual    # prior decond,   pt can walk fine, so he will likely go home, once ok w/ pulm    Dispo: d/c plans are per pulm team; cont current mngmt as above (? d/c todd) SUBJECTIVE:    Patient is a 68y old  Male who presents with a chief complaint of SOB (17 Aug 2018 17:22)    Currently admitted to medicine with the primary diagnosis of Empyema lung     Today is hospital day 11d. This morning he is resting comfortably in bed and reports no new issues or overnight events.     PAST MEDICAL & SURGICAL HISTORY  PAST MEDICAL & SURGICAL HISTORY:  Muscle weakness (generalized)  Atrial fibrillation, unspecified type  Oxygen dependent  Essential hypertension  Cerebrovascular accident (CVA) due to bilateral embolism of posterior cerebral arteries  Systolic congestive heart failure, unspecified chronicity  AICD (automatic cardioverter/defibrillator) present    SOCIAL HISTORY:    ALLERGIES:  cephalexin (Flushing)  penicillin (Rash)    MEDICATIONS:  STANDING MEDICATIONS  aspirin  chewable 81 milliGRAM(s) Oral daily  carvedilol 3.125 milliGRAM(s) Oral every 12 hours  clindamycin IVPB 900 milliGRAM(s) IV Intermittent every 8 hours  digoxin     Tablet 0.125 milliGRAM(s) Oral daily  enoxaparin Injectable 60 milliGRAM(s) SubCutaneous two times a day  furosemide    Tablet 40 milliGRAM(s) Oral daily  ivabradine 5 milliGRAM(s) Oral two times a day  lactobacillus acidophilus 1 Tablet(s) Oral every 8 hours  levoFLOXacin IVPB 750 milliGRAM(s) IV Intermittent every 24 hours  sacubitril 24 mG/valsartan 26 mG 1 Tablet(s) Oral two times a day  spironolactone 50 milliGRAM(s) Oral two times a day    PRN MEDICATIONS  acetaminophen   Tablet. 650 milliGRAM(s) Oral every 6 hours PRN    VITALS:   T(F): 97.7  HR: 103  BP: 123/69  RR: 18  SpO2: 100%    LABS:                        RADIOLOGY:    < from: Xray Chest 1 View- PORTABLE-Routine (08.27.18 @ 06:30) >  IMPRESSION:      No significant change in right hydropneumothorax with adjacent opacity.   Stable left pleural effusion/opacity..    < end of copied text >      PHYSICAL EXAM:  GEN: No acute distress  HEENT: NCAT  LUNGS: Clear to auscultation bilaterally. R chest tube w pneumostat   HEART: S1/S2 present. RRR.   ABD: Soft, non-tender, non-distended. Bowel sounds present  EXT: no c/c/e  NEURO: AAOX3    Assessment and Plan:  67 yo M w/ hx of Afib not on AC, CHF (EF 23%) w/ AICD, CVA admitted with R loculated pleural effusion s/p R chest tube insertion.     # daily labs are NOT needed in this pt    # empyema - Grp B strep / r pl eff / trapped or non-expanded lung  -abx will be clinda 900 mg iv q8 for now, then 450mg po q8 on d/c and total tx is 4-6 weeks (8/18 (day after CT placed) to 9/18, to start, and then re-assess as outpt w/ updated CT Chest)  -added levaquin 750mg iv q24 (per pulm), will change to po upon d/c and tx w/ clinda as above  -give lactobacillus 1 cap po q8 w/ clinda for this long therapy  -drainage from CT is very low; CT surgery switched to pneumostat today  -pulm approves sending pt home w/ CT and one-way valve; will need home care  -per pulm remain on abx for 2 weeks until follow up appt, will reimage then  -cxr today shows no significant change in R hydropneumothorax  -off O2  -f/u pulm, ID and CT sx outpatient    # neutropenia - stable    # acute PE; acute, extensive DVT as above (right leg prox) and small calf DVT on left  pt on LMWH for now; will go to [facility] upon d/c (w/ loading dosing)    # chr sys CHF / mod AI / Mod IN and TR / AICD / AFib  cont current meds; seems stable    # normo to macro anemia  B12 and Fol and TSH all normal  prob chr dz and suppression from current illness  no further w/u    # low albumin prob suppressed from acute dz  no proteinuria    # hx CVA - stable, no residual    # prior decond,   pt is ambulating and can go home w/services    Dispo: d/c home w/ services

## 2018-08-27 NOTE — DISCHARGE NOTE ADULT - CARE PLAN
Principal Discharge DX:	Empyema lung  Goal:	Complete resolution and prevention of recurrence of symptoms  Assessment and plan of treatment:	Take your medications as indicated and follow cardiothoracic surgery and pulmonary and infectious disease doctor on out patient basis

## 2018-08-27 NOTE — DISCHARGE NOTE ADULT - ADDITIONAL INSTRUCTIONS
Please follow   Dr. Darrick Herrera in 2 weeks - Please get chest xray before seeing Dr. Darrick Jett in 2 weeks   Dr. Caputo in 1-2 weeks Please follow   Dr. Darrick Herrera in 2 weeks - Please get chest xray before seeing Dr. Darrick Jett in 2 weeks   Dr. Caputo in 1-2 weeks  Also please follow up with cardiologist and Primary care doctor in 2-3 weeks

## 2018-08-27 NOTE — PROGRESS NOTE ADULT - SUBJECTIVE AND OBJECTIVE BOX
SAMANTHA CHARLES  68y  Male  ***My note supercedes ALL resident notes that I sign.  My corrections for their notes are in my note.***    I can be reached directly on [facility]UNITY. My office number is [phone]. My personal cell number is [phone].    INTERVAL EVENTS: Here for f/u of empyema. Pt ready for d/c. Feels good and has no complaints.    T(F): 96 (08-27-18 @ 13:31), Max: 98.2 (08-26-18 @ 20:44)  HR: 85 (08-27-18 @ 13:31) (85 - 103)  BP: 100/75 (08-27-18 @ 13:31) (100/75 - 123/69)  RR: 18 (08-27-18 @ 13:31) (18 - 18)  SpO2: 100% (08-27-18 @ 09:32) (100% - 100%)    General: In NAD   HEENT: PERRL; EOMI; + NC O2; mouth clr  Neck no JVD, no nodes        Lungs: Bilateral BS  Chest: + right CT to pleurovac and suction - output 5 cc overnight  Cardiovascular: Regular s1 s2 no murmur  Gastrointestinal: Soft, Positive BS, NT/ND, no masses  Extremities: No clubbing.  no cyanosis; no edema  Neurological: No motor or sensory deficit     LABS:    RADIOLOGY & ADDITIONAL TESTS:      MEDICATIONS:  clindamycin IVPB 900 milliGRAM(s) IV Intermittent every 8 hours  levoFLOXacin IVPB 750 milliGRAM(s) IV Intermittent every 24 hours    acetaminophen   Tablet. 650 milliGRAM(s) Oral every 6 hours PRN  aspirin  chewable 81 milliGRAM(s) Oral daily  carvedilol 3.125 milliGRAM(s) Oral every 12 hours  digoxin     Tablet 0.125 milliGRAM(s) Oral daily  enoxaparin Injectable 60 milliGRAM(s) SubCutaneous two times a day  furosemide    Tablet 40 milliGRAM(s) Oral daily  ivabradine 5 milliGRAM(s) Oral two times a day  lactobacillus acidophilus 1 Tablet(s) Oral every 8 hours  sacubitril 24 mG/valsartan 26 mG 1 Tablet(s) Oral two times a day  spironolactone 50 milliGRAM(s) Oral two times a day

## 2018-08-27 NOTE — CHART NOTE - NSCHARTNOTEFT_GEN_A_CORE
General Thoracic Surgery Attestation    I have seen and examined the patient.  Where appropriate I have updated, edited, or corrected the resident's or PA's note with regard to findings, values, and plan.    I switched the patients pleurevac to a pneumostat.  This is appropriate for discharge home.  VNA should assess the chest tube site and call my office for any changes to the tube site (drainage around tube, erythema, rubor, etc).      The pneumostat allows for home negative pressure therapy.  The patient should be sent home with 10 or 20cc syringes.  The syringe is connected to the leuer lock on the bottom of the pneumostat, the syringe plunger is withdrawn to 20cm, and then the syringe is disconnected.  With a 10cc syringe this is repeated twice.  The patient should record how much output (fluid) is withdrawn each time they drain the pneumostat, and should bring these results to clinic.    The patient should follow up in my clinic () in 2 weeks with an xray (coordinated from clinic)

## 2018-08-27 NOTE — DISCHARGE NOTE ADULT - MEDICATION SUMMARY - MEDICATIONS TO STOP TAKING
I will STOP taking the medications listed below when I get home from the hospital:    furosemide 40 mg oral tablet  -- 1 tab(s) by mouth 2 times a day    furosemide 20 mg oral tablet  -- 1 tab(s) by mouth once a day

## 2018-08-27 NOTE — DISCHARGE NOTE ADULT - HOSPITAL COURSE
67 yo M w/ hx of Afib not on AC, CHF (EF 23%) w/ AICD, CVA presents w/ SOB since this AM. Recent hx of AICD placement at [facility], subsequently went to [facility] for rehabilitation. Patient denies any respiratory issue prior to this am. He sat 77% on NRB at NH, on arrival to ED, respiratory distress improved w/ BiPAP. Bedside US significant for right side loculated pleural effusion. Thoracentesis by Pulm team was done bedside, chest tube was inserted to suction. ID followed patient and recommended antibiotics. Pleural fluid analysis showed Group B strep. Now patient is stable and is cleared by pulmnonary, CT surgery to be discharged with out patient follow ups. Patient is going home with chest tube as per CT surgery.

## 2018-08-27 NOTE — DISCHARGE NOTE ADULT - MEDICATION SUMMARY - MEDICATIONS TO TAKE
I will START or STAY ON the medications listed below when I get home from the hospital:    spironolactone 50 mg oral tablet  -- 1 tab(s) by mouth 2 times a day  -- Indication: For heart failure    Tylenol 325 mg oral tablet  -- 2 tab(s) by mouth every 6 hours, As Needed  -- Indication: For pain    aspirin 81 mg oral tablet, chewable  -- 1 tab(s) by mouth once a day  -- Indication: For heart disease    Entresto 24 mg-26 mg oral tablet  -- 1 tab(s) by mouth 2 times a day  -- Indication: For heart failure    digoxin 125 mcg (0.125 mg) oral tablet  -- 1 tab(s) by mouth once a day (at bedtime)  -- Indication: For heart failure    Eliquis 5 mg oral tablet  -- 2 tab(s) by mouth 2 times a day  for 7 days and then 1 tab by mouth 2 times a day afterwards   -- Check with your doctor before becoming pregnant.  It is very important that you take or use this exactly as directed.  Do not skip doses or discontinue unless directed by your doctor.  Obtain medical advice before taking any non-prescription drugs as some may affect the action of this medication.    -- Indication: For pulmonary embolism    Coreg 3.125 mg oral tablet  -- 1 tab(s) by mouth 2 times a day  -- Indication: For heart disease    furosemide 40 mg oral tablet  -- 1 tab(s) by mouth once a day  -- Indication: For heart failure    clindamycin 150 mg oral capsule  -- 3 cap(s) by mouth 3 times a day   -- Finish all this medication unless otherwise directed by prescriber.  Medication should be taken with plenty of water.    -- Indication: For pneumonia    Corlanor 5 mg oral tablet  -- 1 tab(s) by mouth 2 times a day (with meals)  -- Indication: For heart failure    lactobacillus acidophilus oral capsule  -- 1 cap(s) by mouth 3 times a day   -- Indication: For to prevent antibiotic induced diarrhea     levoFLOXacin 750 mg oral tablet  -- 1 tab(s) by mouth once a day   -- Avoid prolonged or excessive exposure to direct and/or artificial sunlight while taking this medication.  Do not take dairy products, antacids, or iron preparations within one hour of this medication.  Finish all this medication unless otherwise directed by prescriber.  May cause drowsiness or dizziness.  Medication should be taken with plenty of water.    -- Indication: For pneumonia

## 2018-08-27 NOTE — CHART NOTE - NSCHARTNOTEFT_GEN_A_CORE
Registered Dietitian Follow-Up     Patient Profile Reviewed                           Yes [x]   No []     Nutrition History Previously Obtained        Yes [x]  No []       Pertinent Subjective Information:  -Pt OOB in chair at time of assessment. Reports persistently increasing appetite and PO intake remains 100% meals. Pt expresses desire to gain or at least maintain wt. Discussed benefits of nutrition supplements to encourage wt gain and identified both nutritionally and calorically dense foods with pt. RD reccs for Ensure Compact TID remain pending--d/w LIP (Cain).      Pertinent Medical Interventions: No need for daily labs. Neutropenia--stable. Empyema--chest tube output decreasing. Normocytic to macrocytic anemia--B12 and Folate and TSH all normal.   Diet order: DASH/TLC      Anthropometrics:  - Ht. 172.72cm   - Wt. 56.3kg (8/23)   - %wt change-stable since admit   - BMI 18.9   - IBW 70kg      Pertinent Lab Data: no new labs since 8/24      Pertinent Meds: lovenox, abx, lactobacillus acidophilus, lasix, coreg, aldactone      Physical Findings:  - Appearance: AAO, clavicle/temporal muscle wasting and mid-upper arm subcutaneous fat loss observed  - GI function: none reported by pt LBM 8/24   - Tubes:  - Oral/Mouth cavity: none reported by pt   - Skin: stage II scaral pressure ulcer d/c'd on 8/23      Nutrition Requirements  Weight Used: 56kg      Estimated Energy Needs    Continue [x]  5272-3070 kcal/day (MSJ x 1.2-1.5 d/t increased in malnourished pt).   Estimated Protein Needs    Continue [x] 70-84 g/day (1.25-1.5 g/kg CBW).  Estimated Fluid Needs        Continue [x]  per LIP     Nutrient Intake: meeting nutritional needs      [x] Previous Nutrition Diagnosis: Severe PCM-ongoing; Increased nutrient needs-resolved as stage II PU resolved             [] Ongoing          [] Resolved     Nutrition Intervention: meals and snacks, medical food supplements   Reccs:   1. Add Ensure compact TID to increase caloric intake and promote wt gain.   2. Continue DASH/TLC diet order.    Goal/Expected Outcome: Pt to continue to consume/tolerate 100% meals, snacks and supplements upon f/u in 3 days.      Indicator/Monitoring: RD will monitor diet order, energy intake, wt trends, nutrition related lab values, NFPF (muscle wasting, fat loss, appetite).

## 2018-08-27 NOTE — DISCHARGE NOTE ADULT - PLAN OF CARE
Complete resolution and prevention of recurrence of symptoms Take your medications as indicated and follow cardiothoracic surgery and pulmonary and infectious disease doctor on out patient basis

## 2018-08-27 NOTE — DISCHARGE NOTE ADULT - CARE PROVIDER_API CALL
Rasta Hollingsworth), Surgery; Thoracic Surgery  [address] [place] [address]  Phone: [phone]  Fax: [phone]    Omid Jett (MD), Critical Care Medicine; Pulmonary Disease; Sleep Medicine  [address] [place] [address]  Phone: [phone]  Fax: [phone]    Justice Caputo), Infectious Disease; Internal Medicine  [address] [place] [address]  Phone: [phone]  Fax: [phone]

## 2018-08-27 NOTE — DISCHARGE NOTE ADULT - PATIENT PORTAL LINK FT
You can access the [facility] Patient Portal, offered by [facility], by registering with the following website: [email or web address]

## 2018-08-28 PROBLEM — I48.91 UNSPECIFIED ATRIAL FIBRILLATION: Chronic | Status: ACTIVE | Noted: 2018-08-17

## 2018-09-03 DIAGNOSIS — I08.0 RHEUMATIC DISORDERS OF BOTH MITRAL AND AORTIC VALVES: ICD-10-CM

## 2018-09-03 DIAGNOSIS — Z88.0 ALLERGY STATUS TO PENICILLIN: ICD-10-CM

## 2018-09-03 DIAGNOSIS — D70.9 NEUTROPENIA, UNSPECIFIED: ICD-10-CM

## 2018-09-03 DIAGNOSIS — I10 ESSENTIAL (PRIMARY) HYPERTENSION: ICD-10-CM

## 2018-09-03 DIAGNOSIS — Z51.89 ENCOUNTER FOR OTHER SPECIFIED AFTERCARE: ICD-10-CM

## 2018-09-03 DIAGNOSIS — Z86.73 PERSONAL HISTORY OF TRANSIENT ISCHEMIC ATTACK (TIA), AND CEREBRAL INFARCTION WITHOUT RESIDUAL DEFICITS: ICD-10-CM

## 2018-09-03 DIAGNOSIS — I48.0 PAROXYSMAL ATRIAL FIBRILLATION: ICD-10-CM

## 2018-09-03 DIAGNOSIS — J86.9 PYOTHORAX WITHOUT FISTULA: ICD-10-CM

## 2018-09-03 DIAGNOSIS — Z88.1 ALLERGY STATUS TO OTHER ANTIBIOTIC AGENTS STATUS: ICD-10-CM

## 2018-09-03 DIAGNOSIS — I82.411 ACUTE EMBOLISM AND THROMBOSIS OF RIGHT FEMORAL VEIN: ICD-10-CM

## 2018-09-03 DIAGNOSIS — Z99.81 DEPENDENCE ON SUPPLEMENTAL OXYGEN: ICD-10-CM

## 2018-09-03 DIAGNOSIS — Z95.810 PRESENCE OF AUTOMATIC (IMPLANTABLE) CARDIAC DEFIBRILLATOR: ICD-10-CM

## 2018-09-03 DIAGNOSIS — D53.9 NUTRITIONAL ANEMIA, UNSPECIFIED: ICD-10-CM

## 2018-09-03 DIAGNOSIS — J18.9 PNEUMONIA, UNSPECIFIED ORGANISM: ICD-10-CM

## 2018-09-03 DIAGNOSIS — E43 UNSPECIFIED SEVERE PROTEIN-CALORIE MALNUTRITION: ICD-10-CM

## 2018-09-03 DIAGNOSIS — E87.2 ACIDOSIS: ICD-10-CM

## 2018-09-03 DIAGNOSIS — Z22.322 CARRIER OR SUSPECTED CARRIER OF METHICILLIN RESISTANT STAPHYLOCOCCUS AUREUS: ICD-10-CM

## 2018-09-03 DIAGNOSIS — I82.431 ACUTE EMBOLISM AND THROMBOSIS OF RIGHT POPLITEAL VEIN: ICD-10-CM

## 2018-09-03 DIAGNOSIS — I26.99 OTHER PULMONARY EMBOLISM WITHOUT ACUTE COR PULMONALE: ICD-10-CM

## 2018-09-03 DIAGNOSIS — I82.4Z3 ACUTE EMBOLISM AND THROMBOSIS OF UNSPECIFIED DEEP VEINS OF DISTAL LOWER EXTREMITY, BILATERAL: ICD-10-CM

## 2018-09-03 DIAGNOSIS — I50.23 ACUTE ON CHRONIC SYSTOLIC (CONGESTIVE) HEART FAILURE: ICD-10-CM

## 2018-09-03 DIAGNOSIS — J90 PLEURAL EFFUSION, NOT ELSEWHERE CLASSIFIED: ICD-10-CM

## 2018-09-03 DIAGNOSIS — B95.1 STREPTOCOCCUS, GROUP B, AS THE CAUSE OF DISEASES CLASSIFIED ELSEWHERE: ICD-10-CM

## 2018-09-03 DIAGNOSIS — J96.11 CHRONIC RESPIRATORY FAILURE WITH HYPOXIA: ICD-10-CM

## 2018-09-07 ENCOUNTER — OUTPATIENT (OUTPATIENT)
Dept: OUTPATIENT SERVICES | Facility: HOSPITAL | Age: 68
LOS: 1 days | Discharge: HOME | End: 2018-09-07

## 2018-09-07 DIAGNOSIS — Z95.810 PRESENCE OF AUTOMATIC (IMPLANTABLE) CARDIAC DEFIBRILLATOR: Chronic | ICD-10-CM

## 2018-09-07 DIAGNOSIS — R05 COUGH: ICD-10-CM

## 2018-09-12 ENCOUNTER — OUTPATIENT (OUTPATIENT)
Dept: OUTPATIENT SERVICES | Facility: HOSPITAL | Age: 68
LOS: 1 days | Discharge: HOME | End: 2018-09-12

## 2018-09-12 DIAGNOSIS — Z95.810 PRESENCE OF AUTOMATIC (IMPLANTABLE) CARDIAC DEFIBRILLATOR: Chronic | ICD-10-CM

## 2018-09-12 DIAGNOSIS — B97.89 OTHER VIRAL AGENTS AS THE CAUSE OF DISEASES CLASSIFIED ELSEWHERE: ICD-10-CM

## 2018-09-14 ENCOUNTER — APPOINTMENT (OUTPATIENT)
Dept: PULMONOLOGY | Facility: CLINIC | Age: 68
End: 2018-09-14

## 2018-09-18 NOTE — ED ADULT NURSE NOTE - NSSISCREENINGQ3_ED_A_ED
Dual skin assessment completed with RN. Allevyn dressing pulled back and replaced, no redness or breakdown noted. Surgical dressings clean, dry, and intact. No

## 2018-09-26 ENCOUNTER — OUTPATIENT (OUTPATIENT)
Dept: OUTPATIENT SERVICES | Facility: HOSPITAL | Age: 68
LOS: 1 days | Discharge: HOME | End: 2018-09-26

## 2018-09-26 DIAGNOSIS — B97.89 OTHER VIRAL AGENTS AS THE CAUSE OF DISEASES CLASSIFIED ELSEWHERE: ICD-10-CM

## 2018-09-26 DIAGNOSIS — Z95.810 PRESENCE OF AUTOMATIC (IMPLANTABLE) CARDIAC DEFIBRILLATOR: Chronic | ICD-10-CM

## 2018-10-05 ENCOUNTER — OUTPATIENT (OUTPATIENT)
Dept: OUTPATIENT SERVICES | Facility: HOSPITAL | Age: 68
LOS: 1 days | Discharge: HOME | End: 2018-10-05

## 2018-10-05 DIAGNOSIS — J86.9 PYOTHORAX WITHOUT FISTULA: ICD-10-CM

## 2018-10-05 DIAGNOSIS — Z95.810 PRESENCE OF AUTOMATIC (IMPLANTABLE) CARDIAC DEFIBRILLATOR: Chronic | ICD-10-CM

## 2018-10-06 LAB
CULTURE RESULTS: SIGNIFICANT CHANGE UP
SPECIMEN SOURCE: SIGNIFICANT CHANGE UP

## 2018-10-08 PROBLEM — Z00.00 ENCOUNTER FOR PREVENTIVE HEALTH EXAMINATION: Noted: 2018-10-08

## 2018-10-10 ENCOUNTER — RECORD ABSTRACTING (OUTPATIENT)
Age: 68
End: 2018-10-10

## 2018-10-10 DIAGNOSIS — Z93.8 OTHER ARTIFICIAL OPENING STATUS: ICD-10-CM

## 2018-10-10 DIAGNOSIS — I50.20 UNSPECIFIED SYSTOLIC (CONGESTIVE) HEART FAILURE: ICD-10-CM

## 2018-10-10 DIAGNOSIS — J90 PLEURAL EFFUSION, NOT ELSEWHERE CLASSIFIED: ICD-10-CM

## 2018-10-10 DIAGNOSIS — Z78.9 OTHER SPECIFIED HEALTH STATUS: ICD-10-CM

## 2018-10-10 DIAGNOSIS — I10 ESSENTIAL (PRIMARY) HYPERTENSION: ICD-10-CM

## 2018-10-10 DIAGNOSIS — I48.91 UNSPECIFIED ATRIAL FIBRILLATION: ICD-10-CM

## 2018-10-10 DIAGNOSIS — Z92.89 PERSONAL HISTORY OF OTHER MEDICAL TREATMENT: ICD-10-CM

## 2018-10-10 DIAGNOSIS — Z86.73 PERSONAL HISTORY OF TRANSIENT ISCHEMIC ATTACK (TIA), AND CEREBRAL INFARCTION W/OUT RESIDUAL DEFICITS: ICD-10-CM

## 2018-10-10 DIAGNOSIS — Z99.81 DEPENDENCE ON SUPPLEMENTAL OXYGEN: ICD-10-CM

## 2018-10-10 DIAGNOSIS — Z95.810 PRESENCE OF AUTOMATIC (IMPLANTABLE) CARDIAC DEFIBRILLATOR: ICD-10-CM

## 2018-10-10 DIAGNOSIS — I51.7 CARDIOMEGALY: ICD-10-CM

## 2018-10-10 RX ORDER — CARVEDILOL 3.12 MG/1
3.12 TABLET, FILM COATED ORAL TWICE DAILY
Refills: 0 | Status: ACTIVE | COMMUNITY

## 2018-10-10 RX ORDER — SPIRONOLACTONE 50 MG/1
50 TABLET, FILM COATED ORAL TWICE DAILY
Refills: 0 | Status: ACTIVE | COMMUNITY

## 2018-10-10 RX ORDER — FUROSEMIDE 40 MG/1
40 TABLET ORAL TWICE DAILY
Refills: 0 | Status: ACTIVE | COMMUNITY

## 2018-10-10 RX ORDER — IVABRADINE 5 MG/1
5 TABLET, FILM COATED ORAL TWICE DAILY
Refills: 0 | Status: ACTIVE | COMMUNITY

## 2018-10-10 RX ORDER — DIGOXIN 125 UG/1
125 TABLET ORAL
Refills: 0 | Status: ACTIVE | COMMUNITY

## 2018-10-10 RX ORDER — ACETAMINOPHEN 325 MG/1
325 TABLET, FILM COATED ORAL EVERY 6 HOURS
Refills: 0 | Status: ACTIVE | COMMUNITY

## 2018-10-10 RX ORDER — SACUBITRIL AND VALSARTAN 24; 26 MG/1; MG/1
24-26 TABLET, FILM COATED ORAL TWICE DAILY
Refills: 0 | Status: ACTIVE | COMMUNITY

## 2018-10-10 RX ORDER — ASPIRIN 81 MG/1
81 TABLET, CHEWABLE ORAL DAILY
Refills: 0 | Status: ACTIVE | COMMUNITY

## 2018-10-12 ENCOUNTER — APPOINTMENT (OUTPATIENT)
Dept: CARDIOTHORACIC SURGERY | Facility: CLINIC | Age: 68
End: 2018-10-12

## 2018-10-12 VITALS
RESPIRATION RATE: 14 BRPM | HEART RATE: 63 BPM | SYSTOLIC BLOOD PRESSURE: 70 MMHG | OXYGEN SATURATION: 94 % | DIASTOLIC BLOOD PRESSURE: 64 MMHG

## 2018-10-12 DIAGNOSIS — J86.9 PYOTHORAX W/OUT FISTULA: ICD-10-CM

## 2018-10-23 ENCOUNTER — APPOINTMENT (OUTPATIENT)
Dept: PULMONOLOGY | Facility: CLINIC | Age: 68
End: 2018-10-23

## 2019-10-30 NOTE — DISCHARGE NOTE ADULT - FUNCTIONAL SCREEN CURRENT LEVEL: DRESSING, MLM
Work[employer]  Daily Note    Visit Count: 17  Plan of Care Dates:Initial: 9/23/2019 Through: 12/16/2019  Referred by: Dr. Oh De Leon MD  Diagnosis: M25.512, G89.29 Chronic left shoulder pain   Next Referring Provider Visit: 10/28/19     Insurance: RASHMI WEEMS  Claim Number: [id number]  Claim Status: claim open and in good standing  Current Work Status: full time occupation:   Adjustor/: [employer]     DOI: 8/23/2018  Employer: [employer]  Ph#: [phone]  Fax#: [phone]  RN Case Manager: VELIA STEWART  Ph#: [phone]  FAX: [phone]    AWARE OF WORK CONDITIONING - IAR TO FAX ORDER & EVAL WHEN COMPLETED  Secondary Insurance: ALL SAVORS     Date of Injury: 8/23/18/ 6/7/19  Surgery: date of surgery: 8/29/18; surgery performed: ARTHROSCOPY SHOULDER WITH LABRAL REPAIR  Precautions: Physical activity restrictions     Work Status:  Job Title: , concrete  Current Employer: [employer]  : [employer]   Last Date Worked: 9/23/19  Restrictions: 40# to waist, 10# overhead  Return to Work Date: 9/12/18  Job Description Obtained: no  Job Site Visit: No, due to nature of the work     Case Notes/Attendance:  Date of Communication: none Results: none at this time  Attendance Concerns: none at this time   10/11/19:  Phone call to Velia to discuss progress/ job description.  Velia is to get therapist more information on job description and informed very slow progress at this time with increase pain occurring.     SUBJECTIVE   Patient states that he has been in the hospital because his dad had a heart attack. Shoulders feel about the same.  Pain on arrival: 3/10     OBJECTIVE   Functional Status:  Functional Task Client Abilities Job Demand:  Client Report Task Details or Descriptions (complete as needed) Match?   Lift: floor to waist  55 lbs 50 lbs  []? N  [x]? O []? F  []? C  Forms, inserts  [x]? Yes []? No   Lift: waist to  shoulder  52.5 lbs 50 lbs  []? N  [x]? O []? F  []? C  Forms, inserts  [x]? Yes []? No   Lift: overhead 22.5 lbs 0 lbs  []? N  [x]? O []? F  []? C  Forms, inserts  []? Yes [x]? No   Two Hand Carry 55 lbs 50 lbs  []? N  [x]? O []? F  []? C  Forms, inserts  [x]? Yes []? No   One Hand Carry R-35 lbs  L-30 lbs 0 lbs  []? N  [x]? O []? F  []? C  Pail of mud/ cement  []? Yes []? No   Push 100 lbs of force 100 lbs of force  []? N  []? O []? F  []? C  Forms  [x]? Yes []? No   Pull 100 bs of force 100 lbs of force  []? N  []? O []? F  []? C    [x]? Yes []? No   Pulling self out of catch basin NA  NA    []? Yes []? No   Shoveling  NA NA      Standing Frequently  []? N  []? O [x]? F  []? C    [x]? Yes []? No   Walking Frequently  []? N  []? O [x]? F  []? C    [x]? Yes []? No   Climbing NA NA Ladders/ catch basin []? Yes []? No   Squatting Occasionally  []? N  [x]? O []? F  []? C    [x]? Yes []? No   Balancing Constant []? N  []? O []? F  [x]? C    [x]? Yes []? No   Definitions: Never (N); Occasionally (O) = up to 1/3 of the day; Frequently (F) = 1/3 to 2/3 of the day; Constantly (C) = 2/3 to the full day     Shoulder AROM:  Flexion: Right = 150, Left = 150  Abduction: Right = 130, Left = 125  IR: Right = T10, Left = T10  ER: Right = C7, Left = C7    Shoulder MMT:  Flexion: Right = 5, Left = 4  Abduction: Right = 4+, Left = 4  IR: Right = 5, Left = 5  ER: Right = 4+, Left = 4+    Treatment   Completed task sheet.  Refer to task sheets for specifics.  Work Hardening/Conditioning:     Cardio: Treadmill with some running      Strengthening:    Sets Reps   Sit to Stand Squats 3 15   Step-Ups 3 15   FreeMotion Row 3 15   FreeMotion Lat Pulldown 3 15   FreeMotion Chest Press 3 15   FreeMotion Bicep Curls 3 15   FreeMotion Tricep Extensions 3 15      Core:    Sets Reps   Pelvic Tilts 3 15   MB Rotations 3 15   Back Extensions 3 15   Crunches 3 15     Manual Therapy: performed bilaterally  Posterior glide GH mobilization grades  3-4  Supine shoulder physiologic glides: FLEX, ABD, ER, IR     Therapeutic Exercise: performed bilaterally  UBE level 6.0 x 3'FW/3'BW  Side lying manually resisted shoulder ER, abduction - 2 x fatigue  Prone manually resisted I's, T's  Side lying shoulder rhythmic stabilization: shoulder abduction at 90    Work simulated tasks:   Floor to waist: 22.5# x1; 32.5# x1; 42.5# x1; 52.5# x3  Waist to shoulder: 22.5# x1; 32.5# x1; 42.5# x1; 52.5# x3  Floor to shoulder: 22.5# x1; 32.5# x1; 42.5# x1; 52.5# x3  2 handed carry: 52.5# x3  Push/ pull cart: 25# x1; 50# x1; 75# x1; 100# x1  Force gauge able to pull: 100# of force able to complete  1 handed carry: 20# x3  KB carry at shoulder 90: 5# each hand x3; overhead carry 5# x3     ASSESSMENT   Patient with functional right/left shoulder ROM. Limited in left shoulder strength, but is independent with his HEP.  Tolerated work simulated tasks with fatigue       Pain after treatment: 4-5/10 left; 3-4/10 right   Result of above outlined education: Verbalizes understanding and Demonstrates understanding    Goals:       To be obtained by end of this plan of care:   1. Client will demonstrate ability to meet all job demands as listed above for return to work regular duty/ restricted duty. MET  2. Client will demonstrate safe and consistent use of proper posture and body mechanics with all tasks to facilitate safe return to work. MET  3. Client to verbalize decreased return to work concerns. MET  4. Client and/or employer to confirm accuracy of a job description and/or participate in a job site visit to confirm job demands to enable smooth transition to restricted duty or full duty work as appropriate. MET  5. Client will demonstrate independence with progressive home exercise program. MET  6. Client will lift floor to waist 50#  MET  7. Client will lift waist to shoulder 50# MET  8. Client will lift overhead 50#  NA  9. Client will 2 handed carry 50# MET  10. Client will 1 handed  carry 50#  NA  11. Client will push/ pull 100# of force  MET  12. Client will climb vertical wall to lift self out of catch basin  NA  13. Client will be able to simulate shoveling for 1 minute  NA  14. Client will be able to climb ladder NA    PLAN   Discharge work conditioning     THERAPY DAILY BILLING   Evaluation Procedures:  No evaluation codes were used on this date of service    Timed Procedures:  Manual Therapy, 20 minutes  Work hardening/conditioning initial 2 hours: 1 unit(s), 75 minutes    Untimed Procedures:  No untimed codes were used on this date of service    Total Treatment Time: 95 minutes   (0) independent

## 2020-02-24 NOTE — CONSULT NOTE ADULT - ATTENDING COMMENTS
Observation Stay Multiple Days
Severely ill patient   Non-ischemic cardiomyopathy   Prior syncope  IVCD  ms  Chronic systolic heart failure NYHA III  Visual changes TIA? CVA?    I discussed with patient the placement of an ICD. I discussed rationale, risks/benefits and alternatives. Patient expressed understanding of discussion.   Will plan BiV-ICD implant after completion of neurological work-up including MRI/MRA and EEG

## 2020-03-21 NOTE — DISCHARGE NOTE ADULT - NS AS ACTIVITY OBS
09-Mar-2020 As previously tolerated, please do not participate in intense physical activity until cleared by your cardiologist

## 2020-07-01 NOTE — ED ADULT NURSE NOTE - AS SC BRADEN MOISTURE
Assessment/Plan:    Problem List Items Addressed This Visit     None      Visit Diagnoses     Insect bites and stings, initial encounter    -  Primary    Bilateral upper extremity on forearms    Relevant Medications    methylPREDNISolone 4 MG tablet therapy pack    Pruritus        Relevant Medications    methylPREDNISolone 4 MG tablet therapy pack    Rash and nonspecific skin eruption        Left lateral ankle    Relevant Medications    methylPREDNISolone 4 MG tablet therapy pack           Diagnoses and all orders for this visit:    Insect bites and stings, initial encounter  Comments:  Bilateral upper extremity on forearms  Orders:  -     methylPREDNISolone 4 MG tablet therapy pack; Use as directed on package    Pruritus  -     methylPREDNISolone 4 MG tablet therapy pack; Use as directed on package    Rash and nonspecific skin eruption  Comments:  Left lateral ankle  Orders:  -     methylPREDNISolone 4 MG tablet therapy pack; Use as directed on package        No problem-specific Assessment & Plan notes found for this encounter  Subjective:      Patient ID: Za Orellana is a 61 y o  male  Za Orellana presents with complaints of a itchy rash over his left lateral ankle and multiple bug bites on his upper extremities  Rash   The current episode started in the past 7 days  The problem is unchanged  The affected locations include the left ankle  The rash is characterized by dryness and itchiness  He was exposed to an insect bite/sting  Pertinent negatives include no anorexia, congestion, cough, diarrhea, eye pain, facial edema, fatigue, fever, joint pain, nail changes, rhinorrhea, shortness of breath, sore throat or vomiting  Past treatments include cold compress, moisturizer, topical steroids, anti-itch cream and antihistamine  The treatment provided mild relief  There is no history of allergies, asthma, eczema or varicella         The following portions of the patient's history were reviewed and updated as appropriate:   He has a past medical history of Allergic, Deviated septum, Diabetes mellitus ([address] ), and Hyperlipidemia ,  does not have any pertinent problems on file  ,   has a past surgical history that includes Cyst Removal; Abscess drainage; Dental surgery; and Septoplasty  ,  family history includes Diabetes in his father; Hyperlipidemia in his brother; Lung cancer in his mother; No Known Problems in his brother  ,   reports that he has been smoking cigarettes  He has a 48 00 pack-year smoking history  He has never used smokeless tobacco  He reports that he drinks alcohol  He reports that he does not use drugs  ,  is allergic to dust mite extract; gramineae pollens; and prunus persica     Current Outpatient Medications   Medication Sig Dispense Refill    calcitriol (ROCALTROL) 0 5 MCG capsule Take 1 capsule (0 5 mcg total) by mouth daily 90 capsule 3    fenofibrate (TRICOR) 145 mg tablet TAKE 1 TABLET BY MOUTH EVERY DAY 90 tablet 1    fluticasone (FLONASE) 50 mcg/act nasal spray SPRAY 2 SPRAYS INTO EACH NOSTRIL EVERY DAY 16 mL 5    levocetirizine (XYZAL) 5 MG tablet Take 1 tablet (5 mg total) by mouth every evening 90 tablet 1    metFORMIN (GLUCOPHAGE) 500 mg tablet TAKE 1 TABLET BY MOUTH TWICE A DAY WITH MEALS 180 tablet 3    Cholecalciferol (VITAMIN D3) 50 MCG (2000 UT) capsule TAKE 1 TABLET (2,000 UNITS TOTAL) BY MOUTH DAILY TAKE AFTER VIT D 50,000 WEEKLY IS COMPLETED      methylPREDNISolone 4 MG tablet therapy pack Use as directed on package 21 each 0     No current facility-administered medications for this visit  Review of Systems   Constitutional: Negative for fatigue and fever  HENT: Negative for congestion, rhinorrhea and sore throat  Eyes: Negative for pain  Respiratory: Negative for cough and shortness of breath  Gastrointestinal: Negative for anorexia, diarrhea and vomiting  Musculoskeletal: Negative for joint pain  Skin: Positive for rash   Negative for nail changes  All other systems reviewed and are negative  Objective:  Vitals:    07/01/20 1424   BP: 126/74   Pulse: 74   Temp: 99 5 °F (37 5 °C)   TempSrc: Tympanic   SpO2: 94%   Weight: 89 5 kg (197 lb 6 4 oz)   Height: 5' 4 5" (1 638 m)     Body mass index is 33 36 kg/m²  Physical Exam   Constitutional: He is oriented to person, place, and time  He appears well-developed and well-nourished  HENT:   Head: Normocephalic and atraumatic  Right Ear: Tympanic membrane, external ear and ear canal normal    Left Ear: Tympanic membrane, external ear and ear canal normal    Nose: Nose normal    Mouth/Throat: Uvula is midline, oropharynx is clear and moist and mucous membranes are normal    Eyes: Pupils are equal, round, and reactive to light  Conjunctivae, EOM and lids are normal    Neck: Trachea normal, normal range of motion, full passive range of motion without pain and phonation normal  Neck supple  No JVD present  No thyroid mass and no thyromegaly present  Cardiovascular: Normal rate, regular rhythm, S1 normal, S2 normal, normal heart sounds, intact distal pulses and normal pulses  Exam reveals no gallop and no friction rub  No murmur heard  Pulmonary/Chest: Effort normal and breath sounds normal  He has no decreased breath sounds  Abdominal: Soft  Normal appearance, normal aorta and bowel sounds are normal  There is no hepatosplenomegaly  There is no tenderness  Genitourinary:   Genitourinary Comments: Deferred   Musculoskeletal: Normal range of motion  Neurological: He is alert and oriented to person, place, and time  He has normal strength  No cranial nerve deficit  Skin: Skin is warm, dry and intact  Capillary refill takes less than 2 seconds  Psychiatric: He has a normal mood and affect  His speech is normal and behavior is normal  Judgment and thought content normal  Cognition and memory are normal    Nursing note and vitals reviewed  (2) very moist

## 2020-07-21 NOTE — PROGRESS NOTE ADULT - ASSESSMENT
Pt is to remain on the same dose  And recheck in one week  Pt to take 5mg daily except Sun take 7 5mg   Retest again in 1 week   (8/4/2020)  lmom at [phone] 67 y/o M s/p right pigtail placement for loculated pleural effusion, empyema    PLAN:  - disposition  -  for VNS  - when discharging, plan to d/c on pneumostat

## 2020-11-19 NOTE — ED ADULT NURSE NOTE - PRIMARY CARE PROVIDER
Marlene
What Type Of Note Output Would You Prefer (Optional)?: Bullet Format
How Severe Are Your Spot(S)?: moderate
Have Your Spot(S) Been Treated In The Past?: has not been treated
Hpi Title: Evaluation of Skin Lesions

## 2021-04-26 NOTE — ED ADULT TRIAGE NOTE - NS ED NURSE BANDS TYPE
Name band; Clindamycin Counseling: I counseled the patient regarding use of clindamycin as an antibiotic for prophylactic and/or therapeutic purposes. Clindamycin is active against numerous classes of bacteria, including skin bacteria. Side effects may include nausea, diarrhea, gastrointestinal upset, rash, hives, yeast infections, and in rare cases, colitis.

## 2021-05-10 ENCOUNTER — INPATIENT (INPATIENT)
Facility: HOSPITAL | Age: 71
LOS: 3 days | Discharge: HOME HEALTH | End: 2021-05-14
Attending: INTERNAL MEDICINE | Admitting: INTERNAL MEDICINE
Payer: MEDICARE

## 2021-05-10 VITALS
TEMPERATURE: 98 F | RESPIRATION RATE: 18 BRPM | HEART RATE: 80 BPM | SYSTOLIC BLOOD PRESSURE: 111 MMHG | DIASTOLIC BLOOD PRESSURE: 55 MMHG | WEIGHT: 139.99 LBS | OXYGEN SATURATION: 97 % | HEIGHT: 67 IN

## 2021-05-10 DIAGNOSIS — Z95.810 PRESENCE OF AUTOMATIC (IMPLANTABLE) CARDIAC DEFIBRILLATOR: Chronic | ICD-10-CM

## 2021-05-10 LAB
BASOPHILS # BLD AUTO: 0.02 K/UL — SIGNIFICANT CHANGE UP (ref 0–0.2)
BASOPHILS NFR BLD AUTO: 0.4 % — SIGNIFICANT CHANGE UP (ref 0–1)
EOSINOPHIL # BLD AUTO: 0.15 K/UL — SIGNIFICANT CHANGE UP (ref 0–0.7)
EOSINOPHIL NFR BLD AUTO: 2.7 % — SIGNIFICANT CHANGE UP (ref 0–8)
HCT VFR BLD CALC: 33.3 % — LOW (ref 42–52)
HGB BLD-MCNC: 10.6 G/DL — LOW (ref 14–18)
IMM GRANULOCYTES NFR BLD AUTO: 0.4 % — HIGH (ref 0.1–0.3)
LYMPHOCYTES # BLD AUTO: 0.9 K/UL — LOW (ref 1.2–3.4)
LYMPHOCYTES # BLD AUTO: 16 % — LOW (ref 20.5–51.1)
MCHC RBC-ENTMCNC: 31.1 PG — HIGH (ref 27–31)
MCHC RBC-ENTMCNC: 31.8 G/DL — LOW (ref 32–37)
MCV RBC AUTO: 97.7 FL — HIGH (ref 80–94)
MONOCYTES # BLD AUTO: 0.55 K/UL — SIGNIFICANT CHANGE UP (ref 0.1–0.6)
MONOCYTES NFR BLD AUTO: 9.8 % — HIGH (ref 1.7–9.3)
NEUTROPHILS # BLD AUTO: 3.99 K/UL — SIGNIFICANT CHANGE UP (ref 1.4–6.5)
NEUTROPHILS NFR BLD AUTO: 70.7 % — SIGNIFICANT CHANGE UP (ref 42.2–75.2)
NRBC # BLD: 0 /100 WBCS — SIGNIFICANT CHANGE UP (ref 0–0)
PLATELET # BLD AUTO: 199 K/UL — SIGNIFICANT CHANGE UP (ref 130–400)
RBC # BLD: 3.41 M/UL — LOW (ref 4.7–6.1)
RBC # FLD: 13.2 % — SIGNIFICANT CHANGE UP (ref 11.5–14.5)
WBC # BLD: 5.63 K/UL — SIGNIFICANT CHANGE UP (ref 4.8–10.8)
WBC # FLD AUTO: 5.63 K/UL — SIGNIFICANT CHANGE UP (ref 4.8–10.8)

## 2021-05-10 PROCEDURE — 99285 EMERGENCY DEPT VISIT HI MDM: CPT

## 2021-05-10 RX ORDER — IBUPROFEN 200 MG
600 TABLET ORAL ONCE
Refills: 0 | Status: DISCONTINUED | OUTPATIENT
Start: 2021-05-10 | End: 2021-05-10

## 2021-05-10 NOTE — ED ADULT NURSE NOTE - NSIMPLEMENTINTERV_GEN_ALL_ED
Implemented All Universal Safety Interventions:  [place] to call system. Call bell, personal items and telephone within reach. Instruct patient to call for assistance. Room bathroom lighting operational. Non-slip footwear when patient is off stretcher. Physically safe environment: no spills, clutter or unnecessary equipment. Stretcher in lowest position, wheels locked, appropriate side rails in place.

## 2021-05-10 NOTE — ED ADULT TRIAGE NOTE - CHIEF COMPLAINT QUOTE
Pt BIBA from home left upper thigh pain [id number], had CT and MRI outpt with negative results. Pt c/o worsening pain, difficulty ambulating.

## 2021-05-11 LAB
ALBUMIN SERPL ELPH-MCNC: 3.6 G/DL — SIGNIFICANT CHANGE UP (ref 3.5–5.2)
ALP SERPL-CCNC: 376 U/L — HIGH (ref 30–115)
ALT FLD-CCNC: 14 U/L — SIGNIFICANT CHANGE UP (ref 0–41)
ANION GAP SERPL CALC-SCNC: 11 MMOL/L — SIGNIFICANT CHANGE UP (ref 7–14)
AST SERPL-CCNC: 25 U/L — SIGNIFICANT CHANGE UP (ref 0–41)
BILIRUB DIRECT SERPL-MCNC: <0.2 MG/DL — SIGNIFICANT CHANGE UP (ref 0–0.2)
BILIRUB INDIRECT FLD-MCNC: >0 MG/DL — LOW (ref 0.2–1.2)
BILIRUB SERPL-MCNC: 0.2 MG/DL — SIGNIFICANT CHANGE UP (ref 0.2–1.2)
BUN SERPL-MCNC: 22 MG/DL — HIGH (ref 10–20)
CALCIUM SERPL-MCNC: 8.9 MG/DL — SIGNIFICANT CHANGE UP (ref 8.5–10.1)
CHLORIDE SERPL-SCNC: 105 MMOL/L — SIGNIFICANT CHANGE UP (ref 98–110)
CO2 SERPL-SCNC: 23 MMOL/L — SIGNIFICANT CHANGE UP (ref 17–32)
CREAT SERPL-MCNC: 0.9 MG/DL — SIGNIFICANT CHANGE UP (ref 0.7–1.5)
CRP SERPL-MCNC: 40 MG/L — HIGH
ERYTHROCYTE [SEDIMENTATION RATE] IN BLOOD: 53 MM/HR — HIGH (ref 0–10)
GLUCOSE SERPL-MCNC: 115 MG/DL — HIGH (ref 70–99)
HCV AB S/CO SERPL IA: 0.04 COI — SIGNIFICANT CHANGE UP
HCV AB SERPL-IMP: SIGNIFICANT CHANGE UP
NT-PROBNP SERPL-SCNC: 148 PG/ML — SIGNIFICANT CHANGE UP (ref 0–300)
POTASSIUM SERPL-MCNC: 4.8 MMOL/L — SIGNIFICANT CHANGE UP (ref 3.5–5)
POTASSIUM SERPL-SCNC: 4.8 MMOL/L — SIGNIFICANT CHANGE UP (ref 3.5–5)
PROT SERPL-MCNC: 6.3 G/DL — SIGNIFICANT CHANGE UP (ref 6–8)
SARS-COV-2 RNA SPEC QL NAA+PROBE: SIGNIFICANT CHANGE UP
SODIUM SERPL-SCNC: 139 MMOL/L — SIGNIFICANT CHANGE UP (ref 135–146)

## 2021-05-11 PROCEDURE — 99223 1ST HOSP IP/OBS HIGH 75: CPT

## 2021-05-11 PROCEDURE — 73700 CT LOWER EXTREMITY W/O DYE: CPT | Mod: 26,LT

## 2021-05-11 PROCEDURE — 73562 X-RAY EXAM OF KNEE 3: CPT | Mod: 26,LT

## 2021-05-11 RX ORDER — APIXABAN 2.5 MG/1
5 TABLET, FILM COATED ORAL EVERY 12 HOURS
Refills: 0 | Status: DISCONTINUED | OUTPATIENT
Start: 2021-05-11 | End: 2021-05-14

## 2021-05-11 RX ORDER — MORPHINE SULFATE 50 MG/1
2 CAPSULE, EXTENDED RELEASE ORAL
Refills: 0 | Status: DISCONTINUED | OUTPATIENT
Start: 2021-05-11 | End: 2021-05-12

## 2021-05-11 RX ORDER — SPIRONOLACTONE 25 MG/1
50 TABLET, FILM COATED ORAL DAILY
Refills: 0 | Status: DISCONTINUED | OUTPATIENT
Start: 2021-05-11 | End: 2021-05-14

## 2021-05-11 RX ORDER — SACUBITRIL AND VALSARTAN 24; 26 MG/1; MG/1
1 TABLET, FILM COATED ORAL
Refills: 0 | Status: DISCONTINUED | OUTPATIENT
Start: 2021-05-11 | End: 2021-05-14

## 2021-05-11 RX ORDER — IVABRADINE 7.5 MG/1
1 TABLET, FILM COATED ORAL
Qty: 0 | Refills: 0 | DISCHARGE

## 2021-05-11 RX ORDER — OXYCODONE AND ACETAMINOPHEN 5; 325 MG/1; MG/1
2 TABLET ORAL ONCE
Refills: 0 | Status: DISCONTINUED | OUTPATIENT
Start: 2021-05-11 | End: 2021-05-11

## 2021-05-11 RX ADMIN — SACUBITRIL AND VALSARTAN 1 TABLET(S): 24; 26 TABLET, FILM COATED ORAL at 17:59

## 2021-05-11 RX ADMIN — MORPHINE SULFATE 2 MILLIGRAM(S): 50 CAPSULE, EXTENDED RELEASE ORAL at 11:19

## 2021-05-11 RX ADMIN — OXYCODONE AND ACETAMINOPHEN 2 TABLET(S): 5; 325 TABLET ORAL at 01:05

## 2021-05-11 RX ADMIN — SACUBITRIL AND VALSARTAN 1 TABLET(S): 24; 26 TABLET, FILM COATED ORAL at 05:43

## 2021-05-11 RX ADMIN — APIXABAN 5 MILLIGRAM(S): 2.5 TABLET, FILM COATED ORAL at 05:43

## 2021-05-11 RX ADMIN — MORPHINE SULFATE 2 MILLIGRAM(S): 50 CAPSULE, EXTENDED RELEASE ORAL at 08:51

## 2021-05-11 RX ADMIN — SPIRONOLACTONE 50 MILLIGRAM(S): 25 TABLET, FILM COATED ORAL at 05:43

## 2021-05-11 RX ADMIN — APIXABAN 5 MILLIGRAM(S): 2.5 TABLET, FILM COATED ORAL at 17:59

## 2021-05-11 NOTE — H&P ADULT - ASSESSMENT
70 M PMH afib/PE on eliquis, CVA, HTN, CHFrEF presenting for knee pain X 6 months, not improving. Had a visit with orthopedist last week, was given cortisone shot. Symptoms not improving. Patient unable to carry out ADL's.     In ED VS stable. Patient being admitted for safe placement     IMPRESSION  Chronic Knee Pain Impairing ADL  CHFrEF  A-fib on Eliquis  PE   CVA  Macrocytic Anemia    #Chronic Knee Pain Impairing ADL  -pt eval  -physiatry eval  -esr moderately elevated when corrected for age, can consider rheum w/u op    #CHFrEF  -pro-bnp:   -keep i < o,  trend daily weights, daily bmp  -check tsh/t4, lipids, a1cd    #Macrocytic Anemia  -check b12/folate/tsh    #A-fib on Eliquis/PE  -c/w eliquis       70 M PMH afib/PE on eliquis, CVA, HTN, CHFrEF presenting for knee pain X 6 months, not improving. Had a visit with orthopedist last week, was given cortisone shot. Symptoms not improving. Patient unable to carry out ADL's.  In ED VS stable. Patient being admitted for safe placement     IMPRESSION  Chronic Knee Pain Impairing ADL  CHFrEF  Chronic A-fib on Eliquis  PE   CVA  Macrocytic Anemia    #Chronic Knee Pain Impairing ADL  -pt eval  -physiatry eval  -esr moderately elevated when corrected for age, can consider rheum w/u op    #CHFrEF  -can check pro-bnp  -keep i < o,  trend daily weights, daily bmp  -check tsh/t4, lipids, a1cd    #Macrocytic Anemia  -check b12/folate/tsh    #A-fib on Eliquis/PE  -c/w eliquis    ______________________________________  DIET-regular  DVT ppx-eliquis  GI ppx-protonix  dispo-acute, from home, needs placement vs VNS

## 2021-05-11 NOTE — PHYSICAL THERAPY INITIAL EVALUATION ADULT - RANGE OF MOTION EXAMINATION, REHAB EVAL
Decreased L LE AROM secondary to pain./bilateral upper extremity ROM was WNL (within normal limits)/Right LE ROM was WNL (within normal limits)

## 2021-05-11 NOTE — ED PROVIDER NOTE - ATTENDING CONTRIBUTION TO CARE
70M h/o AF on elliquis, chf (ef 23%) s/p aicd on digoxin, lasix, spironolactone, cva p/w worsening L knee pain x 6 mos. HPI obtained from pt, wife& sister, rpt worsening L knee & leg pain x 6 mos, with difficulty ambulating, saw Ortho few days ago had neg xr rec'd steroid inj w/min improvement. Per wife pt unable to ambulate @ home, cannot perform ADLs, no HHA set-up. Unknown if pt has ever rec'd op CT/MRI, despite triage note. Denies any recent falls or trauma.  No other medical complaints.    PE:  elderly m nad  skin warm, dry  ncat  neck supple  red rate irreg rhythm nl s1s2 no mrg  ctab no wrr  abd soft ntnd no palpable masses no rgr  back non-tender no cvat  ext- LLE- L thigh wasting, L knee atx, mild infra-patellar effusion, rom/strength intact, nvid; remainder of ext exam nl no cce dpi  neuro aaox3 grossly nf exam

## 2021-05-11 NOTE — ED PROVIDER NOTE - PHYSICAL EXAMINATION
CONSTITUTIONAL: Well-appearing; well-nourished; in no apparent distress.   CARDIOVASCULAR: Normal S1, S2; no murmurs, rubs, or gallops.   RESPIRATORY: Normal chest excursion with respiration; breath sounds clear and equal bilaterally; no wheezes, rhonchi, or rales.  MS: b/l LE muscle wasting/atrophy; left knee pain with rom and some swelling inferiorly; No evidence of trauma or deformity. distal pulses are normal.   SKIN: Normal for age and race; warm; dry; good turgor; no apparent lesions or exudate.   NEURO/PSYCH: A & O x 4; grossly unremarkable. mood and manner are appropriate.

## 2021-05-11 NOTE — CONSULT NOTE ADULT - SUBJECTIVE AND OBJECTIVE BOX
Orthopaedics Consult Note    SAMANTHA CHARLES  [id number]    Time consult called: 3 pm   Time patient seen: 4 pm    Patient is a 70y year old Male with progressive left knee pain for the last year, says it became more painful within the last two weeks. At baseline, patient walks with a cane. Denies recent injury to the knee. Denies recent illness. Was able to walk with PT and negotiate stairs today. Was able to take a few short steps with the author of this note today. Denies numbness or tingling    PMH/PSH  KNEE PAIN;INABILITY TO AMBULATE DUE TO KNEE    ^LEFT LEG PAIN    H/o or current diagnosis of HF- ACEI/ARB contraindication unknown    H/o or current diagnosis of HF- no contraindication to ACEI/ARBs    H/o or current diagnosis of HF- ACEI/ARB contraindication unknown    H/o or current diagnosis of HF- no contraindication to ACEI/ARBs    No pertinent family history in first degree relatives    Handoff    MEWS Score    No pertinent past medical history    Systolic congestive heart failure, unspecified chronicity    Cerebrovascular accident (CVA) due to bilateral embolism of posterior cerebral arteries    Essential hypertension    Oxygen dependent    Atrial fibrillation, unspecified type    Muscle weakness (generalized)    Knee pain    No significant past surgical history    AICD (automatic cardioverter/defibrillator) present    LEFT LEG PAIN    90+    Inability to ambulate due to knee    [email or web address]        Medications  apixaban 5 milliGRAM(s) Oral every 12 hours  morphine  - Injectable 2 milliGRAM(s) IV Push every 3 hours PRN  sacubitril 24 mG/valsartan 26 mG 1 Tablet(s) Oral two times a day  spironolactone 50 milliGRAM(s) Oral daily      Allergies  cephalexin (Flushing)  penicillin (Rash)        T(C): 36.8 (05-11-21 @ 14:01), Max: 36.9 (05-10-21 @ 22:18)  HR: 92 (05-11-21 @ 17:34) (70 - 92)  BP: 127/58 (05-11-21 @ 17:34) (108/56 - 127/58)  RR: 18 (05-11-21 @ 17:34) (18 - 18)  SpO2: 100% (05-11-21 @ 17:34) (97% - 100%)    Physical Exam  NAD  Breathing comfortably on RA  Resting comfortably    LLE  Skin intact  Minimal effusion  Knee AROM 0-45  No pain with short arc ROM  No gross deformity/laceration/abrasion noted  No medial or lateral joint line ttp  Stable to varus/valgus stress  Neg ant/post drawaer  Neg lachman  Able to SLR at edge of the bed  Motor: TA/EHL/FHL/Gastroc intact  Sensory: SP/DP/Robles/Sa intact  Vasc: foot WWP, 2+ DP pulse    Labs                        10.6   5.63  )-----------( 199      ( 10 May 2021 22:51 )             33.3     05-10    139  |  105  |  22<H>  ----------------------------<  115<H>  4.8   |  23  |  0.9    Ca    8.9      10 May 2021 22:51    TPro  6.3  /  Alb  3.6  /  TBili  0.2  /  DBili  <0.2  /  AST  25  /  ALT  14  /  AlkPhos  376<H>  05-11    LIVER FUNCTIONS - ( 11 May 2021 10:40 )  Alb: 3.6 g/dL / Pro: 6.3 g/dL / ALK PHOS: 376 U/L / ALT: 14 U/L / AST: 25 U/L / GGT: x           Img  XR: diffuse osteopenia, tricompartmental athritis, med>lat joint space narrowing, no fractures, no dislocations    CT: diffuse osteopenia, tricompartmental athritis, med>lat joint space narrowing, no fractures, no dislocations      A/P: Patient is a 70y year old Male with left knee tricompartmental arthritis     WBAT LLE  Continue PT  Ice/elevation prn  NSAIDS if tolerated and cleared by primary team  Return to clinic: Orthopaedic with Dr. Arellano, please call [phone] to schedule an appointment once discharged  Return to ED with uncontrolled pain/bleeding/fever/chills/numbness/tingling/cool extremity/inability to move extremity           Orthopaedics Consult Note    SAMANTHA CHARLES  [id number]    Time consult called: 3 pm   Time patient seen: 4 pm    Patient is a 70y year old Male with progressive left knee pain for the last year, says it became more painful within the last two weeks. At baseline, patient walks with a cane. Denies recent injury to the knee. Denies recent illness. Was able to walk with PT and negotiate stairs today. Was able to take a few short steps with the author of this note today. Denies numbness or tingling    PMH/PSH  KNEE PAIN;INABILITY TO AMBULATE DUE TO KNEE    ^LEFT LEG PAIN    H/o or current diagnosis of HF- ACEI/ARB contraindication unknown    H/o or current diagnosis of HF- no contraindication to ACEI/ARBs    H/o or current diagnosis of HF- ACEI/ARB contraindication unknown    H/o or current diagnosis of HF- no contraindication to ACEI/ARBs    No pertinent family history in first degree relatives    Handoff    MEWS Score    No pertinent past medical history    Systolic congestive heart failure, unspecified chronicity    Cerebrovascular accident (CVA) due to bilateral embolism of posterior cerebral arteries    Essential hypertension    Oxygen dependent    Atrial fibrillation, unspecified type    Muscle weakness (generalized)    Knee pain    No significant past surgical history    AICD (automatic cardioverter/defibrillator) present    LEFT LEG PAIN    90+    Inability to ambulate due to knee    [email or web address]        Medications  apixaban 5 milliGRAM(s) Oral every 12 hours  morphine  - Injectable 2 milliGRAM(s) IV Push every 3 hours PRN  sacubitril 24 mG/valsartan 26 mG 1 Tablet(s) Oral two times a day  spironolactone 50 milliGRAM(s) Oral daily      Allergies  cephalexin (Flushing)  penicillin (Rash)        T(C): 36.8 (05-11-21 @ 14:01), Max: 36.9 (05-10-21 @ 22:18)  HR: 92 (05-11-21 @ 17:34) (70 - 92)  BP: 127/58 (05-11-21 @ 17:34) (108/56 - 127/58)  RR: 18 (05-11-21 @ 17:34) (18 - 18)  SpO2: 100% (05-11-21 @ 17:34) (97% - 100%)    Physical Exam  NAD  Breathing comfortably on RA  Resting comfortably    LLE  Skin intact  Minimal effusion  Knee AROM 0-45  No pain with short arc ROM  No gross deformity/laceration/abrasion noted  No medial or lateral joint line ttp  Stable to varus/valgus stress  Neg ant/post drawaer  Neg lachman  Able to SLR at edge of the bed  Motor: TA/EHL/FHL/Gastroc intact  Sensory: SP/DP/Robles/Sa intact  Vasc: foot WWP, 2+ DP pulse    Labs                        10.6   5.63  )-----------( 199      ( 10 May 2021 22:51 )             33.3     05-10    139  |  105  |  22<H>  ----------------------------<  115<H>  4.8   |  23  |  0.9    Ca    8.9      10 May 2021 22:51    TPro  6.3  /  Alb  3.6  /  TBili  0.2  /  DBili  <0.2  /  AST  25  /  ALT  14  /  AlkPhos  376<H>  05-11    LIVER FUNCTIONS - ( 11 May 2021 10:40 )  Alb: 3.6 g/dL / Pro: 6.3 g/dL / ALK PHOS: 376 U/L / ALT: 14 U/L / AST: 25 U/L / GGT: x           esr 53    Img  XR: diffuse osteopenia, tricompartmental athritis, med>lat joint space narrowing, no fractures, no dislocations    CT: diffuse osteopenia, tricompartmental athritis, med>lat joint space narrowing, no fractures, no dislocations      A/P: Patient is a 70y year old Male with left knee tricompartmental arthritis     WBAT LLE  Continue PT  Ice/elevation prn  NSAIDS if tolerated and cleared by primary team  Return to clinic: Orthopaedic with Dr. Arellano, please call [phone] to schedule an appointment once discharged  Return to ED with uncontrolled pain/bleeding/fever/chills/numbness/tingling/cool extremity/inability to move extremity

## 2021-05-11 NOTE — PHYSICAL THERAPY INITIAL EVALUATION ADULT - GENERAL OBSERVATIONS, REHAB EVAL
Pt was seen from 9:45-10:15 for PT IE. Patient was received in semi-reclined in bed, + IVL, NAD, agreeable to participate in PT. Patient was left as found NAD.

## 2021-05-11 NOTE — PHYSICAL THERAPY INITIAL EVALUATION ADULT - PERTINENT HX OF CURRENT PROBLEM, REHAB EVAL
70 M PMH afib/PE on eliquis, CVA, HTN, CHFrEF presenting for left knee pain X 6 months, not improving. Began insidiously in Dec 2020.

## 2021-05-11 NOTE — PHYSICAL THERAPY INITIAL EVALUATION ADULT - GAIT TRAINING, PT EVAL
Goal: By discharge: Ambulation: 150 feet with Rolling walker, Independent Stairs: 16 with 1-2 HR with supervision

## 2021-05-11 NOTE — PHYSICAL THERAPY INITIAL EVALUATION ADULT - LIVES WITH, PROFILE
and mother in law. Patient lives in a , has 14 steps to enter and 16 steps to go up to bedroom, without handrail./spouse

## 2021-05-11 NOTE — H&P ADULT - HISTORY OF PRESENT ILLNESS
70 M PMH afib/PE on eliquis, CVA, HTN, CHFrEF presenting for knee pain X 6 months, not improving. Had a visit with orthopedist last week, was given cortisone shot. Symptoms not improving. Patient unable to carry out ADL's.     In ED VS stable. Patient being admitted for safe placement     70 M PMH afib/PE on eliquis, CVA, HTN, CHFrEF presenting for left knee pain X 6 months, not improving. Began insidiously in Dec 2020.  Had a visit with orthopedist last week, was given cortisone shot. Symptoms not improving. Patient unable to carry out ADL's. Lives with wife at home, and his mother in law. Pt mother in law is sick, and pt wife has a hard time helping both her mother and pt. Pt compliant with HF medications, reports no cardiac sx.  Denies blurry vision, chest pain, dyspnea, chills, fevers, constipation, diarrhea, dysuria.     In ED VS stable. Patient being admitted for rehab eval as he cannot carry out ADL

## 2021-05-11 NOTE — ED PROVIDER NOTE - CLINICAL SUMMARY MEDICAL DECISION MAKING FREE TEXT BOX
chronic LLE/knee pain x 6 mos, now unable to ambulate or perform ADLs 2/2 pain - xrs unrevealing, labs wnl, admitted for inpatient pt/rehab c/s

## 2021-05-11 NOTE — OCCUPATIONAL THERAPY INITIAL EVALUATION ADULT - PERTINENT HX OF CURRENT PROBLEM, REHAB EVAL
70 M PMH afib/PE on eliquis, CVA, HTN, CHFrEF presenting for left knee pain X 6 months, not improving. Began insidiously in Dec 2020.  Had a visit with orthopedist last week, was given cortisone shot. Symptoms not improving. Patient unable to carry out ADL's. Lives with wife at home, and his mother in law. Pt mother in law is sick, and pt wife has a hard time helping both her mother and pt

## 2021-05-11 NOTE — H&P ADULT - NSICDXPASTMEDICALHX_GEN_ALL_CORE_FT
PAST MEDICAL HISTORY:  Atrial fibrillation, unspecified type     Cerebrovascular accident (CVA) due to bilateral embolism of posterior cerebral arteries     Essential hypertension     Muscle weakness (generalized)     Oxygen dependent     Systolic congestive heart failure, unspecified chronicity

## 2021-05-11 NOTE — ED PROVIDER NOTE - PROGRESS NOTE DETAILS
d/w sister at bedside, wife and brother over the phone who agree unable to care for pt, admission for rehab/poss placement.

## 2021-05-11 NOTE — ED PROVIDER NOTE - OBJECTIVE STATEMENT
pt with pmhx atrial fibrillation and PE on eliquis, CVA, HTN, CHF presents for left knee pain for 6 months, worsening recently. post ortho visit 5 days ago where he had XR that showed arthritis, given cortisone shot without relief. has since had so much pain, unable to ambulate well/carry of ADLs per wife, sister, and brother. lives in 2nd floor apartment. pain is sharp, nonradiating, moderate. denies exacerbating or relieving factors. Denies fever/chill/HA/dizziness/chest pain/palpitation/sob/abd pain/n/v/d/ black stool/bloody stool/urinary sxs

## 2021-05-11 NOTE — CONSULT NOTE ADULT - SUBJECTIVE AND OBJECTIVE BOX
HPI:  70 M PMH afib/PE on eliquis, CVA, HTN, CHFrEF presenting for left knee pain X 6 months, not improving. Began insidiously in Dec 2020.  Had a visit with orthopedist last week, was given cortisone shot. Symptoms not improving. Patient unable to carry out ADL's. Lives with wife at home, and his mother in law. Pt mother in law is sick, and pt wife has a hard time helping both her mother and pt. Pt compliant with HF medications, reports no cardiac sx.  Denies blurry vision, chest pain, dyspnea, chills, fevers, constipation, diarrhea, dysuria.     In ED VS stable. Patient being admitted for rehab eval as he cannot carry out ADL    (11 May 2021 02:35)      PAST MEDICAL & SURGICAL HISTORY:  Systolic congestive heart failure, unspecified chronicity    Cerebrovascular accident (CVA) due to bilateral embolism of posterior cerebral arteries    Essential hypertension    Oxygen dependent    Atrial fibrillation, unspecified type    Muscle weakness (generalized)    AICD (automatic cardioverter/defibrillator) present        Hospital Course:  He recently got  a cane. He had a recent left corticosteroid injection. His left knee pain is an anterior pain. Worsens with stairs and flexion of knee. Postive theatre sign.  Ortho to see.  TODAY'S SUBJECTIVE & REVIEW OF SYMPTOMS:     Constitutional WNL   Cardio CHF, AICD   Resp WNL   GI WNL  Heme WNL  Endo WNL  Skin WNL  MSK left knee pain  Neuro WNL  Cognitive WNL  Psych WNL      MEDICATIONS  (STANDING):  apixaban 5 milliGRAM(s) Oral every 12 hours  sacubitril 24 mG/valsartan 26 mG 1 Tablet(s) Oral two times a day  spironolactone 50 milliGRAM(s) Oral daily    MEDICATIONS  (PRN):  morphine  - Injectable 2 milliGRAM(s) IV Push every 3 hours PRN Severe Pain (7 - 10)      FAMILY HISTORY:  No pertinent family history in first degree relatives        Allergies    cephalexin (Flushing)  penicillin (Rash)    Intolerances        SOCIAL HISTORY:    [  ] Etoh  [  ] Smoking  [  ] Substance abuse     Home Environment:  [  ] Home Alone  [x  ] Lives with Family-spouse, mother in law  [  ] Home Health Aid    Dwelling:  [  ] Apartment  [x  ] Private House  [  ] Adult Home  [  ] Skilled Nursing Facility      [  ] Short Term  [  ] Long Term  [x  ] Stairs-16 to enter       Elevator [  ]    FUNCTIONAL STATUS PTA: (Check all that apply)  Ambulation: [x   ]Independent    [  ] Dependent     [  ] Non-Ambulatory  Assistive Device: [x  ] SA Cane  [  ]  Q Cane  [  ] Walker  [  ]  Wheelchair  ADL : [x  ] Independent  [  ]  Dependent       Vital Signs Last 24 Hrs  T(C): 36.9 (11 May 2021 05:52), Max: 36.9 (10 May 2021 22:18)  T(F): 98.5 (11 May 2021 05:52), Max: 98.5 (10 May 2021 22:18)  HR: 70 (11 May 2021 05:52) (70 - 80)  BP: 126/61 (11 May 2021 05:52) (108/56 - 126/61)  BP(mean): --  RR: 18 (11 May 2021 05:52) (18 - 18)  SpO2: 99% (11 May 2021 05:52) (97% - 99%)      PHYSICAL EXAM: Alert & Oriented X3  GENERAL: NAD, well-groomed, well-developed  HEAD:  Atraumatic, Normocephalic  EYES: EOMI, PERRLA, conjunctiva and sclera clear  NECK: Supple, No JVD, Normal thyroid  CHEST/LUNG: Clear bilaterally; No rales, rhonchi, wheezing, or rubs  HEART: Regular rate and rhythm; No murmurs, rubs, or gallops  ABDOMEN: Soft, Nontender, Nondistended; Bowel sounds present  EXTREMITIES:  2+ Peripheral Pulses, No clubbing, cyanosis, or edema    NERVOUS SYSTEM:  Cranial Nerves 2-12 intact [  ] Abnormal  [  ]  ROM: WFL all extremities [  ]  Abnormal [ x ] pain with flexion of left knee--anterior knee pain; hypermobile patella   Motor Strength: WFL all extremities  [  ]  Abnormal [  ]  Sensation: intact to light touch [ x ] Abnormal [  ]  Reflexes: Symmetric [  ]  Abnormal [  ]    FUNCTIONAL STATUS:  Bed Mobility: Independent [  ]  Supervision [  ]  Needs Assistance [  ]  N/A [  ]  Transfers: Independent [  ]  Supervision [  ]  Needs Assistance [  ]  N/A [  ]   Ambulation: Independent [  ]  Supervision [  ]  Needs Assistance [  ]  N/A [  ]  ADL: Independent [  ] Requires Assistance [  ] N/A [  ]      LABS:                        10.6   5.63  )-----------( 199      ( 10 May 2021 22:51 )             33.3     05-10    139  |  105  |  22<H>  ----------------------------<  115<H>  4.8   |  23  |  0.9    Ca    8.9      10 May 2021 22:51            RADIOLOGY & ADDITIONAL STUDIES:    Assesment:

## 2021-05-11 NOTE — H&P ADULT - ATTENDING COMMENTS
71 YO M with a PMH of chronic Afib/PE (eliquis), CVA, HTN, and HFrEF who presents to the hospital with a c/o difficulty with ambulation due to left knee pain that has progressively worsened over the past x 6 months, acutely worse after having a cortisone injection x 1 week ago. Denies any swelling, trama/falls, fevers/chills, CP, or rashes.     In the ED, XRs of the knee were negative for acute process. Pt unable to ambulate after pain meds, admitted for possible rehab placement.     Physical exam shows pt in NAD. VSS, afebrile, not hypoxic on RA. A&Ox3. Non-focal neuro exam. Muscle strength/sensation intact. CTA B/L with no W/C/R. RRR, no M/G/R. ABD is soft and non-tender, normoactive BSs. Left knee w/o swelling, TTP, or warmth; no short-arc pain w/ ROM; pain with passive ROM at 45 degress; distal pulses/sensation intact. No rashes. Labs and radiology as above.     Inability to ambulate due to left knee pain, suspect acute on chronic OA, less likely septic arthritis. No sepsis present on admission. Ortho eval. PT eval. PRN pain meds. No ABXs for now. Fall precautions.     Macrocytic anemia, above baseline. Send B12/Folate levels. Replace PRN.     HX of chronic Afib/PE (eliquis), CVA, HTN, and HFrEF. Restart home meds, except as stated above. DVT PPX. Inform PCP of pt's admission to hospital. My note supersedes the residents note.

## 2021-05-11 NOTE — ED PROVIDER NOTE - NS ED ROS FT
Constitutional: no fever, chills, no recent weight loss, change in appetite or malaise  Eyes: no redness/discharge/pain/vision changes  ENT: no rhinorrhea/ear pain/sore throat  Cardiac: No chest pain, SOB or edema.  Respiratory: No cough or respiratory distress  GI: No nausea, vomiting, diarrhea or abdominal pain.  : No dysuria, frequency, urgency or hematuria  MS: no pain to back or other extremities  Neuro: No headache or weakness. No LOC.  Skin: No skin rash.  Except as documented in the HPI, all other systems are negative.

## 2021-05-12 ENCOUNTER — TRANSCRIPTION ENCOUNTER (OUTPATIENT)
Age: 71
End: 2021-05-12

## 2021-05-12 LAB
AMPHET UR-MCNC: NEGATIVE — SIGNIFICANT CHANGE UP
BARBITURATES UR SCN-MCNC: NEGATIVE — SIGNIFICANT CHANGE UP
BENZODIAZ UR-MCNC: NEGATIVE — SIGNIFICANT CHANGE UP
COCAINE METAB.OTHER UR-MCNC: NEGATIVE — SIGNIFICANT CHANGE UP
COVID-19 SPIKE DOMAIN AB INTERP: POSITIVE
COVID-19 SPIKE DOMAIN ANTIBODY RESULT: 0.82 U/ML — HIGH
DRUG SCREEN 1, URINE RESULT: SIGNIFICANT CHANGE UP
FOLATE SERPL-MCNC: 13.5 NG/ML — SIGNIFICANT CHANGE UP
METHADONE UR-MCNC: NEGATIVE — SIGNIFICANT CHANGE UP
OPIATES UR-MCNC: NEGATIVE — SIGNIFICANT CHANGE UP
PCP UR-MCNC: NEGATIVE — SIGNIFICANT CHANGE UP
PROPOXYPHENE QUALITATIVE URINE RESULT: NEGATIVE — SIGNIFICANT CHANGE UP
SARS-COV-2 IGG+IGM SERPL QL IA: 0.82 U/ML — HIGH
SARS-COV-2 IGG+IGM SERPL QL IA: POSITIVE
THC UR QL: NEGATIVE — SIGNIFICANT CHANGE UP
TSH SERPL-MCNC: 0.7 UIU/ML — SIGNIFICANT CHANGE UP (ref 0.27–4.2)
VIT B12 SERPL-MCNC: 993 PG/ML — SIGNIFICANT CHANGE UP (ref 232–1245)

## 2021-05-12 PROCEDURE — 99233 SBSQ HOSP IP/OBS HIGH 50: CPT

## 2021-05-12 RX ORDER — KETOROLAC TROMETHAMINE 30 MG/ML
30 SYRINGE (ML) INJECTION THREE TIMES A DAY
Refills: 0 | Status: DISCONTINUED | OUTPATIENT
Start: 2021-05-12 | End: 2021-05-13

## 2021-05-12 RX ORDER — OXYCODONE AND ACETAMINOPHEN 5; 325 MG/1; MG/1
1 TABLET ORAL EVERY 4 HOURS
Refills: 0 | Status: DISCONTINUED | OUTPATIENT
Start: 2021-05-12 | End: 2021-05-13

## 2021-05-12 RX ADMIN — Medication 30 MILLIGRAM(S): at 21:23

## 2021-05-12 RX ADMIN — Medication 30 MILLIGRAM(S): at 14:10

## 2021-05-12 RX ADMIN — SACUBITRIL AND VALSARTAN 1 TABLET(S): 24; 26 TABLET, FILM COATED ORAL at 05:42

## 2021-05-12 RX ADMIN — Medication 30 MILLIGRAM(S): at 14:18

## 2021-05-12 RX ADMIN — SPIRONOLACTONE 50 MILLIGRAM(S): 25 TABLET, FILM COATED ORAL at 05:42

## 2021-05-12 RX ADMIN — Medication 30 MILLIGRAM(S): at 22:10

## 2021-05-12 RX ADMIN — SACUBITRIL AND VALSARTAN 1 TABLET(S): 24; 26 TABLET, FILM COATED ORAL at 17:47

## 2021-05-12 RX ADMIN — APIXABAN 5 MILLIGRAM(S): 2.5 TABLET, FILM COATED ORAL at 17:47

## 2021-05-12 RX ADMIN — MORPHINE SULFATE 2 MILLIGRAM(S): 50 CAPSULE, EXTENDED RELEASE ORAL at 06:50

## 2021-05-12 RX ADMIN — OXYCODONE AND ACETAMINOPHEN 1 TABLET(S): 5; 325 TABLET ORAL at 11:32

## 2021-05-12 RX ADMIN — MORPHINE SULFATE 2 MILLIGRAM(S): 50 CAPSULE, EXTENDED RELEASE ORAL at 07:55

## 2021-05-12 RX ADMIN — APIXABAN 5 MILLIGRAM(S): 2.5 TABLET, FILM COATED ORAL at 05:42

## 2021-05-12 RX ADMIN — OXYCODONE AND ACETAMINOPHEN 1 TABLET(S): 5; 325 TABLET ORAL at 10:54

## 2021-05-12 NOTE — DISCHARGE NOTE NURSING/CASE MANAGEMENT/SOCIAL WORK - PATIENT PORTAL LINK FT
You can access the FollowMyHealth Patient Portal offered by [facility] by registering at the following website: [email or web address]. By joining [facility]’s FollowMyHealth portal, you will also be able to view your health information using other applications (apps) compatible with our system.

## 2021-05-12 NOTE — CONSULT NOTE ADULT - SUBJECTIVE AND OBJECTIVE BOX
Chief Complaint: L knee pain     HPI:  70 M PMH afib/PE on eliquis, CVA, HTN, CHFrEF presenting for left knee pain X 6 months, not improving. Began insidiously in Dec 2020.  Had a visit with orthopedist last week, was given cortisone shot. Symptoms not improving. Patient unable to carry out ADL's. Lives with wife at home, and his mother in law. Pt mother in law is sick, and pt wife has a hard time helping both her mother and pt. Pt compliant with HF medications, reports no cardiac sx.  Denies blurry vision, chest pain, dyspnea, chills, fevers, constipation, diarrhea, dysuria.     Pain HPI:    Pain he said began about one year ago, but has been progressing slowly over the past 6 months until the pain has become extreme. The pain is sharp, stabbing when moving. The pain stays at the anterior knee, non-radiating, painless at rest, only painful with motion. No pain with palpation of the knee, no allodynia. He says he has not been taking any medications at home for the pain other than advil/tylenol.     PAST MEDICAL & SURGICAL HISTORY:  Systolic congestive heart failure, unspecified chronicity    Cerebrovascular accident (CVA) due to bilateral embolism of posterior cerebral arteries    Essential hypertension    Oxygen dependent    Atrial fibrillation, unspecified type    Muscle weakness (generalized)    AICD (automatic cardioverter/defibrillator) present        FAMILY HISTORY:  No pertinent family history in first degree relatives        SOCIAL HISTORY:  [ ] Denies Smoking, Alcohol, or Drug Use    Allergies    cephalexin (Flushing)  penicillin (Rash)    Intolerances        PAIN MEDICATIONS:  ketorolac   Injectable 30 milliGRAM(s) IV Push three times a day  oxycodone    5 mG/acetaminophen 325 mG 1 Tablet(s) Oral every 4 hours PRN    Heme:  apixaban 5 milliGRAM(s) Oral every 12 hours    Antibiotics:    Cardiovascular:  sacubitril 24 mG/valsartan 26 mG 1 Tablet(s) Oral two times a day  spironolactone 50 milliGRAM(s) Oral daily    GI:    Endocrine:    All Other Medications:      REVIEW OF SYSTEMS:    CONSTITUTIONAL: No fever, weight loss, or fatigue  EYES: No eye pain, visual disturbances, or discharge  ENMT:  No difficulty hearing, tinnitus, vertigo; No sinus or throat pain  NECK: No pain or stiffness  BREASTS: No pain, masses, or nipple discharge  RESPIRATORY: No cough, wheezing, chills or hemoptysis; No shortness of breath  CARDIOVASCULAR: No chest pain, palpitations, dizziness, or leg swelling  GASTROINTESTINAL: No abdominal or epigastric pain. No nausea, vomiting, or hematemesis; No diarrhea or constipation. No melena or hematochezia.  GENITOURINARY: No dysuria, frequency, hematuria, or incontinence  NEUROLOGICAL: No headaches, memory loss, loss of strength, numbness, or tremors  SKIN: No itching, burning, rashes, or lesions   LYMPH NODES: No enlarged glands  ENDOCRINE: No heat or cold intolerance; No hair loss  MUSCULOSKELETAL: No joint pain or swelling; No muscle, back, or extremity pain  PSYCHIATRIC: No depression, anxiety, mood swings, or difficulty sleeping  HEME/LYMPH: No easy bruising, or bleeding gums  ALLERY AND IMMUNOLOGIC: No hives or eczema      Vital Signs Last 24 Hrs  T(C): 36.7 (12 May 2021 05:02), Max: 37.5 (11 May 2021 21:48)  T(F): 98 (12 May 2021 05:02), Max: 99.5 (11 May 2021 21:48)  HR: 89 (12 May 2021 05:02) (89 - 99)  BP: 115/65 (12 May 2021 05:02) (115/65 - 132/67)  BP(mean): --  RR: 18 (12 May 2021 05:02) (18 - 18)  SpO2: 99% (12 May 2021 05:02) (99% - 100%)    PAIN SCORE:         SCALE USED: (1-10 VNRS)             PHYSICAL EXAM:    GENERAL: NAD, well-groomed, thin  HEAD:  Atraumatic, Normocephalic  EYES: EOMI, PERRLA, conjunctiva and sclera clear  NERVOUS SYSTEM:  Alert & Oriented X3, Good concentration; Moving all 4 extremites, though LLE with pain   CHEST/LUNG: non labored breathing  HEART: S1S1  ABDOMEN: Soft, Nontender, Nondistended;  EXTREMITIES:  2+ Peripheral Pulses, No clubbing, cyanosis, or edema        LABS:                          10.6   5.63  )-----------( 199      ( 10 May 2021 22:51 )             33.3     05-10    139  |  105  |  22<H>  ----------------------------<  115<H>  4.8   |  23  |  0.9    Ca    8.9      10 May 2021 22:51    TPro  6.3  /  Alb  3.6  /  TBili  0.2  /  DBili  <0.2  /  AST  25  /  ALT  14  /  AlkPhos  376<H>  05-11          RADIOLOGY:  < from: CT Knee No Cont, Left (05.11.21 @ 12:10) >    EXAM:  CT KNEE ONLY LT            PROCEDURE DATE:  05/11/2021            INTERPRETATION:  CT OF THE LEFT KNEE WITHOUT CONTRAST    CLINICAL HISTORY: Pain    TECHNIQUE: Images were obtained of the left knee without contrast. Coronal and sagittal reformatted images were also provided.    COMPARISON: Left knee x-ray 5/11/2021    FINDINGS:    BONES/JOINTS: Osteopenia without acutely displaced fracture. There is mild patellofemoral joint space narrowing and mild medial compartment joint space narrowing. There are degenerative changes at the tibial spines and eminence    Minimal quadriceps enthesopathy. No suprapatellar joint effusion. Vascular calcification.    IMPRESSION:    No acute osseous abnormality    No suprapatellar joint effusion                SAM ROBLES MD; Attending Radiologist  This document has been electronically signed. May 11 2021  3:21PM    < end of copied text >  < from: Xray Knee 3 Views, Left (05.11.21 @ 03:05) >    EXAM:  XR KNEE W PATELLA 3 VIEWS LT            PROCEDURE DATE:  05/11/2021            INTERPRETATION:  Clinical History/Reason For Exam: Pain.    Comparison: None.    Procedure: Three views of the left knee.    Findings/  impression: Mild lateral compartment joint space narrowing. No acute fracture or dislocation. No suprapatellar joint effusion. Multifocal arterial vascular calcification noted.              ELIZA SUAREZ MD; Attending Radiologist  This document has been electronically signed. May 11 2021 10:03AM    < end of copied text >      Drug Screen:  This report was requested by: Amanuel Nunez | Reference #: [id number]    There are no results for the search terms that you entered.                 [x ]  [place]  Reviewed and Copied to Chart  This report was requested by: Amanuel Nunez | Reference #: [id number]    There are no results for the search terms that you entered.

## 2021-05-12 NOTE — CONSULT NOTE ADULT - ASSESSMENT
IMPRESSION: Rehab of gait abnormality, left patello-femoral syndrome    PRECAUTIONS: [ x ] Cardiac  [  ] Respiratory  [  ] Seizures [  ] Contact Isolation  [  ] Droplet Isolation  [  ] Other    Weight Bearing Status:     RECOMMENDATION:  A left neoprene knee sleeve with a patellar cut out would be helpful. On a NOVAC, Voltatern gel TID to left anterior knee could be tried.   Out of Bed to Chair     DVT/Decubiti Prophylaxis    REHAB PLAN:     [ x  ] Bedside P/T 3-5 times a week   [   ]   Bedside O/T  2-3 times a week             [   ] No Rehab Therapy Indicated                   [   ]  Speech Therapy   Conditioning/ROM                                    ADL  Bed Mobility                                               Conditioning/ROM  Transfers                                                     Bed Mobility  Sitting /Standing Balance                         Transfers                                        Gait Training                                               Sitting/Standing Balance  Stair Training [   ]Applicable                    Home equipment Eval                                                                          Splinting  [   ] Only      GOALS:   ADL   [ x  ]   Independent                    Transfers  [ x  ] Independent                          Ambulation  [x   ] Independent     [ x   ] With device                            [   ]  CG                                                         [   ]  CG                                                                  [   ] CG                            [    ] Min A                                                   [   ] Min A                                                              [   ] Min  A          DISCHARGE PLAN:   [   ]  Good candidate for Intensive Rehabilitation/Hospital based-[facility]                                             Will tolerate 3hrs Intensive Rehab Daily                                       [ x   ]  Short Term Rehab in Skilled Nursing Facility                                                                  VS                                     [ x   ]  Home with Outpatient or  services.   Voltaren gel TID to left knee; left neoprene knee sleeve with patellar cut out could be tried.                                           [    ]  Possible Candidate for Intensive Hospital based Rehab                                       
70 M PMH afib/PE on eliquis, CVA, HTN, CHFrEF presenting for left knee pain X 6 months, not improving. Began insidiously in Dec 2020 pain consult for severe left knee pain likely 2/2 tricompartmental arthritis    - Tylenol 650mg QID around the clock  - NSAIDS if OK/not contraindicated by primary team  - If pain is severe would trial tramadol 50mg q6h PRN for severe pain, if this does not adequately control the pain would escalate to 100mg q6h PRN - patient is opioid naive would monitor for response  - Consider Gabapentin 100mg TID as opioid sparing medication  - Warm compress to Left knee

## 2021-05-13 ENCOUNTER — TRANSCRIPTION ENCOUNTER (OUTPATIENT)
Age: 71
End: 2021-05-13

## 2021-05-13 LAB
ALBUMIN SERPL ELPH-MCNC: 3.4 G/DL — LOW (ref 3.5–5.2)
ALP SERPL-CCNC: 365 U/L — HIGH (ref 30–115)
ALT FLD-CCNC: 13 U/L — SIGNIFICANT CHANGE UP (ref 0–41)
ANION GAP SERPL CALC-SCNC: 9 MMOL/L — SIGNIFICANT CHANGE UP (ref 7–14)
AST SERPL-CCNC: 19 U/L — SIGNIFICANT CHANGE UP (ref 0–41)
BILIRUB SERPL-MCNC: 0.2 MG/DL — SIGNIFICANT CHANGE UP (ref 0.2–1.2)
BUN SERPL-MCNC: 38 MG/DL — HIGH (ref 10–20)
CALCIUM SERPL-MCNC: 8.6 MG/DL — SIGNIFICANT CHANGE UP (ref 8.5–10.1)
CHLORIDE SERPL-SCNC: 104 MMOL/L — SIGNIFICANT CHANGE UP (ref 98–110)
CO2 SERPL-SCNC: 25 MMOL/L — SIGNIFICANT CHANGE UP (ref 17–32)
CREAT SERPL-MCNC: 1.4 MG/DL — SIGNIFICANT CHANGE UP (ref 0.7–1.5)
GLUCOSE SERPL-MCNC: 103 MG/DL — HIGH (ref 70–99)
HCT VFR BLD CALC: 33 % — LOW (ref 42–52)
HGB BLD-MCNC: 10.5 G/DL — LOW (ref 14–18)
MCHC RBC-ENTMCNC: 31.4 PG — HIGH (ref 27–31)
MCHC RBC-ENTMCNC: 31.8 G/DL — LOW (ref 32–37)
MCV RBC AUTO: 98.8 FL — HIGH (ref 80–94)
NRBC # BLD: 0 /100 WBCS — SIGNIFICANT CHANGE UP (ref 0–0)
PLATELET # BLD AUTO: 181 K/UL — SIGNIFICANT CHANGE UP (ref 130–400)
POTASSIUM SERPL-MCNC: 4.7 MMOL/L — SIGNIFICANT CHANGE UP (ref 3.5–5)
POTASSIUM SERPL-SCNC: 4.7 MMOL/L — SIGNIFICANT CHANGE UP (ref 3.5–5)
PROT SERPL-MCNC: 5.8 G/DL — LOW (ref 6–8)
RBC # BLD: 3.34 M/UL — LOW (ref 4.7–6.1)
RBC # FLD: 13.3 % — SIGNIFICANT CHANGE UP (ref 11.5–14.5)
SODIUM SERPL-SCNC: 138 MMOL/L — SIGNIFICANT CHANGE UP (ref 135–146)
WBC # BLD: 4.88 K/UL — SIGNIFICANT CHANGE UP (ref 4.8–10.8)
WBC # FLD AUTO: 4.88 K/UL — SIGNIFICANT CHANGE UP (ref 4.8–10.8)

## 2021-05-13 PROCEDURE — 99233 SBSQ HOSP IP/OBS HIGH 50: CPT

## 2021-05-13 RX ORDER — OXYCODONE AND ACETAMINOPHEN 5; 325 MG/1; MG/1
1 TABLET ORAL EVERY 6 HOURS
Refills: 0 | Status: DISCONTINUED | OUTPATIENT
Start: 2021-05-13 | End: 2021-05-14

## 2021-05-13 RX ORDER — SODIUM CHLORIDE 9 MG/ML
1000 INJECTION INTRAMUSCULAR; INTRAVENOUS; SUBCUTANEOUS
Refills: 0 | Status: COMPLETED | OUTPATIENT
Start: 2021-05-13 | End: 2021-05-13

## 2021-05-13 RX ORDER — KETOROLAC TROMETHAMINE 30 MG/ML
15 SYRINGE (ML) INJECTION THREE TIMES A DAY
Refills: 0 | Status: DISCONTINUED | OUTPATIENT
Start: 2021-05-13 | End: 2021-05-14

## 2021-05-13 RX ADMIN — SODIUM CHLORIDE 50 MILLILITER(S): 9 INJECTION INTRAMUSCULAR; INTRAVENOUS; SUBCUTANEOUS at 21:27

## 2021-05-13 RX ADMIN — Medication 15 MILLIGRAM(S): at 14:05

## 2021-05-13 RX ADMIN — SACUBITRIL AND VALSARTAN 1 TABLET(S): 24; 26 TABLET, FILM COATED ORAL at 17:37

## 2021-05-13 RX ADMIN — Medication 15 MILLIGRAM(S): at 22:00

## 2021-05-13 RX ADMIN — APIXABAN 5 MILLIGRAM(S): 2.5 TABLET, FILM COATED ORAL at 06:07

## 2021-05-13 RX ADMIN — SACUBITRIL AND VALSARTAN 1 TABLET(S): 24; 26 TABLET, FILM COATED ORAL at 06:07

## 2021-05-13 RX ADMIN — SPIRONOLACTONE 50 MILLIGRAM(S): 25 TABLET, FILM COATED ORAL at 06:07

## 2021-05-13 RX ADMIN — OXYCODONE AND ACETAMINOPHEN 1 TABLET(S): 5; 325 TABLET ORAL at 18:39

## 2021-05-13 RX ADMIN — SODIUM CHLORIDE 50 MILLILITER(S): 9 INJECTION INTRAMUSCULAR; INTRAVENOUS; SUBCUTANEOUS at 13:27

## 2021-05-13 RX ADMIN — Medication 15 MILLIGRAM(S): at 13:28

## 2021-05-13 RX ADMIN — OXYCODONE AND ACETAMINOPHEN 1 TABLET(S): 5; 325 TABLET ORAL at 02:19

## 2021-05-13 RX ADMIN — APIXABAN 5 MILLIGRAM(S): 2.5 TABLET, FILM COATED ORAL at 17:37

## 2021-05-13 RX ADMIN — Medication 30 MILLIGRAM(S): at 06:08

## 2021-05-13 RX ADMIN — Medication 15 MILLIGRAM(S): at 21:26

## 2021-05-13 RX ADMIN — OXYCODONE AND ACETAMINOPHEN 1 TABLET(S): 5; 325 TABLET ORAL at 03:00

## 2021-05-13 RX ADMIN — OXYCODONE AND ACETAMINOPHEN 1 TABLET(S): 5; 325 TABLET ORAL at 19:23

## 2021-05-13 NOTE — PROGRESS NOTE ADULT - ASSESSMENT
· Assessment	  70 M PMH afib/PE on eliquis, CVA, HTN, CHFrEF presenting for knee pain X 6 months, not improving. Had a visit with orthopedist last week, was given cortisone shot. Symptoms not improving. Patient unable to carry out ADL's.   IMPRESSION  Acute on Chronic severe left Knee Pain Impairing ADL  CHFrEF, at baseline  Chronic A-fib on Eliquis  Hx PE   Hx CVA at baseline  Macrocytic Anemia, chronic, stable      #Acute on Chronic severe left Knee Pain, Impairing ADL  -pt eval  -physiatry eval  -esr moderately elevated when corrected for age, can consider rheum w/u op  - f/u CT left knee, pain consult  - cont pain control, f/u drug screen    #CHFrEF, stable at baseline  -can check pro-bnp  -keep i < o,  trend daily weights, daily bmp  -check tsh/t4, lipids, a1cd    #Macrocytic Anemia, stable H/H  -check b12/folate/tsh    #A-fib on Eliquis/PE  -c/w eliquis    ______________________________________  DIET-regular  DVT ppx-eliquis    dispo-acute, from home, needs placement vs [facility]   d/c to rehab tomorrow? if pain better controlled        
· Assessment	  70 M PMH afib/PE on eliquis, CVA, HTN, CHFrEF presenting for knee pain X 6 months, not improving. Had a visit with orthopedist last week, was given cortisone shot. Symptoms not improving. Patient unable to carry out ADL's.   IMPRESSION  Acute on Chronic severe left Knee Pain Impairing ADL  CHFrEF, at baseline  Chronic A-fib on Eliquis  Hx PE   Hx CVA at baseline  Macrocytic Anemia, chronic, stable      #Acute on Chronic severe left Knee Pain, Impairing ADL  -pt eval  -physiatry eval  -esr moderately elevated when corrected for age, can consider rheum w/u op  - f/u CT left knee, pain consult  - cont pain control, f/u drug screen    #CHFrEF, stable at baseline  -can check pro-bnp  -keep i < o,  trend daily weights, daily bmp  -check tsh/t4, lipids, a1cd    #Macrocytic Anemia, stable H/H  -check b12/folate/tsh    #A-fib on Eliquis/PE  -c/w eliquis    ______________________________________  DIET-regular  DVT ppx-eliquis    dispo-acute, from home, needs placement vs [facility]   d/c to rehab tomorrow        
· Assessment	  70 M PMH afib/PE on eliquis, CVA, HTN, CHFrEF presenting for knee pain X 6 months, not improving. Had a visit with orthopedist last week, was given cortisone shot. Symptoms not improving. Patient unable to carry out ADL's.   IMPRESSION  Acute on Chronic severe left Knee Pain Impairing ADL  CHFrEF, at baseline  Chronic A-fib on Eliquis  Hx PE   Hx CVA at baseline  Macrocytic Anemia, chronic, stable      #Acute on Chronic severe left Knee Pain, Impairing ADL  -pt eval  -physiatry eval  -esr moderately elevated when corrected for age, can consider rheum w/u op  - f/u CT left knee, pain consult  - cont pain control, f/u drug screen    #CHFrEF, stable at baseline  -can check pro-bnp  -keep i < o,  trend daily weights, daily bmp  -check tsh/t4, lipids, a1cd    #Macrocytic Anemia, stable H/H  -check b12/folate/tsh    #A-fib on Eliquis/PE  -c/w eliquis    ______________________________________  DIET-regular  DVT ppx-eliquis    dispo-acute, from home, needs placement vs [facility]   pending: f/u CT left knee, pain consult, f/u drug screen

## 2021-05-13 NOTE — DISCHARGE NOTE PROVIDER - HOSPITAL COURSE
71 y/o M with PMH of Afib/PE CVA, HTN, CHFrEF presenting with 6 month history of left knee pain, insidious in onset, with no improvement of pain. Patient admitted for pain control and rehab eval as he is unable to carry out ADLs at home independently. LT knee x-ray revealed no acute fracture, dislocation, or suprapatellar joint effusion.   CT of LT knee showed no acute osseous abnormality or suprapatellar joint effusion    Orthopedic consult ordered with no surgical interventions indicated. Pain Management consulted with  pain control regime implemented, with   some improvement of pain as per pt.   Physiatry team consulted and examined pt with recommendations for bedside physical therapy 3-5 times a week.    Pt examined and deemed medically cleared for discharge by Dr. Gann.        71 y/o M with PMH of Afib/PE CVA, HTN, CHFrEF presenting with 6 month history of left knee pain, insidious in onset, with no improvement of pain. Patient admitted for pain control and rehab eval as he is unable to carry out ADLs at home independently. LT knee x-ray revealed no acute fracture, dislocation, or suprapatellar joint effusion.   CT of LT knee showed no acute osseous abnormality or suprapatellar joint effusion    Orthopedic consult ordered with no surgical interventions indicated. Pain Management consulted with pain control regime implemented; mild  improvement of pain as per pt.   Physiatry team consulted and examined pt with recommendations for bedside physical therapy 3-5 times a week.    Pt examined and deemed medically cleared for discharge by Dr. Gann.        69 y/o M with PMH of Afib/PE CVA, HTN, CHFrEF presenting with 6 month history of left knee pain, insidious in onset, with no improvement of pain. Patient admitted for pain control and rehab eval as he is unable to carry out ADLs at home independently. LT knee x-ray revealed no acute fracture, dislocation, or suprapatellar joint effusion.   CT of LT knee showed no acute osseous abnormality or suprapatellar joint effusion.    Orthopedic consult ordered with no surgical interventions indicated. Outpatient orthopedic follow up recommended. Pain management consulted with pain control regime   recommendations implemented; mild improvement of pain as per pt. Physiatry team consulted and examined pt with recommendations for home physical therapy 3-5 times a week.    Pt examined and deemed medically cleared for discharge by Dr. Gann.

## 2021-05-13 NOTE — DISCHARGE NOTE PROVIDER - CARE PROVIDER_API CALL
Louis Pratt)  Orthopaedic Surgery  [address] [place] [address]  Phone: [phone]  Fax: [phone]  Follow Up Time: 1-3 days   Louis Pratt)  Orthopaedic Surgery  [address] [place] [address]  Phone: [phone]  Fax: [phone]  Follow Up Time: 1-3 days    DAVE CLARKE  Pediatrics  [address] [place] [address]  Phone: [phone]  Fax: [phone]  Follow Up Time: 1-3 days   Louis Pratt)  Orthopaedic Surgery  [address] [place] [address]  Phone: [phone]  Fax: [phone]  Follow Up Time: 1-3 days    Gary Bernard Dr. (Primary Care Provider)  [address] [place] [address]  Phone: [phone]  Fax: [phone]  Phone: [phone]  Fax: (   )    -  Established Patient  Follow Up Time: 1-3 days

## 2021-05-13 NOTE — DISCHARGE NOTE PROVIDER - NSDCCAREPROVSEEN_GEN_ALL_CORE_FT
En Gann- Hospitalist  Sloane Chavez- Orthopedist  Amanuel Nunez - Pain management  Khang Stokes - Physiatry

## 2021-05-13 NOTE — DISCHARGE NOTE PROVIDER - NSDCCPCAREPLAN_GEN_ALL_CORE_FT
PRINCIPAL DISCHARGE DIAGNOSIS  Diagnosis: Knee pain  Assessment and Plan of Treatment: ****Follow up with  Dr. Arellano at orthopedic clinic in 1 week or sooner, Please call [phone] to schedule an appointment.  Knee Pain  Knee pain is a very common symptom and can have many causes. Knee pain often goes away when you follow your health care provider's instructions for relieving pain and discomfort at home. However, knee pain can develop into a condition that needs treatment. Some conditions may include:   Arthritis caused by wear and tear (osteoarthritis).  Arthritis caused by swelling and irritation (rheumatoid arthritis or gout).  A cyst or growth in your knee.  An infection in your knee joint.  An injury that will not heal.  Damage, swelling, or irritation of the tissues that support your knee (torn ligaments or tendinitis).  If your knee pain continues, additional tests may be ordered to diagnose your condition. Tests may include X-rays or other imaging studies of your knee. You may also need to have fluid removed from your knee. Treatment for ongoing knee pain depends on the cause, but treatment may include:  Medicines to relieve pain or swelling.  Steroid injections in your knee.  Physical therapy.  Surgery.  HOME CARE INSTRUCTIONS  Take medicines only as directed by your health care provider.   Rest your knee and keep it raised (elevated) while you are resting.  Do not do things that cause or worsen pain.  Avoid high-impact activities or exercises, such as running, jumping rope, or doing jumping jacks.  Apply ice to the knee area:  Put ice in a plastic bag.  Place a towel between your skin and the bag.  Leave the ice on for 20 minutes, 2–3 times a day.  Ask your health care provider if you should wear an elastic knee support.  Keep a pillow under your knee when you sleep.  Lose weight if you are overweight. Extra weight can put pressure on your knee.  Do not use any tobacco products, including cigarettes, chewing tobacco, or electronic cigarettes. If you need help quitting, ask your health care provider. Smoking may slow the healing of any bone and joint pr      SECONDARY DISCHARGE DIAGNOSES  Diagnosis: Inability to ambulate due to knee  Assessment and Plan of Treatment:      PRINCIPAL DISCHARGE DIAGNOSIS  Diagnosis: Knee pain  Assessment and Plan of Treatment: ****Follow up with Dr. Arellano at orthopedic clinic in 1 -3 days. Please call [phone] to schedule an appointment.  Knee Pain  If your knee pain continues, additional tests may be ordered to diagnose your condition. Tests may include X-rays or other imaging studies of your knee. You may also need to have fluid removed from your knee. Treatment for ongoing knee pain depends on the cause, but treatment may include:  HOME CARE INSTRUCTIONS  Take medicines only as directed by your health care provider.   Rest your knee and keep it raised (elevated) while you are resting.  Do not do things that cause or worsen pain.  Avoid high-impact activities or exercises, such as running, jumping rope, or doing jumping jacks.  Apply ice to the knee area:  Put ice in a plastic bag.  Place a towel between your skin and the bag.  Leave the ice on for 20 minutes, 2–3 times a day as needed.  Ask your health care provider if you should wear an elastic knee support.  Lose weight if you are overweight. Extra weight can put pressure on your knee.  Do not use any tobacco products, including cigarettes, chewing tobacco, or electronic cigarettes. If you need help quitting, ask your health care provider. Smoking may slow the healing of any bone and joint pr      SECONDARY DISCHARGE DIAGNOSES  Diagnosis: Inability to ambulate due to knee  Assessment and Plan of Treatment:      PRINCIPAL DISCHARGE DIAGNOSIS  Diagnosis: Knee pain  Assessment and Plan of Treatment: ****Follow up with Dr. Arellano at orthopedic clinic in 1 -3 days. Please call [phone] to schedule an appointment.  Knee Pain  If your knee pain continues, additional tests may be ordered to diagnose your condition. Tests may include X-rays or other imaging studies of your knee. You may also need to have fluid removed from your knee. Treatment for ongoing knee pain depends on the cause, but treatment may include:  HOME CARE INSTRUCTIONS  Take medicines only as directed by your health care provider.   Rest your knee and keep it raised (elevated) while you are resting.  Do not do things that cause or worsen pain.  Avoid high-impact activities or exercises, such as running, jumping rope, or doing jumping jacks.  Apply ice to the knee area:  Put ice in a plastic bag.  Place a towel between your skin and the bag.  Leave the ice on for 20 minutes, 2–3 times a day as needed.  Ask your health care provider if you should wear an elastic knee support.Lose weight if you are overweight. Extra weight can put pressure on your knee.  Do not use any tobacco products, including cigarettes, chewing tobacco, or electronic cigarettes. If you need help quitting, ask your health care provider. Smoking may slow the healing.        SECONDARY DISCHARGE DIAGNOSES  Diagnosis: Inability to ambulate due to knee  Assessment and Plan of Treatment: ***Please return to Emergency Department if you develop any type of uncontrollable pain, bleeding, fever, chills, numbness, tingling, cool extremity, and/or inability to move extremity***

## 2021-05-13 NOTE — DISCHARGE NOTE PROVIDER - PROVIDER TOKENS
PROVIDER:[TOKEN:[[id number]],FOLLOWUP:[1-3 days]] PROVIDER:[TOKEN:[[id number]],FOLLOWUP:[1-3 days]],PROVIDER:[TOKEN:[[id number]],FOLLOWUP:[1-3 days]] PROVIDER:[TOKEN:[[id number]],FOLLOWUP:[1-3 days]],FREE:[LAST:[Marco Antonio],FIRST:[Gary],PHONE:[phone]],FAX:[(   )    -],ADDRESS:[Dr. Gary Bernard (Primary Care Provider)  [address] [place] [address]  Phone: [phone]  Fax: [phone]],FOLLOWUP:[1-3 days],ESTABLISHEDPATIENT:[T]]

## 2021-05-13 NOTE — DISCHARGE NOTE PROVIDER - CARE PROVIDERS DIRECT ADDRESSES
,[email or web address],DirectAddress_Unknown

## 2021-05-13 NOTE — PROGRESS NOTE ADULT - PROVIDER SPECIALTY LIST ADULT
Hospitalist
[No Acute Distress] : no acute distress
[Well Nourished] : well nourished
[Well-Appearing] : well-appearing
[Well Developed] : well developed
[PERRL] : pupils equal round and reactive to light
[Normal Sclera/Conjunctiva] : normal sclera/conjunctiva
[EOMI] : extraocular movements intact
[Normal Outer Ear/Nose] : the outer ears and nose were normal in appearance
[Normal Oropharynx] : the oropharynx was normal
[No JVD] : no jugular venous distention
[No Lymphadenopathy] : no lymphadenopathy
[Supple] : supple
[Thyroid Normal, No Nodules] : the thyroid was normal and there were no nodules present
[Clear to Auscultation] : lungs were clear to auscultation bilaterally
[No Accessory Muscle Use] : no accessory muscle use
[No Respiratory Distress] : no respiratory distress 
[Normal Rate] : normal rate 
[Regular Rhythm] : with a regular rhythm
[Normal S1, S2] : normal S1 and S2
[No Murmur] : no murmur heard
[No Carotid Bruits] : no carotid bruits
[No Varicosities] : no varicosities
[No Abdominal Bruit] : a ~M bruit was not heard ~T in the abdomen
[Pedal Pulses Present] : the pedal pulses are present
[No Edema] : there was no peripheral edema
[No Palpable Aorta] : no palpable aorta
[No Extremity Clubbing/Cyanosis] : no extremity clubbing/cyanosis
[Soft] : abdomen soft
[Non Tender] : non-tender
[Non-distended] : non-distended
[No Masses] : no abdominal mass palpated
[Normal Posterior Cervical Nodes] : no posterior cervical lymphadenopathy
[Normal Bowel Sounds] : normal bowel sounds
[No HSM] : no HSM
[No CVA Tenderness] : no CVA  tenderness
[Normal Anterior Cervical Nodes] : no anterior cervical lymphadenopathy
[No Spinal Tenderness] : no spinal tenderness
[No Rash] : no rash
[No Joint Swelling] : no joint swelling
[Grossly Normal Strength/Tone] : grossly normal strength/tone
[Coordination Grossly Intact] : coordination grossly intact
[No Focal Deficits] : no focal deficits
[Normal Gait] : normal gait
[Deep Tendon Reflexes (DTR)] : deep tendon reflexes were 2+ and symmetric
[Normal Affect] : the affect was normal
[Normal Insight/Judgement] : insight and judgment were intact

## 2021-05-13 NOTE — DISCHARGE NOTE PROVIDER - NSDCMRMEDTOKEN_GEN_ALL_CORE_FT
Eliquis 5 mg oral tablet: 2 tab(s) orally 2 times a day  for 7 days and then 1 tab orally 2 times a day afterwards   Entresto 24 mg-26 mg oral tablet: 1 tab(s) orally 2 times a day   Eliquis 5 mg oral tablet: 2 tab(s) orally 2 times a day  for 7 days and then 1 tab orally 2 times a day afterwards   Entresto 24 mg-26 mg oral tablet: 1 tab(s) orally 2 times a day  gabapentin 100 mg oral capsule: 1 cap(s) orally 3 times a day MDD:300mg   Eliquis 5 mg oral tablet: 2 tab(s) orally 2 times a day  for 7 days and then 1 tab orally 2 times a day afterwards   Entresto 24 mg-26 mg oral tablet: 1 tab(s) orally 2 times a day  gabapentin 100 mg oral capsule: 1 cap(s) orally 3 times a day MDD:300mg  ketorolac: 15mg 1 cap(s) orally 3 times a day MDD:45mg  oxycodone-acetaminophen 5 mg-325 mg oral tablet: 1 tab(s) orally every 6 hours As Needed for Severe Pain (7 - 10) MDD:4 tabs  spironolactone 25 mg oral tablet: 1 tab(s) orally once a day   Eliquis 5 mg oral tablet: 2 tab(s) orally 2 times a day  for 7 days and then 1 tab orally 2 times a day afterwards   Entresto 24 mg-26 mg oral tablet: 1 tab(s) orally 2 times a day  gabapentin 100 mg oral capsule: 1 cap(s) orally 3 times a day MDD:300mg  ketorolac: 15mg 1 cap(s) orally 3 times a day MDD:45mg  spironolactone 25 mg oral tablet: 1 tab(s) orally once a day   Eliquis 5 mg oral tablet: 2 tab(s) orally 2 times a day  for 7 days and then 1 tab orally 2 times a day afterwards   Entresto 24 mg-26 mg oral tablet: 1 tab(s) orally 2 times a day  gabapentin 100 mg oral capsule: 1 cap(s) orally 3 times a day MDD:300mg  gabapentin 100 mg oral capsule: 1 cap(s) orally 3 times a day  ketorolac: 15 milligram(s) orally 3 times a day   ketorolac 10 mg oral tablet: 1 tab(s) orally every 6 hours   oxycodone-acetaminophen 5 mg-325 mg oral tablet: 1 tab(s) orally every 6 hours, As needed, Severe Pain (7 - 10) MDD:20mg or 4 pills max daily  spironolactone 25 mg oral tablet: 1 tab(s) orally once a day   Eliquis 5 mg oral tablet: 2 tab(s) orally 2 times a day  for 7 days and then 1 tab orally 2 times a day afterwards   Entresto 24 mg-26 mg oral tablet: 1 tab(s) orally 2 times a day  gabapentin 100 mg oral capsule: 1 cap(s) orally 3 times a day MDD:300mg  gabapentin 100 mg oral capsule: 1 cap(s) orally 3 times a day  ketorolac 10 mg oral tablet: 1 tab(s) orally every 6 hours   ketorolac 10 mg oral tablet: 1 tab(s) orally every 6 hours   oxycodone-acetaminophen 5 mg-325 mg oral tablet: 1 tab(s) orally every 6 hours, As needed, Severe Pain (7 - 10) MDD:20mg or 4 pills max daily  oxycodone-acetaminophen 5 mg-325 mg oral tablet: 1 tab(s) orally every 6 hours, As needed, Severe Pain (7 - 10) MDD:20mg  spironolactone 25 mg oral tablet: 1 tab(s) orally once a day   Eliquis 5 mg oral tablet: 2 tab(s) orally 2 times a day  for 7 days and then 1 tab orally 2 times a day afterwards   Entresto 24 mg-26 mg oral tablet: 1 tab(s) orally 2 times a day  gabapentin 100 mg oral capsule: 1 cap(s) orally 3 times a day  ketorolac 10 mg oral tablet: 1 tab(s) orally every 6 hours   oxycodone-acetaminophen 5 mg-325 mg oral tablet: 1 tab(s) orally every 6 hours, As needed, Severe Pain (7 - 10) MDD:20mg  spironolactone 25 mg oral tablet: 1 tab(s) orally once a day

## 2021-05-13 NOTE — PROGRESS NOTE ADULT - SUBJECTIVE AND OBJECTIVE BOX
SAMANTHA CHARLES  70y, Male  Allergy: cephalexin (Flushing)  penicillin (Rash)      OVERNIGHT EVENTS:      Pain decr after treatment started  unremarkable CT left knee,  neg drug screening  hx pace maker  admitted for severe worsening left knee pain without radicular pain, denied LBP, incontinence, fever, neck pain,  neg C-spine, L-spine tenderness  d/c tomorrow if pain better controlled      PHYSICAL EXAM:   Alert & Oriented X3  GENERAL: NAD, well-groomed, well-developed  HEAD:  Atraumatic, Normocephalic  EYES: EOMI, PERRLA, conjunctiva and sclera clear  NECK: Supple, No JVD, Normal thyroid  CHEST/LUNG: Clear bilaterally; No rales, rhonchi, wheezing, or rubs  HEART: Regular rate and rhythm; No murmurs, rubs, or gallops  ABDOMEN: Soft, Nontender, Nondistended; Bowel sounds present  EXTREMITIES:  2+ Peripheral Pulses, No clubbing, cyanosis, or edema  Skin: no rash        VITALS:  T(F): 98 (05-12-21 @ 05:02), Max: 99.5 (05-11-21 @ 21:48)  HR: 89 (05-12-21 @ 05:02)  BP: 115/65 (05-12-21 @ 05:02) (115/65 - 132/67)  RR: 18 (05-12-21 @ 05:02)  SpO2: 99% (05-12-21 @ 05:02)    TESTS & MEASUREMENTS:  Height (cm): 170.2 (05-10-21 @ 22:18)  Weight (kg): 55.4 (05-11-21 @ 05:52)  BMI (kg/m2): 19.1 (05-11-21 @ 05:52)    05-11-21 @ 07:01  -  05-12-21 @ 07:00  --------------------------------------------------------  IN: 0 mL / OUT: 600 mL / NET: -600 mL                            10.6   5.63  )-----------( 199      ( 10 May 2021 22:51 )             33.3       05-10    139  |  105  |  22<H>  ----------------------------<  115<H>  4.8   |  23  |  0.9    Ca    8.9      10 May 2021 22:51    TPro  6.3  /  Alb  3.6  /  TBili  0.2  /  DBili  <0.2  /  AST  25  /  ALT  14  /  AlkPhos  376<H>  05-11    LIVER FUNCTIONS - ( 11 May 2021 10:40 )  Alb: 3.6 g/dL / Pro: 6.3 g/dL / ALK PHOS: 376 U/L / ALT: 14 U/L / AST: 25 U/L / GGT: x                         MEDICATIONS:  MEDICATIONS  (STANDING):  apixaban 5 milliGRAM(s) Oral every 12 hours  ketorolac   Injectable 30 milliGRAM(s) IV Push three times a day  sacubitril 24 mG/valsartan 26 mG 1 Tablet(s) Oral two times a day  spironolactone 50 milliGRAM(s) Oral daily    MEDICATIONS  (PRN):  oxycodone    5 mG/acetaminophen 325 mG 1 Tablet(s) Oral every 4 hours PRN Severe Pain (7 - 10)              HEALTH ISSUES - PROBLEM Dx:                  
  SAMANTHA CHARLES  70y, Male  Allergy: cephalexin (Flushing)  penicillin (Rash)      OVERNIGHT EVENTS:    admitted for severe worsening left knee pain without radicular pain, denied LBP, incontinence, fever, neck pain,  neg C-spine, L-spine tenderness  NAEO  f/u CT left knee, pain consult, drug screening  hx pace maker      PHYSICAL EXAM:   Alert & Oriented X3  GENERAL: NAD, well-groomed, well-developed  HEAD:  Atraumatic, Normocephalic  EYES: EOMI, PERRLA, conjunctiva and sclera clear  NECK: Supple, No JVD, Normal thyroid  CHEST/LUNG: Clear bilaterally; No rales, rhonchi, wheezing, or rubs  HEART: Regular rate and rhythm; No murmurs, rubs, or gallops  ABDOMEN: Soft, Nontender, Nondistended; Bowel sounds present  EXTREMITIES:  2+ Peripheral Pulses, No clubbing, cyanosis, or edema  Skin: no rash    VITALS:  T(F): 98.5 (05-11-21 @ 05:52), Max: 98.5 (05-10-21 @ 22:18)  HR: 70 (05-11-21 @ 05:52)  BP: 126/61 (05-11-21 @ 05:52) (108/56 - 126/61)  RR: 18 (05-11-21 @ 05:52)  SpO2: 99% (05-11-21 @ 05:52)    TESTS & MEASUREMENTS:  Height (cm): 170.2 (05-10-21 @ 22:18)  Weight (kg): 55.4 (05-11-21 @ 05:52)  BMI (kg/m2): 19.1 (05-11-21 @ 05:52)                          10.6   5.63  )-----------( 199      ( 10 May 2021 22:51 )             33.3       05-10    139  |  105  |  22<H>  ----------------------------<  115<H>  4.8   |  23  |  0.9    Ca    8.9      10 May 2021 22:51    TPro  6.3  /  Alb  3.6  /  TBili  0.2  /  DBili  <0.2  /  AST  25  /  ALT  14  /  AlkPhos  376<H>  05-11    LIVER FUNCTIONS - ( 11 May 2021 10:40 )  Alb: 3.6 g/dL / Pro: 6.3 g/dL / ALK PHOS: 376 U/L / ALT: 14 U/L / AST: 25 U/L / GGT: x                         MEDICATIONS:  MEDICATIONS  (STANDING):  apixaban 5 milliGRAM(s) Oral every 12 hours  sacubitril 24 mG/valsartan 26 mG 1 Tablet(s) Oral two times a day  spironolactone 50 milliGRAM(s) Oral daily    MEDICATIONS  (PRN):  morphine  - Injectable 2 milliGRAM(s) IV Push every 3 hours PRN Severe Pain (7 - 10)              HEALTH ISSUES - PROBLEM Dx:                  
  SAMANTHA CHARLES  70y, Male  Allergy: cephalexin (Flushing)  penicillin (Rash)      OVERNIGHT EVENTS:    severe pain cont to improve   pain med further adjusted: percocet 5 q4  => 5 q6h, ketorolac 30 tid => 15 tid, Cr wnl  unremarkable CT left knee,  neg drug screening  hx pace maker  admitted for severe worsening left knee pain without radicular pain, denied LBP, incontinence, fever, neck pain,  neg C-spine, L-spine tenderness  planning: d/c tomorrow to rehab if accepted       PHYSICAL EXAM:   Alert & Oriented X3  GENERAL: NAD, well-groomed, well-developed  HEAD:  Atraumatic, Normocephalic  EYES: EOMI, PERRLA, conjunctiva and sclera clear  NECK: Supple, No JVD, Normal thyroid  CHEST/LUNG: Clear bilaterally; No rales, rhonchi, wheezing, or rubs  HEART: Regular rate and rhythm; No murmurs, rubs, or gallops  ABDOMEN: Soft, Nontender, Nondistended; Bowel sounds present  EXTREMITIES:  2+ Peripheral Pulses, No clubbing, cyanosis, or edema  Skin: no rash    VITALS:  T(F): 97.6 (05-13-21 @ 05:37), Max: 98.4 (05-12-21 @ 20:51)  HR: 72 (05-13-21 @ 05:37)  BP: 127/60 (05-13-21 @ 05:37) (89/43 - 127/60)  RR: 18 (05-13-21 @ 05:37)  SpO2: 100% (05-13-21 @ 05:37)    TESTS & MEASUREMENTS:      05-11-21 @ 07:01  -  05-12-21 @ 07:00  --------------------------------------------------------  IN: 0 mL / OUT: 600 mL / NET: -600 mL    05-12-21 @ 07:01  -  05-13-21 @ 07:00  --------------------------------------------------------  IN: 0 mL / OUT: 200 mL / NET: -200 mL                            10.5   4.88  )-----------( 181      ( 13 May 2021 06:41 )             33.0       05-13    138  |  104  |  38<H>  ----------------------------<  103<H>  4.7   |  25  |  1.4    Ca    8.6      13 May 2021 06:41    TPro  5.8<L>  /  Alb  3.4<L>  /  TBili  0.2  /  DBili  x   /  AST  19  /  ALT  13  /  AlkPhos  365<H>  05-13    LIVER FUNCTIONS - ( 13 May 2021 06:41 )  Alb: 3.4 g/dL / Pro: 5.8 g/dL / ALK PHOS: 365 U/L / ALT: 13 U/L / AST: 19 U/L / GGT: x                         MEDICATIONS:  MEDICATIONS  (STANDING):  apixaban 5 milliGRAM(s) Oral every 12 hours  ketorolac   Injectable 15 milliGRAM(s) IV Push three times a day  sacubitril 24 mG/valsartan 26 mG 1 Tablet(s) Oral two times a day  sodium chloride 0.9%. 1000 milliLiter(s) (50 mL/Hr) IV Continuous <Continuous>  spironolactone 50 milliGRAM(s) Oral daily    MEDICATIONS  (PRN):  oxycodone    5 mG/acetaminophen 325 mG 1 Tablet(s) Oral every 6 hours PRN Severe Pain (7 - 10)              HEALTH ISSUES - PROBLEM Dx:

## 2021-05-14 VITALS
TEMPERATURE: 98 F | DIASTOLIC BLOOD PRESSURE: 55 MMHG | HEART RATE: 78 BPM | RESPIRATION RATE: 18 BRPM | SYSTOLIC BLOOD PRESSURE: 114 MMHG | OXYGEN SATURATION: 98 %

## 2021-05-14 LAB
ALBUMIN SERPL ELPH-MCNC: 3 G/DL — LOW (ref 3.5–5.2)
ALP SERPL-CCNC: 291 U/L — HIGH (ref 30–115)
ALT FLD-CCNC: 12 U/L — SIGNIFICANT CHANGE UP (ref 0–41)
ANION GAP SERPL CALC-SCNC: 8 MMOL/L — SIGNIFICANT CHANGE UP (ref 7–14)
AST SERPL-CCNC: 15 U/L — SIGNIFICANT CHANGE UP (ref 0–41)
BILIRUB SERPL-MCNC: 0.3 MG/DL — SIGNIFICANT CHANGE UP (ref 0.2–1.2)
BUN SERPL-MCNC: 32 MG/DL — HIGH (ref 10–20)
CALCIUM SERPL-MCNC: 8.5 MG/DL — SIGNIFICANT CHANGE UP (ref 8.5–10.1)
CHLORIDE SERPL-SCNC: 108 MMOL/L — SIGNIFICANT CHANGE UP (ref 98–110)
CO2 SERPL-SCNC: 26 MMOL/L — SIGNIFICANT CHANGE UP (ref 17–32)
CREAT SERPL-MCNC: 0.9 MG/DL — SIGNIFICANT CHANGE UP (ref 0.7–1.5)
GLUCOSE SERPL-MCNC: 91 MG/DL — SIGNIFICANT CHANGE UP (ref 70–99)
HCT VFR BLD CALC: 31.7 % — LOW (ref 42–52)
HGB BLD-MCNC: 10 G/DL — LOW (ref 14–18)
MAGNESIUM SERPL-MCNC: 2 MG/DL — SIGNIFICANT CHANGE UP (ref 1.8–2.4)
MCHC RBC-ENTMCNC: 31.1 PG — HIGH (ref 27–31)
MCHC RBC-ENTMCNC: 31.5 G/DL — LOW (ref 32–37)
MCV RBC AUTO: 98.4 FL — HIGH (ref 80–94)
NRBC # BLD: 0 /100 WBCS — SIGNIFICANT CHANGE UP (ref 0–0)
PLATELET # BLD AUTO: 184 K/UL — SIGNIFICANT CHANGE UP (ref 130–400)
POTASSIUM SERPL-MCNC: 4.8 MMOL/L — SIGNIFICANT CHANGE UP (ref 3.5–5)
POTASSIUM SERPL-SCNC: 4.8 MMOL/L — SIGNIFICANT CHANGE UP (ref 3.5–5)
PROT SERPL-MCNC: 5.4 G/DL — LOW (ref 6–8)
RBC # BLD: 3.22 M/UL — LOW (ref 4.7–6.1)
RBC # FLD: 13.1 % — SIGNIFICANT CHANGE UP (ref 11.5–14.5)
SODIUM SERPL-SCNC: 142 MMOL/L — SIGNIFICANT CHANGE UP (ref 135–146)
WBC # BLD: 3.99 K/UL — LOW (ref 4.8–10.8)
WBC # FLD AUTO: 3.99 K/UL — LOW (ref 4.8–10.8)

## 2021-05-14 PROCEDURE — 99239 HOSP IP/OBS DSCHRG MGMT >30: CPT

## 2021-05-14 RX ORDER — GABAPENTIN 400 MG/1
1 CAPSULE ORAL
Qty: 15 | Refills: 0
Start: 2021-05-14 | End: 2021-05-18

## 2021-05-14 RX ORDER — KETOROLAC TROMETHAMINE 30 MG/ML
1 SYRINGE (ML) INJECTION
Qty: 8 | Refills: 0
Start: 2021-05-14 | End: 2021-05-15

## 2021-05-14 RX ORDER — GABAPENTIN 400 MG/1
100 CAPSULE ORAL THREE TIMES A DAY
Refills: 0 | Status: DISCONTINUED | OUTPATIENT
Start: 2021-05-14 | End: 2021-05-14

## 2021-05-14 RX ORDER — GABAPENTIN 400 MG/1
1 CAPSULE ORAL
Qty: 21 | Refills: 0
Start: 2021-05-14 | End: 2021-05-20

## 2021-05-14 RX ORDER — KETOROLAC TROMETHAMINE 30 MG/ML
15 SYRINGE (ML) INJECTION
Qty: 9 | Refills: 0
Start: 2021-05-14 | End: 2021-05-16

## 2021-05-14 RX ORDER — SPIRONOLACTONE 25 MG/1
1 TABLET, FILM COATED ORAL
Qty: 0 | Refills: 0 | DISCHARGE
Start: 2021-05-14

## 2021-05-14 RX ORDER — KETOROLAC TROMETHAMINE 30 MG/ML
1 SYRINGE (ML) INJECTION
Qty: 9 | Refills: 0
Start: 2021-05-14 | End: 2021-05-16

## 2021-05-14 RX ADMIN — APIXABAN 5 MILLIGRAM(S): 2.5 TABLET, FILM COATED ORAL at 06:11

## 2021-05-14 RX ADMIN — APIXABAN 5 MILLIGRAM(S): 2.5 TABLET, FILM COATED ORAL at 17:07

## 2021-05-14 RX ADMIN — Medication 15 MILLIGRAM(S): at 13:49

## 2021-05-14 RX ADMIN — GABAPENTIN 100 MILLIGRAM(S): 400 CAPSULE ORAL at 13:10

## 2021-05-14 RX ADMIN — OXYCODONE AND ACETAMINOPHEN 1 TABLET(S): 5; 325 TABLET ORAL at 00:23

## 2021-05-14 RX ADMIN — SACUBITRIL AND VALSARTAN 1 TABLET(S): 24; 26 TABLET, FILM COATED ORAL at 06:11

## 2021-05-14 RX ADMIN — SACUBITRIL AND VALSARTAN 1 TABLET(S): 24; 26 TABLET, FILM COATED ORAL at 17:07

## 2021-05-14 RX ADMIN — Medication 15 MILLIGRAM(S): at 06:11

## 2021-05-14 RX ADMIN — OXYCODONE AND ACETAMINOPHEN 1 TABLET(S): 5; 325 TABLET ORAL at 00:10

## 2021-05-14 RX ADMIN — Medication 15 MILLIGRAM(S): at 13:10

## 2021-05-14 RX ADMIN — SPIRONOLACTONE 50 MILLIGRAM(S): 25 TABLET, FILM COATED ORAL at 06:11

## 2021-05-21 NOTE — DISCHARGE NOTE ADULT - NSFTFATTESTRESTRICTRD_GEN_ALL_CORE
Infusion Nursing Note:  Babar Laboy presents today for Topotecan cycle 1, day 1.    Patient seen by provider today: No   present during visit today: Not Applicable.    Note: N/A.    Intravenous Access:  Labs drawn without difficulty.  Implanted Port.    Treatment Conditions:  Lab Results   Component Value Date    HGB 11.7 07/20/2020     Lab Results   Component Value Date    WBC 8.2 07/20/2020      Lab Results   Component Value Date    ANEU 6.1 07/20/2020     Lab Results   Component Value Date     07/20/2020      Results reviewed, labs MET treatment parameters, ok to proceed with treatment.    Post Infusion Assessment:  Patient tolerated infusion without incident.  Patient observed for 30 minutes post Topotecan end time per protocol.  Blood return noted pre and post infusion.  Site patent and intact, free from redness, edema or discomfort.  No evidence of extravasations.  Access discontinued per protocol.     Discharge Plan:   Patient and/or family verbalized understanding of discharge instructions and all questions answered.  Copy of AVS reviewed with patient and/or family.  Patient will return 7/27/20 for next appointment.  Patient discharged in stable condition accompanied by: wife.  Departure Mode: Ambulatory.    Brenda J. Goodell, RN                         negative... ATTENDING CERTIFICATION

## 2021-05-22 DIAGNOSIS — M17.12 UNILATERAL PRIMARY OSTEOARTHRITIS, LEFT KNEE: ICD-10-CM

## 2021-05-22 DIAGNOSIS — I11.0 HYPERTENSIVE HEART DISEASE WITH HEART FAILURE: ICD-10-CM

## 2021-05-22 DIAGNOSIS — Z79.01 LONG TERM (CURRENT) USE OF ANTICOAGULANTS: ICD-10-CM

## 2021-05-22 DIAGNOSIS — D53.9 NUTRITIONAL ANEMIA, UNSPECIFIED: ICD-10-CM

## 2021-05-22 DIAGNOSIS — Z86.73 PERSONAL HISTORY OF TRANSIENT ISCHEMIC ATTACK (TIA), AND CEREBRAL INFARCTION WITHOUT RESIDUAL DEFICITS: ICD-10-CM

## 2021-05-22 DIAGNOSIS — I48.20 CHRONIC ATRIAL FIBRILLATION, UNSPECIFIED: ICD-10-CM

## 2021-05-22 DIAGNOSIS — M25.562 PAIN IN LEFT KNEE: ICD-10-CM

## 2021-05-22 DIAGNOSIS — Z86.711 PERSONAL HISTORY OF PULMONARY EMBOLISM: ICD-10-CM

## 2021-05-22 DIAGNOSIS — Z87.891 PERSONAL HISTORY OF NICOTINE DEPENDENCE: ICD-10-CM

## 2021-05-22 DIAGNOSIS — Z95.810 PRESENCE OF AUTOMATIC (IMPLANTABLE) CARDIAC DEFIBRILLATOR: ICD-10-CM

## 2021-05-22 DIAGNOSIS — I50.22 CHRONIC SYSTOLIC (CONGESTIVE) HEART FAILURE: ICD-10-CM

## 2021-08-02 NOTE — PATIENT PROFILE ADULT. - FALL HARM RISK TYPE OF ASSESSMENT
Pulmonary, Critical Care    Name: Grisel Vergara MRN: [id number]   : 1954 Hospital: [address] [place] [address]   Date: 2021  Admission date: 2021 Hospital Day:        Subjective/Interval History:   Seen in the emergency room wearing noninvasive ventilator. Patient has underlying renal insufficiency developed worsening shortness of breath cough presented to the emergency room chest x-ray shows pulmonary edema. He was profoundly hypoxic is now on noninvasive ventilator and feels more comfortable. Has not had any significant urine output since he has been in the ER. He also complains of new onset left arm swelling   post void bladder scan showed greater than 200 cc urine retention and Quesada catheter placed with 500 cc removed. Overnight he has put out an additional 1200 cc. Respirations improved currently nonlabored on nasal oxygen. Duplex scan of the left arm did show left axillary and proximal brachial DVT and heparin was started. On heparin with Quesada catheter and he has had slight bleeding at the urethral meatus. Ultrasound of the abdomen is pending  7/15 ultrasound of the abdomen showed medical renal disease on the right with small kidney no hydronephrosis there was evidence of prostate enlargement. He is breathing easily at this point and on room air is running on oxygen saturation of 93%   respirations nonlabored on nasal oxygen. Quesada catheter is out he states he has been voiding frequent small amounts without straining   no specific complaints breathing easily. Overnight oximetry showed minimal but significant desaturation while on room air 8 minutes 40 seconds with low of 71%  . Developed V. tach this morning given IV amiodarone IV magnesium and transferred to the unit. Currently heart rate  alternating between A. fib and sinus with PAC. He denies any discomfort is breathing easily   remains sinus rhythm with PVCs.   Had worsening hypoxia during the night and placed on oxygen he feels comfortable at this point. Urine output mildly improved yesterday but input remains greater than output  7/24 no specific complaints respirations nonlabored currently room air with saturation 97% Lasix been resumed 7/23. Urine output not accurately recorded but he states he has been passing a lot of urine  7/25 feels fine denies shortness of breath on room air. Oxygen saturation 97% on room air while awake. He states he is putting out good urine from his oral Lasix although its not being recorded in the chart  7/27 overnight oximetry continues to show nocturnal hypoxia will continue nocturnal oxygen 7/28  no one put oxygen on him last night. Good urine output recorded yesterday 1500 cc but chest x-ray today continues to show pulmonary edema and small effusions  7/30 states again last night he was not placed on oxygen until he asked and then someone came and removed it later on. We will repeat overnight oximetry tonight on and off oxygen  7/31 feels fine no specific complaints. Overnight oximetry last night showed no desaturation when he was on room air  8/1 he was put on 2 L nasal cannula this morning repeat overnight pulse oximetry results pending  8/2 overnight oximetry study 8/1 showed no significant desaturation on room air  Patient Active Problem List   Diagnosis Code    GI bleed K92.2    Pulmonary edema J81.1       IMPRESSION:   1. Ventricular tachycardia none further seen   2. Atrial fibrillation converted to sinus  3. Hypotension corrected   4. Acute hypoxic respiratory failure room air oxygen saturation maintained while awake  5. Chronic hypoxic respiratory failure most recent overnight oximetry 8/1 showed no significant desaturation will put oxygen on standby  6. Acute pulmonary edema radiographically no change 7/28  7. 2 of 4 blood culture bottles with coagulase-negative staph aureus likely skin contaminant   8.  Bilateral pleural effusions no change on 7/28 x-ray  9. Acute on chronic kidney disease creatinine  stable  10. Obstructive uropathy Quesada catheter has been removed with no residual urine on bladder scanning  11. Severe hypertension corrected   12. DVT left axillary and brachial now on Eliquis  13. Anemia hemoglobin improved after last transfusion 7/21  14. Troponin leak likely due to renal insufficiency stable has not risen any further  Body mass index is 31.19 kg/m². RECOMMENDATIONS/PLAN:     1. Remains in sinus rhythm  2. Blood pressure well controlled now on metoprolol low-dose and Norvasc  3. Continue Flomax for prostate enlargement   4. Repeat chest x-ray with continued fluid overload  5. 8/2 overnight oximetry did not show any significant desaturation. Subjective/Initial History:       I was asked by Bruno Pruitt MD to see Bee Coates a 77 y.o.  male  in consultation for a chief complaint of shortness of breath pulmonary edema acute hypoxic respiratory failure        Patient PCP: Vianca Schafer MD  PMH:  has a past medical history of Chronic kidney disease and Hypertension. PSH:   has no past surgical history on file. FHX: family history is not on file. SHX:  reports that he has never smoked. He has never used smokeless tobacco.    Systemic review somewhat limited as he is on noninvasive ventilator remains short of breath although states he is feeling better  General he has not noted any worsening edema of his upper legs fever chills or sweats does complain of new onset swelling of his left hand and arm  Eyes no double vision or momentary blindness  ENT no facial pain over the sinuses  Endocrinologic no polyuria polydipsia  Musculoskeletal no swollen tender joints  Neurologic no history seizures or syncope  Gastrointestinal no nausea vomiting acid indigestion  Genitourinary states he does still pass fair amount of urine each day has not noted any decrease in that.   Does not have to strain to urinate has no bleeding or drainage  Cardiovascular he is not aware of any underlying heart disease has not had chest pain diaphoresis has had worsening shortness of breath laying down and with exertion  Respiratory worsening shortness of breath over the last week or more no significant cough no sputum production no chills or sweats did have history COVID-19 pneumonitis January of this year    No Known Allergies   MEDS:   Current Facility-Administered Medications   Medication    furosemide (LASIX) injection 40 mg    metOLazone (ZAROXOLYN) tablet 5 mg    sodium bicarbonate tablet 650 mg    amLODIPine (NORVASC) tablet 5 mg    apixaban (ELIQUIS) tablet 2.5 mg    sevelamer carbonate (RENVELA) tab 1,600 mg    0.9% sodium chloride infusion 250 mL    0.9% sodium chloride infusion 250 mL    metoprolol tartrate (LOPRESSOR) tablet 25 mg    tamsulosin (FLOMAX) capsule 0.4 mg    [Held by provider] hydrALAZINE (APRESOLINE) tablet 50 mg    0.9% sodium chloride infusion 250 mL    calcitRIOL (ROCALTROL) capsule 0.25 mcg    ergocalciferol capsule 50,000 Units    hydrALAZINE (APRESOLINE) 20 mg/mL injection 40 mg        Current Facility-Administered Medications:     furosemide (LASIX) injection 40 mg, 40 mg, IntraVENous, BID, Meryle Prost S, MD, 40 mg at 08/02/21 1351    metOLazone (ZAROXOLYN) tablet 5 mg, 5 mg, Oral, DAILY, Lilia Ball MD, 5 mg at 08/02/21 0914    sodium bicarbonate tablet 650 mg, 650 mg, Oral, BID, Lilia Ball MD, 650 mg at 08/02/21 0915    amLODIPine (NORVASC) tablet 5 mg, 5 mg, Oral, DAILY, James Amaya MD, 5 mg at 08/02/21 4315    apixaban (ELIQUIS) tablet 2.5 mg, 2.5 mg, Oral, BID, Fany Hua MD, 2.5 mg at 08/02/21 9728    sevelamer carbonate (RENVELA) tab 1,600 mg, 1,600 mg, Oral, TID WITH MEALS, Lilia Ball MD, 1,600 mg at 08/02/21 1351    0.9% sodium chloride infusion 250 mL, 250 mL, IntraVENous, PRN, Fany Hua MD    0.9% sodium chloride infusion 250 mL, 250 mL, IntraVENous, PRN, Fany Hua MD    metoprolol tartrate (LOPRESSOR) tablet 25 mg, 25 mg, Oral, DAILY, Lilia Ball MD, 25 mg at 21 0916    tamsulosin (FLOMAX) capsule 0.4 mg, 0.4 mg, Oral, DAILY, Ruben Hernandez MD, 0.4 mg at 21 0915    [Held by provider] hydrALAZINE (APRESOLINE) tablet 50 mg, 50 mg, Oral, TID, Ruben Hernandez MD, 50 mg at 21    0.9% sodium chloride infusion 250 mL, 250 mL, IntraVENous, PRN, Ruben Hernandez MD    calcitRIOL (ROCALTROL) capsule 0.25 mcg, 0.25 mcg, Oral, DAILY, Lilia Ball MD, 0.25 mcg at 21    ergocalciferol capsule 50,000 Units, 50,000 Units, Oral, Q7D, Lilia Ball MD, 50,000 Units at 21 162    hydrALAZINE (APRESOLINE) 20 mg/mL injection 40 mg, 40 mg, IntraVENous, Q6H PRN, Ruben Hernandez MD, 40 mg at 21      Objective:     Vital Signs: Telemetry:    normal sinus rhythm Intake/Output:   Visit Vitals  BP (!) 168/104 Comment: Rechecked,177/99   Pulse 84   Temp 97.3 °F (36.3 °C)   Resp 22   Ht 6' (1.829 m)   Wt 104.3 kg (229 lb 15 oz)   SpO2 95%   BMI 31.19 kg/m²       Temp (24hrs), Av.6 °F (36.4 °C), Min:97.3 °F (36.3 °C), Max:98 °F (36.7 °C)        O2 Device: None (Room air) O2 Flow Rate (L/min): 1 l/min         Body mass index is 31.19 kg/m². Wt Readings from Last 4 Encounters:   21 104.3 kg (229 lb 15 oz)   21 79.4 kg (175 lb)          Intake/Output Summary (Last 24 hours) at 2021 1517  Last data filed at 2021 0750  Gross per 24 hour   Intake 120 ml   Output 1400 ml   Net -1280 ml       Last shift:      701 -  190  In: -   Out: 36 [Urine:820]  Last 3 shifts: 1901 -  0700  In: 440 [P.O.:440]  Out: 1920 [Urine:1920]       Hemodynamics:    CO:    CI:    CVP:    SVR:   PAP Systolic:    PAP Diastolic:    PVR:    UU57:       Ventilator Settings:      Mode Rate TV Press PEEP FiO2 PIP Min.  Vent               21 %     9.7 l/min      Physical Exam:    General:  male; no distress now on room air  HEENT: NCAT, visible oral mucosa is moist and clear  Eyes: anicteric; conjunctiva clear extraocular movements intact  Neck: no nodes,,no accessory MM use. no respiratory distress  Chest: no deformity,   Cardiac: Regular rhythm and rate now controlled  Lungs: distant breath sounds; clear anteriorly and laterally with rales in the bases  Abd: Soft positive bowel sounds no tenderness  Ext: Improvement in edema  of the lower extremities with continued edema of the left arm; no joint swelling;  No clubbing  : Clear urine  Neuro: Alert awake no distress speech is clear moves all 4 extremities  Psych-calm, oriented to person;   Skin: warm, dry, no cyanosis;   Pulses: Brachial and radial pulses intact  Capillary:slow capillary refill      Labs:    Recent Labs     08/01/21  1040      K 5.2*   *   CO2 23   GLU 94   BUN 99*   CREA 6.43*   CA 8.6   PHOS 5.7*   ALB 3.3*   8/1 overnight oximetry on room air with low oxygen saturation 79% only 5 minutes below 88   7/39 overnight oximetry on and off oxygen with low oxygen saturation 72% with only 5 minutes below 88%   7/29 overnight oximetry with low oxygen saturation 44% with 52 minutes 40 seconds below 88%   7/27  overnight oximetry split study with a low oxygen saturation 75% 33 minutes 10 seconds below 88%   7/25 overnight oximetry with 1 hour 13 minutes below 88% with low oxygen saturation 56%  7/19 overnight oximetry shows 14 minutes 26 seconds below 88% with low oxygen saturation 75%  7/17 overnight oximetry shows low oxygen saturation 71% with 8 minutes 40 seconds below 88% from 1 AM to 6 AM   7/16 overnight oximetry on 1 L shows only 2 minutes 20 seconds below 88% with a low oxygen saturation of 83%  7/15 room air oxygen saturation 93%  currently on noninvasive ventilator inspiratory pressure 16 expiratory pressure six rate 16 FiO2 28% with oxygen saturation 96%   BNP 32,264, previous greater than 35,000  Lab Results   Component Value Date/Time    Culture result:  07/12/2021 10:40 PM     Two of four bottles have been flagged positive by instrument. Bottles have been sent to [facility] laboratory to assess for possible growth. Gram Positive Cocci in clusters CALLED TO AND READ BACK BY Pedro Camacho r.n. [id number] 07/14/2021 by dpw    Culture result:  07/12/2021 10:40 PM     Staphylococcus species, coagulase negative GROWING IN THE 1ST OF 4 BOTTLES DRAWN    Culture result:  07/12/2021 10:40 PM     Staphylococcus species, coagulase negative (2nd colony type/strain) GROWING IN THE 2ND OF 4 BOTTLES DRAWN        Imaging:  I have personally reviewed the patients radiographs and have reviewed the reports:    CXR Results  (Last 48 hours)  7/23 chest x-ray mild pulmonary congestion tiny effusions unchanged from last x-ray although right upper lobe lung may have slightly less congestion  7/21 chest x-ray with continued mild pulmonary edema pattern small bilateral pleural effusions there may be improved inspiratory effort  7/16 chest x-ray with continued mild pulmonary edema and small pleural effusions unchanged               07/12/21 2251  XR CHEST PORT Final result    Impression:  Findings/impression:       Cardiac silhouette is enlarged. Central vascular congestion and patchy central   airspace disease/edema. Suspect trace right pleural effusion. No evidence of pneumothorax. No acute osseous abnormality identified. Narrative:  Study: XR CHEST PORT       Clinical indication: sob       Comparison: None. To me chest x-ray consistent with cardiomegaly pulmonary edema and bilateral pleural effusions           Results from Hospital Encounter encounter on 07/12/21    XR CHEST PA LAT    Narrative  Two-view chest:    COMPARISON: Chest x-ray July 20, 2021. Impression  Findings/impression:    Volume overload noted, with slightly increased interstitial markings present  bilaterally.  There is mild bilateral perihilar airspace disease present, with  mild interval improvement. Interval improvement in left lower lobe airspace  disease. No pneumothorax. CP angles are blunted suggesting small effusions. No  pneumothorax. Enlarged cardiac size again noted similar to prior study. No  suspicious osseous lesions. XR CHEST PA LAT    Narrative  Chest 2 views. Comparison chest series July 26, 2021. No change compared to prior imaging. Patchy lower lobe lung reticular markings  with small dependent pleural effusions persist. Cardiac and mediastinal  structures unchanged. No pneumothorax. XR CHEST PA LAT    Narrative  Chest, 2 views, 7/26/2021    History: Pulmonary edema. Pleural effusions. Comparison: Including chest 7/23/2021. Findings: The cardiac silhouette remains enlarged. The lungs are adequately  expanded. There is pulmonary vascular congestion and hydrostatic edema, not  significantly changed as compared to chest 7/23/2021. Small to moderate pleural  effusions and associated bibasilar atelectasis persist.  No pneumothorax is  identified. Degenerative changes are present in the spine. Impression  Hydrostatic edema. Pleural effusions and associated bibasilar  atelectasis. No significant interval change. Results from [place] encounter on 01/05/21    CT CHEST WO CONT    Narrative  Images obtained without contrast include the abdomen and pelvis. Comparison portable chest radiograph earlier today. Dose Reduction:  All CT scans at this facility are performed using dose reduction optimization  techniques as appropriate to a performed exam including the following: Automated  exposure control, adjustments of the mA and/or kV according to patient size, or  use of iterative reconstruction technique. Subtle peripheral groundglass densities are noted in both lungs, could reflect  Covid pneumonia or other. No pneumothorax or pneumomediastinum.  The radiographic findings suspicious for  pneumomediastinum probably was artifact, perhaps related to cardiac motion. No pleural effusion or adenopathy. Cardiomegaly again noted. Impression  IMPRESSION:  1. No pneumomediastinum or pneumothorax. 2. Cardiomegaly. 3. Subtle groundglass densities in both lungs might be pneumonia or other. Discussion patient with worsening renal insufficiency has developed pulmonary edema acute hypoxic respiratory failure. He states he still has urine output normally at home. We will give him high-dose IV Lasix to see if diuresis is induced. He very likely will need dialysis. He has minimal elevation in troponin probably troponin leak from hypoxia or just due to his renal insufficiency. He is not having any chest pain. Does have severe hypertension. Has been started on clonidine and hydralazine. 7/14 no distress today on nasal oxygen. Did have residual on post void bladder scan and Quesada catheter was placed with good diuresis overnight. Despite this potassium remains elevated. We will give 1 dose of Kayexalate. Despite diuresis hemoglobin is dropped to 6.7 will transfuse. He denies frequency small-volume urine or straining to urinate at home despite this with signs of obstruction we will add Flomax. Potassium 6 today we will give 1 dose of Kayexalate and continue diuresis  7/16 respirations nonlabored. Was placed back on 1 L nasal oxygen yesterday overnight oximetry shows well-maintained oxygen saturation. Will check overnight tonight on and off oxygen. Quesada catheter has been removed. Chest x-ray continues to show mild pulmonary edema  7/17 overnight oximetry shows desaturation when on room air. Will maintain oxygen and repeat overnight oximetry Ibrahima night  7/19 developed V. tach overnight and now in the ICU on IV amiodarone with rhythm showing alternating sinus with PAC and A. fib. Rate . Overnight oximetry continued to show desaturation on room air and he is back on nasal oxygen. He has no specific complaint  7/20 now in sinus rhythm with frequent PVCs. Cardiology is recommending cardiac cath but he is reluctant to undergo that. Creatinine has worsened despite IV fluid administration yesterday although blood pressure is improved. Currently oxygen saturation well-maintained on room air but last overnight oximetry showed desaturation will maintain oxygen at 1 L for now  7/21 had hypoxia yesterday afternoon and placed back on oxygen during the daytime as well as the night. He feels comfortable at this point. Chest x-ray is unchanged still has mild pulmonary edema with small effusions. It does appear that he took a deeper breath today. Creatinine remains markedly elevated. He is on heparin for left axillary DVT. Holding switching to oral anticoagulation until sure no instrumentation is needed   7/23 unchanged remains on IV heparin. Oral Lasix being resumed did require transfusion 7/21. Repeat chest x-ray no worsening  7/26 chest x-ray unchanged pulmonary edema and bilateral effusions. He states he has good urine output with the current dose of Lasix. We will repeat overnight oximetry on and off oxygen   7/27 continued nocturnal hypoxia he will need home oxygen  7/28 repeat overnight oximetry to keep him qualified for home oxygen  7/29 repeat overnight oximetry with low oxygen saturation 44% with 52 minutes 40 seconds below 88%. Continues to require nocturnal oxygen  8/2 repeat overnight oximetry on room air on 8/1 showed no significant desaturation           Thank you for allowing us to participate in the care of this patient.   We will follow along with you     Yi Barker MD Admission

## 2021-08-17 ENCOUNTER — EMERGENCY (EMERGENCY)
Facility: HOSPITAL | Age: 71
LOS: 0 days | Discharge: HOME | End: 2021-08-17
Attending: EMERGENCY MEDICINE | Admitting: EMERGENCY MEDICINE
Payer: MEDICARE

## 2021-08-17 VITALS
SYSTOLIC BLOOD PRESSURE: 93 MMHG | HEIGHT: 67 IN | DIASTOLIC BLOOD PRESSURE: 50 MMHG | WEIGHT: 134.04 LBS | HEART RATE: 90 BPM | TEMPERATURE: 98 F | OXYGEN SATURATION: 99 % | RESPIRATION RATE: 18 BRPM

## 2021-08-17 VITALS — SYSTOLIC BLOOD PRESSURE: 118 MMHG | HEART RATE: 84 BPM | DIASTOLIC BLOOD PRESSURE: 61 MMHG

## 2021-08-17 DIAGNOSIS — I50.20 UNSPECIFIED SYSTOLIC (CONGESTIVE) HEART FAILURE: ICD-10-CM

## 2021-08-17 DIAGNOSIS — M25.562 PAIN IN LEFT KNEE: ICD-10-CM

## 2021-08-17 DIAGNOSIS — Z88.1 ALLERGY STATUS TO OTHER ANTIBIOTIC AGENTS STATUS: ICD-10-CM

## 2021-08-17 DIAGNOSIS — Z95.810 PRESENCE OF AUTOMATIC (IMPLANTABLE) CARDIAC DEFIBRILLATOR: ICD-10-CM

## 2021-08-17 DIAGNOSIS — Z88.0 ALLERGY STATUS TO PENICILLIN: ICD-10-CM

## 2021-08-17 DIAGNOSIS — R74.8 ABNORMAL LEVELS OF OTHER SERUM ENZYMES: ICD-10-CM

## 2021-08-17 DIAGNOSIS — I48.91 UNSPECIFIED ATRIAL FIBRILLATION: ICD-10-CM

## 2021-08-17 DIAGNOSIS — Z99.81 DEPENDENCE ON SUPPLEMENTAL OXYGEN: ICD-10-CM

## 2021-08-17 DIAGNOSIS — Z95.810 PRESENCE OF AUTOMATIC (IMPLANTABLE) CARDIAC DEFIBRILLATOR: Chronic | ICD-10-CM

## 2021-08-17 DIAGNOSIS — Z79.01 LONG TERM (CURRENT) USE OF ANTICOAGULANTS: ICD-10-CM

## 2021-08-17 DIAGNOSIS — I11.0 HYPERTENSIVE HEART DISEASE WITH HEART FAILURE: ICD-10-CM

## 2021-08-17 DIAGNOSIS — Z86.73 PERSONAL HISTORY OF TRANSIENT ISCHEMIC ATTACK (TIA), AND CEREBRAL INFARCTION WITHOUT RESIDUAL DEFICITS: ICD-10-CM

## 2021-08-17 LAB
ALBUMIN SERPL ELPH-MCNC: 3.4 G/DL — LOW (ref 3.5–5.2)
ALP SERPL-CCNC: 1155 U/L — HIGH (ref 30–115)
ALT FLD-CCNC: 13 U/L — SIGNIFICANT CHANGE UP (ref 0–41)
ANION GAP SERPL CALC-SCNC: 9 MMOL/L — SIGNIFICANT CHANGE UP (ref 7–14)
AST SERPL-CCNC: 33 U/L — SIGNIFICANT CHANGE UP (ref 0–41)
BASOPHILS # BLD AUTO: 0.02 K/UL — SIGNIFICANT CHANGE UP (ref 0–0.2)
BASOPHILS NFR BLD AUTO: 0.3 % — SIGNIFICANT CHANGE UP (ref 0–1)
BILIRUB SERPL-MCNC: 0.3 MG/DL — SIGNIFICANT CHANGE UP (ref 0.2–1.2)
BUN SERPL-MCNC: 18 MG/DL — SIGNIFICANT CHANGE UP (ref 10–20)
CALCIUM SERPL-MCNC: 8.9 MG/DL — SIGNIFICANT CHANGE UP (ref 8.5–10.1)
CHLORIDE SERPL-SCNC: 99 MMOL/L — SIGNIFICANT CHANGE UP (ref 98–110)
CO2 SERPL-SCNC: 24 MMOL/L — SIGNIFICANT CHANGE UP (ref 17–32)
CREAT SERPL-MCNC: 0.8 MG/DL — SIGNIFICANT CHANGE UP (ref 0.7–1.5)
EOSINOPHIL # BLD AUTO: 0.23 K/UL — SIGNIFICANT CHANGE UP (ref 0–0.7)
EOSINOPHIL NFR BLD AUTO: 3.9 % — SIGNIFICANT CHANGE UP (ref 0–8)
GLUCOSE SERPL-MCNC: 94 MG/DL — SIGNIFICANT CHANGE UP (ref 70–99)
HCT VFR BLD CALC: 31.3 % — LOW (ref 42–52)
HGB BLD-MCNC: 10 G/DL — LOW (ref 14–18)
IMM GRANULOCYTES NFR BLD AUTO: 0.5 % — HIGH (ref 0.1–0.3)
LYMPHOCYTES # BLD AUTO: 0.98 K/UL — LOW (ref 1.2–3.4)
LYMPHOCYTES # BLD AUTO: 16.7 % — LOW (ref 20.5–51.1)
MAGNESIUM SERPL-MCNC: 2.3 MG/DL — SIGNIFICANT CHANGE UP (ref 1.8–2.4)
MCHC RBC-ENTMCNC: 30.1 PG — SIGNIFICANT CHANGE UP (ref 27–31)
MCHC RBC-ENTMCNC: 31.9 G/DL — LOW (ref 32–37)
MCV RBC AUTO: 94.3 FL — HIGH (ref 80–94)
MONOCYTES # BLD AUTO: 0.42 K/UL — SIGNIFICANT CHANGE UP (ref 0.1–0.6)
MONOCYTES NFR BLD AUTO: 7.1 % — SIGNIFICANT CHANGE UP (ref 1.7–9.3)
NEUTROPHILS # BLD AUTO: 4.2 K/UL — SIGNIFICANT CHANGE UP (ref 1.4–6.5)
NEUTROPHILS NFR BLD AUTO: 71.5 % — SIGNIFICANT CHANGE UP (ref 42.2–75.2)
NRBC # BLD: 0 /100 WBCS — SIGNIFICANT CHANGE UP (ref 0–0)
PLATELET # BLD AUTO: 250 K/UL — SIGNIFICANT CHANGE UP (ref 130–400)
POTASSIUM SERPL-MCNC: 5.7 MMOL/L — HIGH (ref 3.5–5)
POTASSIUM SERPL-SCNC: 5.7 MMOL/L — HIGH (ref 3.5–5)
PROT SERPL-MCNC: 6.5 G/DL — SIGNIFICANT CHANGE UP (ref 6–8)
RBC # BLD: 3.32 M/UL — LOW (ref 4.7–6.1)
RBC # FLD: 13.2 % — SIGNIFICANT CHANGE UP (ref 11.5–14.5)
SODIUM SERPL-SCNC: 132 MMOL/L — LOW (ref 135–146)
WBC # BLD: 5.88 K/UL — SIGNIFICANT CHANGE UP (ref 4.8–10.8)
WBC # FLD AUTO: 5.88 K/UL — SIGNIFICANT CHANGE UP (ref 4.8–10.8)

## 2021-08-17 PROCEDURE — 99284 EMERGENCY DEPT VISIT MOD MDM: CPT

## 2021-08-17 RX ORDER — SODIUM CHLORIDE 9 MG/ML
1000 INJECTION INTRAMUSCULAR; INTRAVENOUS; SUBCUTANEOUS ONCE
Refills: 0 | Status: COMPLETED | OUTPATIENT
Start: 2021-08-17 | End: 2021-08-17

## 2021-08-17 RX ORDER — TRAMADOL HYDROCHLORIDE 50 MG/1
1 TABLET ORAL
Qty: 12 | Refills: 0
Start: 2021-08-17 | End: 2021-08-19

## 2021-08-17 RX ORDER — TRAMADOL HYDROCHLORIDE 50 MG/1
50 TABLET ORAL ONCE
Refills: 0 | Status: DISCONTINUED | OUTPATIENT
Start: 2021-08-17 | End: 2021-08-17

## 2021-08-17 RX ORDER — TRAMADOL HYDROCHLORIDE 50 MG/1
1 TABLET ORAL
Qty: 8 | Refills: 0
Start: 2021-08-17 | End: 2021-08-18

## 2021-08-17 RX ADMIN — SODIUM CHLORIDE 1000 MILLILITER(S): 9 INJECTION INTRAMUSCULAR; INTRAVENOUS; SUBCUTANEOUS at 15:27

## 2021-08-17 RX ADMIN — TRAMADOL HYDROCHLORIDE 50 MILLIGRAM(S): 50 TABLET ORAL at 15:27

## 2021-08-17 NOTE — ED PROVIDER NOTE - PATIENT PORTAL LINK FT
You can access the FollowMyHealth Patient Portal offered by [facility] by registering at the following website: [email or web address]. By joining [facility]’s FollowMyHealth portal, you will also be able to view your health information using other applications (apps) compatible with our system.

## 2021-08-17 NOTE — ED PROVIDER NOTE - PHYSICAL EXAMINATION
CONSTITUTIONAL: elderly male; frail appearing; laying in stretcher in no acute distress.   SKIN: warm, dry  HEAD: Normocephalic; atraumatic.  EYES: normal sclera and conjunctiva   ENT: No nasal discharge; airway clear.  NECK: Supple; non tender.  CARD: S1, S2 normal; no murmurs, gallops, or rubs. Regular rate and rhythm.   RESP: No wheezes, rales or rhonchi.  ABD: soft ntnd  EXT: Normal ROM.  No clubbing, cyanosis or edema.   LYMPH: No acute cervical adenopathy.  NEURO: Alert, oriented, grossly unremarkable  PSYCH: Cooperative, appropriate.

## 2021-08-17 NOTE — ED PROVIDER NOTE - ATTENDING CONTRIBUTION TO CARE
71yoM with h/o CVA, afib, HTN, CHF, on Eliquis, presents with his sister Toshia Carreon c/o L knee pain and request for rx for analgesia. Was admitted for his L knee pain, had w/u which was reportedly negative, then sent to rehab facility for this, has been getting regular tramadol. She took him out of the [facility] rehab facility in [place] as he was not eating the food as she and he state that it was "horrible" food and tasted bad, and he was not eating and lost weight there due to the taste of the food. He left today and brought to ED for analgesia to cover him until he can see a local pain management provider. He reports pain to L anterior knee with pressure on area/walking, had recent Xrays that were negative. Denies all other symptoms including fever, night sweats, CP, SOB, v/d, abdominal pain, urinary changes, cough, . On exam, afebrile, hemodynamically stable, saturating well, NAD, nontoxic appearing, laying comfortably in bed, head NCAT, EOMI grossly, anicteric, MMM, no JVD, RRR, nml S1/S2, no m/r/g, lungs CTAB, no w/r/r, abd soft, NT, ND, nml BS, no rebound or guarding, AAO, CN's 3-12 grossly intact, no leg discoloration or edema, limited L knee ROM 2/2 pain though I am able to range fully, no asymmetry, nml warmth/color/sensation, 2+ DP pulses, <2 sec cap refill, skin warm, well perfused, no rashes or hives. Localized knee pain. No trauma and recent neg Xrays and low suspicion for fx. No e/o cellulitis or septic arthritis. No e/o DVT or PAD. Neurovascularly intact. Has had w/u for this leg and will f/u as outpt with ortho. No fever or specific infectious symptoms. Anemia at baseline. No e/o fluid overload. No CP/SOB to suggest PE. Pt confirms that not eating 2/2 food type and sister confirms she eats food that she brings him. 71yoM with h/o CVA, afib, HTN, CHF, on Eliquis, presents with his sister Toshia Carreon c/o L knee pain and request for rx for analgesia. Was admitted for his L knee pain, had w/u which was reportedly negative, then sent to rehab facility for this, has been getting regular tramadol. She took him out of the [facility] facility in [place] as he was not eating the food as she and he state that it was "horrible" food and tasted bad, and he was not eating and lost weight there due to the taste of the food. He left today and brought to ED for analgesia to cover him until he can see a local pain management provider. He reports pain to L anterior knee with pressure on area/walking, had recent Xrays that were negative. Denies all other symptoms including fever, night sweats, CP, SOB, v/d, abdominal pain, urinary changes, cough, . On exam, afebrile, hemodynamically stable, saturating well, NAD, nontoxic appearing, laying comfortably in bed, head NCAT, EOMI grossly, anicteric, MMM, no JVD, RRR, nml S1/S2, no m/r/g, lungs CTAB, no w/r/r, abd soft, NT, ND, nml BS, no rebound or guarding, AAO, CN's 3-12 grossly intact, no leg discoloration or edema, limited L knee ROM 2/2 pain though I am able to range fully, no asymmetry, nml warmth/color/sensation, 2+ DP pulses, <2 sec cap refill, skin warm, well perfused, no rashes or hives. Localized knee pain. No trauma and recent neg Xrays and low suspicion for fx. No e/o cellulitis or septic arthritis. No e/o DVT or PAD. Neurovascularly intact. Has had w/u for this leg and will f/u as outpt with ortho. No fever or specific infectious symptoms. Anemia at baseline. No e/o fluid overload. No CP/SOB to suggest PE. Pt confirms that not eating 2/2 food type and sister confirms she eats food that she brings him. Noted elevated alk phos, pt going for prostate biopsy and I also d/w pt concern for possible bone cancer and need for close ortho and hem/onc f/u. Patient is well appearing, NAD, afebrile, hemodynamically stable after 1L fluids. Any available tests and studies were discussed with patient and sister. Discharged with instructions in further symptomatic care, return precautions.

## 2021-08-17 NOTE — ED PROVIDER NOTE - CARE PLAN
1 Principal Discharge DX:	Knee pain, left  Secondary Diagnosis:	Elevated alkaline phosphatase level

## 2021-08-17 NOTE — ED PROVIDER NOTE - PROVIDER TOKENS
PROVIDER:[TOKEN:[[id number]],FOLLOWUP:[1-3 Days]],PROVIDER:[TOKEN:[[id number]],FOLLOWUP:[1-3 Days]]

## 2021-08-17 NOTE — ED PROVIDER NOTE - NSFOLLOWUPCLINICS_GEN_ALL_ED_FT
[facility]  Orthpedic  [address], [place]   Phone: [phone]  Fax:   Follow Up Time: 1-3 Days    [facility] ([facility])  Rehabilitation  [facility] [address], [address] [place] [address]  Phone: [phone]  Fax:   Follow Up Time: 1-3 Days

## 2021-08-17 NOTE — ED PROVIDER NOTE - NS ED ROS FT
Eyes:  No visual changes, eye pain or discharge.  ENMT:  No hearing changes, pain, discharge or infections. No neck pain or stiffness.  Cardiac:  No chest pain, SOB or edema. No chest pain with exertion.  Respiratory:  No cough or respiratory distress. No hemoptysis. No history of asthma or RAD.  GI:  No nausea, vomiting, diarrhea or abdominal pain.  :  No dysuria, frequency or burning.  MS:  No myalgia, muscle weakness, +knee pain  Neuro:  No headache or weakness.  No LOC.  Skin:  No skin rash.   Endocrine: No history of thyroid disease or diabetes.

## 2021-08-17 NOTE — ED PROVIDER NOTE - CARE PROVIDERS DIRECT ADDRESSES
[email or web address]

## 2021-08-17 NOTE — ED PROVIDER NOTE - CARE PROVIDER_API CALL
Lei Grimm)  Orthopaedic Surgery  [address] [place] [address]  Phone: [phone]  Fax: [phone]  Follow Up Time: 1-3 Days    Pablo Yanez)  Internal Medicine; Medical Oncology  [address] [place] [address]  Phone: [phone]  Fax: [phone]  Follow Up Time: 1-3 Days

## 2021-08-17 NOTE — ED PROVIDER NOTE - CLINICAL SUMMARY MEDICAL DECISION MAKING FREE TEXT BOX
71yoM with h/o CVA, afib, HTN, CHF, on Eliquis, presents with his sister Toshia Carreon c/o L knee pain and request for rx for analgesia. Was admitted for his L knee pain, had w/u which was reportedly negative, then sent to rehab facility for this, has been getting regular tramadol. She took him out of the [facility] rehab facility in [place] as he was not eating the food as she and he state that it was "horrible" food and tasted bad, and he was not eating and lost weight there due to the taste of the food. He left today and brought to ED for analgesia to cover him until he can see a local pain management provider. He reports pain to L anterior knee with pressure on area/walking, had recent Xrays that were negative. Denies all other symptoms including fever, night sweats, CP, SOB, v/d, abdominal pain, urinary changes, cough, . On exam, afebrile, hemodynamically stable, saturating well, NAD, nontoxic appearing, laying comfortably in bed, head NCAT, EOMI grossly, anicteric, MMM, no JVD, RRR, nml S1/S2, no m/r/g, lungs CTAB, no w/r/r, abd soft, NT, ND, nml BS, no rebound or guarding, AAO, CN's 3-12 grossly intact, no leg discoloration or edema, limited L knee ROM 2/2 pain though I am able to range fully, no asymmetry, nml warmth/color/sensation, 2+ DP pulses, <2 sec cap refill, skin warm, well perfused, no rashes or hives. Localized knee pain. No trauma and recent neg Xrays and low suspicion for fx. No e/o cellulitis or septic arthritis. No e/o DVT or PAD. Neurovascularly intact. Has had w/u for this leg and will f/u as outpt with ortho. No fever or specific infectious symptoms. Anemia at baseline. No e/o fluid overload. No CP/SOB to suggest PE. Pt confirms that not eating 2/2 food type and sister confirms she eats food that she brings him. Noted elevated alk phos, pt going for prostate biopsy and I also d/w pt concern for possible bone cancer and need for close ortho and hem/onc f/u. Patient is well appearing, NAD, afebrile, hemodynamically stable after 1L fluids. Any available tests and studies were discussed with patient and sister. Discharged with instructions in further symptomatic care, return precautions.

## 2021-08-17 NOTE — ED PROVIDER NOTE - OBJECTIVE STATEMENT
The patient is a 71 year old male with a history of CVA, afib, HTN, CHF, on eliquis presenting for left knee pain. States pain has been going on for several months The patient is a 71 year old male with a history of CVA, afib, HTN, CHF, on eliquis presenting for left knee pain. States pain has been going on for several months, requesting pain meds. Sister states she took patient out of rehab facility because she stated they were not taking care of him well there. States she will now take him home but would like some pain meds to control his pain until he can see PMD.

## 2021-10-06 PROBLEM — I10 ESSENTIAL HYPERTENSION: Status: ACTIVE | Noted: 2018-10-10

## 2021-11-11 ENCOUNTER — EMERGENCY (EMERGENCY)
Facility: HOSPITAL | Age: 71
LOS: 0 days | Discharge: HOME | End: 2021-11-11
Attending: EMERGENCY MEDICINE | Admitting: EMERGENCY MEDICINE
Payer: MEDICARE

## 2021-11-11 VITALS
OXYGEN SATURATION: 98 % | WEIGHT: 119.93 LBS | SYSTOLIC BLOOD PRESSURE: 88 MMHG | HEART RATE: 91 BPM | RESPIRATION RATE: 18 BRPM | DIASTOLIC BLOOD PRESSURE: 53 MMHG | TEMPERATURE: 98 F | HEIGHT: 67 IN

## 2021-11-11 DIAGNOSIS — Z86.73 PERSONAL HISTORY OF TRANSIENT ISCHEMIC ATTACK (TIA), AND CEREBRAL INFARCTION WITHOUT RESIDUAL DEFICITS: ICD-10-CM

## 2021-11-11 DIAGNOSIS — I50.9 HEART FAILURE, UNSPECIFIED: ICD-10-CM

## 2021-11-11 DIAGNOSIS — Z88.0 ALLERGY STATUS TO PENICILLIN: ICD-10-CM

## 2021-11-11 DIAGNOSIS — Z95.810 PRESENCE OF AUTOMATIC (IMPLANTABLE) CARDIAC DEFIBRILLATOR: Chronic | ICD-10-CM

## 2021-11-11 DIAGNOSIS — I11.0 HYPERTENSIVE HEART DISEASE WITH HEART FAILURE: ICD-10-CM

## 2021-11-11 DIAGNOSIS — M79.662 PAIN IN LEFT LOWER LEG: ICD-10-CM

## 2021-11-11 DIAGNOSIS — Z88.1 ALLERGY STATUS TO OTHER ANTIBIOTIC AGENTS STATUS: ICD-10-CM

## 2021-11-11 DIAGNOSIS — I48.91 UNSPECIFIED ATRIAL FIBRILLATION: ICD-10-CM

## 2021-11-11 DIAGNOSIS — M79.652 PAIN IN LEFT THIGH: ICD-10-CM

## 2021-11-11 DIAGNOSIS — M25.562 PAIN IN LEFT KNEE: ICD-10-CM

## 2021-11-11 DIAGNOSIS — Z79.01 LONG TERM (CURRENT) USE OF ANTICOAGULANTS: ICD-10-CM

## 2021-11-11 PROCEDURE — 99283 EMERGENCY DEPT VISIT LOW MDM: CPT

## 2021-11-11 RX ORDER — OXYCODONE AND ACETAMINOPHEN 5; 325 MG/1; MG/1
2 TABLET ORAL ONCE
Refills: 0 | Status: DISCONTINUED | OUTPATIENT
Start: 2021-11-11 | End: 2021-11-11

## 2021-11-11 RX ORDER — OXYCODONE AND ACETAMINOPHEN 5; 325 MG/1; MG/1
1 TABLET ORAL
Qty: 16 | Refills: 0
Start: 2021-11-11 | End: 2021-11-14

## 2021-11-11 RX ADMIN — OXYCODONE AND ACETAMINOPHEN 2 TABLET(S): 5; 325 TABLET ORAL at 09:39

## 2021-11-11 NOTE — ED PROVIDER NOTE - NSFOLLOWUPINSTRUCTIONS_ED_ALL_ED_FT
A prescription has been sent to your pharmacy.     Please keep your appointment with Pain Management for 11/16/21.     Return to the Emergency Room for any new or worsening symptoms.

## 2021-11-11 NOTE — ED ADULT NURSE NOTE - NSIMPLEMENTINTERV_GEN_ALL_ED
Implemented All Fall with Harm Risk Interventions:  [place] to call system. Call bell, personal items and telephone within reach. Instruct patient to call for assistance. Room bathroom lighting operational. Non-slip footwear when patient is off stretcher. Physically safe environment: no spills, clutter or unnecessary equipment. Stretcher in lowest position, wheels locked, appropriate side rails in place. Provide visual cue, wrist band, yellow gown, etc. Monitor gait and stability. Monitor for mental status changes and reorient to person, place, and time. Review medications for side effects contributing to fall risk. Reinforce activity limits and safety measures with patient and family. Provide visual clues: red socks.

## 2021-11-11 NOTE — ED PROVIDER NOTE - CLINICAL SUMMARY MEDICAL DECISION MAKING FREE TEXT BOX
Chronic leg pain. I stop screening for the Pt reveals no recent narcotic prescriptions. Will give 4 days of pain medication. Advised to take only for breakthrough pain as needed to prolong the length of prescription. Family is comfortable taking Pt home.

## 2021-11-11 NOTE — ED PROVIDER NOTE - PATIENT PORTAL LINK FT
You can access the FollowMyHealth Patient Portal offered by [facility] by registering at the following website: [email or web address]. By joining [employer]’s FollowMyHealth portal, you will also be able to view your health information using other applications (apps) compatible with our system.

## 2021-11-11 NOTE — ED PROVIDER NOTE - PHYSICAL EXAMINATION
Vital Signs: I have reviewed the initial vital signs.  Constitutional: well-nourished, appears stated age, no acute distress  Cardiovascular: regular rate, regular rhythm, well-perfused extremities  Respiratory: unlabored respiratory effort, clear to auscultation bilaterally  Gastrointestinal: soft, non-tender abdomen  Musculoskeletal: supple neck, no lower extremity edema, (+) LLE thigh and knee ttp; neurovascularly intact   Integumentary: warm, dry, no rash  Neurologic: awake, alert, extremities’ motor and sensory functions grossly intact  Psychiatric: appropriate mood, appropriate affect

## 2021-11-11 NOTE — ED PROVIDER NOTE - PROGRESS NOTE DETAILS
Pt's pain relieved after Percocet here. Sent a few days to pharmacy. Pt has pain management appointment on 11/16/21. ATTENDING NOTE: 72 y/o M here for L leg pain. Family and Pt report chronic L thigh pain and L knee pain which the Pt has had multiple work up’s for in the past. Pt sees pain management on Tuesday but family is requesting pain medications that can hold over the Pt until that time. Pt has no new complaints. Pain is similar in nature and chronic. Pt is poorly ambulatory with walker at baseline. Family states that they are able to take care of the Pt at home with pain medication. On exam: (+) Pain with ROM of the L knee. No deformity. (+) TTP to L knee an L thigh with no swelling. Impression: Chronic leg pain. I stop screening for the Pt reveals no recent narcotic prescriptions. Will give 4 days of pain medication. Advised to take only for breakthrough pain as needed to prolong the length of prescription. Family is comfortable taking Pt home.

## 2021-11-11 NOTE — ED PROVIDER NOTE - NS ED ROS FT
Constitutional: (-) fever  Eyes/ENT: (-) blurry vision, (-) epistaxis  Cardiovascular: (-) chest pain, (-) syncope  Respiratory: (-) cough, (-) shortness of breath  Gastrointestinal: (-) vomiting, (-) diarrhea  Musculoskeletal: (-) neck pain, (-) back pain, (+) LLE pain   Integumentary: (-) rash, (-) edema  Neurological: (-) headache, (-) altered mental status  Psychiatric: (-) hallucinations  Allergic/Immunologic: (-) pruritus

## 2021-11-11 NOTE — ED PROVIDER NOTE - OBJECTIVE STATEMENT
The pt is a 71y M w/ hx of chronic LLE pain, The pt is a 71y M w/ hx of CVA, afib on Eliquis, HTN, CHF, chronic LLE pain presenting for uncontrolled pain. Pt has been taking oxycodone at home but ran out, has an appointment with pain management in 5 days. This pain is same pain pt has had, no new symptoms. Denies fever, headache, chest pain, SOB, N/V, abdominal pain.

## 2021-11-30 ENCOUNTER — INPATIENT (INPATIENT)
Facility: HOSPITAL | Age: 71
LOS: 8 days | Discharge: NURSING, REHAB OR LONG-TERM CARE | End: 2021-12-09
Attending: INTERNAL MEDICINE | Admitting: INTERNAL MEDICINE
Payer: MEDICARE

## 2021-11-30 VITALS
HEIGHT: 67 IN | SYSTOLIC BLOOD PRESSURE: 220 MMHG | DIASTOLIC BLOOD PRESSURE: 140 MMHG | HEART RATE: 105 BPM | TEMPERATURE: 98 F | RESPIRATION RATE: 17 BRPM | OXYGEN SATURATION: 100 %

## 2021-11-30 DIAGNOSIS — Z95.810 PRESENCE OF AUTOMATIC (IMPLANTABLE) CARDIAC DEFIBRILLATOR: Chronic | ICD-10-CM

## 2021-11-30 DIAGNOSIS — D63.0 ANEMIA IN NEOPLASTIC DISEASE: ICD-10-CM

## 2021-11-30 LAB
ALBUMIN SERPL ELPH-MCNC: 3.8 G/DL — SIGNIFICANT CHANGE UP (ref 3.5–5.2)
ALP SERPL-CCNC: 2765 U/L — HIGH (ref 30–115)
ALT FLD-CCNC: 8 U/L — SIGNIFICANT CHANGE UP (ref 0–41)
ANION GAP SERPL CALC-SCNC: 24 MMOL/L — HIGH (ref 7–14)
ANISOCYTOSIS BLD QL: SIGNIFICANT CHANGE UP
AST SERPL-CCNC: 39 U/L — SIGNIFICANT CHANGE UP (ref 0–41)
BASE EXCESS BLDV CALC-SCNC: -4.9 MMOL/L — LOW (ref -2–3)
BASOPHILS # BLD AUTO: 0 K/UL — SIGNIFICANT CHANGE UP (ref 0–0.2)
BASOPHILS # BLD AUTO: 0.02 K/UL — SIGNIFICANT CHANGE UP (ref 0–0.2)
BASOPHILS NFR BLD AUTO: 0 % — SIGNIFICANT CHANGE UP (ref 0–1)
BASOPHILS NFR BLD AUTO: 0.4 % — SIGNIFICANT CHANGE UP (ref 0–1)
BILIRUB SERPL-MCNC: 0.4 MG/DL — SIGNIFICANT CHANGE UP (ref 0.2–1.2)
BLD GP AB SCN SERPL QL: SIGNIFICANT CHANGE UP
BUN SERPL-MCNC: 32 MG/DL — HIGH (ref 10–20)
CA-I SERPL-SCNC: 1.03 MMOL/L — LOW (ref 1.15–1.33)
CALCIUM SERPL-MCNC: 9 MG/DL — SIGNIFICANT CHANGE UP (ref 8.5–10.1)
CHLORIDE SERPL-SCNC: 103 MMOL/L — SIGNIFICANT CHANGE UP (ref 98–110)
CK SERPL-CCNC: 505 U/L — HIGH (ref 0–225)
CO2 SERPL-SCNC: 16 MMOL/L — LOW (ref 17–32)
CREAT SERPL-MCNC: 1.4 MG/DL — SIGNIFICANT CHANGE UP (ref 0.7–1.5)
DACRYOCYTES BLD QL SMEAR: SLIGHT — SIGNIFICANT CHANGE UP
EOSINOPHIL # BLD AUTO: 0.02 K/UL — SIGNIFICANT CHANGE UP (ref 0–0.7)
EOSINOPHIL # BLD AUTO: 0.09 K/UL — SIGNIFICANT CHANGE UP (ref 0–0.7)
EOSINOPHIL NFR BLD AUTO: 0.4 % — SIGNIFICANT CHANGE UP (ref 0–8)
EOSINOPHIL NFR BLD AUTO: 1.7 % — SIGNIFICANT CHANGE UP (ref 0–8)
GAS PNL BLDV: 146 MMOL/L — HIGH (ref 136–145)
GAS PNL BLDV: SIGNIFICANT CHANGE UP
GIANT PLATELETS BLD QL SMEAR: PRESENT — SIGNIFICANT CHANGE UP
GLUCOSE SERPL-MCNC: 153 MG/DL — HIGH (ref 70–99)
HCO3 BLDV-SCNC: 21 MMOL/L — LOW (ref 22–29)
HCT VFR BLD CALC: 18.8 % — LOW (ref 42–52)
HCT VFR BLD CALC: 20.8 % — LOW (ref 42–52)
HCT VFR BLD CALC: 22.2 % — LOW (ref 42–52)
HCT VFR BLDA CALC: 14 % — CRITICAL LOW (ref 39–51)
HGB BLD CALC-MCNC: 4.5 G/DL — CRITICAL LOW (ref 12.6–17.4)
HGB BLD-MCNC: 5.7 G/DL — CRITICAL LOW (ref 14–18)
HGB BLD-MCNC: 6.4 G/DL — CRITICAL LOW (ref 14–18)
HGB BLD-MCNC: 7 G/DL — LOW (ref 14–18)
IMM GRANULOCYTES NFR BLD AUTO: 5 % — HIGH (ref 0.1–0.3)
IRON SATN MFR SERPL: 32 % — SIGNIFICANT CHANGE UP (ref 15–50)
IRON SATN MFR SERPL: 71 UG/DL — SIGNIFICANT CHANGE UP (ref 35–150)
LACTATE BLDV-MCNC: 2.7 MMOL/L — HIGH (ref 0.5–2)
LACTATE SERPL-SCNC: 1.4 MMOL/L — SIGNIFICANT CHANGE UP (ref 0.7–2)
LACTATE SERPL-SCNC: 5.1 MMOL/L — CRITICAL HIGH (ref 0.7–2)
LDH SERPL L TO P-CCNC: 875 U/L — HIGH (ref 50–242)
LYMPHOCYTES # BLD AUTO: 0.92 K/UL — LOW (ref 1.2–3.4)
LYMPHOCYTES # BLD AUTO: 1.1 K/UL — LOW (ref 1.2–3.4)
LYMPHOCYTES # BLD AUTO: 20.1 % — LOW (ref 20.5–51.1)
LYMPHOCYTES # BLD AUTO: 21.7 % — SIGNIFICANT CHANGE UP (ref 20.5–51.1)
MANUAL SMEAR VERIFICATION: SIGNIFICANT CHANGE UP
MCHC RBC-ENTMCNC: 30.3 G/DL — LOW (ref 32–37)
MCHC RBC-ENTMCNC: 30.4 PG — SIGNIFICANT CHANGE UP (ref 27–31)
MCHC RBC-ENTMCNC: 30.5 PG — SIGNIFICANT CHANGE UP (ref 27–31)
MCHC RBC-ENTMCNC: 30.8 G/DL — LOW (ref 32–37)
MCHC RBC-ENTMCNC: 30.9 PG — SIGNIFICANT CHANGE UP (ref 27–31)
MCHC RBC-ENTMCNC: 31.5 G/DL — LOW (ref 32–37)
MCV RBC AUTO: 100.5 FL — HIGH (ref 80–94)
MCV RBC AUTO: 100.5 FL — HIGH (ref 80–94)
MCV RBC AUTO: 96.5 FL — HIGH (ref 80–94)
METAMYELOCYTES # FLD: 0.9 % — HIGH (ref 0–0)
MICROCYTES BLD QL: SIGNIFICANT CHANGE UP
MONOCYTES # BLD AUTO: 0.22 K/UL — SIGNIFICANT CHANGE UP (ref 0.1–0.6)
MONOCYTES # BLD AUTO: 0.53 K/UL — SIGNIFICANT CHANGE UP (ref 0.1–0.6)
MONOCYTES NFR BLD AUTO: 11.6 % — HIGH (ref 1.7–9.3)
MONOCYTES NFR BLD AUTO: 4.3 % — SIGNIFICANT CHANGE UP (ref 1.7–9.3)
MYELOCYTES NFR BLD: 3.5 % — HIGH (ref 0–0)
NEUTROPHILS # BLD AUTO: 2.86 K/UL — SIGNIFICANT CHANGE UP (ref 1.4–6.5)
NEUTROPHILS # BLD AUTO: 3.38 K/UL — SIGNIFICANT CHANGE UP (ref 1.4–6.5)
NEUTROPHILS NFR BLD AUTO: 60 % — SIGNIFICANT CHANGE UP (ref 42.2–75.2)
NEUTROPHILS NFR BLD AUTO: 62.5 % — SIGNIFICANT CHANGE UP (ref 42.2–75.2)
NEUTS BAND # BLD: 7 % — HIGH (ref 0–6)
NRBC # BLD: 3 /100 WBCS — HIGH (ref 0–0)
NRBC # BLD: 3 /100 — HIGH (ref 0–0)
NRBC # BLD: 4 /100 WBCS — HIGH (ref 0–0)
NRBC # BLD: SIGNIFICANT CHANGE UP /100 WBCS (ref 0–0)
PCO2 BLDV: 39 MMHG — LOW (ref 42–55)
PH BLDV: 7.33 — SIGNIFICANT CHANGE UP (ref 7.32–7.43)
PHOSPHATE SERPL-MCNC: 3.1 MG/DL — SIGNIFICANT CHANGE UP (ref 2.1–4.9)
PLAT MORPH BLD: NORMAL — SIGNIFICANT CHANGE UP
PLATELET # BLD AUTO: 135 K/UL — SIGNIFICANT CHANGE UP (ref 130–400)
PLATELET # BLD AUTO: 151 K/UL — SIGNIFICANT CHANGE UP (ref 130–400)
PLATELET # BLD AUTO: 154 K/UL — SIGNIFICANT CHANGE UP (ref 130–400)
PO2 BLDV: 32 MMHG — SIGNIFICANT CHANGE UP
POIKILOCYTOSIS BLD QL AUTO: SLIGHT — SIGNIFICANT CHANGE UP
POLYCHROMASIA BLD QL SMEAR: SLIGHT — SIGNIFICANT CHANGE UP
POTASSIUM BLDV-SCNC: 4 MMOL/L — SIGNIFICANT CHANGE UP (ref 3.5–5.1)
POTASSIUM SERPL-MCNC: 4.2 MMOL/L — SIGNIFICANT CHANGE UP (ref 3.5–5)
POTASSIUM SERPL-SCNC: 4.2 MMOL/L — SIGNIFICANT CHANGE UP (ref 3.5–5)
PROMYELOCYTES # FLD: 0.9 % — HIGH (ref 0–0)
PROT SERPL-MCNC: 6.2 G/DL — SIGNIFICANT CHANGE UP (ref 6–8)
RBC # BLD: 1.87 M/UL — LOW (ref 4.7–6.1)
RBC # BLD: 2.07 M/UL — LOW (ref 4.7–6.1)
RBC # BLD: 2.3 M/UL — LOW (ref 4.7–6.1)
RBC # FLD: 17.6 % — HIGH (ref 11.5–14.5)
RBC # FLD: 18 % — HIGH (ref 11.5–14.5)
RBC # FLD: 18.2 % — HIGH (ref 11.5–14.5)
RBC BLD AUTO: ABNORMAL
SAO2 % BLDV: 49.6 % — SIGNIFICANT CHANGE UP
SARS-COV-2 RNA SPEC QL NAA+PROBE: SIGNIFICANT CHANGE UP
SCHISTOCYTES BLD QL AUTO: SLIGHT — SIGNIFICANT CHANGE UP
SODIUM SERPL-SCNC: 143 MMOL/L — SIGNIFICANT CHANGE UP (ref 135–146)
SPHEROCYTES BLD QL SMEAR: SLIGHT — SIGNIFICANT CHANGE UP
TIBC SERPL-MCNC: 220 UG/DL — SIGNIFICANT CHANGE UP (ref 220–430)
UIBC SERPL-MCNC: 149 UG/DL — SIGNIFICANT CHANGE UP (ref 110–370)
WBC # BLD: 4.58 K/UL — LOW (ref 4.8–10.8)
WBC # BLD: 4.72 K/UL — LOW (ref 4.8–10.8)
WBC # BLD: 5.05 K/UL — SIGNIFICANT CHANGE UP (ref 4.8–10.8)
WBC # FLD AUTO: 4.58 K/UL — LOW (ref 4.8–10.8)
WBC # FLD AUTO: 4.72 K/UL — LOW (ref 4.8–10.8)
WBC # FLD AUTO: 5.05 K/UL — SIGNIFICANT CHANGE UP (ref 4.8–10.8)

## 2021-11-30 PROCEDURE — 71260 CT THORAX DX C+: CPT | Mod: 26,MA

## 2021-11-30 PROCEDURE — 99223 1ST HOSP IP/OBS HIGH 75: CPT

## 2021-11-30 PROCEDURE — 93010 ELECTROCARDIOGRAM REPORT: CPT

## 2021-11-30 PROCEDURE — 99285 EMERGENCY DEPT VISIT HI MDM: CPT

## 2021-11-30 PROCEDURE — 74177 CT ABD & PELVIS W/CONTRAST: CPT | Mod: 26,MA

## 2021-11-30 PROCEDURE — 70450 CT HEAD/BRAIN W/O DYE: CPT | Mod: 26,MA

## 2021-11-30 RX ORDER — SPIRONOLACTONE 25 MG/1
25 TABLET, FILM COATED ORAL DAILY
Refills: 0 | Status: DISCONTINUED | OUTPATIENT
Start: 2021-11-30 | End: 2021-11-30

## 2021-11-30 RX ORDER — HYDROMORPHONE HYDROCHLORIDE 2 MG/ML
1 INJECTION INTRAMUSCULAR; INTRAVENOUS; SUBCUTANEOUS EVERY 6 HOURS
Refills: 0 | Status: DISCONTINUED | OUTPATIENT
Start: 2021-11-30 | End: 2021-12-01

## 2021-11-30 RX ORDER — FUROSEMIDE 40 MG
20 TABLET ORAL ONCE
Refills: 0 | Status: COMPLETED | OUTPATIENT
Start: 2021-11-30 | End: 2021-11-30

## 2021-11-30 RX ORDER — MORPHINE SULFATE 50 MG/1
4 CAPSULE, EXTENDED RELEASE ORAL ONCE
Refills: 0 | Status: DISCONTINUED | OUTPATIENT
Start: 2021-11-30 | End: 2021-11-30

## 2021-11-30 RX ORDER — IVABRADINE 7.5 MG/1
5 TABLET, FILM COATED ORAL
Refills: 0 | Status: DISCONTINUED | OUTPATIENT
Start: 2021-11-30 | End: 2021-12-09

## 2021-11-30 RX ORDER — ALBUTEROL 90 UG/1
2 AEROSOL, METERED ORAL EVERY 6 HOURS
Refills: 0 | Status: DISCONTINUED | OUTPATIENT
Start: 2021-11-30 | End: 2021-11-30

## 2021-11-30 RX ORDER — SACUBITRIL AND VALSARTAN 24; 26 MG/1; MG/1
1 TABLET, FILM COATED ORAL
Refills: 0 | Status: DISCONTINUED | OUTPATIENT
Start: 2021-11-30 | End: 2021-11-30

## 2021-11-30 RX ORDER — APIXABAN 2.5 MG/1
5 TABLET, FILM COATED ORAL EVERY 12 HOURS
Refills: 0 | Status: DISCONTINUED | OUTPATIENT
Start: 2021-11-30 | End: 2021-12-01

## 2021-11-30 RX ORDER — METHOCARBAMOL 500 MG/1
750 TABLET, FILM COATED ORAL ONCE
Refills: 0 | Status: COMPLETED | OUTPATIENT
Start: 2021-11-30 | End: 2021-11-30

## 2021-11-30 RX ORDER — METOPROLOL TARTRATE 50 MG
25 TABLET ORAL DAILY
Refills: 0 | Status: DISCONTINUED | OUTPATIENT
Start: 2021-11-30 | End: 2021-12-01

## 2021-11-30 RX ORDER — SODIUM CHLORIDE 9 MG/ML
1000 INJECTION, SOLUTION INTRAVENOUS ONCE
Refills: 0 | Status: COMPLETED | OUTPATIENT
Start: 2021-11-30 | End: 2021-11-30

## 2021-11-30 RX ORDER — SODIUM CHLORIDE 9 MG/ML
1000 INJECTION, SOLUTION INTRAVENOUS
Refills: 0 | Status: DISCONTINUED | OUTPATIENT
Start: 2021-11-30 | End: 2021-12-03

## 2021-11-30 RX ORDER — HYDROMORPHONE HYDROCHLORIDE 2 MG/ML
1 INJECTION INTRAMUSCULAR; INTRAVENOUS; SUBCUTANEOUS
Refills: 0 | Status: DISCONTINUED | OUTPATIENT
Start: 2021-11-30 | End: 2021-12-01

## 2021-11-30 RX ORDER — ONDANSETRON 8 MG/1
4 TABLET, FILM COATED ORAL EVERY 8 HOURS
Refills: 0 | Status: DISCONTINUED | OUTPATIENT
Start: 2021-11-30 | End: 2021-12-09

## 2021-11-30 RX ORDER — OXYCODONE AND ACETAMINOPHEN 5; 325 MG/1; MG/1
2 TABLET ORAL EVERY 6 HOURS
Refills: 0 | Status: DISCONTINUED | OUTPATIENT
Start: 2021-11-30 | End: 2021-12-01

## 2021-11-30 RX ORDER — METOPROLOL TARTRATE 50 MG
1 TABLET ORAL
Qty: 0 | Refills: 0 | DISCHARGE

## 2021-11-30 RX ORDER — IVABRADINE 7.5 MG/1
1 TABLET, FILM COATED ORAL
Qty: 0 | Refills: 0 | DISCHARGE

## 2021-11-30 RX ORDER — MORPHINE SULFATE 50 MG/1
6 CAPSULE, EXTENDED RELEASE ORAL ONCE
Refills: 0 | Status: DISCONTINUED | OUTPATIENT
Start: 2021-11-30 | End: 2021-11-30

## 2021-11-30 RX ORDER — ACETAMINOPHEN 500 MG
650 TABLET ORAL EVERY 6 HOURS
Refills: 0 | Status: DISCONTINUED | OUTPATIENT
Start: 2021-11-30 | End: 2021-12-09

## 2021-11-30 RX ORDER — OXYCODONE AND ACETAMINOPHEN 5; 325 MG/1; MG/1
1 TABLET ORAL EVERY 6 HOURS
Refills: 0 | Status: DISCONTINUED | OUTPATIENT
Start: 2021-11-30 | End: 2021-11-30

## 2021-11-30 RX ORDER — LANOLIN ALCOHOL/MO/W.PET/CERES
3 CREAM (GRAM) TOPICAL AT BEDTIME
Refills: 0 | Status: DISCONTINUED | OUTPATIENT
Start: 2021-11-30 | End: 2021-12-09

## 2021-11-30 RX ADMIN — HYDROMORPHONE HYDROCHLORIDE 1 MILLIGRAM(S): 2 INJECTION INTRAMUSCULAR; INTRAVENOUS; SUBCUTANEOUS at 22:54

## 2021-11-30 RX ADMIN — Medication 20 MILLIGRAM(S): at 19:07

## 2021-11-30 RX ADMIN — MORPHINE SULFATE 6 MILLIGRAM(S): 50 CAPSULE, EXTENDED RELEASE ORAL at 19:07

## 2021-11-30 RX ADMIN — SODIUM CHLORIDE 1000 MILLILITER(S): 9 INJECTION, SOLUTION INTRAVENOUS at 14:45

## 2021-11-30 RX ADMIN — METHOCARBAMOL 750 MILLIGRAM(S): 500 TABLET, FILM COATED ORAL at 12:35

## 2021-11-30 RX ADMIN — MORPHINE SULFATE 4 MILLIGRAM(S): 50 CAPSULE, EXTENDED RELEASE ORAL at 12:35

## 2021-11-30 RX ADMIN — SODIUM CHLORIDE 50 MILLILITER(S): 9 INJECTION, SOLUTION INTRAVENOUS at 19:06

## 2021-11-30 RX ADMIN — SODIUM CHLORIDE 1000 MILLILITER(S): 9 INJECTION, SOLUTION INTRAVENOUS at 11:40

## 2021-11-30 RX ADMIN — APIXABAN 5 MILLIGRAM(S): 2.5 TABLET, FILM COATED ORAL at 21:14

## 2021-11-30 RX ADMIN — OXYCODONE AND ACETAMINOPHEN 2 TABLET(S): 5; 325 TABLET ORAL at 18:30

## 2021-11-30 NOTE — ED PROVIDER NOTE - OBJECTIVE STATEMENT
Pt is a 71y M w/ hx of CVA, afib on Eliquis, HTN, CHF ( EF 20-25% 2018) , chronic LLE pain presenting for LLE pain, significant acute weight loss and loss of appetite.  Of note pt has previously been worked up for the leg pain including a CT scan in march 2021 in [facility] which was negative. The sister also notes patient has had loss of appetite for the past few weeks and has had significant weight loss .  No bloody or black stool.. Pt was being seen by a GI w/ planned colonoscopy today but on arrival to office, his GI physician was concerned about his cachetic appearance and rapid weightloss, so he sent him into ED immediately for evaluation.

## 2021-11-30 NOTE — ED PROVIDER NOTE - PHYSICAL EXAMINATION
CONSTITUTIONAL: thin , cachetic   SKIN: warm, dry  HEAD: Normocephalic; atraumatic.  EYES:  normal sclera and conjunctiva   ENT: No nasal discharge; airway clear. dry MMM  NECK: Supple; non tender.  CARD:  Regular rate and rhythm.   RESP: NO inc WOB , speaking in full sentences, lungs CTAB  ABD: soft ntnd  EXT: RLE > LLE ,   NEURO: Alert, oriented, grossly unremarkable

## 2021-11-30 NOTE — ED ADULT TRIAGE NOTE - HISTORY OF COVID-19 VACCINATION
Patient called and informed Medicaid forms was completed by Dr Carmela Schroeder 9 days ago per messages below. Patient requesting order for wheelchair that she will call back with a specific company to get from instead of [employer]. Yes

## 2021-11-30 NOTE — H&P ADULT - NSHPPHYSICALEXAM_GEN_ALL_CORE
General: No acute distress, cachectic; Pallor (+), Icterus (-), Cyanosis (-), Clubbing (-)  HEENT: Normocephalic, atraumatic, PERRLA, EOMI  PULM: Bilaterally equal and clear breath sounds, wheeze (-), rubs (-), crackles (-)  CVS: Normal S1 and S2, murmurs (-), rubs (-), gallops (-)   GI: Soft, scaphoid, nontender, BS +  MSK: Edema (-), Left lower extremity - flexed - limited active extension of left knee, diffuse bony tenderness  SKIN: dry, scaly  NEURO:  Alert and Oriented x 3; No gross focal neurological deficit noted

## 2021-11-30 NOTE — H&P ADULT - ASSESSMENT
70 yo M with PMH of CVA, afib on Eliquis, HTN, HFrEF ( EF 20-25% 2018), chronic LLE pain, presented to the ED with worsening Left LE pain for the past 2 months, worsening fatigue, anorexia and significant weight loss. Of note pt has previously been worked up for the leg pain including a CT scan in march 2021 in [facility] which was negative.    # Symptomatic anemia  - Hb 6.4 on presentation (baseline 10)  - Likely sec to bone mets  - 2U PRBC transfusion  - Give lasix between transfusions  - Check Iron panel  - Check B12, Folate    # Failure to thrive  # Significant weight loss/Anorexia  # Diffuse bony sclerotic lesions/irregularly marginated prostate gland  - CT chest/abdomen/pelvis - Innumerable diffuse sclerotic osseous lesions -  including 6x5.8x9.4cm mass involving the left iliac wing and 6.6x4.5x4.6cm mass involving left inferior pubic ramus. Expansile sclerotic lesions are noted to involve the right 2nd lateral rib and right 5th anterior rib.  - IR eval for biopsy of the sclerotic lesions - will need to call IR in AM.   - Hem-onc eval after biopsy  - check PSA  - ALP - 2085  - Nutrition eval for failure to thrive  - start ensure supplements    # FELICITY  - Cr 1.4 (baseline ~ 0.8)  - Strict I/O  - likely prerenal d/t Poor PO intake  - Gentle IV hydration, avoid volume overload  - Follow up BMP  - Hold entresto    # h/o HTN  # h/o CVA, Afib  # H/o HFrEF (EF 20-25% in 2018)  - Not in exacerbation  - continue with eliquis - may need to hold for biopsy  - continue with metoprolol, Ivabradine, spironolactone    # Chronic LLE pain  - continue with percocet PRN  - PT eval    # Diet: DASH  # Activity: IAT/PT eval  # GI PPX: NA  # DVT PPX: on eliquis  # Full code         70 yo M with PMH of CVA, afib on Eliquis, HTN, HFrEF ( EF 20-25% 2018), chronic LLE pain, presented to the ED with worsening Left LE pain for the past 2 months, worsening fatigue, anorexia and significant weight loss. Of note pt has previously been worked up for the leg pain including a CT scan in march 2021 in [facility] which was negative.    # Symptomatic anemia  - Hb 6.4 on presentation (baseline 10)  - Likely sec to bone mets  - 2U PRBC transfusion  - Give lasix between transfusions  - Check Iron panel  - Check B12, Folate    # Failure to thrive  # Significant weight loss/Anorexia  # Diffuse bony sclerotic lesions/irregularly marginated prostate gland  - CT chest/abdomen/pelvis - Innumerable diffuse sclerotic osseous lesions -  including 6x5.8x9.4cm mass involving the left iliac wing and 6.6x4.5x4.6cm mass involving left inferior pubic ramus. Expansile sclerotic lesions are noted to involve the right 2nd lateral rib and right 5th anterior rib.  - IR/Urology eval for biopsy.   - Hem-onc eval after biopsy  - check PSA  - ALP - 2765  - Nutrition eval for failure to thrive  - start ensure supplements    # FELICITY  - Cr 1.4 (baseline ~ 0.8)  - Strict I/O  - likely prerenal d/t Poor PO intake  - Gentle IV hydration, avoid volume overload  - Follow up BMP  - Hold entresto    # h/o HTN  # h/o CVA, Afib  # H/o HFrEF (EF 20-25% in 2018)  - Not in exacerbation  - continue with eliquis - may need to hold for biopsy  - continue with metoprolol, Ivabradine, spironolactone    # Chronic LLE pain  - continue with percocet PRN  - PT eval    # Diet: DASH  # Activity: IAT/PT eval  # GI PPX: NA  # DVT PPX: on eliquis  # Full code

## 2021-11-30 NOTE — ED PROVIDER NOTE - NS ED ROS FT
Constitutional: (-) fever  Eyes/ENT: (-) blurry vision, (-) epistaxis  Cardiovascular: (-) chest pain, (-) syncope  Respiratory: (-) cough, (-) shortness of breath  Gastrointestinal: (-) vomiting, (-) diarrhea  Musculoskeletal: + joint pain   Integumentary: (-) rash, (-) edema  Neurological: (-) headache, (-) altered mental status  Psychiatric: (-) hallucinations  Allergic/Immunologic: (-) pruritus

## 2021-11-30 NOTE — ED ADULT NURSE NOTE - NSIMPLEMENTINTERV_GEN_ALL_ED
Implemented All Fall Risk Interventions:  [place] to call system. Call bell, personal items and telephone within reach. Instruct patient to call for assistance. Room bathroom lighting operational. Non-slip footwear when patient is off stretcher. Physically safe environment: no spills, clutter or unnecessary equipment. Stretcher in lowest position, wheels locked, appropriate side rails in place. Provide visual cue, wrist band, yellow gown, etc. Monitor gait and stability. Monitor for mental status changes and reorient to person, place, and time. Review medications for side effects contributing to fall risk. Reinforce activity limits and safety measures with patient and family.

## 2021-11-30 NOTE — ED PROVIDER NOTE - CLINICAL SUMMARY MEDICAL DECISION MAKING FREE TEXT BOX
Labs with Hb of 6.4 with abnormal smear; alk phos also elevated, lactate is 5.1, likely from poor PO intake, less likely from infection as no infectious sx. CT with diffuse lytic lesions of the bones of the chest and pelvis and an enlarged prostate, which likely represents prostate cancer. Will admit for hydration and transfusion, heme/onc consult, and continued management.

## 2021-11-30 NOTE — H&P ADULT - NSHPSOCIALHISTORY_GEN_ALL_CORE
Substance Use (street drugs): ( x ) never used  (  ) other:  Tobacco Usage:  ( x  ) never smoked   (   ) former smoker   (   ) current smoker  (     ) pack year  Alcohol Usage: None   Lives with sister and brother in law; walks with walker at baseline

## 2021-11-30 NOTE — H&P ADULT - NSHPLABSRESULTS_GEN_ALL_CORE
(11-30 @ 15:36)                      5.7  4.72 )-----------( 151                 18.8    Neutrophils = -- (--%)  Lymphocytes = -- (--%)  Eosinophils = -- (--%)  Basophils = -- (--%)  Monocytes = -- (--%)  Bands = --%    11-30    143  |  103  |  32<H>  ----------------------------<  153<H>  4.2   |  16<L>  |  1.4    Ca    9.0      30 Nov 2021 11:50  Phos  3.1     11-30    TPro  6.2  /  Alb  3.8  /  TBili  0.4  /  DBili  x   /  AST  39  /  ALT  8   /  AlkPhos  2765<H>  11-30      CARDIAC MARKERS ( 30 Nov 2021 15:36 )  Trop x     /  U/L<H> / CKMB x           RVP:    Venous Blood Gas:  11-30 @ 17:14  7.33/39/32/21/49.6  VBG Lactate: 2.70  < from: CT Abdomen and Pelvis w/ IV Cont (11.30.21 @ 13:49) >      EXAM:  CT ABDOMEN AND PELVIS IC        EXAM:  CT CHEST IC            PROCEDURE DATE:  11/30/2021            INTERPRETATION:  CLINICAL STATEMENT: Malignancy.    TECHNIQUE: Contiguous axial CT images were obtained from the thoracic inlet to the pubicsymphysis following administration of 100c Optiray 320 intravenous contrast.  Oral contrast was not administered.  Reformatted images in the coronal and sagittal planes were acquired.    COMPARISON: CT chest dated August 17, 2018.    FINDINGS:    CHEST:    LUNGS/PLEURA/AIRWAYS: No lobar consolidation, pleural effusion or pneumothorax. Central tracheobronchial tree is grossly patent. Mild bibasilar subsegmental atelectasis.    MEDIASTINUM/THORACIC NODES: No enlarged thoracic lymph nodes.    HEART/GREAT VESSELS: Heart size is within normal limits. Atherosclerotic calcification of the thoracic aorta. No pericardial effusion. Left chest wall AICD, with lead tips in appropriate position.    ABDOMEN/PELVIS:    HEPATOBILIARY: Unremarkable.    SPLEEN: Unremarkable.    PANCREAS: Unremarkable.    ADRENAL GLANDS: Unremarkable.    KIDNEYS: Unremarkable.    ABDOMINOPELVIC NODES: Unremarkable.    PELVIC ORGANS: Enlarged irregularly marginated prostate gland, measuring 6.1 x 5.8 x 5.7 cm.    PERITONEUM/MESENTERY/BOWEL: Unremarkable.    BONES/SOFT TISSUES: Innumerable diffuse sclerotic osseous lesions are noted. For example:    -A 6.0 x 5.8 x 9.4 cm mass involving the left iliac wing with associated extraosseous soft tissue extension  -A 6.6 x 4.5 x 4.6 and meter mass involves the left inferior pubic ramus, with associated extraosseous soft tissue extension.  -Expansile sclerotic lesions are noted to involve the right 2nd lateral rib and right 5th anterior rib.    OTHER: Atherosclerotic vascular calcification.      IMPRESSION:    1. Innumerable diffuse sclerotic osseous lesions, as detailed above, compatible with osseous metastases.    2. Enlarged irregularly marginated prostate gland, possibly reflecting underlying neoplastic process.    < end of copied text > (11-30 @ 15:36)                      5.7  4.72 )-----------( 151                 18.8    Neutrophils = -- (--%)  Lymphocytes = -- (--%)  Eosinophils = -- (--%)  Basophils = -- (--%)  Monocytes = -- (--%)  Bands = --%    11-30    143  |  103  |  32<H>  ----------------------------<  153<H>  4.2   |  16<L>  |  1.4    Ca    9.0      30 Nov 2021 11:50  Phos  3.1     11-30    TPro  6.2  /  Alb  3.8  /  TBili  0.4  /  DBili  x   /  AST  39  /  ALT  8   /  AlkPhos  2765<H>  11-30      CARDIAC MARKERS ( 30 Nov 2021 15:36 )  Trop x     /  U/L<H> / CKMB x           RVP:    Venous Blood Gas:  11-30 @ 17:14  7.33/39/32/21/49.6  VBG Lactate: 2.70  < from: CT Abdomen and Pelvis w/ IV Cont (11.30.21 @ 13:49) >      EXAM:  CT ABDOMEN AND PELVIS IC        EXAM:  CT CHEST IC            PROCEDURE DATE:  11/30/2021            INTERPRETATION:  CLINICAL STATEMENT: Malignancy.    TECHNIQUE: Contiguous axial CT images were obtained from the thoracic inlet to the pubicsymphysis following administration of 100c Optiray 320 intravenous contrast.  Oral contrast was not administered.  Reformatted images in the coronal and sagittal planes were acquired.    COMPARISON: CT chest dated August 17, 2018.    FINDINGS:    CHEST:    LUNGS/PLEURA/AIRWAYS: No lobar consolidation, pleural effusion or pneumothorax. Central tracheobronchial tree is grossly patent. Mild bibasilar subsegmental atelectasis.    MEDIASTINUM/THORACIC NODES: No enlarged thoracic lymph nodes.    HEART/GREAT VESSELS: Heart size is within normal limits. Atherosclerotic calcification of the thoracic aorta. No pericardial effusion. Left chest wall AICD, with lead tips in appropriate position.    ABDOMEN/PELVIS:    HEPATOBILIARY: Unremarkable.    SPLEEN: Unremarkable.    PANCREAS: Unremarkable.    ADRENAL GLANDS: Unremarkable.    KIDNEYS: Unremarkable.    ABDOMINOPELVIC NODES: Unremarkable.    PELVIC ORGANS: Enlarged irregularly marginated prostate gland, measuring 6.1 x 5.8 x 5.7 cm.    PERITONEUM/MESENTERY/BOWEL: Unremarkable.    BONES/SOFT TISSUES: Innumerable diffuse sclerotic osseous lesions are noted. For example:    -A 6.0 x 5.8 x 9.4 cm mass involving the left iliac wing with associated extraosseous soft tissue extension  -A 6.6 x 4.5 x 4.6 and meter mass involves the left inferior pubic ramus, with associated extraosseous soft tissue extension.  -Expansile sclerotic lesions are noted to involve the right 2nd lateral rib and right 5th anterior rib.    OTHER: Atherosclerotic vascular calcification.      IMPRESSION:    1. Innumerable diffuse sclerotic osseous lesions, as detailed above, compatible with osseous metastases.    2. Enlarged irregularly marginated prostate gland, possibly reflecting underlying neoplastic process.

## 2021-11-30 NOTE — H&P ADULT - NSICDXPASTMEDICALHX_GEN_ALL_CORE_FT
PAST MEDICAL HISTORY:  Atrial fibrillation, unspecified type     Cerebrovascular accident (CVA) due to bilateral embolism of posterior cerebral arteries     Essential hypertension     Systolic congestive heart failure, unspecified chronicity

## 2021-11-30 NOTE — ED PROVIDER NOTE - CARE PLAN
Principal Discharge DX:	Cancer, metastatic to bone  Secondary Diagnosis:	Adult failure to thrive  Secondary Diagnosis:	Lactic acidosis  Secondary Diagnosis:	Low hemoglobin   1

## 2021-11-30 NOTE — H&P ADULT - ATTENDING COMMENTS
70 yo M with PMH of CVA, Afib on Eliquis, HTN, HFrEF ( EF 20-25% 2018), chronic LLE pain, presented to the ED with worsening Left LE pain for the past 2 months, worsening fatigue, anorexia and significant weight loss. Of note pt has previously been worked up for the leg pain including a CT scan in march 2021 in [facility] which was negative.    General: in pain cachectic; Pallor   HEENT: Normocephalic, atraumatic,   PULM: good air entry bilateral  CVS: Normal S1 and S2  GI: Soft, scaphoid, nontender  MSK: no Edema, limited active extension of left knee, diffuse bony tenderness  SKIN: dry, scaly  NEURO:  Alert and Oriented x 3    # Symptomatic anemia, no evidence of active loss, malignancy related?  - Hb 6.4 on presentation (baseline 10)  - 2U PRBC transfusion  - avoid volume overload  - Check Iron panel, reticulocytes   - Check B12, Folate  -  repeat CBC    # Failure to thrive, Significant weight loss/Anorexia and Diffuse bony sclerotic lesions/irregularly marginated prostate gland. possible metastatic prostate disease?  - CT chest/abdomen/pelvis - Innumerable diffuse sclerotic osseous lesions -  including 6x5.8x9.4cm mass involving the left iliac wing and 6.6x4.5x4.6cm mass involving left inferior pubic ramus. Expansile sclerotic lesions are noted to involve the right 2nd lateral rib and right 5th anterior rib.  - IR/Urology/Hem-onc  eval for biopsy.   - check PSA  - ALP - 2765  - Nutrition eval for failure to thrive  - start ensure supplements    # FELICITY, Cr 1.4 (baseline ~ 0.8)  - Strict I/O  - likely prerenal d/t Poor PO intake.  - avoid volume overload  - Follow up BMP  - Hold Entresto    # h/o HTN  # h/o CVA, Afib  # H/o HFrEF (EF 20-25% in 2018)  - Not in exacerbation  - continue with Eliquis - may need to hold for biopsy  - continue with metoprolol, Ivabradine, hold spironolactone    # Chronic LLE pain  - continue with percocet PRN, morphine for severe pain.   - PT eval    # Diet: DASH  # Activity: PT eval  # DVT PPX: on Eliquis  # Full code     #Progress Note Handoff  Pending (specify):  PRBCs, monitor CBC,   Family discussion: discussed with patient and sister at bedside.   Disposition: PT evaluation 70 yo M with PMH of CVA, Afib on Eliquis, HTN, HFrEF ( EF 20-25% 2018), chronic LLE pain, presented to the ED with worsening Left LE pain for the past 2 months, worsening fatigue, anorexia and significant weight loss. Of note pt has previously been worked up for the leg pain including a CT scan in march 2021 in [facility] which was negative.    General: in pain cachectic; Pallor   HEENT: Normocephalic, atraumatic,   PULM: good air entry bilateral  CVS: Normal S1 and S2  GI: Soft, scaphoid, nontender  MSK: no Edema, limited active extension of left knee, diffuse bony tenderness  SKIN: dry, scaly  NEURO:  Alert and Oriented x 3    # Symptomatic anemia, no evidence of active loss, malignancy related?  - Hb 6.4 on presentation (baseline 10)  - 2U PRBC transfusion  - avoid volume overload  - Check Iron panel, reticulocytes   - Check B12, Folate  -  repeat CBC    # Failure to thrive, Significant weight loss/Anorexia and Diffuse bony sclerotic lesions/irregularly marginated prostate gland. possible metastatic prostate disease?  # Chronic LLE pain  - CT chest/abdomen/pelvis - Innumerable diffuse sclerotic osseous lesions -  including 6x5.8x9.4cm mass involving the left iliac wing and 6.6x4.5x4.6cm mass involving left inferior pubic ramus. Expansile sclerotic lesions are noted to involve the right 2nd lateral rib and right 5th anterior rib.  - IR/Urology eval for biopsy.   - continue with percocet PRN, morphine for severe pain.   - PT eval  - check PSA  - ALP - 2765  - Nutrition eval for failure to thrive  - start ensure supplements    # FELICITY, Cr 1.4 (baseline ~ 0.8)  - Strict I/O  - likely prerenal d/t Poor PO intake.  - avoid volume overload  - Follow up BMP  - Hold Entresto    # h/o HTN  # h/o CVA, Afib  # H/o HFrEF (EF 20-25% in 2018)  - Not in exacerbation  - continue with Eliquis - may need to hold for biopsy  - continue with metoprolol, Ivabradine, hold spironolactone    # Diet: DASH  # Activity: PT eval  # DVT PPX: on Eliquis  # Full code     #Progress Note Handoff  Pending (specify):  PRBCs, monitor CBC, IR for biopsy   Family discussion: discussed with patient and sister at bedside.   Disposition: PT evaluation

## 2021-11-30 NOTE — CONSULT NOTE ADULT - SUBJECTIVE AND OBJECTIVE BOX
Urology Consult Note: History obtained from chart review and patient's sister at bedside in ED. 71 year old male with PMH of CVA, afib on Eliquis, HTN, HFrEF ( EF 20-25% 2018), chronic LLE pain, presented to the ED with worsening Left LE pain for the past 2 months, worsening fatigue, anorexia and significant weight loss - admitted for management of symptomatic anemia, FELICITY and likely metastatic prostatic ca with diffuse osseous mets. Previous CT scan for work up of leg pain March 2021 was negative however patient's leg pain has been getting progressively worsening rendering the patient unable to walk. Pt was OP by GI for planned colonoscopy today but on arrival to office, his GI physician was concerned about his cachetic appearance and rapid weight loss, so he sent him into ED immediately for evaluation. In the ED his vitals were significant for /140mmHg, /min, other vitals were normal. Labs were significant for Hb of 6.4, Cr 1.4 (0.8 at baseline), bicarb 16, AG 24, ALP 2765, Lactate of 5.1. CT chest, abdomen and pelvis showed -  Innumerable diffuse sclerotic osseous lesions, compatible with osseous metastases and Enlarged irregularly marginated prostate gland, possibly reflecting underlying neoplastic process.  called for further evaluation for biopsy for possible prostate cancer found on CTAP.     PAST MEDICAL & SURGICAL HISTORY:  Systolic congestive heart failure, unspecified chronicity  Cerebrovascular accident (CVA) due to bilateral embolism of posterior cerebral arteries  Essential hypertension  Atrial fibrillation, unspecified type  AICD (automatic cardioverter/defibrillator) present    [ x ] Due to altered mental status/intubation, subjective information was not able to be obtained from the patient. History was obtained to the extent possible from review of the chart and collateral sources of information.     MEDICATIONS  (STANDING):  apixaban 5 milliGRAM(s) Oral every 12 hours  lactated ringers. 1000 milliLiter(s) (50 mL/Hr) IV Continuous <Continuous>  metoprolol succinate ER 25 milliGRAM(s) Oral daily    MEDICATIONS  (PRN):  acetaminophen     Tablet .. 650 milliGRAM(s) Oral every 6 hours PRN Temp greater or equal to 38C (100.4F), Mild Pain (1 - 3)  ALBUTerol    90 MICROgram(s) HFA Inhaler 2 Puff(s) Inhalation every 6 hours PRN Shortness of Breath and/or Wheezing  aluminum hydroxide/magnesium hydroxide/simethicone Suspension 30 milliLiter(s) Oral every 4 hours PRN Dyspepsia  melatonin 3 milliGRAM(s) Oral at bedtime PRN Insomnia  ondansetron Injectable 4 milliGRAM(s) IV Push every 8 hours PRN Nausea and/or Vomiting  oxycodone    5 mG/acetaminophen 325 mG 2 Tablet(s) Oral every 6 hours PRN Moderate Pain (4 - 6)    Allergies:  cephalexin (Flushing)  penicillin (Rash)    SOCIAL HISTORY: No illicit drug use    FAMILY HISTORY:  No pertinent family history in first degree relatives    Vital Signs Last 24 Hrs  T(C): 37.1 (30 Nov 2021 16:31), Max: 37.1 (30 Nov 2021 16:31)  T(F): 98.7 (30 Nov 2021 16:31), Max: 98.7 (30 Nov 2021 16:31)  HR: 96 (30 Nov 2021 16:31) (96 - 105)  BP: 109/57 (30 Nov 2021 16:31) (98/56 - 220/140)  RR: 20 (30 Nov 2021 16:31) (17 - 20)  SpO2: 97% (30 Nov 2021 16:31) (97% - 100%)    PHYSICAL EXAM:  Constitutional: NAD, well-developed, cachectic, lethargic  HEENT: NC/AT  Back: No CVA ttp bilaterally  Resp: No accessory respiratory muscle use  Abd: Soft, NT/ND. No suprapubic ttp  Cardio: +S1/S2  : Uncircumcised, testicles x2 nonttp, symmetric, meatus patent without blood or discharge  Neuro: Somnolent  MSK: Diffuse bony tenderness    LABS:                      5.7    4.72  )-----------( 151      ( 30 Nov 2021 15:36 )             18.8     11-30  143  |  103  |  32<H>  ----------------------------<  153<H>  4.2   |  16<L>  |  1.4    Ca    9.0      30 Nov 2021 11:50  Phos  3.1     11-30  TPro  6.2  /  Alb  3.8  /  TBili  0.4  /  DBili  x   /  AST  39  /  ALT  8   /  AlkPhos  2765<H>  11-30    RADIOLOGY & ADDITIONAL STUDIES:    EXAM:  CT ABDOMEN AND PELVIS IC        EXAM:  CT CHEST IC          PROCEDURE DATE:  11/30/2021    INTERPRETATION:  CLINICAL STATEMENT: Malignancy.    TECHNIQUE: Contiguous axial CT images were obtained from the thoracic inlet to the pubicsymphysis following administration of 100c Optiray 320 intravenous contrast.  Oral contrast was not administered.  Reformatted images in the coronal and sagittal planes were acquired.    COMPARISON: CT chest dated August 17, 2018.    FINDINGS:    CHEST:    LUNGS/PLEURA/AIRWAYS: No lobar consolidation, pleural effusion or pneumothorax. Central tracheobronchial tree is grossly patent. Mild bibasilar subsegmental atelectasis.    MEDIASTINUM/THORACIC NODES: No enlarged thoracic lymph nodes.    HEART/GREAT VESSELS: Heart size is within normal limits. Atherosclerotic calcification of the thoracic aorta. No pericardial effusion. Left chest wall AICD, with lead tips in appropriate position.    ABDOMEN/PELVIS:    HEPATOBILIARY: Unremarkable.    SPLEEN: Unremarkable.    PANCREAS: Unremarkable.    ADRENAL GLANDS: Unremarkable.    KIDNEYS: Unremarkable.    ABDOMINOPELVIC NODES: Unremarkable.    PELVIC ORGANS: Enlarged irregularly marginated prostate gland, measuring 6.1 x 5.8 x 5.7 cm.    PERITONEUM/MESENTERY/BOWEL: Unremarkable.    BONES/SOFT TISSUES: Innumerable diffuse sclerotic osseous lesions are noted. For example:    -A 6.0 x 5.8 x 9.4 cm mass involving the left iliac wing with associated extraosseous soft tissue extension  -A 6.6 x 4.5 x 4.6 and meter mass involves the left inferior pubic ramus, with associated extraosseous soft tissue extension.  -Expansile sclerotic lesions are noted to involve the right 2nd lateral rib and right 5th anterior rib.    OTHER: Atherosclerotic vascular calcification.    IMPRESSION:  1. Innumerable diffuse sclerotic osseous lesions, as detailed above, compatible with osseous metastases.    2. Enlarged irregularly marginated prostate gland, possibly reflecting underlying neoplastic process.    Spoke with ESTELA RICHMOND on 11/30/2021 2:09 PM with readback.    --- End of Report ---    EDENILSON NOLAN MD; Attending Radiologist  This document has been electronically signed. Nov 30 2021  2:19PM

## 2021-11-30 NOTE — H&P ADULT - HISTORY OF PRESENT ILLNESS
70 yo M with PMH of CVA, afib on Eliquis, HTN, HFrEF ( EF 20-25% 2018), chronic LLE pain, presented to the ED with worsening Left LE pain for the past 2 months, worsening fatigue, anorexia and significant weight loss. Of note pt has previously been worked up for the leg pain including a CT scan in march 2021 in [facility] which was negative. Patient has been getting progressively weak and is unable to ambulate now. He denies any chest pain, shortness of breath, fever, cough. Pt was being seen by a GI w/ planned colonoscopy today but on arrival to office, his GI physician was concerned about his cachetic appearance and rapid weight loss, so he sent him into ED immediately for evaluation.   In the ED his vitals were significant for /140mmHg, /min, other vitals were normal. Labs were significant for Hb of 6.4, Cr 1.4 (0.8 at baseline), bicarb 16, AG 24, ALP 2765, Lactate of 5.1. CT chest, abdomen and pelvis showed -  Innumerable diffuse sclerotic osseous lesions, compatible with osseous metastases and Enlarged irregularly marginated prostate gland, possibly reflecting underlying neoplastic process.  Patient being admitted for management of symptomatic anemia, FELICITY and likely metastatic prostatic ca with diffuse osseous mets.

## 2021-11-30 NOTE — CONSULT NOTE ADULT - ASSESSMENT
71 year old male with admitted for management of symptomatic anemia, FELICITY and likely metastatic prostatic ca with diffuse osseous mets. . In the ED his vitals were significant for /140mmHg, /min, other vitals were normal. Labs were significant for Hb of 6.4, Cr 1.4 (0.8 at baseline), bicarb 16, AG 24, ALP 2765, Lactate of 5.1.     CT chest/abdomen/pelvis - Innumerable diffuse sclerotic osseous lesions compatible with metastases -  including 6x5.8x9.4cm mass involving the left iliac wing and 6.6x4.5x4.6cm mass involving left inferior pubic ramus. Expansile sclerotic lesions are noted to involve the right 2nd lateral rib and right 5th anterior rib. Enlarged irregularly marginated prostate gland, possibly reflecting underlying neoplastic process.    Plan:  ·	F/u PSA level  ·	Continue pain control  ·	Patient voiding freely per sister at bedside  ·	FELICITY, Cr 1.4 (baseline ~ 0.8) likely prerenal, trend Cr  ·	Strict I/Os  ·	Possible biopsy pending PSA results  ·	F/u previous IR c/s for possible biopsy  ·	Will discuss with attending

## 2021-11-30 NOTE — ED PROVIDER NOTE - ATTENDING WITH...
Airway patent, Nasal mucosa clear. Mouth with normal mucosa. Throat has no vesicles, no oropharyngeal exudates and uvula is midline. Resident

## 2021-11-30 NOTE — ED PROVIDER NOTE - PROGRESS NOTE DETAILS
I have personally performed a history and physical exam on this patient and personally directed the management of the patient. 72yo man h/o HTN, HLD, CVA, afib on eliquis, CHF with low EF and AICD BIB family due to generalized weakness and poor PO intake over the past 1 month. He has a h/o chronic L knee pain, worse with palpation, movement, and weightbearing for which he has been admitted in the past with no etiology discovered. Per sister, he has been increasingly weak. Patient reports that he has no appetite, but denies abdominal pain, nausea or vomiting. No urinary sx. On exam he is extremely cachectic with poor skin turgor, but he is alert and answering questions appropriately, moist mms, lungs CTA, CVS1S2 RRR abd soft, NT, ND. Painful to touch even the skin over the L knee but there is no deformity or single area of maximum tenderness. He does move the extremity, but only with pain. Given significant weight loss, concerned for underlying oncologic process such as colon/prostate/lung CA. Will check labs, imaging, reassess. OG : CT scan results discussed with patient and his sister

## 2021-12-01 DIAGNOSIS — C61 MALIGNANT NEOPLASM OF PROSTATE: ICD-10-CM

## 2021-12-01 DIAGNOSIS — R52 PAIN, UNSPECIFIED: ICD-10-CM

## 2021-12-01 DIAGNOSIS — K59.00 CONSTIPATION, UNSPECIFIED: ICD-10-CM

## 2021-12-01 DIAGNOSIS — R41.82 ALTERED MENTAL STATUS, UNSPECIFIED: ICD-10-CM

## 2021-12-01 DIAGNOSIS — Z51.5 ENCOUNTER FOR PALLIATIVE CARE: ICD-10-CM

## 2021-12-01 LAB
FERRITIN SERPL-MCNC: 2624 NG/ML — HIGH (ref 30–400)
FOLATE SERPL-MCNC: 8.5 NG/ML — SIGNIFICANT CHANGE UP
HCT VFR BLD CALC: 27.6 % — LOW (ref 42–52)
HGB BLD-MCNC: 9.1 G/DL — LOW (ref 14–18)
MCHC RBC-ENTMCNC: 31 PG — SIGNIFICANT CHANGE UP (ref 27–31)
MCHC RBC-ENTMCNC: 33 G/DL — SIGNIFICANT CHANGE UP (ref 32–37)
MCV RBC AUTO: 93.9 FL — SIGNIFICANT CHANGE UP (ref 80–94)
NRBC # BLD: 3 /100 WBCS — HIGH (ref 0–0)
PLATELET # BLD AUTO: 134 K/UL — SIGNIFICANT CHANGE UP (ref 130–400)
PSA FLD-MCNC: 1458 NG/ML — HIGH (ref 0–4)
RBC # BLD: 2.94 M/UL — LOW (ref 4.7–6.1)
RBC # FLD: 17.7 % — HIGH (ref 11.5–14.5)
WBC # BLD: 3.52 K/UL — LOW (ref 4.8–10.8)
WBC # FLD AUTO: 3.52 K/UL — LOW (ref 4.8–10.8)

## 2021-12-01 PROCEDURE — 99233 SBSQ HOSP IP/OBS HIGH 50: CPT

## 2021-12-01 PROCEDURE — 99223 1ST HOSP IP/OBS HIGH 75: CPT

## 2021-12-01 RX ORDER — HYDROMORPHONE HYDROCHLORIDE 2 MG/ML
2 INJECTION INTRAMUSCULAR; INTRAVENOUS; SUBCUTANEOUS
Refills: 0 | Status: DISCONTINUED | OUTPATIENT
Start: 2021-12-01 | End: 2021-12-02

## 2021-12-01 RX ORDER — HYDROMORPHONE HYDROCHLORIDE 2 MG/ML
2 INJECTION INTRAMUSCULAR; INTRAVENOUS; SUBCUTANEOUS ONCE
Refills: 0 | Status: DISCONTINUED | OUTPATIENT
Start: 2021-12-01 | End: 2021-12-01

## 2021-12-01 RX ORDER — INFLUENZA VIRUS VACCINE 15; 15; 15; 15 UG/.5ML; UG/.5ML; UG/.5ML; UG/.5ML
0.7 SUSPENSION INTRAMUSCULAR ONCE
Refills: 0 | Status: DISCONTINUED | OUTPATIENT
Start: 2021-12-01 | End: 2021-12-09

## 2021-12-01 RX ORDER — METOPROLOL TARTRATE 50 MG
25 TABLET ORAL DAILY
Refills: 0 | Status: DISCONTINUED | OUTPATIENT
Start: 2021-12-02 | End: 2021-12-09

## 2021-12-01 RX ORDER — HYDROMORPHONE HYDROCHLORIDE 2 MG/ML
2 INJECTION INTRAMUSCULAR; INTRAVENOUS; SUBCUTANEOUS ONCE
Refills: 0 | Status: COMPLETED | OUTPATIENT
Start: 2021-12-01 | End: 2021-12-01

## 2021-12-01 RX ORDER — SODIUM CHLORIDE 9 MG/ML
500 INJECTION INTRAMUSCULAR; INTRAVENOUS; SUBCUTANEOUS
Refills: 0 | Status: COMPLETED | OUTPATIENT
Start: 2021-12-01 | End: 2021-12-01

## 2021-12-01 RX ORDER — GABAPENTIN 400 MG/1
300 CAPSULE ORAL EVERY 12 HOURS
Refills: 0 | Status: DISCONTINUED | OUTPATIENT
Start: 2021-12-01 | End: 2021-12-08

## 2021-12-01 RX ORDER — KETOROLAC TROMETHAMINE 30 MG/ML
30 SYRINGE (ML) INJECTION ONCE
Refills: 0 | Status: DISCONTINUED | OUTPATIENT
Start: 2021-12-01 | End: 2021-12-01

## 2021-12-01 RX ORDER — HYDROMORPHONE HYDROCHLORIDE 2 MG/ML
2 INJECTION INTRAMUSCULAR; INTRAVENOUS; SUBCUTANEOUS
Refills: 0 | Status: DISCONTINUED | OUTPATIENT
Start: 2021-12-01 | End: 2021-12-01

## 2021-12-01 RX ORDER — DEXAMETHASONE 0.5 MG/5ML
2 ELIXIR ORAL EVERY 6 HOURS
Refills: 0 | Status: DISCONTINUED | OUTPATIENT
Start: 2021-12-01 | End: 2021-12-02

## 2021-12-01 RX ADMIN — GABAPENTIN 300 MILLIGRAM(S): 400 CAPSULE ORAL at 17:41

## 2021-12-01 RX ADMIN — HYDROMORPHONE HYDROCHLORIDE 1 MILLIGRAM(S): 2 INJECTION INTRAMUSCULAR; INTRAVENOUS; SUBCUTANEOUS at 07:42

## 2021-12-01 RX ADMIN — Medication 30 MILLIGRAM(S): at 16:09

## 2021-12-01 RX ADMIN — HYDROMORPHONE HYDROCHLORIDE 1 MILLIGRAM(S): 2 INJECTION INTRAMUSCULAR; INTRAVENOUS; SUBCUTANEOUS at 06:52

## 2021-12-01 RX ADMIN — Medication 2 MILLIGRAM(S): at 17:38

## 2021-12-01 RX ADMIN — IVABRADINE 5 MILLIGRAM(S): 7.5 TABLET, FILM COATED ORAL at 06:57

## 2021-12-01 RX ADMIN — IVABRADINE 5 MILLIGRAM(S): 7.5 TABLET, FILM COATED ORAL at 17:40

## 2021-12-01 RX ADMIN — APIXABAN 5 MILLIGRAM(S): 2.5 TABLET, FILM COATED ORAL at 06:51

## 2021-12-01 RX ADMIN — HYDROMORPHONE HYDROCHLORIDE 1 MILLIGRAM(S): 2 INJECTION INTRAMUSCULAR; INTRAVENOUS; SUBCUTANEOUS at 11:09

## 2021-12-01 RX ADMIN — SODIUM CHLORIDE 500 MILLILITER(S): 9 INJECTION INTRAMUSCULAR; INTRAVENOUS; SUBCUTANEOUS at 17:43

## 2021-12-01 RX ADMIN — SODIUM CHLORIDE 50 MILLILITER(S): 9 INJECTION, SOLUTION INTRAVENOUS at 17:43

## 2021-12-01 RX ADMIN — SODIUM CHLORIDE 500 MILLILITER(S): 9 INJECTION INTRAMUSCULAR; INTRAVENOUS; SUBCUTANEOUS at 16:10

## 2021-12-01 RX ADMIN — HYDROMORPHONE HYDROCHLORIDE 2 MILLIGRAM(S): 2 INJECTION INTRAMUSCULAR; INTRAVENOUS; SUBCUTANEOUS at 14:31

## 2021-12-01 RX ADMIN — Medication 25 MILLIGRAM(S): at 06:51

## 2021-12-01 NOTE — CONSULT NOTE ADULT - PROBLEM SELECTOR RECOMMENDATION 3
severe, likely related to metastatic disease   minimal relief with Dilaudid 1 mg IVP   recommend starting Dilaudid 2 mg IVP Q 2 H PRN for pain   recommend starting Dexamethasone 2 mg IVP Q 6 H ATC for bone pain   Gabapentin 300 Q 12 H per primary team  D/C Percocet, D/C Dilaudid 1 mg IVP PRN   * Spoke with Dr. Carias regarding pain mgmt plan of care severe, likely related to metastatic disease   minimal relief with Dilaudid 1 mg IVP   recommend starting Dilaudid 2 mg IVP Q 2 H PRN for pain (hold for sedation, confusion, RR<10)  recommend starting Dexamethasone 2 mg IVP Q 6 H ATC for bone pain - will likely titrate down in coming days  Gabapentin 300 Q 12 H per primary team  D/C Percocet, D/C Dilaudid 1 mg IVP PRN   * Spoke with Dr. Carias regarding pain mgmt plan of care

## 2021-12-01 NOTE — PROGRESS NOTE ADULT - ASSESSMENT
70 yo M with PMH of CVA, afib on Eliquis, HTN, HFrEF (EF 20-25% 2018), chronic LLE pain, presented to the ED with worsening Left LE pain for the past 2 months, worsening fatigue, anorexia and significant weight loss. Of note, pt had previously been worked up for the leg pain including a CT scan in march 2021 in [facility], which was negative.    # Neopl-related severe pain 2/2 Diffuse bony sclerotic lesions (AP 2765) likely 2/2 prostate cancer (irregularly marginated prostate gland on CT and PSA 1400)  CT chest/abdomen/pelvis - Innumerable diffuse sclerotic osseous lesions -  including 6x5.8x9.4cm mass involving the left iliac wing and 6.6x4.5x4.6cm mass involving left inferior pubic ramus. Expansile sclerotic lesions are noted to involve the right 2nd lateral rib and right 5th anterior rib.  hold Eliquis for now  IR eval for bx  Uro noted  Hem Onc eval now for work up  Rad Onc eval for pall RT  Pall Care eval - spoke w/ Dr Johansen  once more comfortable, will consider MRI C/T/L spine NC  dilaudid 2mg iv q2 prn pain  toradol 30mg iv x1  neurontin 300mg po q12  dexameth 2mg iv q6 - will taper down todd  bowel reg    # Failure to thrive  dietician eval  MVI q24  Ensure 3x/day    # Symptomatic anemia  no overt bleed  prob related to malignancy and diffuse bone mets  Hb 6.4 on presentation (baseline 10)  2U PRBC transfusion -> hgb better  may need lasix after future tx given poor EF  f/u Iron panel, B12, Folate    # FELICITY - likely dehydration  Cr 1.4 (baseline ~ 0.8)  IVFs: LR 50/hr  Hold entresto  BMP q24    # hypoTN - prob from anemia, CHF meds, low EF and dehydration  keep sys BP above 90 and preferably above 100  NS bolus now  consider midodrine  holding parameters for BB    # h/o HTN; h/o CVA, Afib; H/o HFrEF (EF 20-25% in 2018)  hold Eliquis for bx  hold Entresto for FELICITY  hold aldactone for FELICITY  c/w metoprolol (hold if low BP), Ivabradine    # Activity: bedrest until more comfortable    # GI PPX: NA    # DVT PPX: hold eliquis; will start LMWH prob todd or Thurs    # Full code    # updated sister (Barb) at bedside and brother (Pepito) via phone    Dispo: tx pain; tx low BP; f/u IR, h/o, r/o, pall care, uro; hold Eliquis; IVFs;     Prog is poor.

## 2021-12-01 NOTE — CONSULT NOTE ADULT - PROBLEM SELECTOR RECOMMENDATION 4
somewhat confused on exam  may be multifactorial    -Recommend non-pharmacological interventions to prevent/treat delirium  - maintain day/night light cycles  - optimize sleep-wake cycle, minimize environmental noise  - reorientation frequently  - use verbal redirection as first line  - minimize restraints and lines  - ensure good bladder/bowel function  - ensure adequate pain control  - minimize use of anticholinergic, antihistaminic, and benzodiazepine medications

## 2021-12-01 NOTE — PROGRESS NOTE ADULT - ASSESSMENT
70yo Male admitted for management of symptomatic anemia, FELICITY and likely metastatic prostatic ca with diffuse osseous mets- PSA Total 1458    Plan:  ·	f/u with IR for possible biopsy  ·	will need to start casodex 50mg daily x 2 weeks prior to starting lupron hormonal therapy   ·	cont to trend Cr   ·	pain control as needed  ·	palliative following  ·	continue care as per primary team   ·	will discuss with attending    72yo Male admitted for management of symptomatic anemia, FELICITY and likely metastatic prostatic ca with diffuse osseous mets- PSA Total 1458    Plan:  ·	LUH: enlarged prostate which is irregular and firm  ·	will need to start casodex 50mg daily x 2 weeks prior to starting lupron hormonal therapy   ·	cont to trend Cr   ·	pain control as needed  ·	palliative following  ·	continue care as per primary team   findings consistent with metastatic Prostate Cancer with multiple metastatic sites.    I think biopsy can be bypassed and start immediate hormonal therapy .

## 2021-12-01 NOTE — PATIENT PROFILE ADULT - FALL HARM RISK - HARM RISK INTERVENTIONS

## 2021-12-01 NOTE — PHYSICAL THERAPY INITIAL EVALUATION ADULT - SPECIFY REASON(S)
PT spoke to  to place activity orders for Pt. However, Pt will remain on PT Hold due to low Hemoglobin levels (7.0g/dL). Will follow up when appropriate.

## 2021-12-01 NOTE — CONSULT NOTE ADULT - PROBLEM SELECTOR RECOMMENDATION 2
Full Code  Continue current med mgmt  Will address GOC if appropriate  Following for symptom management

## 2021-12-01 NOTE — CONSULT NOTE ADULT - ASSESSMENT
71yMale being evaluated for      MEDD (morphine equivalent daily dose):      See Recs below.    Please call [phone] with questions or concerns 24/7.   We will continue to follow.     Discussed with primary MD.   71yMale with PMH including CVA, afib on Eliquis, HTN, HFrEF ( EF 20-25% 2018), chronic LLE pain, being evaluated for pain management in the setting of newly diagnosed metastatic prostate cancer to bone. Patient in severe pain on exam but not able to give comprehensive history. Patient currently in acute pain with minimal relief using PRN Dilaudid 1 mg IVP.     Patient likely would not be able to properly utilize PCA pump due to confusion at this time.   Recommend continuing use of PRN Dilaudid to gauge appropriate opioid pain medication needs.  Will recommend adding steroids to help with possible bone pain.     MEDD (morphine equivalent daily dose): 103 mg   Dilaudid 1 mg IVP X 3 dose  Dilaudid 2 mg PO X 1 dose  Morphine 6 mg IVP X 1 dose  Oxy 5 mg X 2 tabs       See Recs below.    Please call [phone] with questions or concerns 24/7.   We will continue to follow.     Discussed with primary MD.

## 2021-12-01 NOTE — PROGRESS NOTE ADULT - SUBJECTIVE AND OBJECTIVE BOX
SAMANTHA CHARLES  71y  Male  ***My note supersedes ALL resident notes that I sign.  My corrections for their notes are in my note.***    I can be reached directly on [email or web address]. My office number is [phone]. My personal cell number is [phone].    INTERVAL EVENTS: Here for f/u of severe cancer pain. Pt does not interact much at all during interview. Pt is very quiet and keeps eyes closed, lets sister do all of the talking. Sister says pt has not been well for 3 YEARS. Pt not eating well, losing weight. No urine/stool incont.    T(F): 97.6 (12-01-21 @ 14:33), Max: 98.7 (11-30-21 @ 16:31)  HR: 85 (12-01-21 @ 14:33) (85 - 102)  BP: 87/38 (12-01-21 @ 14:33) (87/38 - 121/57)  RR: 19 (12-01-21 @ 14:33) (18 - 20)  SpO2: 97% (12-01-21 @ 01:31) (97% - 98%)    Gen: in pain; not talking much  HEENT: PERRL, nose clr  Neck: no nodes, no JVD, thyroid nl  lungs: clr  hrt: s1 s2 rrr no murmur  abd: soft, NT/ND, no HS megaly  ext: no edema, no c/c  neuro: aa ox3, cn intact, can move all 4 ext    LABS:                      9.1     (    93.9   3.52  )-----------( ---------      134      ( 01 Dec 2021 14:15 )             27.6    (    17.7     --   (   --   (   --      11-30-21 @ 15:36  ----------------------               --   (   --   (   --                             -----                        --  Ca  --   Mg  --    P   3.1     CARDIAC MARKERS ( 30 Nov 2021 15:36 )  x     / x     / 505 U/L / x     / x        RADIOLOGY & ADDITIONAL TESTS:  < from: CT Abdomen and Pelvis w/ IV Cont (11.30.21 @ 13:49) >  BONES/SOFT TISSUES: Innumerable diffuse sclerotic osseous lesions are noted. For example:    -A 6.0 x 5.8 x 9.4 cm mass involving the left iliac wing with associated extraosseous soft tissue extension  -A 6.6 x 4.5 x 4.6 and meter mass involves the left inferior pubic ramus, with associated extraosseous soft tissue extension.  -Expansile sclerotic lesions are noted to involve the right 2nd lateral rib and right 5th anterior rib.    OTHER: Atherosclerotic vascular calcification.    IMPRESSION:    1. Innumerable diffuse sclerotic osseous lesions, as detailed above, compatible with osseous metastases.    2. Enlarged irregularly marginated prostate gland, possibly reflecting underlying neoplastic process.    < end of copied text >    < from: CT Head No Cont (11.30.21 @ 13:41) >  IMPRESSION:  No acute intracranial pathology. No evidence of midline shift, mass effect or intracranial hemorrhage.    Chronic bilateral temporal occipital infarcts in a PCA distribution.    < end of copied text >    < from: Transthoracic Echocardiogram (08.18.18 @ 10:02) >  Summary:   1. Left ventricular ejection fraction, by visual estimation, is <20%.   2. Mild-moderate tricuspid regurgitation.   3. Mild to moderate aortic regurgitation.   4. Moderate pulmonic valve regurgitation.    < end of copied text >    MEDICATIONS:    acetaminophen     Tablet .. 650 milliGRAM(s) Oral every 6 hours PRN  aluminum hydroxide/magnesium hydroxide/simethicone Suspension 30 milliLiter(s) Oral every 4 hours PRN  gabapentin 300 milliGRAM(s) Oral every 12 hours  HYDROmorphone  Injectable 2 milliGRAM(s) IV Push every 3 hours PRN  HYDROmorphone  Injectable 2 milliGRAM(s) IV Push once  influenza  Vaccine (HIGH DOSE) 0.7 milliLiter(s) IntraMuscular once  ivabradine 5 milliGRAM(s) Oral two times a day  ketorolac   Injectable 30 milliGRAM(s) IV Push once  lactated ringers. 1000 milliLiter(s) IV Continuous <Continuous>  melatonin 3 milliGRAM(s) Oral at bedtime PRN  metoprolol succinate ER 25 milliGRAM(s) Oral daily  ondansetron Injectable 4 milliGRAM(s) IV Push every 8 hours PRN

## 2021-12-01 NOTE — PROGRESS NOTE ADULT - SUBJECTIVE AND OBJECTIVE BOX
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 Pt seen and examined, with pt's sister at bedside. Pt admits to body aches, knee/joint pain, requesting pain medications. Pt denies fever, chills, N/V, abdominal pain, suprapubic pain, dysuria, or hematuria.     [ x ] a 10 point review of systems was negative except where noted        Vital signs  T(C): , Max: 36.8 (12-01-21 @ 05:15)  HR: 85 (12-01-21 @ 14:33)  BP: 87/38 (12-01-21 @ 14:33)  SpO2: 97% (12-01-21 @ 01:31)    Physical Exam  Gen: cachectic with apparent distress   HEENT: NC/AT  Respiratory: no accessory muscle use   Back: no CVA tenderness bilaterally    Abd: soft, nontender, nondistended   : uncircumcised male genitalia,   b/l testis descended, no edema or TTP   Ext: no edema  Skin: non diaphoretic     Labs                        9.1    3.52  )-----------( 134      ( 01 Dec 2021 14:15 )             27.6     30 Nov 2021 11:50    143    |  103    |  32     ----------------------------<  153    4.2     |  16     |  1.4      Ca    9.0        30 Nov 2021 11:50  Phos  3.1       30 Nov 2021 15:36    Prostate Ca Screen, PSA Total (11.30.21 @ 15:36)    Prostate Ca Screen, PSA Total: 1458.00: Method: Roche Electrochemiluminescence Immuno Assay  Values obtained with different assay methods or kits cannot be  used interchangeably.  Results cannot be interpreted as absolute evidence of the presence  or absence of malignant disease.  Test Repeated ng/mL

## 2021-12-01 NOTE — CONSULT NOTE ADULT - SUBJECTIVE AND OBJECTIVE BOX
SAMANTHA CHARLES          MRN-[id number]              HPI:  72 yo M with PMH of CVA, afib on Eliquis, HTN, HFrEF ( EF 20-25% ), chronic LLE pain, presented to the ED with worsening Left LE pain for the past 2 months, worsening fatigue, anorexia and significant weight loss. Of note pt has previously been worked up for the leg pain including a CT scan in 2021 in [facility] which was negative. Patient has been getting progressively weak and is unable to ambulate now. He denies any chest pain, shortness of breath, fever, cough. Pt was being seen by a GI w/ planned colonoscopy today but on arrival to office, his GI physician was concerned about his cachetic appearance and rapid weight loss, so he sent him into ED immediately for evaluation.   In the ED his vitals were significant for /140mmHg, /min, other vitals were normal. Labs were significant for Hb of 6.4, Cr 1.4 (0.8 at baseline), bicarb 16, AG 24, ALP 2765, Lactate of 5.1. CT chest, abdomen and pelvis showed -  Innumerable diffuse sclerotic osseous lesions, compatible with osseous metastases and Enlarged irregularly marginated prostate gland, possibly reflecting underlying neoplastic process.  Patient being admitted for management of symptomatic anemia, FELICITY and likely metastatic prostatic ca with diffuse osseous mets.  (2021 17:07)      PAST MEDICAL & SURGICAL HISTORY:  Systolic congestive heart failure, unspecified chronicity    Cerebrovascular accident (CVA) due to bilateral embolism of posterior cerebral arteries    Essential hypertension    Atrial fibrillation, unspecified type    AICD (automatic cardioverter/defibrillator) present        FAMILY HISTORY:  No pertinent family history in first degree relatives     Reviewed and found non contributory in mother or father    SOCIAL HISTORY:   Tobacco/etoh/illicit drug use use reported. Yes [ ]  _________  No [ ]  Pt resides at: home [ ]  facility [ ]  other [ ] _______        ROS:	    Unable to attain due to:                      Dyspnea (Brandon 0-10): 0                       N/V (Y/N): No                             Secretions (Y/N) : No                                          Agitation(Y/N): No                              Pain (Y/N): No                                 -Provocation/Palliation: N/A  -Quality/Quantity: N/A  -Radiating: N/A  -Severity: No pain  -Timing/Frequency: N/A  -Impact on ADLs: N/A    General:  Denied  HEENT:    Denied  Neck:  Denied  CVS:  Denied  Resp:  Denied  GI:  Denied    :  Denied  Musc:  Denied  Neuro:  Denied  Psych:  Denied  Skin:  Denied  Lymph:  Denied    Last BM:      Allergies    cephalexin (Flushing)  penicillin (Rash)    Intolerances      Opiate Naive (Y/N):   -iStop reviewed (Y/N):   Ref#:                  Labs:	    CBC:                        9.1    3.52  )-----------( 134      ( 01 Dec 2021 14:15 )             27.6     CMP:        143  |  103  |  32<H>  ----------------------------<  153<H>  4.2   |  16<L>  |  1.4    Ca    9.0      2021 11:50  Phos  3.1         TPro  6.2  /  Alb  3.8  /  TBili  0.4  /  DBili  x   /  AST  39  /  ALT  8   /  AlkPhos  2765<H>                    Radiology:	   CT Chest w/ IV Cont:   EXAM:  CT ABDOMEN AND PELVIS IC        EXAM:  CT CHEST IC            PROCEDURE DATE:  2021            INTERPRETATION:  CLINICAL STATEMENT: Malignancy.    TECHNIQUE: Contiguous axial CT images were obtained from the thoracic inlet to the pubicsymphysis following administration of 100c Optiray 320 intravenous contrast.  Oral contrast was not administered.  Reformatted images in the coronal and sagittal planes were acquired.    COMPARISON: CT chest dated 2018.    FINDINGS:    CHEST:    LUNGS/PLEURA/AIRWAYS: No lobar consolidation, pleural effusion or pneumothorax. Central tracheobronchial tree is grossly patent. Mild bibasilar subsegmental atelectasis.    MEDIASTINUM/THORACIC NODES: No enlarged thoracic lymph nodes.    HEART/GREAT VESSELS: Heart size is within normal limits. Atherosclerotic calcification of the thoracic aorta. No pericardial effusion. Left chest wall AICD, with lead tips in appropriate position.    ABDOMEN/PELVIS:    HEPATOBILIARY: Unremarkable.    SPLEEN: Unremarkable.    PANCREAS: Unremarkable.    ADRENAL GLANDS: Unremarkable.    KIDNEYS: Unremarkable.    ABDOMINOPELVIC NODES: Unremarkable.    PELVIC ORGANS: Enlarged irregularly marginated prostate gland, measuring 6.1 x 5.8 x 5.7 cm.    PERITONEUM/MESENTERY/BOWEL: Unremarkable.    BONES/SOFT TISSUES: Innumerable diffuse sclerotic osseous lesions are noted. For example:    -A 6.0 x 5.8 x 9.4 cm mass involving the left iliac wing with associated extraosseous soft tissue extension  -A 6.6 x 4.5 x 4.6 and meter mass involves the left inferior pubic ramus, with associated extraosseous soft tissue extension.  -Expansile sclerotic lesions are noted to involve the right 2nd lateral rib and right 5th anterior rib.    OTHER: Atherosclerotic vascular calcification.      IMPRESSION:    1. Innumerable diffuse sclerotic osseous lesions, as detailed above, compatible with osseous metastases.    2. Enlarged irregularly marginated prostate gland, possibly reflecting underlying neoplastic process.        Spoke with ESTELA RICHMOND on 2021 2:09 PM with readback.    --- End of Report ---              EDENILSON NOLAN MD; Attending Radiologist  This document has been electronically signed. 2021  2:19PM (21)      EK Lead ECG:   Ventricular Rate 86 BPM    Atrial Rate 86 BPM    P-R Interval 148 ms    QRS Duration 140 ms    Q-T Interval 410 ms    QTC Calculation(Bazett) 490 ms    P Axis 86 degrees    R Axis 99 degrees    T Axis -42 degrees    Diagnosis Line Atrial-sensed ventricular-paced rhythm  Abnormal ECG    Confirmed by ERWIN STUART MD ([id number]) on 2021 1:27:56 PM (21 @ 11:40)      Imaging Personally Reviewed:  [ ] YES  [ ] NO    Consultant(s) Notes Reviewed:  [ ] YES  [ ] NO  Care Discussed with Consultants/Other Providers [ ] YES  [ ] NO    PEx:	  T(C): 36.4 (21 @ 14:33), Max: 37.1 (21 @ 16:31)  HR: 85 (21 @ 14:33) (85 - 102)  BP: 87/38 (21 @ 14:33) (87/38 - 121/57)  RR: 19 (21 @ 14:33) (18 - 20)  SpO2: 97% (21 @ 01:31) (97% - 98%)  Wt(kg): --  Daily     Daily     General:  found in bed in NAD  Eyes:  PERRL EOMI Non icteric MOM  ENMT: no external oral ulcers, MMM, no thrush   CVS: RR S1S2 No M/G/R  Resp: Unlabored Non tachypneic No increased WOB  GI:  Soft NT ND BS+  :  Voiding / Sol / PrimaFit  Musc: No C/C/E    Neuro: Follows commands No focal deficits  Psych: Calm Pleasant, AAOx3    Skin: Non jaundiced , no rash   Lymph: no adenopathy     Preadmit Karnofsky:  %           Current Karnofsky:     %  http://www.npcrc.org/files/news/karnofsky_performance_scale.pdf   http://www.npcrc.org/files/news/palliative_performance_scale_PPSv2.pdf  Cachexia (Y/N):   BMI:      Medications:	      MEDICATIONS  (STANDING):  gabapentin 300 milliGRAM(s) Oral every 12 hours  HYDROmorphone  Injectable 2 milliGRAM(s) IV Push once  influenza  Vaccine (HIGH DOSE) 0.7 milliLiter(s) IntraMuscular once  ivabradine 5 milliGRAM(s) Oral two times a day  ketorolac   Injectable 30 milliGRAM(s) IV Push once  lactated ringers. 1000 milliLiter(s) (50 mL/Hr) IV Continuous <Continuous>  sodium chloride 0.9%. 500 milliLiter(s) (500 mL/Hr) IV Continuous <Continuous>    MEDICATIONS  (PRN):  acetaminophen     Tablet .. 650 milliGRAM(s) Oral every 6 hours PRN Temp greater or equal to 38C (100.4F), Mild Pain (1 - 3)  aluminum hydroxide/magnesium hydroxide/simethicone Suspension 30 milliLiter(s) Oral every 4 hours PRN Dyspepsia  HYDROmorphone  Injectable 2 milliGRAM(s) IV Push every 3 hours PRN Severe Pain (7 - 10)  melatonin 3 milliGRAM(s) Oral at bedtime PRN Insomnia  ondansetron Injectable 4 milliGRAM(s) IV Push every 8 hours PRN Nausea and/or Vomiting        Advanced Directives:	     Full Code     DNR/DNI     MOLST     HCP     Living Will     Decision maker: The patient is able to participate in complex medical decision making conversations.   Legal surrogate:    GOALS OF CARE DISCUSSION	       Palliative care info/counseling provided	           Family meeting scheduled         Documentation of GOC/Advanced Care Planning:       See previous Palliative Medicine Note    PSYCHOSOCIAL-SPIRITUAL ASSESSMENT:       Reviewed       See Palliative Care SW/ documentation        	    REFERRALS       Palliative Med        Unit SW/Case Mgmt              Hospice       Speech/Swallow       Nutrition       PT/OT ROLDAN VILLAVICENCIO          MRN-[id number]              HPI:     72 yo M with PMH of CVA, afib on Eliquis, HTN, HFrEF ( EF 20-25% ), chronic LLE pain, presented to the ED with worsening Left LE pain for the past 2 months, worsening fatigue, anorexia and significant weight loss. Of note pt has previously been worked up for the leg pain including a CT scan in 2021 in [facility] which was negative. Patient has been getting progressively weak and is unable to ambulate now. He denies any chest pain, shortness of breath, fever, cough. Pt was being seen by a GI w/ planned colonoscopy today but on arrival to office, his GI physician was concerned about his cachetic appearance and rapid weight loss, so he sent him into ED immediately for evaluation.   In the ED his vitals were significant for /140mmHg, /min, other vitals were normal. Labs were significant for Hb of 6.4, Cr 1.4 (0.8 at baseline), bicarb 16, AG 24, ALP 2765, Lactate of 5.1. CT chest, abdomen and pelvis showed -  Innumerable diffuse sclerotic osseous lesions, compatible with osseous metastases and Enlarged irregularly marginated prostate gland, possibly reflecting underlying neoplastic process.  Patient being admitted for management of symptomatic anemia, FELICITY and likely metastatic prostatic ca with diffuse osseous mets.  (2021 17:07)      PAST MEDICAL & SURGICAL HISTORY:  Systolic congestive heart failure, unspecified chronicity    Cerebrovascular accident (CVA) due to bilateral embolism of posterior cerebral arteries    Essential hypertension    Atrial fibrillation, unspecified type    AICD (automatic cardioverter/defibrillator) present        FAMILY HISTORY:  No pertinent family history in first degree relatives     Reviewed and found non contributory in mother or father    SOCIAL HISTORY:   Tobacco/etoh/illicit drug use use reported. Yes [ ]  _________  No [ ]  Pt resides at: home [X ]  facility [ ]  other [ ] _______      ROS:	    Dyspnea (Brandon 0-10): 0                       N/V (Y/N): No                             Secretions (Y/N) : No                                          Agitation(Y/N): No                              Pain (Y/N): Yes, severe abdominal pain since yesterday. Denies back pain or pain elsewhere. Cannot tell us if the pain meds having helped. He had a BM X 2 days ago.                                  -Provocation/Palliation: N/A  -Quality/Quantity: N/A  -Radiating: N/A  -Severity: No pain  -Timing/Frequency: N/A  -Impact on ADLs: N/A    General:  Denied  HEENT:    Denied  Neck:  Denied  CVS:  Denied  Resp:  Denied  GI:  Denied    :  Denied  Musc:  Denied  Neuro:  Denied  Psych:  Denied  Skin:  Denied  Lymph:  Denied    Last BM: 2 days ago per patient       Allergies    cephalexin (Flushing)  penicillin (Rash)    Intolerances      -iStop reviewed (Y/N):   Ref#:   This report was requested by: Laura Ziegler | Reference #: [id number]         Patient Name: Roldan VillavicencioBirth Date: 1950  Address: [address] [place] [id number]: Male  Rx Written	Rx Dispensed	Drug	Quantity	Days Supply	Prescriber Name	Prescriber Jo #	Payment Method	Dispenser  2021	oxycodone-acetaminophen 5-325 mg tab	10	3	Juan Miguel Hamilton A, MD	[id number]	[id number]  2021	oxycodone-acetaminophen 5-325 mg tab	12	3	Laxmi Bone	[id number]	Medicare	[id number]    Patient Name: Roldan VillavicencioBirth Date: 1950  Address: [address] [place] [id number]: Male  Rx Written	Rx Dispensed	Drug	Quantity	Days Supply	Prescriber Name	Prescriber Jo #	Payment Method	Dispenser  2021	endocet  mg tablet	63	21	Alina-Louis Andre	[id number]	Insurance	[facility]  2021	endocet 5-325 tablet	16	4	Charles Ferrera	[id number]	Insurance	[facility]  2021	tramadol hcl 50 mg tablet	8	2	Arcelia Salas MD	[id number]	[facility]        Labs:	    CBC:                        9.1    3.52  )-----------( 134      ( 01 Dec 2021 14:15 )             27.6     CMP:    11-30    143  |  103  |  32<H>  ----------------------------<  153<H>  4.2   |  16<L>  |  1.4    Ca    9.0      2021 11:50  Phos  3.1         TPro  6.2  /  Alb  3.8  /  TBili  0.4  /  DBili  x   /  AST  39  /  ALT  8   /  AlkPhos  2765<H>             Radiology:	   CT Chest w/ IV Cont:   EXAM:  CT ABDOMEN AND PELVIS IC        EXAM:  CT CHEST IC            PROCEDURE DATE:  2021            INTERPRETATION:  CLINICAL STATEMENT: Malignancy.    TECHNIQUE: Contiguous axial CT images were obtained from the thoracic inlet to the pubicsymphysis following administration of 100c Optiray 320 intravenous contrast.  Oral contrast was not administered.  Reformatted images in the coronal and sagittal planes were acquired.    COMPARISON: CT chest dated 2018.    FINDINGS:    CHEST:    LUNGS/PLEURA/AIRWAYS: No lobar consolidation, pleural effusion or pneumothorax. Central tracheobronchial tree is grossly patent. Mild bibasilar subsegmental atelectasis.    MEDIASTINUM/THORACIC NODES: No enlarged thoracic lymph nodes.    HEART/GREAT VESSELS: Heart size is within normal limits. Atherosclerotic calcification of the thoracic aorta. No pericardial effusion. Left chest wall AICD, with lead tips in appropriate position.    ABDOMEN/PELVIS:    HEPATOBILIARY: Unremarkable.    SPLEEN: Unremarkable.    PANCREAS: Unremarkable.    ADRENAL GLANDS: Unremarkable.    KIDNEYS: Unremarkable.    ABDOMINOPELVIC NODES: Unremarkable.    PELVIC ORGANS: Enlarged irregularly marginated prostate gland, measuring 6.1 x 5.8 x 5.7 cm.    PERITONEUM/MESENTERY/BOWEL: Unremarkable.    BONES/SOFT TISSUES: Innumerable diffuse sclerotic osseous lesions are noted. For example:    -A 6.0 x 5.8 x 9.4 cm mass involving the left iliac wing with associated extraosseous soft tissue extension  -A 6.6 x 4.5 x 4.6 and meter mass involves the left inferior pubic ramus, with associated extraosseous soft tissue extension.  -Expansile sclerotic lesions are noted to involve the right 2nd lateral rib and right 5th anterior rib.    OTHER: Atherosclerotic vascular calcification.      IMPRESSION:    1. Innumerable diffuse sclerotic osseous lesions, as detailed above, compatible with osseous metastases.    2. Enlarged irregularly marginated prostate gland, possibly reflecting underlying neoplastic process.        Spoke with ESTELA RICHMOND on 2021 2:09 PM with readback.    --- End of Report ---      EDENILSON NOLAN MD; Attending Radiologist  This document has been electronically signed. 2021  2:19PM (21)      EK Lead ECG:   Ventricular Rate 86 BPM    Atrial Rate 86 BPM    P-R Interval 148 ms    QRS Duration 140 ms    Q-T Interval 410 ms    QTC Calculation(Bazett) 490 ms    P Axis 86 degrees    R Axis 99 degrees    T Axis -42 degrees    Diagnosis Line Atrial-sensed ventricular-paced rhythm  Abnormal ECG    Confirmed by ERWIN STUART MD ([id number]) on 2021 1:27:56 PM (21 @ 11:40)      Imaging Personally Reviewed:  [X ] YES  [ ] NO    Consultant(s) Notes Reviewed:  [X ] YES  [ ] NO  Care Discussed with Consultants/Other Providers [X ] YES  [ ] NO    PEx:	  T(C): 36.4 (21 @ 14:33), Max: 37.1 (21 @ 16:31)  HR: 85 (21 @ 14:33) (85 - 102)  BP: 87/38 (21 @ 14:33) (87/38 - 121/57)  RR: 19 (21 @ 14:33) (18 - 20)  SpO2: 97% (21 @ 01:31) (97% - 98%)    General:  found in bed in significant distress, cachectic   Eyes:  PERRL EOMI Non icteric MOM  ENMT: no external oral ulcers, MMM, no thrush   CVS: RR , no edema   Resp: Unlabored Non tachypneic No increased WOB  GI:  ND - would not allow exam   Musc: No C/C/E    Neuro: Follows commands No focal deficits, + confusion + restlessness   Psych: Calm Pleasant, AAOx 2-3   Skin: Non jaundiced , no rash       Preadmit Karnofsky:  %           Current Karnofsky:   30 %        Medications:	      MEDICATIONS  (STANDING):  gabapentin 300 milliGRAM(s) Oral every 12 hours  HYDROmorphone  Injectable 2 milliGRAM(s) IV Push once  influenza  Vaccine (HIGH DOSE) 0.7 milliLiter(s) IntraMuscular once  ivabradine 5 milliGRAM(s) Oral two times a day  ketorolac   Injectable 30 milliGRAM(s) IV Push once  lactated ringers. 1000 milliLiter(s) (50 mL/Hr) IV Continuous <Continuous>  sodium chloride 0.9%. 500 milliLiter(s) (500 mL/Hr) IV Continuous <Continuous>    MEDICATIONS  (PRN):  acetaminophen     Tablet .. 650 milliGRAM(s) Oral every 6 hours PRN Temp greater or equal to 38C (100.4F), Mild Pain (1 - 3)  aluminum hydroxide/magnesium hydroxide/simethicone Suspension 30 milliLiter(s) Oral every 4 hours PRN Dyspepsia  HYDROmorphone  Injectable 2 milliGRAM(s) IV Push every 3 hours PRN Severe Pain (7 - 10)  melatonin 3 milliGRAM(s) Oral at bedtime PRN Insomnia  ondansetron Injectable 4 milliGRAM(s) IV Push every 8 hours PRN Nausea and/or Vomiting        Advanced Directives:	     Full Code - addressed in ED, patient to think about it    Decision maker: The patient is able to participate in complex medical decision making conversations.   Legal surrogate: Sister     GOALS OF CARE DISCUSSION	  - not addressed this consult   - will be available to discuss as appropriate     PSYCHOSOCIAL-SPIRITUAL ASSESSMENT:       Reviewed       See Palliative Care SW/ documentation        	    REFERRALS       Palliative Med        Unit SW/Case Mgmt          ROLDAN VILLAVICENCIO          MRN-[id number]              CC: pain    HPI:     72 yo M with PMH of CVA, afib on Eliquis, HTN, HFrEF ( EF 20-25% ), chronic LLE pain, presented to the ED with worsening Left LE pain for the past 2 months, worsening fatigue, anorexia and significant weight loss. Of note pt has previously been worked up for the leg pain including a CT scan in 2021 in [facility] which was negative. Patient has been getting progressively weak and is unable to ambulate now. He denies any chest pain, shortness of breath, fever, cough. Pt was being seen by a GI w/ planned colonoscopy today but on arrival to office, his GI physician was concerned about his cachetic appearance and rapid weight loss, so he sent him into ED immediately for evaluation.   In the ED his vitals were significant for /140mmHg, /min, other vitals were normal. Labs were significant for Hb of 6.4, Cr 1.4 (0.8 at baseline), bicarb 16, AG 24, ALP 2765, Lactate of 5.1. CT chest, abdomen and pelvis showed -  Innumerable diffuse sclerotic osseous lesions, compatible with osseous metastases and Enlarged irregularly marginated prostate gland, possibly reflecting underlying neoplastic process.  Patient being admitted for management of symptomatic anemia, FELICITY and likely metastatic prostatic ca with diffuse osseous mets.  (2021 17:07)      PAST MEDICAL & SURGICAL HISTORY:  Systolic congestive heart failure, unspecified chronicity    Cerebrovascular accident (CVA) due to bilateral embolism of posterior cerebral arteries    Essential hypertension    Atrial fibrillation, unspecified type    AICD (automatic cardioverter/defibrillator) present        FAMILY HISTORY:  No pertinent family history in first degree relatives     Reviewed and found non contributory in mother or father    SOCIAL HISTORY:   Tobacco/etoh/illicit drug use use reported. Yes [ ]  _________  No [ ]  Pt resides at: home [X ]  facility [ ]  other [ ] _______      ROS:	    Dyspnea (Brandon 0-10): 0                       N/V (Y/N): No                             Secretions (Y/N) : No                                          Agitation(Y/N): No                              Pain (Y/N): Yes, severe abdominal pain since yesterday. Denies back pain or pain elsewhere. Cannot tell us if the pain meds having helped. He had a BM X 2 days ago.                                  -Provocation/Palliation: N/A  -Quality/Quantity: N/A  -Radiating: N/A  -Severity: No pain  -Timing/Frequency: N/A  -Impact on ADLs: N/A    General:  Denied  HEENT:    Denied  Neck:  Denied  CVS:  Denied  Resp:  Denied  GI:  Denied    :  Denied  Musc:  Denied  Neuro:  Denied  Psych:  Denied  Skin:  Denied  Lymph:  Denied    Last BM: 2 days ago per patient       Allergies    cephalexin (Flushing)  penicillin (Rash)    Intolerances      -iStop reviewed (Y/N):   Ref#:   This report was requested by: Laura Ziegler | Reference #: [id number]         Patient Name: Roldan VillavicencioBirth Date: 1950  Address: [address] [place] [id number]: Male  Rx Written	Rx Dispensed	Drug	Quantity	Days Supply	Prescriber Name	Prescriber Jo #	Payment Method	Dispenser  2021	oxycodone-acetaminophen 5-325 mg tab	10	3	Juan Miguel Hamilton A, MD	[id number]	[id number]  2021	oxycodone-acetaminophen 5-325 mg tab	12	3	Laxmi Bone	[id number]	Medicare	[id number]    Patient Name: Roldan VillavicencioBirth Date: 1950  Address: [address] [place] [id number]: Male  Rx Written	Rx Dispensed	Drug	Quantity	Days Supply	Prescriber Name	Prescriber Jo #	Payment Method	Dispenser  2021	endocet  mg tablet	63	21	Ailna-Louis Andre	[id number]	Insurance	[facility]  2021	endocet 5-325 tablet	16	4	Charles Ferrera	[id number]	Insurance	[facility]  2021	tramadol hcl 50 mg tablet	8	2	Arcelia Salas MD	[id number]	[facility]        Labs:	    CBC:                        9.1    3.52  )-----------( 134      ( 01 Dec 2021 14:15 )             27.6     CMP:    11-30    143  |  103  |  32<H>  ----------------------------<  153<H>  4.2   |  16<L>  |  1.4    Ca    9.0      2021 11:50  Phos  3.1         TPro  6.2  /  Alb  3.8  /  TBili  0.4  /  DBili  x   /  AST  39  /  ALT  8   /  AlkPhos  2765<H>             Radiology:	   CT Chest w/ IV Cont:   EXAM:  CT ABDOMEN AND PELVIS IC        EXAM:  CT CHEST IC            PROCEDURE DATE:  2021            INTERPRETATION:  CLINICAL STATEMENT: Malignancy.    TECHNIQUE: Contiguous axial CT images were obtained from the thoracic inlet to the pubicsymphysis following administration of 100c Optiray 320 intravenous contrast.  Oral contrast was not administered.  Reformatted images in the coronal and sagittal planes were acquired.    COMPARISON: CT chest dated 2018.    FINDINGS:    CHEST:    LUNGS/PLEURA/AIRWAYS: No lobar consolidation, pleural effusion or pneumothorax. Central tracheobronchial tree is grossly patent. Mild bibasilar subsegmental atelectasis.    MEDIASTINUM/THORACIC NODES: No enlarged thoracic lymph nodes.    HEART/GREAT VESSELS: Heart size is within normal limits. Atherosclerotic calcification of the thoracic aorta. No pericardial effusion. Left chest wall AICD, with lead tips in appropriate position.    ABDOMEN/PELVIS:    HEPATOBILIARY: Unremarkable.    SPLEEN: Unremarkable.    PANCREAS: Unremarkable.    ADRENAL GLANDS: Unremarkable.    KIDNEYS: Unremarkable.    ABDOMINOPELVIC NODES: Unremarkable.    PELVIC ORGANS: Enlarged irregularly marginated prostate gland, measuring 6.1 x 5.8 x 5.7 cm.    PERITONEUM/MESENTERY/BOWEL: Unremarkable.    BONES/SOFT TISSUES: Innumerable diffuse sclerotic osseous lesions are noted. For example:    -A 6.0 x 5.8 x 9.4 cm mass involving the left iliac wing with associated extraosseous soft tissue extension  -A 6.6 x 4.5 x 4.6 and meter mass involves the left inferior pubic ramus, with associated extraosseous soft tissue extension.  -Expansile sclerotic lesions are noted to involve the right 2nd lateral rib and right 5th anterior rib.    OTHER: Atherosclerotic vascular calcification.      IMPRESSION:    1. Innumerable diffuse sclerotic osseous lesions, as detailed above, compatible with osseous metastases.    2. Enlarged irregularly marginated prostate gland, possibly reflecting underlying neoplastic process.        Spoke with ESTELA RICHMOND on 2021 2:09 PM with readback.    --- End of Report ---      EDENILSON NOLAN MD; Attending Radiologist  This document has been electronically signed. 2021  2:19PM (21)      EK Lead ECG:   Ventricular Rate 86 BPM    Atrial Rate 86 BPM    P-R Interval 148 ms    QRS Duration 140 ms    Q-T Interval 410 ms    QTC Calculation(Bazett) 490 ms    P Axis 86 degrees    R Axis 99 degrees    T Axis -42 degrees    Diagnosis Line Atrial-sensed ventricular-paced rhythm  Abnormal ECG    Confirmed by ERWIN STUART MD ([id number]) on 2021 1:27:56 PM (21 @ 11:40)      Imaging Personally Reviewed:  [X ] YES  [ ] NO    Consultant(s) Notes Reviewed:  [X ] YES  [ ] NO  Care Discussed with Consultants/Other Providers [X ] YES  [ ] NO    PEx:	  T(C): 36.4 (21 @ 14:33), Max: 37.1 (21 @ 16:31)  HR: 85 (21 @ 14:33) (85 - 102)  BP: 87/38 (21 @ 14:33) (87/38 - 121/57)  RR: 19 (21 @ 14:33) (18 - 20)  SpO2: 97% (21 @ 01:31) (97% - 98%)    General:  found in bed in significant distress, cachectic   Eyes:  PERRL EOMI Non icteric MOM  ENMT: no external oral ulcers, MMM, no thrush   CVS: RR , no edema   Resp: Unlabored Non tachypneic No increased WOB  GI:  ND - would not allow exam   Musc: No C/C/E    Neuro: Follows commands No focal deficits, + confusion + restlessness   Psych: Calm Pleasant, AAOx 2-3   Skin: Non jaundiced , no rash       Preadmit Karnofsky:  Unknown %           Current Karnofsky:   30 %        Medications:	      MEDICATIONS  (STANDING):  gabapentin 300 milliGRAM(s) Oral every 12 hours  HYDROmorphone  Injectable 2 milliGRAM(s) IV Push once  influenza  Vaccine (HIGH DOSE) 0.7 milliLiter(s) IntraMuscular once  ivabradine 5 milliGRAM(s) Oral two times a day  ketorolac   Injectable 30 milliGRAM(s) IV Push once  lactated ringers. 1000 milliLiter(s) (50 mL/Hr) IV Continuous <Continuous>  sodium chloride 0.9%. 500 milliLiter(s) (500 mL/Hr) IV Continuous <Continuous>    MEDICATIONS  (PRN):  acetaminophen     Tablet .. 650 milliGRAM(s) Oral every 6 hours PRN Temp greater or equal to 38C (100.4F), Mild Pain (1 - 3)  aluminum hydroxide/magnesium hydroxide/simethicone Suspension 30 milliLiter(s) Oral every 4 hours PRN Dyspepsia  HYDROmorphone  Injectable 2 milliGRAM(s) IV Push every 3 hours PRN Severe Pain (7 - 10)  melatonin 3 milliGRAM(s) Oral at bedtime PRN Insomnia  ondansetron Injectable 4 milliGRAM(s) IV Push every 8 hours PRN Nausea and/or Vomiting        Advanced Directives:	     Full Code - addressed in ED, patient to think about it    Decision maker: The patient is able to participate in complex medical decision making conversations.   Legal surrogate: Sister     GOALS OF CARE DISCUSSION	  - not addressed this consult   - will be available to discuss as appropriate     PSYCHOSOCIAL-SPIRITUAL ASSESSMENT:       Reviewed       See Palliative Care SW/ documentation        	    REFERRALS       Palliative Med        Unit SW/Case Mgmt

## 2021-12-01 NOTE — PHYSICAL THERAPY INITIAL EVALUATION ADULT - SPECIFY REASON(S)
Chart reviewed. Pt. currently without activity orders. PT evaluation on hold pending activity orders as appropriate. Will follow.

## 2021-12-01 NOTE — PROGRESS NOTE ADULT - SUBJECTIVE AND OBJECTIVE BOX
SAMANTHA CHARLES 71y Male  MRN#: [id number]   CODE STATUS:________    Hospital Day: 1d    Pt is currently admitted with the primary diagnosis of     SUBJECTIVE  Overnight/Interval Events:                                              ----------------------------------------------------------  OBJECTIVE  PAST MEDICAL & SURGICAL HISTORY  Systolic congestive heart failure, unspecified chronicity    Cerebrovascular accident (CVA) due to bilateral embolism of posterior cerebral arteries    Essential hypertension    Atrial fibrillation, unspecified type    AICD (automatic cardioverter/defibrillator) present                                              -----------------------------------------------------------  ALLERGIES:  cephalexin (Flushing)  penicillin (Rash)                                            ------------------------------------------------------------    HOME MEDICATIONS  Home Medications:  Corlanor 5 mg oral tablet: 1 tab(s) orally 2 times a day (with meals) (30 Nov 2021 17:21)  Metoprolol Succinate ER 25 mg oral tablet, extended release: 1 tab(s) orally once a day (30 Nov 2021 17:20)  spironolactone 25 mg oral tablet: 1 tab(s) orally once a day (14 May 2021 11:51)                           MEDICATIONS:  STANDING MEDICATIONS  apixaban 5 milliGRAM(s) Oral every 12 hours  influenza  Vaccine (HIGH DOSE) 0.7 milliLiter(s) IntraMuscular once  ivabradine 5 milliGRAM(s) Oral two times a day  lactated ringers. 1000 milliLiter(s) IV Continuous <Continuous>  metoprolol succinate ER 25 milliGRAM(s) Oral daily    PRN MEDICATIONS  acetaminophen     Tablet .. 650 milliGRAM(s) Oral every 6 hours PRN  aluminum hydroxide/magnesium hydroxide/simethicone Suspension 30 milliLiter(s) Oral every 4 hours PRN  HYDROmorphone  Injectable 1 milliGRAM(s) IV Push every 6 hours PRN  HYDROmorphone  Injectable 1 milliGRAM(s) IV Push every 3 hours PRN  melatonin 3 milliGRAM(s) Oral at bedtime PRN  ondansetron Injectable 4 milliGRAM(s) IV Push every 8 hours PRN  oxycodone    5 mG/acetaminophen 325 mG 2 Tablet(s) Oral every 6 hours PRN                                            ------------------------------------------------------------  VITAL SIGNS: Last 24 Hours  T(C): 36.8 (01 Dec 2021 05:15), Max: 37.1 (30 Nov 2021 16:31)  T(F): 98.2 (01 Dec 2021 05:15), Max: 98.7 (30 Nov 2021 16:31)  HR: 86 (01 Dec 2021 05:15) (86 - 105)  BP: 99/54 (01 Dec 2021 05:15) (98/56 - 220/140)  BP(mean): --  RR: 20 (01 Dec 2021 05:15) (17 - 20)  SpO2: 97% (01 Dec 2021 01:31) (97% - 100%)                                             --------------------------------------------------------------  LABS:                        7.0    4.58  )-----------( 135      ( 30 Nov 2021 21:47 )             22.2     11-30    143  |  103  |  32<H>  ----------------------------<  153<H>  4.2   |  16<L>  |  1.4    Ca    9.0      30 Nov 2021 11:50  Phos  3.1     11-30    TPro  6.2  /  Alb  3.8  /  TBili  0.4  /  DBili  x   /  AST  39  /  ALT  8   /  AlkPhos  2765<H>  11-30          Lactate, Blood: 1.4 mmol/L (11-30-21 @ 21:47)  Creatine Kinase, Serum: 505 U/L *H* (11-30-21 @ 15:36)  Lactate, Blood: 5.1 mmol/L *HH* (11-30-21 @ 11:50)          CARDIAC MARKERS ( 30 Nov 2021 15:36 )  x     / x     / 505 U/L / x     / x                                                  -------------------------------------------------------------  RADIOLOGY:                                            --------------------------------------------------------------    PHYSICAL EXAM:  General:   HEENT:  LUNGS:  HEART:  ABDOMEN:  EXT:  NEURO:  SKIN:                                           --------------------------------------------------------------    ASSESSMENT & PLAN                                    # DVT prophylaxis   # GI prophylaxis   # Diet   # Activity Score (AM-PAC)  # Code status   # Disposition      SAMANTHA CHARLES 71y Male  MRN#: [id number]   CODE STATUS:________    Hospital Day: 1d    Pt is currently admitted with the primary diagnosis of     SUBJECTIVE  Overnight/Interval Events:                                              ----------------------------------------------------------  OBJECTIVE  PAST MEDICAL & SURGICAL HISTORY  Systolic congestive heart failure, unspecified chronicity    Cerebrovascular accident (CVA) due to bilateral embolism of posterior cerebral arteries    Essential hypertension    Atrial fibrillation, unspecified type    AICD (automatic cardioverter/defibrillator) present                                              -----------------------------------------------------------  ALLERGIES:  cephalexin (Flushing)  penicillin (Rash)                                            ------------------------------------------------------------    HOME MEDICATIONS  Home Medications:  Corlanor 5 mg oral tablet: 1 tab(s) orally 2 times a day (with meals) (30 Nov 2021 17:21)  Metoprolol Succinate ER 25 mg oral tablet, extended release: 1 tab(s) orally once a day (30 Nov 2021 17:20)  spironolactone 25 mg oral tablet: 1 tab(s) orally once a day (14 May 2021 11:51)                           MEDICATIONS:  STANDING MEDICATIONS  apixaban 5 milliGRAM(s) Oral every 12 hours  influenza  Vaccine (HIGH DOSE) 0.7 milliLiter(s) IntraMuscular once  ivabradine 5 milliGRAM(s) Oral two times a day  lactated ringers. 1000 milliLiter(s) IV Continuous <Continuous>  metoprolol succinate ER 25 milliGRAM(s) Oral daily    PRN MEDICATIONS  acetaminophen     Tablet .. 650 milliGRAM(s) Oral every 6 hours PRN  aluminum hydroxide/magnesium hydroxide/simethicone Suspension 30 milliLiter(s) Oral every 4 hours PRN  HYDROmorphone  Injectable 1 milliGRAM(s) IV Push every 6 hours PRN  HYDROmorphone  Injectable 1 milliGRAM(s) IV Push every 3 hours PRN  melatonin 3 milliGRAM(s) Oral at bedtime PRN  ondansetron Injectable 4 milliGRAM(s) IV Push every 8 hours PRN  oxycodone    5 mG/acetaminophen 325 mG 2 Tablet(s) Oral every 6 hours PRN                                            ------------------------------------------------------------  VITAL SIGNS: Last 24 Hours  T(C): 36.8 (01 Dec 2021 05:15), Max: 37.1 (30 Nov 2021 16:31)  T(F): 98.2 (01 Dec 2021 05:15), Max: 98.7 (30 Nov 2021 16:31)  HR: 86 (01 Dec 2021 05:15) (86 - 105)  BP: 99/54 (01 Dec 2021 05:15) (98/56 - 220/140)  BP(mean): --  RR: 20 (01 Dec 2021 05:15) (17 - 20)  SpO2: 97% (01 Dec 2021 01:31) (97% - 100%)                                             --------------------------------------------------------------  LABS:                        7.0    4.58  )-----------( 135      ( 30 Nov 2021 21:47 )             22.2     11-30    143  |  103  |  32<H>  ----------------------------<  153<H>  4.2   |  16<L>  |  1.4    Ca    9.0      30 Nov 2021 11:50  Phos  3.1     11-30    TPro  6.2  /  Alb  3.8  /  TBili  0.4  /  DBili  x   /  AST  39  /  ALT  8   /  AlkPhos  2765<H>  11-30          Lactate, Blood: 1.4 mmol/L (11-30-21 @ 21:47)  Creatine Kinase, Serum: 505 U/L *H* (11-30-21 @ 15:36)  Lactate, Blood: 5.1 mmol/L *HH* (11-30-21 @ 11:50)          CARDIAC MARKERS ( 30 Nov 2021 15:36 )  x     / x     / 505 U/L / x     / x                                                  -------------------------------------------------------------  RADIOLOGY:                                            --------------------------------------------------------------    PHYSICAL EXAM:  General:   HEENT:  LUNGS:  HEART:  ABDOMEN:  EXT:  NEURO:  SKIN:                                           --------------------------------------------------------------    ASSESSMENT & PLAN    72 yo M with PMH of CVA, afib on Eliquis, HTN, HFrEF (EF 20-25% 2018), chronic LLE pain, presented to the ED with worsening Left LE pain for the past 2 months, worsening fatigue, anorexia and significant weight loss. Of note, pt had previously been worked up for the leg pain including a CT scan in march 2021 in [facility], which was negative.    # Neopl-related severe pain 2/2 Diffuse bony sclerotic lesions (AP 2765) likely 2/2 prostate cancer (irregularly marginated prostate gland on CT and PSA 1400)  CT chest/abdomen/pelvis - Innumerable diffuse sclerotic osseous lesions -  including 6x5.8x9.4cm mass involving the left iliac wing and 6.6x4.5x4.6cm mass involving left inferior pubic ramus. Expansile sclerotic lesions are noted to involve the right 2nd lateral rib and right 5th anterior rib.  hold Eliquis for now  IR eval for bx  Uro noted  Hem Onc eval now for work up  Rad Onc eval for pall RT  Pall Care eval - spoke w/ Dr Johansen  once more comfortable, will consider MRI C/T/L spine NC  dilaudid 2mg iv q2 prn pain  toradol 30mg iv x1  neurontin 300mg po q12  dexameth 2mg iv q6 - will taper down todd  bowel reg    # Failure to thrive  dietician eval  MVI q24  Ensure 3x/day    # Symptomatic anemia  no overt bleed  prob related to malignancy and diffuse bone mets  Hb 6.4 on presentation (baseline 10)  2U PRBC transfusion -> hgb better  may need lasix after future tx given poor EF  f/u Iron panel, B12, Folate    # FELICITY - likely dehydration  Cr 1.4 (baseline ~ 0.8)  IVFs: LR 50/hr  Hold entresto  BMP q24    # hypoTN - prob from anemia, CHF meds, low EF and dehydration  keep sys BP above 90 and preferably above 100  NS bolus now  consider midodrine  holding parameters for BB    # h/o HTN; h/o CVA, Afib; H/o HFrEF (EF 20-25% in 2018)  hold Eliquis for bx  hold Entresto for FELICITY  hold aldactone for FELICITY  c/w metoprolol (hold if low BP), Ivabradine    # Activity: bedrest until more comfortable    # GI PPX: NA    # DVT PPX: hold eliquis; will start LMWH prob todd or Thurs    # Full code    # updated sister (Barb) at bedside and brother (Pepito) via phone    Dispo: tx pain; tx low BP; f/u IR, h/o, r/o, pall care, uro; hold Eliquis; IVFs;     Prog is poor.          #suspected metastatic prostate cancer                            # DVT prophylaxis   # GI prophylaxis   # Diet   # Activity Score (AM-PAC)  # Code status   # Disposition      SAMANTHA CHARLES 71y Male  MRN#: [id number]   CODE STATUS: full code    Hospital Day: 1d    Pt is currently admitted with the primary diagnosis of severe cancer pain    SUBJECTIVE  Overnight/Interval Events: No acute events overnight. Patient minimally verbal this morning and appears to be in significant pain.                                               ----------------------------------------------------------  OBJECTIVE  PAST MEDICAL & SURGICAL HISTORY  Systolic congestive heart failure, unspecified chronicity    Cerebrovascular accident (CVA) due to bilateral embolism of posterior cerebral arteries    Essential hypertension    Atrial fibrillation, unspecified type    AICD (automatic cardioverter/defibrillator) present                                              -----------------------------------------------------------  ALLERGIES:  cephalexin (Flushing)  penicillin (Rash)                                            ------------------------------------------------------------    HOME MEDICATIONS  Home Medications:  Corlanor 5 mg oral tablet: 1 tab(s) orally 2 times a day (with meals) (30 Nov 2021 17:21)  Metoprolol Succinate ER 25 mg oral tablet, extended release: 1 tab(s) orally once a day (30 Nov 2021 17:20)  spironolactone 25 mg oral tablet: 1 tab(s) orally once a day (14 May 2021 11:51)                           MEDICATIONS:  STANDING MEDICATIONS  apixaban 5 milliGRAM(s) Oral every 12 hours  influenza  Vaccine (HIGH DOSE) 0.7 milliLiter(s) IntraMuscular once  ivabradine 5 milliGRAM(s) Oral two times a day  lactated ringers. 1000 milliLiter(s) IV Continuous <Continuous>  metoprolol succinate ER 25 milliGRAM(s) Oral daily    PRN MEDICATIONS  acetaminophen     Tablet .. 650 milliGRAM(s) Oral every 6 hours PRN  aluminum hydroxide/magnesium hydroxide/simethicone Suspension 30 milliLiter(s) Oral every 4 hours PRN  HYDROmorphone  Injectable 1 milliGRAM(s) IV Push every 6 hours PRN  HYDROmorphone  Injectable 1 milliGRAM(s) IV Push every 3 hours PRN  melatonin 3 milliGRAM(s) Oral at bedtime PRN  ondansetron Injectable 4 milliGRAM(s) IV Push every 8 hours PRN  oxycodone    5 mG/acetaminophen 325 mG 2 Tablet(s) Oral every 6 hours PRN                                            ------------------------------------------------------------  VITAL SIGNS: Last 24 Hours  T(C): 36.8 (01 Dec 2021 05:15), Max: 37.1 (30 Nov 2021 16:31)  T(F): 98.2 (01 Dec 2021 05:15), Max: 98.7 (30 Nov 2021 16:31)  HR: 86 (01 Dec 2021 05:15) (86 - 105)  BP: 99/54 (01 Dec 2021 05:15) (98/56 - 220/140)  BP(mean): --  RR: 20 (01 Dec 2021 05:15) (17 - 20)  SpO2: 97% (01 Dec 2021 01:31) (97% - 100%)                                             --------------------------------------------------------------  LABS:                        7.0    4.58  )-----------( 135      ( 30 Nov 2021 21:47 )             22.2     11-30    143  |  103  |  32<H>  ----------------------------<  153<H>  4.2   |  16<L>  |  1.4    Ca    9.0      30 Nov 2021 11:50  Phos  3.1     11-30    TPro  6.2  /  Alb  3.8  /  TBili  0.4  /  DBili  x   /  AST  39  /  ALT  8   /  AlkPhos  2765<H>  11-30          Lactate, Blood: 1.4 mmol/L (11-30-21 @ 21:47)  Creatine Kinase, Serum: 505 U/L *H* (11-30-21 @ 15:36)  Lactate, Blood: 5.1 mmol/L *HH* (11-30-21 @ 11:50)          CARDIAC MARKERS ( 30 Nov 2021 15:36 )  x     / x     / 505 U/L / x     / x                                                  -------------------------------------------------------------  RADIOLOGY:    CT:  1. Innumerable diffuse sclerotic osseous lesions, as detailed above, compatible with osseous metastases.    2. Enlarged irregularly marginated prostate gland, possibly reflecting underlying neoplastic process.                                            --------------------------------------------------------------    PHYSICAL EXAM:  General: no acute distress, ill appearing male, lying in bed  HEENT: normocephalic, atraumatic  Lungs: CTAB  Heart: regular rate and rhythm, normal S1 and S2  Abdomen: soft, nontender, nondistended  Neuro: awake, alert, responds to commands  Psych: appears depressed and in pain                                             --------------------------------------------------------------    ASSESSMENT & PLAN    70 yo M with PMH of CVA, afib on Eliquis, HTN, HFrEF (EF 20-25% 2018), chronic LLE pain, presented to the ED with worsening Left LE pain for the past 2 months, worsening fatigue, anorexia and significant weight loss. Of note, pt had previously been worked up for the leg pain including a CT scan in march 2021 in [facility], which was negative.    #suspected metastatic prostate cancer    -PSA 1458; alk phos 2765  -CT chest & AP: Innumerable diffuse sclerotic osseous lesions -  including 6x5.8x9.4cm mass involving the left iliac wing and 6.6x4.5x4.6cm mass involving left inferior pubic ramus. Expansile sclerotic lesions are noted to involve the right 2nd lateral rib and right 5th anterior rib  -f/u recs per hem-onc  -f/u recs per urology regarding biopsy  -per rad onc, need biopsy before they can see patient for palliative radiation  -palliative care following  dilaudid 2mg iv q2 prn pain  neurontin 300mg po q12  dexameth 2mg iv q6 - will taper down todd  bowel reg - senna and miralax    # Failure to thrive  dietician eval  Ensure 3x/day  c/w LR @50CC/H    # Symptomatic anemia  likely 2/2 malignancy  Hb 6.4 on presentation (baseline 10);  s/p 2 units pRBC -> hgb 9.1  may need lasix after future tx given poor EF  f/u Iron panel, B12, Folate    # FELICITY - likely dehydration  Cr 1.4 (baseline ~ 0.8)  IVFs: LR 50/hr  Hold entresto    # hypotension  prob from anemia, CHF meds, low EF and dehydration  keep SBP >90  s/p NS bolus  consider midodrine    # h/o HTN; h/o CVA, Afib; H/o HFrEF (EF 20-25% in 2018)  hold Eliquis for bx  hold Entresto for FELICITY  hold aldactone for FELICITY  c/w metoprolol (hold if low BP), Ivabradine    # Activity: bedrest until more comfortable  # GI PPX: NA  # DVT PPX: hold eliquis; will start LMWH prob todd or Thurs  # Full code  # updated sister (Barb) at bedside and brother (Pepito) via phone                           SAMANTHA CHARLES 71y Male  MRN#: [id number]   CODE STATUS: full code    Hospital Day: 1d    Pt is currently admitted with the primary diagnosis of severe cancer pain    SUBJECTIVE  Overnight/Interval Events: No acute events overnight. Patient minimally verbal this morning and appears to be in significant pain.                                               ----------------------------------------------------------  OBJECTIVE  PAST MEDICAL & SURGICAL HISTORY  Systolic congestive heart failure, unspecified chronicity    Cerebrovascular accident (CVA) due to bilateral embolism of posterior cerebral arteries    Essential hypertension    Atrial fibrillation, unspecified type    AICD (automatic cardioverter/defibrillator) present                                              -----------------------------------------------------------  ALLERGIES:  cephalexin (Flushing)  penicillin (Rash)                                            ------------------------------------------------------------    HOME MEDICATIONS  Home Medications:  Corlanor 5 mg oral tablet: 1 tab(s) orally 2 times a day (with meals) (30 Nov 2021 17:21)  Metoprolol Succinate ER 25 mg oral tablet, extended release: 1 tab(s) orally once a day (30 Nov 2021 17:20)  spironolactone 25 mg oral tablet: 1 tab(s) orally once a day (14 May 2021 11:51)                           MEDICATIONS:  STANDING MEDICATIONS  apixaban 5 milliGRAM(s) Oral every 12 hours  influenza  Vaccine (HIGH DOSE) 0.7 milliLiter(s) IntraMuscular once  ivabradine 5 milliGRAM(s) Oral two times a day  lactated ringers. 1000 milliLiter(s) IV Continuous <Continuous>  metoprolol succinate ER 25 milliGRAM(s) Oral daily    PRN MEDICATIONS  acetaminophen     Tablet .. 650 milliGRAM(s) Oral every 6 hours PRN  aluminum hydroxide/magnesium hydroxide/simethicone Suspension 30 milliLiter(s) Oral every 4 hours PRN  HYDROmorphone  Injectable 1 milliGRAM(s) IV Push every 6 hours PRN  HYDROmorphone  Injectable 1 milliGRAM(s) IV Push every 3 hours PRN  melatonin 3 milliGRAM(s) Oral at bedtime PRN  ondansetron Injectable 4 milliGRAM(s) IV Push every 8 hours PRN  oxycodone    5 mG/acetaminophen 325 mG 2 Tablet(s) Oral every 6 hours PRN                                            ------------------------------------------------------------  VITAL SIGNS: Last 24 Hours  T(C): 36.8 (01 Dec 2021 05:15), Max: 37.1 (30 Nov 2021 16:31)  T(F): 98.2 (01 Dec 2021 05:15), Max: 98.7 (30 Nov 2021 16:31)  HR: 86 (01 Dec 2021 05:15) (86 - 105)  BP: 99/54 (01 Dec 2021 05:15) (98/56 - 220/140)  BP(mean): --  RR: 20 (01 Dec 2021 05:15) (17 - 20)  SpO2: 97% (01 Dec 2021 01:31) (97% - 100%)                                             --------------------------------------------------------------  LABS:                        7.0    4.58  )-----------( 135      ( 30 Nov 2021 21:47 )             22.2     11-30    143  |  103  |  32<H>  ----------------------------<  153<H>  4.2   |  16<L>  |  1.4    Ca    9.0      30 Nov 2021 11:50  Phos  3.1     11-30    TPro  6.2  /  Alb  3.8  /  TBili  0.4  /  DBili  x   /  AST  39  /  ALT  8   /  AlkPhos  2765<H>  11-30          Lactate, Blood: 1.4 mmol/L (11-30-21 @ 21:47)  Creatine Kinase, Serum: 505 U/L *H* (11-30-21 @ 15:36)  Lactate, Blood: 5.1 mmol/L *HH* (11-30-21 @ 11:50)          CARDIAC MARKERS ( 30 Nov 2021 15:36 )  x     / x     / 505 U/L / x     / x                                                  -------------------------------------------------------------  RADIOLOGY:    CT:  1. Innumerable diffuse sclerotic osseous lesions, as detailed above, compatible with osseous metastases.    2. Enlarged irregularly marginated prostate gland, possibly reflecting underlying neoplastic process.                                            --------------------------------------------------------------    PHYSICAL EXAM:  General: no acute distress, ill appearing male, lying in bed  HEENT: normocephalic, atraumatic  Lungs: CTAB  Heart: regular rate and rhythm, normal S1 and S2  Abdomen: soft, nontender, nondistended  Neuro: awake, alert, responds to commands  Psych: appears depressed and in pain                                             --------------------------------------------------------------    ASSESSMENT & PLAN    70 yo M with PMH of CVA, afib on Eliquis, HTN, HFrEF (EF 20-25% 2018), chronic LLE pain, presented to the ED with worsening Left LE pain for the past 2 months, worsening fatigue, anorexia and significant weight loss. Of note, pt had previously been worked up for the leg pain including a CT scan in march 2021 in [facility], which was negative.    #suspected metastatic prostate cancer    -PSA 1458; alk phos 2765  -CT chest & AP: Innumerable diffuse sclerotic osseous lesions -  including 6x5.8x9.4cm mass involving the left iliac wing and 6.6x4.5x4.6cm mass involving left inferior pubic ramus. Expansile sclerotic lesions are noted to involve the right 2nd lateral rib and right 5th anterior rib  -f/u recs per hem-onc  -f/u recs per urology regarding biopsy  -per rad onc, need biopsy before they can see patient for palliative radiation  -palliative care following  dilaudid 2mg iv q2 prn pain  neurontin 300mg po q12  dexameth 2mg iv q6 - will taper down todd  bowel reg - senna and miralax    # Failure to thrive  dietician eval  Ensure 3x/day  c/w LR @50CC/H    # Symptomatic anemia  likely 2/2 malignancy  Hb 6.4 on presentation (baseline 10);  s/p 2 units pRBC -> hgb 9.1  may need lasix after future tx given poor EF  f/u B12 and retics  iron: 71, TIBC 220, 32% saturation  folate 8.5, wnl    # FLEICITY - likely dehydration  Cr 1.4 (baseline ~ 0.8)  IVFs: LR 50/hr  Hold entresto    # hypotension  prob from anemia, CHF meds, low EF and dehydration  keep SBP >90  s/p NS bolus  consider midodrine    # h/o HTN; h/o CVA, Afib; H/o HFrEF (EF 20-25% in 2018)  hold Eliquis for bx  hold Entresto for FELICITY  hold aldactone for FELICITY  c/w metoprolol (hold if low BP), Ivabradine    # Activity: bedrest until more comfortable  # GI PPX: NA  # DVT PPX: hold eliquis; will start LMWH prob todd or Thurs  # Full code  # updated sister (Barb) at bedside and brother (Pepito) via phone

## 2021-12-02 LAB
ALBUMIN SERPL ELPH-MCNC: 3 G/DL — LOW (ref 3.5–5.2)
ALP SERPL-CCNC: 2097 U/L — HIGH (ref 30–115)
ALT FLD-CCNC: 9 U/L — SIGNIFICANT CHANGE UP (ref 0–41)
ANION GAP SERPL CALC-SCNC: 16 MMOL/L — HIGH (ref 7–14)
AST SERPL-CCNC: 49 U/L — HIGH (ref 0–41)
BASOPHILS # BLD AUTO: 0.01 K/UL — SIGNIFICANT CHANGE UP (ref 0–0.2)
BASOPHILS NFR BLD AUTO: 0.3 % — SIGNIFICANT CHANGE UP (ref 0–1)
BILIRUB SERPL-MCNC: 0.4 MG/DL — SIGNIFICANT CHANGE UP (ref 0.2–1.2)
BUN SERPL-MCNC: 29 MG/DL — HIGH (ref 10–20)
CALCIUM SERPL-MCNC: 7.9 MG/DL — LOW (ref 8.5–10.1)
CHLORIDE SERPL-SCNC: 110 MMOL/L — SIGNIFICANT CHANGE UP (ref 98–110)
CO2 SERPL-SCNC: 17 MMOL/L — SIGNIFICANT CHANGE UP (ref 17–32)
CREAT SERPL-MCNC: 1.1 MG/DL — SIGNIFICANT CHANGE UP (ref 0.7–1.5)
EOSINOPHIL # BLD AUTO: 0.01 K/UL — SIGNIFICANT CHANGE UP (ref 0–0.7)
EOSINOPHIL NFR BLD AUTO: 0.3 % — SIGNIFICANT CHANGE UP (ref 0–8)
GLUCOSE SERPL-MCNC: 109 MG/DL — HIGH (ref 70–99)
HCT VFR BLD CALC: 24.4 % — LOW (ref 42–52)
HGB BLD-MCNC: 7.8 G/DL — LOW (ref 14–18)
IMM GRANULOCYTES NFR BLD AUTO: 1.3 % — HIGH (ref 0.1–0.3)
LYMPHOCYTES # BLD AUTO: 0.87 K/UL — LOW (ref 1.2–3.4)
LYMPHOCYTES # BLD AUTO: 22.4 % — SIGNIFICANT CHANGE UP (ref 20.5–51.1)
MAGNESIUM SERPL-MCNC: 2 MG/DL — SIGNIFICANT CHANGE UP (ref 1.8–2.4)
MCHC RBC-ENTMCNC: 30.8 PG — SIGNIFICANT CHANGE UP (ref 27–31)
MCHC RBC-ENTMCNC: 32 G/DL — SIGNIFICANT CHANGE UP (ref 32–37)
MCV RBC AUTO: 96.4 FL — HIGH (ref 80–94)
MONOCYTES # BLD AUTO: 0.27 K/UL — SIGNIFICANT CHANGE UP (ref 0.1–0.6)
MONOCYTES NFR BLD AUTO: 7 % — SIGNIFICANT CHANGE UP (ref 1.7–9.3)
NEUTROPHILS # BLD AUTO: 2.67 K/UL — SIGNIFICANT CHANGE UP (ref 1.4–6.5)
NEUTROPHILS NFR BLD AUTO: 68.7 % — SIGNIFICANT CHANGE UP (ref 42.2–75.2)
NRBC # BLD: 2 /100 WBCS — HIGH (ref 0–0)
PLATELET # BLD AUTO: 115 K/UL — LOW (ref 130–400)
POTASSIUM SERPL-MCNC: 4.7 MMOL/L — SIGNIFICANT CHANGE UP (ref 3.5–5)
POTASSIUM SERPL-SCNC: 4.7 MMOL/L — SIGNIFICANT CHANGE UP (ref 3.5–5)
PROT SERPL-MCNC: 5.3 G/DL — LOW (ref 6–8)
RBC # BLD: 2.53 M/UL — LOW (ref 4.7–6.1)
RBC # BLD: 2.53 M/UL — LOW (ref 4.7–6.1)
RBC # FLD: 17.8 % — HIGH (ref 11.5–14.5)
RETICS #: 46.3 K/UL — SIGNIFICANT CHANGE UP (ref 25–125)
RETICS/RBC NFR: 1.8 % — HIGH (ref 0.5–1.5)
SODIUM SERPL-SCNC: 143 MMOL/L — SIGNIFICANT CHANGE UP (ref 135–146)
VIT B12 SERPL-MCNC: 1540 PG/ML — HIGH (ref 232–1245)
WBC # BLD: 3.88 K/UL — LOW (ref 4.8–10.8)
WBC # FLD AUTO: 3.88 K/UL — LOW (ref 4.8–10.8)

## 2021-12-02 PROCEDURE — 99233 SBSQ HOSP IP/OBS HIGH 50: CPT

## 2021-12-02 PROCEDURE — 99221 1ST HOSP IP/OBS SF/LOW 40: CPT

## 2021-12-02 PROCEDURE — 99223 1ST HOSP IP/OBS HIGH 75: CPT

## 2021-12-02 RX ORDER — SODIUM BICARBONATE 1 MEQ/ML
650 SYRINGE (ML) INTRAVENOUS EVERY 12 HOURS
Refills: 0 | Status: DISCONTINUED | OUTPATIENT
Start: 2021-12-02 | End: 2021-12-03

## 2021-12-02 RX ORDER — DEXAMETHASONE 0.5 MG/5ML
2 ELIXIR ORAL EVERY 8 HOURS
Refills: 0 | Status: DISCONTINUED | OUTPATIENT
Start: 2021-12-02 | End: 2021-12-03

## 2021-12-02 RX ORDER — DEXAMETHASONE 0.5 MG/5ML
2 ELIXIR ORAL EVERY 4 HOURS
Refills: 0 | Status: DISCONTINUED | OUTPATIENT
Start: 2021-12-02 | End: 2021-12-02

## 2021-12-02 RX ORDER — POLYETHYLENE GLYCOL 3350 17 G/17G
17 POWDER, FOR SOLUTION ORAL DAILY
Refills: 0 | Status: DISCONTINUED | OUTPATIENT
Start: 2021-12-02 | End: 2021-12-03

## 2021-12-02 RX ORDER — SENNA PLUS 8.6 MG/1
2 TABLET ORAL AT BEDTIME
Refills: 0 | Status: DISCONTINUED | OUTPATIENT
Start: 2021-12-02 | End: 2021-12-03

## 2021-12-02 RX ORDER — HYDROMORPHONE HYDROCHLORIDE 2 MG/ML
2 INJECTION INTRAMUSCULAR; INTRAVENOUS; SUBCUTANEOUS
Refills: 0 | Status: DISCONTINUED | OUTPATIENT
Start: 2021-12-02 | End: 2021-12-09

## 2021-12-02 RX ORDER — DEXAMETHASONE 0.5 MG/5ML
2 ELIXIR ORAL EVERY 8 HOURS
Refills: 0 | Status: DISCONTINUED | OUTPATIENT
Start: 2021-12-02 | End: 2021-12-02

## 2021-12-02 RX ADMIN — Medication 2 MILLIGRAM(S): at 00:51

## 2021-12-02 RX ADMIN — Medication 2 MILLIGRAM(S): at 14:33

## 2021-12-02 RX ADMIN — GABAPENTIN 300 MILLIGRAM(S): 400 CAPSULE ORAL at 05:37

## 2021-12-02 RX ADMIN — POLYETHYLENE GLYCOL 3350 17 GRAM(S): 17 POWDER, FOR SOLUTION ORAL at 11:31

## 2021-12-02 RX ADMIN — Medication 2 MILLIGRAM(S): at 05:37

## 2021-12-02 RX ADMIN — IVABRADINE 5 MILLIGRAM(S): 7.5 TABLET, FILM COATED ORAL at 05:38

## 2021-12-02 RX ADMIN — SENNA PLUS 2 TABLET(S): 8.6 TABLET ORAL at 21:30

## 2021-12-02 RX ADMIN — GABAPENTIN 300 MILLIGRAM(S): 400 CAPSULE ORAL at 17:19

## 2021-12-02 RX ADMIN — Medication 2 MILLIGRAM(S): at 21:29

## 2021-12-02 RX ADMIN — Medication 25 MILLIGRAM(S): at 05:37

## 2021-12-02 RX ADMIN — IVABRADINE 5 MILLIGRAM(S): 7.5 TABLET, FILM COATED ORAL at 17:37

## 2021-12-02 NOTE — DIETITIAN INITIAL EVALUATION ADULT. - PHYSCIAL ASSESSMENT
skin intact/ no edema noted  No chewing//swallowing difficulty per sister  No N/V/C/D at this time, LBM: 12/1 fatigue - weak/well nourished

## 2021-12-02 NOTE — PROGRESS NOTE ADULT - SUBJECTIVE AND OBJECTIVE BOX
SAMANTHA CHARLES             MRN-[id number]      Patient is a 71y old Male who presents with a chief complaint of Loss of appetite, weight loss, and knee pain (02 Dec 2021 14:01)    Currently admitted with the primary diagnosis of:  pain     SUBJECTIVE:  -Patient seen at bedside with sister  -Patient feels pain much improved today  -Denies pain at time of visit    ROS:  DYSPNEA: N  NAUS/VOM: N	  SECRETIONS:N	  AGITATION: N  Pain (Y/N):  Y       PEx:   T(C): 36.4 (12-02-21 @ 14:24), Max: 36.4 (12-02-21 @ 14:24)  HR: 75 (12-02-21 @ 14:24) (75 - 89)  BP: 96/49 (12-02-21 @ 14:24) (96/49 - 113/56)  RR: 19 (12-02-21 @ 14:24) (16 - 19)  SpO2: --  Wt(kg): --            General:  found in bed in NAD, cachectic  Eyes:  EOMI Non icteric MOM  ENMT: no external oral ulcers, MMM, no thrush   CVS: RR , no edema   Resp: Unlabored Non tachypneic No increased WOB  GI:  ND, nontender  Musc: No clubbing  Neuro: Follows commands No focal deficits  Psych: Calm Pleasant, AAOx 3-4   Skin: Non jaundiced , no rash     Last BM: unclear    ALLERGIES: cephalexin (Flushing)  penicillin (Rash)      Labs:	    CBC:                        7.8    3.88  )-----------( 115      ( 02 Dec 2021 08:00 )             24.4     CMP:    12-02    143  |  110  |  29<H>  ----------------------------<  109<H>  4.7   |  17  |  1.1    Ca    7.9<L>      02 Dec 2021 08:00  Phos  3.1     11-30  Mg     2.0     12-02    TPro  5.3<L>  /  Alb  3.0<L>  /  TBili  0.4  /  DBili  x   /  AST  49<H>  /  ALT  9   /  AlkPhos  2097<H>  12-02    RADIOLOGY  CT AP  1. Innumerable diffuse sclerotic osseous lesions, as detailed above, compatible with osseous metastases.    2. Enlarged irregularly marginated prostate gland, possibly reflecting underlying neoplastic process.      EKG  12 Lead ECG:   Ventricular Rate 86 BPM    Atrial Rate 86 BPM    P-R Interval 148 ms    QRS Duration 140 ms    Q-T Interval 410 ms    QTC Calculation(Bazett) 490 ms    P Axis 86 degrees    R Axis 99 degrees    T Axis -42 degrees    Diagnosis Line Atrial-sensed ventricular-paced rhythm  Abnormal ECG    Confirmed by ERWIN STUART MD ([id number]) on 11/30/2021 1:27:56 PM (11-30-21 @ 11:40)      Imaging Personally Reviewed:  [x] YES  [ ] NO    Consultant(s) Notes Reviewed:  [x] YES  [ ] NO  Care Discussed with Consultants/Other Providers [x] YES  [ ] NO    Medications:	      MEDICATIONS  (STANDING):  dexAMETHasone     Tablet 2 milliGRAM(s) Oral every 8 hours  gabapentin 300 milliGRAM(s) Oral every 12 hours  influenza  Vaccine (HIGH DOSE) 0.7 milliLiter(s) IntraMuscular once  ivabradine 5 milliGRAM(s) Oral two times a day  lactated ringers. 1000 milliLiter(s) (50 mL/Hr) IV Continuous <Continuous>  metoprolol succinate ER 25 milliGRAM(s) Oral daily  polyethylene glycol 3350 17 Gram(s) Oral daily  senna 2 Tablet(s) Oral at bedtime    MEDICATIONS  (PRN):  acetaminophen     Tablet .. 650 milliGRAM(s) Oral every 6 hours PRN Temp greater or equal to 38C (100.4F), Mild Pain (1 - 3)  aluminum hydroxide/magnesium hydroxide/simethicone Suspension 30 milliLiter(s) Oral every 4 hours PRN Dyspepsia  HYDROmorphone   Tablet 2 milliGRAM(s) Oral every 3 hours PRN Severe Pain (7 - 10)  melatonin 3 milliGRAM(s) Oral at bedtime PRN Insomnia  ondansetron Injectable 4 milliGRAM(s) IV Push every 8 hours PRN Nausea and/or Vomiting    ADVANCED DIRECTIVES:            FULL CODE         DECISION MAKER: Patient [x]  Family [  ]  Other [  ] _______      GOALS OF CARE DISCUSSION       Palliative care info/counseling provided	          PSYCHOSOCIAL-SPIRITUAL ASSESSMENT:       Reviewed    REFERRALS	        Palliative Med        Unit SW/Case Mgmt

## 2021-12-02 NOTE — PROGRESS NOTE ADULT - SUBJECTIVE AND OBJECTIVE BOX
SAMANTHA CHARLES 71y Male  MRN#: [id number]   CODE STATUS: full code    Hospital Day: 2d    Pt is currently admitted with the primary diagnosis of metastatic prostate cancer     SUBJECTIVE  Overnight/Interval Events: No acute events overnight. This morning, patient endorses pain in left lower extremity (especially at knees) but appears more comfortable.                                             ----------------------------------------------------------  OBJECTIVE  PAST MEDICAL & SURGICAL HISTORY  Systolic congestive heart failure, unspecified chronicity  Cerebrovascular accident (CVA) due to bilateral embolism of posterior cerebral arteries  Essential hypertension  Atrial fibrillation, unspecified type  AICD (automatic cardioverter/defibrillator) present                                            -----------------------------------------------------------  ALLERGIES:  cephalexin (Flushing)  penicillin (Rash)                                          ------------------------------------------------------------    HOME MEDICATIONS  Home Medications:  Corlanor 5 mg oral tablet: 1 tab(s) orally 2 times a day (with meals) (30 Nov 2021 17:21)  Metoprolol Succinate ER 25 mg oral tablet, extended release: 1 tab(s) orally once a day (30 Nov 2021 17:20)  spironolactone 25 mg oral tablet: 1 tab(s) orally once a day (14 May 2021 11:51)                           MEDICATIONS:  STANDING MEDICATIONS  dexAMETHasone     Tablet 2 milliGRAM(s) Oral every 8 hours  gabapentin 300 milliGRAM(s) Oral every 12 hours  influenza  Vaccine (HIGH DOSE) 0.7 milliLiter(s) IntraMuscular once  ivabradine 5 milliGRAM(s) Oral two times a day  lactated ringers. 1000 milliLiter(s) IV Continuous <Continuous>  metoprolol succinate ER 25 milliGRAM(s) Oral daily  polyethylene glycol 3350 17 Gram(s) Oral daily  senna 2 Tablet(s) Oral at bedtime    PRN MEDICATIONS  acetaminophen     Tablet .. 650 milliGRAM(s) Oral every 6 hours PRN  aluminum hydroxide/magnesium hydroxide/simethicone Suspension 30 milliLiter(s) Oral every 4 hours PRN  HYDROmorphone   Tablet 2 milliGRAM(s) Oral every 3 hours PRN  melatonin 3 milliGRAM(s) Oral at bedtime PRN  ondansetron Injectable 4 milliGRAM(s) IV Push every 8 hours PRN                                            ------------------------------------------------------------  VITAL SIGNS: Last 24 Hours  T(C): 36.2 (02 Dec 2021 04:10), Max: 36.4 (01 Dec 2021 14:33)  T(F): 97.1 (02 Dec 2021 04:10), Max: 97.6 (01 Dec 2021 14:33)  HR: 81 (02 Dec 2021 04:10) (81 - 89)  BP: 113/52 (02 Dec 2021 04:10) (87/38 - 113/56)  BP(mean): --  RR: 16 (02 Dec 2021 04:10) (16 - 19)  SpO2: --                                             --------------------------------------------------------------  LABS:                        7.8    3.88  )-----------( 115      ( 02 Dec 2021 08:00 )             24.4     12-02    143  |  110  |  29<H>  ----------------------------<  109<H>  4.7   |  17  |  1.1    Ca    7.9<L>      02 Dec 2021 08:00  Phos  3.1     11-30  Mg     2.0     12-02    TPro  5.3<L>  /  Alb  3.0<L>  /  TBili  0.4  /  DBili  x   /  AST  49<H>  /  ALT  9   /  AlkPhos  2097<H>  12-02      Culture - Blood (collected 30 Nov 2021 11:50)  Source: .Blood Blood-Peripheral  Preliminary Report (01 Dec 2021 23:02):    No growth to date.    Culture - Blood (collected 30 Nov 2021 11:50)  Source: .Blood Blood-Peripheral  Preliminary Report (01 Dec 2021 23:02):    No growth to date.      CARDIAC MARKERS ( 30 Nov 2021 15:36 )  x     / x     / 505 U/L / x     / x          CTA&P w/IV contrast 11/30  1. Innumerable diffuse sclerotic osseous lesions, as detailed above, compatible with osseous metastases.  2. Enlarged irregularly marginated prostate gland, possibly reflecting underlying neoplastic process.                                          --------------------------------------------------------------    PHYSICAL EXAM:  General: no acute distress, ill appearing male, lying in bed  HEENT: normocephalic, atraumatic  Lungs: CTAB  Heart: regular rate and rhythm, normal S1 and S2  Abdomen: soft, nontender, nondistended  Neuro: awake, alert, responds to commands  Psych: appears depressed and in pain                                             --------------------------------------------------------------    ASSESSMENT & PLAN    72 yo M with PMH of CVA, afib on Eliquis, HTN, HFrEF (EF 20-25% 2018), chronic LLE pain, presented to the ED with worsening Left LE pain for the past 2 months, worsening fatigue, anorexia and significant weight loss. Of note, pt had previously been worked up for the leg pain including a CT scan in march 2021 in [facility], which was negative.    #suspected metastatic prostate cancer    -PSA 1458; alk phos 2765; high probability of prostate cancer w/distant mets  -CT chest & AP: Innumerable diffuse sclerotic osseous lesions -  including 6x5.8x9.4cm mass involving the left iliac wing and 6.6x4.5x4.6cm mass involving left inferior pubic ramus. Expansile sclerotic lesions are noted to involve the right 2nd lateral rib and right 5th anterior rib  -need biopsy by IR per hem-onc and rad-onc before cancer treatment  - per urology, start casodex 50mg daily x 2 weeks prior to starting lupron hormonal therapy (can bypass biopsy for this)  -palliative care following  dilaudid 2mg iv q2 prn pain  neurontin 300mg po q12  dexameth 2mg iv q6 - will taper down todd  bowel reg - senna and miralax    # Failure to thrive  dietician eval  Ensure 3x/day  c/w LR @50CC/H    # Symptomatic anemia  likely 2/2 malignancy  Hb 6.4 on presentation (baseline 10);  s/p 2 units pRBC -> hgb 9.1  may need lasix after future tx given poor EF  f/u B12 and retics  iron: 71, TIBC 220, 32% saturation  folate 8.5, wnl    # FELICITY - likely dehydration  Cr 1.4 (baseline ~ 0.8)  IVFs: LR 50/hr  Hold entresto    # hypotension  prob from anemia, CHF meds, low EF and dehydration  keep SBP >90  s/p NS bolus  consider midodrine    # h/o HTN; h/o CVA, Afib; H/o HFrEF (EF 20-25% in 2018)  hold Eliquis for bx  hold Entresto for FELICITY  hold aldactone for FELICITY  c/w metoprolol (hold if low BP), Ivabradine    # Activity: bedrest until more comfortable  # GI PPX: NA  # DVT PPX: hold eliquis; will start LMWH prob todd or Thurs  # Full code  # updated sister (Barb) at bedside and brother (Pepito) via phone   SAMANTHA CHARLES 71y Male  MRN#: [id number]   CODE STATUS: full code    Hospital Day: 2d    Pt is currently admitted with the primary diagnosis of metastatic prostate cancer     SUBJECTIVE  Overnight/Interval Events: No acute events overnight. This morning, patient endorses pain in left lower extremity (especially at knees) but appears more comfortable.                                             ----------------------------------------------------------  OBJECTIVE  PAST MEDICAL & SURGICAL HISTORY  Systolic congestive heart failure, unspecified chronicity  Cerebrovascular accident (CVA) due to bilateral embolism of posterior cerebral arteries  Essential hypertension  Atrial fibrillation, unspecified type  AICD (automatic cardioverter/defibrillator) present                                            -----------------------------------------------------------  ALLERGIES:  cephalexin (Flushing)  penicillin (Rash)                                          ------------------------------------------------------------    HOME MEDICATIONS  Home Medications:  Corlanor 5 mg oral tablet: 1 tab(s) orally 2 times a day (with meals) (30 Nov 2021 17:21)  Metoprolol Succinate ER 25 mg oral tablet, extended release: 1 tab(s) orally once a day (30 Nov 2021 17:20)  spironolactone 25 mg oral tablet: 1 tab(s) orally once a day (14 May 2021 11:51)                           MEDICATIONS:  STANDING MEDICATIONS  dexAMETHasone     Tablet 2 milliGRAM(s) Oral every 8 hours  gabapentin 300 milliGRAM(s) Oral every 12 hours  influenza  Vaccine (HIGH DOSE) 0.7 milliLiter(s) IntraMuscular once  ivabradine 5 milliGRAM(s) Oral two times a day  lactated ringers. 1000 milliLiter(s) IV Continuous <Continuous>  metoprolol succinate ER 25 milliGRAM(s) Oral daily  polyethylene glycol 3350 17 Gram(s) Oral daily  senna 2 Tablet(s) Oral at bedtime    PRN MEDICATIONS  acetaminophen     Tablet .. 650 milliGRAM(s) Oral every 6 hours PRN  aluminum hydroxide/magnesium hydroxide/simethicone Suspension 30 milliLiter(s) Oral every 4 hours PRN  HYDROmorphone   Tablet 2 milliGRAM(s) Oral every 3 hours PRN  melatonin 3 milliGRAM(s) Oral at bedtime PRN  ondansetron Injectable 4 milliGRAM(s) IV Push every 8 hours PRN                                            ------------------------------------------------------------  VITAL SIGNS: Last 24 Hours  T(C): 36.2 (02 Dec 2021 04:10), Max: 36.4 (01 Dec 2021 14:33)  T(F): 97.1 (02 Dec 2021 04:10), Max: 97.6 (01 Dec 2021 14:33)  HR: 81 (02 Dec 2021 04:10) (81 - 89)  BP: 113/52 (02 Dec 2021 04:10) (87/38 - 113/56)  BP(mean): --  RR: 16 (02 Dec 2021 04:10) (16 - 19)  SpO2: --                                             --------------------------------------------------------------  LABS:                        7.8    3.88  )-----------( 115      ( 02 Dec 2021 08:00 )             24.4     12-02    143  |  110  |  29<H>  ----------------------------<  109<H>  4.7   |  17  |  1.1    Ca    7.9<L>      02 Dec 2021 08:00  Phos  3.1     11-30  Mg     2.0     12-02    TPro  5.3<L>  /  Alb  3.0<L>  /  TBili  0.4  /  DBili  x   /  AST  49<H>  /  ALT  9   /  AlkPhos  2097<H>  12-02      Culture - Blood (collected 30 Nov 2021 11:50)  Source: .Blood Blood-Peripheral  Preliminary Report (01 Dec 2021 23:02):    No growth to date.    Culture - Blood (collected 30 Nov 2021 11:50)  Source: .Blood Blood-Peripheral  Preliminary Report (01 Dec 2021 23:02):    No growth to date.      CARDIAC MARKERS ( 30 Nov 2021 15:36 )  x     / x     / 505 U/L / x     / x          CTA&P w/IV contrast 11/30  1. Innumerable diffuse sclerotic osseous lesions, as detailed above, compatible with osseous metastases.  2. Enlarged irregularly marginated prostate gland, possibly reflecting underlying neoplastic process.                                          --------------------------------------------------------------    PHYSICAL EXAM:  General: no acute distress, ill appearing male, lying in bed  HEENT: normocephalic, atraumatic  Lungs: CTAB  Heart: regular rate and rhythm, normal S1 and S2  Abdomen: soft, nontender, nondistended  Neuro: awake, alert, responds to commands  Psych: appears depressed and in pain                                             --------------------------------------------------------------    ASSESSMENT & PLAN    70 yo M with PMH of CVA, afib on Eliquis, HTN, HFrEF (EF 20-25% 2018), chronic LLE pain, presented to the ED with worsening Left LE pain for the past 2 months, worsening fatigue, anorexia and significant weight loss. Of note, pt had previously been worked up for the leg pain including a CT scan in march 2021 in [facility], which was negative.    #prostate cancer with distant mets  -PSA 1458; alk phos 2765;  -CT chest & AP: Innumerable diffuse sclerotic osseous lesions -  including 6x5.8x9.4cm mass involving the left iliac wing and 6.6x4.5x4.6cm mass involving left inferior pubic ramus. Expansile sclerotic lesions are noted to involve the right 2nd lateral rib and right 5th anterior rib  -need biopsy by IR per hem-onc and rad-onc before cancer treatment; per IR, this could be done outpatient  -MRI w/IV contrast lumbar and thoracic spine (AICD is MRI compatible claria MRI Quad CRT-D; model: TGEP5TC; Dr. Jaclyn Sigala, cardiologist)  - per urology, start casodex 50mg daily x 2 weeks prior to starting lupron hormonal therapy (can bypass biopsy for this)  -palliative care following  dilaudid 2mg iv q2 prn pain  neurontin 300mg po q12  dexameth 2mg iv q6 - will taper down todd  bowel reg - senna and miralax    # Failure to thrive  dietician eval  Ensure 3x/day  c/w LR @50CC/H    # Symptomatic anemia  likely 2/2 malignancy    hgb 7.8 in AM; baseline 10; s/p 2 units pRBC   may need lasix after future tx given poor EF  iron: 71, TIBC 220, 32% saturation  folate 8.5, wnl  retics 1.8%  f/u vit B12     # FELICITY - likely dehydration  Cr 1.1 (decreased from 1.4); baseline ~ 0.8  IVFs: LR 50/hr  Hold entresto    # hypotension  prob from anemia, CHF meds, low EF and dehydration  keep SBP >90  s/p NS bolus  consider midodrine    # h/o HTN; h/o CVA, Afib; H/o HFrEF (EF 20-25% in 2018)  hold Eliquis for bx  hold Entresto for FELICITY  hold aldactone for FELICITY  c/w metoprolol (hold if low BP), Ivabradine    # Activity: bedrest until more comfortable  # GI PPX: NA  # DVT PPX: hold eliquis; will start LMWH prob todd or Thurs  # Full code  #Dispo: spoke with sister; considering SNF   SAMANTHA CHARLES 71y Male  MRN#: [id number]   CODE STATUS: full code    Hospital Day: 2d    Pt is currently admitted with the primary diagnosis of metastatic prostate cancer     SUBJECTIVE  Overnight/Interval Events: No acute events overnight. This morning, patient endorses pain in left lower extremity (especially at knees) but appears more comfortable.                                             ----------------------------------------------------------  OBJECTIVE  PAST MEDICAL & SURGICAL HISTORY  Systolic congestive heart failure, unspecified chronicity  Cerebrovascular accident (CVA) due to bilateral embolism of posterior cerebral arteries  Essential hypertension  Atrial fibrillation, unspecified type  AICD (automatic cardioverter/defibrillator) present                                            -----------------------------------------------------------  ALLERGIES:  cephalexin (Flushing)  penicillin (Rash)                                          ------------------------------------------------------------    HOME MEDICATIONS  Home Medications:  Corlanor 5 mg oral tablet: 1 tab(s) orally 2 times a day (with meals) (30 Nov 2021 17:21)  Metoprolol Succinate ER 25 mg oral tablet, extended release: 1 tab(s) orally once a day (30 Nov 2021 17:20)  spironolactone 25 mg oral tablet: 1 tab(s) orally once a day (14 May 2021 11:51)                           MEDICATIONS:  STANDING MEDICATIONS  dexAMETHasone     Tablet 2 milliGRAM(s) Oral every 8 hours  gabapentin 300 milliGRAM(s) Oral every 12 hours  influenza  Vaccine (HIGH DOSE) 0.7 milliLiter(s) IntraMuscular once  ivabradine 5 milliGRAM(s) Oral two times a day  lactated ringers. 1000 milliLiter(s) IV Continuous <Continuous>  metoprolol succinate ER 25 milliGRAM(s) Oral daily  polyethylene glycol 3350 17 Gram(s) Oral daily  senna 2 Tablet(s) Oral at bedtime    PRN MEDICATIONS  acetaminophen     Tablet .. 650 milliGRAM(s) Oral every 6 hours PRN  aluminum hydroxide/magnesium hydroxide/simethicone Suspension 30 milliLiter(s) Oral every 4 hours PRN  HYDROmorphone   Tablet 2 milliGRAM(s) Oral every 3 hours PRN  melatonin 3 milliGRAM(s) Oral at bedtime PRN  ondansetron Injectable 4 milliGRAM(s) IV Push every 8 hours PRN                                            ------------------------------------------------------------  VITAL SIGNS: Last 24 Hours  T(C): 36.2 (02 Dec 2021 04:10), Max: 36.4 (01 Dec 2021 14:33)  T(F): 97.1 (02 Dec 2021 04:10), Max: 97.6 (01 Dec 2021 14:33)  HR: 81 (02 Dec 2021 04:10) (81 - 89)  BP: 113/52 (02 Dec 2021 04:10) (87/38 - 113/56)  BP(mean): --  RR: 16 (02 Dec 2021 04:10) (16 - 19)  SpO2: --                                             --------------------------------------------------------------  LABS:                        7.8    3.88  )-----------( 115      ( 02 Dec 2021 08:00 )             24.4     12-02    143  |  110  |  29<H>  ----------------------------<  109<H>  4.7   |  17  |  1.1    Ca    7.9<L>      02 Dec 2021 08:00  Phos  3.1     11-30  Mg     2.0     12-02    TPro  5.3<L>  /  Alb  3.0<L>  /  TBili  0.4  /  DBili  x   /  AST  49<H>  /  ALT  9   /  AlkPhos  2097<H>  12-02      Culture - Blood (collected 30 Nov 2021 11:50)  Source: .Blood Blood-Peripheral  Preliminary Report (01 Dec 2021 23:02):    No growth to date.    Culture - Blood (collected 30 Nov 2021 11:50)  Source: .Blood Blood-Peripheral  Preliminary Report (01 Dec 2021 23:02):    No growth to date.      CARDIAC MARKERS ( 30 Nov 2021 15:36 )  x     / x     / 505 U/L / x     / x          CTA&P w/IV contrast 11/30  1. Innumerable diffuse sclerotic osseous lesions, as detailed above, compatible with osseous metastases.  2. Enlarged irregularly marginated prostate gland, possibly reflecting underlying neoplastic process.                                          --------------------------------------------------------------    PHYSICAL EXAM:  General: no acute distress, ill appearing male, lying in bed  HEENT: normocephalic, atraumatic  Lungs: CTAB  Heart: regular rate and rhythm, normal S1 and S2  Abdomen: soft, nontender, nondistended  Neuro: awake, alert, responds to commands  Psych: appears depressed and in pain                                             --------------------------------------------------------------    ASSESSMENT & PLAN    72 yo M with PMH of CVA, afib on Eliquis, HTN, HFrEF (EF 20-25% 2018), chronic LLE pain, presented to the ED with worsening Left LE pain for the past 2 months, worsening fatigue, anorexia and significant weight loss. Of note, pt had previously been worked up for the leg pain including a CT scan in march 2021 in [facility], which was negative.    #prostate cancer with distant mets  -PSA 1458; alk phos 2765;  -CT chest & AP: Innumerable diffuse sclerotic osseous lesions -  including 6x5.8x9.4cm mass involving the left iliac wing and 6.6x4.5x4.6cm mass involving left inferior pubic ramus. Expansile sclerotic lesions are noted to involve the right 2nd lateral rib and right 5th anterior rib  -ordered MRI w/IV contrast lumbar and thoracic spine; discussed with MRI tech that AICD is MRI compatible claria MRI Quad CRT-D; model: [id number]; please contact, Dr. Jaclyn Sigala, cardiologist if needed)  -d/w IR that biopsy should be done inpatient as patient's cancer is advanced and need confirmation on biopsy before starting chemo & radiation by hem-onc and rad-onc; f/u with IR once MRI is done  -palliative care following  dilaudid 2mg iv q2 prn pain  neurontin 300mg po q12  dexameth 2mg iv q6 - will taper down todd  bowel reg - senna and miralax    # Failure to thrive  dietician eval  Ensure 3x/day  c/w LR @50CC/H    # Symptomatic anemia  likely 2/2 malignancy    hgb 7.8 in AM; baseline 10; s/p 2 units pRBC   may need lasix after future tx given poor EF  iron: 71, TIBC 220, 32% saturation  folate 8.5, wnl  retics 1.8%  f/u vit B12     # FELICITY - likely dehydration  Cr 1.1 (decreased from 1.4); baseline ~ 0.8  IVFs: LR 50/hr  Hold entresto    # hypotension  prob from anemia, CHF meds, low EF and dehydration  keep SBP >90  s/p NS bolus  consider midodrine    # h/o HTN; h/o CVA, Afib; H/o HFrEF (EF 20-25% in 2018)  hold Eliquis for bx  hold Entresto for FELICITY  hold aldactone for FELICITY  c/w metoprolol (hold if low BP), Ivabradine    # Activity: bedrest until more comfortable  # GI PPX: NA  # DVT PPX: hold eliquis  # Full code  #Dispo: spoke with sister, considering SNF

## 2021-12-02 NOTE — DIETITIAN INITIAL EVALUATION ADULT. - PERSON TAUGHT/METHOD
Discussed with sister the importance of consuming adequate calorie and protein due to the increased metabolic demand from wound healing/verbal instruction/patient instructed

## 2021-12-02 NOTE — PHYSICAL THERAPY INITIAL EVALUATION ADULT - PERTINENT HX OF CURRENT PROBLEM, REHAB EVAL
72 yo M with PMH of CVA, afib on Eliquis, HTN, HFrEF (EF 20-25% 2018), chronic LLE pain, presented to the ED with worsening Left LE pain for the past 2 months, worsening fatigue, anorexia and significant weight loss. Of note, pt had previously been worked up for the leg pain including a CT scan in march 2021 in [facility] [place], which was negative. Referred to PT for evaluation and treatment.

## 2021-12-02 NOTE — CONSULT NOTE ADULT - ATTENDING COMMENTS
findings most consistent with Metastatic Prostate Cancer.  In order to prevent further delay in treatment, biopsy may be bypassed and start Hormonal therapy with Casodex then eventually LHRH.  Will discuss with family.  Agree with PSA for baseline.
The patient was seen. Agree with above.  70 yo Male presented for weakness, weight loss and pain, was found to have diffuse osseous mets and marked elevated PSA, consistent with metastatic prostate cancer.   CT Abdomen and Pelvis w/ IV Cont showed   -A 6.0 x 5.8 x 9.4 cm mass involving the left iliac wing with associated extraosseous soft tissue extension  -A 6.6 x 4.5 x 4.6 and meter mass involves the left inferior pubic ramus, with associated extraosseous soft tissue extension.  -Expansile sclerotic lesions are noted to involve the right 2nd lateral rib and right 5th anterior rib.  -Enlarged irregularly marginated prostate gland, possibly reflecting underlying neoplastic process.    Prostate Ca Screen, PSA Total: 1458.00    RECOMMENDATIONS:  - CT guided biopsy to confirm diagnosis. Given patient is symptomatic, would recommend inpatient biopsy so that he can start treatment soon.   - Bone scan.  - Will start Casodex 50mg daily, and will add Lupron 22.5 mg IM in 1 week.  - Abiraterone 1000 mg po daily and Prednisone 5 mg po daily   - Will star Xgeva as out patient to reduce bone related complication.   - Pain management.  - Anemia. Check Fe, TIBC, Ferritin, B12, Folate, Retic count, LDH, Haptoglobin.
Patient seen at bedside  He was having severe abdominal pain that he said stated yesterday  Denied other pain  Imaging shows many osseous lesions  Patient unable to have more in depth conversations  Last opioids the patient received was earlier this morning - dilaudid 1mg IV with minimal improvement  Patient would likely benefit from a PCA, but does not show that he'd be able to understand/work a PCA pump at this time  Continue dilaudid 2mg IV q2h PRN for pain per primary team - if patient cannot tolerate 2mg IV dilaudid, decrease dose back to 1.5mg IV q2h PRN for pain  -gabapentin, steroids per Dr. Carias  -Bowel regimen  -Will follow

## 2021-12-02 NOTE — PROGRESS NOTE ADULT - ASSESSMENT
71yMale with PMH including CVA, afib on Eliquis, HTN, HFrEF ( EF 20-25% 2018), chronic LLE pain, being evaluated for pain management in the setting of newly diagnosed metastatic prostate cancer to bone. Patient in severe pain on exam but not able to give comprehensive history. Palliative care consulted for pain management.    Patient seen at bedside. Pain much improved today. Never received dose of dilaudid 2mg IV last night.    See Recs below.    Please call [phone] with questions or concerns 24/7.   We will continue to follow.     Discussed with Dr. Carias.

## 2021-12-02 NOTE — CONSULT NOTE ADULT - ASSESSMENT
72yo Male admitted for management of symptomatic anemia, FELICITY and likely metastatic prostatic ca with diffuse osseous mets.    CT Abdomen and Pelvis w/ IV Cont (11.30.21 @ 13:49) >  -A 6.0 x 5.8 x 9.4 cm mass involving the left iliac wing with associated extraosseous soft tissue extension  -A 6.6 x 4.5 x 4.6 and meter mass involves the left inferior pubic ramus, with associated extraosseous soft tissue extension.  -Expansile sclerotic lesions are noted to involve the right 2nd lateral rib and right 5th anterior rib.  -Enlarged irregularly marginated prostate gland, possibly reflecting underlying neoplastic process.    Prostate cancer with distant metastasis most likely diagnosis / very consistent with findings.   Prostate Ca Screen, PSA Total: 1458.00  >> Patient fits into high risk category given high PSA and distant metastasis.     Expected patient survival >5 years or symptomatic (which he is)  Initial therapy will be similar, adjuvant therapy will depend on if cancer is high risk or very high risk  > High risk > EBRT + ADT (1.5-3 years) or EBRT + brachytherapy + ADT (1-3 years)  > Very high risk > EBRT + ADT for 2 years + docetaxel for 6 cycles or EBRT + ADT for 2 years + abiraterone     Otherwise, pain control as needed.  72yo Male admitted for management of symptomatic anemia, FELICITY and likely metastatic prostatic ca with diffuse osseous mets.    CT Abdomen and Pelvis w/ IV Cont (11.30.21 @ 13:49) >  -A 6.0 x 5.8 x 9.4 cm mass involving the left iliac wing with associated extraosseous soft tissue extension  -A 6.6 x 4.5 x 4.6 and meter mass involves the left inferior pubic ramus, with associated extraosseous soft tissue extension.  -Expansile sclerotic lesions are noted to involve the right 2nd lateral rib and right 5th anterior rib.  -Enlarged irregularly marginated prostate gland, possibly reflecting underlying neoplastic process.    Prostate cancer with distant metastasis most likely diagnosis / very consistent with findings.   Prostate Ca Screen, PSA Total: 1458.00  >> Patient fits into high risk category given high PSA and distant metastasis.     RECOMMENDATIONS:    -Would recommend inpatient biopsy, with plan to start treatment inpatient  -Get MRI C/T/L spine if pt can tolerate to evaluate for spine mets and r/o pathologic fx/cord compression.  -Get bone scan.  -Please call radiation oncology for EBRT eval   -Expected patient survival >5 years or symptomatic (which he is)  Initial therapy will be similar, adjuvant therapy will depend on if cancer is high risk or very high risk  > High risk > EBRT + ADT (1.5-3 years) or EBRT + brachytherapy + ADT (1-3 years)  > Very high risk > EBRT + ADT for 2 years + docetaxel for 6 cycles or EBRT + ADT for 2 years + abiraterone   -Would start Casodex 50mg daily, and will add Lupron in 1-2 weeks  -Palliative care for pain control

## 2021-12-02 NOTE — CHART NOTE - NSCHARTNOTEFT_GEN_A_CORE
PALLIATIVE MEDICINE INTERDISCIPLINARY TEAM NOTE    Provider:  [ x  ]Social Work   [   ]          [  x ] Initial visit [   ] Follow up    Family or contact name / phone #   Met with: [ x  ] Patient  [  x ] Family:  sister, Barb Carreon, was present during assessment  [   ] Other:    Primary Language: [x   ] English [   ] Other*:                      *Interpretation provided by:    SUPPORT DIAGNOSES            (Check all that apply)  [   ] Psychosocial spiritual assessment (PSSA)  [   ] EOL issues  [   ] Cultural / spiritual concerns  [ x  ] Pain / suffering  [   ] Dementia / AMS  [   ] Other:  [   ] AD issues  [   ] Grief / loss / sadness  [   ] Discharge issues  [  x ] Distress / coping    PSYCHOSOCIAL ASSESSMENT OF PATIENT         (Check all that apply)  [ x  ] Initial Assessment            [   ] Reassessment          [   ] Not Applicable this visit    Pain/suffering acuity:  [   ] None to mild (0-3)           [   ] Moderate (4-6)        [  x ] High (7-10)    Mental Status:  [   ] Alert/oriented (x3)          [x   ] Confused/Altered(x2/x1)    oriented X2 (person, place)     [   ] Non-resp    Functional status:  [   ] Independent w ADLs      [   ] Needs Assistance             [ x  ] Bedbound/Full Care    Coping:  [   ] Coping well                     [   x] Coping w/difficulty            [   ] Poor coping    Support system:  [ x  ] Strong                              [   ] Adequate                        [   ] Inadequate      Past history and medications for: unknown    [ ] Anxiety       [ ] Depression    [ ] Sleep disorders     SPIRITUAL ASSESSMENT  Adventism/Spiritual practice: ___________________________    Role of organized Congregation:  [   ] Important                     [   ] Some (fam tradition, cultural)               [   ] None    Effects on medical care:  [   ] Yes, _____________________________________                         [   ] None    Cultural/Hindu need:  [   ] Yes, _____________________________________                         [   ] None    Refer to Pastoral Care:  [   ] Yes           [   ] No, not at this time    SERVICE PROVIDED  [   ]PSSA                                                                             [   ]Discharge support / facilitation  [   ]AD / goals of care counseling                                  [   ]EOL / death / bereavement counseling  [x   ]Counseling / support                                                [   ] Family meeting  [   ]Prayer / sacrament / ritual                                      [   ] Referral   [   ]Other                                                                       NOTE and Plan of Care (PoC):    Patient is a 71 year old male.  Patient was admitted on 11/30/21, dx:  cancer, metastatic to bone, adult failure to thrive, lactic acidosis.    Patient expressed that he was in pain.  Writer informed RN and medication was given.  Patient expressed frustration with reduced functioning.  Support rendered.  Sister, Barb Carreon, was at bedside:  provided information regarding role of Palliative Care.

## 2021-12-02 NOTE — PROGRESS NOTE ADULT - ASSESSMENT
70 yo M with PMH of CVA, afib on Eliquis, HTN, HFrEF (EF 20-25% 2018), chronic LLE pain, presented to the ED with worsening Left LE pain for the past 2 months, worsening fatigue, anorexia and significant weight loss. Of note, pt had previously been worked up for the leg pain including a CT scan in march 2021 in [facility], which was negative.    # Neopl-related severe pain 2/2 diffuse bony sclerotic lesions (AP 2765) likely 2/2 prostate cancer (irregularly marginated prostate gland on CT and PSA 1400)  CT chest/abdomen/pelvis - Innumerable diffuse sclerotic osseous lesions -  including 6x5.8x9.4cm mass involving the left iliac wing and 6.6x4.5x4.6cm mass involving left inferior pubic ramus. Expansile sclerotic lesions are noted to involve the right 2nd lateral rib and right 5th anterior rib.  hold Eliquis for now for bx  IR eval for bx  Uro noted  Hem Onc eval now for work up; h/o also requesting bx  Rad Onc eval for pall RT; r/o also requesting bx  Pall Care eval - spoke w/ Dr Johansen  obtain MRI C/T/L spine NC (AICD compatible)  make dilaudid 2mg po q3 prn pain  neurontin 300mg po q12  make dexameth 2mg po q8 - will taper down w/ pall care advice  bowel reg    # Failure to thrive  dietician eval  MVI q24  Ensure 3x/day    # Symptomatic normo to macro anemia (mild pancytopenia)  no overt bleed  prob related to malignancy and diffuse bone mets  Hb 6.4 on presentation (baseline 10)  2U PRBC transfusion -> hgb better  keep hgb > 8 - may need another tx todd  may need lasix after future tx given poor EF    Iron panel: iron sat 32%; ferr: 2624 = NOT SELENA  Folate nl  f/u B12    # FELICITY - likely dehydration; metab acid w/ gap  Cr improving (baseline ~ 0.8)  IVFs: LR 50/hr  Hold entresto  BMP q24  NaHCO3 650mg po q12 til HCO3 > 22    # hypoTN - prob from anemia, CHF meds, low EF and dehydration  keep sys BP above 90 and preferably above 100  c/w IVFs  consider midodrine  holding parameters for BB    # h/o HTN; h/o CVA, Afib; H/o HFrEF (EF 20-25% in 2018)  hold Eliquis for bx  hold Entresto for FELICITY  hold aldactone for FELICITY  c/w metoprolol (hold if low BP)  c/w Ivabradine    # Activity: PT eval for eval for STR    # GI PPX: NA    # DVT PPX: hold eliquis; will start LMWH prob todd (unless going for bx)    # Full code    # updated sister (Barb) at bedside     Dispo: tx pain; tx low BP; tx HCO3; f/u IR, h/o, r/o, pall care, uro; hold Eliquis; IVFs; MRI CTL spines (if AICD compatible)  eventually, pt will likely need SNF for STR upon d/c - prob d/c after bx and MRI    Prog is poor for long term.

## 2021-12-02 NOTE — CONSULT NOTE ADULT - SUBJECTIVE AND OBJECTIVE BOX
Patient is a 71y old  Male who presents with a chief complaint of Loss of appetite, weight loss, and knee pain (01 Dec 2021 17:12)      HPI:  70 yo M with PMH of CVA, afib on Eliquis, HTN, HFrEF ( EF 20-25% 2018), chronic LLE pain, presented to the ED with worsening Left LE pain for the past 2 months, worsening fatigue, anorexia and significant weight loss. Of note pt has previously been worked up for the leg pain including a CT scan in march 2021 in [facility] which was negative. Patient has been getting progressively weak and is unable to ambulate now. He denies any chest pain, shortness of breath, fever, cough. Pt was being seen by a GI w/ planned colonoscopy today but on arrival to office, his GI physician was concerned about his cachetic appearance and rapid weight loss, so he sent him into ED immediately for evaluation.   In the ED his vitals were significant for /140mmHg, /min, other vitals were normal. Labs were significant for Hb of 6.4, Cr 1.4 (0.8 at baseline), bicarb 16, AG 24, ALP 2765, Lactate of 5.1. CT chest, abdomen and pelvis showed -  Innumerable diffuse sclerotic osseous lesions, compatible with osseous metastases and Enlarged irregularly marginated prostate gland, possibly reflecting underlying neoplastic process.  Patient being admitted for management of symptomatic anemia, FELICITY and likely metastatic prostatic ca with diffuse osseous mets.  (30 Nov 2021 17:07)         PAST MEDICAL & SURGICAL HISTORY:  Systolic congestive heart failure, unspecified chronicity    Cerebrovascular accident (CVA) due to bilateral embolism of posterior cerebral arteries    Essential hypertension    Atrial fibrillation, unspecified type    AICD (automatic cardioverter/defibrillator) present        SOCIAL HISTORY:    FAMILY HISTORY:  No pertinent family history in first degree relatives      Allergies    cephalexin (Flushing)  penicillin (Rash)    Intolerances      HOME MEDICATIONS:  Corlanor 5 mg oral tablet: 1 tab(s) orally 2 times a day (with meals) (30 Nov 2021 17:21)  Metoprolol Succinate ER 25 mg oral tablet, extended release: 1 tab(s) orally once a day (30 Nov 2021 17:20)  spironolactone 25 mg oral tablet: 1 tab(s) orally once a day (14 May 2021 11:51)      Vital Signs Last 24 Hrs  T(C): 36.2 (02 Dec 2021 04:10), Max: 36.4 (01 Dec 2021 14:33)  T(F): 97.1 (02 Dec 2021 04:10), Max: 97.6 (01 Dec 2021 14:33)  HR: 81 (02 Dec 2021 04:10) (81 - 89)  BP: 113/52 (02 Dec 2021 04:10) (87/38 - 113/56)  BP(mean): --  RR: 16 (02 Dec 2021 04:10) (16 - 19)  SpO2: --    PHYSICAL EXAM  General: adult in NAD  HEENT: clear oropharynx, anicteric sclera, pink conjunctiva  Neck: supple  CV: normal S1/S2 with no murmur rubs or gallops  Lungs: positive air movement b/l ant lungs,clear to auscultation, no wheezes, no rales  Abdomen: soft non-tender non-distended, no hepatosplenomegaly  Ext: no clubbing cyanosis or edema  Skin: no rashes and no petechiae  Neuro: alert and oriented X 4, no focal deficits    MEDICATIONS  (STANDING):  dexAMETHasone     Tablet 2 milliGRAM(s) Oral every 8 hours  gabapentin 300 milliGRAM(s) Oral every 12 hours  influenza  Vaccine (HIGH DOSE) 0.7 milliLiter(s) IntraMuscular once  ivabradine 5 milliGRAM(s) Oral two times a day  lactated ringers. 1000 milliLiter(s) (50 mL/Hr) IV Continuous <Continuous>  metoprolol succinate ER 25 milliGRAM(s) Oral daily  polyethylene glycol 3350 17 Gram(s) Oral daily  senna 2 Tablet(s) Oral at bedtime    MEDICATIONS  (PRN):  acetaminophen     Tablet .. 650 milliGRAM(s) Oral every 6 hours PRN Temp greater or equal to 38C (100.4F), Mild Pain (1 - 3)  aluminum hydroxide/magnesium hydroxide/simethicone Suspension 30 milliLiter(s) Oral every 4 hours PRN Dyspepsia  HYDROmorphone   Tablet 2 milliGRAM(s) Oral every 3 hours PRN Severe Pain (7 - 10)  melatonin 3 milliGRAM(s) Oral at bedtime PRN Insomnia  ondansetron Injectable 4 milliGRAM(s) IV Push every 8 hours PRN Nausea and/or Vomiting      LABS:                          7.8    3.88  )-----------( 115      ( 02 Dec 2021 08:00 )             24.4         Mean Cell Volume : 96.4 fL  Mean Cell Hemoglobin : 30.8 pg  Mean Cell Hemoglobin Concentration : 32.0 g/dL  Auto Neutrophil # : 2.67 K/uL  Auto Lymphocyte # : 0.87 K/uL  Auto Monocyte # : 0.27 K/uL  Auto Eosinophil # : 0.01 K/uL  Auto Basophil # : 0.01 K/uL  Auto Neutrophil % : 68.7 %  Auto Lymphocyte % : 22.4 %  Auto Monocyte % : 7.0 %  Auto Eosinophil % : 0.3 %  Auto Basophil % : 0.3 %      Serial CBC's  12-02 @ 08:00  Hct-24.4 / Hgb-7.8 / Plat-115 / RBC-2.53 / WBC-3.88  Serial CBC's  12-01 @ 14:15  Hct-27.6 / Hgb-9.1 / Plat-134 / RBC-2.94 / WBC-3.52  Serial CBC's  11-30 @ 21:47  Hct-22.2 / Hgb-7.0 / Plat-135 / RBC-2.30 / WBC-4.58  Serial CBC's  11-30 @ 15:36  Hct-18.8 / Hgb-5.7 / Plat-151 / RBC-1.87 / WBC-4.72  Serial CBC's  11-30 @ 11:50  Hct-20.8 / Hgb-6.4 / Plat-154 / RBC-2.07 / WBC-5.05      12-02    143  |  110  |  29<H>  ----------------------------<  109<H>  4.7   |  17  |  1.1    Ca    7.9<L>      02 Dec 2021 08:00  Phos  3.1     11-30  Mg     2.0     12-02    TPro  5.3<L>  /  Alb  3.0<L>  /  TBili  0.4  /  DBili  x   /  AST  49<H>  /  ALT  9   /  AlkPhos  2097<H>  12-02          Reticulocyte Percent: 1.8 % (12-02 @ 08:00)  Folate, Serum: 8.5 ng/mL (11-30 @ 13:03)  Ferritin, Serum: 2624 ng/mL (11-30 @ 13:03)  Iron - Total Binding Capacity.: 220 ug/dL (11-30 @ 13:03)                    Culture - Blood (collected 30 Nov 2021 11:50)  Source: .Blood Blood-Peripheral  Preliminary Report (01 Dec 2021 23:02):    No growth to date.    Culture - Blood (collected 30 Nov 2021 11:50)  Source: .Blood Blood-Peripheral  Preliminary Report (01 Dec 2021 23:02):    No growth to date.            BLOOD SMEAR INTERPRETATION:       RADIOLOGY & ADDITIONAL STUDIES:

## 2021-12-02 NOTE — CHART NOTE - NSCHARTNOTEFT_GEN_A_CORE
PALLIATIVE MEDICINE INTERDISCIPLINARY TEAM NOTE    Provider:  [   ]Social Work   [   ]          [   ] Initial visit [   ] Follow up    Family or contact name / phone #   Met with: [   ] Patient  [   ] Family  [   ] Other:    Primary Language: [   ] English [   ] Other*:                      *Interpretation provided by:    SUPPORT DIAGNOSES            (Check all that apply)  [   ] Psychosocial spiritual assessment (PSSA)  [   ] EOL issues  [   ] Cultural / spiritual concerns  [   ] Pain / suffering  [   ] Dementia / AMS  [   ] Other:  [   ] AD issues  [   ] Grief / loss / sadness  [   ] Discharge issues  [   ] Distress / coping    PSYCHOSOCIAL ASSESSMENT OF PATIENT         (Check all that apply)  [   ] Initial Assessment            [   ] Reassessment          [   ] Not Applicable this visit    Pain/suffering acuity:  [   ] None to mild (0-3)           [   ] Moderate (4-6)        [   ] High (7-10)    Mental Status:  [   ] Alert/oriented (x3)          [   ] Confused/Altered(x2/x1)         [   ] Non-resp    Functional status:  [   ] Independent w ADLs      [   ] Needs Assistance             [   ] Bedbound/Full Care    Coping:  [   ] Coping well                     [   ] Coping w/difficulty            [   ] Poor coping    Support system:  [   ] Strong                              [   ] Adequate                        [   ] Inadequate    SPIRITUAL ASSESSMENT  Episcopalian/Spiritual practice: __[place]            Role of organized Christianity:  [  x ] Important                     [   ] Some (fam tradition, cultural)               [   ] None    Effects on medical care:  [   ] Yes, _____________________________________                         [  x ] None    Cultural/Yazdanism need:  [   ] Yes, _____________________________________                         [   ] None    Refer to Pastoral Care:  [   ] Yes           [ x  ] No, not at this time    SERVICE PROVIDED  [   ]PSSA                                                                             [   ]Discharge support / facilitation  [   ]AD / goals of care counseling                                  [   ]EOL / death / bereavement counseling  [   ]Counseling / support                                                [   ] Family meeting  [   ]Prayer / sacrament / ritual                                      [   ] Referral   [   ]Other                                                                       NOTE and Plan of Care (PoC): Pt states he is comfortable. Pt is doing a life review. Pt has community, his sister was at bedside at the time of visit. Pt has identify ways to cope. I will follow up as needed.

## 2021-12-02 NOTE — DIETITIAN INITIAL EVALUATION ADULT. - MALNUTRITION
Severe protein calorie malnutrition in the setting of chronic illness secondary to cancer/metastatic to bone/ failure to thrive as evidenced by muscle mass loss in the shoulder and temple region and <75% of estimated energy needs in > 1 months . Goal patient is to meet ~75% of meals and nutrition oral supplements in the next 4 days

## 2021-12-02 NOTE — DIETITIAN INITIAL EVALUATION ADULT. - CONTINUE CURRENT NUTRITION CARE PLAN
Intervention: meals and snacks, medical nutrition supplements, coordination of care, vitamin/ mineral supplement

## 2021-12-02 NOTE — CONSULT NOTE ADULT - REASON FOR ADMISSION
Loss of appetite, weight loss, and knee pain

## 2021-12-02 NOTE — DIETITIAN INITIAL EVALUATION ADULT. - OTHER CALCULATIONS
weight used: 66 kg dosing weight. Will continue to monitor weights, and if 120 pounds is the most accurate weight, will adjust needs however since pt is severely malnourished , pt needs energy and protein needs are higher regardless. Fluid needs: per LIP

## 2021-12-02 NOTE — DIETITIAN INITIAL EVALUATION ADULT. - OTHER INFO
Per medical information: 70 yo M with PMH of CVA, afib on Eliquis, HTN, HFrEF (EF 20-25% 2018), chronic LLE pain, presented to the ED with worsening Left LE pain for the past 2 months, worsening fatigue, anorexia and significant weight loss. Of note, pt had previously been worked up for the leg pain including a CT scan in march 2021 in [facility], which was negative. Per internal medicine note, PSA 1458; alk phos 2765; high probability of prostate cancer w/distant mets -CT chest & AP: Innumerable diffuse sclerotic osseous lesions. need biopsy by IR per hem-onc and rad-onc before cancer treatment. Failure to thrive. Symptomatic anemia likely 2/2 malignancy noted. f/u B12. FELICITY - likely dehydration. hypotension prob from anemia, CHF meds, low EF and dehydration noted.        Pertinent nutrition information: Patient was in bed resting, looked very tired. Spoke to sister at bedside. Reports pt lives with her, she prepares most of the meals for him. Patients appetite has been declining for the past 2 months and has significantly gotten worse for the past 2-3 weeks. Patients reports lack of appetite and does not feeling like eating. Sister tries to forcefully feed him and give him high calorie food however pt is still actively losing weight. Patient drank breakfast essentials can which 220 calories and 16 grams of protein ( tried to give 2perday) was also drinking ensure throughout the day.  was doing better on liquids. Took Multivitamin pta.    NKFA. Patient does not eat pork.  would like to try one halal meal with his tray to see if pt would like it. Per medical information: 72 yo M with PMH of CVA, afib on Eliquis, HTN, HFrEF (EF 20-25% 2018), chronic LLE pain, presented to the ED with worsening Left LE pain for the past 2 months, worsening fatigue, anorexia and significant weight loss. Of note, pt had previously been worked up for the leg pain including a CT scan in march 2021 in [facility], which was negative. Per internal medicine note, PSA 1458; alk phos 2765; high probability of prostate cancer w/distant mets -CT chest & AP: Innumerable diffuse sclerotic osseous lesions. need biopsy by IR per hem-onc and rad-onc before cancer treatment. Failure to thrive. Symptomatic anemia likely 2/2 malignancy noted. f/u B12. FELICITY - likely dehydration. hypotension prob from anemia, CHF meds, low EF and dehydration noted.        Pertinent nutrition information: Patient was in bed resting, looked very tired. Spoke to sister at bedside. Reports pt lives with her, she prepares his meals at home. Patients appetite has been declining for the past 2 months and has significantly gotten worse for the past 2-3 weeks. Patients reports lack of appetite and does not feeling like eating. Sister tries to forcefully feed him and give him high calorie food however pt is still actively losing weight. Drinks breakfast essentials can in the morning which 220 calories and 16 grams of protein ( tried to give 2perday) was also drinking ensure throughout the day.  was doing better on liquids. Took Multivitamin pta.    NKFA. Patient does not eat pork.  would like to try one halal meal with his tray to see if pt would like it. Currently pt PO intake is suboptimal however when sister fed him for lunch he consume ~75% of meal.    UBW was 140 lbs which matches dosing weight however RD took bed scale weight of 122 lbs. Sisters reports rapid weight loss over the last two months. Pt looks like hes ~ 120 lbs, will continue to monitor and obtain more weights

## 2021-12-02 NOTE — DIETITIAN INITIAL EVALUATION ADULT. - PERTINENT MEDS FT
MEDICATIONS  (STANDING):  dexAMETHasone     Tablet 2 milliGRAM(s) Oral every 8 hours  lactated ringers. 1000 milliLiter(s) (50 mL/Hr) IV Continuous <Continuous>  polyethylene glycol 3350 17 Gram(s) Oral daily  senna 2 Tablet(s) Oral at bedtime    MEDICATIONS  (PRN):  ondansetron Injectable 4 milliGRAM(s) IV Push every 8 hours PRN Nausea and/or Vomiting

## 2021-12-02 NOTE — PHYSICAL THERAPY INITIAL EVALUATION ADULT - GENERAL OBSERVATIONS, REHAB EVAL
Pt chart reviewed. Pt found semi-reclined in bed. +IV drip ongoing. No apparent distress, A&Ox2, Pt sister present during evauation

## 2021-12-02 NOTE — DIETITIAN INITIAL EVALUATION ADULT. - ADD RECOMMEND
1) Continue DASH/TLC - add no pork modifier 2) Continue ensure enlive TID ( 1050 calories, and 60 g protein) + add 3 packets of bene protein per day ( explained to sister to mix in with food such as soup/oat meal to provide extra protein) 3) Order calorie count would like to quantify exactly how much patient is consuming from his meals and supplements 4) Obtain daily weights 5) If sister isn't there patient needs a 1:1 feed , he will not consume meals on his own

## 2021-12-02 NOTE — PROGRESS NOTE ADULT - SUBJECTIVE AND OBJECTIVE BOX
SAMANTHA CHARLES  71y  Male  ***My note supersedes ALL resident notes that I sign.  My corrections for their notes are in my note.***    I can be reached directly on [employer] [id number]. My office number is [phone]. My personal cell number is [phone].    INTERVAL EVENTS: Here for f/u of cancer. Pt looks much better today. Much more comfortable and at ease. Pt was eating and drinking today. Pt agreeable to bx and tx.    T(F): 97.6 (12-02-21 @ 14:24), Max: 97.6 (12-02-21 @ 14:24)  HR: 75 (12-02-21 @ 14:24) (75 - 89)  BP: 96/49 (12-02-21 @ 14:24) (96/49 - 113/56)  RR: 19 (12-02-21 @ 14:24) (16 - 19)  SpO2: --    Gen: more comfortable; talking better  HEENT: PERRL, EOMI; mouth clr; nose clr  Neck: no nodes, no JVD, thyroid nl  chest: left AICD  lungs: clr  hrt: s1 s2 rrr no murmur  abd: soft, NT/ND, no HS megaly  ext: no edema, no c/c  neuro: aa ox3, cn intact, can move all 4 ext    LABS:                      7.8     (    96.4   3.88  )-----------( ---------      115      ( 02 Dec 2021 08:00 )             24.4    (    17.8     143   (   110   (   109      12-02-21 @ 08:00  ----------------------               4.7   (   17   (   29     AG = 16                        -----                        1.1  Ca  7.9   Mg  2.0    P   --     LFT  5.3  (  0.4  (  49       12-02-21 @ 08:00  -------------------------  3.0  (  2097  (  9    Culture - Blood (collected 11-30-21 @ 11:50)  Source: .Blood Blood-Peripheral  Preliminary Report (12-01-21 @ 23:02):    No growth to date.    Culture - Blood (collected 11-30-21 @ 11:50)  Source: .Blood Blood-Peripheral  Preliminary Report (12-01-21 @ 23:02):    No growth to date.    RADIOLOGY & ADDITIONAL TESTS:    MEDICATIONS:    acetaminophen     Tablet .. 650 milliGRAM(s) Oral every 6 hours PRN  aluminum hydroxide/magnesium hydroxide/simethicone Suspension 30 milliLiter(s) Oral every 4 hours PRN  dexAMETHasone     Tablet 2 milliGRAM(s) Oral every 8 hours  gabapentin 300 milliGRAM(s) Oral every 12 hours  HYDROmorphone   Tablet 2 milliGRAM(s) Oral every 3 hours PRN  influenza  Vaccine (HIGH DOSE) 0.7 milliLiter(s) IntraMuscular once  ivabradine 5 milliGRAM(s) Oral two times a day  lactated ringers. 1000 milliLiter(s) IV Continuous <Continuous>  melatonin 3 milliGRAM(s) Oral at bedtime PRN  metoprolol succinate ER 25 milliGRAM(s) Oral daily  ondansetron Injectable 4 milliGRAM(s) IV Push every 8 hours PRN  polyethylene glycol 3350 17 Gram(s) Oral daily  senna 2 Tablet(s) Oral at bedtime

## 2021-12-02 NOTE — DIETITIAN INITIAL EVALUATION ADULT. - NAME AND PHONE
RD to monitor: diet order, body composition, energy intake, nutrition focused physical finding: high risk due in 4 days

## 2021-12-03 ENCOUNTER — RESULT REVIEW (OUTPATIENT)
Age: 71
End: 2021-12-03

## 2021-12-03 LAB
ANION GAP SERPL CALC-SCNC: 14 MMOL/L — SIGNIFICANT CHANGE UP (ref 7–14)
ANION GAP SERPL CALC-SCNC: 17 MMOL/L — HIGH (ref 7–14)
BASOPHILS # BLD AUTO: 0.02 K/UL — SIGNIFICANT CHANGE UP (ref 0–0.2)
BASOPHILS NFR BLD AUTO: 0.4 % — SIGNIFICANT CHANGE UP (ref 0–1)
BLD GP AB SCN SERPL QL: SIGNIFICANT CHANGE UP
BUN SERPL-MCNC: 27 MG/DL — HIGH (ref 10–20)
BUN SERPL-MCNC: 27 MG/DL — HIGH (ref 10–20)
CALCIUM SERPL-MCNC: 8.1 MG/DL — LOW (ref 8.5–10.1)
CALCIUM SERPL-MCNC: 8.2 MG/DL — LOW (ref 8.5–10.1)
CHLORIDE SERPL-SCNC: 108 MMOL/L — SIGNIFICANT CHANGE UP (ref 98–110)
CHLORIDE SERPL-SCNC: 108 MMOL/L — SIGNIFICANT CHANGE UP (ref 98–110)
CO2 SERPL-SCNC: 15 MMOL/L — LOW (ref 17–32)
CO2 SERPL-SCNC: 16 MMOL/L — LOW (ref 17–32)
CREAT SERPL-MCNC: 0.9 MG/DL — SIGNIFICANT CHANGE UP (ref 0.7–1.5)
CREAT SERPL-MCNC: 0.9 MG/DL — SIGNIFICANT CHANGE UP (ref 0.7–1.5)
EOSINOPHIL # BLD AUTO: 0.01 K/UL — SIGNIFICANT CHANGE UP (ref 0–0.7)
EOSINOPHIL NFR BLD AUTO: 0.2 % — SIGNIFICANT CHANGE UP (ref 0–8)
GLUCOSE SERPL-MCNC: 106 MG/DL — HIGH (ref 70–99)
GLUCOSE SERPL-MCNC: 114 MG/DL — HIGH (ref 70–99)
HCT VFR BLD CALC: 20.8 % — LOW (ref 42–52)
HCT VFR BLD CALC: 30.1 % — LOW (ref 42–52)
HGB BLD-MCNC: 6.4 G/DL — CRITICAL LOW (ref 14–18)
HGB BLD-MCNC: 9.7 G/DL — LOW (ref 14–18)
IMM GRANULOCYTES NFR BLD AUTO: 3.3 % — HIGH (ref 0.1–0.3)
INR BLD: 1.55 RATIO — HIGH (ref 0.65–1.3)
LYMPHOCYTES # BLD AUTO: 1.11 K/UL — LOW (ref 1.2–3.4)
LYMPHOCYTES # BLD AUTO: 24.1 % — SIGNIFICANT CHANGE UP (ref 20.5–51.1)
MAGNESIUM SERPL-MCNC: 2.1 MG/DL — SIGNIFICANT CHANGE UP (ref 1.8–2.4)
MCHC RBC-ENTMCNC: 30.5 PG — SIGNIFICANT CHANGE UP (ref 27–31)
MCHC RBC-ENTMCNC: 30.6 PG — SIGNIFICANT CHANGE UP (ref 27–31)
MCHC RBC-ENTMCNC: 30.8 G/DL — LOW (ref 32–37)
MCHC RBC-ENTMCNC: 32.2 G/DL — SIGNIFICANT CHANGE UP (ref 32–37)
MCV RBC AUTO: 95 FL — HIGH (ref 80–94)
MCV RBC AUTO: 99 FL — HIGH (ref 80–94)
MONOCYTES # BLD AUTO: 0.41 K/UL — SIGNIFICANT CHANGE UP (ref 0.1–0.6)
MONOCYTES NFR BLD AUTO: 8.9 % — SIGNIFICANT CHANGE UP (ref 1.7–9.3)
NEUTROPHILS # BLD AUTO: 2.9 K/UL — SIGNIFICANT CHANGE UP (ref 1.4–6.5)
NEUTROPHILS NFR BLD AUTO: 63.1 % — SIGNIFICANT CHANGE UP (ref 42.2–75.2)
NRBC # BLD: 2 /100 WBCS — HIGH (ref 0–0)
NRBC # BLD: 2 /100 WBCS — HIGH (ref 0–0)
PLATELET # BLD AUTO: 118 K/UL — LOW (ref 130–400)
PLATELET # BLD AUTO: 125 K/UL — LOW (ref 130–400)
POTASSIUM SERPL-MCNC: 4.6 MMOL/L — SIGNIFICANT CHANGE UP (ref 3.5–5)
POTASSIUM SERPL-MCNC: 4.6 MMOL/L — SIGNIFICANT CHANGE UP (ref 3.5–5)
POTASSIUM SERPL-SCNC: 4.6 MMOL/L — SIGNIFICANT CHANGE UP (ref 3.5–5)
POTASSIUM SERPL-SCNC: 4.6 MMOL/L — SIGNIFICANT CHANGE UP (ref 3.5–5)
PROTHROM AB SERPL-ACNC: 17.7 SEC — HIGH (ref 9.95–12.87)
RBC # BLD: 2.1 M/UL — LOW (ref 4.7–6.1)
RBC # BLD: 3.17 M/UL — LOW (ref 4.7–6.1)
RBC # FLD: 16.3 % — HIGH (ref 11.5–14.5)
RBC # FLD: 17 % — HIGH (ref 11.5–14.5)
SODIUM SERPL-SCNC: 138 MMOL/L — SIGNIFICANT CHANGE UP (ref 135–146)
SODIUM SERPL-SCNC: 140 MMOL/L — SIGNIFICANT CHANGE UP (ref 135–146)
WBC # BLD: 3.43 K/UL — LOW (ref 4.8–10.8)
WBC # BLD: 4.6 K/UL — LOW (ref 4.8–10.8)
WBC # FLD AUTO: 3.43 K/UL — LOW (ref 4.8–10.8)
WBC # FLD AUTO: 4.6 K/UL — LOW (ref 4.8–10.8)

## 2021-12-03 PROCEDURE — 88341 IMHCHEM/IMCYTCHM EA ADD ANTB: CPT | Mod: 26

## 2021-12-03 PROCEDURE — 88342 IMHCHEM/IMCYTCHM 1ST ANTB: CPT | Mod: 26

## 2021-12-03 PROCEDURE — 38222 DX BONE MARROW BX & ASPIR: CPT | Mod: LT

## 2021-12-03 PROCEDURE — 99152 MOD SED SAME PHYS/QHP 5/>YRS: CPT

## 2021-12-03 PROCEDURE — 88172 CYTP DX EVAL FNA 1ST EA SITE: CPT | Mod: 26

## 2021-12-03 PROCEDURE — 77012 CT SCAN FOR NEEDLE BIOPSY: CPT | Mod: 26

## 2021-12-03 PROCEDURE — 99233 SBSQ HOSP IP/OBS HIGH 50: CPT

## 2021-12-03 PROCEDURE — 88173 CYTOPATH EVAL FNA REPORT: CPT | Mod: 26

## 2021-12-03 PROCEDURE — 88305 TISSUE EXAM BY PATHOLOGIST: CPT | Mod: 26

## 2021-12-03 PROCEDURE — 78306 BONE IMAGING WHOLE BODY: CPT | Mod: 26

## 2021-12-03 PROCEDURE — 78803 RP LOCLZJ TUM SPECT 1 AREA: CPT | Mod: 26

## 2021-12-03 RX ORDER — DEXAMETHASONE 0.5 MG/5ML
2 ELIXIR ORAL EVERY 8 HOURS
Refills: 0 | Status: COMPLETED | OUTPATIENT
Start: 2021-12-03 | End: 2021-12-03

## 2021-12-03 RX ORDER — SODIUM BICARBONATE 1 MEQ/ML
1300 SYRINGE (ML) INTRAVENOUS EVERY 12 HOURS
Refills: 0 | Status: DISCONTINUED | OUTPATIENT
Start: 2021-12-03 | End: 2021-12-09

## 2021-12-03 RX ORDER — BICALUTAMIDE 50 MG/1
50 TABLET, FILM COATED ORAL DAILY
Refills: 0 | Status: DISCONTINUED | OUTPATIENT
Start: 2021-12-03 | End: 2021-12-09

## 2021-12-03 RX ORDER — DEXAMETHASONE 0.5 MG/5ML
2 ELIXIR ORAL EVERY 12 HOURS
Refills: 0 | Status: COMPLETED | OUTPATIENT
Start: 2021-12-04 | End: 2021-12-04

## 2021-12-03 RX ORDER — ABIRATERONE ACETATE 250 MG/1
1000 TABLET ORAL DAILY
Refills: 0 | Status: DISCONTINUED | OUTPATIENT
Start: 2021-12-03 | End: 2021-12-09

## 2021-12-03 RX ADMIN — IVABRADINE 5 MILLIGRAM(S): 7.5 TABLET, FILM COATED ORAL at 18:20

## 2021-12-03 RX ADMIN — IVABRADINE 5 MILLIGRAM(S): 7.5 TABLET, FILM COATED ORAL at 05:16

## 2021-12-03 RX ADMIN — Medication 2 MILLIGRAM(S): at 05:17

## 2021-12-03 RX ADMIN — Medication 650 MILLIGRAM(S): at 05:16

## 2021-12-03 RX ADMIN — Medication 1300 MILLIGRAM(S): at 18:20

## 2021-12-03 RX ADMIN — BICALUTAMIDE 50 MILLIGRAM(S): 50 TABLET, FILM COATED ORAL at 11:09

## 2021-12-03 RX ADMIN — GABAPENTIN 300 MILLIGRAM(S): 400 CAPSULE ORAL at 05:17

## 2021-12-03 RX ADMIN — GABAPENTIN 300 MILLIGRAM(S): 400 CAPSULE ORAL at 18:20

## 2021-12-03 RX ADMIN — Medication 2 MILLIGRAM(S): at 21:38

## 2021-12-03 NOTE — PROGRESS NOTE ADULT - SUBJECTIVE AND OBJECTIVE BOX
SUBJECTIVE:    Patient is a 71y old Male who presents with a chief complaint of Loss of appetite, weight loss, and knee pain (03 Dec 2021 14:26)    Currently admitted to medicine with the primary diagnosis of Cancer, metastatic to bone       Today is hospital day 3d. hgB 6.4, s/p 1U improved to 9.7    PAST MEDICAL & SURGICAL HISTORY  Systolic congestive heart failure, unspecified chronicity    Cerebrovascular accident (CVA) due to bilateral embolism of posterior cerebral arteries    Essential hypertension    Atrial fibrillation, unspecified type    AICD (automatic cardioverter/defibrillator) present      SOCIAL HISTORY:  Negative for smoking/alcohol/drug use.     ALLERGIES:  cephalexin (Flushing)  penicillin (Rash)    MEDICATIONS:  STANDING MEDICATIONS  bicalutamide 50 milliGRAM(s) Oral daily  dexAMETHasone     Tablet 2 milliGRAM(s) Oral every 8 hours  gabapentin 300 milliGRAM(s) Oral every 12 hours  influenza  Vaccine (HIGH DOSE) 0.7 milliLiter(s) IntraMuscular once  ivabradine 5 milliGRAM(s) Oral two times a day  metoprolol succinate ER 25 milliGRAM(s) Oral daily  sodium bicarbonate 650 milliGRAM(s) Oral every 12 hours    PRN MEDICATIONS  acetaminophen     Tablet .. 650 milliGRAM(s) Oral every 6 hours PRN  aluminum hydroxide/magnesium hydroxide/simethicone Suspension 30 milliLiter(s) Oral every 4 hours PRN  HYDROmorphone   Tablet 2 milliGRAM(s) Oral every 3 hours PRN  melatonin 3 milliGRAM(s) Oral at bedtime PRN  ondansetron Injectable 4 milliGRAM(s) IV Push every 8 hours PRN    VITALS:   T(F): 96.5  HR: 60  BP: 106/52  RR: 19  SpO2: 97%    LABS:                        9.7    4.60  )-----------( 118      ( 03 Dec 2021 11:59 )             30.1     12-03    140  |  108  |  27<H>  ----------------------------<  106<H>  4.6   |  15<L>  |  0.9    Ca    8.2<L>      03 Dec 2021 11:59  Mg     2.1     12-03    TPro  5.3<L>  /  Alb  3.0<L>  /  TBili  0.4  /  DBili  x   /  AST  49<H>  /  ALT  9   /  AlkPhos  2097<H>  12-02    PT/INR - ( 02 Dec 2021 23:29 )   PT: 17.70 sec;   INR: 1.55 ratio                       RADIOLOGY:    PHYSICAL EXAM:  GEN: No acute distress; ill-appearing  LUNGS: Clear to auscultation bilaterally   HEART: Regular  ABD: Soft, non-tender, non-distended.  EXT: No edema  NEURO: AAOX3    Intravenous access:   NG tube:   Sol Catheter:

## 2021-12-03 NOTE — PROGRESS NOTE ADULT - ASSESSMENT
72 yo M with PMH of CVA, afib on Eliquis, HTN, HFrEF (EF 20-25% 2018), chronic LLE pain, presented to the ED with worsening Left LE pain for the past 2 months, worsening fatigue, anorexia and significant weight loss. Of note, pt had previously been worked up for the leg pain including a CT scan in march 2021 in [facility], which was negative.    #prostate cancer with distant mets  -PSA 1458; alk phos 2765;  -CT chest & AP: Innumerable diffuse sclerotic osseous lesions -  including 6x5.8x9.4cm mass involving the left iliac wing and 6.6x4.5x4.6cm mass involving left inferior pubic ramus. Expansile sclerotic lesions are noted to involve the right 2nd lateral rib and right 5th anterior rib  - IR did biopsy from left iliac bone lesion today; f/u path results  - hemonc wanted bonescan rather than MRI - f/u bonescan results scheduled for 6pm today  - dexa 2mg q8 today; dexa 2mg q12 saturday; pred 5mg po sunday  -palliative care following  dilaudid 2mg iv q2 prn pain  neurontin 300mg po q12  dexameth 2mg iv q6 - will taper down todd  bowel reg - senna and miralax    # Failure to thrive  dietician eval  Ensure 3x/day  c/w LR @50CC/H    # Symptomatic anemia  likely 2/2 malignancy  hgb 7.8 in AM; baseline 10; s/p 2 units pRBC   may need lasix after future tx given poor EF  iron: 71, TIBC 220, 32% saturation  folate 8.5, wnl  retics 1.8%      # FELICITY - likely dehydration  Cr 1.1 (decreased from 1.4); baseline ~ 0.8  IVFs: LR 50/hr  Hold entresto    # hypotension  prob from anemia, CHF meds, low EF and dehydration  keep SBP >90  s/p NS bolus  consider midodrine    # h/o HTN; h/o CVA, Afib; H/o HFrEF (EF 20-25% in 2018)  hold Eliquis for bx  hold Entresto for FELICITY  hold aldactone for FELICITY  c/w metoprolol (hold if low BP), Ivabradine    # Activity: bedrest until more comfortable  # GI PPX: NA  # DVT PPX: hold eliquis  # Full code  #Dispo: spoke with sister, considering SNF; d/c might not happen over weekend

## 2021-12-03 NOTE — CHART NOTE - NSCHARTNOTEFT_GEN_A_CORE
PACU ANESTHESIA ADMISSION NOTE      Procedure:   Post op diagnosis:      ____  Intubated  TV:______       Rate: ______      FiO2: ______    __x__  Patent Airway    __x__  Full return of protective reflexes    __x__  Full recovery from anesthesia / back to baseline status    Vitals:  T(C): 35.8 (12-03-21 @ 12:31), Max: 36.4 (12-03-21 @ 02:23)  HR: 60 (12-03-21 @ 12:31) (60 - 73)  BP: 106/52 (12-03-21 @ 12:31) (87/43 - 115/55)  RR: 19 (12-03-21 @ 12:31) (18 - 19)  SpO2: 97% (12-02-21 @ 19:49) (97% - 97%)    Mental Status:  __x__ Awake   ___x__ Alert   _____ Drowsy   _____ Sedated    Nausea/Vomiting:  __x__ NO  ______Yes,   See Post - Op Orders          Pain Scale (0-10):  _____    Treatment: ____ None    __x__ See Post - Op/PCA Orders    Post - Operative Fluids:   ____ Oral   __x__ See Post - Op Orders    Plan: Discharge:   ____Home       __x___Floor     _____Critical Care    _____  Other:_________________    Comments: Patient had smooth intraoperative event, no anesthesia complication.  PACU Vital signs: HR:    77        BP:   110     /  65        RR:  15           O2 Sat:  100     %     Temp 36C

## 2021-12-03 NOTE — CHART NOTE - NSCHARTNOTEFT_GEN_A_CORE
Patient was having biopsy when writer arrived at room.  Patient's sister, aBrb Carreon, was in room.  Sister expressed concerns regarding patient's prognosis.  Support rendered.

## 2021-12-03 NOTE — PRE-ANESTHESIA EVALUATION ADULT - NSANTHPEFT_GEN_ALL_CORE
General: Pt is cachectic. He is lying in bed and appears very tired. He follows some commands but does not participate in conversation  Resp: CTAB  Cards: RRR

## 2021-12-03 NOTE — PROGRESS NOTE ADULT - ASSESSMENT
70 yo M with PMH of CVA, afib on Eliquis, HTN, HFrEF (EF 20-25% 2018), chronic LLE pain, presented to the ED with worsening Left LE pain for the past 2 months, worsening fatigue, anorexia and significant weight loss. Of note, pt had previously been worked up for the leg pain including a CT scan in march 2021 in [facility], which was negative.    # Neopl-related severe pain 2/2 diffuse bony sclerotic lesions (AP 2765) likely 2/2 prostate cancer (irregularly marginated prostate gland on CT and PSA 1400)  CT chest/abdomen/pelvis - Innumerable diffuse sclerotic osseous lesions -  including 6x5.8x9.4cm mass involving the left iliac wing and 6.6x4.5x4.6cm mass involving left inferior pubic ramus. Expansile sclerotic lesions are noted to involve the right 2nd lateral rib and right 5th anterior rib.  hold Eliquis for today (s/p bx) - can resume todd  IR s/p bx lt iliac lesion 12/3: f/u path  Uro noted  Hem Onc eval now for work up; h/o also requesting bx  Rad Onc eval for pall RT; r/o also requesting bx  Pall Care eval - spoke w/ Dr Johansen  obtain bone scan -> if lesions seen in vert, then obtain MRI spine (still need to verify if AICD is MRI compatible)  might need MRI pelvis (if rad onc needs for pall RT to that area) - f/u after bone scan  rad/onc eval can be completed as outpt  h/o beginning tx:  Casodex 50mg po daily, will add Lupron 22.5 mg IM in 1 week.  Abiraterone 1000 mg po daily and Prednisone 5 mg po daily: pharmacy trying to get zytiga: possibly over the weekend.   h/o will start Xgeva as out patient to reduce bone related complications  c/w dilaudid 2mg po q3 prn pain  c/w neurontin 300mg po q12  taper dexameth 2mg po q8 (fri); 2mg po q12 (Sat); then change to Pred 5mg po q24, as above, starting SUN  bowel reg    # Failure to thrive  dietician eval  MVI q24  Ensure 3x/day    # Symptomatic normo to macro anemia (mild pancytopenia)  no overt bleed  prob related to malignancy and diffuse bone mets  s/p PRBC transfusions -> hgb better  keep hgb > 8; may need lasix after future tx given poor EF    Iron panel: iron sat 32%; ferr: 2624 = NOT SELENA  Folate nl  B12 1540    # FELICITY - likely dehydration; metab acid w/ high gap  Cr improving (baseline ~ 0.8)  IVFs: d/c  Hold entresto - consider restart soon, if BP will allow  incr NaHCO3 1300mg po q12 til HCO3 > 22  BMP q24    # hypoTN - prob from anemia, CHF meds, low EF and dehydration  keep sys BP above 90 and preferably above 100  restart IVFs as needed  consider midodrine  holding parameters for BB    # h/o HTN; h/o CVA, Afib; H/o HFrEF (EF 20-25% in 2018)  restart Eliquis 5mg po q12 tomorrow (had bx today)  hold Entresto for FELICITY  hold aldactone for FELICITY  c/w metoprolol (hold if low BP)  c/w Ivabradine    # Activity: PT eval for eval for STR    # GI PPX: NA    # DVT PPX: hold eliquis today and restart todd    # Full code    # updated sister (Barb) at bedside     Dispo: tx pain; tx low BP; tx HCO3; f/u path of bx; f/u h/o, r/o, pall care; restart Eliquis todd; d/c IVFs; bone scan as above (poss MRIs if AICD compatible and bone scan +)  eventually, pt will need SNF for STR upon d/c - anticipate SUN for d/c MON    Prog is poor for long term.

## 2021-12-03 NOTE — PROGRESS NOTE ADULT - SUBJECTIVE AND OBJECTIVE BOX
CORDELIARANDSAMANTHA  71y  Male  ***My note supersedes ALL resident notes that I sign.  My corrections for their notes are in my note.***    I can be reached directly on [employer] [phone]. My office number is [phone]. My personal cell number is [phone].    INTERVAL EVENTS: Here for f/u of cancer. Pt doing OK. Much more comfortable. Agrees to bx.    T(F): 96.5 (12-03-21 @ 12:06), Max: 97.5 (12-03-21 @ 02:23)  HR: 60 (12-03-21 @ 12:31) (60 - 73)  BP: 106/52 (12-03-21 @ 12:31) (87/43 - 115/55)  RR: 19 (12-03-21 @ 12:31) (18 - 19)  SpO2: 97% (12-02-21 @ 19:49) (97% - 97%)    Gen: more comfortable; talking better  HEENT: PERRL, EOMI; mouth clr; nose clr  Neck: no nodes, no JVD, thyroid nl  chest: left AICD  lungs: clr  hrt: s1 s2 rrr no murmur  abd: soft, NT/ND, no HS megaly  ext: no edema, no c/c  neuro: aa ox3, cn intact, can move all 4 ext    LABS:                      9.7     (    95.0   4.60  )-----------( ---------      118      ( 03 Dec 2021 11:59 )             30.1    (    16.3     Hemoglobin: 9.7 g/dL (12-03 @ 11:59)  Hemoglobin: 6.4 g/dL (12-02 @ 23:29) - s/p 1u PRBC  Hemoglobin: 7.8 g/dL (12-02 @ 08:00)  Hemoglobin: 9.1 g/dL (12-01 @ 14:15)  Hemoglobin: 7.0 g/dL (11-30 @ 21:47)  Hemoglobin: 5.7 g/dL (11-30 @ 15:36)  Hemoglobin: 6.4 g/dL (11-30 @ 11:50)    Platelet Count - Automated: 118 K/uL (12-03-21 @ 11:59)  Platelet Count - Automated: 125 K/uL (12-02-21 @ 23:29)  Platelet Count - Automated: 115 K/uL (12-02-21 @ 08:00)  Platelet Count - Automated: 134 K/uL (12-01-21 @ 14:15)  Platelet Count - Automated: 135 K/uL (11-30-21 @ 21:47)  Platelet Count - Automated: 151 K/uL (11-30-21 @ 15:36)  Platelet Count - Automated: 154 K/uL (11-30-21 @ 11:50)    140   (   108   (   106      12-03-21 @ 11:59  ----------------------               4.6   (   15   (   27     AG = 17                        -----                        0.9  Ca  8.2   Mg  2.1    P   --     PT/INR - ( 02 Dec 2021 23:29 )   PT: 17.70 sec;   INR: 1.55 ratio      RADIOLOGY & ADDITIONAL TESTS:      MEDICATIONS:    abiraterone 1000 milliGRAM(s) Oral daily  acetaminophen     Tablet .. 650 milliGRAM(s) Oral every 6 hours PRN  aluminum hydroxide/magnesium hydroxide/simethicone Suspension 30 milliLiter(s) Oral every 4 hours PRN  bicalutamide 50 milliGRAM(s) Oral daily  dexAMETHasone     Tablet 2 milliGRAM(s) Oral every 8 hours  gabapentin 300 milliGRAM(s) Oral every 12 hours  HYDROmorphone   Tablet 2 milliGRAM(s) Oral every 3 hours PRN  influenza  Vaccine (HIGH DOSE) 0.7 milliLiter(s) IntraMuscular once  ivabradine 5 milliGRAM(s) Oral two times a day  melatonin 3 milliGRAM(s) Oral at bedtime PRN  metoprolol succinate ER 25 milliGRAM(s) Oral daily  ondansetron Injectable 4 milliGRAM(s) IV Push every 8 hours PRN  sodium bicarbonate 650 milliGRAM(s) Oral every 12 hours

## 2021-12-03 NOTE — PROGRESS NOTE ADULT - SUBJECTIVE AND OBJECTIVE BOX
INTERVENTIONAL RADIOLOGY BRIEF-OPERATIVE NOTE    Procedure:  Percutaneous, CT-directed core needle biopsy of left iliac bone lesion    Pre-Op Diagnosis:  Bone metastases    Post-Op Diagnosis:  Same    Attending:   Sumit (IR) / Jovanni (Anaesthesia)  Resident:   Dr. Duarte    Anesthesia (type):  [X] General Anesthesia  [ ] Sedation  [ ] Spinal Anesthesia  [X] Local-- 1% Lidcoaine SQ, left gluteal region, 8 cc    Total Face-to-Face Sedation Time:  25 minutes    Contrast:   None    Blood Loss:  5 cc    Condition:   [ ] Critical  [ ] Serious  [X] Fair   [ ] Good    Findings/Follow up Plan of Care:  Left iliac wing biopsied using 17/18 G, 14.6/20 cm CorVocet coaxial needle system with CT-guidance. Specimens deemed adequate by cytopathologist, Dr. Erickson.    Specimens Removed:  Five 18 G tissue cores obtained, followed by one 17 G aspirate, ~2 cc of bloody marrow.    Implants:  None    Complications:  None iimmediate    Disposition:  Back to floor; f/u pathology      Please call Interventional Radiology [phone] with any questions, concerns, or issues.

## 2021-12-03 NOTE — PROGRESS NOTE ADULT - SUBJECTIVE AND OBJECTIVE BOX
Patient is a 71y old  Male who presents with a chief complaint of Loss of appetite, weight loss, and knee pain (02 Dec 2021 17:29)      Subjective: Pt still sleepy, but feels fine. answers questions. pain mostly in left leg but better controlled. Sister at bedside.       Vital Signs Last 24 Hrs  T(C): 35.8 (03 Dec 2021 12:31), Max: 36.4 (02 Dec 2021 14:24)  T(F): 96.5 (03 Dec 2021 12:06), Max: 97.6 (02 Dec 2021 14:24)  HR: 60 (03 Dec 2021 12:31) (60 - 75)  BP: 106/52 (03 Dec 2021 12:31) (87/43 - 115/55)  RR: 19 (03 Dec 2021 12:31) (18 - 19)  SpO2: 97% (02 Dec 2021 19:49) (97% - 97%)    PHYSICAL EXAM  General: adult in NAD  HEENT: clear oropharynx, anicteric sclera, pink conjunctiva  Neck: supple  CV: normal S1/S2 with no murmur rubs or gallops  Lungs: positive air movement b/l ant lungs  Abdomen: soft non-tender non-distended  Ext: no clubbing cyanosis or edema  Skin: no rashes and no petechiae    MEDICATIONS  (STANDING):  bicalutamide 50 milliGRAM(s) Oral daily  dexAMETHasone     Tablet 2 milliGRAM(s) Oral every 8 hours  gabapentin 300 milliGRAM(s) Oral every 12 hours  influenza  Vaccine (HIGH DOSE) 0.7 milliLiter(s) IntraMuscular once  ivabradine 5 milliGRAM(s) Oral two times a day  lactated ringers. 1000 milliLiter(s) (50 mL/Hr) IV Continuous <Continuous>  metoprolol succinate ER 25 milliGRAM(s) Oral daily  sodium bicarbonate 650 milliGRAM(s) Oral every 12 hours    MEDICATIONS  (PRN):  acetaminophen     Tablet .. 650 milliGRAM(s) Oral every 6 hours PRN Temp greater or equal to 38C (100.4F), Mild Pain (1 - 3)  aluminum hydroxide/magnesium hydroxide/simethicone Suspension 30 milliLiter(s) Oral every 4 hours PRN Dyspepsia  HYDROmorphone   Tablet 2 milliGRAM(s) Oral every 3 hours PRN Severe Pain (7 - 10)  melatonin 3 milliGRAM(s) Oral at bedtime PRN Insomnia  ondansetron Injectable 4 milliGRAM(s) IV Push every 8 hours PRN Nausea and/or Vomiting      LABS:                          6.4    3.43  )-----------( 125      ( 02 Dec 2021 23:29 )             20.8         Mean Cell Volume : 99.0 fL  Mean Cell Hemoglobin : 30.5 pg  Mean Cell Hemoglobin Concentration : 30.8 g/dL  Auto Neutrophil # : x  Auto Lymphocyte # : x  Auto Monocyte # : x  Auto Eosinophil # : x  Auto Basophil # : x  Auto Neutrophil % : x  Auto Lymphocyte % : x  Auto Monocyte % : x  Auto Eosinophil % : x  Auto Basophil % : x      Serial CBC's  12-02 @ 23:29  Hct-20.8 / Hgb-6.4 / Plat-125 / RBC-2.10 / WBC-3.43  Serial CBC's  12-02 @ 08:00  Hct-24.4 / Hgb-7.8 / Plat-115 / RBC-2.53 / WBC-3.88  Serial CBC's  12-01 @ 14:15  Hct-27.6 / Hgb-9.1 / Plat-134 / RBC-2.94 / WBC-3.52  Serial CBC's  11-30 @ 21:47  Hct-22.2 / Hgb-7.0 / Plat-135 / RBC-2.30 / WBC-4.58  Serial CBC's  11-30 @ 15:36  Hct-18.8 / Hgb-5.7 / Plat-151 / RBC-1.87 / WBC-4.72  Serial CBC's  11-30 @ 11:50  Hct-20.8 / Hgb-6.4 / Plat-154 / RBC-2.07 / WBC-5.05      12-02    138  |  108  |  27<H>  ----------------------------<  114<H>  4.6   |  16<L>  |  0.9    Ca    8.1<L>      02 Dec 2021 23:29  Mg     2.0     12-02    TPro  5.3<L>  /  Alb  3.0<L>  /  TBili  0.4  /  DBili  x   /  AST  49<H>  /  ALT  9   /  AlkPhos  2097<H>  12-02      PT/INR - ( 02 Dec 2021 23:29 )   PT: 17.70 sec;   INR: 1.55 ratio        Vitamin B12, Serum: 1540 pg/mL (12-02 @ 08:00)  Reticulocyte Percent: 1.8 % (12-02 @ 08:00)  Folate, Serum: 8.5 ng/mL (11-30 @ 13:03)  Ferritin, Serum: 2624 ng/mL (11-30 @ 13:03)  Iron - Total Binding Capacity.: 220 ug/dL (11-30 @ 13:03)      RADIOLOGY & ADDITIONAL STUDIES:

## 2021-12-03 NOTE — DIETITIAN NUTRITION RISK NOTIFICATION - TREATMENT: THE FOLLOWING DIET HAS BEEN RECOMMENDED
Diet, NPO after Midnight:      NPO Start Date: 02-Dec-2021,   NPO Start Time: 23:59  Except Medications (12-02-21 @ 18:04) [Active]  Diet, DASH/TLC: -- no pork  Sodium & Cholesterol Restricted  Supplement Feeding Modality:  Oral  Ensure Enlive Cans or Servings Per Day:  1       Frequency:  Three Times a day + beneprotein three times a day    Severe protein calorie malnutrition in the setting of chronic illness secondary to cancer/metastatic to bone/ failure to thrive as evidenced by muscle mass loss in the shoulder and temple region and <75% of estimated energy needs in > 1 months . Goal patient is to meet ~75% of meals and nutrition oral supplements in the next 4 days

## 2021-12-03 NOTE — PROGRESS NOTE ADULT - ASSESSMENT
70yo Male admitted for management of symptomatic anemia, FELICITY and likely metastatic prostatic ca with diffuse osseous mets.    CT Abdomen and Pelvis w/ IV Cont (11.30.21 @ 13:49) >  -A 6.0 x 5.8 x 9.4 cm mass involving the left iliac wing with associated extraosseous soft tissue extension  -A 6.6 x 4.5 x 4.6 and meter mass involves the left inferior pubic ramus, with associated extraosseous soft tissue extension.  -Expansile sclerotic lesions are noted to involve the right 2nd lateral rib and right 5th anterior rib.  -Enlarged irregularly marginated prostate gland, possibly reflecting underlying neoplastic process.    Prostate cancer with distant metastasis most likely diagnosis / very consistent with findings.   Prostate Ca Screen, PSA Total: 1458.00  >> Patient fits into high risk category given high PSA and distant metastasis.     Prostate Ca Screen, PSA Total: 1458.00    RECOMMENDATIONS:  - CT guided biopsy to confirm diagnosis. Given patient is symptomatic, would recommend inpatient biopsy so that he can start treatment soon. Planned for biopsy today -will follow  - Get Bone scan. 1st, if any spinal lesions appreciated on bone scan, then would get MRI C/T/L spine for spinal mets and possible rad/onc evaluation.  - Started Casodex 50mg daily on 12/3, and will add Lupron 22.5 mg IM in 1 week.  - Abiraterone 1000 mg po daily and Prednisone 5 mg po daily : Spoke with pharmacy, trying to get zytiga: possibly over the weekend.   - Will star Xgeva as out patient to reduce bone related complication.   - Pain management per palliative care  - Anemia. Check Fe, TIBC, Ferritin, B12, Folate, Retic count, LDH, Haptoglobin.

## 2021-12-03 NOTE — DIETITIAN NUTRITION RISK NOTIFICATION - ADDITIONAL COMMENTS/DIETITIAN RECOMMENDATIONS
Recommendations	    1) Continue DASH/TLC - add no pork modifier  2) Continue ensure enlive TID ( 1050 calories, and 60 g protein) + add 3 packets of bene protein per day ( explained to sister to mix in with food such as soup/oat meal to provide extra protein)   3) Order calorie count would like to quantify exactly how much patient is consuming from his meals and supplements   4) Obtain daily weights   5) If sister isn't there patient needs a 1:1 feed , he will not consume meals on his own

## 2021-12-03 NOTE — PRE-ANESTHESIA EVALUATION ADULT - NSANTHPMHFT_GEN_ALL_CORE
Last TTE was performed in 2018, showing dilated LV with LVEF less than 20% and global hypokinesis of left ventricle. Additional findings include Mild-mod TR, mild-mod AR, mod PVR

## 2021-12-03 NOTE — CHART NOTE - NSCHARTNOTEFT_GEN_A_CORE
Palliative care chart note - patient not seen    71yMale with PMH including CVA, afib on Eliquis, HTN, HFrEF ( EF 20-25% 2018), chronic LLE pain, being evaluated for pain management in the setting of newly diagnosed metastatic prostate cancer to bone. Patient in severe pain on exam but not able to give comprehensive history. Palliative care consulted for pain management.    Patient was off floor at time of visit for biopsy. Per chart review, no PRN use in 24 hours.  Plan is for likely discharge after biopsy and other work up.    Recommendations  -Recommend no changes to opioids at this time  -Recommend down-titrating dexamethasone to 2mg BID and only continuing that dose for 5 days before discontinuing all steroids    [id number] PRN  Will follow

## 2021-12-04 LAB
ANION GAP SERPL CALC-SCNC: 17 MMOL/L — HIGH (ref 7–14)
BASOPHILS # BLD AUTO: 0.01 K/UL — SIGNIFICANT CHANGE UP (ref 0–0.2)
BASOPHILS NFR BLD AUTO: 0.3 % — SIGNIFICANT CHANGE UP (ref 0–1)
BUN SERPL-MCNC: 22 MG/DL — HIGH (ref 10–20)
CALCIUM SERPL-MCNC: 8.2 MG/DL — LOW (ref 8.5–10.1)
CHLORIDE SERPL-SCNC: 108 MMOL/L — SIGNIFICANT CHANGE UP (ref 98–110)
CO2 SERPL-SCNC: 15 MMOL/L — LOW (ref 17–32)
CREAT SERPL-MCNC: 0.9 MG/DL — SIGNIFICANT CHANGE UP (ref 0.7–1.5)
EOSINOPHIL # BLD AUTO: 0.02 K/UL — SIGNIFICANT CHANGE UP (ref 0–0.7)
EOSINOPHIL NFR BLD AUTO: 0.5 % — SIGNIFICANT CHANGE UP (ref 0–8)
GLUCOSE SERPL-MCNC: 83 MG/DL — SIGNIFICANT CHANGE UP (ref 70–99)
HCT VFR BLD CALC: 29.4 % — LOW (ref 42–52)
HGB BLD-MCNC: 9.6 G/DL — LOW (ref 14–18)
IMM GRANULOCYTES NFR BLD AUTO: 5.1 % — HIGH (ref 0.1–0.3)
LYMPHOCYTES # BLD AUTO: 0.95 K/UL — LOW (ref 1.2–3.4)
LYMPHOCYTES # BLD AUTO: 25.7 % — SIGNIFICANT CHANGE UP (ref 20.5–51.1)
MAGNESIUM SERPL-MCNC: 2.1 MG/DL — SIGNIFICANT CHANGE UP (ref 1.8–2.4)
MCHC RBC-ENTMCNC: 31.3 PG — HIGH (ref 27–31)
MCHC RBC-ENTMCNC: 32.7 G/DL — SIGNIFICANT CHANGE UP (ref 32–37)
MCV RBC AUTO: 95.8 FL — HIGH (ref 80–94)
MONOCYTES # BLD AUTO: 0.25 K/UL — SIGNIFICANT CHANGE UP (ref 0.1–0.6)
MONOCYTES NFR BLD AUTO: 6.8 % — SIGNIFICANT CHANGE UP (ref 1.7–9.3)
NEUTROPHILS # BLD AUTO: 2.27 K/UL — SIGNIFICANT CHANGE UP (ref 1.4–6.5)
NEUTROPHILS NFR BLD AUTO: 61.6 % — SIGNIFICANT CHANGE UP (ref 42.2–75.2)
NRBC # BLD: 1 /100 WBCS — HIGH (ref 0–0)
PLATELET # BLD AUTO: 101 K/UL — LOW (ref 130–400)
POTASSIUM SERPL-MCNC: 4.7 MMOL/L — SIGNIFICANT CHANGE UP (ref 3.5–5)
POTASSIUM SERPL-SCNC: 4.7 MMOL/L — SIGNIFICANT CHANGE UP (ref 3.5–5)
RBC # BLD: 3.07 M/UL — LOW (ref 4.7–6.1)
RBC # FLD: 16.4 % — HIGH (ref 11.5–14.5)
SODIUM SERPL-SCNC: 140 MMOL/L — SIGNIFICANT CHANGE UP (ref 135–146)
WBC # BLD: 3.69 K/UL — LOW (ref 4.8–10.8)
WBC # FLD AUTO: 3.69 K/UL — LOW (ref 4.8–10.8)

## 2021-12-04 PROCEDURE — 99232 SBSQ HOSP IP/OBS MODERATE 35: CPT

## 2021-12-04 PROCEDURE — 99233 SBSQ HOSP IP/OBS HIGH 50: CPT

## 2021-12-04 RX ORDER — APIXABAN 2.5 MG/1
5 TABLET, FILM COATED ORAL EVERY 12 HOURS
Refills: 0 | Status: DISCONTINUED | OUTPATIENT
Start: 2021-12-04 | End: 2021-12-09

## 2021-12-04 RX ADMIN — Medication 2 MILLIGRAM(S): at 18:47

## 2021-12-04 RX ADMIN — HYDROMORPHONE HYDROCHLORIDE 2 MILLIGRAM(S): 2 INJECTION INTRAMUSCULAR; INTRAVENOUS; SUBCUTANEOUS at 10:13

## 2021-12-04 RX ADMIN — Medication 1300 MILLIGRAM(S): at 06:22

## 2021-12-04 RX ADMIN — HYDROMORPHONE HYDROCHLORIDE 2 MILLIGRAM(S): 2 INJECTION INTRAMUSCULAR; INTRAVENOUS; SUBCUTANEOUS at 18:40

## 2021-12-04 RX ADMIN — GABAPENTIN 300 MILLIGRAM(S): 400 CAPSULE ORAL at 06:22

## 2021-12-04 RX ADMIN — GABAPENTIN 300 MILLIGRAM(S): 400 CAPSULE ORAL at 18:40

## 2021-12-04 RX ADMIN — APIXABAN 5 MILLIGRAM(S): 2.5 TABLET, FILM COATED ORAL at 18:40

## 2021-12-04 RX ADMIN — Medication 2 MILLIGRAM(S): at 06:21

## 2021-12-04 RX ADMIN — Medication 25 MILLIGRAM(S): at 06:22

## 2021-12-04 RX ADMIN — Medication 1300 MILLIGRAM(S): at 18:40

## 2021-12-04 NOTE — PROGRESS NOTE ADULT - SUBJECTIVE AND OBJECTIVE BOX
Patient is a 71y old  Male who presents with a chief complaint of Loss of appetite, weight loss, and knee pain (03 Dec 2021 17:42)      Subjective: He feels well today but does endorse some pain in his "side" that does improve with pain medications.       Vital Signs Last 24 Hrs  T(C): 35.9 (04 Dec 2021 05:51), Max: 36.2 (03 Dec 2021 21:29)  T(F): 96.7 (04 Dec 2021 05:51), Max: 97.1 (03 Dec 2021 21:29)  HR: 69 (04 Dec 2021 05:51) (60 - 74)  BP: 123/62 (04 Dec 2021 05:51) (106/52 - 123/62)  BP(mean): --  RR: 18 (04 Dec 2021 05:51) (18 - 19)  SpO2: 99% (03 Dec 2021 19:40) (99% - 99%)    PHYSICAL EXAM  General: adult in NAD  HEENT: PERRL  CV: normal S1/S2 with no murmur rubs or gallops  Lungs: CTABL  Abdomen: soft non-tender non-distended  Ext: no edema  Neuro: alert and oriented X 4, no focal deficits    MEDICATIONS  (STANDING):  abiraterone 1000 milliGRAM(s) Oral daily  apixaban 5 milliGRAM(s) Oral every 12 hours  bicalutamide 50 milliGRAM(s) Oral daily  dexAMETHasone     Tablet 2 milliGRAM(s) Oral every 12 hours  gabapentin 300 milliGRAM(s) Oral every 12 hours  influenza  Vaccine (HIGH DOSE) 0.7 milliLiter(s) IntraMuscular once  ivabradine 5 milliGRAM(s) Oral two times a day  metoprolol succinate ER 25 milliGRAM(s) Oral daily  sodium bicarbonate 1300 milliGRAM(s) Oral every 12 hours    MEDICATIONS  (PRN):  acetaminophen     Tablet .. 650 milliGRAM(s) Oral every 6 hours PRN Temp greater or equal to 38C (100.4F), Mild Pain (1 - 3)  aluminum hydroxide/magnesium hydroxide/simethicone Suspension 30 milliLiter(s) Oral every 4 hours PRN Dyspepsia  HYDROmorphone   Tablet 2 milliGRAM(s) Oral every 3 hours PRN Severe Pain (7 - 10)  melatonin 3 milliGRAM(s) Oral at bedtime PRN Insomnia  ondansetron Injectable 4 milliGRAM(s) IV Push every 8 hours PRN Nausea and/or Vomiting      LABS:                          9.6    3.69  )-----------( 101      ( 04 Dec 2021 04:30 )             29.4         Mean Cell Volume : 95.8 fL  Mean Cell Hemoglobin : 31.3 pg  Mean Cell Hemoglobin Concentration : 32.7 g/dL  Auto Neutrophil # : 2.27 K/uL  Auto Lymphocyte # : 0.95 K/uL  Auto Monocyte # : 0.25 K/uL  Auto Eosinophil # : 0.02 K/uL  Auto Basophil # : 0.01 K/uL  Auto Neutrophil % : 61.6 %  Auto Lymphocyte % : 25.7 %  Auto Monocyte % : 6.8 %  Auto Eosinophil % : 0.5 %  Auto Basophil % : 0.3 %      Serial CBC's  12-04 @ 04:30  Hct-29.4 / Hgb-9.6 / Plat-101 / RBC-3.07 / WBC-3.69  Serial CBC's  12-03 @ 11:59  Hct-30.1 / Hgb-9.7 / Plat-118 / RBC-3.17 / WBC-4.60  Serial CBC's  12-02 @ 23:29  Hct-20.8 / Hgb-6.4 / Plat-125 / RBC-2.10 / WBC-3.43  Serial CBC's  12-02 @ 08:00  Hct-24.4 / Hgb-7.8 / Plat-115 / RBC-2.53 / WBC-3.88  Serial CBC's  12-01 @ 14:15  Hct-27.6 / Hgb-9.1 / Plat-134 / RBC-2.94 / WBC-3.52  Serial CBC's  11-30 @ 21:47  Hct-22.2 / Hgb-7.0 / Plat-135 / RBC-2.30 / WBC-4.58  Serial CBC's  11-30 @ 15:36  Hct-18.8 / Hgb-5.7 / Plat-151 / RBC-1.87 / WBC-4.72  Serial CBC's  11-30 @ 11:50  Hct-20.8 / Hgb-6.4 / Plat-154 / RBC-2.07 / WBC-5.05      12-04    140  |  108  |  22<H>  ----------------------------<  83  4.7   |  15<L>  |  0.9    Ca    8.2<L>      04 Dec 2021 04:30  Mg     2.1     12-04        PT/INR - ( 02 Dec 2021 23:29 )   PT: 17.70 sec;   INR: 1.55 ratio             Vitamin B12, Serum: 1540 pg/mL (12-02 @ 08:00)  Reticulocyte Percent: 1.8 % (12-02 @ 08:00)  Folate, Serum: 8.5 ng/mL (11-30 @ 13:03)  Ferritin, Serum: 2624 ng/mL (11-30 @ 13:03)  Iron - Total Binding Capacity.: 220 ug/dL (11-30 @ 13:03)      BLOOD SMEAR INTERPRETATION:       RADIOLOGY & ADDITIONAL STUDIES:

## 2021-12-04 NOTE — PROGRESS NOTE ADULT - ASSESSMENT
72 yo M with PMH of CVA, afib on Eliquis, HTN, HFrEF (EF 20-25% 2018), chronic LLE pain, presented to the ED with worsening Left LE pain for the past 2 months, worsening fatigue, anorexia and significant weight loss. Of note, pt had previously been worked up for the leg pain including a CT scan in march 2021 in [facility], which was negative.    #prostate cancer with distant mets  - PSA 1458; alk phos 2765;  - CT chest & AP: Innumerable diffuse sclerotic osseous lesions -  including 6x5.8x9.4cm mass involving the left iliac wing and 6.6x4.5x4.6cm mass involving left inferior pubic ramus.   - s/p IR guided biopsy from left iliac bone lesion today; f/u path results  - s/p bone scan, f/u result, If any spinal lesions appreciated on bone scan, then would get MRI C/T/L spine for spinal mets and possible rad/onc evaluation.  - taper steriod: dexa 2mg q8 today; dexa 2mg q12 saturday; pred 5mg po sunday  -palliative care following: dilaudid 2mg iv q2 prn pain, neurontin 300mg po q12, dexameth 2mg iv q6 - will taper down todd  - bowel reg - senna and miralax    # Failure to thrive- dietician eval, Ensure 3x/day, /w LR @50CC/H    # Symptomatic anemia- likely 2/2 malignancy  - s/p 2 units pRBC   - iron: 71, TIBC 220, 32% saturation, folate 8.5, wnl, retics 1.8%    # FELICITY - likely dehydration  Cr 1.1 (decreased from 1.4); baseline ~ 0.8  IVFs: LR 50/hr  Hold entresto    # h/o HTN; h/o CVA, Afib; H/o HFrEF (EF 20-25% in 2018)  - restart eliquis today (was on hold for biopsy)  - hold Entresto for FELICITY  - hold aldactone for FELICITY  - c/w metoprolol (hold if low BP), Ivabradine    Activity: bedrest until more comfortable  GI PPX: NA  DVT PPX: Eliquis  Full code  Dispo: SNF placement, possibly Sunday

## 2021-12-04 NOTE — PROGRESS NOTE ADULT - SUBJECTIVE AND OBJECTIVE BOX
SUBJECTIVE:    Patient is a 71y old Male who presents with a chief complaint of Loss of appetite, weight loss, and knee pain (04 Dec 2021 11:40)  Currently admitted to medicine with the primary diagnosis of Cancer, metastatic to bone  Today is hospital day 4d. This morning he is resting comfortably in bed and reports no new issues or overnight events.   chance is controlled.     PAST MEDICAL & SURGICAL HISTORY  Systolic congestive heart failure, unspecified chronicity    Cerebrovascular accident (CVA) due to bilateral embolism of posterior cerebral arteries    Essential hypertension    Atrial fibrillation, unspecified type    AICD (automatic cardioverter/defibrillator) present      SOCIAL HISTORY:  Negative for smoking/alcohol/drug use.     ALLERGIES:  cephalexin (Flushing)  penicillin (Rash)    MEDICATIONS:  STANDING MEDICATIONS  abiraterone 1000 milliGRAM(s) Oral daily  apixaban 5 milliGRAM(s) Oral every 12 hours  bicalutamide 50 milliGRAM(s) Oral daily  dexAMETHasone     Tablet 2 milliGRAM(s) Oral every 12 hours  gabapentin 300 milliGRAM(s) Oral every 12 hours  influenza  Vaccine (HIGH DOSE) 0.7 milliLiter(s) IntraMuscular once  ivabradine 5 milliGRAM(s) Oral two times a day  metoprolol succinate ER 25 milliGRAM(s) Oral daily  sodium bicarbonate 1300 milliGRAM(s) Oral every 12 hours    PRN MEDICATIONS  acetaminophen     Tablet .. 650 milliGRAM(s) Oral every 6 hours PRN  aluminum hydroxide/magnesium hydroxide/simethicone Suspension 30 milliLiter(s) Oral every 4 hours PRN  HYDROmorphone   Tablet 2 milliGRAM(s) Oral every 3 hours PRN  melatonin 3 milliGRAM(s) Oral at bedtime PRN  ondansetron Injectable 4 milliGRAM(s) IV Push every 8 hours PRN    VITALS:   T(F): 97.2  HR: 76  BP: 111/65  RR: 19  SpO2: 99%    LABS:                        9.6    3.69  )-----------( 101      ( 04 Dec 2021 04:30 )             29.4     12-04    140  |  108  |  22<H>  ----------------------------<  83  4.7   |  15<L>  |  0.9    Ca    8.2<L>      04 Dec 2021 04:30  Mg     2.1     12-04      PT/INR - ( 02 Dec 2021 23:29 )   PT: 17.70 sec;   INR: 1.55 ratio         RADIOLOGY:    < from: CT Abdomen and Pelvis w/ IV Cont (11.30.21 @ 13:49) >  IMPRESSION:    1. Innumerable diffuse sclerotic osseous lesions, as detailed above, compatible with osseous metastases.    2. Enlarged irregularly marginated prostate gland, possibly reflecting underlying neoplastic process.    < end of copied text >      PHYSICAL EXAM:  GEN: No acute distress, cachectic   LUNGS: Clear to auscultation bilaterally   HEART: S1/S2 present. RRR.   ABD: Soft, non-tender, non-distended. Bowel sounds present  EXT: no le edema  NEURO: AAOX3

## 2021-12-04 NOTE — PROGRESS NOTE ADULT - ASSESSMENT
70yo Male admitted for management of symptomatic anemia, FELICITY and likely metastatic prostatic ca with diffuse osseous mets.    CT Abdomen and Pelvis w/ IV Cont (11.30.21 @ 13:49) >  -A 6.0 x 5.8 x 9.4 cm mass involving the left iliac wing with associated extraosseous soft tissue extension  -A 6.6 x 4.5 x 4.6 and meter mass involves the left inferior pubic ramus, with associated extraosseous soft tissue extension.  -Expansile sclerotic lesions are noted to involve the right 2nd lateral rib and right 5th anterior rib.  -Enlarged irregularly marginated prostate gland, possibly reflecting underlying neoplastic process.    Prostate cancer with distant metastasis most likely diagnosis / very consistent with findings.   Prostate Ca Screen, PSA Total: 1458.00  >> Patient fits into high risk category given high PSA and distant metastasis.     Prostate Ca Screen, PSA Total: 1458.00    RECOMMENDATIONS:  - s/p IR Biopsy of left iliac bone lesion on 12/3/21. Results are pending   - Bone scan results pending; If any spinal lesions appreciated on bone scan, then would get MRI C/T/L spine for spinal mets and possible rad/onc evaluation.  - Cont Casodex 50mg daily (started on 12/3), and will add Lupron 22.5 mg IM in 1 week.  - Abiraterone 1000 mg po daily and Prednisone 5 mg po daily : Spoke with pharmacy, trying to get zytiga: possibly over the weekend. Please start once obtained   - Xgeva as out patient to reduce bone related complication.   - Pain management per palliative care  - Anemia of Chronic Disease. Ferritin elevated to 2624; B12 elevated; Folate WNL; ; Haptoglobin pending. Retic 1.8%

## 2021-12-05 LAB
ANION GAP SERPL CALC-SCNC: 14 MMOL/L — SIGNIFICANT CHANGE UP (ref 7–14)
BASOPHILS # BLD AUTO: 0.01 K/UL — SIGNIFICANT CHANGE UP (ref 0–0.2)
BASOPHILS NFR BLD AUTO: 0.2 % — SIGNIFICANT CHANGE UP (ref 0–1)
BUN SERPL-MCNC: 22 MG/DL — HIGH (ref 10–20)
CALCIUM SERPL-MCNC: 8 MG/DL — LOW (ref 8.5–10.1)
CHLORIDE SERPL-SCNC: 106 MMOL/L — SIGNIFICANT CHANGE UP (ref 98–110)
CO2 SERPL-SCNC: 20 MMOL/L — SIGNIFICANT CHANGE UP (ref 17–32)
CREAT SERPL-MCNC: 0.8 MG/DL — SIGNIFICANT CHANGE UP (ref 0.7–1.5)
CULTURE RESULTS: SIGNIFICANT CHANGE UP
CULTURE RESULTS: SIGNIFICANT CHANGE UP
EOSINOPHIL # BLD AUTO: 0.05 K/UL — SIGNIFICANT CHANGE UP (ref 0–0.7)
EOSINOPHIL NFR BLD AUTO: 1.2 % — SIGNIFICANT CHANGE UP (ref 0–8)
GLUCOSE SERPL-MCNC: 110 MG/DL — HIGH (ref 70–99)
HCT VFR BLD CALC: 29.5 % — LOW (ref 42–52)
HGB BLD-MCNC: 9.7 G/DL — LOW (ref 14–18)
IMM GRANULOCYTES NFR BLD AUTO: 4.4 % — HIGH (ref 0.1–0.3)
LYMPHOCYTES # BLD AUTO: 1.08 K/UL — LOW (ref 1.2–3.4)
LYMPHOCYTES # BLD AUTO: 26.4 % — SIGNIFICANT CHANGE UP (ref 20.5–51.1)
MAGNESIUM SERPL-MCNC: 2.2 MG/DL — SIGNIFICANT CHANGE UP (ref 1.8–2.4)
MCHC RBC-ENTMCNC: 31.6 PG — HIGH (ref 27–31)
MCHC RBC-ENTMCNC: 32.9 G/DL — SIGNIFICANT CHANGE UP (ref 32–37)
MCV RBC AUTO: 96.1 FL — HIGH (ref 80–94)
MONOCYTES # BLD AUTO: 0.29 K/UL — SIGNIFICANT CHANGE UP (ref 0.1–0.6)
MONOCYTES NFR BLD AUTO: 7.1 % — SIGNIFICANT CHANGE UP (ref 1.7–9.3)
NEUTROPHILS # BLD AUTO: 2.48 K/UL — SIGNIFICANT CHANGE UP (ref 1.4–6.5)
NEUTROPHILS NFR BLD AUTO: 60.7 % — SIGNIFICANT CHANGE UP (ref 42.2–75.2)
NRBC # BLD: 2 /100 WBCS — HIGH (ref 0–0)
PLATELET # BLD AUTO: 131 K/UL — SIGNIFICANT CHANGE UP (ref 130–400)
POTASSIUM SERPL-MCNC: 4.6 MMOL/L — SIGNIFICANT CHANGE UP (ref 3.5–5)
POTASSIUM SERPL-SCNC: 4.6 MMOL/L — SIGNIFICANT CHANGE UP (ref 3.5–5)
RBC # BLD: 3.07 M/UL — LOW (ref 4.7–6.1)
RBC # FLD: 15.9 % — HIGH (ref 11.5–14.5)
SODIUM SERPL-SCNC: 140 MMOL/L — SIGNIFICANT CHANGE UP (ref 135–146)
SPECIMEN SOURCE: SIGNIFICANT CHANGE UP
SPECIMEN SOURCE: SIGNIFICANT CHANGE UP
WBC # BLD: 4.09 K/UL — LOW (ref 4.8–10.8)
WBC # FLD AUTO: 4.09 K/UL — LOW (ref 4.8–10.8)

## 2021-12-05 PROCEDURE — 99233 SBSQ HOSP IP/OBS HIGH 50: CPT

## 2021-12-05 RX ADMIN — APIXABAN 5 MILLIGRAM(S): 2.5 TABLET, FILM COATED ORAL at 05:20

## 2021-12-05 RX ADMIN — Medication 25 MILLIGRAM(S): at 05:22

## 2021-12-05 RX ADMIN — IVABRADINE 5 MILLIGRAM(S): 7.5 TABLET, FILM COATED ORAL at 06:43

## 2021-12-05 RX ADMIN — IVABRADINE 5 MILLIGRAM(S): 7.5 TABLET, FILM COATED ORAL at 17:42

## 2021-12-05 RX ADMIN — GABAPENTIN 300 MILLIGRAM(S): 400 CAPSULE ORAL at 17:22

## 2021-12-05 RX ADMIN — HYDROMORPHONE HYDROCHLORIDE 2 MILLIGRAM(S): 2 INJECTION INTRAMUSCULAR; INTRAVENOUS; SUBCUTANEOUS at 19:40

## 2021-12-05 RX ADMIN — Medication 650 MILLIGRAM(S): at 17:21

## 2021-12-05 RX ADMIN — Medication 1300 MILLIGRAM(S): at 05:20

## 2021-12-05 RX ADMIN — Medication 5 MILLIGRAM(S): at 00:09

## 2021-12-05 RX ADMIN — GABAPENTIN 300 MILLIGRAM(S): 400 CAPSULE ORAL at 05:19

## 2021-12-05 RX ADMIN — Medication 1300 MILLIGRAM(S): at 17:22

## 2021-12-05 RX ADMIN — APIXABAN 5 MILLIGRAM(S): 2.5 TABLET, FILM COATED ORAL at 17:22

## 2021-12-05 RX ADMIN — BICALUTAMIDE 50 MILLIGRAM(S): 50 TABLET, FILM COATED ORAL at 15:39

## 2021-12-05 NOTE — PROGRESS NOTE ADULT - SUBJECTIVE AND OBJECTIVE BOX
SAMANTHA CHARLES 71y Male  MRN#: [id number]   CODE STATUS: full code     Hospital Day: 5d    Pt is currently admitted with the primary diagnosis of weight loss , met cancer     SUBJECTIVE    No acute events overnight, denies fever cillls, nvd, abdominal pain                                             ----------------------------------------------------------  OBJECTIVE  PAST MEDICAL & SURGICAL HISTORY  Systolic congestive heart failure, unspecified chronicity    Cerebrovascular accident (CVA) due to bilateral embolism of posterior cerebral arteries    Essential hypertension    Atrial fibrillation, unspecified type    AICD (automatic cardioverter/defibrillator) present                                              -----------------------------------------------------------  ALLERGIES:  cephalexin (Flushing)  penicillin (Rash)                                            ------------------------------------------------------------    HOME MEDICATIONS  Home Medications:  Corlanor 5 mg oral tablet: 1 tab(s) orally 2 times a day (with meals) (30 Nov 2021 17:21)  Metoprolol Succinate ER 25 mg oral tablet, extended release: 1 tab(s) orally once a day (30 Nov 2021 17:20)  spironolactone 25 mg oral tablet: 1 tab(s) orally once a day (14 May 2021 11:51)                           MEDICATIONS:  STANDING MEDICATIONS  abiraterone 1000 milliGRAM(s) Oral daily  apixaban 5 milliGRAM(s) Oral every 12 hours  bicalutamide 50 milliGRAM(s) Oral daily  gabapentin 300 milliGRAM(s) Oral every 12 hours  influenza  Vaccine (HIGH DOSE) 0.7 milliLiter(s) IntraMuscular once  ivabradine 5 milliGRAM(s) Oral two times a day  metoprolol succinate ER 25 milliGRAM(s) Oral daily  sodium bicarbonate 1300 milliGRAM(s) Oral every 12 hours    PRN MEDICATIONS  acetaminophen     Tablet .. 650 milliGRAM(s) Oral every 6 hours PRN  aluminum hydroxide/magnesium hydroxide/simethicone Suspension 30 milliLiter(s) Oral every 4 hours PRN  HYDROmorphone   Tablet 2 milliGRAM(s) Oral every 3 hours PRN  melatonin 3 milliGRAM(s) Oral at bedtime PRN  ondansetron Injectable 4 milliGRAM(s) IV Push every 8 hours PRN                                            ------------------------------------------------------------  VITAL SIGNS: Last 24 Hours  T(C): 36.6 (04 Dec 2021 21:50), Max: 36.6 (04 Dec 2021 21:50)  T(F): 97.8 (04 Dec 2021 21:50), Max: 97.8 (04 Dec 2021 21:50)  HR: 68 (04 Dec 2021 21:50) (68 - 76)  BP: 124/63 (04 Dec 2021 21:50) (111/65 - 124/63)  BP(mean): --  RR: 18 (04 Dec 2021 21:50) (18 - 19)  SpO2: --                                             --------------------------------------------------------------  LABS:                        9.6    3.69  )-----------( 101      ( 04 Dec 2021 04:30 )             29.4     12-04    140  |  108  |  22<H>  ----------------------------<  83  4.7   |  15<L>  |  0.9    Ca    8.2<L>      04 Dec 2021 04:30  Mg     2.1     12-04                                                                -------------------------------------------------------------  RADIOLOGY:    EXAM:  NM BONE SPECT SA SD        EXAM:  NM BONE IMG WHOLE BODY            PROCEDURE DATE:  12/03/2021            INTERPRETATION:  Procedure: Whole body and regional bone images and bone SPECT    Reason: Metastatic prostate cancer    CT chest, abdomen and pelvis November 30, 2021 demonstrates innumerable diffuse sclerotic osseous lesions compatible with osseous metastasis.    Approximately 3 hours after the intravenous administration of  20 millicuries 99m technetium MDP, anterior and posteriorwhole body format bone images were obtained from head to feet.  In addition regional images were also obtained and SPECT images were obtained over thoracolumbar spine and pelvis and reconstructions and three-dimensional reconstructions were generated.    There is diffusely increased uptake throughout the calvarium, thoracic and lumbar spine, pelvis, bilateral femurs, bilateral humeri, bilateral scapula and innumerable bilateral ribs. No visualization of bilateral kidneys.    Findings consistent with diffuse metastatic bone disease, particularly when compared with referenced CT.    Impression:    Abnormal bone scan. Diffusely increased bone agent uptake throughout the skeleton consistent with diffuse metastatic bone disease. Sites include calvarium, thoracic and lumbar spine, bilateral iliac bones, bilateral femurs, bilateral humeri, bilateral scapula and innumerable bilateral ribs.    Nonvisualization of the kidneys probably explain lobe right intense uptake by bone metastasis ("absent kidney sign" - superscan).    --- End of Report ---              ELLIOT LANDAU MD; Attending Radiologist  This document has been electronically signed. Dec  4 2021  8:52PM                                              --------------------------------------------------------------    PHYSICAL EXAM:  GENERAL: cachectic, in NAD   HEENT: NCAT  LUNGS: air entry bilaterally   HEART: RRR, +S1,S2, RRR  ABDOMEN: SNTTP, ND x 4 q's  EXT: Warm, well perfused x 4  NEURO: AxOx3, No FND's noted  SKIN: No new breakdown or rashes noted

## 2021-12-05 NOTE — PROGRESS NOTE ADULT - ASSESSMENT
70 yo M with PMH of CVA, afib on Eliquis, HTN, HFrEF (EF 20-25% 2018), chronic LLE pain, presented to the ED with worsening Left LE pain for the past 2 months, worsening fatigue, anorexia and significant weight loss. Of note, pt had previously been worked up for the leg pain including a CT scan in march 2021 in [facility], which was negative.    #prostate cancer with distant mets  - PSA 1458; alk phos 2765;  - CT chest & AP: Innumerable diffuse sclerotic osseous lesions -  including 6x5.8x9.4cm mass involving the left iliac wing and 6.6x4.5x4.6cm mass involving left inferior pubic ramus.   - s/p IR guided biopsy from left iliac bone lesion today; f/u path results  - s/p bone scan, f/u result, multiple bony lesions incl calvarium, thoracic, lumbar, iliac bilat, femur bilat, humeri bilat, scapula bilat, ribs   - taper steriod pred 5mg po sunday  -palliative care following: dilaudid 2mg iv q2 prn pain, neurontin 300mg po q12, dexameth 2mg iv q6 - will taper down todd  - bowel reg - senna and miralax    # Failure to thrive  - dietician eval, Ensure 3x/day, /w LR @50CC/H    # Symptomatic anemia- likely 2/2 malignancy  - s/p 2 units pRBC   - iron: 71, TIBC 220, 32% saturation, folate 8.5, wnl, retics 1.8%  - hgb stable fu cbc     # FELICITY - likely dehydration - resolved   IVFs: LR 50/hr  Hold entresto    # h/o HTN; h/o CVA, Afib; H/o HFrEF (EF 20-25% in 2018)  - continue  eliquis  - hold Entresto for FELICITY  - hold aldactone for FELICITY  - c/w metoprolol (hold if low BP), Ivabradine    Activity: bedrest until more comfortable  GI PPX: NA  DVT PPX: Eliquis  Full code  Dispo: SNF placement, possibly Sunday

## 2021-12-06 DIAGNOSIS — R53.83 OTHER FATIGUE: ICD-10-CM

## 2021-12-06 LAB
ANION GAP SERPL CALC-SCNC: 13 MMOL/L — SIGNIFICANT CHANGE UP (ref 7–14)
BASOPHILS # BLD AUTO: 0.01 K/UL — SIGNIFICANT CHANGE UP (ref 0–0.2)
BASOPHILS NFR BLD AUTO: 0.2 % — SIGNIFICANT CHANGE UP (ref 0–1)
BUN SERPL-MCNC: 19 MG/DL — SIGNIFICANT CHANGE UP (ref 10–20)
CALCIUM SERPL-MCNC: 7.8 MG/DL — LOW (ref 8.5–10.1)
CHLORIDE SERPL-SCNC: 105 MMOL/L — SIGNIFICANT CHANGE UP (ref 98–110)
CO2 SERPL-SCNC: 18 MMOL/L — SIGNIFICANT CHANGE UP (ref 17–32)
CREAT SERPL-MCNC: 0.7 MG/DL — SIGNIFICANT CHANGE UP (ref 0.7–1.5)
EOSINOPHIL # BLD AUTO: 0.11 K/UL — SIGNIFICANT CHANGE UP (ref 0–0.7)
EOSINOPHIL NFR BLD AUTO: 2.6 % — SIGNIFICANT CHANGE UP (ref 0–8)
GLUCOSE SERPL-MCNC: 88 MG/DL — SIGNIFICANT CHANGE UP (ref 70–99)
HCT VFR BLD CALC: 27.5 % — LOW (ref 42–52)
HGB BLD-MCNC: 8.8 G/DL — LOW (ref 14–18)
IMM GRANULOCYTES NFR BLD AUTO: 5.5 % — HIGH (ref 0.1–0.3)
LYMPHOCYTES # BLD AUTO: 1.21 K/UL — SIGNIFICANT CHANGE UP (ref 1.2–3.4)
LYMPHOCYTES # BLD AUTO: 28.7 % — SIGNIFICANT CHANGE UP (ref 20.5–51.1)
MAGNESIUM SERPL-MCNC: 2.1 MG/DL — SIGNIFICANT CHANGE UP (ref 1.8–2.4)
MCHC RBC-ENTMCNC: 30.7 PG — SIGNIFICANT CHANGE UP (ref 27–31)
MCHC RBC-ENTMCNC: 32 G/DL — SIGNIFICANT CHANGE UP (ref 32–37)
MCV RBC AUTO: 95.8 FL — HIGH (ref 80–94)
MONOCYTES # BLD AUTO: 0.34 K/UL — SIGNIFICANT CHANGE UP (ref 0.1–0.6)
MONOCYTES NFR BLD AUTO: 8.1 % — SIGNIFICANT CHANGE UP (ref 1.7–9.3)
NEUTROPHILS # BLD AUTO: 2.31 K/UL — SIGNIFICANT CHANGE UP (ref 1.4–6.5)
NEUTROPHILS NFR BLD AUTO: 54.9 % — SIGNIFICANT CHANGE UP (ref 42.2–75.2)
NRBC # BLD: 1 /100 WBCS — HIGH (ref 0–0)
PLATELET # BLD AUTO: 123 K/UL — LOW (ref 130–400)
POTASSIUM SERPL-MCNC: 4.3 MMOL/L — SIGNIFICANT CHANGE UP (ref 3.5–5)
POTASSIUM SERPL-SCNC: 4.3 MMOL/L — SIGNIFICANT CHANGE UP (ref 3.5–5)
RBC # BLD: 2.87 M/UL — LOW (ref 4.7–6.1)
RBC # FLD: 15.8 % — HIGH (ref 11.5–14.5)
SODIUM SERPL-SCNC: 136 MMOL/L — SIGNIFICANT CHANGE UP (ref 135–146)
WBC # BLD: 4.21 K/UL — LOW (ref 4.8–10.8)
WBC # FLD AUTO: 4.21 K/UL — LOW (ref 4.8–10.8)

## 2021-12-06 PROCEDURE — 99233 SBSQ HOSP IP/OBS HIGH 50: CPT

## 2021-12-06 PROCEDURE — 99232 SBSQ HOSP IP/OBS MODERATE 35: CPT

## 2021-12-06 RX ADMIN — APIXABAN 5 MILLIGRAM(S): 2.5 TABLET, FILM COATED ORAL at 05:43

## 2021-12-06 RX ADMIN — Medication 25 MILLIGRAM(S): at 05:44

## 2021-12-06 RX ADMIN — APIXABAN 5 MILLIGRAM(S): 2.5 TABLET, FILM COATED ORAL at 17:17

## 2021-12-06 RX ADMIN — BICALUTAMIDE 50 MILLIGRAM(S): 50 TABLET, FILM COATED ORAL at 12:27

## 2021-12-06 RX ADMIN — HYDROMORPHONE HYDROCHLORIDE 2 MILLIGRAM(S): 2 INJECTION INTRAMUSCULAR; INTRAVENOUS; SUBCUTANEOUS at 05:44

## 2021-12-06 RX ADMIN — IVABRADINE 5 MILLIGRAM(S): 7.5 TABLET, FILM COATED ORAL at 18:17

## 2021-12-06 RX ADMIN — HYDROMORPHONE HYDROCHLORIDE 2 MILLIGRAM(S): 2 INJECTION INTRAMUSCULAR; INTRAVENOUS; SUBCUTANEOUS at 11:52

## 2021-12-06 RX ADMIN — Medication 5 MILLIGRAM(S): at 18:17

## 2021-12-06 RX ADMIN — GABAPENTIN 300 MILLIGRAM(S): 400 CAPSULE ORAL at 05:43

## 2021-12-06 RX ADMIN — HYDROMORPHONE HYDROCHLORIDE 2 MILLIGRAM(S): 2 INJECTION INTRAMUSCULAR; INTRAVENOUS; SUBCUTANEOUS at 03:00

## 2021-12-06 RX ADMIN — Medication 1300 MILLIGRAM(S): at 05:44

## 2021-12-06 RX ADMIN — GABAPENTIN 300 MILLIGRAM(S): 400 CAPSULE ORAL at 17:17

## 2021-12-06 RX ADMIN — ABIRATERONE ACETATE 1000 MILLIGRAM(S): 250 TABLET ORAL at 12:27

## 2021-12-06 RX ADMIN — Medication 1300 MILLIGRAM(S): at 17:17

## 2021-12-06 RX ADMIN — HYDROMORPHONE HYDROCHLORIDE 2 MILLIGRAM(S): 2 INJECTION INTRAMUSCULAR; INTRAVENOUS; SUBCUTANEOUS at 07:57

## 2021-12-06 NOTE — PROGRESS NOTE ADULT - SUBJECTIVE AND OBJECTIVE BOX
SAMANTHA CHARLES 71y Male  MRN#: [id number]   Hospital Day: 6d    SUBJECTIVE  Patient is a 71y old Male who presents with a chief complaint of Loss of appetite, weight loss, and knee pain (05 Dec 2021 00:56)  Currently admitted to medicine with the primary diagnosis of Cancer, metastatic to bone    INTERVAL HPI AND OVERNIGHT EVENTS:  Patient was examined and seen at bedside. This morning he is resting in bed, reports ongoing left lower extremity pain    OBJECTIVE  PAST MEDICAL & SURGICAL HISTORY  Systolic congestive heart failure, unspecified chronicity    Cerebrovascular accident (CVA) due to bilateral embolism of posterior cerebral arteries    Essential hypertension    Atrial fibrillation, unspecified type    AICD (automatic cardioverter/defibrillator) present      ALLERGIES:  cephalexin (Flushing)  penicillin (Rash)    MEDICATIONS:  STANDING MEDICATIONS  abiraterone 1000 milliGRAM(s) Oral daily  apixaban 5 milliGRAM(s) Oral every 12 hours  bicalutamide 50 milliGRAM(s) Oral daily  gabapentin 300 milliGRAM(s) Oral every 12 hours  influenza  Vaccine (HIGH DOSE) 0.7 milliLiter(s) IntraMuscular once  ivabradine 5 milliGRAM(s) Oral two times a day  metoprolol succinate ER 25 milliGRAM(s) Oral daily  sodium bicarbonate 1300 milliGRAM(s) Oral every 12 hours    PRN MEDICATIONS  acetaminophen     Tablet .. 650 milliGRAM(s) Oral every 6 hours PRN  aluminum hydroxide/magnesium hydroxide/simethicone Suspension 30 milliLiter(s) Oral every 4 hours PRN  HYDROmorphone   Tablet 2 milliGRAM(s) Oral every 3 hours PRN  melatonin 3 milliGRAM(s) Oral at bedtime PRN  ondansetron Injectable 4 milliGRAM(s) IV Push every 8 hours PRN      PHYSICAL EXAM:  GEN: No acute distress, cachectic   HEENT: NCAT  LUNGS: Clear to auscultation bilaterally   HEART: S1/S2 present. RRR.   ABD: Soft, non-tender, non-distended. Bowel sounds present  EXT: no le edema  NEURO: AAOX3  SKIN: No new lesions    VITAL SIGNS: Last 24 Hours  T(C): 36.2 (06 Dec 2021 05:58), Max: 36.4 (05 Dec 2021 21:53)  T(F): 97.2 (06 Dec 2021 05:58), Max: 97.6 (05 Dec 2021 21:53)  HR: 80 (06 Dec 2021 05:58) (72 - 80)  BP: 110/58 (06 Dec 2021 05:58) (110/58 - 121/62)  BP(mean): --  RR: 18 (06 Dec 2021 05:58) (18 - 18)  SpO2: --    LABS:                        8.8    4.21  )-----------( 123      ( 06 Dec 2021 06:14 )             27.5     12-06    136  |  105  |  19  ----------------------------<  88  4.3   |  18  |  0.7    Ca    7.8<L>      06 Dec 2021 06:14  Mg     2.1     12-06      RADIOLOGY:  PROCEDURE DATE:  12/03/2021    EXAM:  NM BONE SPECT SA SD        EXAM:  NM BONE IMG WHOLE BODY            INTERPRETATION:  Procedure: Whole body and regional bone images and bone SPECT  Reason: Metastatic prostate cancer    CT chest, abdomen and pelvis November 30, 2021 demonstrates innumerable diffuse sclerotic osseous lesions compatible with osseous metastasis.    Approximately 3 hours after the intravenous administration of  20 millicuries 99m technetium MDP, anterior and posteriorwhole body format bone images were obtained from head to feet.  In addition regional images were also obtained and SPECT images were obtained over thoracolumbar spine and pelvis and reconstructions and three-dimensional reconstructions were generated.    There is diffusely increased uptake throughout the calvarium, thoracic and lumbar spine, pelvis, bilateral femurs, bilateral humeri, bilateral scapula and innumerable bilateral ribs. No visualization of bilateral kidneys.    Findings consistent with diffuse metastatic bone disease, particularly when compared with referenced CT.    Impression:  Abnormal bone scan. Diffusely increased bone agent uptake throughout the skeleton consistent with diffuse metastatic bone disease. Sites include calvarium, thoracic and lumbar spine, bilateral iliac bones, bilateral femurs, bilateral humeri, bilateral scapula and innumerable bilateral ribs.    Nonvisualization of the kidneys probably explain lobe right intense uptake by bone metastasis ("absent kidney sign" - superscan).       SAMANTHA CHARLES 71y Male  MRN#: [id number]   Hospital Day: 6d    SUBJECTIVE  Patient is a 71y old Male who presents with a chief complaint of Loss of appetite, weight loss, and knee pain (05 Dec 2021 00:56)  Currently admitted to medicine with the primary diagnosis of Cancer, metastatic to bone    INTERVAL HPI AND OVERNIGHT EVENTS:  Patient was examined and seen at bedside. This morning he is resting in bed, reports ongoing left lower extremity pain    OBJECTIVE  PAST MEDICAL & SURGICAL HISTORY  Systolic congestive heart failure, unspecified chronicity    Cerebrovascular accident (CVA) due to bilateral embolism of posterior cerebral arteries    Essential hypertension    Atrial fibrillation, unspecified type    AICD (automatic cardioverter/defibrillator) present      ALLERGIES:  cephalexin (Flushing)  penicillin (Rash)    MEDICATIONS:  STANDING MEDICATIONS  abiraterone 1000 milliGRAM(s) Oral daily  apixaban 5 milliGRAM(s) Oral every 12 hours  bicalutamide 50 milliGRAM(s) Oral daily  gabapentin 300 milliGRAM(s) Oral every 12 hours  influenza  Vaccine (HIGH DOSE) 0.7 milliLiter(s) IntraMuscular once  ivabradine 5 milliGRAM(s) Oral two times a day  metoprolol succinate ER 25 milliGRAM(s) Oral daily  sodium bicarbonate 1300 milliGRAM(s) Oral every 12 hours    PRN MEDICATIONS  acetaminophen     Tablet .. 650 milliGRAM(s) Oral every 6 hours PRN  aluminum hydroxide/magnesium hydroxide/simethicone Suspension 30 milliLiter(s) Oral every 4 hours PRN  HYDROmorphone   Tablet 2 milliGRAM(s) Oral every 3 hours PRN  melatonin 3 milliGRAM(s) Oral at bedtime PRN  ondansetron Injectable 4 milliGRAM(s) IV Push every 8 hours PRN      PHYSICAL EXAM:  GEN: No acute distress, cachectic   HEENT: NCAT  LUNGS: Clear to auscultation bilaterally   HEART: S1/S2 present. RRR.   ABD: Soft, non-tender, non-distended. Bowel sounds present  EXT: no le edema  NEURO: AAOX3  SKIN: No new lesions    VITAL SIGNS: Last 24 Hours  T(C): 36.2 (06 Dec 2021 05:58), Max: 36.4 (05 Dec 2021 21:53)  T(F): 97.2 (06 Dec 2021 05:58), Max: 97.6 (05 Dec 2021 21:53)  HR: 80 (06 Dec 2021 05:58) (72 - 80)  BP: 110/58 (06 Dec 2021 05:58) (110/58 - 121/62)  BP(mean): --  RR: 18 (06 Dec 2021 05:58) (18 - 18)  SpO2: --    LABS:                        8.8    4.21  )-----------( 123      ( 06 Dec 2021 06:14 )             27.5     12-06    136  |  105  |  19  ----------------------------<  88  4.3   |  18  |  0.7    Ca    7.8<L>      06 Dec 2021 06:14  Mg     2.1     12-06      RADIOLOGY:  PROCEDURE DATE:  12/03/2021    EXAM:  NM BONE SPECT SA SD        EXAM:  NM BONE IMG WHOLE BODY            INTERPRETATION:  Procedure: Whole body and regional bone images and bone SPECT  Reason: Metastatic prostate cancer    CT chest, abdomen and pelvis November 30, 2021 demonstrates innumerable diffuse sclerotic osseous lesions compatible with osseous metastasis.    Approximately 3 hours after the intravenous administration of  20 millicuries 99m technetium MDP, anterior and posteriorwhole body format bone images were obtained from head to feet.  In addition regional images were also obtained and SPECT images were obtained over thoracolumbar spine and pelvis and reconstructions and three-dimensional reconstructions were generated.    There is diffusely increased uptake throughout the calvarium, thoracic and lumbar spine, pelvis, bilateral femurs, bilateral humeri, bilateral scapula and innumerable bilateral ribs. No visualization of bilateral kidneys.    Findings consistent with diffuse metastatic bone disease, particularly when compared with referenced CT.    Impression: Abnormal bone scan. Diffusely increased bone agent uptake throughout the skeleton consistent with diffuse metastatic bone disease. Sites include calvarium, thoracic and lumbar spine, bilateral iliac bones, bilateral femurs, bilateral humeri, bilateral scapula and innumerable bilateral ribs.    Nonvisualization of the kidneys probably explain lobe right intense uptake by bone metastasis ("absent kidney sign" - superscan).       SAMANTHA CHARLES 71y Male  MRN#: [id number]   Hospital Day: 6d    SUBJECTIVE  Patient is a 71y old Male who presents with a chief complaint of Loss of appetite, weight loss, and knee pain (05 Dec 2021 00:56)  Currently admitted to medicine with the primary diagnosis of Cancer, metastatic to bone    INTERVAL HPI AND OVERNIGHT EVENTS:  Patient was examined and seen at bedside. This morning he is resting in bed, reports ongoing left lower extremity pain    OBJECTIVE  PAST MEDICAL & SURGICAL HISTORY  Systolic congestive heart failure, unspecified chronicity    Cerebrovascular accident (CVA) due to bilateral embolism of posterior cerebral arteries    Essential hypertension    Atrial fibrillation, unspecified type    AICD (automatic cardioverter/defibrillator) present      ALLERGIES:  cephalexin (Flushing)  penicillin (Rash)    MEDICATIONS:  STANDING MEDICATIONS  abiraterone 1000 milliGRAM(s) Oral daily  apixaban 5 milliGRAM(s) Oral every 12 hours  bicalutamide 50 milliGRAM(s) Oral daily  gabapentin 300 milliGRAM(s) Oral every 12 hours  influenza  Vaccine (HIGH DOSE) 0.7 milliLiter(s) IntraMuscular once  ivabradine 5 milliGRAM(s) Oral two times a day  metoprolol succinate ER 25 milliGRAM(s) Oral daily  sodium bicarbonate 1300 milliGRAM(s) Oral every 12 hours    PRN MEDICATIONS  acetaminophen     Tablet .. 650 milliGRAM(s) Oral every 6 hours PRN  aluminum hydroxide/magnesium hydroxide/simethicone Suspension 30 milliLiter(s) Oral every 4 hours PRN  HYDROmorphone   Tablet 2 milliGRAM(s) Oral every 3 hours PRN  melatonin 3 milliGRAM(s) Oral at bedtime PRN  ondansetron Injectable 4 milliGRAM(s) IV Push every 8 hours PRN      PHYSICAL EXAM:  GEN: No acute distress, cachectic   HEENT: NCAT  LUNGS: Clear to auscultation bilaterally   HEART: S1/S2 present. RRR.   ABD: Soft, non-tender, non-distended. Bowel sounds present  EXT: no le edema  NEURO: AAOX3  SKIN: No new lesions    VITAL SIGNS: Last 24 Hours  T(C): 36.2 (06 Dec 2021 05:58), Max: 36.4 (05 Dec 2021 21:53)  T(F): 97.2 (06 Dec 2021 05:58), Max: 97.6 (05 Dec 2021 21:53)  HR: 80 (06 Dec 2021 05:58) (72 - 80)  BP: 110/58 (06 Dec 2021 05:58) (110/58 - 121/62)  BP(mean): --  RR: 18 (06 Dec 2021 05:58) (18 - 18)  SpO2: --    LABS:                        8.8    4.21  )-----------( 123      ( 06 Dec 2021 06:14 )             27.5     12-06    136  |  105  |  19  ----------------------------<  88  4.3   |  18  |  0.7    Ca    7.8<L>      06 Dec 2021 06:14  Mg     2.1     12-06      RADIOLOGY:  PROCEDURE DATE:  12/03/2021    EXAM:  NM BONE SPECT SA SD        EXAM:  NM BONE IMG WHOLE BODY            INTERPRETATION:  Procedure: Whole body and regional bone images and bone SPECT  Reason: Metastatic prostate cancer    CT chest, abdomen and pelvis November 30, 2021 demonstrates innumerable diffuse sclerotic osseous lesions compatible with osseous metastasis.    Approximately 3 hours after the intravenous administration of  20 millicuries 99m technetium MDP, anterior and posteriorwhole body format bone images were obtained from head to feet.  In addition regional images were also obtained and SPECT images were obtained over thoracolumbar spine and pelvis and reconstructions and three-dimensional reconstructions were generated.    There is diffusely increased uptake throughout the calvarium, thoracic and lumbar spine, pelvis, bilateral femurs, bilateral humeri, bilateral scapula and innumerable bilateral ribs. No visualization of bilateral kidneys.    Findings consistent with diffuse metastatic bone disease, particularly when compared with referenced CT.    Impression: Abnormal bone scan. Diffusely increased bone agent uptake throughout the skeleton consistent with diffuse metastatic bone disease. Sites include calvarium, thoracic and lumbar spine, bilateral iliac bones, bilateral femurs, bilateral humeri, bilateral scapula and innumerable bilateral ribs.    Nonvisualization of the kidneys probably explain lobe right intense uptake by bone metastasis ("absent kidney sign" - superscan).

## 2021-12-06 NOTE — PROGRESS NOTE ADULT - SUBJECTIVE AND OBJECTIVE BOX
CORDELIARANDSAMANTHA  71y  Male  ***My note supersedes ALL resident notes that I sign.  My corrections for their notes are in my note.***    I can be reached directly on [employer]. My office number is [phone]. My personal cell number is [phone].    INTERVAL EVENTS: Here for f/u of prostate cancer. Pt having pain today, but relieved w/ dilaudid. Pt will try PT.    T(F): 96.9 (12-06-21 @ 14:56), Max: 97.6 (12-05-21 @ 21:53)  HR: 86 (12-06-21 @ 14:56) (72 - 86)  BP: 105/53 (12-06-21 @ 14:56) (105/53 - 121/62)  RR: 19 (12-06-21 @ 14:56) (18 - 19)  SpO2: --    Gen: more comfortable; talking better  HEENT: PERRL, EOMI; mouth clr; nose clr  Neck: no nodes, no JVD, thyroid nl  chest: left AICD  lungs: clr  hrt: s1 s2 rrr no murmur  abd: soft, NT/ND, no HS megaly  ext: no edema, no c/c  neuro: aa ox3, cn intact, can move all 4 ext    LABS:                      8.8     (    95.8   4.21  )-----------( ---------      123      ( 06 Dec 2021 06:14 )             27.5    (    15.8     136   (   105   (   88      12-06-21 @ 06:14  ----------------------               4.3   (   18   (   19     AG = 13                        -----                        0.7  Ca  7.8   Mg  2.1    P   --     Culture - Blood (collected 11-30-21 @ 11:50)  Source: .Blood Blood-Peripheral  Final Report (12-05-21 @ 23:01):    No Growth Final    Culture - Blood (collected 11-30-21 @ 11:50)  Source: .Blood Blood-Peripheral  Final Report (12-05-21 @ 23:01):    No Growth Final    RADIOLOGY & ADDITIONAL TESTS:  < from: NM Bone Imaging Total (12.03.21 @ 17:22) >  Impression:    Abnormal bone scan. Diffusely increased bone agent uptake throughout the skeleton consistent with diffuse metastatic bone disease. Sites include calvarium, thoracic and lumbar spine, bilateral iliac bones, bilateral femurs, bilateral humeri, bilateral scapula and innumerable bilateral ribs.    Nonvisualization of the kidneys probably explain lobe right intense uptake by bone metastasis ("absent kidney sign" - superscan).    < end of copied text >    MEDICATIONS:    abiraterone 1000 milliGRAM(s) Oral daily  acetaminophen     Tablet .. 650 milliGRAM(s) Oral every 6 hours PRN  aluminum hydroxide/magnesium hydroxide/simethicone Suspension 30 milliLiter(s) Oral every 4 hours PRN  apixaban 5 milliGRAM(s) Oral every 12 hours  bicalutamide 50 milliGRAM(s) Oral daily  gabapentin 300 milliGRAM(s) Oral every 12 hours  HYDROmorphone   Tablet 2 milliGRAM(s) Oral every 3 hours PRN  influenza  Vaccine (HIGH DOSE) 0.7 milliLiter(s) IntraMuscular once  ivabradine 5 milliGRAM(s) Oral two times a day  melatonin 3 milliGRAM(s) Oral at bedtime PRN  metoprolol succinate ER 25 milliGRAM(s) Oral daily  ondansetron Injectable 4 milliGRAM(s) IV Push every 8 hours PRN  sodium bicarbonate 1300 milliGRAM(s) Oral every 12 hours

## 2021-12-06 NOTE — PROGRESS NOTE ADULT - ASSESSMENT
70 yo M with PMH of CVA, afib on Eliquis, HTN, HFrEF (EF 20-25% 2018), chronic LLE pain, presented to the ED with worsening Left LE pain for the past 2 months, worsening fatigue, anorexia and significant weight loss. Of note, pt had previously been worked up for the leg pain including a CT scan in march 2021 in [facility], which was negative.    # Neopl-related severe pain 2/2 diffuse bony sclerotic lesions (AP 2765) likely 2/2 prostate cancer (irregularly marginated prostate gland on CT and PSA 1400)  CT chest/abdomen/pelvis - Innumerable diffuse sclerotic osseous lesions -  including 6x5.8x9.4cm mass involving the left iliac wing and 6.6x4.5x4.6cm mass involving left inferior pubic ramus. Expansile sclerotic lesions are noted to involve the right 2nd lateral rib and right 5th anterior rib.  IR s/p bx lt iliac lesion 12/3: f/u path  Uro noted  Hem Onc eval now for work up; h/o also requesting bx  Rad Onc eval for pall RT; r/o also requesting bx  Pall Care eval - spoke w/ Dr Johansen  bone scan -> diff bone mets, including vert (kidneys not seen b/o such intense uptake by diff bone mets)  obtain MRI C/T/L spine w/ gado (AICD is MRI compatible)  rad/onc eval can be completed as outpt  h/o beginning tx:  Casodex 50mg po daily, will add Lupron 22.5 mg IM end of this week.  Abiraterone 1000 mg po daily and Prednisone 5 mg po daily  h/o will start Xgeva as out patient to reduce bone related complications  c/w dilaudid 2mg po q3 prn pain  c/w neurontin 300mg po q12  bowel reg    # Failure to thrive  dietician eval  MVI q24  Ensure 3x/day    # Symptomatic normo to macro anemia (mild pancytopenia)  no overt bleed  prob related to malignancy and diffuse bone mets  s/p PRBC transfusions -> hgb better  keep hgb > 8    Iron panel: iron sat 32%; ferr: 2624 = NOT SELENA  Folate nl  B12 1540    # FELICITY - likely dehydration; metab acid w/ high gap  Cr improving (baseline ~ 0.8)  IVFs: d/c  Hold entresto - consider restart soon, if BP will allow  c/w NaHCO3 1300mg po q12 til HCO3 > 22  BMP q24    # hypoTN - prob from anemia, CHF meds, low EF and dehydration  keep sys BP above 90 and preferably above 100  consider midodrine  holding parameters for BB    # h/o HTN; h/o CVA, Afib; H/o HFrEF (EF 20-25% in 2018)  restart Eliquis 5mg po q12 tomorrow (had bx today)  hold Entresto for FELICITY/BP  hold aldactone for FELICITY/BP  c/w metoprolol (hold if low BP)  c/w Ivabradine    # Activity: PT eval for eval for STR    # GI PPX: NA    # DVT PPX: eliquis     # Full code    # updated sister (Barb) at bedside     Dispo: tx pain; tx low BP; tx HCO3; f/u path of bx; f/u h/o, r/o, pall care; MRI C/T/L Sp (AICD is compatible)  eventually, pt will need SNF for STR upon d/c - anticipate tomorrow    Prog is poor for long term.

## 2021-12-06 NOTE — PROGRESS NOTE ADULT - ASSESSMENT
71yMale with PMH including CVA, afib on Eliquis, HTN, HFrEF ( EF 20-25% 2018), chronic LLE pain, being evaluated for pain management in the setting of newly diagnosed metastatic prostate cancer with diffuse osseous mets. Currently with heme-onc following for workup/treatment. Patient is requiring intermittent pain relief and presents in multiple different body sites likely related to metastatic disease. Patient reports not having regular BMs but flowsheet shows multiple in the past few days. May just need PRN bowel regimen.     Patient is very weak, fatigued, and deconditioned. Plan is to await bx results, heme-onc following for treatment  recommendations.   Patient made sisterBarb, official HCP today.     See Recs below.    Please call [phone] with questions or concerns 24/7.   We will continue to follow.

## 2021-12-06 NOTE — PROGRESS NOTE ADULT - ASSESSMENT
72 yo M with PMH of CVA, afib on Eliquis, HTN, HFrEF (EF 20-25% 2018), chronic LLE pain, presented to the ED with worsening Left LE pain for the past 2 months, worsening fatigue, anorexia and significant weight loss. Of note, pt had previously been worked up for the leg pain including a CT scan in march 2021 in [facility], which was negative.    # Neopl-related severe pain 2/2 diffuse bony sclerotic lesions (AP 2765) likely 2/2 prostate cancer (irregularly marginated prostate gland on CT and PSA 1400)  CT chest/abdomen/pelvis - Innumerable diffuse sclerotic osseous lesions -  including 6x5.8x9.4cm mass involving the left iliac wing and 6.6x4.5x4.6cm mass involving left inferior pubic ramus. Expansile sclerotic lesions are noted to involve the right 2nd lateral rib and right 5th anterior rib.  -IR s/p bx left iliac lesion 12/3: f/u path  - s/p bone scan showing multiple metastatic lesions including the spine  - will get MRI C/T/L spine for spinal mets and possible rad/onc evaluation (Possibly [place], sister will bring AICD card information to verify MRI compatibility)   -rad/onc eval can be completed as outpt  Heme/Onc Recommendations:  -Casodex 50mg po daily, will add Lupron 22.5 mg IM in 1 week.  -Abiraterone 1000 mg po daily and Prednisone 5 mg po daily: pharmacy trying to get zytiga   -Start Xgeva as out patient to reduce bone related complications  - Palliative care following: Dilaudid 2mg iv q2 prn pain, Neurontin 300mg po q12, Pred 5mg po q24 started SUN as per Heme/Onc  -bowel regimen: senna and Miralax    # Failure to thrive- dietician recommendations DASH/TLC diet(No pork),  Ensure 3x/day, beneprotein 3x/day ([facility] only) /w LR @50CC/H  Severe protein-calorie malnutrition.      # Symptomatic anemia- likely 2/2 malignancy  - s/p 2 units pRBC   - 12/6 Hgb 8.8  will follow  -keep hgb > 8; may need lasix after future tx given poor EF  -11/30 iron: 71, TIBC 220, 32% saturation, folate 8.5, wnl, retics 1.8%,   -12/2 B12 1540    # FELICITY - Resolved  #metabolic acid w/ high gap  -Cr .7 (decreased from 1.4); baseline ~ 0.8  -eGFR   -consider restarting Entresto and Aldactone  -Bicarb 18  - incr NaHCO3 1300mg po q12 until HCO3 > 22    # h/o HTN; h/o CVA, Afib; H/o HFrEF (EF 20-25% in 2018)  HypoTN - prob from anemia, CHF meds, low EF and dehydration  - Eliquis restarted 12/4  -FELICITY resolved consider restarting Entresto and Aldactone as above  - c/w metoprolol (hold if low BP), Ivabradine  -keep sys BP above 90, preferably above 100    Activity: : PT eval for eval for STR  GI PPX: NA  DVT PPX: Eliquis  Full code  Dispo: tx pain; tx low BP; tx HCO3; f/u path of bx; f/u h/o, r/o, pall care; bone scan as above (poss MRIs if AICD compatible )  eventually, pt will need SNF for STR,  anticipate following PT eval       70 yo M with PMH of CVA, afib on Eliquis, HTN, HFrEF (EF 20-25% 2018), chronic LLE pain, presented to the ED with worsening Left LE pain for the past 2 months, worsening fatigue, anorexia and significant weight loss. Of note, pt had previously been worked up for the leg pain including a CT scan in march 2021 in [facility] which was negative.    # Neopl-related severe pain 2/2 diffuse bony sclerotic lesions (AP 2765) likely 2/2 prostate cancer (irregularly marginated prostate gland on CT and PSA 1400)  CT chest/abdomen/pelvis - Innumerable diffuse sclerotic osseous lesions -  including 6x5.8x9.4cm mass involving the left iliac wing and 6.6x4.5x4.6cm mass involving left inferior pubic ramus. Expansile sclerotic lesions are noted to involve the right 2nd lateral rib and right 5th anterior rib.  -IR s/p bx left iliac lesion 12/3: f/u path  - s/p bone scan showing multiple metastatic lesions including the spine  - will get MRI C/T/L spine for spinal mets and possible rad/onc evaluation (Possibly [employer], sister will bring AICD card information to verify MRI compatibility)   Heme/Onc Recommendations:  -Casodex 50mg po daily, will add Lupron 22.5 mg IM in 1 week.  -Abiraterone 1000 mg po daily and Prednisone 5 mg po daily: pharmacy trying to get zytiga   -Start Xgeva as out patient to reduce bone related complications  - Palliative care following: Dilaudid 2mg iv q2 prn pain, Neurontin 300mg po q12, Pred 5mg po q24 started SUN as per Heme/Onc  -bowel regimen: senna and Miralax    # Failure to thrive- dietician recommendations DASH/TLC diet(No pork),  Ensure 3x/day, beneprotein 3x/day ([facility] only) /w LR @50CC/H  Severe protein-calorie malnutrition.      # Symptomatic anemia- likely 2/2 malignancy  - s/p 2 units pRBC   - 12/6 Hgb 8.8  will follow  -keep hgb > 8; may need lasix after future tx given poor EF  -11/30 iron: 71, TIBC 220, 32% saturation, folate 8.5, wnl, retics 1.8%,   -12/2 B12 1540    # FELICITY - Resolved  #metabolic acid w/ high gap  -Cr .7 (decreased from 1.4); baseline ~ 0.8  -eGFR   -consider restarting Entresto and Aldactone  -Bicarb 18  - incr NaHCO3 1300mg po q12 until HCO3 > 22    # h/o HTN; h/o CVA, Afib; H/o HFrEF (EF 20-25% in 2018)  HypoTN - prob from anemia, CHF meds, low EF and dehydration  - Eliquis restarted 12/4  -FELICITY resolved consider restarting meds as above  - c/w metoprolol (hold if low BP), Ivabradine  -keep sys BP above 90, preferably above 100    Activity: : PT eval for eval for STR  GI PPX: NA  DVT PPX: Eliquis  Full code  Dispo: tx pain; tx low BP; tx HCO3; f/u path of bx; f/u h/o, r/o, pall care; bone scan as above (poss MRIs if AICD compatible )  eventually, pt will need SNF for STR, COVID pcr ordered,  anticipate       72 yo M with PMH of CVA, afib on Eliquis, HTN, HFrEF (EF 20-25% 2018), chronic LLE pain, presented to the ED with worsening Left LE pain for the past 2 months, worsening fatigue, anorexia and significant weight loss. Of note, pt had previously been worked up for the leg pain including a CT scan in march 2021 in [facility] which was negative.    # Neopl-related severe pain 2/2 diffuse bony sclerotic lesions (AP 2765) likely 2/2 prostate cancer (irregularly marginated prostate gland on CT and PSA 1400)  CT chest/abdomen/pelvis - Innumerable diffuse sclerotic osseous lesions -  including 6x5.8x9.4cm mass involving the left iliac wing and 6.6x4.5x4.6cm mass involving left inferior pubic ramus. Expansile sclerotic lesions are noted to involve the right 2nd lateral rib and right 5th anterior rib.  -IR s/p bx left iliac lesion 12/3: f/u path  - s/p bone scan showing multiple metastatic lesions including the spine  - f/u MRI C/T/L spine for spinal mets and possible rad/onc evaluation  Heme/Onc Recommendations:  -Casodex 50mg po daily, will add Lupron 22.5 mg IM in 1 week.  -Abiraterone 1000 mg po daily and Prednisone 5 mg po daily: pharmacy trying to get zytiga   -Start Xgeva as out patient to reduce bone related complications  - Palliative care following: Dilaudid 2mg iv q2 prn pain, Neurontin 300mg po q12, Pred 5mg po q24 started SUN as per Heme/Onc  -bowel regimen: senna and Miralax    # Failure to thrive- dietician recommendations DASH/TLC diet(No pork),  Ensure 3x/day, beneprotein 3x/day ([facility] only) /w LR @50CC/H  Severe protein-calorie malnutrition.    # Symptomatic anemia- likely 2/2 malignancy  - s/p 2 units pRBC   - 12/6 Hgb 8.8  will follow  -keep hgb > 8; may need lasix after future tx given poor EF  -11/30 iron: 71, TIBC 220, 32% saturation, folate 8.5, wnl, retics 1.8%,   -12/2 B12 1540    # FELICITY - Resolved  #metabolic acid w/ high gap  -Cr .7 (decreased from 1.4); baseline ~ 0.8  -eGFR   -consider restarting Entresto and Aldactone  -Bicarb 18  - incr NaHCO3 1300mg po q12 until HCO3 > 22    # h/o HTN; h/o CVA, Afib; H/o HFrEF (EF 20-25% in 2018)  HypoTN - prob from anemia, CHF meds, low EF and dehydration  - Eliquis restarted 12/4  -FELICITY resolved consider restarting meds as above  - c/w metoprolol (hold if low BP), Ivabradine  -keep sys BP above 90, preferably above 100    Activity: : PT eval for eval for STR  GI PPX: NA  DVT PPX: Eliquis  Full code  Dispo: tx pain; tx low BP; tx HCO3; f/u path of bx; f/u h/o, r/o, pall care; bone scan as above (poss MRIs if AICD compatible )  eventually, pt will need SNF for STR, COVID pcr ordered,  anticipate

## 2021-12-06 NOTE — CHART NOTE - NSCHARTNOTEFT_GEN_A_CORE
Sister, Barb, was at bedside.  Patient reported that he was not experiencing pain.  Writer assisted patient with completing HCP.  Sister, Barb Carreon, was appointed HCP.  HCP is in patient's chart.

## 2021-12-06 NOTE — PROGRESS NOTE ADULT - SUBJECTIVE AND OBJECTIVE BOX
SAMANTHA CHARLES             MRN-[id number]      Patient is a 71y old Male who presents with a chief complaint of Loss of appetite, weight loss, and knee pain (02 Dec 2021 14:01)    Currently admitted with the primary diagnosis of:  pain     SUBJECTIVE:  -Patient seen at bedside with sister  -Patient feels pain much improved today  -Denies pain at time of visit    ROS:  DYSPNEA: N  NAUS/VOM: N	  SECRETIONS:N	  AGITATION: N  Pain (Y/N):  Yes but pain medication helps relieve it. No pain right now. Sister concerned because he has more pain in the evenings around the time of change of shift and the medication takes a while to get.     PEx:   ICU Vital Signs Last 24 Hrs  T(C): 36.2 (06 Dec 2021 05:58), Max: 36.4 (05 Dec 2021 21:53)  T(F): 97.2 (06 Dec 2021 05:58), Max: 97.6 (05 Dec 2021 21:53)  HR: 80 (06 Dec 2021 05:58) (72 - 80)  BP: 110/58 (06 Dec 2021 05:58) (110/58 - 121/62)  RR: 18 (06 Dec 2021 05:58) (18 - 18)       General:  found in bed in NAD, cachectic  Eyes:  EOMI Non icteric MOM  ENMT: no external oral ulcers, MMM, no thrush   CVS: RR , no edema   Resp: Unlabored Non tachypneic No increased WOB  GI:  ND, nontender  Musc: No clubbing  Neuro: Follows commands No focal deficits  Psych: Calm Pleasant, AAOx 4   Skin: Non jaundiced , no rash     Last BM: unsure. sister notes he was having BMs too frequently last week so they eased off the bowel regimen. per flowsheet, yesterday.     ALLERGIES: cephalexin (Flushing)  penicillin (Rash)      Labs:	      12-06    136  |  105  |  19  ----------------------------<  88  4.3   |  18  |  0.7    Ca    7.8<L>      06 Dec 2021 06:14  Mg     2.1     12-06                              8.8    4.21  )-----------( 123      ( 06 Dec 2021 06:14 )             27.5             RADIOLOGY  CT AP  1. Innumerable diffuse sclerotic osseous lesions, as detailed above, compatible with osseous metastases.    2. Enlarged irregularly marginated prostate gland, possibly reflecting underlying neoplastic process.      EKG  12 Lead ECG:   Ventricular Rate 86 BPM    Atrial Rate 86 BPM    P-R Interval 148 ms    QRS Duration 140 ms    Q-T Interval 410 ms    QTC Calculation(Bazett) 490 ms    P Axis 86 degrees    R Axis 99 degrees    T Axis -42 degrees    Diagnosis Line Atrial-sensed ventricular-paced rhythm  Abnormal ECG    Confirmed by ERWIN STUART MD ([id number]) on 11/30/2021 1:27:56 PM (11-30-21 @ 11:40)      Imaging Personally Reviewed:  [x] YES  [ ] NO    Consultant(s) Notes Reviewed:  [x] YES  [ ] NO  Care Discussed with Consultants/Other Providers [x] YES  [ ] NO    Medications:	      MEDICATIONS  (STANDING):  dexAMETHasone     Tablet 2 milliGRAM(s) Oral every 8 hours  gabapentin 300 milliGRAM(s) Oral every 12 hours  influenza  Vaccine (HIGH DOSE) 0.7 milliLiter(s) IntraMuscular once  ivabradine 5 milliGRAM(s) Oral two times a day  lactated ringers. 1000 milliLiter(s) (50 mL/Hr) IV Continuous <Continuous>  metoprolol succinate ER 25 milliGRAM(s) Oral daily  polyethylene glycol 3350 17 Gram(s) Oral daily  senna 2 Tablet(s) Oral at bedtime    MEDICATIONS  (PRN):  acetaminophen     Tablet .. 650 milliGRAM(s) Oral every 6 hours PRN Temp greater or equal to 38C (100.4F), Mild Pain (1 - 3)  aluminum hydroxide/magnesium hydroxide/simethicone Suspension 30 milliLiter(s) Oral every 4 hours PRN Dyspepsia  HYDROmorphone   Tablet 2 milliGRAM(s) Oral every 3 hours PRN Severe Pain (7 - 10)  melatonin 3 milliGRAM(s) Oral at bedtime PRN Insomnia  ondansetron Injectable 4 milliGRAM(s) IV Push every 8 hours PRN Nausea and/or Vomiting    ADVANCED DIRECTIVES:            FULL CODE         DECISION MAKER: Patient [x]  Family [  ]  Other [  ] _______  Made sister, Barb, HCP.    GOALS OF CARE DISCUSSION       Palliative care info/counseling provided	          PSYCHOSOCIAL-SPIRITUAL ASSESSMENT:       Reviewed    REFERRALS	        Palliative Med        Unit SW/Case Mgmt

## 2021-12-07 ENCOUNTER — TRANSCRIPTION ENCOUNTER (OUTPATIENT)
Age: 71
End: 2021-12-07

## 2021-12-07 LAB
ANION GAP SERPL CALC-SCNC: 16 MMOL/L — HIGH (ref 7–14)
ANISOCYTOSIS BLD QL: SLIGHT — SIGNIFICANT CHANGE UP
BASOPHILS # BLD AUTO: 0 K/UL — SIGNIFICANT CHANGE UP (ref 0–0.2)
BASOPHILS NFR BLD AUTO: 0 % — SIGNIFICANT CHANGE UP (ref 0–1)
BUN SERPL-MCNC: 17 MG/DL — SIGNIFICANT CHANGE UP (ref 10–20)
CALCIUM SERPL-MCNC: 7.7 MG/DL — LOW (ref 8.5–10.1)
CHLORIDE SERPL-SCNC: 105 MMOL/L — SIGNIFICANT CHANGE UP (ref 98–110)
CO2 SERPL-SCNC: 17 MMOL/L — SIGNIFICANT CHANGE UP (ref 17–32)
CREAT SERPL-MCNC: 0.8 MG/DL — SIGNIFICANT CHANGE UP (ref 0.7–1.5)
DACRYOCYTES BLD QL SMEAR: SLIGHT — SIGNIFICANT CHANGE UP
EOSINOPHIL # BLD AUTO: 0.09 K/UL — SIGNIFICANT CHANGE UP (ref 0–0.7)
EOSINOPHIL NFR BLD AUTO: 1.8 % — SIGNIFICANT CHANGE UP (ref 0–8)
GIANT PLATELETS BLD QL SMEAR: PRESENT — SIGNIFICANT CHANGE UP
GLUCOSE SERPL-MCNC: 81 MG/DL — SIGNIFICANT CHANGE UP (ref 70–99)
HCT VFR BLD CALC: 27.3 % — LOW (ref 42–52)
HGB BLD-MCNC: 8.6 G/DL — LOW (ref 14–18)
LYMPHOCYTES # BLD AUTO: 0.92 K/UL — LOW (ref 1.2–3.4)
LYMPHOCYTES # BLD AUTO: 19.1 % — LOW (ref 20.5–51.1)
MAGNESIUM SERPL-MCNC: 2.2 MG/DL — SIGNIFICANT CHANGE UP (ref 1.8–2.4)
MANUAL SMEAR VERIFICATION: SIGNIFICANT CHANGE UP
MCHC RBC-ENTMCNC: 30.8 PG — SIGNIFICANT CHANGE UP (ref 27–31)
MCHC RBC-ENTMCNC: 31.5 G/DL — LOW (ref 32–37)
MCV RBC AUTO: 97.8 FL — HIGH (ref 80–94)
METAMYELOCYTES # FLD: 1.7 % — HIGH (ref 0–0)
MICROCYTES BLD QL: SLIGHT — SIGNIFICANT CHANGE UP
MONOCYTES # BLD AUTO: 0.25 K/UL — SIGNIFICANT CHANGE UP (ref 0.1–0.6)
MONOCYTES NFR BLD AUTO: 5.2 % — SIGNIFICANT CHANGE UP (ref 1.7–9.3)
MYELOCYTES NFR BLD: 1.7 % — HIGH (ref 0–0)
NEUTROPHILS # BLD AUTO: 3.37 K/UL — SIGNIFICANT CHANGE UP (ref 1.4–6.5)
NEUTROPHILS NFR BLD AUTO: 67.8 % — SIGNIFICANT CHANGE UP (ref 42.2–75.2)
NEUTS BAND # BLD: 1.8 % — SIGNIFICANT CHANGE UP (ref 0–6)
OVALOCYTES BLD QL SMEAR: SLIGHT — SIGNIFICANT CHANGE UP
PLAT MORPH BLD: ABNORMAL
PLATELET # BLD AUTO: 120 K/UL — LOW (ref 130–400)
POIKILOCYTOSIS BLD QL AUTO: SLIGHT — SIGNIFICANT CHANGE UP
POLYCHROMASIA BLD QL SMEAR: SLIGHT — SIGNIFICANT CHANGE UP
POTASSIUM SERPL-MCNC: 4.5 MMOL/L — SIGNIFICANT CHANGE UP (ref 3.5–5)
POTASSIUM SERPL-SCNC: 4.5 MMOL/L — SIGNIFICANT CHANGE UP (ref 3.5–5)
RBC # BLD: 2.79 M/UL — LOW (ref 4.7–6.1)
RBC # FLD: 15.7 % — HIGH (ref 11.5–14.5)
RBC BLD AUTO: ABNORMAL
SODIUM SERPL-SCNC: 138 MMOL/L — SIGNIFICANT CHANGE UP (ref 135–146)
VARIANT LYMPHS # BLD: 0.9 % — SIGNIFICANT CHANGE UP (ref 0–5)
WBC # BLD: 4.84 K/UL — SIGNIFICANT CHANGE UP (ref 4.8–10.8)
WBC # FLD AUTO: 4.84 K/UL — SIGNIFICANT CHANGE UP (ref 4.8–10.8)

## 2021-12-07 PROCEDURE — 99233 SBSQ HOSP IP/OBS HIGH 50: CPT

## 2021-12-07 PROCEDURE — 99232 SBSQ HOSP IP/OBS MODERATE 35: CPT

## 2021-12-07 RX ADMIN — Medication 5 MILLIGRAM(S): at 05:35

## 2021-12-07 RX ADMIN — APIXABAN 5 MILLIGRAM(S): 2.5 TABLET, FILM COATED ORAL at 05:35

## 2021-12-07 RX ADMIN — ABIRATERONE ACETATE 1000 MILLIGRAM(S): 250 TABLET ORAL at 15:29

## 2021-12-07 RX ADMIN — BICALUTAMIDE 50 MILLIGRAM(S): 50 TABLET, FILM COATED ORAL at 11:49

## 2021-12-07 RX ADMIN — HYDROMORPHONE HYDROCHLORIDE 2 MILLIGRAM(S): 2 INJECTION INTRAMUSCULAR; INTRAVENOUS; SUBCUTANEOUS at 15:46

## 2021-12-07 RX ADMIN — Medication 25 MILLIGRAM(S): at 05:35

## 2021-12-07 RX ADMIN — HYDROMORPHONE HYDROCHLORIDE 2 MILLIGRAM(S): 2 INJECTION INTRAMUSCULAR; INTRAVENOUS; SUBCUTANEOUS at 21:40

## 2021-12-07 RX ADMIN — Medication 1300 MILLIGRAM(S): at 17:15

## 2021-12-07 RX ADMIN — APIXABAN 5 MILLIGRAM(S): 2.5 TABLET, FILM COATED ORAL at 17:16

## 2021-12-07 RX ADMIN — Medication 1 TABLET(S): at 11:49

## 2021-12-07 RX ADMIN — IVABRADINE 5 MILLIGRAM(S): 7.5 TABLET, FILM COATED ORAL at 05:38

## 2021-12-07 RX ADMIN — GABAPENTIN 300 MILLIGRAM(S): 400 CAPSULE ORAL at 05:34

## 2021-12-07 RX ADMIN — HYDROMORPHONE HYDROCHLORIDE 2 MILLIGRAM(S): 2 INJECTION INTRAMUSCULAR; INTRAVENOUS; SUBCUTANEOUS at 15:51

## 2021-12-07 RX ADMIN — Medication 1300 MILLIGRAM(S): at 05:34

## 2021-12-07 RX ADMIN — HYDROMORPHONE HYDROCHLORIDE 2 MILLIGRAM(S): 2 INJECTION INTRAMUSCULAR; INTRAVENOUS; SUBCUTANEOUS at 21:10

## 2021-12-07 RX ADMIN — HYDROMORPHONE HYDROCHLORIDE 2 MILLIGRAM(S): 2 INJECTION INTRAMUSCULAR; INTRAVENOUS; SUBCUTANEOUS at 05:37

## 2021-12-07 RX ADMIN — HYDROMORPHONE HYDROCHLORIDE 2 MILLIGRAM(S): 2 INJECTION INTRAMUSCULAR; INTRAVENOUS; SUBCUTANEOUS at 12:21

## 2021-12-07 RX ADMIN — GABAPENTIN 300 MILLIGRAM(S): 400 CAPSULE ORAL at 17:16

## 2021-12-07 RX ADMIN — IVABRADINE 5 MILLIGRAM(S): 7.5 TABLET, FILM COATED ORAL at 17:16

## 2021-12-07 RX ADMIN — HYDROMORPHONE HYDROCHLORIDE 2 MILLIGRAM(S): 2 INJECTION INTRAMUSCULAR; INTRAVENOUS; SUBCUTANEOUS at 06:36

## 2021-12-07 RX ADMIN — HYDROMORPHONE HYDROCHLORIDE 2 MILLIGRAM(S): 2 INJECTION INTRAMUSCULAR; INTRAVENOUS; SUBCUTANEOUS at 11:49

## 2021-12-07 NOTE — PROGRESS NOTE ADULT - ASSESSMENT
72 yo M with PMH of CVA, afib on Eliquis, HTN, HFrEF (EF 20-25% 2018), chronic LLE pain, presented to the ED with worsening Left LE pain for the past 2 months, worsening fatigue, anorexia and significant weight loss. Of note, pt had previously been worked up for the leg pain including a CT scan in march 2021 in [facility], which was negative.    # Neopl-related severe pain 2/2 diffuse bony sclerotic lesions (AP 2765) likely 2/2 prostate cancer (irregularly marginated prostate gland on CT and PSA 1400)  CT chest/abdomen/pelvis - Innumerable diffuse sclerotic osseous lesions -  including 6x5.8x9.4cm mass involving the left iliac wing and 6.6x4.5x4.6cm mass involving left inferior pubic ramus. Expansile sclerotic lesions are noted to involve the right 2nd lateral rib and right 5th anterior rib.  IR s/p bx lt iliac lesion 12/3: f/u path   Uro noted  Hem Onc eval  Rad Onc eval   Pall Care eval   bone scan -> diff bone mets, including vert (kidneys not seen b/o such intense uptake by diff bone mets)  obtain MRI T/L spine w/ gado (AICD is MRI compatible)  rad/onc eval can be completed as outpt  h/o beginning tx:  Casodex 50mg po daily, will add Lupron 22.5 mg IM end of this week.  Abiraterone 1000 mg po daily and Prednisone 5 mg po daily  h/o will start Xgeva as out patient to reduce bone related complications  c/w dilaudid 2mg po q3 prn pain (if using 4 or more tabs/day, could consider fentanyl patch 12 mcg/hr top q72 for longer-acting relief)  c/w neurontin 300mg po q12  bowel reg    # Failure to thrive  dietician eval  MVI q24  Ensure 3x/day    # Symptomatic normo to macro anemia (mild pancytopenia)  no overt bleed  prob related to malignancy and diffuse bone mets  s/p PRBC transfusions -> hgb better  keep hgb > 8    Iron panel: iron sat 32%; ferr: 2624 = NOT SELENA  Folate nl  B12 1540    # FELICITY - likely dehydration; metab acid w/ high gap (prob result of high tumor burden, which is poor prog sign)  Cr improving (baseline ~ 0.8)  IVFs: d/c  Hold entresto - consider restart soon, if BP will allow  c/w NaHCO3 1300mg po q12 til HCO3 > 22  BMP q24    # hypoTN - prob from anemia, CHF meds, low EF and dehydration  keep sys BP above 90 and preferably above 100  consider midodrine  holding parameters for BB    # h/o HTN; h/o CVA, Afib; H/o HFrEF (EF 20-25% in 2018)  c/w Eliquis 5mg po q12   hold Entresto for FELICITY/BP  hold aldactone for FELICITY/BP  c/w metoprolol (hold if low BP)  c/w Ivabradine    # Activity: PT eval for eval for STR - having trouble finding a NH willing to take pt    # GI PPX: NA    # DVT PPX: eliquis     # Full code    # updated sister (Barb) at bedside     Dispo: tx pain; tx HCO3; f/u path of bx; f/u h/o, r/o, pall care; MRI T/L w/ gado (AICD is compatible)  eventually, pt will need SNF for STR upon d/c - anticipate tomorrow    Prog is poor for long term.

## 2021-12-07 NOTE — DISCHARGE NOTE PROVIDER - CARE PROVIDERS DIRECT ADDRESSES
[email or web address],DirectAddress_Unknown,DirectAddress_Unknown

## 2021-12-07 NOTE — DISCHARGE NOTE PROVIDER - NSDCMRMEDTOKEN_GEN_ALL_CORE_FT
Corlanor 5 mg oral tablet: 1 tab(s) orally 2 times a day (with meals)  Eliquis 5 mg oral tablet: 2 tab(s) orally 2 times a day  for 7 days and then 1 tab orally 2 times a day afterwards   Entresto 24 mg-26 mg oral tablet: 1 tab(s) orally 2 times a day  Metoprolol Succinate ER 25 mg oral tablet, extended release: 1 tab(s) orally once a day  oxycodone-acetaminophen 5 mg-325 mg oral tablet: 1 tab(s) orally every 6 hours, As needed, Severe Pain (7 - 10) MDD:20mg  spironolactone 25 mg oral tablet: 1 tab(s) orally once a day   abiraterone 500 mg oral tablet: 2 tab(s) orally once a day   Casodex 50 mg oral tablet: 1 tab(s) orally once a day   Corlanor 5 mg oral tablet: 1 tab(s) orally 2 times a day (with meals)  Eliquis 5 mg oral tablet: 2 tab(s) orally 2 times a day  for 7 days and then 1 tab orally 2 times a day afterwards   Entresto 24 mg-26 mg oral tablet: 1 tab(s) orally 2 times a day  fentaNYL 12 mcg/hr transdermal film, extended release: 1 patch transdermal every 72 hours  gabapentin 300 mg oral capsule: 1 cap(s) orally every 8 hours  HYDROmorphone 2 mg oral tablet: 1 tab(s) orally every 3 hours, As needed, Severe Pain (7 - 10)  melatonin 3 mg oral tablet: 1 tab(s) orally once a day (at bedtime), As needed, Insomnia  Metoprolol Succinate ER 25 mg oral tablet, extended release: 1 tab(s) orally once a day  Multiple Vitamins oral tablet: 1 tab(s) orally once a day  oxycodone-acetaminophen 5 mg-325 mg oral tablet: 1 tab(s) orally every 6 hours, As needed, Severe Pain (7 - 10) MDD:20mg  predniSONE 5 mg oral tablet: 1 tab(s) orally once a day  spironolactone 25 mg oral tablet: 1 tab(s) orally once a day

## 2021-12-07 NOTE — DISCHARGE NOTE PROVIDER - DETAILS OF MALNUTRITION DIAGNOSIS/DIAGNOSES
This patient has been assessed with a concern for Malnutrition and was treated during this hospitalization for the following Nutrition diagnosis/diagnoses:     -  12/03/2021: Severe protein-calorie malnutrition

## 2021-12-07 NOTE — PROGRESS NOTE ADULT - SUBJECTIVE AND OBJECTIVE BOX
SAMANTHA CHARLES 71y Male  MRN#: [id number]   CODE STATUS: full code    Hospital Day: 7d    Pt is currently admitted with the primary diagnosis of metastatic prostate cancer    SUBJECTIVE  Overnight/Interval Events: No acute events overnight. This morning, patient feels all right and his pain is controlled.                                               ----------------------------------------------------------  OBJECTIVE  PAST MEDICAL & SURGICAL HISTORY  Systolic congestive heart failure, unspecified chronicity    Cerebrovascular accident (CVA) due to bilateral embolism of posterior cerebral arteries    Essential hypertension    Atrial fibrillation, unspecified type    AICD (automatic cardioverter/defibrillator) present                                              -----------------------------------------------------------  ALLERGIES:  cephalexin (Flushing)  penicillin (Rash)                                            ------------------------------------------------------------    HOME MEDICATIONS  Home Medications:  Corlanor 5 mg oral tablet: 1 tab(s) orally 2 times a day (with meals) (30 Nov 2021 17:21)  Metoprolol Succinate ER 25 mg oral tablet, extended release: 1 tab(s) orally once a day (30 Nov 2021 17:20)  spironolactone 25 mg oral tablet: 1 tab(s) orally once a day (14 May 2021 11:51)                           MEDICATIONS:  STANDING MEDICATIONS  abiraterone 1000 milliGRAM(s) Oral daily  apixaban 5 milliGRAM(s) Oral every 12 hours  bicalutamide 50 milliGRAM(s) Oral daily  gabapentin 300 milliGRAM(s) Oral every 12 hours  influenza  Vaccine (HIGH DOSE) 0.7 milliLiter(s) IntraMuscular once  ivabradine 5 milliGRAM(s) Oral two times a day  metoprolol succinate ER 25 milliGRAM(s) Oral daily  multivitamin 1 Tablet(s) Oral daily  predniSONE   Tablet 5 milliGRAM(s) Oral daily  sodium bicarbonate 1300 milliGRAM(s) Oral every 12 hours    PRN MEDICATIONS  acetaminophen     Tablet .. 650 milliGRAM(s) Oral every 6 hours PRN  aluminum hydroxide/magnesium hydroxide/simethicone Suspension 30 milliLiter(s) Oral every 4 hours PRN  HYDROmorphone   Tablet 2 milliGRAM(s) Oral every 3 hours PRN  melatonin 3 milliGRAM(s) Oral at bedtime PRN  ondansetron Injectable 4 milliGRAM(s) IV Push every 8 hours PRN                                            ------------------------------------------------------------  VITAL SIGNS: Last 24 Hours  T(C): 36.1 (07 Dec 2021 06:20), Max: 37.1 (06 Dec 2021 21:00)  T(F): 96.9 (07 Dec 2021 06:20), Max: 98.7 (06 Dec 2021 21:00)  HR: 74 (07 Dec 2021 06:20) (74 - 87)  BP: 113/52 (07 Dec 2021 06:20) (105/53 - 113/52)  BP(mean): --  RR: 18 (07 Dec 2021 06:20) (18 - 19)  SpO2: --      12-06-21 @ 07:01  -  12-07-21 @ 07:00  --------------------------------------------------------  IN: 0 mL / OUT: 500 mL / NET: -500 mL                                             --------------------------------------------------------------  LABS:                        8.6    4.84  )-----------( 120      ( 07 Dec 2021 04:30 )             27.3     12-07    138  |  105  |  17  ----------------------------<  81  4.5   |  17  |  0.8    Ca    7.7<L>      07 Dec 2021 04:30  Mg     2.2     12-07                                                                -------------------------------------------------------------  RADIOLOGY:                                            --------------------------------------------------------------    PHYSICAL EXAM:  General:   HEENT:  LUNGS:  HEART:  ABDOMEN:  EXT:  NEURO:  SKIN:                                           --------------------------------------------------------------    ASSESSMENT & PLAN                                    # DVT prophylaxis   # GI prophylaxis   # Diet   # Activity Score (AM-PAC)  # Code status   # Disposition      SAMANTHA CHARLES 71y Male  MRN#: [id number]   CODE STATUS: full code    Hospital Day: 7d    Pt is currently admitted with the primary diagnosis of metastatic prostate cancer    SUBJECTIVE  Overnight/Interval Events: No acute events overnight. This morning, patient feels all right and his pain is controlled.                                               ----------------------------------------------------------  OBJECTIVE  PAST MEDICAL & SURGICAL HISTORY  Systolic congestive heart failure, unspecified chronicity    Cerebrovascular accident (CVA) due to bilateral embolism of posterior cerebral arteries    Essential hypertension    Atrial fibrillation, unspecified type    AICD (automatic cardioverter/defibrillator) present                                              -----------------------------------------------------------  ALLERGIES:  cephalexin (Flushing)  penicillin (Rash)                                            ------------------------------------------------------------    HOME MEDICATIONS  Home Medications:  Corlanor 5 mg oral tablet: 1 tab(s) orally 2 times a day (with meals) (30 Nov 2021 17:21)  Metoprolol Succinate ER 25 mg oral tablet, extended release: 1 tab(s) orally once a day (30 Nov 2021 17:20)  spironolactone 25 mg oral tablet: 1 tab(s) orally once a day (14 May 2021 11:51)                           MEDICATIONS:  STANDING MEDICATIONS  abiraterone 1000 milliGRAM(s) Oral daily  apixaban 5 milliGRAM(s) Oral every 12 hours  bicalutamide 50 milliGRAM(s) Oral daily  gabapentin 300 milliGRAM(s) Oral every 12 hours  influenza  Vaccine (HIGH DOSE) 0.7 milliLiter(s) IntraMuscular once  ivabradine 5 milliGRAM(s) Oral two times a day  metoprolol succinate ER 25 milliGRAM(s) Oral daily  multivitamin 1 Tablet(s) Oral daily  predniSONE   Tablet 5 milliGRAM(s) Oral daily  sodium bicarbonate 1300 milliGRAM(s) Oral every 12 hours    PRN MEDICATIONS  acetaminophen     Tablet .. 650 milliGRAM(s) Oral every 6 hours PRN  aluminum hydroxide/magnesium hydroxide/simethicone Suspension 30 milliLiter(s) Oral every 4 hours PRN  HYDROmorphone   Tablet 2 milliGRAM(s) Oral every 3 hours PRN  melatonin 3 milliGRAM(s) Oral at bedtime PRN  ondansetron Injectable 4 milliGRAM(s) IV Push every 8 hours PRN                                            ------------------------------------------------------------  VITAL SIGNS: Last 24 Hours  T(C): 36.1 (07 Dec 2021 06:20), Max: 37.1 (06 Dec 2021 21:00)  T(F): 96.9 (07 Dec 2021 06:20), Max: 98.7 (06 Dec 2021 21:00)  HR: 74 (07 Dec 2021 06:20) (74 - 87)  BP: 113/52 (07 Dec 2021 06:20) (105/53 - 113/52)  BP(mean): --  RR: 18 (07 Dec 2021 06:20) (18 - 19)  SpO2: --      12-06-21 @ 07:01  -  12-07-21 @ 07:00  --------------------------------------------------------  IN: 0 mL / OUT: 500 mL / NET: -500 mL                                             --------------------------------------------------------------  LABS:                        8.6    4.84  )-----------( 120      ( 07 Dec 2021 04:30 )             27.3     12-07    138  |  105  |  17  ----------------------------<  81  4.5   |  17  |  0.8    Ca    7.7<L>      07 Dec 2021 04:30  Mg     2.2     12-07                                            -------------------------------------------------------------  RADIOLOGY:    PROCEDURE DATE:  12/03/2021    EXAM:  NM BONE SPECT SA SD        EXAM:  NM BONE IMG WHOLE BODY            INTERPRETATION:  Procedure: Whole body and regional bone images and bone SPECT  Reason: Metastatic prostate cancer    CT chest, abdomen and pelvis November 30, 2021 demonstrates innumerable diffuse sclerotic osseous lesions compatible with osseous metastasis.    Approximately 3 hours after the intravenous administration of  20 millicuries 99m technetium MDP, anterior and posteriorwhole body format bone images were obtained from head to feet.  In addition regional images were also obtained and SPECT images were obtained over thoracolumbar spine and pelvis and reconstructions and three-dimensional reconstructions were generated.    There is diffusely increased uptake throughout the calvarium, thoracic and lumbar spine, pelvis, bilateral femurs, bilateral humeri, bilateral scapula and innumerable bilateral ribs. No visualization of bilateral kidneys.    Findings consistent with diffuse metastatic bone disease, particularly when compared with referenced CT.    Impression: Abnormal bone scan. Diffusely increased bone agent uptake throughout the skeleton consistent with diffuse metastatic bone disease. Sites include calvarium, thoracic and lumbar spine, bilateral iliac bones, bilateral femurs, bilateral humeri, bilateral scapula and innumerable bilateral ribs.    Nonvisualization of the kidneys probably explain lobe right intense uptake by bone metastasis ("absent kidney sign" - superscan).                                              --------------------------------------------------------------    PHYSICAL EXAM:  GEN: No acute distress, cachectic male  HEENT: NCAT  LUNGS: Clear to auscultation bilaterally   HEART: S1/S2 present. RRR.   ABD: Soft, non-tender, non-distended. Bowel sounds present  EXT: no le edema  NEURO: AAOX3  SKIN: No new lesions                                           --------------------------------------------------------------    ASSESSMENT & PLAN    70 yo male with PMH of CVA, afib on Eliquis, HTN, HFrEF (EF 20-25% 2018), chronic LLE pain, presented to the ED with worsening Left LE pain for the past 2 months, worsening fatigue, anorexia and significant weight loss.    #prostate cancer with distant mets   PSA 1458, alk phos 2676  CT chest/abdomen/pelvis - Innumerable diffuse sclerotic osseous lesions -  including 6x5.8x9.4cm mass involving the left iliac wing and 6.6x4.5x4.6cm mass involving left inferior pubic ramus. Expansile sclerotic lesions are noted to involve the right 2nd lateral rib and right 5th anterior rib.  - s/p bone bx by IR of left iliac lesion 12/3: f/u pathology from bone biopsy  - s/p bone scan showing multiple metastatic lesions including the spine  - f/u MRI C/T/L spine to look for cord compression; based on result, f/u with rad/onc for palliative radiation  Heme/Onc Recommendations:  -Casodex 50mg po daily, will add Lupron 22.5 mg IM in 1 week.  -Abiraterone (zytiga) 1000 mg po daily and Prednisone 5 mg po daily: pharmacy trying to get zytiga   -Start Xgeva as out patient to reduce bone related complications    #neoplasm-related bone pain  -tylenol 650mg PO q6h prn pain  -Dilaudid 2mg PO q3h prn pain  -Neurontin 300mg po q12  -steroid course: Pred 5mg po q24 started SUN as per Heme/Onc  -bowel regimen: senna and Miralax  -per pall care, no indication for fentanyl patch as patient used 4mg dilaudid in the last 24hours and his pain is intermittent    # Failure to thrive  Severe protein-calorie malnutrition.  dietician recommendations DASH/TLC diet(No pork),  Ensure 3x/day, beneprotein 3x/day ([facility] only) /w LR @50CC/H    # Symptomatic anemia- likely 2/2 malignancy  - s/p 2 units pRBC   - 12/6 Hgb 8.8  will follow  -keep hgb > 8; may need lasix after future tx given poor EF  -11/30 iron: 71, TIBC 220, 32% saturation, folate 8.5, wnl, retics 1.8%,   -12/2 B12 1540    # FELICITY - Resolved  #metabolic acid w/ high gap  -Cr .7 (decreased from 1.4); baseline ~ 0.8  -eGFR   -consider restarting Entresto and Aldactone  -Bicarb 18  - incr NaHCO3 1300mg po q12 until HCO3 > 22    # h/o HTN; h/o CVA, Afib; H/o HFrEF (EF 20-25% in 2018)  HypoTN - prob from anemia, CHF meds, low EF and dehydration  - Eliquis restarted 12/4  -FELICITY resolved consider restarting meds as above  - c/w metoprolol (hold if low BP), Ivabradine  -keep sys BP above 90, preferably above 100    Activity: : PT eval for eval for STR  GI PPX: NA  DVT PPX: Eliquis  Full code  Dispo: tx pain; tx low BP; tx HCO3; f/u path of bx; f/u h/o, r/o, pall care; bone scan as above (poss MRIs if AICD compatible )  eventually, pt will need SNF for STR, COVID pcr ordered,  anticipate                               SAMANTHA CHARLES 71y Male  MRN#: [id number]   CODE STATUS: full code    Hospital Day: 7d    Pt is currently admitted with the primary diagnosis of metastatic prostate cancer    SUBJECTIVE  Overnight/Interval Events: No acute events overnight. This morning, patient feels all right and his pain is controlled.                                               ----------------------------------------------------------  OBJECTIVE  PAST MEDICAL & SURGICAL HISTORY  Systolic congestive heart failure, unspecified chronicity  Cerebrovascular accident (CVA) due to bilateral embolism of posterior cerebral arteries  Essential hypertension  Atrial fibrillation, unspecified type  AICD (automatic cardioverter/defibrillator) present                                            -----------------------------------------------------------  ALLERGIES:  cephalexin (Flushing)  penicillin (Rash)                                          ------------------------------------------------------------    HOME MEDICATIONS  Home Medications:  Corlanor 5 mg oral tablet: 1 tab(s) orally 2 times a day (with meals) (30 Nov 2021 17:21)  Metoprolol Succinate ER 25 mg oral tablet, extended release: 1 tab(s) orally once a day (30 Nov 2021 17:20)  spironolactone 25 mg oral tablet: 1 tab(s) orally once a day (14 May 2021 11:51)                           MEDICATIONS:  STANDING MEDICATIONS  abiraterone 1000 milliGRAM(s) Oral daily  apixaban 5 milliGRAM(s) Oral every 12 hours  bicalutamide 50 milliGRAM(s) Oral daily  gabapentin 300 milliGRAM(s) Oral every 12 hours  influenza  Vaccine (HIGH DOSE) 0.7 milliLiter(s) IntraMuscular once  ivabradine 5 milliGRAM(s) Oral two times a day  metoprolol succinate ER 25 milliGRAM(s) Oral daily  multivitamin 1 Tablet(s) Oral daily  predniSONE   Tablet 5 milliGRAM(s) Oral daily  sodium bicarbonate 1300 milliGRAM(s) Oral every 12 hours    PRN MEDICATIONS  acetaminophen     Tablet .. 650 milliGRAM(s) Oral every 6 hours PRN  aluminum hydroxide/magnesium hydroxide/simethicone Suspension 30 milliLiter(s) Oral every 4 hours PRN  HYDROmorphone   Tablet 2 milliGRAM(s) Oral every 3 hours PRN  melatonin 3 milliGRAM(s) Oral at bedtime PRN  ondansetron Injectable 4 milliGRAM(s) IV Push every 8 hours PRN                                            ------------------------------------------------------------  VITAL SIGNS: Last 24 Hours  T(C): 36.1 (07 Dec 2021 06:20), Max: 37.1 (06 Dec 2021 21:00)  T(F): 96.9 (07 Dec 2021 06:20), Max: 98.7 (06 Dec 2021 21:00)  HR: 74 (07 Dec 2021 06:20) (74 - 87)  BP: 113/52 (07 Dec 2021 06:20) (105/53 - 113/52)  BP(mean): --  RR: 18 (07 Dec 2021 06:20) (18 - 19)  SpO2: --      12-06-21 @ 07:01  -  12-07-21 @ 07:00  --------------------------------------------------------  IN: 0 mL / OUT: 500 mL / NET: -500 mL                                             --------------------------------------------------------------  LABS:                        8.6    4.84  )-----------( 120      ( 07 Dec 2021 04:30 )             27.3     12-07    138  |  105  |  17  ----------------------------<  81  4.5   |  17  |  0.8    Ca    7.7<L>      07 Dec 2021 04:30  Mg     2.2     12-07                                            -------------------------------------------------------------  RADIOLOGY:    PROCEDURE DATE:  12/03/2021    EXAM:  NM BONE SPECT SA SD        EXAM:  NM BONE IMG WHOLE BODY            INTERPRETATION:  Procedure: Whole body and regional bone images and bone SPECT  Reason: Metastatic prostate cancer    CT chest, abdomen and pelvis November 30, 2021 demonstrates innumerable diffuse sclerotic osseous lesions compatible with osseous metastasis.    Approximately 3 hours after the intravenous administration of  20 millicuries 99m technetium MDP, anterior and posteriorwhole body format bone images were obtained from head to feet.  In addition regional images were also obtained and SPECT images were obtained over thoracolumbar spine and pelvis and reconstructions and three-dimensional reconstructions were generated.    There is diffusely increased uptake throughout the calvarium, thoracic and lumbar spine, pelvis, bilateral femurs, bilateral humeri, bilateral scapula and innumerable bilateral ribs. No visualization of bilateral kidneys.    Findings consistent with diffuse metastatic bone disease, particularly when compared with referenced CT.    Impression: Abnormal bone scan. Diffusely increased bone agent uptake throughout the skeleton consistent with diffuse metastatic bone disease. Sites include calvarium, thoracic and lumbar spine, bilateral iliac bones, bilateral femurs, bilateral humeri, bilateral scapula and innumerable bilateral ribs.    Nonvisualization of the kidneys probably explain lobe right intense uptake by bone metastasis ("absent kidney sign" - superscan).                                              --------------------------------------------------------------    PHYSICAL EXAM:  GEN: No acute distress, cachectic male  HEENT: NCAT  LUNGS: Clear to auscultation bilaterally   HEART: S1/S2 present. RRR.   ABD: Soft, non-tender, non-distended. Bowel sounds present  EXT: no le edema  NEURO: AAOX3  SKIN: No new lesions                                           --------------------------------------------------------------    ASSESSMENT & PLAN    70 yo male with PMH of CVA, afib on Eliquis, HTN, HFrEF (EF 20-25% 2018), chronic LLE pain, presented to the ED with worsening Left LE pain for the past 2 months, worsening fatigue, anorexia and significant weight loss.    #prostate cancer with distant mets   PSA 1458, alk phos 2676  CT chest/abdomen/pelvis - Innumerable diffuse sclerotic osseous lesions -  including 6x5.8x9.4cm mass involving the left iliac wing and 6.6x4.5x4.6cm mass involving left inferior pubic ramus. Expansile sclerotic lesions are noted to involve the right 2nd lateral rib and right 5th anterior rib.  - s/p bone bx by IR of left iliac lesion 12/3: f/u pathology from bone biopsy  - s/p bone scan showing multiple metastatic lesions including the spine  - f/u MRI C/T/L spine to look for cord compression; based on result, f/u with rad/onc for palliative radiation  per hem-onc:  -Casodex 50mg po daily (started on 12/3), will add Lupron 22.5 mg IM in 1 week.  -Abiraterone (zytiga) 1000 mg po daily and Prednisone 5 mg po daily: start Xgeva as out patient to reduce bone related complications    # anemia of chronic disease  - hgb 8.6 in AM, stable  - s/p 4 units pRBC this admission  -keep hgb > 8; may need lasix after future tx given poor EF  Ferritin elevated to 2624; B12 elevated; Folate WNL; ; Haptoglobin pending. Retic 1.8%      #neoplasm-related bone pain  -tylenol 650mg PO q6h prn pain  -Dilaudid 2mg PO q3h prn pain  -Neurontin 300mg po q12  -steroid course: Pred 5mg po q24 started SUN as per Heme/Onc  -bowel regimen: senna and Miralax  -per pall care, no indication for fentanyl patch as patient used 4mg dilaudid in the last 24hours and his pain is intermittent    # Failure to thrive  Severe protein-calorie malnutrition.  dietician recommendations DASH/TLC diet(No pork),  Ensure 3x/day, beneprotein 3x/day ([facility] only) /w LR @50CC/H      # h/o HTN; h/o CVA, Afib; H/o HFrEF (EF 20-25% in 2018)  HypoTN - prob from anemia, CHF meds, low EF and dehydration  - Eliquis restarted 12/4  -FELICITY resolved consider restarting meds as above  - c/w metoprolol (hold if low BP), Ivabradine  -keep sys BP above 90, preferably above 100    Activity: : PT eval for eval for STR  GI PPX: NA  DVT PPX: Eliquis  Full code  Dispo: tx pain; tx low BP; tx HCO3; f/u path of bx; f/u h/o, r/o, pall care; bone scan as above (poss MRIs if AICD compatible )  eventually, pt will need SNF for STR, COVID pcr ordered,  anticipate                               SAMANTHA CHARLES 71y Male  MRN#: [id number]   CODE STATUS: full code    Hospital Day: 7d    Pt is currently admitted with the primary diagnosis of metastatic prostate cancer    SUBJECTIVE  Overnight/Interval Events: No acute events overnight. This morning, patient feels all right and his pain is controlled.                                               ----------------------------------------------------------  OBJECTIVE  PAST MEDICAL & SURGICAL HISTORY  Systolic congestive heart failure, unspecified chronicity  Cerebrovascular accident (CVA) due to bilateral embolism of posterior cerebral arteries  Essential hypertension  Atrial fibrillation, unspecified type  AICD (automatic cardioverter/defibrillator) present                                            -----------------------------------------------------------  ALLERGIES:  cephalexin (Flushing)  penicillin (Rash)                                          ------------------------------------------------------------    HOME MEDICATIONS  Home Medications:  Corlanor 5 mg oral tablet: 1 tab(s) orally 2 times a day (with meals) (30 Nov 2021 17:21)  Metoprolol Succinate ER 25 mg oral tablet, extended release: 1 tab(s) orally once a day (30 Nov 2021 17:20)  spironolactone 25 mg oral tablet: 1 tab(s) orally once a day (14 May 2021 11:51)                           MEDICATIONS:  STANDING MEDICATIONS  abiraterone 1000 milliGRAM(s) Oral daily  apixaban 5 milliGRAM(s) Oral every 12 hours  bicalutamide 50 milliGRAM(s) Oral daily  gabapentin 300 milliGRAM(s) Oral every 12 hours  influenza  Vaccine (HIGH DOSE) 0.7 milliLiter(s) IntraMuscular once  ivabradine 5 milliGRAM(s) Oral two times a day  metoprolol succinate ER 25 milliGRAM(s) Oral daily  multivitamin 1 Tablet(s) Oral daily  predniSONE   Tablet 5 milliGRAM(s) Oral daily  sodium bicarbonate 1300 milliGRAM(s) Oral every 12 hours    PRN MEDICATIONS  acetaminophen     Tablet .. 650 milliGRAM(s) Oral every 6 hours PRN  aluminum hydroxide/magnesium hydroxide/simethicone Suspension 30 milliLiter(s) Oral every 4 hours PRN  HYDROmorphone   Tablet 2 milliGRAM(s) Oral every 3 hours PRN  melatonin 3 milliGRAM(s) Oral at bedtime PRN  ondansetron Injectable 4 milliGRAM(s) IV Push every 8 hours PRN                                            ------------------------------------------------------------  VITAL SIGNS: Last 24 Hours  T(C): 36.1 (07 Dec 2021 06:20), Max: 37.1 (06 Dec 2021 21:00)  T(F): 96.9 (07 Dec 2021 06:20), Max: 98.7 (06 Dec 2021 21:00)  HR: 74 (07 Dec 2021 06:20) (74 - 87)  BP: 113/52 (07 Dec 2021 06:20) (105/53 - 113/52)  BP(mean): --  RR: 18 (07 Dec 2021 06:20) (18 - 19)  SpO2: --      12-06-21 @ 07:01  -  12-07-21 @ 07:00  --------------------------------------------------------  IN: 0 mL / OUT: 500 mL / NET: -500 mL                                             --------------------------------------------------------------  LABS:                        8.6    4.84  )-----------( 120      ( 07 Dec 2021 04:30 )             27.3     12-07    138  |  105  |  17  ----------------------------<  81  4.5   |  17  |  0.8    Ca    7.7<L>      07 Dec 2021 04:30  Mg     2.2     12-07                                            -------------------------------------------------------------  RADIOLOGY:    PROCEDURE DATE:  12/03/2021    EXAM:  NM BONE SPECT SA SD        EXAM:  NM BONE IMG WHOLE BODY            INTERPRETATION:  Procedure: Whole body and regional bone images and bone SPECT  Reason: Metastatic prostate cancer    CT chest, abdomen and pelvis November 30, 2021 demonstrates innumerable diffuse sclerotic osseous lesions compatible with osseous metastasis.    Approximately 3 hours after the intravenous administration of  20 millicuries 99m technetium MDP, anterior and posteriorwhole body format bone images were obtained from head to feet.  In addition regional images were also obtained and SPECT images were obtained over thoracolumbar spine and pelvis and reconstructions and three-dimensional reconstructions were generated.    There is diffusely increased uptake throughout the calvarium, thoracic and lumbar spine, pelvis, bilateral femurs, bilateral humeri, bilateral scapula and innumerable bilateral ribs. No visualization of bilateral kidneys.    Findings consistent with diffuse metastatic bone disease, particularly when compared with referenced CT.    Impression: Abnormal bone scan. Diffusely increased bone agent uptake throughout the skeleton consistent with diffuse metastatic bone disease. Sites include calvarium, thoracic and lumbar spine, bilateral iliac bones, bilateral femurs, bilateral humeri, bilateral scapula and innumerable bilateral ribs.    Nonvisualization of the kidneys probably explain lobe right intense uptake by bone metastasis ("absent kidney sign" - superscan).                                              --------------------------------------------------------------    PHYSICAL EXAM:  GEN: No acute distress, cachectic male  HEENT: NCAT  LUNGS: Clear to auscultation bilaterally   HEART: S1/S2 present. RRR.   ABD: Soft, non-tender, non-distended. Bowel sounds present  EXT: no le edema  NEURO: AAOX3  SKIN: No new lesions                                           --------------------------------------------------------------    ASSESSMENT & PLAN    72 yo male with PMH of CVA, afib on Eliquis, HTN, HFrEF (EF 20-25% 2018), chronic LLE pain, presented to the ED with worsening Left LE pain for the past 2 months, worsening fatigue, anorexia and significant weight loss.    #prostate cancer with distant mets   PSA 1458, alk phos 2676  CT chest/abdomen/pelvis - Innumerable diffuse sclerotic osseous lesions -  including 6x5.8x9.4cm mass involving the left iliac wing and 6.6x4.5x4.6cm mass involving left inferior pubic ramus. Expansile sclerotic lesions are noted to involve the right 2nd lateral rib and right 5th anterior rib.  - s/p bone bx by IR of left iliac lesion 12/3: f/u pathology from bone biopsy  - s/p bone scan showing multiple metastatic lesions including the spine  - f/u MRI C/T/L spine to look for cord compression; based on result, f/u with rad/onc for palliative radiation  per hem-onc:  -Casodex 50mg po daily (started on 12/3), will add Lupron 22.5 mg IM in 1 week.  -Abiraterone (zytiga) 1000 mg po daily and Prednisone 5 mg po daily: start Xgeva as out patient to reduce bone related complications    # anemia of chronic disease  - hgb 8.6 in AM, stable  - s/p 4 units pRBC this admission  -keep hgb > 8; may need lasix after future tx given poor EF  Ferritin elevated to 2624; B12 elevated; Folate WNL; ; Haptoglobin pending. Retic 1.8%    #neoplasm-related bone pain  -tylenol 650mg PO q6h prn pain  -Dilaudid 2mg PO q3h prn pain  -Neurontin 300mg po q12  -steroid course: Pred 5mg po q24 started SUN as per Heme/Onc  -bowel regimen: senna and Miralax  -per pall care, no indication for fentanyl patch as patient used 4mg dilaudid in the last 24hours and his pain is intermittent    # Failure to thrive  Severe protein-calorie malnutrition.  dietician recommendations DASH/TLC diet(No pork),  Ensure 3x/day, beneprotein 3x/day ([facility] only) /w LR @50CC/H    # hypotension - prob from anemia, CHF meds, low EF and dehydration  keep SBP >90  consider midodrine  holding parameters for BB    #Afib;   H/o HFrEF (EF 20-25% in 2018)  - c/w elliquis 5mg PO q12h  - hold entresto and aldactone for FELICITY/hypotension  - c/w metoprolol  - c/w ivrabadine    Activity: : PT eval for STR  GI PPX: NA  DVT PPX: Eliquis  Code: Full code  Dispo: anticipate for tmrw

## 2021-12-07 NOTE — PROGRESS NOTE ADULT - SUBJECTIVE AND OBJECTIVE BOX
24H events:    Patient is a 71y old Male who presents with a chief complaint of Loss of appetite, weight loss, and knee pain (07 Dec 2021 09:24)    Primary diagnosis of Prostate cancer       Today is hospital day 7d.     PAST MEDICAL & SURGICAL HISTORY  Systolic congestive heart failure, unspecified chronicity    Cerebrovascular accident (CVA) due to bilateral embolism of posterior cerebral arteries    Essential hypertension    Atrial fibrillation, unspecified type    AICD (automatic cardioverter/defibrillator) present      SOCIAL HISTORY:  Negative for smoking/alcohol/drug use.     ALLERGIES:  cephalexin (Flushing)  penicillin (Rash)    MEDICATIONS:  STANDING MEDICATIONS  abiraterone 1000 milliGRAM(s) Oral daily  apixaban 5 milliGRAM(s) Oral every 12 hours  bicalutamide 50 milliGRAM(s) Oral daily  gabapentin 300 milliGRAM(s) Oral every 12 hours  influenza  Vaccine (HIGH DOSE) 0.7 milliLiter(s) IntraMuscular once  ivabradine 5 milliGRAM(s) Oral two times a day  metoprolol succinate ER 25 milliGRAM(s) Oral daily  multivitamin 1 Tablet(s) Oral daily  predniSONE   Tablet 5 milliGRAM(s) Oral daily  sodium bicarbonate 1300 milliGRAM(s) Oral every 12 hours    PRN MEDICATIONS  acetaminophen     Tablet .. 650 milliGRAM(s) Oral every 6 hours PRN  aluminum hydroxide/magnesium hydroxide/simethicone Suspension 30 milliLiter(s) Oral every 4 hours PRN  HYDROmorphone   Tablet 2 milliGRAM(s) Oral every 3 hours PRN  melatonin 3 milliGRAM(s) Oral at bedtime PRN  ondansetron Injectable 4 milliGRAM(s) IV Push every 8 hours PRN    VITALS:   T(F): 96.9  HR: 74  BP: 113/52  RR: 18  SpO2: --    LABS:                        8.6    4.84  )-----------( 120      ( 07 Dec 2021 04:30 )             27.3     12-07    138  |  105  |  17  ----------------------------<  81  4.5   |  17  |  0.8    Ca    7.7<L>      07 Dec 2021 04:30  Mg     2.2     12-07                    RADIOLOGY:    PHYSICAL EXAM:  GEN: No acute distress  HENT: NCAT, EOMI  LYMPH: No appreciable adenopathy  LUNGS: No respiratory distress, clear to auscultation bilaterally   HEART: regular rate and rhythm  ABD: Soft, non-tender, non-distended  SKIN: No rash  EXT: No edema  NEURO: AAOX3     24H events:  Started Zytiga and prednisone  Pain controlled    PAST MEDICAL & SURGICAL HISTORY  Systolic congestive heart failure, unspecified chronicity    Cerebrovascular accident (CVA) due to bilateral embolism of posterior cerebral arteries    Essential hypertension    Atrial fibrillation, unspecified type    AICD (automatic cardioverter/defibrillator) present      SOCIAL HISTORY:  Negative for smoking/alcohol/drug use.     ALLERGIES:  cephalexin (Flushing)  penicillin (Rash)    MEDICATIONS:  STANDING MEDICATIONS  abiraterone 1000 milliGRAM(s) Oral daily  apixaban 5 milliGRAM(s) Oral every 12 hours  bicalutamide 50 milliGRAM(s) Oral daily  gabapentin 300 milliGRAM(s) Oral every 12 hours  influenza  Vaccine (HIGH DOSE) 0.7 milliLiter(s) IntraMuscular once  ivabradine 5 milliGRAM(s) Oral two times a day  metoprolol succinate ER 25 milliGRAM(s) Oral daily  multivitamin 1 Tablet(s) Oral daily  predniSONE   Tablet 5 milliGRAM(s) Oral daily  sodium bicarbonate 1300 milliGRAM(s) Oral every 12 hours    PRN MEDICATIONS  acetaminophen     Tablet .. 650 milliGRAM(s) Oral every 6 hours PRN  aluminum hydroxide/magnesium hydroxide/simethicone Suspension 30 milliLiter(s) Oral every 4 hours PRN  HYDROmorphone   Tablet 2 milliGRAM(s) Oral every 3 hours PRN  melatonin 3 milliGRAM(s) Oral at bedtime PRN  ondansetron Injectable 4 milliGRAM(s) IV Push every 8 hours PRN    VITALS:   T(F): 96.9  HR: 74  BP: 113/52  RR: 18  SpO2: --    LABS:                        8.6    4.84  )-----------( 120      ( 07 Dec 2021 04:30 )             27.3     12-07    138  |  105  |  17  ----------------------------<  81  4.5   |  17  |  0.8    Ca    7.7<L>      07 Dec 2021 04:30  Mg     2.2     12-07                    RADIOLOGY:  < from: [facility] (12.03.21 @ 17:22) >    Abnormal bone scan. Diffusely increased bone agent uptake throughout the skeleton consistent with diffuse metastatic bone disease. Sites include calvarium, thoracic and lumbar spine, bilateral iliac bones, bilateral femurs, bilateral humeri, bilateral scapula and innumerable bilateral ribs.    Nonvisualization of the kidneys probably explain lobe right intense uptake by bone metastasis ("absent kidney sign" - superscan).    < end of copied text >        PHYSICAL EXAM:  GEN: No acute distress  HENT: NCAT, EOMI  LYMPH: No appreciable adenopathy  LUNGS: No respiratory distress, clear to auscultation bilaterally   HEART: regular rate and rhythm  ABD: Soft, non-tender, non-distended  SKIN: No rash  EXT: No edema  NEURO: AAOX3     24H events:  Started Zytiga and prednisone  Pain controlled    PAST MEDICAL & SURGICAL HISTORY  Systolic congestive heart failure, unspecified chronicity    Cerebrovascular accident (CVA) due to bilateral embolism of posterior cerebral arteries    Essential hypertension    Atrial fibrillation, unspecified type    AICD (automatic cardioverter/defibrillator) present      SOCIAL HISTORY:  Negative for smoking/alcohol/drug use.     ALLERGIES:  cephalexin (Flushing)  penicillin (Rash)    MEDICATIONS:  STANDING MEDICATIONS  abiraterone 1000 milliGRAM(s) Oral daily  apixaban 5 milliGRAM(s) Oral every 12 hours  bicalutamide 50 milliGRAM(s) Oral daily  gabapentin 300 milliGRAM(s) Oral every 12 hours  influenza  Vaccine (HIGH DOSE) 0.7 milliLiter(s) IntraMuscular once  ivabradine 5 milliGRAM(s) Oral two times a day  metoprolol succinate ER 25 milliGRAM(s) Oral daily  multivitamin 1 Tablet(s) Oral daily  predniSONE   Tablet 5 milliGRAM(s) Oral daily  sodium bicarbonate 1300 milliGRAM(s) Oral every 12 hours    PRN MEDICATIONS  acetaminophen     Tablet .. 650 milliGRAM(s) Oral every 6 hours PRN  aluminum hydroxide/magnesium hydroxide/simethicone Suspension 30 milliLiter(s) Oral every 4 hours PRN  HYDROmorphone   Tablet 2 milliGRAM(s) Oral every 3 hours PRN  melatonin 3 milliGRAM(s) Oral at bedtime PRN  ondansetron Injectable 4 milliGRAM(s) IV Push every 8 hours PRN    VITALS:   T(F): 96.9  HR: 74  BP: 113/52  RR: 18  SpO2: --    LABS:                        8.6    4.84  )-----------( 120      ( 07 Dec 2021 04:30 )             27.3     12-07    138  |  105  |  17  ----------------------------<  81  4.5   |  17  |  0.8    Ca    7.7<L>      07 Dec 2021 04:30  Mg     2.2     12-07                    RADIOLOGY:  < from: [facility] (12.03.21 @ 17:22) >    Abnormal bone scan. Diffusely increased bone agent uptake throughout the skeleton consistent with diffuse metastatic bone disease. Sites include calvarium, thoracic and lumbar spine, bilateral iliac bones, bilateral femurs, bilateral humeri, bilateral scapula and innumerable bilateral ribs.    Nonvisualization of the kidneys probably explain lobe right intense uptake by bone metastasis ("absent kidney sign" - superscan).    < end of copied text >        PHYSICAL EXAM:  GEN: frail appearing  HENT: NCAT, EOMI  LYMPH: No appreciable adenopathy  LUNGS: No respiratory distress, clear to auscultation bilaterally   HEART: regular rate and rhythm  ABD: Soft, non-tender, non-distended  SKIN: No rash  EXT: No edema  NEURO: AAOX3

## 2021-12-07 NOTE — DISCHARGE NOTE PROVIDER - HOSPITAL COURSE
HPI:    70 yo M with PMH of CVA, afib on Eliquis, HTN, HFrEF ( EF 20-25% 2018), chronic LLE pain, presented to the ED with worsening Left LE pain for the past 2 months, worsening fatigue, anorexia and significant weight loss. Of note pt has previously been worked up for the leg pain including a CT scan in march 2021 in [facility] which was negative. Patient has been getting progressively weak and is unable to ambulate now. He denies any chest pain, shortness of breath, fever, cough. Pt was being seen by a GI w/ planned colonoscopy today but on arrival to office, his GI physician was concerned about his cachetic appearance and rapid weight loss, so he sent him into ED immediately for evaluation.   In the ED his vitals were significant for /140mmHg, /min, other vitals were normal. Labs were significant for Hb of 6.4, Cr 1.4 (0.8 at baseline), bicarb 16, AG 24, ALP 2765, Lactate of 5.1. CT chest, abdomen and pelvis showed -  Innumerable diffuse sclerotic osseous lesions, compatible with osseous metastases and Enlarged irregularly marginated prostate gland, possibly reflecting underlying neoplastic process.  Patient being admitted for management of symptomatic anemia, FELICITY and likely metastatic prostatic ca with diffuse osseous mets.    Hospital Course:    Patient was admitted to medicine and was seen by urology and hematology-oncology. He was started on hormonal therapy, casodex, HPI:    72 yo M with PMH of CVA, afib on Eliquis, HTN, HFrEF ( EF 20-25% 2018), chronic LLE pain, presented to the ED with worsening Left LE pain for the past 2 months, worsening fatigue, anorexia and significant weight loss. Of note pt has previously been worked up for the leg pain including a CT scan in march 2021 in [facility] which was negative. Patient has been getting progressively weak and is unable to ambulate now. He denies any chest pain, shortness of breath, fever, cough. Pt was being seen by a GI w/ planned colonoscopy today but on arrival to office, his GI physician was concerned about his cachetic appearance and rapid weight loss, so he sent him into ED immediately for evaluation.   In the ED his vitals were significant for /140mmHg, /min, other vitals were normal. Labs were significant for Hb of 6.4, Cr 1.4 (0.8 at baseline), bicarb 16, AG 24, ALP 2765, Lactate of 5.1. CT chest, abdomen and pelvis showed -  Innumerable diffuse sclerotic osseous lesions, compatible with osseous metastases and Enlarged irregularly marginated prostate gland, possibly reflecting underlying neoplastic process.  Patient being admitted for management of symptomatic anemia, FELICITY and likely metastatic prostatic ca with diffuse osseous mets.    Hospital Course:    Patient was admitted to medicine and underwent bone biopsy by IR. He was evaluated by urology and hematology-oncology and started on hormonal therapy, casodex, while inpatient. He had a bone scan which showed multiple metastatic lesions including in the spine. Palliative care saw the patient and he was given PO dilaudid for the left lower extremity apin. HPI:    70 yo M with PMH of CVA, afib on Eliquis, HTN, HFrEF ( EF 20-25% 2018), chronic LLE pain, presented to the ED with worsening Left LE pain for the past 2 months, worsening fatigue, anorexia and significant weight loss. Of note pt has previously been worked up for the leg pain including a CT scan in march 2021 in [facility] which was negative. Patient has been getting progressively weak and is unable to ambulate now. He denies any chest pain, shortness of breath, fever, cough. Pt was being seen by a GI w/ planned colonoscopy today but on arrival to office, his GI physician was concerned about his cachetic appearance and rapid weight loss, so he sent him into ED immediately for evaluation.   In the ED his vitals were significant for /140mmHg, /min, other vitals were normal. Labs were significant for Hb of 6.4, Cr 1.4 (0.8 at baseline), bicarb 16, AG 24, ALP 2765, Lactate of 5.1. CT chest, abdomen and pelvis showed -  Innumerable diffuse sclerotic osseous lesions, compatible with osseous metastases and Enlarged irregularly marginated prostate gland, possibly reflecting underlying neoplastic process.  Patient being admitted for management of symptomatic anemia, FELICITY and likely metastatic prostatic ca with diffuse osseous mets.    Hospital Course:    Patient was admitted to medicine and underwent bone biopsy by IR. He was evaluated by urology and hematology-oncology and started on hormonal therapy, casodex, while inpatient. He had a bone scan which showed multiple metastatic lesions including in the spine. Palliative care saw the patient and he was given PO dilaudid, neurontin, and 1 dose of dexamethasone for the left lower extremity pain. He has anemia of chronic disease and received 4 units pRBCs during the admission. Patient was seen by physical therapy and by dietitian regarding failure to thrive. Spoke with sister regarding poor prognosis and patient will be going to SNF for further care. He will be receiving chemotherapy outpatient at [facility].     #prostate cancer with distant mets   PSA 1458, alk phos 2676  s/p bone bx by IR of left iliac lesion 12/3: f/u pathology from bone biopsy    per hem-onc:  -Casodex 50mg po daily (started on 12/3), will add Lupron 22.5 mg IM on 12/9 (can be outpatient)  -Abiraterone (zytiga) 1000 mg po daily and Prednisone 5 mg po daily: start denosumab (xgeva) as out patient to reduce bone related complications    f/u MRI C/T/L spine to look for cord compression; based on result, f/u with rad/onc for palliative radiation      #neoplasm-related bone pain  -tylenol 650mg PO q6h prn pain  -Dilaudid 2mg PO q3h prn pain  -Neurontin 300mg po q12  -steroid course: Pred 5mg po q24 started SUN as per Heme/Onc  -bowel regimen: senna and Miralax  -fentanyl patch      HPI:    72 yo M with PMH of CVA, afib on Eliquis, HTN, HFrEF ( EF 20-25% 2018), chronic LLE pain, presented to the ED with worsening Left LE pain for the past 2 months, worsening fatigue, anorexia and significant weight loss. Of note pt has previously been worked up for the leg pain including a CT scan in march 2021 in [facility] which was negative. Patient has been getting progressively weak and is unable to ambulate now. He denies any chest pain, shortness of breath, fever, cough. Pt was being seen by a GI w/ planned colonoscopy today but on arrival to office, his GI physician was concerned about his cachetic appearance and rapid weight loss, so he sent him into ED immediately for evaluation.   In the ED his vitals were significant for /140mmHg, /min, other vitals were normal. Labs were significant for Hb of 6.4, Cr 1.4 (0.8 at baseline), bicarb 16, AG 24, ALP 2765, Lactate of 5.1. CT chest, abdomen and pelvis showed -  Innumerable diffuse sclerotic osseous lesions, compatible with osseous metastases and Enlarged irregularly marginated prostate gland, possibly reflecting underlying neoplastic process.  Patient being admitted for management of symptomatic anemia, FELICITY and likely metastatic prostatic ca with diffuse osseous mets.    Hospital Course:    Patient was admitted to medicine and underwent bone biopsy by IR. He was evaluated by urology and hematology-oncology and started on hormonal therapy, casodex, while inpatient. He had a bone scan which showed multiple metastatic lesions including in the spine. Palliative care saw the patient and he was given PO dilaudid, neurontin, and 1 dose of dexamethasone for the left lower extremity pain. He has anemia of chronic disease and received 4 units pRBCs during the admission. Patient was seen by physical therapy and by dietitian regarding failure to thrive. Spoke with sister regarding poor prognosis and patient will be going to SNF for further care. He will be receiving chemotherapy outpatient at [facility]. He can get MRI of lumbar and thoracic spine outpatinet and that will determine treatment by radiation oncology.     #prostate cancer with distant mets   PSA 1458, alk phos 2676  s/p bone bx by IR of left iliac lesion 12/3: f/u pathology from bone biopsy    per hem-onc:  -Casodex 50mg po daily (started on 12/3), will add Lupron 22.5 mg IM (can be outpatient)  -Abiraterone (zytiga) 1000 mg po daily and Prednisone 5 mg po daily: start denosumab (xgeva) as out patient to reduce bone related complications    f/u MRI C/T/L spine to look for cord compression OUTPATIENT; based on result, f/u with rad/onc for palliative radiation      #neoplasm-related bone pain  -tylenol 650mg PO q6h prn pain  -Dilaudid 2mg PO q3h prn pain  -Neurontin 300mg po q12  -steroid course: Pred 5mg po q24 started SUN as per Heme/Onc  -bowel regimen: senna and Miralax  -fentanyl patch

## 2021-12-07 NOTE — PROGRESS NOTE ADULT - ATTENDING COMMENTS
The patient was seen. Agree with above.  70 yo Male presented for weakness, weight loss and pain, was found to have diffuse osseous mets and marked elevated PSA, consistent with metastatic prostate cancer.   CT Abdomen and Pelvis w/ IV Cont showed   -A 6.0 x 5.8 x 9.4 cm mass involving the left iliac wing with associated extraosseous soft tissue extension  -A 6.6 x 4.5 x 4.6 and meter mass involves the left inferior pubic ramus, with associated extraosseous soft tissue extension.  -Expansile sclerotic lesions are noted to involve the right 2nd lateral rib and right 5th anterior rib.  -Enlarged irregularly marginated prostate gland, possibly reflecting underlying neoplastic process.    Prostate Ca Screen, PSA Total: 1458.00    RECOMMENDATIONS:  - CT guided biopsy to confirm diagnosis. Given patient is symptomatic, would recommend inpatient biopsy so that he can start treatment soon.   - Bone scan.  - Will start Casodex 50mg daily, and will add Lupron 22.5 mg IM in 1 week.  - Abiraterone 1000 mg po daily and Prednisone 5 mg po daily   - Will star Xgeva as out patient to reduce bone related complication.   - Pain management.  - Anemia. Hb is trending down, 6.4. Give 2 units PRBC. Check Fe, TIBC, Ferritin, B12, Folate, Retic count, LDH, Hapto
HPI:      REVIEW OF SYSTEMS:  CONSTITUTIONAL:  No weakness, fevers, chills, night sweats, weight loss  EYES/ENT: No visual changes. No vertigo or dysphagia  NECK: No neck pain or stiffness  RESPIRATORY: No cough, wheezing, hemoptysis. No shortness of breath  CARDIOVASCULAR: No chest pain or palpitations. No lower extremity edema  GASTROINTESTINAL: No abdominal pain. No nausea, vomiting, diarrhea, or hematemesis  GENITOURINARY: No dysuria or hematuria   NEUROLOGICAL: No focal numbness or weakness  SKIN: No rashes or itching  HEMATOLOGIC: No easy bruising or prolonged bleeding.      PHYSICAL EXAM:  GENERAL: NAD, well-developed, Non-toxic, stated age   HEAD:  Atraumatic, Normocephalic  EYES: EOMI, Sclera White   NECK: Supple, No JVD  CHEST/LUNG: Clear to auscultation bilaterally; No wheezing, rhonchi, or crackles  HEART: Regular rate and rhythm; s1, s2, No murmurs, rubs, or gallops  ABDOMEN: Soft, Nontender, Nondistended; Bowel sounds present, No rebound or guarding noted   EXTREMITIES:  No lower extremity edema or calf tenderness to palpation.  No clubbing or cyanosis  PSYCH: AAOx3, pleasant, cooperative, not anxious  NEUROLOGY: 5/5 strength in all extremities, no downward drift. Sensation grossly intact.   SKIN: No rashes or lesions      ASSESSMENT AND PLAN:    70 yo M with PMH of CVA, afib on Eliquis, HTN, HFrEF (EF 20-25% 2018), chronic LLE pain, presented to the ED with worsening Left LE pain for the past 2 months, worsening fatigue, anorexia and significant weight loss. Of note, pt had previously been worked up for the leg pain including a CT scan in march 2021 in [facility], which was negative.    #prostate cancer with distant mets  - PSA 1458; alk phos 2765;  - CT chest & AP: Innumerable diffuse sclerotic osseous lesions -  including 6x5.8x9.4cm mass involving the left iliac wing and 6.6x4.5x4.6cm mass involving left inferior pubic ramus.   - s/p IR guided biopsy from left iliac bone lesion today; f/u path results  - s/p bone scan showing multiple metastatic lesions including the spine  - get MRI C/T/L spine for spinal mets and possible rad/onc evaluation (called the sister who will bring AICD card information to check if it's MRI compatible or not)   - taper steriod: dexa 2mg q8 today; dexa 2mg q12 saturday; pred 5mg po daily starting sunday  - palliative care following: dilaudid 2mg iv q2 prn pain, neurontin 300mg po q12, dexameth 2mg iv q6 - will taper down todd  - bowel reg - senna and miralax    # Failure to thrive- dietician eval, Ensure 3x/day, /w LR @50CC/H    # Symptomatic anemia- likely 2/2 malignancy  - s/p 2 units pRBC   - iron: 71, TIBC 220, 32% saturation, folate 8.5, wnl, retics 1.8%    # FELICITY - likely dehydration  Cr 1.1 (decreased from 1.4); baseline ~ 0.8  IVFs: LR 50/hr  Hold entresto    # h/o HTN; h/o CVA, Afib; H/o HFrEF (EF 20-25% in 2018)  - restart eliquis today (was on hold for biopsy)  - hold Entresto for FELICITY  - hold aldactone for FELICITY  - c/w metoprolol (hold if low BP), Ivabradine    Activity: bedrest until more comfortable  GI PPX: NA  DVT PPX: Eliquis  Full code  Dispo: pending SNF placement, will anticepate for the AM
The patient was seen. Agree with above.  70 yo Male presented for weakness, weight loss and pain, was found to have diffuse osseous mets and marked elevated PSA, consistent with metastatic prostate cancer.   CT Abdomen and Pelvis w/ IV Cont showed   -A 6.0 x 5.8 x 9.4 cm mass involving the left iliac wing with associated extraosseous soft tissue extension  -A 6.6 x 4.5 x 4.6 and meter mass involves the left inferior pubic ramus, with associated extraosseous soft tissue extension.  -Expansile sclerotic lesions are noted to involve the right 2nd lateral rib and right 5th anterior rib.  -Enlarged irregularly marginated prostate gland, possibly reflecting underlying neoplastic process.    Prostate Ca Screen, PSA Total: 1458.00    RECOMMENDATIONS:  - S/P CT guided biopsy on 12/3. Followup pathology report.   - Continue Casodex 50mg daily, and will add Lupron 22.5 mg IM in 1 week.  - Abiraterone 1000 mg po daily and Prednisone 5 mg po daily. Waiting for medication.  - Will star Xgeva as out patient to reduce bone related complication.   - Pain management.  - Anemia. s/p blood transfusion. Hb is 9.6. Check Fe, TIBC, Ferritin, B12, Folate, Retic count, LDH, Hapto.
agree with above
He was seen in follow-up today the patient looks frail and weak he denied any pain or difficulty with urination.  His family member was at bedside as well.    Plan   #Most likely metastatic prostate cancer given the elevated PSA  -Biopsy result  is pending  -The patient is on Casodex 50 mg started on 12/3 and was also started Abiraterone 1000 mg po daily and Prednisone 5 mg po daily started 12/6  -Will administer Lupron tomorrow 7.5 mg this is a monthly dose which is the only dose available in our institution for hospitalized patients and continue Casodex to complete 2 weeks total to prevent a flare  and then  Casodex can be stopped and he can continue with abiraterone and prednisone and ADT  He has MRI CT and L-spine pending    #Anemia and thrombocytopenia likely due to bone marrow involvement by prostate cancer, should improve with treatment
Leatha seen at bedside  Pain is intermittent and is his knee - no pain at time of visit  Steroid course complete  Continue current pain regimen  Bowel regimen as above  Will follow

## 2021-12-07 NOTE — DISCHARGE NOTE PROVIDER - PROVIDER TOKENS
PROVIDER:[TOKEN:[[id number]]],PROVIDER:[TOKEN:[[id number]]],PROVIDER:[TOKEN:[[id number]]]

## 2021-12-07 NOTE — PROGRESS NOTE ADULT - ASSESSMENT
72yo Male admitted for management of symptomatic anemia, FELICITY and likely metastatic prostatic ca with diffuse osseous mets.    State IV metastatic prostate cancer:  - Biopsy pending  - PSA 1458  - CT Abdomen and Pelvis w/ IV Cont (11.30.21 @ 13:49) >  ---A 6.0 x 5.8 x 9.4 cm mass involving the left iliac wing with associated extraosseous soft tissue extension  ---A 6.6 x 4.5 x 4.6 and meter mass involves the left inferior pubic ramus, with associated extraosseous soft tissue extension.  ---Expansile sclerotic lesions are noted to involve the right 2nd lateral rib and right 5th anterior rib.  ---Enlarged irregularly marginated prostate gland, possibly reflecting underlying neoplastic process.    Normocytic anemia and thrombocytopenia: likely bone marrow infiltration and ACD  - Ferritin > 1200, TSAT wnl wnl  - B12, folate wnl    RECOMMENDATIONS:  - f/u bone biopsy  - MRI CTL pending, may need rad onc eval  - Cont Casodex 50mg daily (started on 12/3), and will add Lupron 22.5 mg IM in 1 week.  - Abiraterone 1000 mg po daily and Prednisone 5 mg po daily started 12/6   - Xgeva as out patient to reduce bone related complication.   - Pain management per palliative care   70yo Male admitted for management of symptomatic anemia, FELICITY and likely metastatic prostatic ca with diffuse osseous mets.    State IV metastatic prostate cancer:  - Biopsy pending (performed 12/3)  - PSA 1458  - CT Abdomen and Pelvis w/ IV Cont (11.30.21 @ 13:49) >  ---A 6.0 x 5.8 x 9.4 cm mass involving the left iliac wing with associated extraosseous soft tissue extension  ---A 6.6 x 4.5 x 4.6 and meter mass involves the left inferior pubic ramus, with associated extraosseous soft tissue extension.  ---Expansile sclerotic lesions are noted to involve the right 2nd lateral rib and right 5th anterior rib.  ---Enlarged irregularly marginated prostate gland, possibly reflecting underlying neoplastic process.    Normocytic anemia and thrombocytopenia: likely bone marrow infiltration and ACD  - Ferritin > 1200, TSAT wnl wnl  - B12, folate wnl    RECOMMENDATIONS:  - f/u bone biopsy  - MRI CTL pending, may need rad onc eval  - Cont Casodex 50mg daily (started on 12/3), and will add Lupron 22.5 mg IM in 1 week.  - Abiraterone 1000 mg po daily and Prednisone 5 mg po daily started 12/6   - Xgeva as out patient to reduce bone related complication.   - Pain management per palliative care   72yo Male admitted for management of symptomatic anemia, FELICITY and likely metastatic prostatic ca with diffuse osseous mets.    State IV metastatic prostate cancer:  - Biopsy pending (performed 12/3)  - PSA 1458  - CT Abdomen and Pelvis w/ IV Cont (11.30.21 @ 13:49) >  ---A 6.0 x 5.8 x 9.4 cm mass involving the left iliac wing with associated extraosseous soft tissue extension  ---A 6.6 x 4.5 x 4.6 and meter mass involves the left inferior pubic ramus, with associated extraosseous soft tissue extension.  ---Expansile sclerotic lesions are noted to involve the right 2nd lateral rib and right 5th anterior rib.  ---Enlarged irregularly marginated prostate gland, possibly reflecting underlying neoplastic process.    Normocytic anemia and thrombocytopenia: likely bone marrow infiltration and ACD  - Ferritin > 1200, TSAT wnl wnl  - B12, folate wnl    RECOMMENDATIONS:  - Cont Casodex 50mg daily for 2 weeks (started on 12/3)  - Will give Lupron Lupron 22.5mg IM tomorrow, will need followup in 1 month for next Lupron injection  - Abiraterone 1000 mg po daily and Prednisone 5 mg po daily started 12/6   - f/u bone biopsy  - MRI CTL pending, may need rad onc eval  - Xgeva as out patient to reduce bone related complication.   - Pain management per palliative care

## 2021-12-07 NOTE — DISCHARGE NOTE PROVIDER - CARE PROVIDER_API CALL
Lily Cary)  Hematology; Internal Medicine; Medical Oncology  [address] [address]  Phone: [phone]  Fax: [phone]  Follow Up Time:     DAVE CLARKE  Pediatrics  [address] [place] [address]  Phone: [phone]  Fax: [phone]  Follow Up Time:     Destin Jernigan)  Urology  [address] [place] [address]  Phone: [phone]  Fax: [phone]  Follow Up Time:

## 2021-12-07 NOTE — DISCHARGE NOTE PROVIDER - NSDCFUADDAPPT_GEN_ALL_CORE_FT
Please follow up with hem-onc, Dr. Cary, and with urology Dr. Crockett Please follow up with hem-onc, Dr. Cary, and with urology Dr. Crockett, regarding treatment for prostate cancer

## 2021-12-07 NOTE — CHART NOTE - NSCHARTNOTEFT_GEN_A_CORE
Patient was observed to be resting comfortably.  Patient's sister, Barb Carreon, was at bedside.  During visit patient's sister received a call from case management.  Case management is working on discharge to SNF,for rehab.  Sister was informed that one SNF, [facility], accepted patient.  Sister was told that in order for patient to be admitted to SNF, family would have to agree to  medications from pharmacy and bring to SNF.  Writer spoke with case management to inquire if patient's insurance would cover these medications in the community since he will be in a SNF.  Case management did not know and will follow-up with insurance company.  Writer will follow-up.

## 2021-12-07 NOTE — DISCHARGE NOTE PROVIDER - NSDCCPCAREPLAN_GEN_ALL_CORE_FT
PRINCIPAL DISCHARGE DIAGNOSIS  Diagnosis: Prostate cancer  Assessment and Plan of Treatment:       SECONDARY DISCHARGE DIAGNOSES  Diagnosis: Adult failure to thrive  Assessment and Plan of Treatment:     Diagnosis: Lactic acidosis  Assessment and Plan of Treatment:     Diagnosis: Low hemoglobin  Assessment and Plan of Treatment:      PRINCIPAL DISCHARGE DIAGNOSIS  Diagnosis: Prostate cancer  Assessment and Plan of Treatment: You have prostate cancer and were seen by oncology and urology in the hospital. You were started on hormonal medication, casodex, and need to follow up outpatient for treatment. You had a bone biopsy for which the results are pending.

## 2021-12-07 NOTE — CHART NOTE - NSCHARTNOTEFT_GEN_A_CORE
Palliative care sign off note    As pain is relatively well controlled with minimal PRN usage, palliative care will sign off at this time.    Please call back [phone] PRN

## 2021-12-07 NOTE — PROGRESS NOTE ADULT - SUBJECTIVE AND OBJECTIVE BOX
SAMANTHA CHARLES  71y  Male  ***My note supersedes ALL resident notes that I sign.  My corrections for their notes are in my note.***    I can be reached directly on [employer]. My office number is [phone]. My personal cell number is [phone].    INTERVAL EVENTS: Here for f/u of cancer pain. Pt resting comfortably. Pt uses about 3 dilaudid tabs every 24 hrs. Pt could only stand w/ PT, but not walk.    T(F): 96.9 (12-07-21 @ 06:20), Max: 98.7 (12-06-21 @ 21:00)  HR: 74 (12-07-21 @ 06:20) (74 - 87)  BP: 113/52 (12-07-21 @ 06:20) (105/53 - 113/52)  RR: 18 (12-07-21 @ 06:20) (18 - 19)  SpO2: --    12-06-21 @ 07:01  -  12-07-21 @ 07:00  --------------------------------------------------------  IN: 0 mL / OUT: 500 mL / NET: -500 mL    Gen: comfortable;   HEENT: PERRL, EOMI; mouth clr; nose clr  Neck: no nodes, no JVD, thyroid nl  chest: left AICD  lungs: clr  hrt: s1 s2 rrr no murmur  abd: soft, NT/ND, no HS megaly  ext: no edema, no c/c  neuro: aa ox3, cn intact, can move all 4 ext, but globally weak    LABS:                      8.6     (    97.8   4.84  )-----------( ---------      120      ( 07 Dec 2021 04:30 )             27.3    (    15.7     138   (   105   (   81      12-07-21 @ 04:30  ----------------------               4.5   (   17   (   17     AG = 16                        -----                        0.8  Ca  7.7 = martín = 8.5  Mg  2.2    P   --     LFT  5.3  (  0.4  (  49       12-02-21 @ 08:00  -------------------------  3.0  (  6771  (  9    RADIOLOGY & ADDITIONAL TESTS:      MEDICATIONS:    abiraterone 1000 milliGRAM(s) Oral daily  acetaminophen     Tablet .. 650 milliGRAM(s) Oral every 6 hours PRN  aluminum hydroxide/magnesium hydroxide/simethicone Suspension 30 milliLiter(s) Oral every 4 hours PRN  apixaban 5 milliGRAM(s) Oral every 12 hours  bicalutamide 50 milliGRAM(s) Oral daily  gabapentin 300 milliGRAM(s) Oral every 12 hours  HYDROmorphone   Tablet 2 milliGRAM(s) Oral every 3 hours PRN  influenza  Vaccine (HIGH DOSE) 0.7 milliLiter(s) IntraMuscular once  ivabradine 5 milliGRAM(s) Oral two times a day  melatonin 3 milliGRAM(s) Oral at bedtime PRN  metoprolol succinate ER 25 milliGRAM(s) Oral daily  multivitamin 1 Tablet(s) Oral daily  ondansetron Injectable 4 milliGRAM(s) IV Push every 8 hours PRN  predniSONE   Tablet 5 milliGRAM(s) Oral daily  sodium bicarbonate 1300 milliGRAM(s) Oral every 12 hours

## 2021-12-08 ENCOUNTER — TRANSCRIPTION ENCOUNTER (OUTPATIENT)
Age: 71
End: 2021-12-08

## 2021-12-08 LAB
ANION GAP SERPL CALC-SCNC: 15 MMOL/L — HIGH (ref 7–14)
BASOPHILS # BLD AUTO: 0.01 K/UL — SIGNIFICANT CHANGE UP (ref 0–0.2)
BASOPHILS NFR BLD AUTO: 0.3 % — SIGNIFICANT CHANGE UP (ref 0–1)
BUN SERPL-MCNC: 22 MG/DL — HIGH (ref 10–20)
CALCIUM SERPL-MCNC: 7.7 MG/DL — LOW (ref 8.5–10.1)
CHLORIDE SERPL-SCNC: 105 MMOL/L — SIGNIFICANT CHANGE UP (ref 98–110)
CO2 SERPL-SCNC: 18 MMOL/L — SIGNIFICANT CHANGE UP (ref 17–32)
CREAT SERPL-MCNC: 0.8 MG/DL — SIGNIFICANT CHANGE UP (ref 0.7–1.5)
EOSINOPHIL # BLD AUTO: 0.07 K/UL — SIGNIFICANT CHANGE UP (ref 0–0.7)
EOSINOPHIL NFR BLD AUTO: 1.9 % — SIGNIFICANT CHANGE UP (ref 0–8)
GLUCOSE SERPL-MCNC: 91 MG/DL — SIGNIFICANT CHANGE UP (ref 70–99)
HCT VFR BLD CALC: 26.2 % — LOW (ref 42–52)
HGB BLD-MCNC: 8.2 G/DL — LOW (ref 14–18)
IMM GRANULOCYTES NFR BLD AUTO: 6.9 % — HIGH (ref 0.1–0.3)
LYMPHOCYTES # BLD AUTO: 0.87 K/UL — LOW (ref 1.2–3.4)
LYMPHOCYTES # BLD AUTO: 23.1 % — SIGNIFICANT CHANGE UP (ref 20.5–51.1)
MAGNESIUM SERPL-MCNC: 2.3 MG/DL — SIGNIFICANT CHANGE UP (ref 1.8–2.4)
MCHC RBC-ENTMCNC: 30.7 PG — SIGNIFICANT CHANGE UP (ref 27–31)
MCHC RBC-ENTMCNC: 31.3 G/DL — LOW (ref 32–37)
MCV RBC AUTO: 98.1 FL — HIGH (ref 80–94)
MONOCYTES # BLD AUTO: 0.32 K/UL — SIGNIFICANT CHANGE UP (ref 0.1–0.6)
MONOCYTES NFR BLD AUTO: 8.5 % — SIGNIFICANT CHANGE UP (ref 1.7–9.3)
NEUTROPHILS # BLD AUTO: 2.23 K/UL — SIGNIFICANT CHANGE UP (ref 1.4–6.5)
NEUTROPHILS NFR BLD AUTO: 59.3 % — SIGNIFICANT CHANGE UP (ref 42.2–75.2)
NRBC # BLD: 3 /100 WBCS — HIGH (ref 0–0)
PLATELET # BLD AUTO: 124 K/UL — LOW (ref 130–400)
POTASSIUM SERPL-MCNC: 4.5 MMOL/L — SIGNIFICANT CHANGE UP (ref 3.5–5)
POTASSIUM SERPL-SCNC: 4.5 MMOL/L — SIGNIFICANT CHANGE UP (ref 3.5–5)
RBC # BLD: 2.67 M/UL — LOW (ref 4.7–6.1)
RBC # FLD: 15.9 % — HIGH (ref 11.5–14.5)
SODIUM SERPL-SCNC: 138 MMOL/L — SIGNIFICANT CHANGE UP (ref 135–146)
WBC # BLD: 3.76 K/UL — LOW (ref 4.8–10.8)
WBC # FLD AUTO: 3.76 K/UL — LOW (ref 4.8–10.8)

## 2021-12-08 PROCEDURE — 99233 SBSQ HOSP IP/OBS HIGH 50: CPT

## 2021-12-08 PROCEDURE — 99239 HOSP IP/OBS DSCHRG MGMT >30: CPT

## 2021-12-08 RX ORDER — GABAPENTIN 400 MG/1
300 CAPSULE ORAL EVERY 8 HOURS
Refills: 0 | Status: DISCONTINUED | OUTPATIENT
Start: 2021-12-08 | End: 2021-12-09

## 2021-12-08 RX ORDER — GABAPENTIN 400 MG/1
1 CAPSULE ORAL
Qty: 0 | Refills: 0 | DISCHARGE
Start: 2021-12-08

## 2021-12-08 RX ORDER — FENTANYL CITRATE 50 UG/ML
1 INJECTION INTRAVENOUS
Refills: 0 | Status: DISCONTINUED | OUTPATIENT
Start: 2021-12-08 | End: 2021-12-09

## 2021-12-08 RX ORDER — HYDROMORPHONE HYDROCHLORIDE 2 MG/ML
1 INJECTION INTRAMUSCULAR; INTRAVENOUS; SUBCUTANEOUS
Qty: 0 | Refills: 0 | DISCHARGE
Start: 2021-12-08

## 2021-12-08 RX ORDER — LANOLIN ALCOHOL/MO/W.PET/CERES
1 CREAM (GRAM) TOPICAL
Qty: 0 | Refills: 0 | DISCHARGE
Start: 2021-12-08

## 2021-12-08 RX ORDER — DEXAMETHASONE 0.5 MG/5ML
2 ELIXIR ORAL ONCE
Refills: 0 | Status: COMPLETED | OUTPATIENT
Start: 2021-12-08 | End: 2021-12-08

## 2021-12-08 RX ORDER — ABIRATERONE ACETATE 250 MG/1
2 TABLET ORAL
Qty: 60 | Refills: 0
Start: 2021-12-08 | End: 2022-01-06

## 2021-12-08 RX ORDER — FENTANYL CITRATE 50 UG/ML
1 INJECTION INTRAVENOUS
Qty: 0 | Refills: 0 | DISCHARGE
Start: 2021-12-08

## 2021-12-08 RX ORDER — BICALUTAMIDE 50 MG/1
1 TABLET, FILM COATED ORAL
Qty: 30 | Refills: 0
Start: 2021-12-08 | End: 2022-01-06

## 2021-12-08 RX ADMIN — HYDROMORPHONE HYDROCHLORIDE 2 MILLIGRAM(S): 2 INJECTION INTRAMUSCULAR; INTRAVENOUS; SUBCUTANEOUS at 14:39

## 2021-12-08 RX ADMIN — HYDROMORPHONE HYDROCHLORIDE 2 MILLIGRAM(S): 2 INJECTION INTRAMUSCULAR; INTRAVENOUS; SUBCUTANEOUS at 15:27

## 2021-12-08 RX ADMIN — Medication 3 MILLIGRAM(S): at 21:12

## 2021-12-08 RX ADMIN — IVABRADINE 5 MILLIGRAM(S): 7.5 TABLET, FILM COATED ORAL at 17:15

## 2021-12-08 RX ADMIN — HYDROMORPHONE HYDROCHLORIDE 2 MILLIGRAM(S): 2 INJECTION INTRAMUSCULAR; INTRAVENOUS; SUBCUTANEOUS at 11:05

## 2021-12-08 RX ADMIN — GABAPENTIN 300 MILLIGRAM(S): 400 CAPSULE ORAL at 14:39

## 2021-12-08 RX ADMIN — FENTANYL CITRATE 1 PATCH: 50 INJECTION INTRAVENOUS at 10:20

## 2021-12-08 RX ADMIN — FENTANYL CITRATE 1 PATCH: 50 INJECTION INTRAVENOUS at 21:11

## 2021-12-08 RX ADMIN — Medication 2 MILLIGRAM(S): at 16:44

## 2021-12-08 RX ADMIN — Medication 7.5 MILLIGRAM(S): at 10:21

## 2021-12-08 RX ADMIN — BICALUTAMIDE 50 MILLIGRAM(S): 50 TABLET, FILM COATED ORAL at 11:05

## 2021-12-08 RX ADMIN — Medication 1300 MILLIGRAM(S): at 17:14

## 2021-12-08 RX ADMIN — HYDROMORPHONE HYDROCHLORIDE 2 MILLIGRAM(S): 2 INJECTION INTRAMUSCULAR; INTRAVENOUS; SUBCUTANEOUS at 21:12

## 2021-12-08 RX ADMIN — GABAPENTIN 300 MILLIGRAM(S): 400 CAPSULE ORAL at 05:49

## 2021-12-08 RX ADMIN — HYDROMORPHONE HYDROCHLORIDE 2 MILLIGRAM(S): 2 INJECTION INTRAMUSCULAR; INTRAVENOUS; SUBCUTANEOUS at 17:13

## 2021-12-08 RX ADMIN — Medication 1300 MILLIGRAM(S): at 05:50

## 2021-12-08 RX ADMIN — Medication 5 MILLIGRAM(S): at 05:49

## 2021-12-08 RX ADMIN — APIXABAN 5 MILLIGRAM(S): 2.5 TABLET, FILM COATED ORAL at 05:49

## 2021-12-08 RX ADMIN — GABAPENTIN 300 MILLIGRAM(S): 400 CAPSULE ORAL at 21:12

## 2021-12-08 RX ADMIN — Medication 1 TABLET(S): at 11:05

## 2021-12-08 RX ADMIN — Medication 25 MILLIGRAM(S): at 05:50

## 2021-12-08 RX ADMIN — HYDROMORPHONE HYDROCHLORIDE 2 MILLIGRAM(S): 2 INJECTION INTRAMUSCULAR; INTRAVENOUS; SUBCUTANEOUS at 16:44

## 2021-12-08 RX ADMIN — APIXABAN 5 MILLIGRAM(S): 2.5 TABLET, FILM COATED ORAL at 17:14

## 2021-12-08 RX ADMIN — HYDROMORPHONE HYDROCHLORIDE 2 MILLIGRAM(S): 2 INJECTION INTRAMUSCULAR; INTRAVENOUS; SUBCUTANEOUS at 05:51

## 2021-12-08 RX ADMIN — ABIRATERONE ACETATE 1000 MILLIGRAM(S): 250 TABLET ORAL at 11:05

## 2021-12-08 NOTE — PROGRESS NOTE ADULT - NUTRITIONAL ASSESSMENT
This patient has been assessed with a concern for Malnutrition and has been determined to have a diagnosis/diagnoses of Severe protein-calorie malnutrition.    This patient is being managed with:   Diet DASH/TLC-  Sodium & Cholesterol Restricted  No Pork  Beneprotein ([facility] Only)     Qty per Day:  3  Supplement Feeding Modality:  Oral  Ensure Enlive Cans or Servings Per Day:  1       Frequency:  Three Times a day  Entered: Dec  3 2021  8:02AM    
This patient has been assessed with a concern for Malnutrition and has been determined to have a diagnosis/diagnoses of Severe protein-calorie malnutrition.    This patient is being managed with:   Diet DASH/TLC-  Sodium & Cholesterol Restricted  No Pork  Beneprotein ([facility] Only)     Qty per Day:  3  Supplement Feeding Modality:  Oral  Ensure Enlive Cans or Servings Per Day:  1       Frequency:  Three Times a day  Entered: Dec  3 2021  8:02AM    
This patient has been assessed with a concern for Malnutrition and has been determined to have a diagnosis/diagnoses of Severe protein-calorie malnutrition.    This patient is being managed with:   Diet DASH/TLC-  Sodium & Cholesterol Restricted  No Pork  Beneprotein ([facility] Only)     Qty per Day:  3  Supplement Feeding Modality:  Oral  Ensure Enlive Cans or Servings Per Day:  1       Frequency:  Three Times a day  Entered: Dec  3 2021  8:02AM    Diet NPO after Midnight-     NPO Start Date: 02-Dec-2021   NPO Start Time: 23:59  Except Medications  Entered: Dec  2 2021  6:03PM    
This patient has been assessed with a concern for Malnutrition and has been determined to have a diagnosis/diagnoses of Severe protein-calorie malnutrition.    This patient is being managed with:   Diet DASH/TLC-  Sodium & Cholesterol Restricted  No Pork  Beneprotein ([facility] Only)     Qty per Day:  3  Supplement Feeding Modality:  Oral  Ensure Enlive Cans or Servings Per Day:  1       Frequency:  Three Times a day  Entered: Dec  3 2021  8:02AM    
This patient has been assessed with a concern for Malnutrition and has been determined to have a diagnosis/diagnoses of Severe protein-calorie malnutrition.    This patient is being managed with:   Diet DASH/TLC-  Sodium & Cholesterol Restricted  No Pork  Beneprotein ([facility] Only)     Qty per Day:  3  Supplement Feeding Modality:  Oral  Ensure Enlive Cans or Servings Per Day:  1       Frequency:  Three Times a day  Entered: Dec  3 2021  8:02AM    
This patient has been assessed with a concern for Malnutrition and has been determined to have a diagnosis/diagnoses of Severe protein-calorie malnutrition.    This patient is being managed with:   Diet DASH/TLC-  Sodium & Cholesterol Restricted  No Pork  Beneprotein ([facility] Only)     Qty per Day:  3  Supplement Feeding Modality:  Oral  Ensure Enlive Cans or Servings Per Day:  1       Frequency:  Three Times a day  Entered: Dec  3 2021  8:02AM    
This patient has been assessed with a concern for Malnutrition and has been determined to have a diagnosis/diagnoses of Severe protein-calorie malnutrition.    This patient is being managed with:   Diet DASH/TLC-  Sodium & Cholesterol Restricted  No Pork  Beneprotein ([facility] Only)     Qty per Day:  3  Supplement Feeding Modality:  Oral  Ensure Enlive Cans or Servings Per Day:  1       Frequency:  Three Times a day  Entered: Dec  3 2021  8:02AM    
This patient has been assessed with a concern for Malnutrition and has been determined to have a diagnosis/diagnoses of Severe protein-calorie malnutrition.    This patient is being managed with:   Diet DASH/TLC-  Sodium & Cholesterol Restricted  No Pork  Beneprotein ([facility] Only)     Qty per Day:  3  Supplement Feeding Modality:  Oral  Ensure Enlive Cans or Servings Per Day:  1       Frequency:  Three Times a day  Entered: Dec  3 2021  8:02AM    
This patient has been assessed with a concern for Malnutrition and has been determined to have a diagnosis/diagnoses of Severe protein-calorie malnutrition.    This patient is being managed with:   Diet DASH/TLC-  Sodium & Cholesterol Restricted  No Pork  Beneprotein ([facility] Only)     Qty per Day:  3  Supplement Feeding Modality:  Oral  Ensure Enlive Cans or Servings Per Day:  1       Frequency:  Three Times a day  Entered: Dec  3 2021  8:02AM    
This patient has been assessed with a concern for Malnutrition and has been determined to have a diagnosis/diagnoses of Severe protein-calorie malnutrition.    This patient is being managed with:   Diet DASH/TLC-  Sodium & Cholesterol Restricted  No Pork  Beneprotein ([facility] Only)     Qty per Day:  3  Supplement Feeding Modality:  Oral  Ensure Enlive Cans or Servings Per Day:  1       Frequency:  Three Times a day  Entered: Dec  3 2021  8:02AM    Diet NPO after Midnight-     NPO Start Date: 02-Dec-2021   NPO Start Time: 23:59  Except Medications  Entered: Dec  2 2021  6:03PM

## 2021-12-08 NOTE — DISCHARGE NOTE NURSING/CASE MANAGEMENT/SOCIAL WORK - NSDCPEFALRISK_GEN_ALL_CORE
For information on Fall & Injury Prevention, visit: [email or web address] OR  [email or web address] OR  https://www.cdc.gov/steadi/patient.html

## 2021-12-08 NOTE — PROGRESS NOTE ADULT - PROBLEM SELECTOR PLAN 6
In the setting of malignancy and overall poor conditioning.  -continue treatment per primary team  -PT as appropriate per primary team  -no need for pharmacological treatment of fatigue at this time
In the setting of malignancy and overall poor conditioning.  -continue treatment per primary team  -PT as appropriate per primary team  -no need for pharmacological treatment of fatigue at this time

## 2021-12-08 NOTE — DISCHARGE NOTE NURSING/CASE MANAGEMENT/SOCIAL WORK - PATIENT PORTAL LINK FT
You can access the FollowMyHealth Patient Portal offered by [facility] by registering at the following website: [email or web address]. By joining [facility]’s FollowMyHealth portal, you will also be able to view your health information using other applications (apps) compatible with our system.

## 2021-12-08 NOTE — CHART NOTE - NSCHARTNOTEFT_GEN_A_CORE
Sister, Barb Carreon, was at bedside.  Writer and Chen Saldivar visited patient.  Patient presented in pain.  Dr. Johansen was consulted to address same.  patient has MRI scheduled.  Writer requested discharge be put on hold until results are available.  Patient appears to be deteriorating.

## 2021-12-08 NOTE — PROGRESS NOTE ADULT - PROVIDER SPECIALTY LIST ADULT
Heme/Onc
Heme/Onc
Internal Medicine
Intervent Radiology
Heme/Onc
Internal Medicine
Palliative Care
Urology
Hospitalist
Internal Medicine
Palliative Care
Palliative Care

## 2021-12-08 NOTE — PROGRESS NOTE ADULT - PROBLEM SELECTOR PROBLEM 1
Prostate cancer metastatic to bone

## 2021-12-08 NOTE — PROGRESS NOTE ADULT - ASSESSMENT
70 yo M with PMH of CVA, afib on Eliquis, HTN, HFrEF (EF 20-25% 2018), chronic LLE pain, presented to the ED with worsening Left LE pain for the past 2 months, worsening fatigue, anorexia and significant weight loss. Of note, pt had previously been worked up for the leg pain including a CT scan in march 2021 in [facility], which was negative.    # Neopl-related severe pain 2/2 diffuse bony sclerotic lesions (AP 2765) likely 2/2 prostate cancer (irregularly marginated prostate gland on CT and PSA 1400)  CT chest/abdomen/pelvis - Innumerable diffuse sclerotic osseous lesions -  including 6x5.8x9.4cm mass involving the left iliac wing and 6.6x4.5x4.6cm mass involving left inferior pubic ramus. Expansile sclerotic lesions are noted to involve the right 2nd lateral rib and right 5th anterior rib.  IR s/p bx lt iliac lesion 12/3: f/u path   Uro noted  Hem Onc eval  Rad Onc eval   Pall Care eval   bone scan -> diff bone mets, including vert (kidneys not seen b/o such intense uptake by diff bone mets)  obtain MRI T/L spine w/ gado (AICD is MRI compatible)  rad/onc eval can be completed as outpt  h/o beginning tx:  Casodex 50mg po daily  Lupron 22.5 mg IM tomorrow if still inpt vs start as outpt   Abiraterone 1000 mg po daily and Prednisone 5 mg po daily  h/o will start Xgeva as outpatient to reduce bone related complications  give dexameth 2mg iv x1 today to help pain  c/w dilaudid 2mg po q3 prn pain  start fentanyl patch 12 mcg/hr top q72 for longer-acting relief  incr neurontin 300mg po q8  bowel reg    # Failure to thrive  dietician eval  MVI q24  Ensure 3x/day    # Symptomatic normo to macro anemia (mild pancytopenia)  no overt bleed  prob related to malignancy and diffuse bone mets  s/p PRBC transfusions -> hgb better  keep hgb > 8    Iron panel: iron sat 32%; ferr: 2624 = NOT SELENA  Folate nl  B12 1540    # FELICITY - likely dehydration (resolved); metab acid w/ high gap (prob result of high tumor burden, which is poor prog sign)  Cr (baseline ~ 0.8)  IVFs: off  Hold entresto - prob restart after gado given for MRI, if BP will allow  c/w NaHCO3 1300mg po q12 til HCO3 > 22  BMP q24    # hypoTN - prob from anemia, CHF meds, low EF and dehydration  improved  keep sys BP above 90 and preferably above 100  holding parameters for BB    # h/o HTN; h/o CVA, Afib; H/o HFrEF (EF 20-25% in 2018)  c/w Eliquis 5mg po q12   hold Entresto for FELICITY/BP - prob restart p MRI  hold aldactone for FELICITY/BP  c/w metoprolol (hold if low BP)  c/w Ivabradine    # Activity: PT eval for eval for STR - prob [facility] for SNF    # GI PPX: NA    # DVT PPX: eliquis     # Full code    # updated sister (Barb) at bedside     Dispo: tx pain; tx HCO3; f/u path of bx; f/u h/o, r/o, pall care; MRI T/L w/ gado (AICD is compatible); add fent; dexa 1 dose; incr neurontin  eventually, pt will need SNF for STR upon d/c - can d/c after MRI    Prog is poor for long term.

## 2021-12-08 NOTE — PROGRESS NOTE ADULT - ASSESSMENT
71yMale with PMH including CVA, afib on Eliquis, HTN, HFrEF ( EF 20-25% 2018), chronic LLE pain, being evaluated for pain management in the setting of newly diagnosed metastatic prostate cancer with diffuse osseous mets. Palliative care consulted for pain management.    Patient seen at bedside with sister. Patient with significant increase in pain today in left leg, though he could not discuss it further. Patient notes pain improved at time of visit after medication given, but still endorsing pain.  Awaiting MRI and likely discharge in next 24-48 hours.    See Recs below.    Please call [phone] with questions or concerns 24/7.   We will continue to follow.     Discussed with Dr. Carias

## 2021-12-08 NOTE — PROGRESS NOTE ADULT - REASON FOR ADMISSION
Loss of appetite, weight loss, and knee pain

## 2021-12-08 NOTE — CHART NOTE - NSCHARTNOTEFT_GEN_A_CORE
Registered Dietitian Follow-Up     Patient Profile Reviewed                           Yes [x]   No []     Nutrition History Previously Obtained        Yes [x]  No []       Pertinent Subjective Information:     Pertinent Medical Interventions:  Per internal medicine:  --Neopl-related severe pain 2/2 diffuse bony sclerotic lesions (AP 2765) likely 2/2 prostate cancer (irregularly marginated prostate gland on CT and PSA 1400)  ---Failure to thrive, severely malnourished   --- FELICITY - likely dehydration; metab acid w/ high gap (prob result of high tumor burden, which is poor prog sign)  Diet order: Diet, DASH/TLC:   Sodium & Cholesterol Restricted  No Pork  Beneprotein ([facility] Only)     Qty per Day:  3  Supplement Feeding Modality:  Oral  Ensure Enlive Cans or Servings Per Day:  1       Frequency:  Three Times a day (12-03-21 @ 08:02) [Active]       Anthropometrics:  - Ht. 170.2 cm  - Wt. 66 kg -- dosing weight. no new weights   - %wt change  - BMI: 22.8  - IBW 67.3 kg      Pertinent Lab Data:  12/7: RBC:  2.79, H/H: 8.6/27.3     Pertinent Meds:  MEDICATIONS  (STANDING):  abiraterone 1000 milliGRAM(s) Oral daily  bicalutamide 50 milliGRAM(s) Oral daily  multivitamin 1 Tablet(s) Oral daily  predniSONE   Tablet 5 milliGRAM(s) Oral daily  sodium bicarbonate 1300 milliGRAM(s) Oral every 12 hours    MEDICATIONS  (PRN):a  HYDROmorphone   Tablet 2 milliGRAM(s) Oral every 3 hours PRN Severe Pain (7 - 10)  ondansetron Injectable 4 milliGRAM(s) IV Push every 8 hours PRN Nausea and/or Vomiting       Physical Findings:  - Appearance: Severely wasted int he temple, clavicle, and the calf region  - GI function: no gastrointestinal signs/symptoms, LBM: 12/6 per RN flow sheets   - Tubes: N/A  - Oral/Mouth cavity: Does prefer softer foods  - Skin: skin intact/ no edema noted.      Nutrition Requirements  Weight Used: weight used: 66 kg dosing weight. Will continue to monitor weights, will adjust needs prn however since pt is severely malnourished , pt needs energy and protein needs are higher regardless.      Estimated Energy Needs    Continue [x]  Adjust []  Estimated energy needs: 1980 -2310   kcal/day (30-35 kcal/kg of dosing weight)        Estimated Protein Needs    Continue [x]  Adjust []  79.2 - 99  gm/day (1.2-1.5 g/kg of dosing weight)        Estimated Fluid Needs        Continue []  Adjust []  1 ml / kcal      Nutrient Intake: Spoke to sister at bedside, reports that when she is here, he is eating very well, as she is feeding him. Emphasized the importance of pt consuming nutrition supplements along side meals as weight gain is desirable at this time      [] Previous Nutrition Diagnosis: Severe protein calorie malnutrition + Inadequate protein energy needs.             [] Ongoing          [] Resolved     Nutrition Intervention meals and snacks, medical nutrition supplements, coordination of care, vitamin/ mineral supplement      Goal/Expected Outcome: Patient to ideally meet ~75% of estimated energy and protein needs in the next 4d.       Indicator/Monitoring: RD to monitor: diet order, body composition, energy intake, nutrition focused physical finding: high risk due in 4 days    Recommendations:  1) Continue current diet order   2) Continue ensure enlive TID ( 1050 calories, and 60 g protein) +  3 packets of bene protein per day ( explained to sister to mix in with food such as soup/oat meal to provide extra protein)   3) Please Order calorie count would like to quantify exactly how much patient is consuming from his meals and supplements --- pt is severely malnourished and is receiving active treatment --- would like to ensure that patient is consuming an adequate amount of calories and protein   4) Obtain daily weights   5) If sister isn't there patient needs a 1:1 feed , he will not consume meals on his own

## 2021-12-08 NOTE — PROGRESS NOTE ADULT - PROBLEM SELECTOR PLAN 5
-if no diarrhea, recommend senna 2 tabs qhs
-continue senna 2 tabs qhs and miralax 17g daily
- BM previously held  2/2 frequent BMs  - consider re-starting Miralax 17 g QD PRN for constipation

## 2021-12-08 NOTE — PROGRESS NOTE ADULT - PROBLEM SELECTOR PLAN 4
Oriented and alert on exam today.  Improved.    -Recommend non-pharmacological interventions to prevent/treat delirium  - maintain day/night light cycles  - optimize sleep-wake cycle, minimize environmental noise  - reorientation frequently  - use verbal redirection as first line  - minimize restraints and lines  - ensure good bladder/bowel function  - ensure adequate pain control
Oriented and alert on exam today.  Improved.     -Recommend non-pharmacological interventions to prevent/treat delirium  - maintain day/night light cycles  - optimize sleep-wake cycle, minimize environmental noise  - reorientation frequently  - use verbal redirection as first line  - minimize restraints and lines  - ensure good bladder/bowel function  - ensure adequate pain control
Mildly distress/altered today in the setting of pain.    -Recommend non-pharmacological interventions to prevent/treat delirium  - maintain day/night light cycles  - optimize sleep-wake cycle, minimize environmental noise  - reorientation frequently  - use verbal redirection as first line  - minimize restraints and lines  - ensure good bladder/bowel function  - ensure adequate pain control

## 2021-12-08 NOTE — CHART NOTE - NSCHARTNOTEFT_GEN_A_CORE
Spoke with MRI tech and was told that MRI will not be happening today because of unavailability of EP/[employer] representative.     Given delay with MRI, ok to discharge patient now and do MRI outpatient. Per hem-onc, MRI is not emergent and treatment for prostatic cancer has been started already.    Spoke with sister, Barb, in evening to inform her that he can go to SNF now without MRI.     Patient ready for discharge and order is in.     Attempted to call case management and left a message:   sister, Barb, would like a phone call when confirmed with [facility] that patient is going Spoke with MRI tech and was told that MRI will not be happening today because of unavailability of EP/[employer] representative.     Given delay with MRI, ok to discharge patient now and do MRI outpatient. Per hem-onc, MRI is not emergent and treatment for prostatic cancer has been started already.    Spoke with sister, Barb, in evening to inform her that he can go to SNF now without MRI

## 2021-12-08 NOTE — PROGRESS NOTE ADULT - SUBJECTIVE AND OBJECTIVE BOX
SAMANTHA CHARLES 71y Male  MRN#: [id number]   CODE STATUS:________    Hospital Day: 8d    Pt is currently admitted with the primary diagnosis of     SUBJECTIVE  Overnight/Interval Events:                                              ----------------------------------------------------------  OBJECTIVE  PAST MEDICAL & SURGICAL HISTORY  Systolic congestive heart failure, unspecified chronicity    Cerebrovascular accident (CVA) due to bilateral embolism of posterior cerebral arteries    Essential hypertension    Atrial fibrillation, unspecified type    AICD (automatic cardioverter/defibrillator) present                                              -----------------------------------------------------------  ALLERGIES:  cephalexin (Flushing)  penicillin (Rash)                                            ------------------------------------------------------------    HOME MEDICATIONS  Home Medications:  Corlanor 5 mg oral tablet: 1 tab(s) orally 2 times a day (with meals) (30 Nov 2021 17:21)  Metoprolol Succinate ER 25 mg oral tablet, extended release: 1 tab(s) orally once a day (30 Nov 2021 17:20)  spironolactone 25 mg oral tablet: 1 tab(s) orally once a day (14 May 2021 11:51)                           MEDICATIONS:  STANDING MEDICATIONS  abiraterone 1000 milliGRAM(s) Oral daily  apixaban 5 milliGRAM(s) Oral every 12 hours  bicalutamide 50 milliGRAM(s) Oral daily  fentaNYL   Patch  12 MICROgram(s)/Hr 1 Patch Transdermal every 72 hours  gabapentin 300 milliGRAM(s) Oral every 12 hours  influenza  Vaccine (HIGH DOSE) 0.7 milliLiter(s) IntraMuscular once  ivabradine 5 milliGRAM(s) Oral two times a day  metoprolol succinate ER 25 milliGRAM(s) Oral daily  multivitamin 1 Tablet(s) Oral daily  predniSONE   Tablet 5 milliGRAM(s) Oral daily  sodium bicarbonate 1300 milliGRAM(s) Oral every 12 hours    PRN MEDICATIONS  acetaminophen     Tablet .. 650 milliGRAM(s) Oral every 6 hours PRN  aluminum hydroxide/magnesium hydroxide/simethicone Suspension 30 milliLiter(s) Oral every 4 hours PRN  HYDROmorphone   Tablet 2 milliGRAM(s) Oral every 3 hours PRN  melatonin 3 milliGRAM(s) Oral at bedtime PRN  ondansetron Injectable 4 milliGRAM(s) IV Push every 8 hours PRN                                            ------------------------------------------------------------  VITAL SIGNS: Last 24 Hours  T(C): 36.2 (08 Dec 2021 06:00), Max: 36.2 (08 Dec 2021 06:00)  T(F): 97.1 (08 Dec 2021 06:00), Max: 97.1 (08 Dec 2021 06:00)  HR: 113 (08 Dec 2021 06:00) (75 - 113)  BP: 150/63 (08 Dec 2021 06:00) (105/57 - 150/63)  BP(mean): --  RR: 18 (08 Dec 2021 06:00) (16 - 18)  SpO2: 96% (07 Dec 2021 20:31) (96% - 96%)      12-07-21 @ 07:01  -  12-08-21 @ 07:00  --------------------------------------------------------  IN: 0 mL / OUT: 600 mL / NET: -600 mL                                             --------------------------------------------------------------  LABS:                        8.2    3.76  )-----------( 124      ( 08 Dec 2021 06:40 )             26.2     12-08    138  |  105  |  22<H>  ----------------------------<  91  4.5   |  18  |  0.8    Ca    7.7<L>      08 Dec 2021 06:40  Mg     2.3     12-08                                                                -------------------------------------------------------------  RADIOLOGY:                                            --------------------------------------------------------------    PHYSICAL EXAM:  General:   HEENT:  LUNGS:  HEART:  ABDOMEN:  EXT:  NEURO:  SKIN:                                           --------------------------------------------------------------    ASSESSMENT & PLAN                                    # DVT prophylaxis   # GI prophylaxis   # Diet   # Activity Score (AM-PAC)  # Code status   # Disposition      SAMANTHA CHARLES 71y Male  MRN#: [id number]   CODE STATUS: full code    Hospital Day: 8d    Pt is currently admitted with the primary diagnosis of metastatic prostate cancer    SUBJECTIVE  Overnight/Interval Events: No acute events overnight. Patient is more awake this morning and he feels all right. He received PO dilaudid 4x in the past 24h.                                              ----------------------------------------------------------  OBJECTIVE  PAST MEDICAL & SURGICAL HISTORY  Systolic congestive heart failure, unspecified chronicity    Cerebrovascular accident (CVA) due to bilateral embolism of posterior cerebral arteries    Essential hypertension    Atrial fibrillation, unspecified type    AICD (automatic cardioverter/defibrillator) present                                              -----------------------------------------------------------  ALLERGIES:  cephalexin (Flushing)  penicillin (Rash)                                            ------------------------------------------------------------    HOME MEDICATIONS  Home Medications:  Corlanor 5 mg oral tablet: 1 tab(s) orally 2 times a day (with meals) (30 Nov 2021 17:21)  Metoprolol Succinate ER 25 mg oral tablet, extended release: 1 tab(s) orally once a day (30 Nov 2021 17:20)  spironolactone 25 mg oral tablet: 1 tab(s) orally once a day (14 May 2021 11:51)                           MEDICATIONS:  STANDING MEDICATIONS  abiraterone 1000 milliGRAM(s) Oral daily  apixaban 5 milliGRAM(s) Oral every 12 hours  bicalutamide 50 milliGRAM(s) Oral daily  fentaNYL   Patch  12 MICROgram(s)/Hr 1 Patch Transdermal every 72 hours  gabapentin 300 milliGRAM(s) Oral every 12 hours  influenza  Vaccine (HIGH DOSE) 0.7 milliLiter(s) IntraMuscular once  ivabradine 5 milliGRAM(s) Oral two times a day  metoprolol succinate ER 25 milliGRAM(s) Oral daily  multivitamin 1 Tablet(s) Oral daily  predniSONE   Tablet 5 milliGRAM(s) Oral daily  sodium bicarbonate 1300 milliGRAM(s) Oral every 12 hours    PRN MEDICATIONS  acetaminophen     Tablet .. 650 milliGRAM(s) Oral every 6 hours PRN  aluminum hydroxide/magnesium hydroxide/simethicone Suspension 30 milliLiter(s) Oral every 4 hours PRN  HYDROmorphone   Tablet 2 milliGRAM(s) Oral every 3 hours PRN  melatonin 3 milliGRAM(s) Oral at bedtime PRN  ondansetron Injectable 4 milliGRAM(s) IV Push every 8 hours PRN                                            ------------------------------------------------------------  VITAL SIGNS: Last 24 Hours  T(C): 36.2 (08 Dec 2021 06:00), Max: 36.2 (08 Dec 2021 06:00)  T(F): 97.1 (08 Dec 2021 06:00), Max: 97.1 (08 Dec 2021 06:00)  HR: 113 (08 Dec 2021 06:00) (75 - 113)  BP: 150/63 (08 Dec 2021 06:00) (105/57 - 150/63)  BP(mean): --  RR: 18 (08 Dec 2021 06:00) (16 - 18)  SpO2: 96% (07 Dec 2021 20:31) (96% - 96%)      12-07-21 @ 07:01  -  12-08-21 @ 07:00  --------------------------------------------------------  IN: 0 mL / OUT: 600 mL / NET: -600 mL                                             --------------------------------------------------------------  LABS:                        8.2    3.76  )-----------( 124      ( 08 Dec 2021 06:40 )             26.2     12-08    138  |  105  |  22<H>  ----------------------------<  91  4.5   |  18  |  0.8    Ca    7.7<L>      08 Dec 2021 06:40  Mg     2.3     12-08                                            -------------------------------------------------------------  RADIOLOGY:    PROCEDURE DATE:  12/03/2021    EXAM:  NM BONE SPECT SA SD        EXAM:  NM BONE IMG WHOLE BODY            INTERPRETATION:  Procedure: Whole body and regional bone images and bone SPECT  Reason: Metastatic prostate cancer    CT chest, abdomen and pelvis November 30, 2021 demonstrates innumerable diffuse sclerotic osseous lesions compatible with osseous metastasis.    Approximately 3 hours after the intravenous administration of  20 millicuries 99m technetium MDP, anterior and posteriorwhole body format bone images were obtained from head to feet.  In addition regional images were also obtained and SPECT images were obtained over thoracolumbar spine and pelvis and reconstructions and three-dimensional reconstructions were generated.    There is diffusely increased uptake throughout the calvarium, thoracic and lumbar spine, pelvis, bilateral femurs, bilateral humeri, bilateral scapula and innumerable bilateral ribs. No visualization of bilateral kidneys.    Findings consistent with diffuse metastatic bone disease, particularly when compared with referenced CT.    Impression: Abnormal bone scan. Diffusely increased bone agent uptake throughout the skeleton consistent with diffuse metastatic bone disease. Sites include calvarium, thoracic and lumbar spine, bilateral iliac bones, bilateral femurs, bilateral humeri, bilateral scapula and innumerable bilateral ribs.    Nonvisualization of the kidneys probably explain lobe right intense uptake by bone metastasis ("absent kidney sign" - superscan).                                              --------------------------------------------------------------    PHYSICAL EXAM:  GEN: No acute distress, cachectic male lying in bed  HEENT: NCAT  LUNGS: Clear to auscultation bilaterally   HEART: S1/S2 present. regular rate and rhythm  ABD: Soft, non-tender, non-distended. Bowel sounds present  EXT: no le edema  NEURO: AAOX3, no motor or sensory deficits  SKIN: No new lesions                                           --------------------------------------------------------------    ASSESSMENT & PLAN    70 yo male with PMH of CVA, afib on Eliquis, HTN, HFrEF (EF 20-25% 2018), chronic LLE pain, presented to the ED with worsening Left LE pain for the past 2 months, worsening fatigue, anorexia and significant weight loss.    #prostate cancer with distant mets   PSA 1458, alk phos 2676  CT chest/abdomen/pelvis - Innumerable diffuse sclerotic osseous lesions -  including 6x5.8x9.4cm mass involving the left iliac wing and 6.6x4.5x4.6cm mass involving left inferior pubic ramus. Expansile sclerotic lesions are noted to involve the right 2nd lateral rib and right 5th anterior rib.  - s/p bone bx by IR of left iliac lesion 12/3: f/u pathology from bone biopsy  - s/p bone scan showing multiple metastatic lesions including the spine  - f/u MRI C/T/L spine to look for cord compression; based on result, f/u with rad/onc for palliative radiation  per hem-onc:  -Casodex 50mg po daily (started on 12/3), will add Lupron 22.5 mg IM in 1 week.  -Abiraterone (zytiga) 1000 mg po daily and Prednisone 5 mg po daily: start Xgeva as out patient to reduce bone related complications    # anemia of chronic disease  - hgb 8.6 in AM, stable  - s/p 4 units pRBC this admission  -keep hgb > 8; may need lasix after future tx given poor EF  Ferritin elevated to 2624; B12 elevated; Folate WNL; ; Haptoglobin pending. Retic 1.8%    #neoplasm-related bone pain  -tylenol 650mg PO q6h prn pain  -Dilaudid 2mg PO q3h prn pain  -Neurontin 300mg po q12  -steroid course: Pred 5mg po q24 started SUN as per Heme/Onc  -bowel regimen: senna and Miralax  -per pall care, no indication for fentanyl patch as patient used 4mg dilaudid in the last 24hours and his pain is intermittent    # Failure to thrive  Severe protein-calorie malnutrition.  dietician recommendations DASH/TLC diet(No pork),  Ensure 3x/day, beneprotein 3x/day ([facility] only) /w LR @50CC/H    # hypotension - prob from anemia, CHF meds, low EF and dehydration  keep SBP >90  consider midodrine  holding parameters for BB    #Afib;   H/o HFrEF (EF 20-25% in 2018)  - c/w elliquis 5mg PO q12h  - hold entresto and aldactone for FELICITY/hypotension  - c/w metoprolol  - c/w ivrabadine    Activity: : PT eval for STR  GI PPX: NA  DVT PPX: Eliquis  Code: Full code  Dispo: anticipate for tmrw                           SAMANTHA CHARLES 71y Male  MRN#: [id number]   CODE STATUS: full code    Hospital Day: 8d    Pt is currently admitted with the primary diagnosis of metastatic prostate cancer    SUBJECTIVE  Overnight/Interval Events: No acute events overnight. Patient is more awake this morning and he feels all right. He received PO dilaudid 4x in the past 24h.                                              ----------------------------------------------------------  OBJECTIVE  PAST MEDICAL & SURGICAL HISTORY  Systolic congestive heart failure, unspecified chronicity  Cerebrovascular accident (CVA) due to bilateral embolism of posterior cerebral arteries  Essential hypertension  Atrial fibrillation, unspecified type  AICD (automatic cardioverter/defibrillator) present                                              -----------------------------------------------------------  ALLERGIES:  cephalexin (Flushing)  penicillin (Rash)                                            ------------------------------------------------------------    HOME MEDICATIONS  Home Medications:  Corlanor 5 mg oral tablet: 1 tab(s) orally 2 times a day (with meals) (30 Nov 2021 17:21)  Metoprolol Succinate ER 25 mg oral tablet, extended release: 1 tab(s) orally once a day (30 Nov 2021 17:20)  spironolactone 25 mg oral tablet: 1 tab(s) orally once a day (14 May 2021 11:51)                           MEDICATIONS:  STANDING MEDICATIONS  abiraterone 1000 milliGRAM(s) Oral daily  apixaban 5 milliGRAM(s) Oral every 12 hours  bicalutamide 50 milliGRAM(s) Oral daily  fentaNYL   Patch  12 MICROgram(s)/Hr 1 Patch Transdermal every 72 hours  gabapentin 300 milliGRAM(s) Oral every 12 hours  influenza  Vaccine (HIGH DOSE) 0.7 milliLiter(s) IntraMuscular once  ivabradine 5 milliGRAM(s) Oral two times a day  metoprolol succinate ER 25 milliGRAM(s) Oral daily  multivitamin 1 Tablet(s) Oral daily  predniSONE   Tablet 5 milliGRAM(s) Oral daily  sodium bicarbonate 1300 milliGRAM(s) Oral every 12 hours    PRN MEDICATIONS  acetaminophen     Tablet .. 650 milliGRAM(s) Oral every 6 hours PRN  aluminum hydroxide/magnesium hydroxide/simethicone Suspension 30 milliLiter(s) Oral every 4 hours PRN  HYDROmorphone   Tablet 2 milliGRAM(s) Oral every 3 hours PRN  melatonin 3 milliGRAM(s) Oral at bedtime PRN  ondansetron Injectable 4 milliGRAM(s) IV Push every 8 hours PRN                                            ------------------------------------------------------------  VITAL SIGNS: Last 24 Hours  T(C): 36.2 (08 Dec 2021 06:00), Max: 36.2 (08 Dec 2021 06:00)  T(F): 97.1 (08 Dec 2021 06:00), Max: 97.1 (08 Dec 2021 06:00)  HR: 113 (08 Dec 2021 06:00) (75 - 113)  BP: 150/63 (08 Dec 2021 06:00) (105/57 - 150/63)  BP(mean): --  RR: 18 (08 Dec 2021 06:00) (16 - 18)  SpO2: 96% (07 Dec 2021 20:31) (96% - 96%)      12-07-21 @ 07:01  -  12-08-21 @ 07:00  --------------------------------------------------------  IN: 0 mL / OUT: 600 mL / NET: -600 mL                                             --------------------------------------------------------------  LABS:                        8.2    3.76  )-----------( 124      ( 08 Dec 2021 06:40 )             26.2     12-08    138  |  105  |  22<H>  ----------------------------<  91  4.5   |  18  |  0.8    Ca    7.7<L>      08 Dec 2021 06:40  Mg     2.3     12-08                                            -------------------------------------------------------------  RADIOLOGY:    PROCEDURE DATE:  12/03/2021    EXAM:  NM BONE SPECT SA SD        EXAM:  NM BONE IMG WHOLE BODY            INTERPRETATION:  Procedure: Whole body and regional bone images and bone SPECT  Reason: Metastatic prostate cancer    CT chest, abdomen and pelvis November 30, 2021 demonstrates innumerable diffuse sclerotic osseous lesions compatible with osseous metastasis.    Approximately 3 hours after the intravenous administration of  20 millicuries 99m technetium MDP, anterior and posteriorwhole body format bone images were obtained from head to feet.  In addition regional images were also obtained and SPECT images were obtained over thoracolumbar spine and pelvis and reconstructions and three-dimensional reconstructions were generated.    There is diffusely increased uptake throughout the calvarium, thoracic and lumbar spine, pelvis, bilateral femurs, bilateral humeri, bilateral scapula and innumerable bilateral ribs. No visualization of bilateral kidneys.    Findings consistent with diffuse metastatic bone disease, particularly when compared with referenced CT.    Impression: Abnormal bone scan. Diffusely increased bone agent uptake throughout the skeleton consistent with diffuse metastatic bone disease. Sites include calvarium, thoracic and lumbar spine, bilateral iliac bones, bilateral femurs, bilateral humeri, bilateral scapula and innumerable bilateral ribs.    Nonvisualization of the kidneys probably explain lobe right intense uptake by bone metastasis ("absent kidney sign" - superscan).                                              --------------------------------------------------------------    PHYSICAL EXAM:  GEN: No acute distress, cachectic male lying in bed  HEENT: NCAT  LUNGS: Clear to auscultation bilaterally   HEART: S1/S2 present. regular rate and rhythm  ABD: Soft, non-tender, non-distended. Bowel sounds present  EXT: no le edema  NEURO: AAOX3, no motor or sensory deficits  SKIN: No new lesions                                           --------------------------------------------------------------    ASSESSMENT & PLAN    70 yo male with PMH of CVA, afib on Eliquis, HTN, HFrEF (EF 20-25% 2018), chronic LLE pain, presented to the ED with worsening Left LE pain for the past 2 months, worsening fatigue, anorexia and significant weight loss.    #prostate cancer with distant mets   PSA 1458, alk phos 2676  CT chest/abdomen/pelvis - Innumerable diffuse sclerotic osseous lesions -  including 6x5.8x9.4cm mass involving the left iliac wing and 6.6x4.5x4.6cm mass involving left inferior pubic ramus. Expansile sclerotic lesions are noted to involve the right 2nd lateral rib and right 5th anterior rib.  - s/p bone bx by IR of left iliac lesion 12/3: f/u pathology from bone biopsy  - s/p bone scan showing multiple metastatic lesions including the spine  - f/u MRI C/T/L spine to look for cord compression; based on result, f/u with rad/onc for palliative radiation  per hem-onc:  -Casodex 50mg po daily (started on 12/3), will add Lupron 22.5 mg IM in 1 week (can be outpatient)  -Abiraterone (zytiga) 1000 mg po daily and Prednisone 5 mg po daily: start denosumab (xgeva) as out patient to reduce bone related complications    # anemia of chronic disease  - hgb 8.2 in AM, stable  - s/p 4 units pRBC this admission  -keep hgb > 8; may need lasix after future tx given poor EF  Ferritin elevated to 2624; B12 elevated; Folate WNL; ; Haptoglobin pending. Retic 1.8%    #neoplasm-related bone pain  -tylenol 650mg PO q6h prn pain  -Dilaudid 2mg PO q3h prn pain  -Neurontin 300mg po q12  -steroid course: Pred 5mg po q24 started SUN as per Heme/Onc  -bowel regimen: senna and Miralax  -fentanyl patch started today 12/8    # Failure to thrive  Severe protein-calorie malnutrition.  dietician recommendations DASH/TLC diet(No pork),  Ensure 3x/day, beneprotein 3x/day ([facility] only) /w LR @50CC/H    # hypotension - prob from anemia, CHF meds, low EF and dehydration  keep SBP >90  consider midodrine  holding parameters for BB    #Afib;   H/o HFrEF (EF 20-25% in 2018)  - c/w elliquis 5mg PO q12h  - hold entresto and aldactone for FELICITY/hypotension  - c/w metoprolol  - c/w ivrabadine    Activity: : PT eval for STR  GI PPX: NA  DVT PPX: Eliquis  Code: Full code  Dispo: working on [facility], f/u with sister about meds, anticipate for tmrw,

## 2021-12-08 NOTE — DISCHARGE NOTE NURSING/CASE MANAGEMENT/SOCIAL WORK - NSDCFUADDAPPT_GEN_ALL_CORE_FT
Please follow up with hem-onc, Dr. Cary, and with urology Dr. Crockett, regarding treatment for prostate cancer

## 2021-12-08 NOTE — PROGRESS NOTE ADULT - SUBJECTIVE AND OBJECTIVE BOX
SAMANTHA CHARLES             MRN-[id number]      Patient is a 71y old Male who presents with a chief complaint of Loss of appetite, weight loss, and knee pain (02 Dec 2021 14:01)    Currently admitted with the primary diagnosis of:  pain     SUBJECTIVE:  -Patient seen at bedside with sister  -Patient with significant increase in pain today in left leg, though he could not discuss it further  -Patient notes pain improved at time of visit after medication given, but still endorsing pain    ROS:  DYSPNEA: N  NAUS/VOM: N	  SECRETIONS:N	  AGITATION: N  Pain (Y/N):  Yes as above     PEx:   Vital Signs Last 24 Hrs  T(C): 37.2 (08 Dec 2021 14:35), Max: 37.2 (08 Dec 2021 14:35)  T(F): 98.9 (08 Dec 2021 14:35), Max: 98.9 (08 Dec 2021 14:35)  HR: 90 (08 Dec 2021 14:35) (75 - 113)  BP: 116/55 (08 Dec 2021 14:35) (105/57 - 150/63)  BP(mean): --  RR: 19 (08 Dec 2021 14:35) (16 - 19)  SpO2: 96% (07 Dec 2021 20:31) (96% - 96%)       General:  found in bed in some distress, cachectic  Eyes:  EOMI Non icteric MOM  ENMT: no external oral ulcers, MMM, no thrush   CVS: RR , no edema   Resp: labored at times   GI:  ND, nontender  Musc: No clubbing  Neuro: Follows commands No focal deficits  Psych: Calm Pleasant, AAOx3   Skin: Non jaundiced , no rash     ALLERGIES: cephalexin (Flushing)  penicillin (Rash)      Labs:	                          8.2    3.76  )-----------( 124      ( 08 Dec 2021 06:40 )             26.2       12-08    138  |  105  |  22<H>  ----------------------------<  91  4.5   |  18  |  0.8    Ca    7.7<L>      08 Dec 2021 06:40  Mg     2.3     12-08      RADIOLOGY  Bone scan  Abnormal bone scan. Diffusely increased bone agent uptake throughout the skeleton consistent with diffuse metastatic bone disease. Sites include calvarium, thoracic and lumbar spine, bilateral iliac bones, bilateral femurs, bilateral humeri, bilateral scapula and innumerable bilateral ribs.    Nonvisualization of the kidneys probably explain lobe right intense uptake by bone metastasis ("absent kidney sign" - superscan).      EKG  12 Lead ECG:   Ventricular Rate 86 BPM    Atrial Rate 86 BPM    P-R Interval 148 ms    QRS Duration 140 ms    Q-T Interval 410 ms    QTC Calculation(Bazett) 490 ms    P Axis 86 degrees    R Axis 99 degrees    T Axis -42 degrees    Diagnosis Line Atrial-sensed ventricular-paced rhythm  Abnormal ECG    Confirmed by ERWIN STUART MD ([id number]) on 11/30/2021 1:27:56 PM (11-30-21 @ 11:40)      Imaging Personally Reviewed:  [x] YES  [ ] NO    Consultant(s) Notes Reviewed:  [x] YES  [ ] NO  Care Discussed with Consultants/Other Providers [x] YES  [ ] NO    Medications:	      MEDICATIONS  (STANDING):  abiraterone 1000 milliGRAM(s) Oral daily  apixaban 5 milliGRAM(s) Oral every 12 hours  bicalutamide 50 milliGRAM(s) Oral daily  dexAMETHasone  Injectable 2 milliGRAM(s) IV Push once  fentaNYL   Patch  12 MICROgram(s)/Hr 1 Patch Transdermal every 72 hours  gabapentin 300 milliGRAM(s) Oral every 8 hours  influenza  Vaccine (HIGH DOSE) 0.7 milliLiter(s) IntraMuscular once  ivabradine 5 milliGRAM(s) Oral two times a day  metoprolol succinate ER 25 milliGRAM(s) Oral daily  multivitamin 1 Tablet(s) Oral daily  predniSONE   Tablet 5 milliGRAM(s) Oral daily  sodium bicarbonate 1300 milliGRAM(s) Oral every 12 hours    MEDICATIONS  (PRN):  acetaminophen     Tablet .. 650 milliGRAM(s) Oral every 6 hours PRN Temp greater or equal to 38C (100.4F), Mild Pain (1 - 3)  aluminum hydroxide/magnesium hydroxide/simethicone Suspension 30 milliLiter(s) Oral every 4 hours PRN Dyspepsia  HYDROmorphone   Tablet 2 milliGRAM(s) Oral every 3 hours PRN Severe Pain (7 - 10)  melatonin 3 milliGRAM(s) Oral at bedtime PRN Insomnia  ondansetron Injectable 4 milliGRAM(s) IV Push every 8 hours PRN Nausea and/or Vomiting    ADVANCED DIRECTIVES:            FULL CODE         DECISION MAKER: Patient [x]  Family [  ]  Other [  ] _______  Made sister, Barb, HCP.    GOALS OF CARE DISCUSSION       Palliative care info/counseling provided	          PSYCHOSOCIAL-SPIRITUAL ASSESSMENT:       Reviewed    REFERRALS	        Palliative Med        Unit SW/Case Mgmt

## 2021-12-08 NOTE — PROGRESS NOTE ADULT - PROBLEM SELECTOR PLAN 1
-New dx, findings on imaging  -heme-onc on board - pending full workup/tx options  -continue current medical management.
-New dx, findings on imaging show diffuse osseous mets  -heme-onc on board - pending full workup/on hormone therapy   -continue current medical management
-New dx, findings on imaging show diffuse osseous mets  -awaiting MRI  -continue current medical management

## 2021-12-08 NOTE — PROGRESS NOTE ADULT - SUBJECTIVE AND OBJECTIVE BOX
SAMANTHA CHARLES  71y  Male  ***My note supersedes ALL resident notes that I sign.  My corrections for their notes are in my note.***    I can be reached directly on [place] [id number]. My office number is [phone]. My personal cell number is [phone].    INTERVAL EVENTS: Here for f/u of pros cancer. Pt having a little more pain today. Pt took 4 tab dilaudid in past 24 hrs. Awaiting MRI.    T(F): 97.1 (12-08-21 @ 06:00), Max: 97.1 (12-08-21 @ 06:00)  HR: 113 (12-08-21 @ 06:00) (75 - 113)  BP: 150/63 (12-08-21 @ 06:00) (105/57 - 150/63)  RR: 18 (12-08-21 @ 06:00) (16 - 18)  SpO2: 96% (12-07-21 @ 20:31) (96% - 96%)    12-07-21 @ 07:01  -  12-08-21 @ 07:00  --------------------------------------------------------  IN: 0 mL / OUT: 600 mL / NET: -600 mL    Gen: comfortable;   HEENT: PERRL, EOMI; mouth clr; nose clr  Neck: no nodes, no JVD, thyroid nl  chest: left AICD  lungs: clr  hrt: s1 s2 rrr no murmur  abd: soft, NT/ND, no HS megaly  ext: no edema, no c/c  neuro: aa ox3, cn intact, can move all 4 ext, but globally weak    LABS:                      8.2     (    98.1   3.76  )-----------( ---------      124      ( 08 Dec 2021 06:40 )             26.2    (    15.9     138   (   105   (   91      12-08-21 @ 06:40  ----------------------               4.5   (   18   (   22                             -----                        0.8  Ca  7.7   Mg  2.3    P   --     RADIOLOGY & ADDITIONAL TESTS:    MEDICATIONS:    abiraterone 1000 milliGRAM(s) Oral daily  acetaminophen     Tablet .. 650 milliGRAM(s) Oral every 6 hours PRN  aluminum hydroxide/magnesium hydroxide/simethicone Suspension 30 milliLiter(s) Oral every 4 hours PRN  apixaban 5 milliGRAM(s) Oral every 12 hours  bicalutamide 50 milliGRAM(s) Oral daily  dexAMETHasone  Injectable 2 milliGRAM(s) IV Push once  fentaNYL   Patch  12 MICROgram(s)/Hr 1 Patch Transdermal every 72 hours  gabapentin 300 milliGRAM(s) Oral every 8 hours  HYDROmorphone   Tablet 2 milliGRAM(s) Oral every 3 hours PRN  influenza  Vaccine (HIGH DOSE) 0.7 milliLiter(s) IntraMuscular once  ivabradine 5 milliGRAM(s) Oral two times a day  melatonin 3 milliGRAM(s) Oral at bedtime PRN  metoprolol succinate ER 25 milliGRAM(s) Oral daily  multivitamin 1 Tablet(s) Oral daily  ondansetron Injectable 4 milliGRAM(s) IV Push every 8 hours PRN  predniSONE   Tablet 5 milliGRAM(s) Oral daily  sodium bicarbonate 1300 milliGRAM(s) Oral every 12 hours

## 2021-12-08 NOTE — PROGRESS NOTE ADULT - PROBLEM SELECTOR PLAN 2
-Full Code  -Continue current med management  -Will address GOC if appropriate  -Following for symptom management.
-Full Code  -Continue current med management  -Will address GOC if appropriate  -Following for symptom management  -Pt named sister Barb as HCP
-Full Code  -Continue current med management  -Will address GOC if appropriate  -Following for symptom management  -Pt named sister Barb as HCP today

## 2021-12-08 NOTE — CHART NOTE - NSCHARTNOTESELECT_GEN_ALL_CORE
Event Note
Palliative Care SW Note/Event Note
Palliative Care SW Note/Event Note
Palliative Care/Event Note
Event Note
Palliative Care SW Initial Assessment/Event Note
Palliative Care SW Note/Event Note
Palliative Care SW Note/Event Note
Palliative Care/Event Note
RD nutrition f/u/Event Note
Transfer Note

## 2021-12-08 NOTE — PROGRESS NOTE ADULT - PROBLEM SELECTOR PLAN 3
Much improved today. Intermittent, likely related to osseous mets. No pain at time of visit.  -continue dilaudid 2mg PO q3h PRN for moderate/severe pain  -continue tylenol PRN for mild pain  -continue gabapentin 300 Q 12 H  -completed steroid course
Much improved today. No PRNs overnight. No pain at time of visit.  -continue dilaudid 2mg PO q3h PRN for moderate/severe pain  -continue tylenol PRN for mild pain  -agree with downtitration of dexamethasone down to 2mg q8h today - continue titrate down over next few days  -continue gabapentin
Significant pain this morning, improved at time of visit.   -fentanyl patch at 12mcg/hour started per primary team  -continue dilaudid 2mg PO q3h PRN for moderate/severe pain  -continue tylenol PRN for mild pain  -continue gabapentin 300 Q 8 H  -completed steroid course

## 2021-12-09 ENCOUNTER — INPATIENT (INPATIENT)
Facility: HOSPITAL | Age: 71
LOS: 13 days | Discharge: NURSING, REHAB OR LONG-TERM CARE | End: 2021-12-23
Attending: INTERNAL MEDICINE | Admitting: INTERNAL MEDICINE
Payer: MEDICARE

## 2021-12-09 VITALS
SYSTOLIC BLOOD PRESSURE: 129 MMHG | HEART RATE: 78 BPM | DIASTOLIC BLOOD PRESSURE: 61 MMHG | RESPIRATION RATE: 20 BRPM | TEMPERATURE: 98 F

## 2021-12-09 VITALS
TEMPERATURE: 98 F | OXYGEN SATURATION: 100 % | DIASTOLIC BLOOD PRESSURE: 52 MMHG | SYSTOLIC BLOOD PRESSURE: 109 MMHG | HEIGHT: 67 IN | HEART RATE: 78 BPM | RESPIRATION RATE: 16 BRPM

## 2021-12-09 DIAGNOSIS — I11.0 HYPERTENSIVE HEART DISEASE WITH HEART FAILURE: ICD-10-CM

## 2021-12-09 DIAGNOSIS — Z95.810 PRESENCE OF AUTOMATIC (IMPLANTABLE) CARDIAC DEFIBRILLATOR: ICD-10-CM

## 2021-12-09 DIAGNOSIS — Z79.01 LONG TERM (CURRENT) USE OF ANTICOAGULANTS: ICD-10-CM

## 2021-12-09 DIAGNOSIS — I48.20 CHRONIC ATRIAL FIBRILLATION, UNSPECIFIED: ICD-10-CM

## 2021-12-09 DIAGNOSIS — E87.5 HYPERKALEMIA: ICD-10-CM

## 2021-12-09 DIAGNOSIS — R62.7 ADULT FAILURE TO THRIVE: ICD-10-CM

## 2021-12-09 DIAGNOSIS — C79.51 SECONDARY MALIGNANT NEOPLASM OF BONE: ICD-10-CM

## 2021-12-09 DIAGNOSIS — Z88.3 ALLERGY STATUS TO OTHER ANTI-INFECTIVE AGENTS: ICD-10-CM

## 2021-12-09 DIAGNOSIS — E87.2 ACIDOSIS: ICD-10-CM

## 2021-12-09 DIAGNOSIS — G89.3 NEOPLASM RELATED PAIN (ACUTE) (CHRONIC): ICD-10-CM

## 2021-12-09 DIAGNOSIS — D63.0 ANEMIA IN NEOPLASTIC DISEASE: ICD-10-CM

## 2021-12-09 DIAGNOSIS — I50.22 CHRONIC SYSTOLIC (CONGESTIVE) HEART FAILURE: ICD-10-CM

## 2021-12-09 DIAGNOSIS — E43 UNSPECIFIED SEVERE PROTEIN-CALORIE MALNUTRITION: ICD-10-CM

## 2021-12-09 DIAGNOSIS — N39.0 URINARY TRACT INFECTION, SITE NOT SPECIFIED: ICD-10-CM

## 2021-12-09 DIAGNOSIS — B95.61 METHICILLIN SUSCEPTIBLE STAPHYLOCOCCUS AUREUS INFECTION AS THE CAUSE OF DISEASES CLASSIFIED ELSEWHERE: ICD-10-CM

## 2021-12-09 DIAGNOSIS — E88.09 OTHER DISORDERS OF PLASMA-PROTEIN METABOLISM, NOT ELSEWHERE CLASSIFIED: ICD-10-CM

## 2021-12-09 DIAGNOSIS — R78.81 BACTEREMIA: ICD-10-CM

## 2021-12-09 DIAGNOSIS — Z95.810 PRESENCE OF AUTOMATIC (IMPLANTABLE) CARDIAC DEFIBRILLATOR: Chronic | ICD-10-CM

## 2021-12-09 DIAGNOSIS — N17.9 ACUTE KIDNEY FAILURE, UNSPECIFIED: ICD-10-CM

## 2021-12-09 DIAGNOSIS — E87.0 HYPEROSMOLALITY AND HYPERNATREMIA: ICD-10-CM

## 2021-12-09 DIAGNOSIS — C61 MALIGNANT NEOPLASM OF PROSTATE: ICD-10-CM

## 2021-12-09 DIAGNOSIS — Z88.0 ALLERGY STATUS TO PENICILLIN: ICD-10-CM

## 2021-12-09 LAB
ALBUMIN SERPL ELPH-MCNC: 3.1 G/DL — LOW (ref 3.5–5.2)
ALP SERPL-CCNC: 1978 U/L — HIGH (ref 30–115)
ALT FLD-CCNC: 17 U/L — SIGNIFICANT CHANGE UP (ref 0–41)
ANION GAP SERPL CALC-SCNC: 13 MMOL/L — SIGNIFICANT CHANGE UP (ref 7–14)
ANION GAP SERPL CALC-SCNC: 15 MMOL/L — HIGH (ref 7–14)
APTT BLD: 32.3 SEC — SIGNIFICANT CHANGE UP (ref 27–39.2)
AST SERPL-CCNC: 34 U/L — SIGNIFICANT CHANGE UP (ref 0–41)
BASOPHILS # BLD AUTO: 0.01 K/UL — SIGNIFICANT CHANGE UP (ref 0–0.2)
BASOPHILS # BLD AUTO: 0.02 K/UL — SIGNIFICANT CHANGE UP (ref 0–0.2)
BASOPHILS NFR BLD AUTO: 0.2 % — SIGNIFICANT CHANGE UP (ref 0–1)
BASOPHILS NFR BLD AUTO: 0.3 % — SIGNIFICANT CHANGE UP (ref 0–1)
BILIRUB SERPL-MCNC: 0.5 MG/DL — SIGNIFICANT CHANGE UP (ref 0.2–1.2)
BLD GP AB SCN SERPL QL: SIGNIFICANT CHANGE UP
BUN SERPL-MCNC: 24 MG/DL — HIGH (ref 10–20)
BUN SERPL-MCNC: 26 MG/DL — HIGH (ref 10–20)
CALCIUM SERPL-MCNC: 8.1 MG/DL — LOW (ref 8.5–10.1)
CALCIUM SERPL-MCNC: 8.1 MG/DL — LOW (ref 8.5–10.1)
CHLORIDE SERPL-SCNC: 104 MMOL/L — SIGNIFICANT CHANGE UP (ref 98–110)
CHLORIDE SERPL-SCNC: 105 MMOL/L — SIGNIFICANT CHANGE UP (ref 98–110)
CO2 SERPL-SCNC: 19 MMOL/L — SIGNIFICANT CHANGE UP (ref 17–32)
CO2 SERPL-SCNC: 22 MMOL/L — SIGNIFICANT CHANGE UP (ref 17–32)
CREAT SERPL-MCNC: 0.8 MG/DL — SIGNIFICANT CHANGE UP (ref 0.7–1.5)
CREAT SERPL-MCNC: 0.8 MG/DL — SIGNIFICANT CHANGE UP (ref 0.7–1.5)
EOSINOPHIL # BLD AUTO: 0.03 K/UL — SIGNIFICANT CHANGE UP (ref 0–0.7)
EOSINOPHIL # BLD AUTO: 0.03 K/UL — SIGNIFICANT CHANGE UP (ref 0–0.7)
EOSINOPHIL NFR BLD AUTO: 0.5 % — SIGNIFICANT CHANGE UP (ref 0–8)
EOSINOPHIL NFR BLD AUTO: 0.6 % — SIGNIFICANT CHANGE UP (ref 0–8)
GLUCOSE SERPL-MCNC: 119 MG/DL — HIGH (ref 70–99)
GLUCOSE SERPL-MCNC: 129 MG/DL — HIGH (ref 70–99)
HCT VFR BLD CALC: 26.3 % — LOW (ref 42–52)
HCT VFR BLD CALC: 27.6 % — LOW (ref 42–52)
HGB BLD-MCNC: 8.3 G/DL — LOW (ref 14–18)
HGB BLD-MCNC: 8.6 G/DL — LOW (ref 14–18)
IMM GRANULOCYTES NFR BLD AUTO: 2.6 % — HIGH (ref 0.1–0.3)
IMM GRANULOCYTES NFR BLD AUTO: 3.9 % — HIGH (ref 0.1–0.3)
INR BLD: 2.4 RATIO — HIGH (ref 0.65–1.3)
LYMPHOCYTES # BLD AUTO: 0.89 K/UL — LOW (ref 1.2–3.4)
LYMPHOCYTES # BLD AUTO: 1.58 K/UL — SIGNIFICANT CHANGE UP (ref 1.2–3.4)
LYMPHOCYTES # BLD AUTO: 17.6 % — LOW (ref 20.5–51.1)
LYMPHOCYTES # BLD AUTO: 24.3 % — SIGNIFICANT CHANGE UP (ref 20.5–51.1)
MAGNESIUM SERPL-MCNC: 2.4 MG/DL — SIGNIFICANT CHANGE UP (ref 1.8–2.4)
MAGNESIUM SERPL-MCNC: 2.4 MG/DL — SIGNIFICANT CHANGE UP (ref 1.8–2.4)
MCHC RBC-ENTMCNC: 30.4 PG — SIGNIFICANT CHANGE UP (ref 27–31)
MCHC RBC-ENTMCNC: 30.9 PG — SIGNIFICANT CHANGE UP (ref 27–31)
MCHC RBC-ENTMCNC: 31.2 G/DL — LOW (ref 32–37)
MCHC RBC-ENTMCNC: 31.6 G/DL — LOW (ref 32–37)
MCV RBC AUTO: 97.5 FL — HIGH (ref 80–94)
MCV RBC AUTO: 97.8 FL — HIGH (ref 80–94)
MONOCYTES # BLD AUTO: 0.58 K/UL — SIGNIFICANT CHANGE UP (ref 0.1–0.6)
MONOCYTES # BLD AUTO: 0.67 K/UL — HIGH (ref 0.1–0.6)
MONOCYTES NFR BLD AUTO: 10.3 % — HIGH (ref 1.7–9.3)
MONOCYTES NFR BLD AUTO: 11.5 % — HIGH (ref 1.7–9.3)
NEUTROPHILS # BLD AUTO: 3.42 K/UL — SIGNIFICANT CHANGE UP (ref 1.4–6.5)
NEUTROPHILS # BLD AUTO: 3.94 K/UL — SIGNIFICANT CHANGE UP (ref 1.4–6.5)
NEUTROPHILS NFR BLD AUTO: 60.7 % — SIGNIFICANT CHANGE UP (ref 42.2–75.2)
NEUTROPHILS NFR BLD AUTO: 67.5 % — SIGNIFICANT CHANGE UP (ref 42.2–75.2)
NRBC # BLD: 1 /100 WBCS — HIGH (ref 0–0)
NRBC # BLD: 2 /100 WBCS — HIGH (ref 0–0)
PLATELET # BLD AUTO: 133 K/UL — SIGNIFICANT CHANGE UP (ref 130–400)
PLATELET # BLD AUTO: 135 K/UL — SIGNIFICANT CHANGE UP (ref 130–400)
POTASSIUM SERPL-MCNC: 4.9 MMOL/L — SIGNIFICANT CHANGE UP (ref 3.5–5)
POTASSIUM SERPL-MCNC: 5.1 MMOL/L — HIGH (ref 3.5–5)
POTASSIUM SERPL-SCNC: 4.9 MMOL/L — SIGNIFICANT CHANGE UP (ref 3.5–5)
POTASSIUM SERPL-SCNC: 5.1 MMOL/L — HIGH (ref 3.5–5)
PROT SERPL-MCNC: 5.2 G/DL — LOW (ref 6–8)
PROTHROM AB SERPL-ACNC: 27.4 SEC — HIGH (ref 9.95–12.87)
RBC # BLD: 2.69 M/UL — LOW (ref 4.7–6.1)
RBC # BLD: 2.83 M/UL — LOW (ref 4.7–6.1)
RBC # FLD: 15.9 % — HIGH (ref 11.5–14.5)
RBC # FLD: 16.1 % — HIGH (ref 11.5–14.5)
SARS-COV-2 RNA SPEC QL NAA+PROBE: SIGNIFICANT CHANGE UP
SODIUM SERPL-SCNC: 139 MMOL/L — SIGNIFICANT CHANGE UP (ref 135–146)
SODIUM SERPL-SCNC: 139 MMOL/L — SIGNIFICANT CHANGE UP (ref 135–146)
WBC # BLD: 5.06 K/UL — SIGNIFICANT CHANGE UP (ref 4.8–10.8)
WBC # BLD: 6.49 K/UL — SIGNIFICANT CHANGE UP (ref 4.8–10.8)
WBC # FLD AUTO: 5.06 K/UL — SIGNIFICANT CHANGE UP (ref 4.8–10.8)
WBC # FLD AUTO: 6.49 K/UL — SIGNIFICANT CHANGE UP (ref 4.8–10.8)

## 2021-12-09 PROCEDURE — 99285 EMERGENCY DEPT VISIT HI MDM: CPT

## 2021-12-09 PROCEDURE — 99223 1ST HOSP IP/OBS HIGH 75: CPT

## 2021-12-09 RX ORDER — BICALUTAMIDE 50 MG/1
50 TABLET, FILM COATED ORAL DAILY
Refills: 0 | Status: DISCONTINUED | OUTPATIENT
Start: 2021-12-09 | End: 2021-12-23

## 2021-12-09 RX ORDER — OXYCODONE AND ACETAMINOPHEN 5; 325 MG/1; MG/1
1 TABLET ORAL EVERY 6 HOURS
Refills: 0 | Status: DISCONTINUED | OUTPATIENT
Start: 2021-12-09 | End: 2021-12-12

## 2021-12-09 RX ORDER — LANOLIN ALCOHOL/MO/W.PET/CERES
3 CREAM (GRAM) TOPICAL AT BEDTIME
Refills: 0 | Status: DISCONTINUED | OUTPATIENT
Start: 2021-12-09 | End: 2021-12-23

## 2021-12-09 RX ORDER — FENTANYL CITRATE 50 UG/ML
1 INJECTION INTRAVENOUS
Refills: 0 | Status: DISCONTINUED | OUTPATIENT
Start: 2021-12-09 | End: 2021-12-12

## 2021-12-09 RX ORDER — GABAPENTIN 400 MG/1
300 CAPSULE ORAL EVERY 8 HOURS
Refills: 0 | Status: DISCONTINUED | OUTPATIENT
Start: 2021-12-09 | End: 2021-12-12

## 2021-12-09 RX ORDER — IVABRADINE 7.5 MG/1
5 TABLET, FILM COATED ORAL
Refills: 0 | Status: DISCONTINUED | OUTPATIENT
Start: 2021-12-09 | End: 2021-12-23

## 2021-12-09 RX ORDER — HYDROMORPHONE HYDROCHLORIDE 2 MG/ML
2 INJECTION INTRAMUSCULAR; INTRAVENOUS; SUBCUTANEOUS
Refills: 0 | Status: DISCONTINUED | OUTPATIENT
Start: 2021-12-09 | End: 2021-12-12

## 2021-12-09 RX ORDER — SODIUM CHLORIDE 9 MG/ML
500 INJECTION INTRAMUSCULAR; INTRAVENOUS; SUBCUTANEOUS ONCE
Refills: 0 | Status: COMPLETED | OUTPATIENT
Start: 2021-12-09 | End: 2021-12-09

## 2021-12-09 RX ORDER — ABIRATERONE ACETATE 250 MG/1
1000 TABLET ORAL EVERY 24 HOURS
Refills: 0 | Status: DISCONTINUED | OUTPATIENT
Start: 2021-12-09 | End: 2021-12-10

## 2021-12-09 RX ORDER — SPIRONOLACTONE 25 MG/1
25 TABLET, FILM COATED ORAL DAILY
Refills: 0 | Status: DISCONTINUED | OUTPATIENT
Start: 2021-12-09 | End: 2021-12-13

## 2021-12-09 RX ORDER — METOPROLOL TARTRATE 50 MG
25 TABLET ORAL DAILY
Refills: 0 | Status: DISCONTINUED | OUTPATIENT
Start: 2021-12-09 | End: 2021-12-23

## 2021-12-09 RX ORDER — APIXABAN 2.5 MG/1
5 TABLET, FILM COATED ORAL EVERY 12 HOURS
Refills: 0 | Status: DISCONTINUED | OUTPATIENT
Start: 2021-12-09 | End: 2021-12-16

## 2021-12-09 RX ORDER — SACUBITRIL AND VALSARTAN 24; 26 MG/1; MG/1
1 TABLET, FILM COATED ORAL
Refills: 0 | Status: DISCONTINUED | OUTPATIENT
Start: 2021-12-09 | End: 2021-12-23

## 2021-12-09 RX ADMIN — Medication 5 MILLIGRAM(S): at 06:10

## 2021-12-09 RX ADMIN — HYDROMORPHONE HYDROCHLORIDE 2 MILLIGRAM(S): 2 INJECTION INTRAMUSCULAR; INTRAVENOUS; SUBCUTANEOUS at 22:03

## 2021-12-09 RX ADMIN — Medication 25 MILLIGRAM(S): at 06:00

## 2021-12-09 RX ADMIN — Medication 1300 MILLIGRAM(S): at 06:00

## 2021-12-09 RX ADMIN — APIXABAN 5 MILLIGRAM(S): 2.5 TABLET, FILM COATED ORAL at 05:59

## 2021-12-09 RX ADMIN — GABAPENTIN 300 MILLIGRAM(S): 400 CAPSULE ORAL at 06:00

## 2021-12-09 RX ADMIN — HYDROMORPHONE HYDROCHLORIDE 2 MILLIGRAM(S): 2 INJECTION INTRAMUSCULAR; INTRAVENOUS; SUBCUTANEOUS at 23:12

## 2021-12-09 RX ADMIN — SODIUM CHLORIDE 500 MILLILITER(S): 9 INJECTION INTRAMUSCULAR; INTRAVENOUS; SUBCUTANEOUS at 16:52

## 2021-12-09 RX ADMIN — HYDROMORPHONE HYDROCHLORIDE 2 MILLIGRAM(S): 2 INJECTION INTRAMUSCULAR; INTRAVENOUS; SUBCUTANEOUS at 06:02

## 2021-12-09 RX ADMIN — SODIUM CHLORIDE 500 MILLILITER(S): 9 INJECTION INTRAMUSCULAR; INTRAVENOUS; SUBCUTANEOUS at 14:23

## 2021-12-09 RX ADMIN — GABAPENTIN 300 MILLIGRAM(S): 400 CAPSULE ORAL at 22:03

## 2021-12-09 RX ADMIN — IVABRADINE 5 MILLIGRAM(S): 7.5 TABLET, FILM COATED ORAL at 06:10

## 2021-12-09 RX ADMIN — Medication 3 MILLIGRAM(S): at 22:06

## 2021-12-09 NOTE — ED ADULT NURSE NOTE - NSIMPLEMENTINTERV_GEN_ALL_ED
Implemented All Fall with Harm Risk Interventions:  [place] to call system. Call bell, personal items and telephone within reach. Instruct patient to call for assistance. Room bathroom lighting operational. Non-slip footwear when patient is off stretcher. Physically safe environment: no spills, clutter or unnecessary equipment. Stretcher in lowest position, wheels locked, appropriate side rails in place. Provide visual cue, wrist band, yellow gown, etc. Monitor gait and stability. Monitor for mental status changes and reorient to person, place, and time. Review medications for side effects contributing to fall risk. Reinforce activity limits and safety measures with patient and family. Provide visual clues: red socks.

## 2021-12-09 NOTE — ED PROVIDER NOTE - ATTENDING CONTRIBUTION TO CARE
72 yo M with PMH of CVA, afib on Eliquis, HTN, HFrEF ( EF 20-25% 2018), chronic LLE pain, presented to the ED with worsening Left LE pain for the past 2 months, worsening fatigue, anorexia and significant weight loss.   Patient was admitted to medicine and underwent bone biopsy by IR. He was evaluated by urology and hematology-oncology and started on hormonal therapy, casodex, while inpatient. The patient was admitted to nursing home but family not happy with nursing home. social work contacted in the ed. admitted after speaking with social work for replacement.

## 2021-12-09 NOTE — ED ADULT NURSE NOTE - CHIEF COMPLAINT QUOTE
pt was d/c from hospital today to [facility]. family member wants patient evaluated, reports that he's having worsening weakness. hx of stage 4 prostate CA. pt c/o chronic right leg pain

## 2021-12-09 NOTE — ED PROVIDER NOTE - PHYSICAL EXAMINATION
Physical Exam    Vital Signs: I have reviewed the initial vital signs.  Constitutional: cachectic  Eyes: Conjunctiva pink, Sclera clear  Cardiovascular: S1 and S2, regular rate, regular rhythm, well-perfused extremities, radial pulses equal and 2+, calves non ttp, equal in size  Respiratory: clear to auscultation bilaterally no wheezing, rales and rhonchi  Gastrointestinal: soft, non-tender abdomen  : flanagan in place with light yellow urine output, no erythema or purulence to genital area  Musculoskeletal: supple neck, no lower extremity edema,   Integumentary: warm, dry, no rashes, lacerations or ecchymosis  Neurologic: awake, alert, no facial droop, no slurred speech, moving all extremities

## 2021-12-09 NOTE — H&P ADULT - HISTORY OF PRESENT ILLNESS
70 yo M with PMH of CVA, afib on Eliquis, HTN, HFrEF ( EF 20-25% 2018), chronic LLE pain, presented to the ED from NH. Pt was discharged today from [facility] to [facility]. Family not happy with current NH and wants patient placed at [facility]. Pending outpt MRI as could not be done inpt 2/2 inavailability of EP/Medtronic Rep for AICDS.     Hospital Course:    Patient was admitted to medicine and underwent bone biopsy by IR. He was evaluated by urology and hematology-oncology and started on hormonal therapy, casodex, while inpatient. He had a bone scan which showed multiple metastatic lesions including in the spine. Palliative care saw the patient and he was given PO dilaudid, neurontin, and 1 dose of dexamethasone for the left lower extremity pain. He has anemia of chronic disease and received 4 units pRBCs during the admission. Patient was seen by physical therapy and by dietitian regarding failure to thrive. Spoke with sister regarding poor prognosis and patient will be going to SNF for further care. He will be receiving chemotherapy outpatient at [facility]. He can get MRI of lumbar and thoracic spine outpatient and that will determine treatment by radiation oncology.     In the ED, pt hemodynamically stable. Repeat Labs stable.    Pt is a limited historian. AX02 @ baseline

## 2021-12-09 NOTE — PATIENT PROFILE ADULT - FALL HARM RISK - HARM RISK INTERVENTIONS

## 2021-12-09 NOTE — H&P ADULT - ASSESSMENT
72 yo M with PMH of CVA, afib on Eliquis, HTN, HFrEF (EF 20-25% 2018), chronic LLE pain, presented to the ED with worsening Left LE pain for the past 2 months, worsening fatigue, anorexia and significant weight loss. Of note, pt had previously been worked up for the leg pain including a CT scan in march 2021 in [facility], which was negative.      Discharged from [facility] today, Re-admitted for placement at another NH      # Neopl-related severe pain 2/2 diffuse bony sclerotic lesions (AP 2765) likely 2/2 prostate cancer (irregularly marginated prostate gland on CT and PSA 1400)  CT chest/abdomen/pelvis - Innumerable diffuse sclerotic osseous lesions -  including 6x5.8x9.4cm mass involving the left iliac wing and 6.6x4.5x4.6cm mass involving left inferior pubic ramus. Expansile sclerotic lesions are noted to involve the right 2nd lateral rib and right 5th anterior rib.  IR s/p bx lt iliac lesion 12/3: f/u path   Uro noted  s/p Hem Onc eval and Pall Care eval   bone scan -> diff bone mets, including vert (kidneys not seen b/o such intense uptake by diff bone mets)  Plan was to obtain MRI T/L spine w/ gado (AICD is MRI compatible) outpatient   rad/onc eval can be completed as outpt  h/o beginning tx:  Casodex 50mg po daily  Lupron 22.5 mg IM tomorrow (F/U Heme/Onc to notify them of pts readmission)  Abiraterone 1000 mg po daily and Prednisone 5 mg po daily  Heme/Onc planned to  start Xgeva as outpatient to reduce bone related complications  c/w dilaudid 2mg po q3 prn pain  cont fentanyl patch 12 mcg/hr top q72 for longer-acting relief  cont neurontin 300mg po q8  bowel reg    # Failure to thrive  dietician eval  MVI q24  Ensure 3x/day    # Symptomatic normo to macro anemia (mild pancytopenia)  no overt bleed  prob related to malignancy and diffuse bone mets  s/p PRBC transfusions -> hgb better  keep hgb > 8    Iron panel: iron sat 32%; ferr: 2624 = NOT SELENA  Folate nl  B12 1540    # FELICITY - likely dehydration (resolved); metab acid w/ high gap (prob result of high tumor burden, which is poor prog sign)  Cr (baseline ~ 0.8)  Entresto held- may restart after gado given for MRI, if BP will allow  c/w NaHCO3 1300mg po q12 til HCO3 > 22  BMP q24      # h/o HTN; h/o CVA, Afib; H/o HFrEF (EF 20-25% in 2018)  c/w Eliquis 5mg po q12   hold Entresto for FELICITY/BP - prob restart p MRI  hold aldactone for FELICITY/BP  c/w metoprolol (hold if low BP)  c/w Ivabradine    # Activity: Amb as tolerated  # GI PPX: NA  # DVT PPX: Eliquis  # Full code  Dispo- Placement, f/u case management 70 yo M with PMH of CVA, afib on Eliquis, HTN, HFrEF (EF 20-25% 2018), chronic LLE pain, presented to the ED with worsening Left LE pain for the past 2 months, worsening fatigue, anorexia and significant weight loss. Of note, pt had previously been worked up for the leg pain including a CT scan in march 2021 in [facility], which was negative.      Discharged from [facility] today, Re-admitted for placement at another NH      # Neopl-related severe pain 2/2 diffuse bony sclerotic lesions (AP 2765) likely 2/2 prostate cancer (irregularly marginated prostate gland on CT and PSA 1400)  CT chest/abdomen/pelvis - Innumerable diffuse sclerotic osseous lesions -  including 6x5.8x9.4cm mass involving the left iliac wing and 6.6x4.5x4.6cm mass involving left inferior pubic ramus. Expansile sclerotic lesions are noted to involve the right 2nd lateral rib and right 5th anterior rib.  IR s/p bx lt iliac lesion 12/3: f/u path   Uro noted  s/p Hem Onc eval and Pall Care eval   bone scan -> diff bone mets, including vert (kidneys not seen b/o such intense uptake by diff bone mets)  Plan was to obtain MRI T/L spine w/ gado (AICD is MRI compatible) outpatient   rad/onc eval can be completed as outpt  h/o beginning tx:  Casodex 50mg po daily  Lupron 22.5 mg IM tomorrow (F/U Heme/Onc to notify them of pts readmission)  Abiraterone 1000 mg po daily and Prednisone 5 mg po daily  Heme/Onc planned to  start Xgeva as outpatient to reduce bone related complications  c/w dilaudid 2mg po q3 prn pain  cont fentanyl patch 12 mcg/hr top q72 for longer-acting relief  cont neurontin 300mg po q8  bowel reg    # Failure to thrive  dietician eval  MVI q24  Ensure 3x/day    # Symptomatic normo to macro anemia (mild pancytopenia)  no overt bleed  prob related to malignancy and diffuse bone mets  s/p PRBC transfusions -> hgb better  keep hgb > 8    Iron panel: iron sat 32%; ferr: 2624 = NOT SELENA  Folate nl  B12 1540    # FELICITY - likely dehydration (resolved); metab acid w/ high gap (prob result of high tumor burden, which is poor prog sign)  Cr (baseline ~ 0.8)  Entresto held- may restart after gado given for MRI, if BP will allow  c/w NaHCO3 1300mg po q12 til HCO3 > 22  BMP q24      # h/o HTN; h/o CVA, Afib; H/o HFrEF (EF 20-25% in 2018)  c/w Eliquis 5mg po q12   hold Entresto for FELICITY/BP - prob restart p MRI  hold aldactone for FELICITY/BP  c/w metoprolol (hold if low BP)  c/w Ivabradine    # Activity: Amb as tolerated  # GI PPX: NA  # DVT PPX: Eliquis  # Full code  Dispo- Placement, f/u case management  Non formulary form sent to the pharmacy for abiraterone

## 2021-12-09 NOTE — ED PROVIDER NOTE - PROGRESS NOTE DETAILS
dc: spoke with EDWIN Schwab at [phone]. Explained process of needing to rpt KALYAN which is only done inpt. Pt requires hospital admission for process to occur and be dc/d to [facility], operated by [facility]. dc: heme onc consult for pt who is actively on chemo. Admitted to medicine.

## 2021-12-09 NOTE — H&P ADULT - ATTENDING COMMENTS
**HX and physical limited due to pt being a poor historian. Supplemental information obtained from family, house staff, and EMR.     70 YO M with a PMH of CVA, chronic Afib (Apixaban), HTN, HFrEF, chronic LLE pain, and recent diagnosis of prostate CA w/ mets to bone who was discharged today to NH ([place]) for out-pt management. However, family was unhappy with NH and sent him back into the hospital for placement into another facility. No new complaints.     FMHx: Reviewed, not relevant    Physical exam shows pt in NAD. VSS, afebrile, not hypoxic on RA. A&Ox2 (name/place). Neuro exam without deficits, motor/sensory intact, no dysarthria, no facial asymmetry. Muscle strength/sensation intact. CTA B/L with no W/C/R. RRR, no M/G/R. ABD is soft and non-tender, normoactive BSs. LEs without swelling. No rashes. Labs and radiology as above.     Prostate CA with mets. Social consult for NH placement. Out-pt MRI. PRN pain meds. Start pt on Heme/onc-recommended regimen while in the hospital. PT.     Macrocytic anemia, at baseline. Replace PRN.     Hypoalbuminemia, from poor oral intake. Nutrition eval.     Hx of CVA, chronic Afib (Apixaban), HTN, HFrEF, and chronic LLE pain. Restart home meds, except as stated above. DVT PPX. Inform PCP of pt's admission to hospital. My note supersedes the residents note.

## 2021-12-09 NOTE — H&P ADULT - NSHPPHYSICALEXAM_GEN_ALL_CORE
GENERAL: NAD, speaks in full sentences, no signs of respiratory distress  HEAD:  Atraumatic, Normocephalic  EYES: EOMI, PERRLA, conjunctiva and sclera clear  NECK: Supple, No JVD  CHEST/LUNG: Clear to auscultation bilaterally; No wheeze; No crackles; No accessory muscles used  HEART: Regular rate and rhythm; No murmurs;   ABDOMEN: Soft, Nontender, Nondistended; Bowel sounds present; No guarding  EXTREMITIES:  2+ Peripheral Pulses, No cyanosis or edema  PSYCH: AAOx3  NEUROLOGY: non-focal  SKIN: No rashes or lesions GENERAL: NAD, no signs of respiratory distress  HEAD:  Atraumatic, Normocephalic  EYES: EOMI, PERRLA, conjunctiva and sclera clear  NECK: Supple, No JVD  CHEST/LUNG: Clear to auscultation bilaterally; left AICD  HEART: Regular rate and rhythm; No murmurs;   ABDOMEN: Soft, Nontender, Nondistended; Bowel sounds present; No guarding  EXTREMITIES:  2+ Peripheral Pulses, No cyanosis or edema  PSYCH: AAOx3  NEUROLOGY: can move all 4 ext, but globally weak

## 2021-12-09 NOTE — H&P ADULT - NSHPLABSRESULTS_GEN_ALL_CORE
8.3    5.06  )-----------( 135      ( 09 Dec 2021 14:00 )             26.3       12-09    139  |  104  |  24<H>  ----------------------------<  119<H>  4.9   |  22  |  0.8    Ca    8.1<L>      09 Dec 2021 14:00  Mg     2.4     12-09    TPro  5.2<L>  /  Alb  3.1<L>  /  TBili  0.5  /  DBili  x   /  AST  34  /  ALT  17  /  AlkPhos  1978<H>  12-09                  PT/INR - ( 09 Dec 2021 14:00 )   PT: 27.40 sec;   INR: 2.40 ratio         PTT - ( 09 Dec 2021 14:00 )  PTT:32.3 sec    Lactate Trend            CAPILLARY BLOOD GLUCOSE            Culture Results:   No Growth Final (11-30 @ 11:50)  Culture Results:   No Growth Final (11-30 @ 11:50)

## 2021-12-09 NOTE — ED PROVIDER NOTE - OBJECTIVE STATEMENT
71 y.o. M 72 yo M with PMH of CVA, afib on Eliquis, HTN, HFrEF ( EF 20-25% 2018), chronic LLE pain, metastatic prostate ca currently on chemotherapy here for eval of continued weakness. Pt was [facility] to [facility]. Sister Barb concerned that pt is still weak. Pt currently has po analgesia and actively being tx'd with chemo therapy outpt. Pending outpt MRI as could not be done inpt 2/2 inavailability of EP/Medtronic Rep for AICDS. Pt is a+ox2 and limited historian only endorsing LLE pain which is as per EMR chronic. States he is minimally ambulatory now, requires assitance with walker 71 y.o. M 70 yo M with PMH of CVA, afib on Eliquis, HTN, HFrEF ( EF 20-25% 2018), chronic LLE pain, metastatic prostate ca currently on chemotherapy here for social work issue. Pt was dc'd [facility] to [facility]. Sister Barb concerned that pt was place at one [facility] and not [facility] [place]. Pt has been in contact with Niharika Deng director of admission [phone] ex 1 or [phone] currently has po analgesia and actively being tx'd with chemo therapy outpt. Pending outpt MRI as could not be done inpt 2/2 inavailability of EP/Medtronic Rep for AICDS. Pt is a+ox2 and limited historian only endorsing LLE pain which is as per EMR chronic. States he is minimally ambulatory now, requires assitance with walker

## 2021-12-09 NOTE — ED PROVIDER NOTE - NS ED ROS FT
Constitutional: (-) fever (-) chills   Eyes/ENT: (-) blurry vision, (-) epistaxis (-) rhinorrhea (-) nasal congestion  Cardiovascular: (-) chest pain(-) palpitations   Respiratory: (-) cough, (-) shortness of breath   Gastrointestinal: (-) vomiting, (-) diarrhea (-) abdominal pain (-) nausea (-) anorexia  Musculoskeletal: (-) neck pain, (-) back pain, (-) joint pain (-) joint swelling (-) painful ROM  Integumentary: (-) rash, (-) edema (-) lacerations (-) pruritis   Neurological: (-) headache (-) LOC

## 2021-12-09 NOTE — ED ADULT TRIAGE NOTE - CHIEF COMPLAINT QUOTE
pt was d/c from hospital today to [facility]. family member wants patient evaluated, reports that he's having worsening weakness. hx of stage 4 prostate CA. pt c/o chronic right leg pain

## 2021-12-10 LAB
ALBUMIN SERPL ELPH-MCNC: 2.9 G/DL — LOW (ref 3.5–5.2)
ALP SERPL-CCNC: 1773 U/L — HIGH (ref 30–115)
ALT FLD-CCNC: 17 U/L — SIGNIFICANT CHANGE UP (ref 0–41)
ANION GAP SERPL CALC-SCNC: 15 MMOL/L — HIGH (ref 7–14)
AST SERPL-CCNC: 35 U/L — SIGNIFICANT CHANGE UP (ref 0–41)
BASOPHILS # BLD AUTO: 0 K/UL — SIGNIFICANT CHANGE UP (ref 0–0.2)
BASOPHILS NFR BLD AUTO: 0 % — SIGNIFICANT CHANGE UP (ref 0–1)
BILIRUB SERPL-MCNC: 0.5 MG/DL — SIGNIFICANT CHANGE UP (ref 0.2–1.2)
BUN SERPL-MCNC: 20 MG/DL — SIGNIFICANT CHANGE UP (ref 10–20)
CALCIUM SERPL-MCNC: 7.9 MG/DL — LOW (ref 8.5–10.1)
CHLORIDE SERPL-SCNC: 107 MMOL/L — SIGNIFICANT CHANGE UP (ref 98–110)
CO2 SERPL-SCNC: 18 MMOL/L — SIGNIFICANT CHANGE UP (ref 17–32)
CREAT SERPL-MCNC: 0.7 MG/DL — SIGNIFICANT CHANGE UP (ref 0.7–1.5)
EOSINOPHIL # BLD AUTO: 0.04 K/UL — SIGNIFICANT CHANGE UP (ref 0–0.7)
EOSINOPHIL NFR BLD AUTO: 1 % — SIGNIFICANT CHANGE UP (ref 0–8)
GLUCOSE SERPL-MCNC: 102 MG/DL — HIGH (ref 70–99)
HCT VFR BLD CALC: 24.5 % — LOW (ref 42–52)
HCT VFR BLD CALC: 33.1 % — LOW (ref 42–52)
HGB BLD-MCNC: 10.5 G/DL — LOW (ref 14–18)
HGB BLD-MCNC: 7.6 G/DL — LOW (ref 14–18)
IMM GRANULOCYTES NFR BLD AUTO: 1.5 % — HIGH (ref 0.1–0.3)
LYMPHOCYTES # BLD AUTO: 0.91 K/UL — LOW (ref 1.2–3.4)
LYMPHOCYTES # BLD AUTO: 22.6 % — SIGNIFICANT CHANGE UP (ref 20.5–51.1)
MAGNESIUM SERPL-MCNC: 2.2 MG/DL — SIGNIFICANT CHANGE UP (ref 1.8–2.4)
MCHC RBC-ENTMCNC: 30.8 PG — SIGNIFICANT CHANGE UP (ref 27–31)
MCHC RBC-ENTMCNC: 30.9 PG — SIGNIFICANT CHANGE UP (ref 27–31)
MCHC RBC-ENTMCNC: 31 G/DL — LOW (ref 32–37)
MCHC RBC-ENTMCNC: 31.7 G/DL — LOW (ref 32–37)
MCV RBC AUTO: 97.4 FL — HIGH (ref 80–94)
MCV RBC AUTO: 99.2 FL — HIGH (ref 80–94)
MONOCYTES # BLD AUTO: 0.36 K/UL — SIGNIFICANT CHANGE UP (ref 0.1–0.6)
MONOCYTES NFR BLD AUTO: 9 % — SIGNIFICANT CHANGE UP (ref 1.7–9.3)
NEUTROPHILS # BLD AUTO: 2.65 K/UL — SIGNIFICANT CHANGE UP (ref 1.4–6.5)
NEUTROPHILS NFR BLD AUTO: 65.9 % — SIGNIFICANT CHANGE UP (ref 42.2–75.2)
NRBC # BLD: 1 /100 WBCS — HIGH (ref 0–0)
NRBC # BLD: 3 /100 WBCS — HIGH (ref 0–0)
PLATELET # BLD AUTO: 126 K/UL — LOW (ref 130–400)
PLATELET # BLD AUTO: 134 K/UL — SIGNIFICANT CHANGE UP (ref 130–400)
POTASSIUM SERPL-MCNC: 4.6 MMOL/L — SIGNIFICANT CHANGE UP (ref 3.5–5)
POTASSIUM SERPL-SCNC: 4.6 MMOL/L — SIGNIFICANT CHANGE UP (ref 3.5–5)
PROT SERPL-MCNC: 5.1 G/DL — LOW (ref 6–8)
RBC # BLD: 2.47 M/UL — LOW (ref 4.7–6.1)
RBC # BLD: 3.4 M/UL — LOW (ref 4.7–6.1)
RBC # FLD: 16 % — HIGH (ref 11.5–14.5)
RBC # FLD: 16.1 % — HIGH (ref 11.5–14.5)
SODIUM SERPL-SCNC: 140 MMOL/L — SIGNIFICANT CHANGE UP (ref 135–146)
WBC # BLD: 3.88 K/UL — LOW (ref 4.8–10.8)
WBC # BLD: 4.02 K/UL — LOW (ref 4.8–10.8)
WBC # FLD AUTO: 3.88 K/UL — LOW (ref 4.8–10.8)
WBC # FLD AUTO: 4.02 K/UL — LOW (ref 4.8–10.8)

## 2021-12-10 PROCEDURE — 99232 SBSQ HOSP IP/OBS MODERATE 35: CPT

## 2021-12-10 RX ADMIN — SACUBITRIL AND VALSARTAN 1 TABLET(S): 24; 26 TABLET, FILM COATED ORAL at 05:44

## 2021-12-10 RX ADMIN — SPIRONOLACTONE 25 MILLIGRAM(S): 25 TABLET, FILM COATED ORAL at 05:44

## 2021-12-10 RX ADMIN — IVABRADINE 5 MILLIGRAM(S): 7.5 TABLET, FILM COATED ORAL at 18:23

## 2021-12-10 RX ADMIN — Medication 5 MILLIGRAM(S): at 05:43

## 2021-12-10 RX ADMIN — GABAPENTIN 300 MILLIGRAM(S): 400 CAPSULE ORAL at 21:59

## 2021-12-10 RX ADMIN — HYDROMORPHONE HYDROCHLORIDE 2 MILLIGRAM(S): 2 INJECTION INTRAMUSCULAR; INTRAVENOUS; SUBCUTANEOUS at 12:15

## 2021-12-10 RX ADMIN — Medication 25 MILLIGRAM(S): at 05:45

## 2021-12-10 RX ADMIN — IVABRADINE 5 MILLIGRAM(S): 7.5 TABLET, FILM COATED ORAL at 05:47

## 2021-12-10 RX ADMIN — Medication 1 TABLET(S): at 11:19

## 2021-12-10 RX ADMIN — SACUBITRIL AND VALSARTAN 1 TABLET(S): 24; 26 TABLET, FILM COATED ORAL at 18:23

## 2021-12-10 RX ADMIN — OXYCODONE AND ACETAMINOPHEN 1 TABLET(S): 5; 325 TABLET ORAL at 13:26

## 2021-12-10 RX ADMIN — GABAPENTIN 300 MILLIGRAM(S): 400 CAPSULE ORAL at 13:02

## 2021-12-10 RX ADMIN — GABAPENTIN 300 MILLIGRAM(S): 400 CAPSULE ORAL at 05:45

## 2021-12-10 RX ADMIN — FENTANYL CITRATE 1 PATCH: 50 INJECTION INTRAVENOUS at 19:01

## 2021-12-10 RX ADMIN — HYDROMORPHONE HYDROCHLORIDE 2 MILLIGRAM(S): 2 INJECTION INTRAMUSCULAR; INTRAVENOUS; SUBCUTANEOUS at 16:15

## 2021-12-10 RX ADMIN — FENTANYL CITRATE 1 PATCH: 50 INJECTION INTRAVENOUS at 12:53

## 2021-12-10 RX ADMIN — APIXABAN 5 MILLIGRAM(S): 2.5 TABLET, FILM COATED ORAL at 18:23

## 2021-12-10 RX ADMIN — HYDROMORPHONE HYDROCHLORIDE 2 MILLIGRAM(S): 2 INJECTION INTRAMUSCULAR; INTRAVENOUS; SUBCUTANEOUS at 11:15

## 2021-12-10 RX ADMIN — BICALUTAMIDE 50 MILLIGRAM(S): 50 TABLET, FILM COATED ORAL at 12:51

## 2021-12-10 RX ADMIN — APIXABAN 5 MILLIGRAM(S): 2.5 TABLET, FILM COATED ORAL at 05:46

## 2021-12-10 NOTE — PROGRESS NOTE ADULT - ASSESSMENT
72 yo M with PMH of CVA, afib on Eliquis, HTN, HFrEF (EF 20-25% 2018), chronic LLE pain, presented to the ED with worsening Left LE pain for the past 2 months, worsening fatigue, anorexia and significant weight loss. Of note, pt had previously been worked up for the leg pain including a CT scan in march 2021 in [facility], which was negative.    # LIMIT LABS    # Neopl-related severe pain 2/2 diffuse bony sclerotic lesions (AP elevated) likely 2/2 prostate cancer (irregularly marginated prostate gland on CT and PSA 1400)  CT chest/abdomen/pelvis - Innumerable diffuse sclerotic osseous lesions -  including 6x5.8x9.4cm mass involving the left iliac wing and 6.6x4.5x4.6cm mass involving left inferior pubic ramus. Expansile sclerotic lesions are noted to involve the right 2nd lateral rib and right 5th anterior rib.  IR s/p bx lt iliac lesion 12/3: f/u path   bone scan -> diff bone mets, including vert (kidneys not seen b/o such intense uptake by diff bone mets)  rad/onc eval can be done as outpt  h/o beginning tx:  Casodex 50mg po daily til 12/17  Lupron 22.5 mg IM x1 on 12/8 (done)   Abiraterone 1000 mg po daily and Prednisone 5 mg po daily  h/o will start Xgeva as outpatient to reduce bone related complications  c/w dilaudid 2mg po q3 prn pain  c/w fentanyl patch 12 mcg/hr top q72 for longer-acting relief  c/w neurontin 300mg po q8  bowel reg    # Failure to thrive  MVI q24  Ensure 3x/day  asst w/ feeds (family or staff)    # Symptomatic normo to macro anemia (mild pancytopenia)  no overt bleed  prob related to malignancy and diffuse bone mets  s/p PRBC transfusions -> hgb better  keep hgb > 8  LDH elevated  Iron panel: iron sat 32%; ferr: 2624 = NOT SELENA  Folate nl; B12: 1540    # metab acid w/ high gap (prob result of high tumor burden, which is poor prog sign) - better  Cr (baseline ~ 0.8)  IVFs: off  off oral NaHCO3     Hold entresto - prob restart after gado given for MRI, if BP will allow    BMP q24    # h/o HTN; h/o CVA, Afib; H/o HFrEF (EF 20-25% in 2018)  c/w Eliquis 5mg po q12   c/w Entresto 24/26 po q12   c/w aldactone 25mg po q24   c/w metoprolol ER 25mg po q24   c/w Ivabradine 5mg po q12  if BP starts to get low again, will need to hold some of the above meds again    # Activity: PT eval for eval for STR - for SNF    # GI PPX: NA    # DVT PPX: eliquis     # Full code    Dispo: tx pain; f/u path of bx; f/u h/o;   pt is stable for d/c to SNF for STR - family did not like [facility], f/u CM for alternate facility (even [place])    Prog is poor for long term.

## 2021-12-10 NOTE — PROGRESS NOTE ADULT - ASSESSMENT
ASSESSMENT & PLAN  72 yo M with PMH of CVA, afib on Eliquis, HTN, HFrEF (EF 20-25% 2018), chronic LLE pain, presented to the ED with worsening Left LE pain for the past 2 months, worsening fatigue, anorexia and significant weight loss. Of note, pt had previously been worked up for the leg pain including a CT scan in march 2021 in [facility], which was negative.    # Neoplasm-related severe pain 2/2 diffuse bony sclerotic lesions (AP 2765) likely 2/2 prostate cancer (irregularly marginated prostate gland on CT and PSA 1400)  -CT chest/abdomen/pelvis - Innumerable diffuse sclerotic osseous lesions -  including 6x5.8x9.4cm mass L iliac wing and 6.6x4.5x4.6cm mass of left inferior pubic ramus. Expansile sclerotic lesions of R 2nd lateral rib, R 5th anterior rib.  -bone scan -> diff bone mets, including vert (kidneys not seen b/o such intense uptake by diff bone mets)  -s/p IR bx lt iliac lesion 12/3: f/u path   -s/p Hem Onc eval and Pall Care eval   -Casodex 50mg po daily x2wk total (stop 12/17/21)  -Lupron 22.5 mg IM q1mo (last 12/8/21)  -Abiraterone 1000 mg po daily and Prednisone 5 mg po daily  -Heme/Onc planned to  start Xgeva as outpatient to reduce bone related complications  -c/w dilaudid 2mg po q3 prn pain  -cont fentanyl patch 12 mcg/hr top q72 for longer-acting relief  -cont neurontin 300mg po q8  -bowel reg  -f/u path report from biopsy  -f/u MRI T/L spine w/ gado (AICD is MRI compatible)    # Failure to thrive  -dietician eval  -MVI q24  -Ensure 3x/day     # Symptomatic normo to macro anemia (mild pancytopenia)  -no overt bleed, s/p pRBCs last admission  -prob related to malignancy and diffuse bone mets  -, Folate nl, B12 1540  -Iron panel: iron sat 32%; ferr: 2624 = NOT SELENA  -keep hgb > 8    # FELICITY - likely dehydration (resolved); metab acid w/ high gap (prob result of high tumor burden, which is poor prog sign)  -Cr (baseline ~ 0.8)  -Entresto held- may restart after gado given for MRI, if BP will allow  -c/w NaHCO3 1300mg po q12 til HCO3 > 22  -BMP q24    # h/o HTN  #h/o CVA  #Afib  #H/o HFrEF (EF 20-25% in 2018)  -c/w Eliquis 5mg po q12, metoprolol  -c/w aldactone, entresto, ivabradine    # Activity  -Amb as tolerated    # GI PPX  -NA    # DVT PPX  -Eliquis    # Full code  Dispo- Placement, f/u case management  Non formulary form sent to the pharmacy for abiraterone

## 2021-12-10 NOTE — PROGRESS NOTE ADULT - SUBJECTIVE AND OBJECTIVE BOX
SAMANTHA CHARLES 71y Male  MRN#: [id number]   CODE STATUS:________    Hospital Day: 1d    Pt is currently admitted with the primary diagnosis of prostate cancer with likely metastasis    SUBJECTIVE  Hospital Course  -Pt admitted due to family not liking NH pt was discharged to 12/9/21.  Overnight events   -no major overnight events per pt or staff  Subjective complaints   -pt feels well, asked for cup of water                                            ----------------------------------------------------------  OBJECTIVE  PAST MEDICAL & SURGICAL HISTORY  Systolic congestive heart failure, unspecified chronicity    Cerebrovascular accident (CVA) due to bilateral embolism of posterior cerebral arteries    Essential hypertension    Atrial fibrillation, unspecified type    AICD (automatic cardioverter/defibrillator) present                                              -----------------------------------------------------------  ALLERGIES:  cephalexin (Flushing)  penicillin (Rash)                                            ------------------------------------------------------------    HOME MEDICATIONS  Home Medications:  Corlanor 5 mg oral tablet: 1 tab(s) orally 2 times a day (with meals) (30 Nov 2021 17:21)  fentaNYL 12 mcg/hr transdermal film, extended release: 1 patch transdermal every 72 hours (08 Dec 2021 15:25)  gabapentin 300 mg oral capsule: 1 cap(s) orally every 8 hours (08 Dec 2021 15:25)  HYDROmorphone 2 mg oral tablet: 1 tab(s) orally every 3 hours, As needed, Severe Pain (7 - 10) (08 Dec 2021 15:25)  melatonin 3 mg oral tablet: 1 tab(s) orally once a day (at bedtime), As needed, Insomnia (08 Dec 2021 15:25)  Metoprolol Succinate ER 25 mg oral tablet, extended release: 1 tab(s) orally once a day (30 Nov 2021 17:20)  Multiple Vitamins oral tablet: 1 tab(s) orally once a day (08 Dec 2021 15:25)  predniSONE 5 mg oral tablet: 1 tab(s) orally once a day (08 Dec 2021 15:25)  spironolactone 25 mg oral tablet: 1 tab(s) orally once a day (14 May 2021 11:51)                           MEDICATIONS:  STANDING MEDICATIONS  abiraterone 1000 milliGRAM(s) Oral every 24 hours  apixaban 5 milliGRAM(s) Oral every 12 hours  bicalutamide 50 milliGRAM(s) Oral daily  fentaNYL   Patch  12 MICROgram(s)/Hr 1 Patch Transdermal every 72 hours  gabapentin 300 milliGRAM(s) Oral every 8 hours  ivabradine 5 milliGRAM(s) Oral two times a day  metoprolol succinate ER 25 milliGRAM(s) Oral daily  multivitamin 1 Tablet(s) Oral daily  predniSONE   Tablet 5 milliGRAM(s) Oral daily  sacubitril 24 mG/valsartan 26 mG 1 Tablet(s) Oral two times a day  spironolactone 25 milliGRAM(s) Oral daily    PRN MEDICATIONS  HYDROmorphone   Tablet 2 milliGRAM(s) Oral every 3 hours PRN  melatonin 3 milliGRAM(s) Oral at bedtime PRN  oxycodone    5 mG/acetaminophen 325 mG 1 Tablet(s) Oral every 6 hours PRN                                            ------------------------------------------------------------  VITAL SIGNS: Last 24 Hours  T(C): 37.9 (10 Dec 2021 06:00), Max: 37.9 (10 Dec 2021 06:00)  T(F): 100.2 (10 Dec 2021 06:00), Max: 100.2 (10 Dec 2021 06:00)  HR: 82 (10 Dec 2021 06:00) (59 - 82)  BP: 103/52 (10 Dec 2021 06:00) (99/55 - 103/52)  BP(mean): 75 (10 Dec 2021 06:00) (75 - 75)  RR: 18 (09 Dec 2021 21:10) (18 - 18)  SpO2: 99% (09 Dec 2021 21:10) (99% - 99%)                                             --------------------------------------------------------------  LABS:                        7.6    4.02  )-----------( 134      ( 10 Dec 2021 11:00 )             24.5     12-10    140  |  107  |  20  ----------------------------<  102<H>  4.6   |  18  |  0.7    Ca    7.9<L>      10 Dec 2021 11:00  Mg     2.2     12-10    TPro  5.1<L>  /  Alb  2.9<L>  /  TBili  0.5  /  DBili  x   /  AST  35  /  ALT  17  /  AlkPhos  1773<H>  12-10    PT/INR - ( 09 Dec 2021 14:00 )   PT: 27.40 sec;   INR: 2.40 ratio         PTT - ( 09 Dec 2021 14:00 )  PTT:32.3 sec                                            --------------------------------------------------------------    PHYSICAL EXAM:  GENERAL: AAOx3. Cachectic, laying in bed appearing in no acute distress  HEENT: No FNDs, atraumatic, normocephalic, moist mucus membranes  LUNGS: Clear to auscultation bilaterally  HEART: S1/S2. No heaves or thrills  ABD: Soft, non-tender, non-distended. Bowel sounds present in all quadrants.  EXT/NEURO: 5/5 strength in all extremity joints. Sensation and ROM grossly intact.  SKIN: No breaks, erythema, edema                                           --------------------------------------------------------------

## 2021-12-10 NOTE — PROGRESS NOTE ADULT - SUBJECTIVE AND OBJECTIVE BOX
SAMANTHA CHARLES  71y  Male  ***My note supersedes ALL resident notes that I sign.  My corrections for their notes are in my note.***    I can be reached directly on [employer]. My office number is [phone]. My personal cell number is [phone].    INTERVAL EVENTS: Here for f/u of prostate cancer. Pt c/o of some pain this am, but not too severe. He can eat w/ asst. No new issues. Awaits placement in NH for STR.    T(F): 100.2 (12-10-21 @ 06:00), Max: 100.2 (12-10-21 @ 06:00)  HR: 82 (12-10-21 @ 06:00) (59 - 82)  BP: 103/52 (12-10-21 @ 06:00) (99/55 - 109/52)  RR: 18 (12-09-21 @ 21:10) (16 - 18)  SpO2: 99% (12-09-21 @ 21:10) (99% - 100%)    Gen: comfortable;   HEENT: PERRL, EOMI; mouth clr; nose clr  Neck: no nodes, no JVD, thyroid nl  chest: left AICD  lungs: clr  hrt: s1 s2 rrr no murmur  abd: soft, NT/ND, no HS megaly  ext: no edema, no c/c  neuro: aa ox3, cn intact, can move all 4 ext, but globally weak    LABS:                      8.3     (    97.8   5.06  )-----------( ---------      135      ( 09 Dec 2021 14:00 )             26.3    (    16.1     139   (   104   (   119      12-09-21 @ 14:00  ----------------------               4.9   (   22   (   24                             -----                        0.8  Ca  8.1   Mg  2.4    P   --     LFT  5.2  (  0.5  (  34       12-09-21 @ 14:00  -------------------------  3.1  (  1978  (  17    PT/INR - ( 09 Dec 2021 14:00 )   PT: 27.40 sec;   INR: 2.40 ratio    PTT - ( 09 Dec 2021 14:00 )  PTT: 32.3 sec    RADIOLOGY & ADDITIONAL TESTS:      MEDICATIONS:    abiraterone 1000 milliGRAM(s) Oral every 24 hours  apixaban 5 milliGRAM(s) Oral every 12 hours  bicalutamide 50 milliGRAM(s) Oral daily  fentaNYL   Patch  12 MICROgram(s)/Hr 1 Patch Transdermal every 72 hours  gabapentin 300 milliGRAM(s) Oral every 8 hours  HYDROmorphone   Tablet 2 milliGRAM(s) Oral every 3 hours PRN  ivabradine 5 milliGRAM(s) Oral two times a day  melatonin 3 milliGRAM(s) Oral at bedtime PRN  metoprolol succinate ER 25 milliGRAM(s) Oral daily  multivitamin 1 Tablet(s) Oral daily  oxycodone    5 mG/acetaminophen 325 mG 1 Tablet(s) Oral every 6 hours PRN  predniSONE   Tablet 5 milliGRAM(s) Oral daily  sacubitril 24 mG/valsartan 26 mG 1 Tablet(s) Oral two times a day  spironolactone 25 milliGRAM(s) Oral daily

## 2021-12-11 LAB
ALBUMIN SERPL ELPH-MCNC: 2.6 G/DL — LOW (ref 3.5–5.2)
ALBUMIN SERPL ELPH-MCNC: 2.7 G/DL — LOW (ref 3.5–5.2)
ALP SERPL-CCNC: 1574 U/L — HIGH (ref 30–115)
ALP SERPL-CCNC: 1754 U/L — HIGH (ref 30–115)
ALT FLD-CCNC: 18 U/L — SIGNIFICANT CHANGE UP (ref 0–41)
ALT FLD-CCNC: 19 U/L — SIGNIFICANT CHANGE UP (ref 0–41)
ANION GAP SERPL CALC-SCNC: 16 MMOL/L — HIGH (ref 7–14)
ANION GAP SERPL CALC-SCNC: 20 MMOL/L — HIGH (ref 7–14)
AST SERPL-CCNC: 36 U/L — SIGNIFICANT CHANGE UP (ref 0–41)
AST SERPL-CCNC: 42 U/L — HIGH (ref 0–41)
BILIRUB SERPL-MCNC: 0.7 MG/DL — SIGNIFICANT CHANGE UP (ref 0.2–1.2)
BILIRUB SERPL-MCNC: 0.8 MG/DL — SIGNIFICANT CHANGE UP (ref 0.2–1.2)
BUN SERPL-MCNC: 17 MG/DL — SIGNIFICANT CHANGE UP (ref 10–20)
BUN SERPL-MCNC: 19 MG/DL — SIGNIFICANT CHANGE UP (ref 10–20)
CALCIUM SERPL-MCNC: 7.4 MG/DL — LOW (ref 8.5–10.1)
CALCIUM SERPL-MCNC: 7.7 MG/DL — LOW (ref 8.5–10.1)
CHLORIDE SERPL-SCNC: 104 MMOL/L — SIGNIFICANT CHANGE UP (ref 98–110)
CHLORIDE SERPL-SCNC: 105 MMOL/L — SIGNIFICANT CHANGE UP (ref 98–110)
CO2 SERPL-SCNC: 13 MMOL/L — LOW (ref 17–32)
CO2 SERPL-SCNC: 17 MMOL/L — SIGNIFICANT CHANGE UP (ref 17–32)
CREAT SERPL-MCNC: 0.6 MG/DL — LOW (ref 0.7–1.5)
CREAT SERPL-MCNC: 0.7 MG/DL — SIGNIFICANT CHANGE UP (ref 0.7–1.5)
GLUCOSE SERPL-MCNC: 118 MG/DL — HIGH (ref 70–99)
GLUCOSE SERPL-MCNC: 150 MG/DL — HIGH (ref 70–99)
POTASSIUM SERPL-MCNC: 4.6 MMOL/L — SIGNIFICANT CHANGE UP (ref 3.5–5)
POTASSIUM SERPL-MCNC: 5 MMOL/L — SIGNIFICANT CHANGE UP (ref 3.5–5)
POTASSIUM SERPL-SCNC: 4.6 MMOL/L — SIGNIFICANT CHANGE UP (ref 3.5–5)
POTASSIUM SERPL-SCNC: 5 MMOL/L — SIGNIFICANT CHANGE UP (ref 3.5–5)
PROT SERPL-MCNC: 5 G/DL — LOW (ref 6–8)
PROT SERPL-MCNC: 5.1 G/DL — LOW (ref 6–8)
SODIUM SERPL-SCNC: 137 MMOL/L — SIGNIFICANT CHANGE UP (ref 135–146)
SODIUM SERPL-SCNC: 138 MMOL/L — SIGNIFICANT CHANGE UP (ref 135–146)

## 2021-12-11 PROCEDURE — 99232 SBSQ HOSP IP/OBS MODERATE 35: CPT

## 2021-12-11 RX ADMIN — Medication 5 MILLIGRAM(S): at 06:13

## 2021-12-11 RX ADMIN — Medication 25 MILLIGRAM(S): at 06:12

## 2021-12-11 RX ADMIN — HYDROMORPHONE HYDROCHLORIDE 2 MILLIGRAM(S): 2 INJECTION INTRAMUSCULAR; INTRAVENOUS; SUBCUTANEOUS at 11:44

## 2021-12-11 RX ADMIN — SACUBITRIL AND VALSARTAN 1 TABLET(S): 24; 26 TABLET, FILM COATED ORAL at 17:35

## 2021-12-11 RX ADMIN — IVABRADINE 5 MILLIGRAM(S): 7.5 TABLET, FILM COATED ORAL at 06:13

## 2021-12-11 RX ADMIN — SPIRONOLACTONE 25 MILLIGRAM(S): 25 TABLET, FILM COATED ORAL at 06:12

## 2021-12-11 RX ADMIN — GABAPENTIN 300 MILLIGRAM(S): 400 CAPSULE ORAL at 06:13

## 2021-12-11 RX ADMIN — APIXABAN 5 MILLIGRAM(S): 2.5 TABLET, FILM COATED ORAL at 06:12

## 2021-12-11 RX ADMIN — HYDROMORPHONE HYDROCHLORIDE 2 MILLIGRAM(S): 2 INJECTION INTRAMUSCULAR; INTRAVENOUS; SUBCUTANEOUS at 10:03

## 2021-12-11 RX ADMIN — GABAPENTIN 300 MILLIGRAM(S): 400 CAPSULE ORAL at 22:09

## 2021-12-11 RX ADMIN — APIXABAN 5 MILLIGRAM(S): 2.5 TABLET, FILM COATED ORAL at 17:35

## 2021-12-11 RX ADMIN — IVABRADINE 5 MILLIGRAM(S): 7.5 TABLET, FILM COATED ORAL at 17:36

## 2021-12-11 RX ADMIN — FENTANYL CITRATE 1 PATCH: 50 INJECTION INTRAVENOUS at 19:11

## 2021-12-11 RX ADMIN — SACUBITRIL AND VALSARTAN 1 TABLET(S): 24; 26 TABLET, FILM COATED ORAL at 06:13

## 2021-12-11 RX ADMIN — BICALUTAMIDE 50 MILLIGRAM(S): 50 TABLET, FILM COATED ORAL at 11:45

## 2021-12-11 RX ADMIN — HYDROMORPHONE HYDROCHLORIDE 2 MILLIGRAM(S): 2 INJECTION INTRAMUSCULAR; INTRAVENOUS; SUBCUTANEOUS at 17:41

## 2021-12-11 RX ADMIN — HYDROMORPHONE HYDROCHLORIDE 2 MILLIGRAM(S): 2 INJECTION INTRAMUSCULAR; INTRAVENOUS; SUBCUTANEOUS at 17:38

## 2021-12-11 RX ADMIN — Medication 1 TABLET(S): at 11:45

## 2021-12-11 RX ADMIN — GABAPENTIN 300 MILLIGRAM(S): 400 CAPSULE ORAL at 17:35

## 2021-12-11 RX ADMIN — FENTANYL CITRATE 1 PATCH: 50 INJECTION INTRAVENOUS at 06:14

## 2021-12-11 NOTE — PROGRESS NOTE ADULT - ASSESSMENT
70 yo M with PMH of CVA, afib on Eliquis, HTN, HFrEF (EF 20-25% 2018), chronic LLE pain, presented to the ED with worsening Left LE pain for the past 2 months, worsening fatigue, anorexia and significant weight loss. Of note, pt had previously been worked up for the leg pain including a CT scan in march 2021 in [facility], which was negative.    # LIMIT LABS    # Neopl-related severe pain 2/2 diffuse bony sclerotic lesions (AP elevated) likely 2/2 prostate cancer (irregularly marginated prostate gland on CT and PSA 1400)  CT chest/abdomen/pelvis - Innumerable diffuse sclerotic osseous lesions -  including 6x5.8x9.4cm mass involving the left iliac wing and 6.6x4.5x4.6cm mass involving left inferior pubic ramus. Expansile sclerotic lesions are noted to involve the right 2nd lateral rib and right 5th anterior rib.  IR s/p bx lt iliac lesion 12/3: f/u path   bone scan -> diff bone mets, including vert (kidneys not seen b/o such intense uptake by diff bone mets)  rad/onc eval can be done as outpt  h/o (Dr Mena) felt MRIs were not needed (and hard for pt to do) - so MRIs cancelled  h/o beginning tx:  Casodex 50mg po daily til 12/17  Lupron 22.5 mg IM x1 on 12/8 (done)   Abiraterone 1000 mg po daily and Prednisone 5 mg po daily  h/o will start Xgeva as outpatient to reduce bone related complications  c/w dilaudid 2mg po q3 prn pain  c/w fentanyl patch 12 mcg/hr top q72 for longer-acting relief  c/w neurontin 300mg po q8  bowel reg    # Failure to thrive  MVI q24  Ensure 3x/day  asst w/ feeds (family or staff)    # Symptomatic normo to macro anemia (mild pancytopenia)  no overt bleed  prob related to malignancy and diffuse bone mets  s/p PRBC transfusions -> hgb better  keep hgb > 8  LDH elevated  Iron panel: iron sat 32%; ferr: 2624 = NOT SELENA  Folate nl; B12: 1540    # metab acid w/ high gap (prob result of high tumor burden, which is poor prog sign) - better  Cr (baseline ~ 0.8)  IVFs: off  off oral NaHCO3   will tx if HCO3 <17    # h/o HTN; h/o CVA, Afib; H/o HFrEF (EF 20-25% in 2018)  c/w Eliquis 5mg po q12   c/w Entresto 24/26 po q12   c/w aldactone 25mg po q24   c/w metoprolol ER 25mg po q24   c/w Ivabradine 5mg po q12  if BP starts to get low again, will need to hold some of the above meds again    # Activity: PT eval for eval for STR - for SNF    # GI PPX: NA    # DVT PPX: eliquis     # Full code    # updated sister today; I told her placement is btw her and CM    Dispo: tx pain; f/u path of bx; f/u h/o;   pt is stable for d/c to SNF for STR - family did not like [facility], f/u CM for alternate facility (even [facility])    Prog is poor for long term.

## 2021-12-11 NOTE — PROGRESS NOTE ADULT - SUBJECTIVE AND OBJECTIVE BOX
SAMANTHA CHARLES  71y  Male  ***My note supersedes ALL resident notes that I sign.  My corrections for their notes are in my note.***    I can be reached directly on [id number]. My office number is [phone]. My personal cell number is [phone].    INTERVAL EVENTS: Here for f/u of     T(F): 96.4 (12-11-21 @ 04:38), Max: 98.3 (12-10-21 @ 17:13)  HR: 89 (12-11-21 @ 04:38) (72 - 89)  BP: 116/55 (12-11-21 @ 04:38) (108/51 - 124/60)  RR: 18 (12-11-21 @ 04:38) (17 - 18)  SpO2: --          LABS:    RADIOLOGY & ADDITIONAL TESTS:      MEDICATIONS:    apixaban 5 milliGRAM(s) Oral every 12 hours  bicalutamide 50 milliGRAM(s) Oral daily  fentaNYL   Patch  12 MICROgram(s)/Hr 1 Patch Transdermal every 72 hours  gabapentin 300 milliGRAM(s) Oral every 8 hours  HYDROmorphone   Tablet 2 milliGRAM(s) Oral every 3 hours PRN  ivabradine 5 milliGRAM(s) Oral two times a day  melatonin 3 milliGRAM(s) Oral at bedtime PRN  metoprolol succinate ER 25 milliGRAM(s) Oral daily  multivitamin 1 Tablet(s) Oral daily  oxycodone    5 mG/acetaminophen 325 mG 1 Tablet(s) Oral every 6 hours PRN  predniSONE   Tablet 5 milliGRAM(s) Oral daily  sacubitril 24 mG/valsartan 26 mG 1 Tablet(s) Oral two times a day  spironolactone 25 milliGRAM(s) Oral daily  Zytiga 1000 milliGRAM(s) 1000 milliGRAM(s) Oral daily     SAMANTHA CHARLES  71y  Male  ***My note supersedes ALL resident notes that I sign.  My corrections for their notes are in my note.***    I can be reached directly on [id number]. My office number is [phone]. My personal cell number is [phone].    INTERVAL EVENTS: Here for f/u of pros cancer. Pt doing OK.    T(F): 96.4 (12-11-21 @ 04:38), Max: 98.3 (12-10-21 @ 17:13)  HR: 89 (12-11-21 @ 04:38) (72 - 89)  BP: 116/55 (12-11-21 @ 04:38) (108/51 - 124/60)  RR: 18 (12-11-21 @ 04:38) (17 - 18)  SpO2: --    Gen: comfortable;   HEENT: PERRL, EOMI; mouth clr; nose clr  Neck: no nodes, no JVD, thyroid nl  chest: left AICD  lungs: clr  hrt: s1 s2 rrr no murmur  abd: soft, NT/ND, no HS megaly  : + condom cath - urine yellow  ext: no edema, no c/c  neuro: aa ox3, cn intact, can move all 4 ext, but globally weak    LABS:                      10.5    (    97.4   3.88  )-----------( ---------      126      ( 10 Dec 2021 20:00 )             33.1    (    16.0     138   (   105   (   118      12-11-21 @ 04:30  ----------------------               5.0   (   17   (   17                             -----                        0.7  Ca  7.7   Mg  --    P   --     LFT  5.1  (  0.8  (  42       12-11-21 @ 04:30  -------------------------  2.7  (  1754  (  18    PT/INR - ( 09 Dec 2021 14:00 )   PT: 27.40 sec;   INR: 2.40 ratio    PTT - ( 09 Dec 2021 14:00 )  PTT: 32.3 sec    RADIOLOGY & ADDITIONAL TESTS:      MEDICATIONS:    apixaban 5 milliGRAM(s) Oral every 12 hours  bicalutamide 50 milliGRAM(s) Oral daily  fentaNYL   Patch  12 MICROgram(s)/Hr 1 Patch Transdermal every 72 hours  gabapentin 300 milliGRAM(s) Oral every 8 hours  HYDROmorphone   Tablet 2 milliGRAM(s) Oral every 3 hours PRN  ivabradine 5 milliGRAM(s) Oral two times a day  melatonin 3 milliGRAM(s) Oral at bedtime PRN  metoprolol succinate ER 25 milliGRAM(s) Oral daily  multivitamin 1 Tablet(s) Oral daily  oxycodone    5 mG/acetaminophen 325 mG 1 Tablet(s) Oral every 6 hours PRN  predniSONE   Tablet 5 milliGRAM(s) Oral daily  sacubitril 24 mG/valsartan 26 mG 1 Tablet(s) Oral two times a day  spironolactone 25 milliGRAM(s) Oral daily  Zytiga 1000 milliGRAM(s) 1000 milliGRAM(s) Oral daily

## 2021-12-11 NOTE — PROGRESS NOTE ADULT - ASSESSMENT
ASSESSMENT & PLAN  70 yo M with PMH of CVA, afib on Eliquis, HTN, HFrEF (EF 20-25% 2018), chronic LLE pain, presented to the ED with worsening Left LE pain for the past 2 months, worsening fatigue, anorexia and significant weight loss. Of note, pt had previously been worked up for the leg pain including a CT scan in march 2021 in [facility], which was negative.    # Neoplasm-related severe pain 2/2 diffuse bony sclerotic lesions (AP 2765) likely 2/2 prostate cancer (irregularly marginated prostate gland on CT and PSA 1400)  -CT chest/abdomen/pelvis - Innumerable diffuse sclerotic osseous lesions -  including 6x5.8x9.4cm mass L iliac wing and 6.6x4.5x4.6cm mass of left inferior pubic ramus. Expansile sclerotic lesions of R 2nd lateral rib, R 5th anterior rib.  -bone scan -> diff bone mets, including vert (kidneys not seen b/o such intense uptake by diff bone mets)  -s/p IR bx lt iliac lesion 12/3: f/u path   -s/p Hem Onc eval and Pall Care eval   -Casodex 50mg po daily x2wk total (stop 12/17/21)  -Lupron 22.5 mg IM q1mo (last 12/8/21)  -Abiraterone 1000 mg po daily and Prednisone 5 mg po daily  -Heme/Onc planned to  start Xgeva as outpatient to reduce bone related complications  -c/w dilaudid 2mg po q3 prn pain  -cont fentanyl patch 12 mcg/hr top q72 for longer-acting relief  -cont neurontin 300mg po q8  -bowel reg  -f/u path report from biopsy  -f/u MRI T/L spine w/ gado (AICD is MRI compatible)    # Failure to thrive  -dietician eval  -MVI q24  -Ensure 3x/day     # Symptomatic normo to macro anemia (mild pancytopenia)  -no overt bleed, s/p pRBCs last admission  -prob related to malignancy and diffuse bone mets  -, Folate nl, B12 1540  -Iron panel: iron sat 32%; ferr: 2624 = NOT SELENA  -keep hgb > 8    # FELICITY - likely dehydration (resolved); metab acid w/ high gap (prob result of high tumor burden, which is poor prog sign)  -Cr (baseline ~ 0.8)  -Entresto held- may restart after gado given for MRI, if BP will allow  -c/w NaHCO3 1300mg po q12 til HCO3 > 22  -BMP q24    # h/o HTN  #h/o CVA  #Afib  #H/o HFrEF (EF 20-25% in 2018)  -c/w Eliquis 5mg po q12, metoprolol  -c/w aldactone, entresto, ivabradine    # Activity  -Amb as tolerated    # GI PPX  -NA    # DVT PPX  -Eliquis    # Full code  Dispo- Placement, f/u case management

## 2021-12-11 NOTE — PROGRESS NOTE ADULT - SUBJECTIVE AND OBJECTIVE BOX
SAMANTHA CHARLES 71y Male  MRN#: [id number]   CODE STATUS:________    Hospital Day: 2d    Pt is currently admitted with the primary diagnosis of prostate cancer with likely metastasis    SUBJECTIVE  Hospital Course  -Pt admitted due to family not liking NH pt was discharged to 12/9/21.  Overnight events   -no major overnight events per pt or staff  Subjective complaints   -pt feels well, asked for cup of water                                            ----------------------------------------------------------  OBJECTIVE  PAST MEDICAL & SURGICAL HISTORY  Systolic congestive heart failure, unspecified chronicity    Cerebrovascular accident (CVA) due to bilateral embolism of posterior cerebral arteries    Essential hypertension    Atrial fibrillation, unspecified type    AICD (automatic cardioverter/defibrillator) present                                              -----------------------------------------------------------  ALLERGIES:  cephalexin (Flushing)  penicillin (Rash)                                            ------------------------------------------------------------    HOME MEDICATIONS  Home Medications:  Corlanor 5 mg oral tablet: 1 tab(s) orally 2 times a day (with meals) (30 Nov 2021 17:21)  fentaNYL 12 mcg/hr transdermal film, extended release: 1 patch transdermal every 72 hours (08 Dec 2021 15:25)  gabapentin 300 mg oral capsule: 1 cap(s) orally every 8 hours (08 Dec 2021 15:25)  HYDROmorphone 2 mg oral tablet: 1 tab(s) orally every 3 hours, As needed, Severe Pain (7 - 10) (08 Dec 2021 15:25)  melatonin 3 mg oral tablet: 1 tab(s) orally once a day (at bedtime), As needed, Insomnia (08 Dec 2021 15:25)  Metoprolol Succinate ER 25 mg oral tablet, extended release: 1 tab(s) orally once a day (30 Nov 2021 17:20)  Multiple Vitamins oral tablet: 1 tab(s) orally once a day (08 Dec 2021 15:25)  predniSONE 5 mg oral tablet: 1 tab(s) orally once a day (08 Dec 2021 15:25)  spironolactone 25 mg oral tablet: 1 tab(s) orally once a day (14 May 2021 11:51)                           MEDICATIONS:  STANDING MEDICATIONS  apixaban 5 milliGRAM(s) Oral every 12 hours  bicalutamide 50 milliGRAM(s) Oral daily  fentaNYL   Patch  12 MICROgram(s)/Hr 1 Patch Transdermal every 72 hours  gabapentin 300 milliGRAM(s) Oral every 8 hours  ivabradine 5 milliGRAM(s) Oral two times a day  metoprolol succinate ER 25 milliGRAM(s) Oral daily  multivitamin 1 Tablet(s) Oral daily  predniSONE   Tablet 5 milliGRAM(s) Oral daily  sacubitril 24 mG/valsartan 26 mG 1 Tablet(s) Oral two times a day  spironolactone 25 milliGRAM(s) Oral daily  Zytiga 1000 milliGRAM(s) 1000 milliGRAM(s) Oral daily    PRN MEDICATIONS  HYDROmorphone   Tablet 2 milliGRAM(s) Oral every 3 hours PRN  melatonin 3 milliGRAM(s) Oral at bedtime PRN  oxycodone    5 mG/acetaminophen 325 mG 1 Tablet(s) Oral every 6 hours PRN                                            ------------------------------------------------------------  VITAL SIGNS: Last 24 Hours  T(C): 35.8 (11 Dec 2021 04:38), Max: 36.8 (10 Dec 2021 17:13)  T(F): 96.4 (11 Dec 2021 04:38), Max: 98.3 (10 Dec 2021 17:13)  HR: 89 (11 Dec 2021 04:38) (72 - 89)  BP: 116/55 (11 Dec 2021 04:38) (108/51 - 124/60)  BP(mean): --  RR: 18 (11 Dec 2021 04:38) (17 - 18)  SpO2: --                                             --------------------------------------------------------------  LABS:                        10.5   3.88  )-----------( 126      ( 10 Dec 2021 20:00 )             33.1     12-11    138  |  105  |  17  ----------------------------<  118<H>  5.0   |  17  |  0.7    Ca    7.7<L>      11 Dec 2021 04:30  Mg     2.2     12-10    TPro  5.1<L>  /  Alb  2.7<L>  /  TBili  0.8  /  DBili  x   /  AST  42<H>  /  ALT  18  /  AlkPhos  1754<H>  12-11    PT/INR - ( 09 Dec 2021 14:00 )   PT: 27.40 sec;   INR: 2.40 ratio         PTT - ( 09 Dec 2021 14:00 )  PTT:32.3 sec                                            --------------------------------------------------------------    PHYSICAL EXAM:  GENERAL: AAOx3. Cachectic, laying in bed appearing in no acute distress  HEENT: No FNDs, atraumatic, normocephalic, moist mucus membranes  LUNGS: Clear to auscultation bilaterally  HEART: S1/S2. No heaves or thrills  ABD: Soft, non-tender, non-distended. Bowel sounds present in all quadrants.  EXT/NEURO: 5/5 strength in all extremity joints. Sensation and ROM grossly intact.  SKIN: No breaks, erythema, edema                                             --------------------------------------------------------------

## 2021-12-12 LAB
APPEARANCE UR: ABNORMAL
BACTERIA # UR AUTO: ABNORMAL
BILIRUB UR-MCNC: NEGATIVE — SIGNIFICANT CHANGE UP
COLOR SPEC: YELLOW — SIGNIFICANT CHANGE UP
DIFF PNL FLD: NEGATIVE — SIGNIFICANT CHANGE UP
EPI CELLS # UR: 2 /HPF — SIGNIFICANT CHANGE UP (ref 0–5)
GAS PNL BLDA: SIGNIFICANT CHANGE UP
GLUCOSE UR QL: NEGATIVE — SIGNIFICANT CHANGE UP
HYALINE CASTS # UR AUTO: 3 /LPF — SIGNIFICANT CHANGE UP (ref 0–7)
KETONES UR-MCNC: NEGATIVE — SIGNIFICANT CHANGE UP
LACTATE SERPL-SCNC: 1.6 MMOL/L — SIGNIFICANT CHANGE UP (ref 0.7–2)
LEUKOCYTE ESTERASE UR-ACNC: NEGATIVE — SIGNIFICANT CHANGE UP
NITRITE UR-MCNC: POSITIVE
PH UR: 6 — SIGNIFICANT CHANGE UP (ref 5–8)
PROT UR-MCNC: ABNORMAL
RBC CASTS # UR COMP ASSIST: 14 /HPF — HIGH (ref 0–4)
SP GR SPEC: 1.03 — SIGNIFICANT CHANGE UP (ref 1.01–1.03)
UROBILINOGEN FLD QL: ABNORMAL
WBC UR QL: 3 /HPF — SIGNIFICANT CHANGE UP (ref 0–5)

## 2021-12-12 PROCEDURE — 71045 X-RAY EXAM CHEST 1 VIEW: CPT | Mod: 26

## 2021-12-12 PROCEDURE — 99232 SBSQ HOSP IP/OBS MODERATE 35: CPT

## 2021-12-12 RX ORDER — HYDROMORPHONE HYDROCHLORIDE 2 MG/ML
4 INJECTION INTRAMUSCULAR; INTRAVENOUS; SUBCUTANEOUS
Refills: 0 | Status: DISCONTINUED | OUTPATIENT
Start: 2021-12-12 | End: 2021-12-17

## 2021-12-12 RX ORDER — HYDROMORPHONE HYDROCHLORIDE 2 MG/ML
2 INJECTION INTRAMUSCULAR; INTRAVENOUS; SUBCUTANEOUS
Refills: 0 | Status: DISCONTINUED | OUTPATIENT
Start: 2021-12-12 | End: 2021-12-18

## 2021-12-12 RX ORDER — FUROSEMIDE 40 MG
40 TABLET ORAL ONCE
Refills: 0 | Status: COMPLETED | OUTPATIENT
Start: 2021-12-12 | End: 2021-12-12

## 2021-12-12 RX ORDER — CIPROFLOXACIN LACTATE 400MG/40ML
400 VIAL (ML) INTRAVENOUS EVERY 12 HOURS
Refills: 0 | Status: DISCONTINUED | OUTPATIENT
Start: 2021-12-12 | End: 2021-12-13

## 2021-12-12 RX ORDER — SODIUM BICARBONATE 1 MEQ/ML
1300 SYRINGE (ML) INTRAVENOUS EVERY 12 HOURS
Refills: 0 | Status: DISCONTINUED | OUTPATIENT
Start: 2021-12-12 | End: 2021-12-13

## 2021-12-12 RX ORDER — FENTANYL CITRATE 50 UG/ML
1 INJECTION INTRAVENOUS
Refills: 0 | Status: DISCONTINUED | OUTPATIENT
Start: 2021-12-12 | End: 2021-12-18

## 2021-12-12 RX ORDER — DEXAMETHASONE 0.5 MG/5ML
2 ELIXIR ORAL ONCE
Refills: 0 | Status: COMPLETED | OUTPATIENT
Start: 2021-12-12 | End: 2021-12-12

## 2021-12-12 RX ORDER — GABAPENTIN 400 MG/1
400 CAPSULE ORAL EVERY 8 HOURS
Refills: 0 | Status: DISCONTINUED | OUTPATIENT
Start: 2021-12-12 | End: 2021-12-23

## 2021-12-12 RX ADMIN — HYDROMORPHONE HYDROCHLORIDE 4 MILLIGRAM(S): 2 INJECTION INTRAMUSCULAR; INTRAVENOUS; SUBCUTANEOUS at 11:23

## 2021-12-12 RX ADMIN — Medication 5 MILLIGRAM(S): at 06:09

## 2021-12-12 RX ADMIN — GABAPENTIN 400 MILLIGRAM(S): 400 CAPSULE ORAL at 22:15

## 2021-12-12 RX ADMIN — HYDROMORPHONE HYDROCHLORIDE 2 MILLIGRAM(S): 2 INJECTION INTRAMUSCULAR; INTRAVENOUS; SUBCUTANEOUS at 13:42

## 2021-12-12 RX ADMIN — GABAPENTIN 300 MILLIGRAM(S): 400 CAPSULE ORAL at 06:41

## 2021-12-12 RX ADMIN — IVABRADINE 5 MILLIGRAM(S): 7.5 TABLET, FILM COATED ORAL at 17:24

## 2021-12-12 RX ADMIN — Medication 2 MILLIGRAM(S): at 11:52

## 2021-12-12 RX ADMIN — FENTANYL CITRATE 1 PATCH: 50 INJECTION INTRAVENOUS at 22:49

## 2021-12-12 RX ADMIN — Medication 25 MILLIGRAM(S): at 06:41

## 2021-12-12 RX ADMIN — SACUBITRIL AND VALSARTAN 1 TABLET(S): 24; 26 TABLET, FILM COATED ORAL at 17:24

## 2021-12-12 RX ADMIN — Medication 40 MILLIGRAM(S): at 22:14

## 2021-12-12 RX ADMIN — FENTANYL CITRATE 1 PATCH: 50 INJECTION INTRAVENOUS at 22:50

## 2021-12-12 RX ADMIN — HYDROMORPHONE HYDROCHLORIDE 2 MILLIGRAM(S): 2 INJECTION INTRAMUSCULAR; INTRAVENOUS; SUBCUTANEOUS at 13:33

## 2021-12-12 RX ADMIN — Medication 1 TABLET(S): at 11:12

## 2021-12-12 RX ADMIN — Medication 1300 MILLIGRAM(S): at 17:23

## 2021-12-12 RX ADMIN — BICALUTAMIDE 50 MILLIGRAM(S): 50 TABLET, FILM COATED ORAL at 11:13

## 2021-12-12 RX ADMIN — APIXABAN 5 MILLIGRAM(S): 2.5 TABLET, FILM COATED ORAL at 17:24

## 2021-12-12 RX ADMIN — HYDROMORPHONE HYDROCHLORIDE 2 MILLIGRAM(S): 2 INJECTION INTRAMUSCULAR; INTRAVENOUS; SUBCUTANEOUS at 09:15

## 2021-12-12 RX ADMIN — FENTANYL CITRATE 1 PATCH: 50 INJECTION INTRAVENOUS at 17:23

## 2021-12-12 RX ADMIN — Medication 1300 MILLIGRAM(S): at 11:12

## 2021-12-12 RX ADMIN — APIXABAN 5 MILLIGRAM(S): 2.5 TABLET, FILM COATED ORAL at 06:41

## 2021-12-12 RX ADMIN — SPIRONOLACTONE 25 MILLIGRAM(S): 25 TABLET, FILM COATED ORAL at 06:40

## 2021-12-12 RX ADMIN — GABAPENTIN 400 MILLIGRAM(S): 400 CAPSULE ORAL at 13:33

## 2021-12-12 RX ADMIN — HYDROMORPHONE HYDROCHLORIDE 2 MILLIGRAM(S): 2 INJECTION INTRAMUSCULAR; INTRAVENOUS; SUBCUTANEOUS at 10:55

## 2021-12-12 RX ADMIN — SACUBITRIL AND VALSARTAN 1 TABLET(S): 24; 26 TABLET, FILM COATED ORAL at 06:40

## 2021-12-12 RX ADMIN — Medication 200 MILLIGRAM(S): at 22:49

## 2021-12-12 NOTE — PROGRESS NOTE ADULT - SUBJECTIVE AND OBJECTIVE BOX
SAMANTHA CHARLES  71y  Male  ***My note supersedes ALL resident notes that I sign.  My corrections for their notes are in my note.***    I can be reached directly on [employer] [id number]. My office number is [phone]. My personal cell number is [phone].    INTERVAL EVENTS: Here for f/u of cancer. Pt c/o pain in upper left leg. RN felt pain was reasonably controlled yesterday into today, aside from now. Pt eats/drinks, but is not walking well. Pt needs a lot of asst just to stand. Pt only using about 2 dilaudid tabs per day.    I spoke w/ sister, Barb, who said pt is Evangelical and that his Imam was recommending more of a hospice route as opposed to SNF for rehab.  However, that would entail stopping all anti-cancer meds, which the family did NOT want to do currently. Barb said she would talk to her brother (the pt) more today.    T(F): 96.9 (12-12-21 @ 05:45), Max: 98.7 (12-11-21 @ 20:00)  HR: 80 (12-12-21 @ 05:45) (75 - 85)  BP: 129/58 (12-12-21 @ 05:45) (102/56 - 129/58)  RR: 18 (12-12-21 @ 05:45) (18 - 18)  SpO2: --    Gen: uncomfortable; left leg pain severe  HEENT: PERRL, mouth clr; nose clr  Neck: no nodes, no JVD, thyroid nl  chest: left AICD  lungs: clr  hrt: s1 s2 rrr no murmur  abd: soft, NT/ND, no HS megaly  : + condom cath - urine yellow  ext: no edema, no c/c  neuro: aa ox3, cn intact, can move all 4 ext, but globally weak    LABS:                      10.5    (    97.4   3.88  )-----------( ---------      126      ( 10 Dec 2021 20:00 )             33.1    (    16.0     137   (   104   (   150      12-11-21 @ 17:58  ----------------------               4.6   (   13   (   19     AG = 20                        -----                        0.6  Ca  7.4   Mg  --    P   --     LFT  5.0  (  0.7  (  36       12-11-21 @ 17:58  -------------------------  2.6  (  1574  (  19    Alb 2.6  T devin 0.7   AP 1574  AST 36  ALT 19  12-11-21 @ 17:58  Alb 2.7  T devin 0.8   AP 1754  AST 42  ALT 18  12-11-21 @ 04:30  Alb 2.9  T devin 0.5   AP 1773  AST 35  ALT 17  12-10-21 @ 11:00  Alb 3.1  T devin 0.5   AP 1978  AST 34  ALT 17  12-09-21 @ 14:00    RADIOLOGY & ADDITIONAL TESTS:    MEDICATIONS:    apixaban 5 milliGRAM(s) Oral every 12 hours  bicalutamide 50 milliGRAM(s) Oral daily  fentaNYL   Patch  12 MICROgram(s)/Hr 1 Patch Transdermal every 72 hours  gabapentin 300 milliGRAM(s) Oral every 8 hours  HYDROmorphone   Tablet 2 milliGRAM(s) Oral every 3 hours PRN  ivabradine 5 milliGRAM(s) Oral two times a day  melatonin 3 milliGRAM(s) Oral at bedtime PRN  metoprolol succinate ER 25 milliGRAM(s) Oral daily  multivitamin 1 Tablet(s) Oral daily  oxycodone    5 mG/acetaminophen 325 mG 1 Tablet(s) Oral every 6 hours PRN  predniSONE   Tablet 5 milliGRAM(s) Oral daily  sacubitril 24 mG/valsartan 26 mG 1 Tablet(s) Oral two times a day  spironolactone 25 milliGRAM(s) Oral daily  Zytiga 1000 milliGRAM(s) 1000 milliGRAM(s) Oral daily

## 2021-12-12 NOTE — PROGRESS NOTE ADULT - ASSESSMENT
72 yo M with PMH of CVA, afib on Eliquis, HTN, HFrEF (EF 20-25% 2018), chronic LLE pain, presented to the ED with worsening Left LE pain for the past 2 months, worsening fatigue, anorexia and significant weight loss. Of note, pt had previously been worked up for the leg pain including a CT scan in march 2021 in [facility], which was negative.    # LIMIT LABS    # Neopl-related severe pain 2/2 diffuse bony sclerotic lesions (AP elevated) likely 2/2 prostate cancer (irregularly marginated prostate gland on CT and PSA 1400)  CT chest/abdomen/pelvis - Innumerable diffuse sclerotic osseous lesions -  including 6x5.8x9.4cm mass involving the left iliac wing and 6.6x4.5x4.6cm mass involving left inferior pubic ramus. Expansile sclerotic lesions are noted to involve the right 2nd lateral rib and right 5th anterior rib.  IR s/p bx lt iliac lesion 12/3: f/u path   bone scan -> diff bone mets, including vert (kidneys not seen on bone scan b/o such intense uptake by diff bone mets)  rad/onc eval can be done as outpt  h/o (Dr Mena) felt MRIs were not needed (and hard for pt to do) - so MRIs cancelled  h/o beginning tx:  AP is improving a lot  Casodex 50mg po daily til 12/17  Lupron 22.5 mg IM x1 on 12/8 (done)   Abiraterone 1000 mg po daily and Prednisone 5 mg po daily  h/o will start Xgeva as outpatient to reduce bone related complications  give dexameth 2mg ivp x1 today  make dilaudid 2mg po q3 prn mod pain  start dilaudid 4mg po q3 prn sev pain  c/w fentanyl patch 12 mcg/hr top q72 for longer-acting relief  incr neurontin 400mg po q8  bowel reg    # metab acid w/ high gap  could be starvation ketosis - check u/a for ketones (if +, feed pt)  could be result of high tumor burden, which is poor prog sign)  lactate was nl recently - will order again  Cr (baseline ~ 0.8)  IVFs: off  restart oral NaHCO3 1300mg po q12  BMP todd    # Failure to thrive  MVI q24  Ensure 3x/day  asst w/ feeds (family or staff)    # Symptomatic normo to macro anemia (mild pancytopenia)  no overt bleed  prob related to malignancy and diffuse bone mets  s/p PRBC transfusions -> hgb better  keep hgb > 8  LDH elevated  Iron panel: iron sat 32%; ferr: 2624 = NOT SELENA  Folate nl; B12: 1540    # h/o HTN; h/o CVA, Afib; H/o HFrEF (EF 20-25% in 2018)  c/w Eliquis 5mg po q12   c/w Entresto 24/26 po q12   c/w aldactone 25mg po q24   c/w metoprolol ER 25mg po q24   c/w Ivabradine 5mg po q12  if BP starts to get low again, will need to hold some of the above meds again    # Activity: PT eval for eval for STR - for SNF    # GI PPX: NA    # DVT PPX: eliquis     # Full code    # updated sister today; she brought up discussion about hospice (she wants to discuss further w/ brother)    Dispo: tx pain; f/u path of bx; tx HCO3; send u/a and lactate; f/u h/o;   current plan is to go to SNF for STR - family did not like [facility], f/u CM for alternate facility (even [place]-is[place]) (vs hospice set up)    Prog is very poor for long term. If pt expires in hospital, please note he is Amish and his body should NOT be touched until family is called and his Imam is called. 72 yo M with PMH of CVA, afib on Eliquis, HTN, HFrEF (EF 20-25% 2018), chronic LLE pain, presented to the ED with worsening Left LE pain for the past 2 months, worsening fatigue, anorexia and significant weight loss. Of note, pt had previously been worked up for the leg pain including a CT scan in march 2021 in [facility], which was negative.    # LIMIT LABS    # Neopl-related severe pain 2/2 diffuse bony sclerotic lesions (AP elevated) likely 2/2 prostate cancer (irregularly marginated prostate gland on CT and PSA 1400)  CT chest/abdomen/pelvis - Innumerable diffuse sclerotic osseous lesions -  including 6x5.8x9.4cm mass involving the left iliac wing and 6.6x4.5x4.6cm mass involving left inferior pubic ramus. Expansile sclerotic lesions are noted to involve the right 2nd lateral rib and right 5th anterior rib.  IR s/p bx lt iliac lesion 12/3: f/u path   bone scan -> diff bone mets, including vert (kidneys not seen on bone scan b/o such intense uptake by diff bone mets)  rad/onc eval can be done as outpt  h/o (Dr Mena) felt MRIs were not needed (and hard for pt to do) - so MRIs cancelled  h/o beginning tx:  AP is improving a lot  Casodex 50mg po daily til 12/17  Lupron 22.5 mg IM x1 on 12/8 (done)   Abiraterone 1000 mg po daily and Prednisone 5 mg po daily  h/o will start Xgeva as outpatient to reduce bone related complications  give dexameth 2mg ivp x1 today  make dilaudid 2mg po q3 prn mod pain  start dilaudid 4mg po q3 prn sev pain  c/w fentanyl patch 12 mcg/hr top q72 for longer-acting relief  incr neurontin 400mg po q8  bowel reg    # metab acid w/ high gap  could be starvation ketosis - check u/a for ketones (if +, feed pt)  could be result of high tumor burden, which is poor prog sign)  lactate was nl recently - will order again  Cr (baseline ~ 0.8)  IVFs: off  restart oral NaHCO3 1300mg po q12  BMP todd    # Failure to thrive  MVI q24  Ensure 3x/day  asst w/ feeds (family or staff)    # Symptomatic normo to macro anemia (mild pancytopenia)  no overt bleed  prob related to malignancy and diffuse bone mets  s/p PRBC transfusions -> hgb better  keep hgb > 8  LDH elevated  Iron panel: iron sat 32%; ferr: 2624 = NOT SELENA  Folate nl; B12: 1540    # h/o HTN; h/o CVA, Afib; H/o HFrEF (EF 20-25% in 2018)  c/w Eliquis 5mg po q12   c/w Entresto 24/26 po q12   c/w aldactone 25mg po q24   c/w metoprolol ER 25mg po q24   c/w Ivabradine 5mg po q12  if BP starts to get low again, will need to hold some of the above meds again    # Activity: PT eval for eval for STR - for SNF    # GI PPX: NA    # DVT PPX: eliquis     # Full code - if proceeding more towards hospice, then will talk w/ family about DNR and DNI options    # updated sister today; she brought up discussion about hospice (she wants to discuss further w/ brother)    Dispo: tx pain; f/u path of bx; tx HCO3; send u/a and lactate; f/u h/o;   current plan is to go to SNF for STR - family did not like [facility], f/u CM for alternate facility (even [place]is[place]) (vs hospice set up)    Prog is very poor for long term. If pt expires in hospital, please note he is Judaism and his body should NOT be touched until family is called and his Imam is called.

## 2021-12-13 ENCOUNTER — TRANSCRIPTION ENCOUNTER (OUTPATIENT)
Age: 71
End: 2021-12-13

## 2021-12-13 LAB
ANION GAP SERPL CALC-SCNC: 18 MMOL/L — HIGH (ref 7–14)
BUN SERPL-MCNC: 33 MG/DL — HIGH (ref 10–20)
CALCIUM SERPL-MCNC: 8.2 MG/DL — LOW (ref 8.5–10.1)
CHLORIDE SERPL-SCNC: 107 MMOL/L — SIGNIFICANT CHANGE UP (ref 98–110)
CO2 SERPL-SCNC: 22 MMOL/L — SIGNIFICANT CHANGE UP (ref 17–32)
CREAT SERPL-MCNC: 0.9 MG/DL — SIGNIFICANT CHANGE UP (ref 0.7–1.5)
GLUCOSE SERPL-MCNC: 189 MG/DL — HIGH (ref 70–99)
NON-GYNECOLOGICAL CYTOLOGY STUDY: SIGNIFICANT CHANGE UP
POTASSIUM SERPL-MCNC: 5.1 MMOL/L — HIGH (ref 3.5–5)
POTASSIUM SERPL-SCNC: 5.1 MMOL/L — HIGH (ref 3.5–5)
SODIUM SERPL-SCNC: 147 MMOL/L — HIGH (ref 135–146)

## 2021-12-13 PROCEDURE — 99233 SBSQ HOSP IP/OBS HIGH 50: CPT

## 2021-12-13 RX ORDER — ABIRATERONE ACETATE 250 MG/1
2 TABLET ORAL
Qty: 60 | Refills: 0
Start: 2021-12-13 | End: 2022-01-11

## 2021-12-13 RX ORDER — SODIUM ZIRCONIUM CYCLOSILICATE 10 G/10G
5 POWDER, FOR SUSPENSION ORAL ONCE
Refills: 0 | Status: COMPLETED | OUTPATIENT
Start: 2021-12-13 | End: 2021-12-13

## 2021-12-13 RX ORDER — SODIUM BICARBONATE 1 MEQ/ML
650 SYRINGE (ML) INTRAVENOUS EVERY 12 HOURS
Refills: 0 | Status: DISCONTINUED | OUTPATIENT
Start: 2021-12-13 | End: 2021-12-17

## 2021-12-13 RX ORDER — SODIUM BICARBONATE 1 MEQ/ML
1 SYRINGE (ML) INTRAVENOUS
Qty: 0 | Refills: 0 | DISCHARGE
Start: 2021-12-13

## 2021-12-13 RX ADMIN — HYDROMORPHONE HYDROCHLORIDE 2 MILLIGRAM(S): 2 INJECTION INTRAMUSCULAR; INTRAVENOUS; SUBCUTANEOUS at 15:01

## 2021-12-13 RX ADMIN — IVABRADINE 5 MILLIGRAM(S): 7.5 TABLET, FILM COATED ORAL at 06:48

## 2021-12-13 RX ADMIN — GABAPENTIN 400 MILLIGRAM(S): 400 CAPSULE ORAL at 22:04

## 2021-12-13 RX ADMIN — HYDROMORPHONE HYDROCHLORIDE 2 MILLIGRAM(S): 2 INJECTION INTRAMUSCULAR; INTRAVENOUS; SUBCUTANEOUS at 22:14

## 2021-12-13 RX ADMIN — SACUBITRIL AND VALSARTAN 1 TABLET(S): 24; 26 TABLET, FILM COATED ORAL at 06:49

## 2021-12-13 RX ADMIN — Medication 1 TABLET(S): at 11:07

## 2021-12-13 RX ADMIN — Medication 25 MILLIGRAM(S): at 06:49

## 2021-12-13 RX ADMIN — APIXABAN 5 MILLIGRAM(S): 2.5 TABLET, FILM COATED ORAL at 06:47

## 2021-12-13 RX ADMIN — GABAPENTIN 400 MILLIGRAM(S): 400 CAPSULE ORAL at 13:40

## 2021-12-13 RX ADMIN — HYDROMORPHONE HYDROCHLORIDE 4 MILLIGRAM(S): 2 INJECTION INTRAMUSCULAR; INTRAVENOUS; SUBCUTANEOUS at 13:53

## 2021-12-13 RX ADMIN — HYDROMORPHONE HYDROCHLORIDE 4 MILLIGRAM(S): 2 INJECTION INTRAMUSCULAR; INTRAVENOUS; SUBCUTANEOUS at 13:45

## 2021-12-13 RX ADMIN — Medication 3 MILLIGRAM(S): at 22:15

## 2021-12-13 RX ADMIN — Medication 650 MILLIGRAM(S): at 17:33

## 2021-12-13 RX ADMIN — Medication 1300 MILLIGRAM(S): at 06:47

## 2021-12-13 RX ADMIN — APIXABAN 5 MILLIGRAM(S): 2.5 TABLET, FILM COATED ORAL at 17:33

## 2021-12-13 RX ADMIN — Medication 5 MILLIGRAM(S): at 06:49

## 2021-12-13 RX ADMIN — GABAPENTIN 400 MILLIGRAM(S): 400 CAPSULE ORAL at 06:48

## 2021-12-13 RX ADMIN — SACUBITRIL AND VALSARTAN 1 TABLET(S): 24; 26 TABLET, FILM COATED ORAL at 17:38

## 2021-12-13 RX ADMIN — HYDROMORPHONE HYDROCHLORIDE 2 MILLIGRAM(S): 2 INJECTION INTRAMUSCULAR; INTRAVENOUS; SUBCUTANEOUS at 22:39

## 2021-12-13 RX ADMIN — SPIRONOLACTONE 25 MILLIGRAM(S): 25 TABLET, FILM COATED ORAL at 06:47

## 2021-12-13 RX ADMIN — BICALUTAMIDE 50 MILLIGRAM(S): 50 TABLET, FILM COATED ORAL at 11:07

## 2021-12-13 RX ADMIN — Medication 200 MILLIGRAM(S): at 06:48

## 2021-12-13 RX ADMIN — SODIUM ZIRCONIUM CYCLOSILICATE 5 GRAM(S): 10 POWDER, FOR SUSPENSION ORAL at 13:41

## 2021-12-13 NOTE — DISCHARGE NOTE PROVIDER - CARE PROVIDER_API CALL
Lily Cary)  Hematology; Internal Medicine; Medical Oncology  [address] [place] [address]  Phone: [phone]  Fax: [phone]  Follow Up Time: 1 week    Destin Jernigan)  Urology  [address] [place] [address]  Phone: [phone]  Fax: [phone]  Follow Up Time: 1 week

## 2021-12-13 NOTE — DISCHARGE NOTE PROVIDER - HOSPITAL COURSE
HPI: 72 yo M with PMH of CVA, afib on Eliquis, HTN, HFrEF ( EF 20-25% 2018), chronic LLE pain, presented to the ED from NH. Pt was discharged today from [facility] to [facility]. Family not happy with current NH and wants patient placed at [facility]. Pending outpt MRI as could not be done inpt 2/2 inavailability of EP/[employer] Rep for AICDS.     Recent Hospital Course: Patient was admitted to medicine and underwent bone biopsy by IR. He was evaluated by urology and hematology-oncology and started on hormonal therapy, casodex, while inpatient. He had a bone scan which showed multiple metastatic lesions including in the spine. Palliative care saw the patient and he was given PO dilaudid, neurontin, and 1 dose of dexamethasone for the left lower extremity pain. He has anemia of chronic disease and received 4 units pRBCs during the admission. Patient was seen by physical therapy and by dietitian regarding failure to thrive. Spoke with sister regarding poor prognosis and patient will be going to SNF for further care. He will be receiving chemotherapy outpatient at [facility]. He can get MRI of lumbar and thoracic spine outpatient and that will determine treatment by radiation oncology.     Hospital Course: Due to difficulty coordinating MRI, pt will receive MRI outside of the hospital. Pt was well during hospital dstay, pain controlled without major changes to outpatient medications. Pt was approved for [facility] and family approved.

## 2021-12-13 NOTE — PROGRESS NOTE ADULT - SUBJECTIVE AND OBJECTIVE BOX
SAMANTHA CHARLES 71y Male  MRN#: [id number]   CODE STATUS:________    Hospital Day: 4d    Pt is currently admitted with the primary diagnosis of prostate cancer with likely metastasis    SUBJECTIVE  Hospital Course  -Pt admitted due to family not liking NH pt was discharged to 21. Pt had episode of lethargy overnight , found UA+ for bacteria, new opacifications on CXR (RUL/JESSICA/RLL), given lasix 40mg IV once + ciprofloxacin standing.  Overnight events   -Pt had episode of lethargy overnight , found UA+ for bacteria, new opacifications on CXR (RUL/JESSICA/RLL), given lasix 40mg IV once + ciprofloxacin standing.  Subjective complaints   -pt feels well this AM, tired but similar baseline to my past exams                                          ----------------------------------------------------------  OBJECTIVE  PAST MEDICAL & SURGICAL HISTORY  Systolic congestive heart failure, unspecified chronicity    Cerebrovascular accident (CVA) due to bilateral embolism of posterior cerebral arteries    Essential hypertension    Atrial fibrillation, unspecified type    AICD (automatic cardioverter/defibrillator) present                                              -----------------------------------------------------------  ALLERGIES:  cephalexin (Flushing)  penicillin (Rash)                                            ------------------------------------------------------------    HOME MEDICATIONS  Home Medications:  Corlanor 5 mg oral tablet: 1 tab(s) orally 2 times a day (with meals) (2021 17:21)  fentaNYL 12 mcg/hr transdermal film, extended release: 1 patch transdermal every 72 hours (08 Dec 2021 15:25)  gabapentin 300 mg oral capsule: 1 cap(s) orally every 8 hours (08 Dec 2021 15:25)  HYDROmorphone 2 mg oral tablet: 1 tab(s) orally every 3 hours, As needed, Severe Pain (7 - 10) (08 Dec 2021 15:25)  melatonin 3 mg oral tablet: 1 tab(s) orally once a day (at bedtime), As needed, Insomnia (08 Dec 2021 15:25)  Metoprolol Succinate ER 25 mg oral tablet, extended release: 1 tab(s) orally once a day (2021 17:20)  Multiple Vitamins oral tablet: 1 tab(s) orally once a day (08 Dec 2021 15:25)  predniSONE 5 mg oral tablet: 1 tab(s) orally once a day (08 Dec 2021 15:25)  spironolactone 25 mg oral tablet: 1 tab(s) orally once a day (14 May 2021 11:51)                           MEDICATIONS:  STANDING MEDICATIONS  apixaban 5 milliGRAM(s) Oral every 12 hours  bicalutamide 50 milliGRAM(s) Oral daily  ciprofloxacin   IVPB 400 milliGRAM(s) IV Intermittent every 12 hours  fentaNYL   Patch  12 MICROgram(s)/Hr 1 Patch Transdermal every 72 hours  gabapentin 400 milliGRAM(s) Oral every 8 hours  ivabradine 5 milliGRAM(s) Oral two times a day  metoprolol succinate ER 25 milliGRAM(s) Oral daily  multivitamin 1 Tablet(s) Oral daily  predniSONE   Tablet 5 milliGRAM(s) Oral daily  sacubitril 24 mG/valsartan 26 mG 1 Tablet(s) Oral two times a day  sodium bicarbonate 1300 milliGRAM(s) Oral every 12 hours  spironolactone 25 milliGRAM(s) Oral daily  Zytiga 1000 milliGRAM(s) 1000 milliGRAM(s) Oral daily    PRN MEDICATIONS  HYDROmorphone   Tablet 2 milliGRAM(s) Oral every 3 hours PRN  HYDROmorphone   Tablet 4 milliGRAM(s) Oral every 3 hours PRN  melatonin 3 milliGRAM(s) Oral at bedtime PRN                                            ------------------------------------------------------------  VITAL SIGNS: Last 24 Hours  T(C): 36.2 (13 Dec 2021 05:02), Max: 36.2 (13 Dec 2021 05:02)  T(F): 97.2 (13 Dec 2021 05:02), Max: 97.2 (13 Dec 2021 05:02)  HR: 77 (13 Dec 2021 05:02) (70 - 94)  BP: 128/60 (13 Dec 2021 05:02) (109/56 - 128/60)  BP(mean): --  RR: 17 (13 Dec 2021 05:02) (17 - 18)  SpO2: 97% (13 Dec 2021 05:02) (96% - 97%)                                             --------------------------------------------------------------  LABS:        147<H>  |  107  |  33<H>  ----------------------------<  189<H>  5.1<H>   |  22  |  0.9    Ca    8.2<L>      13 Dec 2021 08:27    TPro  5.0<L>  /  Alb  2.6<L>  /  TBili  0.7  /  DBili  x   /  AST  36  /  ALT  19  /  AlkPhos  1574<H>  1211      Urinalysis Basic - ( 12 Dec 2021 11:05 )    Color: Yellow / Appearance: Slightly Turbid / S.027 / pH: x  Gluc: x / Ketone: Negative  / Bili: Negative / Urobili: 3 mg/dL   Blood: x / Protein: 30 mg/dL / Nitrite: Positive   Leuk Esterase: Negative / RBC: 14 /HPF / WBC 3 /HPF   Sq Epi: x / Non Sq Epi: 2 /HPF / Bacteria: Many      ABG - ( 12 Dec 2021 20:45 )  pH, Arterial: 7.46  pH, Blood: x     /  pCO2: 36    /  pO2: 84    / HCO3: 26    / Base Excess: 1.8   /  SaO2: 98.3              Lactate, Blood: 1.6 mmol/L (21 @ 11:00)                                                      -------------------------------------------------------------  RADIOLOGY:  CXR 21  Impression:    Limited by obliquity.    Right lung opacities.    Left-sided permanent pacemaker.      IVANA SALEH MD; Attending Radiologist  This document has been electronically signed. Dec 13 2021  2:31AM                                            --------------------------------------------------------------    PHYSICAL EXAM:  GENERAL: AAOx2. Cachectic, laying in bed appearing in no acute distress  HEENT: No FNDs, atraumatic, normocephalic, moist mucus membranes  LUNGS: Clear to auscultation bilaterally  HEART: S1/S2. No heaves or thrills  ABD: Soft, non-tender, non-distended. Bowel sounds present in all quadrants.  EXT/NEURO: 5/5 strength in all extremity joints. Sensation and ROM grossly intact.  SKIN: No breaks, erythema, edema                                           --------------------------------------------------------------

## 2021-12-13 NOTE — DISCHARGE NOTE NURSING/CASE MANAGEMENT/SOCIAL WORK - NSDCPEFALRISK_GEN_ALL_CORE
For information on Fall & Injury Prevention, visit: [email or web address] OR  [email or web address] OR  https://www.cdc.gov/steadi/patient.html

## 2021-12-13 NOTE — PROGRESS NOTE ADULT - SUBJECTIVE AND OBJECTIVE BOX
SAMANTHA CHARLES  71y  Male  ***My note supersedes ALL resident notes that I sign.  My corrections for their notes are in my note.***    I can be reached directly on [employer]. My office number is [phone]. My personal cell number is [phone].    INTERVAL EVENTS: Here for f/u of prostate cancer.  Pt says that he feels OK today. However, he still has pain when moving his legs - this will likely be there for awhile given the extent of his mets. Pt is able to eat and drink a little. No events overnight.    T(F): 98 (21 @ 12:07), Max: 98 (21 @ 12:07)  HR: 89 (21 @ 12:07) (70 - 94)  BP: 100/49 (21 @ 12:07) (100/49 - 128/60)  RR: 19 (21 @ 12:07) (17 - 19)  SpO2: 97% (21 @ 05:02) (96% - 97%)    Gen: fairly comfortable; left leg pain severe w/ mvmt  HEENT: PERRL, mouth clr; nose clr  Neck: no nodes, no JVD, thyroid nl  chest: left AICD  lungs: clr  hrt: s1 s2 rrr no murmur  abd: soft, NT/ND, no HS megaly  : + condom cath - urine yellow  ext: no edema, no c/c  neuro: aa ox3, cn intact, can move all 4 ext, but globally weak    LABS:  147   (   107   (   189      21 @ 08:27  ----------------------               5.1   (   22   (   33     AG = 18                        -----                        0.9  Ca  8.2   Mg  --    P   --     ABG - ( 12 Dec 2021 20:45 )  pH, Arterial: 7.46  pH, Blood: x     /  pCO2: 36    /  pO2: 84    / HCO3: 26    / Base Excess: 1.8   /  SaO2: 98.3      Urinalysis Basic - ( 12 Dec 2021 11:05 )    Color: Yellow / Appearance: Slightly Turbid / S.027 / pH: x  Gluc: x / Ketone: Negative  / Bili: Negative / Urobili: 3 mg/dL   Blood: x / Protein: 30 mg/dL / Nitrite: Positive   Leuk Esterase: Negative / RBC: 14 /HPF / WBC 3 /HPF   Sq Epi: x / Non Sq Epi: 2 /HPF / Bacteria: Many    RADIOLOGY & ADDITIONAL TESTS:    MEDICATIONS:    apixaban 5 milliGRAM(s) Oral every 12 hours  bicalutamide 50 milliGRAM(s) Oral daily  fentaNYL   Patch  12 MICROgram(s)/Hr 1 Patch Transdermal every 72 hours  gabapentin 400 milliGRAM(s) Oral every 8 hours  HYDROmorphone   Tablet 2 milliGRAM(s) Oral every 3 hours PRN  HYDROmorphone   Tablet 4 milliGRAM(s) Oral every 3 hours PRN  ivabradine 5 milliGRAM(s) Oral two times a day  melatonin 3 milliGRAM(s) Oral at bedtime PRN  metoprolol succinate ER 25 milliGRAM(s) Oral daily  multivitamin 1 Tablet(s) Oral daily  predniSONE   Tablet 5 milliGRAM(s) Oral daily  sacubitril 24 mG/valsartan 26 mG 1 Tablet(s) Oral two times a day  sodium bicarbonate 1300 milliGRAM(s) Oral every 12 hours  sodium zirconium cyclosilicate 5 Gram(s) Oral once  spironolactone 25 milliGRAM(s) Oral daily  Zytiga 1000 milliGRAM(s) 1000 milliGRAM(s) Oral daily

## 2021-12-13 NOTE — PROGRESS NOTE ADULT - ASSESSMENT
70 yo M with PMH of CVA, afib on Eliquis, HTN, HFrEF (EF 20-25% 2018), chronic LLE pain, presented to the ED with worsening Left LE pain for the past 2 months, worsening fatigue, anorexia and significant weight loss. Of note, pt had previously been worked up for the leg pain including a CT scan in march 2021 in [facility], which was negative.    # LIMIT LABS    # I spoke w/ PMD Dr Gary Bernard [phone]: I updated him and he agrees w/ current plan; he is helping w/ discussions w/ pt's sister, Barb    # aware of u/a  pt has NO change in MS, looks the same to me today  pt has NO fever and no WBC  pt has NO urine symptoms  pt has active prostate cancer, which can yield abnl u/a  no abx for now    # Neopl-related severe pain 2/2 diffuse bony sclerotic lesions (AP elevated) likely 2/2 prostate cancer (irregularly marginated prostate gland on CT and PSA 1400)  CT chest/abdomen/pelvis - Innumerable diffuse sclerotic osseous lesions -  including 6x5.8x9.4cm mass involving the left iliac wing and 6.6x4.5x4.6cm mass involving left inferior pubic ramus. Expansile sclerotic lesions are noted to involve the right 2nd lateral rib and right 5th anterior rib.  IR s/p bx lt iliac lesion 12/3: path (Dr Erickson x [phone]): confirmed it is prostate cancer; results should be out today/tomorrow  bone scan -> diff bone mets, including vert (kidneys not seen on bone scan b/o such intense uptake by diff bone mets)  rad/onc eval can be done as outpt  h/o (Dr Mena) felt MRIs were not needed (and hard for pt to do) - so MRIs cancelled  h/o beginning tx:  AP is improving a lot  Casodex 50mg po daily til 12/17  Lupron 22.5 mg IM x1 on 12/8 (done) - will be monthly  Abiraterone 1000 mg po daily and Prednisone 5 mg po daily  h/o will start Xgeva as outpatient to reduce bone related complications  c/w dilaudid 2mg po q3 prn mod pain  c/w dilaudid 4mg po q3 prn sev pain  c/w fentanyl patch 12 mcg/hr top q72 for longer-acting relief  c/w neurontin 400mg po q8  bowel reg    # metab acid w/ high gap  improving  doubt starvation ketosis as u/a shows NO ketones and pt does eat  lactate was nl recently - and currently 1.7  could be result of high tumor burden, which is poor prog sign)  Cr (baseline ~ 0.8)  IVFs: off  decr oral NaHCO3 650mg po q12  BMP todd    # hypernatremia  oral hydration  decr NaHCO3    # Failure to thrive - is eating a little w/ asst  MVI q24  Ensure 3x/day  asst w/ feeds (family or staff)    # Symptomatic normo to macro anemia (mild pancytopenia)  no overt bleed  prob related to malignancy and diffuse bone mets  s/p PRBC transfusions -> hgb better  keep hgb > 8  LDH elevated  Iron panel: iron sat 32%; ferr: 2624 = NOT SELENA  Folate nl; B12: 1540    # h/o HTN; h/o CVA, Afib; H/o HFrEF (EF 20-25% in 2018); mild hyperkalemia  d/c aldactone 25mg po q24 - K is rising and BP on lower side  c/w Eliquis 5mg po q12   c/w Entresto 24/26 po q12   c/w metoprolol ER 25mg po q24   c/w Ivabradine 5mg po q12  if BP starts to get low again, will need to hold some of the above meds again    # Activity: PT eval for eval for STR - for SNF    # GI PPX: NA    # DVT PPX: eliquis     # Full code - if proceeding more towards hospice, then will talk w/ family about DNR and DNI options    # updated sister today; she is still interested in a NH for STR (but NOT [facility])    Dispo: tx pain; decr NaHCO3; d/c aldactone; f/u h/o;   current plan is to go to SNF for STR - family did not like [facility], f/u CM for alternate facility (even [place]-island)    Prog is very poor for long term. If pt expires in hospital, please note he is Jewish and his body should NOT be touched until family is called and his Imam is called.

## 2021-12-13 NOTE — PROGRESS NOTE ADULT - ASSESSMENT
ASSESSMENT & PLAN  70 yo M with PMH of CVA, afib on Eliquis, HTN, HFrEF (EF 20-25% 2018), chronic LLE pain, presented to the ED with worsening Left LE pain for the past 2 months, worsening fatigue, anorexia and significant weight loss. Of note, pt had previously been worked up for the leg pain including a CT scan in march 2021 in [facility], which was negative.    # Neoplasm-related severe pain 2/2 diffuse bony sclerotic lesions (AP 2765) likely 2/2 prostate cancer (irregularly marginated prostate gland on CT and PSA 1400)  -CT chest/abdomen/pelvis - Innumerable diffuse sclerotic osseous lesions -  including 6x5.8x9.4cm mass L iliac wing and 6.6x4.5x4.6cm mass of left inferior pubic ramus. Expansile sclerotic lesions of R 2nd lateral rib, R 5th anterior rib.  -bone scan -> diff bone mets, including vert (kidneys not seen b/o such intense uptake by diff bone mets)  -s/p IR bx lt iliac lesion 12/3: f/u path   -s/p Hem Onc eval and Pall Care eval   -Casodex 50mg po daily x2wk total (stop 12/17/21)  -Lupron 22.5 mg IM q1mo (last 12/8/21)  -Abiraterone 1000 mg po daily and Prednisone 5 mg po daily  -Heme/Onc planned to  start Xgeva as outpatient to reduce bone related complications  -c/w dilaudid 2mg po q3 prn pain  -cont fentanyl patch 12 mcg/hr top q72 for longer-acting relief  -cont neurontin 300mg po q8  -bowel reg  -f/u cytopathology report from biopsy    # Failure to thrive  -dietician eval  -MVI q24  -Beneprotein 3x/day     # Symptomatic normo to macro anemia (mild pancytopenia)  -no overt bleed, s/p pRBCs last admission  -prob related to malignancy and diffuse bone mets  -, Folate nl, B12 1540  -Iron panel: iron sat 32%; ferr: 2624 = NOT SELENA  -keep hgb > 8    # FELICITY - likely dehydration (resolved); metab acid w/ high gap (prob result of high tumor burden, which is poor prog sign)  -Cr (baseline ~ 0.8)  -Entresto held- may restart after gado given for MRI, if BP will allow  -c/w NaHCO3 1300mg po q12 til HCO3 > 22  -BMP q24    # h/o HTN  #h/o CVA  #Afib  #H/o HFrEF (EF 20-25% in 2018)  -c/w Eliquis 5mg po q12, metoprolol  -c/w aldactone, entresto, ivabradine    # Activity  -Amb as tolerated    # GI PPX  -NA    # DVT PPX  -Eliquis    # Full code  Dispo- Placement, f/u case management

## 2021-12-13 NOTE — DISCHARGE NOTE PROVIDER - NSDCCPCAREPLAN_GEN_ALL_CORE_FT
PRINCIPAL DISCHARGE DIAGNOSIS  Diagnosis: Prostate cancer metastatic to bone  Assessment and Plan of Treatment:       SECONDARY DISCHARGE DIAGNOSES  Diagnosis: Essential hypertension  Assessment and Plan of Treatment:     Diagnosis: Chronic systolic congestive heart failure  Assessment and Plan of Treatment:     Diagnosis: Chronic atrial fibrillation  Assessment and Plan of Treatment:

## 2021-12-13 NOTE — DISCHARGE NOTE NURSING/CASE MANAGEMENT/SOCIAL WORK - PATIENT PORTAL LINK FT
You can access the FollowMyHealth Patient Portal offered by [facility] by registering at the following website: [email or web address]. By joining [facility]’s FollowMyHealth portal, you will also be able to view your health information using other applications (apps) compatible with our system.

## 2021-12-13 NOTE — DISCHARGE NOTE PROVIDER - PROVIDER TOKENS
PROVIDER:[TOKEN:[[id number]],FOLLOWUP:[1 week]],PROVIDER:[TOKEN:[[id number]],FOLLOWUP:[1 week]]

## 2021-12-13 NOTE — DISCHARGE NOTE PROVIDER - NSDCMRMEDTOKEN_GEN_ALL_CORE_FT
abiraterone 500 mg oral tablet: 2 tab(s) orally once a day   Casodex 50 mg oral tablet: 1 tab(s) orally once a day   Corlanor 5 mg oral tablet: 1 tab(s) orally 2 times a day (with meals)  Eliquis 5 mg oral tablet: 2 tab(s) orally 2 times a day  for 7 days and then 1 tab orally 2 times a day afterwards   Entresto 24 mg-26 mg oral tablet: 1 tab(s) orally 2 times a day  fentaNYL 12 mcg/hr transdermal film, extended release: 1 patch transdermal every 72 hours  gabapentin 300 mg oral capsule: 1 cap(s) orally every 8 hours  HYDROmorphone 2 mg oral tablet: 1 tab(s) orally every 3 hours, As needed, Severe Pain (7 - 10)  melatonin 3 mg oral tablet: 1 tab(s) orally once a day (at bedtime), As needed, Insomnia  Metoprolol Succinate ER 25 mg oral tablet, extended release: 1 tab(s) orally once a day  Multiple Vitamins oral tablet: 1 tab(s) orally once a day  oxycodone-acetaminophen 5 mg-325 mg oral tablet: 1 tab(s) orally every 6 hours, As needed, Severe Pain (7 - 10) MDD:20mg  predniSONE 5 mg oral tablet: 1 tab(s) orally once a day  sodium bicarbonate 650 mg oral tablet: 1 tab(s) orally every 12 hours

## 2021-12-14 PROCEDURE — 99233 SBSQ HOSP IP/OBS HIGH 50: CPT

## 2021-12-14 RX ADMIN — FENTANYL CITRATE 1 PATCH: 50 INJECTION INTRAVENOUS at 06:27

## 2021-12-14 RX ADMIN — IVABRADINE 5 MILLIGRAM(S): 7.5 TABLET, FILM COATED ORAL at 17:13

## 2021-12-14 RX ADMIN — GABAPENTIN 400 MILLIGRAM(S): 400 CAPSULE ORAL at 06:26

## 2021-12-14 RX ADMIN — Medication 5 MILLIGRAM(S): at 06:27

## 2021-12-14 RX ADMIN — HYDROMORPHONE HYDROCHLORIDE 4 MILLIGRAM(S): 2 INJECTION INTRAMUSCULAR; INTRAVENOUS; SUBCUTANEOUS at 11:06

## 2021-12-14 RX ADMIN — GABAPENTIN 400 MILLIGRAM(S): 400 CAPSULE ORAL at 21:36

## 2021-12-14 RX ADMIN — SACUBITRIL AND VALSARTAN 1 TABLET(S): 24; 26 TABLET, FILM COATED ORAL at 06:25

## 2021-12-14 RX ADMIN — SACUBITRIL AND VALSARTAN 1 TABLET(S): 24; 26 TABLET, FILM COATED ORAL at 17:13

## 2021-12-14 RX ADMIN — Medication 650 MILLIGRAM(S): at 17:12

## 2021-12-14 RX ADMIN — Medication 650 MILLIGRAM(S): at 06:24

## 2021-12-14 RX ADMIN — FENTANYL CITRATE 1 PATCH: 50 INJECTION INTRAVENOUS at 20:00

## 2021-12-14 RX ADMIN — BICALUTAMIDE 50 MILLIGRAM(S): 50 TABLET, FILM COATED ORAL at 11:08

## 2021-12-14 RX ADMIN — HYDROMORPHONE HYDROCHLORIDE 4 MILLIGRAM(S): 2 INJECTION INTRAMUSCULAR; INTRAVENOUS; SUBCUTANEOUS at 11:13

## 2021-12-14 RX ADMIN — APIXABAN 5 MILLIGRAM(S): 2.5 TABLET, FILM COATED ORAL at 06:25

## 2021-12-14 RX ADMIN — GABAPENTIN 400 MILLIGRAM(S): 400 CAPSULE ORAL at 13:25

## 2021-12-14 RX ADMIN — Medication 25 MILLIGRAM(S): at 06:25

## 2021-12-14 RX ADMIN — IVABRADINE 5 MILLIGRAM(S): 7.5 TABLET, FILM COATED ORAL at 06:27

## 2021-12-14 RX ADMIN — APIXABAN 5 MILLIGRAM(S): 2.5 TABLET, FILM COATED ORAL at 17:13

## 2021-12-14 RX ADMIN — Medication 1 TABLET(S): at 11:08

## 2021-12-14 NOTE — PHYSICAL THERAPY INITIAL EVALUATION ADULT - SPECIFY REASON(S)
Hold PT secondary pt is lethargic and unable to arouse to verbal and tactile cues, RN Trudi aware. PT will f/u as appropriate.

## 2021-12-14 NOTE — PROGRESS NOTE ADULT - SUBJECTIVE AND OBJECTIVE BOX
SAMANTHA CHARLES 71y Male  MRN#: [id number]   CODE STATUS:________    Hospital Day: 5d    Pt is currently admitted with the primary diagnosis of prostate cancer with likely metastasis    SUBJECTIVE  Hospital Course  -Pt admitted due to family not liking NH pt was discharged to 12/9/21. Pt had episode of lethargy overnight 12/12, found UA+ for bacteria, new opacifications on CXR (RUL/JESSICA/RLL), given lasix 40mg IV once + ciprofloxacin standing.  Overnight events   -Pt had episode of lethargy overnight 12/12, found UA+ for bacteria, new opacifications on CXR (RUL/JESSICA/RLL), given lasix 40mg IV once + ciprofloxacin standing.  Subjective complaints   -pt feels well this AM, tired but similar baseline to my past exams                                            ----------------------------------------------------------  OBJECTIVE  PAST MEDICAL & SURGICAL HISTORY  Systolic congestive heart failure, unspecified chronicity    Cerebrovascular accident (CVA) due to bilateral embolism of posterior cerebral arteries    Essential hypertension    Atrial fibrillation, unspecified type    AICD (automatic cardioverter/defibrillator) present                                              -----------------------------------------------------------  ALLERGIES:  cephalexin (Flushing)  penicillin (Rash)                                            ------------------------------------------------------------    HOME MEDICATIONS  Home Medications:  Corlanor 5 mg oral tablet: 1 tab(s) orally 2 times a day (with meals) (30 Nov 2021 17:21)  fentaNYL 12 mcg/hr transdermal film, extended release: 1 patch transdermal every 72 hours (08 Dec 2021 15:25)  gabapentin 300 mg oral capsule: 1 cap(s) orally every 8 hours (08 Dec 2021 15:25)  HYDROmorphone 2 mg oral tablet: 1 tab(s) orally every 3 hours, As needed, Severe Pain (7 - 10) (08 Dec 2021 15:25)  melatonin 3 mg oral tablet: 1 tab(s) orally once a day (at bedtime), As needed, Insomnia (08 Dec 2021 15:25)  Metoprolol Succinate ER 25 mg oral tablet, extended release: 1 tab(s) orally once a day (30 Nov 2021 17:20)  Multiple Vitamins oral tablet: 1 tab(s) orally once a day (08 Dec 2021 15:25)  predniSONE 5 mg oral tablet: 1 tab(s) orally once a day (08 Dec 2021 15:25)  sodium bicarbonate 650 mg oral tablet: 1 tab(s) orally every 12 hours (13 Dec 2021 15:30)                           MEDICATIONS:  STANDING MEDICATIONS  apixaban 5 milliGRAM(s) Oral every 12 hours  bicalutamide 50 milliGRAM(s) Oral daily  fentaNYL   Patch  12 MICROgram(s)/Hr 1 Patch Transdermal every 72 hours  gabapentin 400 milliGRAM(s) Oral every 8 hours  ivabradine 5 milliGRAM(s) Oral two times a day  metoprolol succinate ER 25 milliGRAM(s) Oral daily  multivitamin 1 Tablet(s) Oral daily  predniSONE   Tablet 5 milliGRAM(s) Oral daily  sacubitril 24 mG/valsartan 26 mG 1 Tablet(s) Oral two times a day  sodium bicarbonate 650 milliGRAM(s) Oral every 12 hours  Zytiga 1000 milliGRAM(s) 1000 milliGRAM(s) Oral daily    PRN MEDICATIONS  HYDROmorphone   Tablet 2 milliGRAM(s) Oral every 3 hours PRN  HYDROmorphone   Tablet 4 milliGRAM(s) Oral every 3 hours PRN  melatonin 3 milliGRAM(s) Oral at bedtime PRN                                            ------------------------------------------------------------  VITAL SIGNS: Last 24 Hours  T(C): 36.9 (14 Dec 2021 12:50), Max: 36.9 (14 Dec 2021 12:50)  T(F): 98.5 (14 Dec 2021 12:50), Max: 98.5 (14 Dec 2021 12:50)  HR: 90 (14 Dec 2021 12:50) (87 - 99)  BP: 105/56 (14 Dec 2021 12:50) (97/49 - 136/61)  BP(mean): --  RR: 19 (14 Dec 2021 12:50) (17 - 19)  SpO2: 97% (14 Dec 2021 12:50) (97% - 97%)      12-14-21 @ 07:01  -  12-14-21 @ 16:41  --------------------------------------------------------  IN: 420 mL / OUT: 550 mL / NET: -130 mL                                             --------------------------------------------------------------  LABS:    12-13    147<H>  |  107  |  33<H>  ----------------------------<  189<H>  5.1<H>   |  22  |  0.9    Ca    8.2<L>      13 Dec 2021 08:27          ABG - ( 12 Dec 2021 20:45 )  pH, Arterial: 7.46  pH, Blood: x     /  pCO2: 36    /  pO2: 84    / HCO3: 26    / Base Excess: 1.8   /  SaO2: 98.3                                              --------------------------------------------------------------    PHYSICAL EXAM:  GENERAL: AAOx2. Cachectic, laying in bed appearing in no acute distress  HEENT: No FNDs, atraumatic, normocephalic, moist mucus membranes  LUNGS: Clear to auscultation bilaterally  HEART: S1/S2. No heaves or thrills  ABD: Soft, non-tender, non-distended. Bowel sounds present in all quadrants.  EXT/NEURO: 5/5 strength in all extremity joints. Sensation and ROM grossly intact.  SKIN: No breaks, erythema, edema                                         --------------------------------------------------------------

## 2021-12-14 NOTE — PROGRESS NOTE ADULT - SUBJECTIVE AND OBJECTIVE BOX
Patient is a 71y old  Male who presents with a chief complaint of Increased Pain, placement (13 Dec 2021 15:21)    HPI:  70 yo M with PMH of CVA, afib on Eliquis, HTN, HFrEF ( EF 20-25% 2018), chronic LLE pain, presented to the ED from NH. Pt was discharged today from [facility] to [facility]. Family not happy with current NH and wants patient placed at [facility]. Pending outpt MRI as could not be done inpt 2/2 inavailability of EP/[employer] Rep for AICDS.     Hospital Course:    Patient was admitted to medicine and underwent bone biopsy by IR. He was evaluated by urology and hematology-oncology and started on hormonal therapy, casodex, while inpatient. He had a bone scan which showed multiple metastatic lesions including in the spine. Palliative care saw the patient and he was given PO dilaudid, neurontin, and 1 dose of dexamethasone for the left lower extremity pain. He has anemia of chronic disease and received 4 units pRBCs during the admission. Patient was seen by physical therapy and by dietitian regarding failure to thrive. Spoke with sister regarding poor prognosis and patient will be going to SNF for further care. He will be receiving chemotherapy outpatient at [facility]. He can get MRI of lumbar and thoracic spine outpatient and that will determine treatment by radiation oncology.   Pt is a limited historian. AX02 @ baseline    (09 Dec 2021 17:49)    PAST MEDICAL & SURGICAL HISTORY:  Systolic congestive heart failure, unspecified chronicity  Cerebrovascular accident (CVA) due to bilateral embolism of posterior cerebral arteries  Essential hypertension  Atrial fibrillation, unspecified type  AICD (automatic cardioverter/defibrillator) present      Vital Signs Last 24 Hrs  T(C): 36.2 (14 Dec 2021 05:00), Max: 36.7 (13 Dec 2021 12:07)  T(F): 97.1 (14 Dec 2021 05:00), Max: 98 (13 Dec 2021 12:07)  HR: 87 (14 Dec 2021 06:22) (87 - 99)  BP: 125/60 (14 Dec 2021 06:22) (97/49 - 136/61)  BP(mean): --  RR: 18 (14 Dec 2021 05:00) (18 - 19)    patient seen and examined independently on morning rounds for the first time today, chart reviewed and discussed with the medicine resident and on interdisciplinary rounds.    d/w sister bedside- patient was not discharged yesterday as no authorization for placement yet- c/o pain today (leg) and appears in mild discomfort     PE:  GEN: thin, cachectic, chronically ill appearing  PULM- fair air entry  CVS- +s1/s2 RRR   GI- soft NT ND +bs, no rebound, no guarding  EXT- no edema, bilateral LE contracted      147<H>  |  107  |  33<H>  ----------------------------<  189<H>  5.1<H>   |  22  |  0.9    Ca    8.2<L>      13 Dec 2021 08:27      MEDICATIONS  (STANDING):  apixaban 5 milliGRAM(s) Oral every 12 hours  bicalutamide 50 milliGRAM(s) Oral daily  fentaNYL   Patch  12 MICROgram(s)/Hr 1 Patch Transdermal every 72 hours  gabapentin 400 milliGRAM(s) Oral every 8 hours  ivabradine 5 milliGRAM(s) Oral two times a day  metoprolol succinate ER 25 milliGRAM(s) Oral daily  multivitamin 1 Tablet(s) Oral daily  predniSONE   Tablet 5 milliGRAM(s) Oral daily  sacubitril 24 mG/valsartan 26 mG 1 Tablet(s) Oral two times a day  sodium bicarbonate 650 milliGRAM(s) Oral every 12 hours  Zytiga 1000 milliGRAM(s) 1000 milliGRAM(s) Oral daily

## 2021-12-14 NOTE — PROGRESS NOTE ADULT - ASSESSMENT
a/p:  72 yo M with PMH of CVA, afib on Eliquis, HTN, HFrEF (EF 20-25% 2018), chronic LLE pain, presented to the ED with worsening Left LE pain for the past 2 months, worsening fatigue, anorexia and significant weight loss. Of note, pt had previously been worked up for the leg pain including a CT scan in march 2021 in [facility], which was negative.    # Neopl-related severe pain 2/2 diffuse bony sclerotic lesions (AP elevated) likely 2/2 prostate cancer (irregularly marginated prostate gland on CT and PSA 1400)  -CT chest/abdomen/pelvis - Innumerable diffuse sclerotic osseous lesions -  including 6x5.8x9.4cm mass involving the left iliac wing and 6.6x4.5x4.6cm mass involving left inferior pubic ramus. Expansile sclerotic lesions are noted to involve the right 2nd lateral rib and right 5th anterior rib.  -IR s/p bx lt iliac lesion 12/3: path c;/w +metastatic prostate carcinoma  -rad/onc eval can be done as outpt  -h/o (Dr Mena) felt MRIs were not needed (and hard for pt to do) - so MRIs cancelled  -continue heme onc treatment and f/u  -Casodex 50mg po daily til 12/17  -Lupron 22.5 mg IM x1 on 12/8 (done) - will be monthly  -Abiraterone 1000 mg po daily and Prednisone 5 mg po daily  -h/o will start Xgeva as outpatient to reduce bone related complications  -c/w dilaudid 2mg po q3 prn mod pain/4 mg oral q3hr for severe pain  -c/w fentanyl patch 12 mcg/hr top q72 for longer-acting relief  -c/w neurontin 400mg po q8hr  -continue bowel regimen     # metab acid w/ high AG with hypernatremia and failure to thrive  -LA 1.7  -continue oral hydration---off ivf  -monitor bmp  - continue oral NaHCO3 but decreased dose 650 mg q12hr yesterday  -could be high 2/2 extensive tumor burden  -continue mvi and ensure 3xday--assist with feeds (aspiration precautions)    # Symptomatic normo to macro anemia (mild pancytopenia)  -limit blood draws  -no overt bleeding=--suspect 2/2 bone mets and malignancy  -goal hb >8-  -not c/w iron deficiency anemia (iron sat 32%; ferr: 2624)    # h/o HTN; h/o CVA, Afib; H/o HFrEF (EF 20-25% in 2018); mild hyperkalemia  -bp 129/60 today and has remained stable over last 24 hrs (since stopping aldactone yesterday)  -K today 5.1  -c/w Eliquis 5mg po q12 , Entresto 24/26 po q12 , metoprolol ER 25mg po q24 , Ivabradine 5mg po q12  -monitor bp closely    # Full code  #overall very poor long term prognosis---continue to readdress goc --consider palliative/hospice f/u    #Progress Note Handoff  Pending (specify): awaiting authorization for STR (discharge did not occur yesterday due to no authorization)  Family discussion: d/w patient and sister bedside   Disposition: SNF__x (pending auth)

## 2021-12-14 NOTE — PROGRESS NOTE ADULT - ASSESSMENT
ASSESSMENT & PLAN  70 yo M with PMH of CVA, afib on Eliquis, HTN, HFrEF (EF 20-25% 2018), chronic LLE pain, presented to the ED with worsening Left LE pain for the past 2 months, worsening fatigue, anorexia and significant weight loss. Of note, pt had previously been worked up for the leg pain including a CT scan in march 2021 in [facility], which was negative.    # Neoplasm-related severe pain 2/2 diffuse bony sclerotic lesions (AP 2765) likely 2/2 prostate cancer (irregularly marginated prostate gland on CT and PSA 1400)  -CT chest/abdomen/pelvis - Innumerable diffuse sclerotic osseous lesions -  including 6x5.8x9.4cm mass L iliac wing and 6.6x4.5x4.6cm mass of left inferior pubic ramus. Expansile sclerotic lesions of R 2nd lateral rib, R 5th anterior rib.  -bone scan -> diff bone mets, including vert (kidneys not seen b/o such intense uptake by diff bone mets)  -s/p IR bx lt iliac lesion 12/3: metastatic prostate cancer  -s/p Hem Onc eval and Pall Care eval   -Casodex 50mg po daily x2wk total (stop 12/17/21)  -Lupron 22.5 mg IM q1mo (last 12/8/21)  -Abiraterone 1000 mg po daily and Prednisone 5 mg po daily  -Heme/Onc planned to  start Xgeva as outpatient to reduce bone related complications  -c/w dilaudid 2mg po q3 prn pain  -cont fentanyl patch 12 mcg/hr top q72 for longer-acting relief  -cont neurontin 300mg po q8  -bowel reg    # Failure to thrive  -dietician eval  -MVI q24  -Beneprotein 3x/day     # Symptomatic normo to macro anemia (mild pancytopenia)  -no overt bleed, s/p pRBCs last admission  -prob related to malignancy and diffuse bone mets  -, Folate nl, B12 1540  -Iron panel: iron sat 32%; ferr: 2624 = NOT SELENA  -keep hgb > 8    # FELICITY - likely dehydration (resolved); metab acid w/ high gap (prob result of high tumor burden, which is poor prog sign)  -Cr (baseline ~ 0.8)  -Entresto held- may restart after gado given for MRI, if BP will allow  -c/w NaHCO3 1300mg po q12 til HCO3 > 22  -BMP q24    # h/o HTN  #h/o CVA  #Afib  #H/o HFrEF (EF 20-25% in 2018)  -c/w Eliquis 5mg po q12, metoprolol  -c/w aldactone, entresto, ivabradine    # Activity  -Amb as tolerated    # GI PPX  -NA    # DVT PPX  -Eliquis    # Full code  Dispo- Placement, f/u case management

## 2021-12-15 LAB
ALBUMIN SERPL ELPH-MCNC: 2.8 G/DL — LOW (ref 3.5–5.2)
ALP SERPL-CCNC: 1449 U/L — HIGH (ref 30–115)
ALT FLD-CCNC: 33 U/L — SIGNIFICANT CHANGE UP (ref 0–41)
ANION GAP SERPL CALC-SCNC: 20 MMOL/L — HIGH (ref 7–14)
ANISOCYTOSIS BLD QL: SLIGHT — SIGNIFICANT CHANGE UP
APPEARANCE UR: CLEAR — SIGNIFICANT CHANGE UP
AST SERPL-CCNC: 71 U/L — HIGH (ref 0–41)
BACTERIA # UR AUTO: NEGATIVE — SIGNIFICANT CHANGE UP
BASOPHILS # BLD AUTO: 0.03 K/UL — SIGNIFICANT CHANGE UP (ref 0–0.2)
BASOPHILS NFR BLD AUTO: 0.9 % — SIGNIFICANT CHANGE UP (ref 0–1)
BILIRUB SERPL-MCNC: 0.8 MG/DL — SIGNIFICANT CHANGE UP (ref 0.2–1.2)
BILIRUB UR-MCNC: NEGATIVE — SIGNIFICANT CHANGE UP
BUN SERPL-MCNC: 32 MG/DL — HIGH (ref 10–20)
CALCIUM SERPL-MCNC: 7.7 MG/DL — LOW (ref 8.5–10.1)
CHLORIDE SERPL-SCNC: 106 MMOL/L — SIGNIFICANT CHANGE UP (ref 98–110)
CO2 SERPL-SCNC: 19 MMOL/L — SIGNIFICANT CHANGE UP (ref 17–32)
COLOR SPEC: YELLOW — SIGNIFICANT CHANGE UP
CREAT SERPL-MCNC: 0.9 MG/DL — SIGNIFICANT CHANGE UP (ref 0.7–1.5)
DIFF PNL FLD: NEGATIVE — SIGNIFICANT CHANGE UP
EOSINOPHIL # BLD AUTO: 0.03 K/UL — SIGNIFICANT CHANGE UP (ref 0–0.7)
EOSINOPHIL NFR BLD AUTO: 0.8 % — SIGNIFICANT CHANGE UP (ref 0–8)
EPI CELLS # UR: 1 /HPF — SIGNIFICANT CHANGE UP (ref 0–5)
GIANT PLATELETS BLD QL SMEAR: PRESENT — SIGNIFICANT CHANGE UP
GLUCOSE SERPL-MCNC: 161 MG/DL — HIGH (ref 70–99)
GLUCOSE UR QL: NEGATIVE — SIGNIFICANT CHANGE UP
HCT VFR BLD CALC: 26 % — LOW (ref 42–52)
HGB BLD-MCNC: 8.1 G/DL — LOW (ref 14–18)
HYALINE CASTS # UR AUTO: 0 /LPF — SIGNIFICANT CHANGE UP (ref 0–7)
KETONES UR-MCNC: NEGATIVE — SIGNIFICANT CHANGE UP
LACTATE SERPL-SCNC: 1.2 MMOL/L — SIGNIFICANT CHANGE UP (ref 0.7–2)
LEUKOCYTE ESTERASE UR-ACNC: NEGATIVE — SIGNIFICANT CHANGE UP
LYMPHOCYTES # BLD AUTO: 1.14 K/UL — LOW (ref 1.2–3.4)
LYMPHOCYTES # BLD AUTO: 33.9 % — SIGNIFICANT CHANGE UP (ref 20.5–51.1)
MANUAL SMEAR VERIFICATION: SIGNIFICANT CHANGE UP
MCHC RBC-ENTMCNC: 30.2 PG — SIGNIFICANT CHANGE UP (ref 27–31)
MCHC RBC-ENTMCNC: 31.2 G/DL — LOW (ref 32–37)
MCV RBC AUTO: 97 FL — HIGH (ref 80–94)
METAMYELOCYTES # FLD: 0.9 % — HIGH (ref 0–0)
MICROCYTES BLD QL: SLIGHT — SIGNIFICANT CHANGE UP
MONOCYTES # BLD AUTO: 0.12 K/UL — SIGNIFICANT CHANGE UP (ref 0.1–0.6)
MONOCYTES NFR BLD AUTO: 3.5 % — SIGNIFICANT CHANGE UP (ref 1.7–9.3)
MYELOCYTES NFR BLD: 2.6 % — HIGH (ref 0–0)
NEUTROPHILS # BLD AUTO: 1.93 K/UL — SIGNIFICANT CHANGE UP (ref 1.4–6.5)
NEUTROPHILS NFR BLD AUTO: 56.5 % — SIGNIFICANT CHANGE UP (ref 42.2–75.2)
NEUTS BAND # BLD: 0.9 % — SIGNIFICANT CHANGE UP (ref 0–6)
NITRITE UR-MCNC: NEGATIVE — SIGNIFICANT CHANGE UP
NRBC # BLD: 5 /100 — HIGH (ref 0–0)
NRBC # BLD: SIGNIFICANT CHANGE UP /100 WBCS (ref 0–0)
PH UR: 7 — SIGNIFICANT CHANGE UP (ref 5–8)
PLAT MORPH BLD: ABNORMAL
PLATELET # BLD AUTO: 147 K/UL — SIGNIFICANT CHANGE UP (ref 130–400)
POTASSIUM SERPL-MCNC: 4.4 MMOL/L — SIGNIFICANT CHANGE UP (ref 3.5–5)
POTASSIUM SERPL-SCNC: 4.4 MMOL/L — SIGNIFICANT CHANGE UP (ref 3.5–5)
PROT SERPL-MCNC: 5.2 G/DL — LOW (ref 6–8)
PROT UR-MCNC: ABNORMAL
RBC # BLD: 2.68 M/UL — LOW (ref 4.7–6.1)
RBC # FLD: 15.9 % — HIGH (ref 11.5–14.5)
RBC BLD AUTO: ABNORMAL
RBC CASTS # UR COMP ASSIST: 3 /HPF — SIGNIFICANT CHANGE UP (ref 0–4)
SODIUM SERPL-SCNC: 145 MMOL/L — SIGNIFICANT CHANGE UP (ref 135–146)
SP GR SPEC: 1.03 — SIGNIFICANT CHANGE UP (ref 1.01–1.03)
SPHEROCYTES BLD QL SMEAR: SLIGHT — SIGNIFICANT CHANGE UP
UROBILINOGEN FLD QL: ABNORMAL
WBC # BLD: 3.37 K/UL — LOW (ref 4.8–10.8)
WBC # FLD AUTO: 3.37 K/UL — LOW (ref 4.8–10.8)
WBC UR QL: 1 /HPF — SIGNIFICANT CHANGE UP (ref 0–5)

## 2021-12-15 PROCEDURE — 71045 X-RAY EXAM CHEST 1 VIEW: CPT | Mod: 26

## 2021-12-15 PROCEDURE — 99232 SBSQ HOSP IP/OBS MODERATE 35: CPT

## 2021-12-15 RX ORDER — ACETAMINOPHEN 500 MG
650 TABLET ORAL EVERY 6 HOURS
Refills: 0 | Status: DISCONTINUED | OUTPATIENT
Start: 2021-12-15 | End: 2021-12-23

## 2021-12-15 RX ADMIN — Medication 5 MILLIGRAM(S): at 05:12

## 2021-12-15 RX ADMIN — APIXABAN 5 MILLIGRAM(S): 2.5 TABLET, FILM COATED ORAL at 05:12

## 2021-12-15 RX ADMIN — FENTANYL CITRATE 1 PATCH: 50 INJECTION INTRAVENOUS at 18:08

## 2021-12-15 RX ADMIN — GABAPENTIN 400 MILLIGRAM(S): 400 CAPSULE ORAL at 14:28

## 2021-12-15 RX ADMIN — HYDROMORPHONE HYDROCHLORIDE 2 MILLIGRAM(S): 2 INJECTION INTRAMUSCULAR; INTRAVENOUS; SUBCUTANEOUS at 06:55

## 2021-12-15 RX ADMIN — Medication 650 MILLIGRAM(S): at 17:57

## 2021-12-15 RX ADMIN — Medication 1 TABLET(S): at 17:55

## 2021-12-15 RX ADMIN — SACUBITRIL AND VALSARTAN 1 TABLET(S): 24; 26 TABLET, FILM COATED ORAL at 05:14

## 2021-12-15 RX ADMIN — APIXABAN 5 MILLIGRAM(S): 2.5 TABLET, FILM COATED ORAL at 17:55

## 2021-12-15 RX ADMIN — HYDROMORPHONE HYDROCHLORIDE 2 MILLIGRAM(S): 2 INJECTION INTRAMUSCULAR; INTRAVENOUS; SUBCUTANEOUS at 12:43

## 2021-12-15 RX ADMIN — HYDROMORPHONE HYDROCHLORIDE 2 MILLIGRAM(S): 2 INJECTION INTRAMUSCULAR; INTRAVENOUS; SUBCUTANEOUS at 22:57

## 2021-12-15 RX ADMIN — IVABRADINE 5 MILLIGRAM(S): 7.5 TABLET, FILM COATED ORAL at 22:06

## 2021-12-15 RX ADMIN — Medication 650 MILLIGRAM(S): at 05:13

## 2021-12-15 RX ADMIN — Medication 1 TABLET(S): at 12:20

## 2021-12-15 RX ADMIN — SACUBITRIL AND VALSARTAN 1 TABLET(S): 24; 26 TABLET, FILM COATED ORAL at 17:55

## 2021-12-15 RX ADMIN — Medication 1 TABLET(S): at 11:19

## 2021-12-15 RX ADMIN — FENTANYL CITRATE 1 PATCH: 50 INJECTION INTRAVENOUS at 06:47

## 2021-12-15 RX ADMIN — FENTANYL CITRATE 1 PATCH: 50 INJECTION INTRAVENOUS at 19:34

## 2021-12-15 RX ADMIN — FENTANYL CITRATE 1 PATCH: 50 INJECTION INTRAVENOUS at 17:54

## 2021-12-15 RX ADMIN — Medication 650 MILLIGRAM(S): at 05:53

## 2021-12-15 RX ADMIN — GABAPENTIN 400 MILLIGRAM(S): 400 CAPSULE ORAL at 05:15

## 2021-12-15 RX ADMIN — IVABRADINE 5 MILLIGRAM(S): 7.5 TABLET, FILM COATED ORAL at 05:15

## 2021-12-15 RX ADMIN — HYDROMORPHONE HYDROCHLORIDE 2 MILLIGRAM(S): 2 INJECTION INTRAMUSCULAR; INTRAVENOUS; SUBCUTANEOUS at 22:13

## 2021-12-15 RX ADMIN — Medication 25 MILLIGRAM(S): at 05:13

## 2021-12-15 RX ADMIN — GABAPENTIN 400 MILLIGRAM(S): 400 CAPSULE ORAL at 22:13

## 2021-12-15 RX ADMIN — Medication 650 MILLIGRAM(S): at 06:47

## 2021-12-15 RX ADMIN — BICALUTAMIDE 50 MILLIGRAM(S): 50 TABLET, FILM COATED ORAL at 11:18

## 2021-12-15 NOTE — PROGRESS NOTE ADULT - SUBJECTIVE AND OBJECTIVE BOX
SAMANTHA CHARLES  71y  Male  ***My note supersedes ALL resident notes that I sign.  My corrections for their notes are in my note.***    I can be reached directly on [email or web address]. My office number is [phone]. My personal cell number is [phone].    INTERVAL EVENTS: Here for f/u of prostate cancer. Pt seems reasonably comfortable today. Pt not doing much w/ PT. Pt denies urine symptoms or SP pain. Pt eating a little bit. Has a slight cough w/ mild phlegm.    T(F): 98.7 (12-15-21 @ 06:21), Max: 100.4 (12-15-21 @ 05:04)  HR: 90 (12-15-21 @ 05:04) (89 - 91)  BP: 119/58 (12-15-21 @ 05:04) (102/49 - 119/58)  RR: 16 (12-15-21 @ 05:04) (16 - 19)  SpO2: 97% (21 @ 12:50) (97% - 97%)    Gen: fairly comfortable; left leg pain little better today   HEENT: PERRL, mouth clr; nose clr  Neck: no nodes, no JVD, thyroid nl  chest: left AICD  lungs: clr  hrt: s1 s2 rrr no murmur  abd: soft, NT/ND, no HS megaly  : + condom cath - urine yellow  ext: no edema, no c/c  neuro: aa ox3, cn intact, can move all 4 ext, but globally very weak    LABS:                      8.1     (    97.0   3.37  )-----------( ---------      147      ( 15 Dec 2021 07:00 )             26.0    (    15.9     145   (   106   (   161      12-15-21 @ 07:00  ----------------------               4.4   (   19   (   32                             -----                        0.9  Ca  7.7   Mg  --    P   --     LFT  5.2  (  0.8  (  71       12-15-21 @ 07:00  -------------------------  2.8  (  1449  (  33    Urinalysis Basic - ( 15 Dec 2021 10:35 )    Color: Yellow / Appearance: Clear / S.026 / pH: x  Gluc: x / Ketone: Negative  / Bili: Negative / Urobili: 3 mg/dL   Blood: x / Protein: 100 mg/dL / Nitrite: Negative   Leuk Esterase: Negative / RBC: 3 /HPF / WBC 1 /HPF   Sq Epi: x / Non Sq Epi: 1 /HPF / Bacteria: Negative    RADIOLOGY & ADDITIONAL TESTS:    MEDICATIONS:  trimethoprim  160 mG/sulfamethoxazole 800 mG 1 Tablet(s) Oral every 12 hours    acetaminophen     Tablet .. 650 milliGRAM(s) Oral every 6 hours PRN  apixaban 5 milliGRAM(s) Oral every 12 hours  bicalutamide 50 milliGRAM(s) Oral daily  fentaNYL   Patch  12 MICROgram(s)/Hr 1 Patch Transdermal every 72 hours  gabapentin 400 milliGRAM(s) Oral every 8 hours  HYDROmorphone   Tablet 2 milliGRAM(s) Oral every 3 hours PRN  HYDROmorphone   Tablet 4 milliGRAM(s) Oral every 3 hours PRN  ivabradine 5 milliGRAM(s) Oral two times a day  melatonin 3 milliGRAM(s) Oral at bedtime PRN  metoprolol succinate ER 25 milliGRAM(s) Oral daily  multivitamin 1 Tablet(s) Oral daily  predniSONE   Tablet 5 milliGRAM(s) Oral daily  sacubitril 24 mG/valsartan 26 mG 1 Tablet(s) Oral two times a day  sodium bicarbonate 650 milliGRAM(s) Oral every 12 hours  Zytiga 1000 milliGRAM(s) 1000 milliGRAM(s) Oral daily

## 2021-12-15 NOTE — PROGRESS NOTE ADULT - SUBJECTIVE AND OBJECTIVE BOX
SAMANTHA CHARLES 71y Male  MRN#: [id number]   CODE STATUS:________    Hospital Day: 6d    Pt is currently admitted with the primary diagnosis of prostate cancer with likely metastasis    SUBJECTIVE  Hospital Course  -Pt admitted due to family not liking NH pt was discharged to 12/9/21. Pt had episode of lethargy overnight 12/12, found UA+ for bacteria, new opacifications on CXR (RUL/JESSICA/RLL), given lasix 40mg IV once + ciprofloxacin standing.  Overnight events   -Lethargic o/n with hydromorphone 4mg, will hold off on this for now. Febrile to 100.4, BCx sent, likely due to malignancy vs cystitis  Subjective complaints   -pt feels well this AM, tired but not lethargic.                                            ----------------------------------------------------------  OBJECTIVE  PAST MEDICAL & SURGICAL HISTORY  Systolic congestive heart failure, unspecified chronicity    Cerebrovascular accident (CVA) due to bilateral embolism of posterior cerebral arteries    Essential hypertension    Atrial fibrillation, unspecified type    AICD (automatic cardioverter/defibrillator) present                                              -----------------------------------------------------------  ALLERGIES:  cephalexin (Flushing)  penicillin (Rash)                                            ------------------------------------------------------------    HOME MEDICATIONS  Home Medications:  Corlanor 5 mg oral tablet: 1 tab(s) orally 2 times a day (with meals) (30 Nov 2021 17:21)  fentaNYL 12 mcg/hr transdermal film, extended release: 1 patch transdermal every 72 hours (08 Dec 2021 15:25)  gabapentin 300 mg oral capsule: 1 cap(s) orally every 8 hours (08 Dec 2021 15:25)  HYDROmorphone 2 mg oral tablet: 1 tab(s) orally every 3 hours, As needed, Severe Pain (7 - 10) (08 Dec 2021 15:25)  melatonin 3 mg oral tablet: 1 tab(s) orally once a day (at bedtime), As needed, Insomnia (08 Dec 2021 15:25)  Metoprolol Succinate ER 25 mg oral tablet, extended release: 1 tab(s) orally once a day (30 Nov 2021 17:20)  Multiple Vitamins oral tablet: 1 tab(s) orally once a day (08 Dec 2021 15:25)  predniSONE 5 mg oral tablet: 1 tab(s) orally once a day (08 Dec 2021 15:25)  sodium bicarbonate 650 mg oral tablet: 1 tab(s) orally every 12 hours (13 Dec 2021 15:30)                           MEDICATIONS:  STANDING MEDICATIONS  apixaban 5 milliGRAM(s) Oral every 12 hours  bicalutamide 50 milliGRAM(s) Oral daily  fentaNYL   Patch  12 MICROgram(s)/Hr 1 Patch Transdermal every 72 hours  gabapentin 400 milliGRAM(s) Oral every 8 hours  ivabradine 5 milliGRAM(s) Oral two times a day  metoprolol succinate ER 25 milliGRAM(s) Oral daily  multivitamin 1 Tablet(s) Oral daily  predniSONE   Tablet 5 milliGRAM(s) Oral daily  sacubitril 24 mG/valsartan 26 mG 1 Tablet(s) Oral two times a day  sodium bicarbonate 650 milliGRAM(s) Oral every 12 hours  trimethoprim  160 mG/sulfamethoxazole 800 mG 1 Tablet(s) Oral every 12 hours  Zytiga 1000 milliGRAM(s) 1000 milliGRAM(s) Oral daily    PRN MEDICATIONS  acetaminophen     Tablet .. 650 milliGRAM(s) Oral every 6 hours PRN  HYDROmorphone   Tablet 2 milliGRAM(s) Oral every 3 hours PRN  HYDROmorphone   Tablet 4 milliGRAM(s) Oral every 3 hours PRN  melatonin 3 milliGRAM(s) Oral at bedtime PRN                                            ------------------------------------------------------------  VITAL SIGNS: Last 24 Hours  T(C): 37.1 (15 Dec 2021 06:21), Max: 38 (15 Dec 2021 05:04)  T(F): 98.7 (15 Dec 2021 06:21), Max: 100.4 (15 Dec 2021 05:04)  HR: 90 (15 Dec 2021 05:04) (89 - 91)  BP: 119/58 (15 Dec 2021 05:04) (102/49 - 119/58)  BP(mean): 83 (15 Dec 2021 05:04) (71 - 83)  RR: 16 (15 Dec 2021 05:04) (16 - 19)  SpO2: 97% (14 Dec 2021 12:50) (97% - 97%)      12-14-21 @ 07:01  -  12-15-21 @ 07:00  --------------------------------------------------------  IN: 420 mL / OUT: 1275 mL / NET: -855 mL                                             --------------------------------------------------------------  LABS:                        8.1    3.37  )-----------( 147      ( 15 Dec 2021 07:00 )             26.0     12-15    145  |  106  |  32<H>  ----------------------------<  161<H>  4.4   |  19  |  0.9    Ca    7.7<L>      15 Dec 2021 07:00    TPro  5.2<L>  /  Alb  2.8<L>  /  TBili  0.8  /  DBili  x   /  AST  71<H>  /  ALT  33  /  AlkPhos  1449<H>  12-15                                                              -------------------------------------------------------------  RADIOLOGY:  CXR 12/15/21  Limited by obliquity.    Right lung opacity, unchanged.    Left-sided permanent pacemaker.    IVANA SALEH MD; Attending Radiologist  This document has been electronically signed. Dec 15 2021  6:40AM                                            --------------------------------------------------------------    PHYSICAL EXAM:  GENERAL: AAOx2. Cachectic, laying in bed appearing in no acute distress  HEENT: No FNDs, atraumatic, normocephalic, moist mucus membranes  LUNGS: Clear to auscultation bilaterally  HEART: S1/S2. No heaves or thrills  ABD: Soft, non-tender, non-distended. Bowel sounds present in all quadrants.  EXT/NEURO: 5/5 strength in all extremity joints. Sensation and ROM grossly intact.  SKIN: No breaks, erythema, edema                                             --------------------------------------------------------------

## 2021-12-15 NOTE — CONSULT NOTE ADULT - SUBJECTIVE AND OBJECTIVE BOX
Patient is a 71y old  Male who presents with a chief complaint of placement, pain (15 Dec 2021 10:38)    HPI:  70 yo M with PMH of CVA, afib on Eliquis, HTN, HFrEF ( EF 20-25% 2018), chronic LLE pain, presented to the ED from NH. Pt was discharged today from [facility] to [facility]. Family not happy with current NH and wants [facility]. Pending outpt MRI as could not be done inpt 2/2 inavailability of EP/[employer] Rep for AICDS.     Hospital Course:    Patient was admitted to medicine and underwent bone biopsy by IR. He was evaluated by urology and hematology-oncology and started on hormonal therapy, casodex, while inpatient. He had a bone scan which showed multiple metastatic lesions including in the spine. Palliative care saw the patient and he was given PO dilaudid, neurontin, and 1 dose of dexamethasone for the left lower extremity pain. He has anemia of chronic disease and received 4 units pRBCs during the admission. Patient was seen by physical therapy and by dietitian regarding failure to thrive. Spoke with sister regarding poor prognosis and patient will be going to SNF for further care. He will be receiving chemotherapy outpatient at [facility]. He can get MRI of lumbar and thoracic spine outpatient and that will determine treatment by radiation oncology.     In the ED, pt hemodynamically stable. Repeat Labs stable.    Pt is a limited historian. AX02 @ baseline    (09 Dec 2021 17:49)    PAST MEDICAL & SURGICAL HISTORY:  Systolic congestive heart failure, unspecified chronicity  Cerebrovascular accident (CVA) due to bilateral embolism of posterior cerebral arteries  Essential hypertension  Atrial fibrillation, unspecified type  AICD (automatic cardioverter/defibrillator) present    Allergies  cephalexin (Flushing)  penicillin (Rash)  Intolerances    MEDICATIONS  (STANDING):  apixaban 5 milliGRAM(s) Oral every 12 hours  bicalutamide 50 milliGRAM(s) Oral daily  fentaNYL   Patch  12 MICROgram(s)/Hr 1 Patch Transdermal every 72 hours  gabapentin 400 milliGRAM(s) Oral every 8 hours  ivabradine 5 milliGRAM(s) Oral two times a day  metoprolol succinate ER 25 milliGRAM(s) Oral daily  multivitamin 1 Tablet(s) Oral daily  predniSONE   Tablet 5 milliGRAM(s) Oral daily  sacubitril 24 mG/valsartan 26 mG 1 Tablet(s) Oral two times a day  sodium bicarbonate 650 milliGRAM(s) Oral every 12 hours  trimethoprim  160 mG/sulfamethoxazole 800 mG 1 Tablet(s) Oral every 12 hours  Zytiga 1000 milliGRAM(s) 1000 milliGRAM(s) Oral daily    MEDICATIONS  (PRN):  acetaminophen     Tablet .. 650 milliGRAM(s) Oral every 6 hours PRN Temp greater or equal to 38.5C (101.3F)  HYDROmorphone   Tablet 2 milliGRAM(s) Oral every 3 hours PRN Moderate Pain (4 - 6)  HYDROmorphone   Tablet 4 milliGRAM(s) Oral every 3 hours PRN Severe Pain (7 - 10)  melatonin 3 milliGRAM(s) Oral at bedtime PRN Insomnia      Cytopathology - Non Gyn Report:   ACCESSION No: [id number]   Patient: SAMANTHA CHARLES   Accession: [id number]   Collected Date/Time: 12/3/2021 14:30 EST   Received Date/Time: 12/3/2021 15:27 EST   Fine Needle Aspiration Report - Auth (Verified)   Specimen(s) Submitted   Left iliac bone   Final Diagnosis   LEFT ILIAC BONE MASS, CT-GUIDED NEEDLE BIOPSY:   - POSITIVE FOR MALIGNANT CELLS.   - Metastatic prostatic carcinoma (see comment).   Comment: The biopsies were suboptimal with severe crush or   sclerotic changes and tumor necrosis and scant viable tumor   cells. Immunohistochemical stains are positive for cam 5.2, PSAP,   negative for AE1/3, PSA. Case is discussed with Dr. Carias on 12/13/2021.   Together with clinical presentation, the findings support the diagnosis   of metastatic carcinoma of the prostate.   Screened by: Declan Erickson M.D.  Patient is a 71y old  Male who presents with a chief complaint of placement, pain (15 Dec 2021 10:38)    HPI:  70 yo M with PMH of CVA, afib on Eliquis, HTN, HFrEF ( EF 20-25% 2018), chronic LLE pain, presented to the ED from NH. Pt was discharged today from [facility] to [facility]. Family not happy with current NH and wants patient placed at [facility]. Pending outpt MRI as could not be done inpt 2/2 inavailability of EP/[employer] Rep for AICDS.     Hospital Course:    Patient was admitted to medicine and underwent bone biopsy by IR. He was evaluated by urology and hematology-oncology and started on hormonal therapy, casodex, while inpatient. He had a bone scan which showed multiple metastatic lesions including in the spine. Palliative care saw the patient and he was given PO dilaudid, neurontin, and 1 dose of dexamethasone for the left lower extremity pain. He has anemia of chronic disease and received 4 units pRBCs during the admission. Patient was seen by physical therapy and by dietitian regarding failure to thrive. Spoke with sister regarding poor prognosis and patient will be going to SNF for further care. He will be receiving chemotherapy outpatient at [facility]. He can get MRI of lumbar and thoracic spine outpatient and that will determine treatment by radiation oncology.     In the ED, pt hemodynamically stable. Repeat Labs stable.    Pt is a limited historian. AX02 @ baseline    (09 Dec 2021 17:49)    PAST MEDICAL & SURGICAL HISTORY:  Systolic congestive heart failure, unspecified chronicity  Cerebrovascular accident (CVA) due to bilateral embolism of posterior cerebral arteries  Essential hypertension  Atrial fibrillation, unspecified type  AICD (automatic cardioverter/defibrillator) present    Allergies  cephalexin (Flushing)  penicillin (Rash)  Intolerances    MEDICATIONS  (STANDING):  apixaban 5 milliGRAM(s) Oral every 12 hours  bicalutamide 50 milliGRAM(s) Oral daily  fentaNYL   Patch  12 MICROgram(s)/Hr 1 Patch Transdermal every 72 hours  gabapentin 400 milliGRAM(s) Oral every 8 hours  ivabradine 5 milliGRAM(s) Oral two times a day  metoprolol succinate ER 25 milliGRAM(s) Oral daily  multivitamin 1 Tablet(s) Oral daily  predniSONE   Tablet 5 milliGRAM(s) Oral daily  sacubitril 24 mG/valsartan 26 mG 1 Tablet(s) Oral two times a day  sodium bicarbonate 650 milliGRAM(s) Oral every 12 hours  trimethoprim  160 mG/sulfamethoxazole 800 mG 1 Tablet(s) Oral every 12 hours  Zytiga 1000 milliGRAM(s) 1000 milliGRAM(s) Oral daily    MEDICATIONS  (PRN):  acetaminophen     Tablet .. 650 milliGRAM(s) Oral every 6 hours PRN Temp greater or equal to 38.5C (101.3F)  HYDROmorphone   Tablet 2 milliGRAM(s) Oral every 3 hours PRN Moderate Pain (4 - 6)  HYDROmorphone   Tablet 4 milliGRAM(s) Oral every 3 hours PRN Severe Pain (7 - 10)  melatonin 3 milliGRAM(s) Oral at bedtime PRN Insomnia    < from: NM SPECT Bone Scan, Single Area Single Day (12.03.21 @ 17:22) >  Impression:    Abnormal bone scan. Diffusely increased bone agent uptake throughout the skeleton consistent with diffuse metastatic bone disease. Sites include calvarium, thoracic and lumbar spine, bilateral iliac bones, bilateral femurs, bilateral humeri, bilateral scapula and innumerable bilateral ribs.    Nonvisualization of the kidneys probably explain lobe right intense uptake by bone metastasis ("absent kidney sign" - superscan).    < end of copied text >    < from: CT Abdomen and Pelvis w/ IV Cont (11.30.21 @ 13:49) >  BONES/SOFT TISSUES: Innumerable diffuse sclerotic osseous lesions are noted. For example:  -A 6.0 x 5.8 x 9.4 cm mass involving the left iliac wing with associated extraosseous soft tissue extension  -A 6.6 x 4.5 x 4.6 and meter mass involves the left inferior pubic ramus, with associated extraosseous soft tissue extension.  -Expansile sclerotic lesions are noted to involve the right 2nd lateral rib and right 5th anterior rib.    OTHER: Atherosclerotic vascular calcification.  IMPRESSION:  1. Innumerable diffuse sclerotic osseous lesions, as detailed above, compatible with osseous metastases.  2. Enlarged irregularly marginated prostate gland, possibly reflecting underlying neoplastic process.    < end of copied text >    < from: IR CT Procedure (12.03.21 @ 14:38) >  IMPRESSION:  1.  Left iliac wing biopsied using a 17/18 G, 14.6/20 cm CorVocet coaxial   needle system with CT-guidance.  Specimens deemed adequate by   cytopathologist, Dr. Erickson.  2.  Diffuse sclerotic bone lesions, as previously described, concerning   for metastatic disease.  3.  Partially imaged newsmall andry-hepatic ascitic collection.    < end of copied text >    Cytopathology - Non Gyn Report:   ACCESSION No: [id number]   Patient: SAMANTHA CHARLES   Accession: [id number]   Collected Date/Time: 12/3/2021 14:30 EST   Received Date/Time: 12/3/2021 15:27 EST   Fine Needle Aspiration Report - Auth (Verified)   Specimen(s) Submitted   Left iliac bone   Final Diagnosis   LEFT ILIAC BONE MASS, CT-GUIDED NEEDLE BIOPSY:   - POSITIVE FOR MALIGNANT CELLS.   - Metastatic prostatic carcinoma (see comment).   Comment: The biopsies were suboptimal with severe crush or   sclerotic changes and tumor necrosis and scant viable tumor   cells. Immunohistochemical stains are positive for cam 5.2, PSAP,   negative for AE1/3, PSA. Case is discussed with Dr. Carias on 12/13/2021.   Together with clinical presentation, the findings support the diagnosis   of metastatic carcinoma of the prostate.   Screened by: Declan Erickson M.D.

## 2021-12-15 NOTE — PHYSICAL THERAPY INITIAL EVALUATION ADULT - SPECIFY REASON(S)
per morning rounds, hold PT due to pt is febrile. PT will f/u as appropriate. per Dr. Medellin in morning rounds, hold PT due to pt has been febrile. PT will f/u as appropriate.

## 2021-12-15 NOTE — PROGRESS NOTE ADULT - ASSESSMENT
72 yo M with PMH of CVA, afib on Eliquis, HTN, HFrEF (EF 20-25% 2018), chronic LLE pain, presented to the ED with worsening Left LE pain for the past 2 months, worsening fatigue, anorexia and significant weight loss. Of note, pt had previously been worked up for the leg pain including a CT scan in march 2021 in [facility] which was negative.    # LIMIT LABS    # PMD Dr Gary Bernard [phone]: I updated him on 12/13    # 1-time fever to 100.4F  could literally be from anything, even tumor fever  no rise in WBC  if temp > 100.5 again -> then send BCx x1 and UCx x1  1st U/A was pos, but the 2nd U/A was neg  abx: Bactrim DS 1 po q12 for 5 days (12/15-12/20)    # Neopl-related severe pain 2/2 diffuse bony sclerotic lesions (AP elevated) likely 2/2 prostate cancer (irregularly marginated prostate gland on CT and PSA 1400 and confirmed by bx)  CT chest/abdomen/pelvis - Innumerable diffuse sclerotic osseous lesions -  including 6x5.8x9.4cm mass involving the left iliac wing and 6.6x4.5x4.6cm mass involving left inferior pubic ramus. Expansile sclerotic lesions are noted to involve the right 2nd lateral rib and right 5th anterior rib.  IR s/p bx lt iliac lesion 12/3: path (Dr Erickson x [phone]): confirmed it is prostate cancer; see cyto results   bone scan -> diff bone mets, including vert (kidneys not seen on bone scan b/o such intense uptake by diff bone mets)  rad/onc eval can be done as outpt  h/o (Dr Mena) felt MRIs were not needed (and hard for pt to do) -> so MRIs cancelled  h/o beginning tx:  AP is improving a lot  Casodex 50mg po daily til 12/17  Lupron 22.5 mg IM x1 on 12/8 (done) - will be monthly  Abiraterone 1000 mg po daily and Prednisone 5 mg po daily  h/o will start Xgeva as outpatient to reduce bone related complications  c/w dilaudid 2mg po q3 prn mod pain  c/w dilaudid 4mg po q3 prn sev pain  c/w fentanyl patch 12 mcg/hr top q72 for longer-acting relief  c/w neurontin 400mg po q8  bowel reg  cbc todd    # metab acid w/ high gap  improving  doubt starvation ketosis as u/a shows NO ketones and pt does eat  lactate was nl recently - and currently 1.7  could be result of high tumor burden, which is poor prog sign)  Cr (baseline ~ 0.8)  IVFs: off  c/w oral NaHCO3 650mg po q12  BMP todd    # hypernatremia  oral hydration  decr NaHCO3    # Failure to thrive - is eating a little w/ asst (not great)  MVI q24  Ensure 3x/day  asst w/ feeds (family or staff)    # Symptomatic normo to macro anemia (mild pancytopenia)  no overt bleed  prob related to malignancy and diffuse bone mets  s/p PRBC transfusions -> hgb better  keep hgb > 8  LDH elevated  Iron panel: iron sat 32%; ferr: 2624 = NOT SELENA  Folate nl; B12: 1540    # h/o HTN; h/o CVA, Afib; H/o HFrEF (EF 20-25% in 2018); mild hyperkalemia  d/c aldactone 25mg po q24 - K is rising and BP on lower side  c/w Eliquis 5mg po q12   c/w Entresto 24/26 po q12   c/w metoprolol ER 25mg po q24   c/w Ivabradine 5mg po q12  if BP starts to get low again, will need to hold some of the above meds again    # Activity: PT eval for eval for STR - for SNF    # GI PPX: NA    # DVT PPX: eliquis     # Full code - if proceeding more towards hospice, then will talk w/ family about DNR and DNI options    # updated sister today; she is still interested in a NH for STR (but NOT [facility])    Dispo: start abx; tx pain; c/w NaHCO3; f/u h/o;   current plan is to go to SNF for STR - family did not like [facility], f/u CM for alternate facility (even [place])    Prog is very poor for long term. If pt expires in hospital, please note he is Zoroastrian and his body should NOT be touched until family is called and his Imam is called.

## 2021-12-15 NOTE — PROGRESS NOTE ADULT - ASSESSMENT
ASSESSMENT & PLAN  70 yo M with PMH of CVA, afib on Eliquis, HTN, HFrEF (EF 20-25% 2018), chronic LLE pain, presented to the ED with worsening Left LE pain for the past 2 months, worsening fatigue, anorexia and significant weight loss. Of note, pt had previously been worked up for the leg pain including a CT scan in march 2021 in [facility] which was negative.    # Neoplasm-related severe pain 2/2 diffuse bony sclerotic lesions (AP 2765) likely 2/2 prostate cancer (irregularly marginated prostate gland on CT and PSA 1400)  -CT chest/abdomen/pelvis - Innumerable diffuse sclerotic osseous lesions -  including 6x5.8x9.4cm mass L iliac wing and 6.6x4.5x4.6cm mass of left inferior pubic ramus. Expansile sclerotic lesions of R 2nd lateral rib, R 5th anterior rib.  -bone scan -> diff bone mets, including vert (kidneys not seen b/o such intense uptake by diff bone mets)  -s/p IR bx lt iliac lesion 12/3: metastatic prostate cancer  -s/p Hem Onc eval and Pall Care eval   -Casodex 50mg po daily x2wk total (stop 12/17/21)  -Lupron 22.5 mg IM q1mo (last 12/8/21)  -Abiraterone 1000 mg po daily and Prednisone 5 mg po daily  -Heme/Onc planned to  start Xgeva as outpatient to reduce bone related complications  -c/w dilaudid 2mg po q3 prn pain  -cont fentanyl patch 12 mcg/hr top q72 for longer-acting relief  -cont neurontin 300mg po q8  -low grade fever 12/15- malignancy related vs cystitis, will cover with bactrim x10d  -bowel reg    # Failure to thrive  -dietician: severe protein-calorie malnutrition  -MVI q24  -Beneprotein 3x/day     # Symptomatic normo to macro anemia (mild pancytopenia)  -no overt bleed, s/p pRBCs last admission  -prob related to malignancy and diffuse bone mets  -, Folate nl, B12 1540  -Iron panel: iron sat 32%; ferr: 2624 = NOT SELENA  -keep hgb > 8    # FELICITY - likely dehydration (resolved); metab acid w/ high gap (prob result of high tumor burden, which is poor prog sign)  -Cr (baseline ~ 0.8)  -Entresto held- may restart after gado given for MRI, if BP will allow  -c/w NaHCO3 1300mg po q12 til HCO3 > 22  -BMP q24    # h/o HTN  #h/o CVA  #Afib  #H/o HFrEF (EF 20-25% in 2018)  -c/w Eliquis 5mg po q12, metoprolol  -c/w aldactone, entresto, ivabradine    # Activity  -Amb as tolerated    # GI PPX  -NA    # DVT PPX  -Eliquis    # Full code  Dispo- Placement, f/u case management

## 2021-12-16 LAB
ALBUMIN SERPL ELPH-MCNC: 2.6 G/DL — LOW (ref 3.5–5.2)
ALP SERPL-CCNC: 1178 U/L — HIGH (ref 30–115)
ALT FLD-CCNC: 26 U/L — SIGNIFICANT CHANGE UP (ref 0–41)
ANION GAP SERPL CALC-SCNC: 18 MMOL/L — HIGH (ref 7–14)
AST SERPL-CCNC: 43 U/L — HIGH (ref 0–41)
BASOPHILS # BLD AUTO: 0.02 K/UL — SIGNIFICANT CHANGE UP (ref 0–0.2)
BASOPHILS NFR BLD AUTO: 0.5 % — SIGNIFICANT CHANGE UP (ref 0–1)
BILIRUB SERPL-MCNC: 0.6 MG/DL — SIGNIFICANT CHANGE UP (ref 0.2–1.2)
BLD GP AB SCN SERPL QL: SIGNIFICANT CHANGE UP
BUN SERPL-MCNC: 21 MG/DL — HIGH (ref 10–20)
CALCIUM SERPL-MCNC: 7.5 MG/DL — LOW (ref 8.5–10.1)
CHLORIDE SERPL-SCNC: 109 MMOL/L — SIGNIFICANT CHANGE UP (ref 98–110)
CO2 SERPL-SCNC: 21 MMOL/L — SIGNIFICANT CHANGE UP (ref 17–32)
CREAT SERPL-MCNC: 0.7 MG/DL — SIGNIFICANT CHANGE UP (ref 0.7–1.5)
EOSINOPHIL # BLD AUTO: 0.05 K/UL — SIGNIFICANT CHANGE UP (ref 0–0.7)
EOSINOPHIL NFR BLD AUTO: 1.2 % — SIGNIFICANT CHANGE UP (ref 0–8)
GLUCOSE SERPL-MCNC: 110 MG/DL — HIGH (ref 70–99)
GRAM STN FLD: SIGNIFICANT CHANGE UP
GRAM STN FLD: SIGNIFICANT CHANGE UP
HCT VFR BLD CALC: 26.5 % — LOW (ref 42–52)
HGB BLD-MCNC: 8.4 G/DL — LOW (ref 14–18)
IMM GRANULOCYTES NFR BLD AUTO: 4 % — HIGH (ref 0.1–0.3)
LYMPHOCYTES # BLD AUTO: 0.91 K/UL — LOW (ref 1.2–3.4)
LYMPHOCYTES # BLD AUTO: 21.3 % — SIGNIFICANT CHANGE UP (ref 20.5–51.1)
MAGNESIUM SERPL-MCNC: 2.3 MG/DL — SIGNIFICANT CHANGE UP (ref 1.8–2.4)
MCHC RBC-ENTMCNC: 30.7 PG — SIGNIFICANT CHANGE UP (ref 27–31)
MCHC RBC-ENTMCNC: 31.7 G/DL — LOW (ref 32–37)
MCV RBC AUTO: 96.7 FL — HIGH (ref 80–94)
METHOD TYPE: SIGNIFICANT CHANGE UP
MONOCYTES # BLD AUTO: 0.14 K/UL — SIGNIFICANT CHANGE UP (ref 0.1–0.6)
MONOCYTES NFR BLD AUTO: 3.3 % — SIGNIFICANT CHANGE UP (ref 1.7–9.3)
MSSA DNA SPEC QL NAA+PROBE: SIGNIFICANT CHANGE UP
NEUTROPHILS # BLD AUTO: 2.98 K/UL — SIGNIFICANT CHANGE UP (ref 1.4–6.5)
NEUTROPHILS NFR BLD AUTO: 69.7 % — SIGNIFICANT CHANGE UP (ref 42.2–75.2)
NRBC # BLD: 2 /100 WBCS — HIGH (ref 0–0)
PLATELET # BLD AUTO: 143 K/UL — SIGNIFICANT CHANGE UP (ref 130–400)
POTASSIUM SERPL-MCNC: 4 MMOL/L — SIGNIFICANT CHANGE UP (ref 3.5–5)
POTASSIUM SERPL-SCNC: 4 MMOL/L — SIGNIFICANT CHANGE UP (ref 3.5–5)
PROT SERPL-MCNC: 4.8 G/DL — LOW (ref 6–8)
RBC # BLD: 2.74 M/UL — LOW (ref 4.7–6.1)
RBC # FLD: 15.6 % — HIGH (ref 11.5–14.5)
SODIUM SERPL-SCNC: 148 MMOL/L — HIGH (ref 135–146)
SPECIMEN SOURCE: SIGNIFICANT CHANGE UP
WBC # BLD: 4.27 K/UL — LOW (ref 4.8–10.8)
WBC # FLD AUTO: 4.27 K/UL — LOW (ref 4.8–10.8)

## 2021-12-16 PROCEDURE — 99233 SBSQ HOSP IP/OBS HIGH 50: CPT

## 2021-12-16 RX ORDER — VANCOMYCIN HCL 1 G
VIAL (EA) INTRAVENOUS
Refills: 0 | Status: DISCONTINUED | OUTPATIENT
Start: 2021-12-16 | End: 2021-12-18

## 2021-12-16 RX ORDER — SODIUM CHLORIDE 9 MG/ML
1000 INJECTION, SOLUTION INTRAVENOUS
Refills: 0 | Status: DISCONTINUED | OUTPATIENT
Start: 2021-12-16 | End: 2021-12-17

## 2021-12-16 RX ORDER — VANCOMYCIN HCL 1 G
750 VIAL (EA) INTRAVENOUS ONCE
Refills: 0 | Status: COMPLETED | OUTPATIENT
Start: 2021-12-16 | End: 2021-12-16

## 2021-12-16 RX ORDER — VANCOMYCIN HCL 1 G
750 VIAL (EA) INTRAVENOUS EVERY 12 HOURS
Refills: 0 | Status: DISCONTINUED | OUTPATIENT
Start: 2021-12-16 | End: 2021-12-18

## 2021-12-16 RX ADMIN — GABAPENTIN 400 MILLIGRAM(S): 400 CAPSULE ORAL at 15:32

## 2021-12-16 RX ADMIN — IVABRADINE 5 MILLIGRAM(S): 7.5 TABLET, FILM COATED ORAL at 05:57

## 2021-12-16 RX ADMIN — Medication 1 TABLET(S): at 05:51

## 2021-12-16 RX ADMIN — BICALUTAMIDE 50 MILLIGRAM(S): 50 TABLET, FILM COATED ORAL at 12:09

## 2021-12-16 RX ADMIN — SACUBITRIL AND VALSARTAN 1 TABLET(S): 24; 26 TABLET, FILM COATED ORAL at 05:51

## 2021-12-16 RX ADMIN — FENTANYL CITRATE 1 PATCH: 50 INJECTION INTRAVENOUS at 06:05

## 2021-12-16 RX ADMIN — Medication 250 MILLIGRAM(S): at 12:09

## 2021-12-16 RX ADMIN — Medication 3 MILLIGRAM(S): at 22:44

## 2021-12-16 RX ADMIN — Medication 1 TABLET(S): at 12:09

## 2021-12-16 RX ADMIN — HYDROMORPHONE HYDROCHLORIDE 4 MILLIGRAM(S): 2 INJECTION INTRAMUSCULAR; INTRAVENOUS; SUBCUTANEOUS at 17:00

## 2021-12-16 RX ADMIN — Medication 650 MILLIGRAM(S): at 05:52

## 2021-12-16 RX ADMIN — HYDROMORPHONE HYDROCHLORIDE 4 MILLIGRAM(S): 2 INJECTION INTRAMUSCULAR; INTRAVENOUS; SUBCUTANEOUS at 16:27

## 2021-12-16 RX ADMIN — HYDROMORPHONE HYDROCHLORIDE 2 MILLIGRAM(S): 2 INJECTION INTRAMUSCULAR; INTRAVENOUS; SUBCUTANEOUS at 23:02

## 2021-12-16 RX ADMIN — FENTANYL CITRATE 1 PATCH: 50 INJECTION INTRAVENOUS at 19:40

## 2021-12-16 RX ADMIN — Medication 250 MILLIGRAM(S): at 18:20

## 2021-12-16 RX ADMIN — HYDROMORPHONE HYDROCHLORIDE 2 MILLIGRAM(S): 2 INJECTION INTRAMUSCULAR; INTRAVENOUS; SUBCUTANEOUS at 22:43

## 2021-12-16 RX ADMIN — Medication 25 MILLIGRAM(S): at 05:52

## 2021-12-16 RX ADMIN — Medication 5 MILLIGRAM(S): at 05:49

## 2021-12-16 RX ADMIN — GABAPENTIN 400 MILLIGRAM(S): 400 CAPSULE ORAL at 22:39

## 2021-12-16 RX ADMIN — APIXABAN 5 MILLIGRAM(S): 2.5 TABLET, FILM COATED ORAL at 05:57

## 2021-12-16 RX ADMIN — SODIUM CHLORIDE 60 MILLILITER(S): 9 INJECTION, SOLUTION INTRAVENOUS at 18:23

## 2021-12-16 RX ADMIN — HYDROMORPHONE HYDROCHLORIDE 2 MILLIGRAM(S): 2 INJECTION INTRAMUSCULAR; INTRAVENOUS; SUBCUTANEOUS at 06:37

## 2021-12-16 RX ADMIN — Medication 650 MILLIGRAM(S): at 18:18

## 2021-12-16 RX ADMIN — HYDROMORPHONE HYDROCHLORIDE 2 MILLIGRAM(S): 2 INJECTION INTRAMUSCULAR; INTRAVENOUS; SUBCUTANEOUS at 05:59

## 2021-12-16 RX ADMIN — Medication 1 TABLET(S): at 18:18

## 2021-12-16 RX ADMIN — SODIUM CHLORIDE 60 MILLILITER(S): 9 INJECTION, SOLUTION INTRAVENOUS at 23:03

## 2021-12-16 RX ADMIN — GABAPENTIN 400 MILLIGRAM(S): 400 CAPSULE ORAL at 05:50

## 2021-12-16 RX ADMIN — SACUBITRIL AND VALSARTAN 1 TABLET(S): 24; 26 TABLET, FILM COATED ORAL at 18:18

## 2021-12-16 RX ADMIN — IVABRADINE 5 MILLIGRAM(S): 7.5 TABLET, FILM COATED ORAL at 18:18

## 2021-12-16 NOTE — PROGRESS NOTE ADULT - ASSESSMENT
ASSESSMENT & PLAN  72 yo M with PMH of CVA, afib on Eliquis, HTN, HFrEF (EF 20-25% 2018), chronic LLE pain, presented to the ED with worsening Left LE pain for the past 2 months, worsening fatigue, anorexia and significant weight loss. Of note, pt had previously been worked up for the leg pain including a CT scan in march 2021 in [facility], which was negative.    # Neoplasm-related severe pain 2/2 diffuse bony sclerotic lesions (AP 2765) likely 2/2 prostate cancer (irregularly marginated prostate gland on CT and PSA 1400)  -CT chest/abdomen/pelvis - Innumerable diffuse sclerotic osseous lesions -  including 6x5.8x9.4cm mass L iliac wing and 6.6x4.5x4.6cm mass of left inferior pubic ramus. Expansile sclerotic lesions of R 2nd lateral rib, R 5th anterior rib.  -bone scan -> diff bone mets, including vert (kidneys not seen b/o such intense uptake by diff bone mets)  -s/p IR bx lt iliac lesion 12/3: metastatic prostate cancer  -s/p Hem Onc eval and Pall Care eval   -Casodex 50mg po daily x2wk total (stop 12/17/21)  -Lupron 22.5 mg IM q1mo (last 12/8/21)  -Abiraterone 1000 mg po daily and Prednisone 5 mg po daily  -Heme/Onc planned to  start Xgeva as outpatient to reduce bone related complications  -c/w dilaudid 2mg po q3 prn pain  -cont fentanyl patch 12 mcg/hr top q72 for longer-acting relief  -cont neurontin 300mg po q8  -bowel reg    #MSSA bacteremia  -low grade fever 12/15- UA initially positive, CXR showed unchanged RUL opacity.  -BCx (12/15)- MSSA  -c/w Bactrim x10d (12/15/21)  -start vancomyin 750 q12  -f/u TTE to r/o endocarditis    # Failure to thrive  -dietician: severe protein-calorie malnutrition  -MVI q24  -Beneprotein 3x/day     # Symptomatic normo to macro anemia (mild pancytopenia)  -no overt bleed, s/p pRBCs last admission  -prob related to malignancy and diffuse bone mets  -, Folate nl, B12 1540  -Iron panel: iron sat 32%; ferr: 2624 = NOT SELENA  -keep hgb > 8    # FELICITY - likely dehydration (resolved); metab acid w/ high gap (prob result of high tumor burden, which is poor prog sign)  -Cr (baseline ~ 0.8)  -Entresto held- may restart after gado given for MRI, if BP will allow  -c/w NaHCO3 1300mg po q12 til HCO3 > 22    # h/o HTN  #h/o CVA  #Afib  #H/o HFrEF (EF 20-25% in 2018)  -c/w Eliquis 5mg po q12, metoprolol  -c/w aldactone, entresto, ivabradine    # Activity  -Amb as tolerated    # GI PPX  -NA    # DVT PPX  -Eliquis    # Full code  Dispo- MSSA work up, placement

## 2021-12-16 NOTE — PROGRESS NOTE ADULT - SUBJECTIVE AND OBJECTIVE BOX
SAMANTHA CHARLES  71y  Male  ***My note supersedes ALL resident notes that I sign.  My corrections for their notes are in my note.***    I can be reached directly on [employer] [id number]. My office number is [phone]. My personal cell number is [phone].    INTERVAL EVENTS: Here for f/u of cancer. Pt cont to do poorly. He is resting comfortably now. Bld cx are +.    T(F): 98.9 (21 @ 04:10), Max: 99.3 (12-15-21 @ 21:10)  HR: 93 (21 @ 04:10) (80 - 93)  BP: 101/57 (21 @ 04:10) (88/51 - 129/66)  RR: 18 (21 @ 04:10) (16 - 20)  SpO2: --    Gen: comfortable; sleeping    Neck: no nodes, no JVD  chest: left AICD  lungs: clr  hrt: s1 s2 rrr no murmur  abd: soft, NT/ND  : + condom cath - urine dark yellow, clear  ext: no edema, no c/c  neuro: sleeping    LABS:                      8.4     (    96.7   4.27  )-----------( ---------      143      ( 16 Dec 2021 04:30 )             26.5    (    15.6     148   (   109   (   110      21 @ 04:30  ----------------------               4.0   (   21   (   21                             -----                        0.7    Ca  7.5 = martín = 8.7  Mg  2.3    P   --     LFT  4.8  (  0.6  (  43       21 @ 04:30  -------------------------  2.6  (  1178  (  26    Urinalysis Basic - ( 15 Dec 2021 10:35 )    Color: Yellow / Appearance: Clear / S.026 / pH: x  Gluc: x / Ketone: Negative  / Bili: Negative / Urobili: 3 mg/dL   Blood: x / Protein: 100 mg/dL / Nitrite: Negative   Leuk Esterase: Negative / RBC: 3 /HPF / WBC 1 /HPF   Sq Epi: x / Non Sq Epi: 1 /HPF / Bacteria: Negative    Culture - Blood (collected 12-15-21 @ 07:00)  Source: .Blood Blood  Gram Stain (21 @ 05:12):    Growth in aerobic bottle: Gram Positive Cocci in Clusters  Preliminary Report (21 @ 05:13):    Growth in aerobic bottle: Gram Positive Cocci in Clusters    ***Blood Panel PCR results on this specimen are available    approximately 3 hours after the Gram stain result.***    Gram stain, PCR, and/or culture results may not always    correspond due to difference in methodologies.    ************************************************************    This PCR assay was performed by multiplex PCR. This    Assay tests for 66 bacterial and resistance gene targets.    Please refer to the [facility] test directory    at [email or web address] for details.  Organism: Blood Culture PCR (21 @ 07:23)  Organism: Blood Culture PCR (21 @ 07:23)      -  Staphylococcus aureus: Detec Any isolate of Staphylococcus aureus from a blood culture is NOT considered a contaminant.      Method Type: PCR    RADIOLOGY & ADDITIONAL TESTS:    MEDICATIONS:  trimethoprim  160 mG/sulfamethoxazole 800 mG 1 Tablet(s) Oral every 12 hours  vancomycin  IVPB 750 milliGRAM(s) IV Intermittent once  vancomycin  IVPB 750 milliGRAM(s) IV Intermittent every 12 hours  vancomycin  IVPB        acetaminophen     Tablet .. 650 milliGRAM(s) Oral every 6 hours PRN  bicalutamide 50 milliGRAM(s) Oral daily  fentaNYL   Patch  12 MICROgram(s)/Hr 1 Patch Transdermal every 72 hours  gabapentin 400 milliGRAM(s) Oral every 8 hours  HYDROmorphone   Tablet 2 milliGRAM(s) Oral every 3 hours PRN  HYDROmorphone   Tablet 4 milliGRAM(s) Oral every 3 hours PRN  ivabradine 5 milliGRAM(s) Oral two times a day  melatonin 3 milliGRAM(s) Oral at bedtime PRN  metoprolol succinate ER 25 milliGRAM(s) Oral daily  multivitamin 1 Tablet(s) Oral daily  predniSONE   Tablet 5 milliGRAM(s) Oral daily  sacubitril 24 mG/valsartan 26 mG 1 Tablet(s) Oral two times a day  sodium bicarbonate 650 milliGRAM(s) Oral every 12 hours  Zytiga 1000 milliGRAM(s) 1000 milliGRAM(s) Oral daily

## 2021-12-16 NOTE — PROGRESS NOTE ADULT - ASSESSMENT
72 yo M with PMH of CVA, afib on Eliquis, HTN, HFrEF (EF 20-25% 2018), chronic LLE pain, presented to the ED with worsening Left LE pain for the past 2 months, worsening fatigue, anorexia and significant weight loss. Of note, pt had previously been worked up for the leg pain including a CT scan in march 2021 in [facility], [place], which was negative.    # PMD Dr Gary Bernard [phone]: I updated him on 12/13    # 1-time fever to 100.4F; lethargy 2/2 MSSA bacteremia; NO SEPSIS  source: possibly skin in pt w/ active cancer  no rise in WBC  1st U/A was pos, but the 2nd U/A was neg  f/u UCX  ALL: PCN, Cephalo  abx: Bactrim DS 1 po q12 for 5 days (12/15-12/20) (for urine)  abx: Vanco 750mg iv q12 (for bacteremia)  ID eval - will determine duration of tx w/ ID and need for PICC vs midline next week  hold a/c for eventual PICC or midline (see below)  echo (TTE) - if neg, would prefer to NOT pursue FREDERICK in this sick pt (aware pt has AICD)  after 48 hrs (start Saturday): do bld cx q24 x3 to document cure of bld stream    # Neopl-related severe pain 2/2 diffuse bony sclerotic lesions (AP elevated, now improving) likely 2/2 prostate cancer (irregularly marginated prostate gland on CT and PSA 1400 and confirmed by bx)  CT chest/abdomen/pelvis - Innumerable diffuse sclerotic osseous lesions -  including 6x5.8x9.4cm mass involving the left iliac wing and 6.6x4.5x4.6cm mass involving left inferior pubic ramus. Expansile sclerotic lesions are noted to involve the right 2nd lateral rib and right 5th anterior rib.  IR s/p bx lt iliac lesion 12/3: path (Dr Erickson x [phone]): confirmed it is prostate cancer; see cyto results   bone scan -> diff bone mets, including vert (kidneys not seen on bone scan b/o such intense uptake by diff bone mets)  rad/onc eval can be done as outpt  h/o (Dr Mena) felt MRIs were not needed (and hard for pt to do) -> so MRIs cancelled  h/o beginning tx:  AP is improving a lot  Casodex 50mg po daily til 12/17  Lupron 22.5 mg IM x1 on 12/8 (done) - will be monthly  Abiraterone 1000 mg po daily and Prednisone 5 mg po daily  h/o will start Xgeva as outpatient to reduce bone related complications  c/w dilaudid 2mg po q3 prn mod pain  c/w dilaudid 4mg po q3 prn sev pain  c/w fentanyl patch 12 mcg/hr top q72 for longer-acting relief  c/w neurontin 400mg po q8  bowel reg    # metab acid w/ high gap  improving  doubt starvation ketosis as u/a shows NO ketones and pt does eat  lactate was nl recently - and currently 1.7  could be result of high tumor burden, which is poor prog sign  Cr (baseline ~ 0.8)  IVFs: D5 60/hr  c/w oral NaHCO3 650mg po q12  BMP todd    # hypernatremia  IVFs: D5 60/hr  decr NaHCO3    # Failure to thrive - is eating a little w/ asst (not great)  MVI q24  Ensure 3x/day  asst w/ feeds (family or staff)  no PEG or NGT    # Symptomatic normo to macro anemia (mild pancytopenia)  no overt bleed  prob related to malignancy and diffuse bone mets  s/p PRBC transfusions -> hgb better  keep hgb > 8  LDH elevated  Iron panel: iron sat 32%; ferr: 2624 = NOT SELENA  Folate nl; B12: 1540    # h/o HTN; h/o CVA, Afib; H/o HFrEF (EF 20-25% in 2018); mild hyperkalemia  d/c aldactone 25mg po q24 - K is rising and BP on lower side  c/w Eliquis 5mg po q12 - hold in prep for midline or PICC line  tomorrow, start lovenox 50mg sc q12 (will go home on DOAC though)  c/w Entresto 24/26 po q12   c/w metoprolol ER 25mg po q24   c/w Ivabradine 5mg po q12  BP is starting to get low again, might need to hold entresto again    # prior DVTs from 2018  < from: [facility] Duplex Lower Ext Vein Scan, Bilat (08.17.18 @ 20:56) >  Acute-appearing deep venous thrombosis of the right common femoral, femoral, popliteal, gastrocnemius, and peroneal veins.  Thrombus is present in a left posterior tibial (calf) vein branch.  no need to rpt V Dupl  pt on a/c for afib (see above)    # Activity: pt is basically bedbound; sister does not want to pursue PT again (pt has too much pain); sister will take him home    # GI PPX: NA    # DVT PPX: see Afib above; hold eliquis; todd start therap LMWH (see above)    # Full code - since heading more towards a pall care route (though w/ anticancer tx), will talk w/ family about DNR and DNI options    # updated sister today; she does NOT want SNF for STR (to hard for pt); she will take pt home; needs hosp bed; will need HC for iv abx    Dispo: start vanco and c/w bactrim; ID eval; TTE; IVFs; tx pain; c/w NaHCO3; f/u h/o; hold Eliquis (LMWH todd);   current plan is to go home w/ HC     Prog is very poor overall. Sister is very well aware. If pt expires in hospital, please note he is [employer] and his body should NOT be touched until family is called and his Imam is called.

## 2021-12-16 NOTE — PROGRESS NOTE ADULT - SUBJECTIVE AND OBJECTIVE BOX
SAMANTHA CHARLES 71y Male  MRN#: [id number]   CODE STATUS:________    Hospital Day: 7d    Pt is currently admitted with the primary diagnosis of prostate cancer with likely metastasis    SUBJECTIVE  Hospital Course  -Pt admitted due to family not liking NH pt was discharged to 21. Pt had episode of lethargy overnight , found UA+ for bacteria, new opacifications on CXR (RUL/JESSICA/RLL), given lasix 40mg IV once + ciprofloxacin standing. BCx now showing S. aureus (not MRSA). Pt on bactrim for possible cystitis.  Overnight events   -no major overnight events per pt or staff  Subjective complaints   -pt feels well this AM, tired but not lethargic, having continued pain but controlled with pain regimen                                              ----------------------------------------------------------  OBJECTIVE  PAST MEDICAL & SURGICAL HISTORY  Systolic congestive heart failure, unspecified chronicity    Cerebrovascular accident (CVA) due to bilateral embolism of posterior cerebral arteries    Essential hypertension    Atrial fibrillation, unspecified type    AICD (automatic cardioverter/defibrillator) present                                              -----------------------------------------------------------  ALLERGIES:  cephalexin (Flushing)  penicillin (Rash)                                            ------------------------------------------------------------    HOME MEDICATIONS  Home Medications:  Corlanor 5 mg oral tablet: 1 tab(s) orally 2 times a day (with meals) (2021 17:21)  fentaNYL 12 mcg/hr transdermal film, extended release: 1 patch transdermal every 72 hours (08 Dec 2021 15:25)  gabapentin 300 mg oral capsule: 1 cap(s) orally every 8 hours (08 Dec 2021 15:25)  HYDROmorphone 2 mg oral tablet: 1 tab(s) orally every 3 hours, As needed, Severe Pain (7 - 10) (08 Dec 2021 15:25)  melatonin 3 mg oral tablet: 1 tab(s) orally once a day (at bedtime), As needed, Insomnia (08 Dec 2021 15:25)  Metoprolol Succinate ER 25 mg oral tablet, extended release: 1 tab(s) orally once a day (2021 17:20)  Multiple Vitamins oral tablet: 1 tab(s) orally once a day (08 Dec 2021 15:25)  predniSONE 5 mg oral tablet: 1 tab(s) orally once a day (08 Dec 2021 15:25)  sodium bicarbonate 650 mg oral tablet: 1 tab(s) orally every 12 hours (13 Dec 2021 15:30)                           MEDICATIONS:  STANDING MEDICATIONS  apixaban 5 milliGRAM(s) Oral every 12 hours  bicalutamide 50 milliGRAM(s) Oral daily  fentaNYL   Patch  12 MICROgram(s)/Hr 1 Patch Transdermal every 72 hours  gabapentin 400 milliGRAM(s) Oral every 8 hours  ivabradine 5 milliGRAM(s) Oral two times a day  metoprolol succinate ER 25 milliGRAM(s) Oral daily  multivitamin 1 Tablet(s) Oral daily  predniSONE   Tablet 5 milliGRAM(s) Oral daily  sacubitril 24 mG/valsartan 26 mG 1 Tablet(s) Oral two times a day  sodium bicarbonate 650 milliGRAM(s) Oral every 12 hours  trimethoprim  160 mG/sulfamethoxazole 800 mG 1 Tablet(s) Oral every 12 hours  vancomycin  IVPB      Zytiga 1000 milliGRAM(s) 1000 milliGRAM(s) Oral daily    PRN MEDICATIONS  acetaminophen     Tablet .. 650 milliGRAM(s) Oral every 6 hours PRN  HYDROmorphone   Tablet 2 milliGRAM(s) Oral every 3 hours PRN  HYDROmorphone   Tablet 4 milliGRAM(s) Oral every 3 hours PRN  melatonin 3 milliGRAM(s) Oral at bedtime PRN                                            ------------------------------------------------------------  VITAL SIGNS: Last 24 Hours  T(C): 37.2 (16 Dec 2021 04:10), Max: 37.4 (15 Dec 2021 21:10)  T(F): 98.9 (16 Dec 2021 04:10), Max: 99.3 (15 Dec 2021 21:10)  HR: 93 (16 Dec 2021 04:10) (80 - 93)  BP: 101/57 (16 Dec 2021 04:10) (88/51 - 129/66)  BP(mean): 65 (15 Dec 2021 21:10) (65 - 65)  RR: 18 (16 Dec 2021 04:10) (16 - 20)  SpO2: --      12-15-21 @ 07:01  -  21 @ 07:00  --------------------------------------------------------  IN: 0 mL / OUT: 425 mL / NET: -425 mL                                             --------------------------------------------------------------  LABS:                        8.4    4.27  )-----------( 143      ( 16 Dec 2021 04:30 )             26.5     12-16    148<H>  |  109  |  21<H>  ----------------------------<  110<H>  4.0   |  21  |  0.7    Ca    7.5<L>      16 Dec 2021 04:30  Mg     2.3     12-16    TPro  4.8<L>  /  Alb  2.6<L>  /  TBili  0.6  /  DBili  x   /  AST  43<H>  /  ALT  26  /  AlkPhos  1178<H>  12-16      Urinalysis Basic - ( 15 Dec 2021 10:35 )    Color: Yellow / Appearance: Clear / S.026 / pH: x  Gluc: x / Ketone: Negative  / Bili: Negative / Urobili: 3 mg/dL   Blood: x / Protein: 100 mg/dL / Nitrite: Negative   Leuk Esterase: Negative / RBC: 3 /HPF / WBC 1 /HPF   Sq Epi: x / Non Sq Epi: 1 /HPF / Bacteria: Negative        Lactate, Blood: 1.2 mmol/L (12-15-21 @ 12:21)        Culture - Blood (collected 15 Dec 2021 07:00)  Source: .Blood Blood  Gram Stain (16 Dec 2021 05:12):    Growth in aerobic bottle: Gram Positive Cocci in Clusters  Preliminary Report (16 Dec 2021 05:13):    Growth in aerobic bottle: Gram Positive Cocci in Clusters    ***Blood Panel PCR results on this specimen are available    approximately 3 hours after the Gram stain result.***    Gram stain, PCR, and/or culture results may not always    correspond due to difference in methodologies.    ************************************************************    This PCR assay was performed by multiplex PCR. This    Assay tests for 66 bacterial and resistance gene targets.    Please refer to the [facility] test directory    at [email or web address] for details.  Organism: Blood Culture PCR (16 Dec 2021 07:23)  Organism: Blood Culture PCR (16 Dec 2021 07:23)                                            --------------------------------------------------------------    PHYSICAL EXAM:  GENERAL: AAOx2. Cachectic, laying in bed appearing in no acute distress  HEENT: No FNDs, atraumatic, normocephalic, moist mucus membranes  LUNGS: Clear to auscultation bilaterally  HEART: S1/S2. No heaves or thrills  ABD: Soft, non-tender, non-distended. Bowel sounds present in all quadrants.  EXT/NEURO: 5/5 strength in all extremity joints. Sensation and ROM grossly intact.  SKIN: No breaks, erythema, edema                                           --------------------------------------------------------------

## 2021-12-16 NOTE — PHYSICAL THERAPY INITIAL EVALUATION ADULT - GENERAL OBSERVATIONS, REHAB EVAL
PT IE attempt 2:15pm. As per MD Diallo [phone], patient can be discontinued from therapy list due to acute medical condition. Consult can be requested again when medical condition improves.

## 2021-12-17 LAB
ALBUMIN SERPL ELPH-MCNC: 2.6 G/DL — LOW (ref 3.5–5.2)
ALP SERPL-CCNC: 1353 U/L — HIGH (ref 30–115)
ALT FLD-CCNC: 25 U/L — SIGNIFICANT CHANGE UP (ref 0–41)
ANION GAP SERPL CALC-SCNC: 18 MMOL/L — HIGH (ref 7–14)
AST SERPL-CCNC: 37 U/L — SIGNIFICANT CHANGE UP (ref 0–41)
BASOPHILS # BLD AUTO: 0.02 K/UL — SIGNIFICANT CHANGE UP (ref 0–0.2)
BASOPHILS NFR BLD AUTO: 0.4 % — SIGNIFICANT CHANGE UP (ref 0–1)
BILIRUB SERPL-MCNC: 0.7 MG/DL — SIGNIFICANT CHANGE UP (ref 0.2–1.2)
BUN SERPL-MCNC: 16 MG/DL — SIGNIFICANT CHANGE UP (ref 10–20)
CALCIUM SERPL-MCNC: 7.9 MG/DL — LOW (ref 8.5–10.1)
CHLORIDE SERPL-SCNC: 102 MMOL/L — SIGNIFICANT CHANGE UP (ref 98–110)
CO2 SERPL-SCNC: 18 MMOL/L — SIGNIFICANT CHANGE UP (ref 17–32)
CREAT SERPL-MCNC: 0.7 MG/DL — SIGNIFICANT CHANGE UP (ref 0.7–1.5)
EOSINOPHIL # BLD AUTO: 0.04 K/UL — SIGNIFICANT CHANGE UP (ref 0–0.7)
EOSINOPHIL NFR BLD AUTO: 0.7 % — SIGNIFICANT CHANGE UP (ref 0–8)
GLUCOSE SERPL-MCNC: 127 MG/DL — HIGH (ref 70–99)
HCT VFR BLD CALC: 26.4 % — LOW (ref 42–52)
HGB BLD-MCNC: 8.3 G/DL — LOW (ref 14–18)
IMM GRANULOCYTES NFR BLD AUTO: 3.6 % — HIGH (ref 0.1–0.3)
LYMPHOCYTES # BLD AUTO: 1.25 K/UL — SIGNIFICANT CHANGE UP (ref 1.2–3.4)
LYMPHOCYTES # BLD AUTO: 22.2 % — SIGNIFICANT CHANGE UP (ref 20.5–51.1)
MCHC RBC-ENTMCNC: 30.9 PG — SIGNIFICANT CHANGE UP (ref 27–31)
MCHC RBC-ENTMCNC: 31.4 G/DL — LOW (ref 32–37)
MCV RBC AUTO: 98.1 FL — HIGH (ref 80–94)
MONOCYTES # BLD AUTO: 0.17 K/UL — SIGNIFICANT CHANGE UP (ref 0.1–0.6)
MONOCYTES NFR BLD AUTO: 3 % — SIGNIFICANT CHANGE UP (ref 1.7–9.3)
NEUTROPHILS # BLD AUTO: 3.95 K/UL — SIGNIFICANT CHANGE UP (ref 1.4–6.5)
NEUTROPHILS NFR BLD AUTO: 70.1 % — SIGNIFICANT CHANGE UP (ref 42.2–75.2)
NRBC # BLD: 0 /100 WBCS — SIGNIFICANT CHANGE UP (ref 0–0)
PLATELET # BLD AUTO: 147 K/UL — SIGNIFICANT CHANGE UP (ref 130–400)
POTASSIUM SERPL-MCNC: 4.3 MMOL/L — SIGNIFICANT CHANGE UP (ref 3.5–5)
POTASSIUM SERPL-SCNC: 4.3 MMOL/L — SIGNIFICANT CHANGE UP (ref 3.5–5)
PROT SERPL-MCNC: 5.1 G/DL — LOW (ref 6–8)
RBC # BLD: 2.69 M/UL — LOW (ref 4.7–6.1)
RBC # FLD: 15.6 % — HIGH (ref 11.5–14.5)
SODIUM SERPL-SCNC: 138 MMOL/L — SIGNIFICANT CHANGE UP (ref 135–146)
VANCOMYCIN TROUGH SERPL-MCNC: 11.8 UG/ML — HIGH (ref 5–10)
WBC # BLD: 5.63 K/UL — SIGNIFICANT CHANGE UP (ref 4.8–10.8)
WBC # FLD AUTO: 5.63 K/UL — SIGNIFICANT CHANGE UP (ref 4.8–10.8)

## 2021-12-17 PROCEDURE — 99232 SBSQ HOSP IP/OBS MODERATE 35: CPT

## 2021-12-17 PROCEDURE — 93306 TTE W/DOPPLER COMPLETE: CPT | Mod: 26

## 2021-12-17 RX ORDER — ENOXAPARIN SODIUM 100 MG/ML
50 INJECTION SUBCUTANEOUS EVERY 12 HOURS
Refills: 0 | Status: DISCONTINUED | OUTPATIENT
Start: 2021-12-17 | End: 2021-12-23

## 2021-12-17 RX ORDER — SODIUM BICARBONATE 1 MEQ/ML
650 SYRINGE (ML) INTRAVENOUS EVERY 8 HOURS
Refills: 0 | Status: DISCONTINUED | OUTPATIENT
Start: 2021-12-17 | End: 2021-12-23

## 2021-12-17 RX ADMIN — Medication 1 TABLET(S): at 18:47

## 2021-12-17 RX ADMIN — Medication 650 MILLIGRAM(S): at 22:24

## 2021-12-17 RX ADMIN — HYDROMORPHONE HYDROCHLORIDE 2 MILLIGRAM(S): 2 INJECTION INTRAMUSCULAR; INTRAVENOUS; SUBCUTANEOUS at 22:41

## 2021-12-17 RX ADMIN — Medication 1 TABLET(S): at 13:05

## 2021-12-17 RX ADMIN — GABAPENTIN 400 MILLIGRAM(S): 400 CAPSULE ORAL at 14:48

## 2021-12-17 RX ADMIN — Medication 1 TABLET(S): at 06:14

## 2021-12-17 RX ADMIN — SACUBITRIL AND VALSARTAN 1 TABLET(S): 24; 26 TABLET, FILM COATED ORAL at 18:46

## 2021-12-17 RX ADMIN — IVABRADINE 5 MILLIGRAM(S): 7.5 TABLET, FILM COATED ORAL at 18:48

## 2021-12-17 RX ADMIN — Medication 5 MILLIGRAM(S): at 06:13

## 2021-12-17 RX ADMIN — Medication 250 MILLIGRAM(S): at 06:20

## 2021-12-17 RX ADMIN — Medication 650 MILLIGRAM(S): at 06:13

## 2021-12-17 RX ADMIN — SACUBITRIL AND VALSARTAN 1 TABLET(S): 24; 26 TABLET, FILM COATED ORAL at 06:08

## 2021-12-17 RX ADMIN — HYDROMORPHONE HYDROCHLORIDE 2 MILLIGRAM(S): 2 INJECTION INTRAMUSCULAR; INTRAVENOUS; SUBCUTANEOUS at 06:20

## 2021-12-17 RX ADMIN — HYDROMORPHONE HYDROCHLORIDE 2 MILLIGRAM(S): 2 INJECTION INTRAMUSCULAR; INTRAVENOUS; SUBCUTANEOUS at 22:45

## 2021-12-17 RX ADMIN — Medication 650 MILLIGRAM(S): at 16:01

## 2021-12-17 RX ADMIN — GABAPENTIN 400 MILLIGRAM(S): 400 CAPSULE ORAL at 06:10

## 2021-12-17 RX ADMIN — HYDROMORPHONE HYDROCHLORIDE 2 MILLIGRAM(S): 2 INJECTION INTRAMUSCULAR; INTRAVENOUS; SUBCUTANEOUS at 06:30

## 2021-12-17 RX ADMIN — BICALUTAMIDE 50 MILLIGRAM(S): 50 TABLET, FILM COATED ORAL at 13:03

## 2021-12-17 RX ADMIN — ENOXAPARIN SODIUM 50 MILLIGRAM(S): 100 INJECTION SUBCUTANEOUS at 18:46

## 2021-12-17 RX ADMIN — Medication 25 MILLIGRAM(S): at 06:10

## 2021-12-17 RX ADMIN — FENTANYL CITRATE 1 PATCH: 50 INJECTION INTRAVENOUS at 06:00

## 2021-12-17 RX ADMIN — IVABRADINE 5 MILLIGRAM(S): 7.5 TABLET, FILM COATED ORAL at 06:13

## 2021-12-17 RX ADMIN — GABAPENTIN 400 MILLIGRAM(S): 400 CAPSULE ORAL at 22:33

## 2021-12-17 NOTE — PROGRESS NOTE ADULT - ASSESSMENT
70 yo M with PMH of CVA, afib on Eliquis, HTN, HFrEF (EF 20-25% 2018), chronic LLE pain, presented to the ED with worsening Left LE pain for the past 2 months, worsening fatigue, anorexia and significant weight loss. Of note, pt had previously been worked up for the leg pain including a CT scan in march 2021 in [facility], which was negative.    # PMD Dr Gary Bernard [phone]: I updated him on 12/13    # 1-time fever to 100.4F; lethargy 2/2 MSSA bacteremia; NO SEPSIS  source: possibly skin (sacrum/buttocks) in pt w/ active cancer  no rise in WBC  1st U/A was pos, but the 2nd U/A was neg  f/u UCX  ALL: PCN, Cephalo  abx: Bactrim DS 1 po q12 for 5 days (12/15-12/20) (for urine)  abx: Vanco 750mg iv q12 (for bacteremia)  recall ID eval - to help determine duration of tx and need for PICC vs midline next week  need vanco trough  hold a/c for eventual PICC or midline (see below)  echo (TTE) - neg for veg; prefer to NOT pursue FREDERICK in this sick pt (aware pt has AICD) - let's see if bld cx's clear first  after 48 hrs of abx (start Saturday): do bld cx q24 x3 to document cure of bld stream    # Neopl-related severe pain 2/2 diffuse bony sclerotic lesions (AP elevated, improving) likely 2/2 prostate cancer (irregularly marginated prostate gland on CT and PSA 1400 and confirmed by bx)  CT chest/abdomen/pelvis - Innumerable diffuse sclerotic osseous lesions -  including 6x5.8x9.4cm mass involving the left iliac wing and 6.6x4.5x4.6cm mass involving left inferior pubic ramus. Expansile sclerotic lesions are noted to involve the right 2nd lateral rib and right 5th anterior rib.  IR s/p bx lt iliac lesion 12/3: path (Dr Erickson x [phone]): confirmed it is prostate cancer; see cyto results   bone scan -> diff bone mets, including vert (kidneys not seen on bone scan b/o such intense uptake by diff bone mets)  rad/onc eval can be done as outpt  h/o (Dr Mena) felt MRIs were not needed (and hard for pt to do) -> so MRIs cancelled  h/o beginning tx:  AP improved, but still in 1000s   Casodex 50mg po daily til 12/17  Lupron 22.5 mg IM x1 on 12/8 (done) - will be monthly  Abiraterone 1000 mg po daily and Prednisone 5 mg po daily  h/o will start Xgeva as outpatient to reduce bone related complications  c/w dilaudid 2mg po q3 prn mod-sev pain  d/c dilaudid 4mg - gets too tired w/ this  c/w fentanyl patch 12 mcg/hr top q72 for longer-acting relief  c/w neurontin 400mg po q8  bowel reg    # metab acid w/ high gap  improving  doubt starvation ketosis as u/a shows NO ketones and pt does eat  lactate was nl recently - and currently 1.7  could be result of high tumor burden, which is poor prog sign  Cr (baseline ~ 0.8)  incr oral NaHCO3 650mg po q8    # hypernatremia - better  IVFs: D5 60/hr - can d/c  seems pt can drink on his own now    # Failure to thrive - is eating a little w/ asst (not great)  MVI q24  Ensure 3x/day  asst w/ feeds (family or staff)  no PEG or NGT    # Symptomatic normo to macro anemia (mild pancytopenia)  no overt bleed  prob related to malignancy and diffuse bone mets  s/p PRBC transfusions -> hgb better  keep hgb > 8  LDH elevated  Iron panel: iron sat 32%; ferr: 2624 = NOT SELENA  Folate nl; B12: 1540    # h/o HTN; h/o CVA, Afib; H/o HFrEF (EF 20-25% in 2018); mild hyperkalemia  d/c aldactone 25mg po q24 - K is rising and BP on lower side  Eliquis 5mg po q12 - HOLD in prep for midline or PICC line  today, start lovenox 50mg sc q12 (will go home on DOAC though)  c/w Entresto 24/26 po q12   c/w metoprolol ER 25mg po q24   c/w Ivabradine 5mg po q12  BP is starting to get low again, might need to hold entresto again    # prior DVTs from 2018  < from: [facility] Duplex Lower Ext Vein Scan, Bilat (08.17.18 @ 20:56) >  Acute-appearing deep venous thrombosis of the right common femoral, femoral, popliteal, gastrocnemius, and peroneal veins.  Thrombus is present in a left posterior tibial (calf) vein branch.  no need to rpt V Dupl  pt on a/c for afib (see above)    # Activity: pt is basically bedbound; sister does not want to pursue PT again (pt has too much pain); sister will take him home    # GI PPX: NA    # DVT PPX: see Afib above; hold eliquis; start therap LMWH (see above)    # Full code - since heading more towards a pall care route (though w/ anticancer tx), will talk w/ family about DNR and DNI options    # updated sister today; she does NOT want SNF for STR (to hard for pt); she will take pt home; needs hosp bed; will need HC for iv abx    Dispo: start vanco - need trough and c/w bactrim; recall ID eval; d/c IVFs; tx pain; incr NaHCO3; f/u h/o; hold Eliquis - start LMWH;   current plan is to go home w/ HC next week; need hosp bed at home - f/u CM    Prog is very poor overall. Sister is very well aware. If pt expires in hospital, please note he is [employer] and his body should NOT be touched until family is called and his Imam is called.

## 2021-12-17 NOTE — PROGRESS NOTE ADULT - ASSESSMENT
ASSESSMENT & PLAN  72 yo M with PMH of CVA, afib on Eliquis, HTN, HFrEF (EF 20-25% 2018), chronic LLE pain, presented to the ED with worsening Left LE pain for the past 2 months, worsening fatigue, anorexia and significant weight loss. Of note, pt had previously been worked up for the leg pain including a CT scan in march 2021 in [facility], which was negative.    # Neoplasm-related severe pain 2/2 diffuse bony sclerotic lesions (AP 2765) likely 2/2 prostate cancer (irregularly marginated prostate gland on CT and PSA 1400)  -CT chest/abdomen/pelvis - Innumerable diffuse sclerotic osseous lesions -  including 6x5.8x9.4cm mass L iliac wing and 6.6x4.5x4.6cm mass of left inferior pubic ramus. Expansile sclerotic lesions of R 2nd lateral rib, R 5th anterior rib.  -bone scan -> diff bone mets, including vert (kidneys not seen b/o such intense uptake by diff bone mets)  -s/p IR bx lt iliac lesion 12/3: metastatic prostate cancer  -s/p Hem Onc eval and Pall Care eval   -Casodex 50mg po daily x2wk total (stop 12/17/21)  -Lupron 22.5 mg IM q1mo (last 12/8/21)  -Abiraterone 1000 mg po daily and Prednisone 5 mg po daily  -Heme/Onc planned to  start Xgeva as outpatient to reduce bone related complications  -c/w dilaudid 2mg po q3 prn pain  -cont fentanyl patch 12 mcg/hr top q72 for longer-acting relief  -cont neurontin 300mg po q8  -bowel reg    #MSSA bacteremia  -low grade fever 12/15- UA initially positive, CXR showed unchanged RUL opacity.  -BCx (12/15)- MSSA  -c/w Bactrim x10d (12/15/21)  -c/w 750 q12  -f/u TTE to r/o PM lead/valvular vegetations    # Failure to thrive  -dietician: severe protein-calorie malnutrition  -MVI q24  -Beneprotein 3x/day     # Symptomatic normo to macro anemia (mild pancytopenia)  -no overt bleed, s/p pRBCs last admission  -prob related to malignancy and diffuse bone mets  -, Folate nl, B12 1540  -Iron panel: iron sat 32%; ferr: 2624 = NOT SELENA  -keep hgb > 8    # FELICITY - likely dehydration (resolved); metab acid w/ high gap (prob result of high tumor burden, which is poor prog sign)  -Cr (baseline ~ 0.8)  -Entresto held- may restart after gado given for MRI, if BP will allow  -c/w NaHCO3 1300mg po q12 til HCO3 > 22    # h/o HTN  #h/o CVA  #Afib  #H/o HFrEF (EF 20-25% in 2018)  -c/w Eliquis 5mg po q12, metoprolol  -c/w aldactone, entresto, ivabradine    # Activity  -Amb as tolerated    # GI PPX  -NA    # DVT PPX  -Eliquis    # Full code  Dispo- MSSA work up, placement

## 2021-12-17 NOTE — PROGRESS NOTE ADULT - SUBJECTIVE AND OBJECTIVE BOX
SAMANTHA CHARLES 71y Male  MRN#: [id number]   CODE STATUS:________    Hospital Day: 8d    Pt is currently admitted with the primary diagnosis of prostate cancer with likely metastasis    SUBJECTIVE  Hospital Course  -Pt admitted due to family not liking NH pt was discharged to 21. Pt had episode of lethargy overnight , found UA+ for bacteria, new opacifications on CXR (RUL/JESSICA/RLL), given lasix 40mg IV once + ciprofloxacin standing. BCx now showing S. aureus (not MRSA). Pt on bactrim for possible cystitis.  Overnight events   -no major overnight events per pt or staff  Subjective complaints   -pt feels well this AM, tired but not lethargic, having continued pain but controlled with pain regimen                                            ----------------------------------------------------------  OBJECTIVE  PAST MEDICAL & SURGICAL HISTORY  Systolic congestive heart failure, unspecified chronicity    Cerebrovascular accident (CVA) due to bilateral embolism of posterior cerebral arteries    Essential hypertension    Atrial fibrillation, unspecified type    AICD (automatic cardioverter/defibrillator) present                                              -----------------------------------------------------------  ALLERGIES:  cephalexin (Flushing)  penicillin (Rash)                                            ------------------------------------------------------------    HOME MEDICATIONS  Home Medications:  Corlanor 5 mg oral tablet: 1 tab(s) orally 2 times a day (with meals) (2021 17:21)  fentaNYL 12 mcg/hr transdermal film, extended release: 1 patch transdermal every 72 hours (08 Dec 2021 15:25)  gabapentin 300 mg oral capsule: 1 cap(s) orally every 8 hours (08 Dec 2021 15:25)  HYDROmorphone 2 mg oral tablet: 1 tab(s) orally every 3 hours, As needed, Severe Pain (7 - 10) (08 Dec 2021 15:25)  melatonin 3 mg oral tablet: 1 tab(s) orally once a day (at bedtime), As needed, Insomnia (08 Dec 2021 15:25)  Metoprolol Succinate ER 25 mg oral tablet, extended release: 1 tab(s) orally once a day (2021 17:20)  Multiple Vitamins oral tablet: 1 tab(s) orally once a day (08 Dec 2021 15:25)  predniSONE 5 mg oral tablet: 1 tab(s) orally once a day (08 Dec 2021 15:25)  sodium bicarbonate 650 mg oral tablet: 1 tab(s) orally every 12 hours (13 Dec 2021 15:30)                           MEDICATIONS:  STANDING MEDICATIONS  bicalutamide 50 milliGRAM(s) Oral daily  dextrose 5%. 1000 milliLiter(s) IV Continuous <Continuous>  fentaNYL   Patch  12 MICROgram(s)/Hr 1 Patch Transdermal every 72 hours  gabapentin 400 milliGRAM(s) Oral every 8 hours  ivabradine 5 milliGRAM(s) Oral two times a day  metoprolol succinate ER 25 milliGRAM(s) Oral daily  multivitamin 1 Tablet(s) Oral daily  predniSONE   Tablet 5 milliGRAM(s) Oral daily  sacubitril 24 mG/valsartan 26 mG 1 Tablet(s) Oral two times a day  sodium bicarbonate 650 milliGRAM(s) Oral every 12 hours  trimethoprim  160 mG/sulfamethoxazole 800 mG 1 Tablet(s) Oral every 12 hours  vancomycin  IVPB 750 milliGRAM(s) IV Intermittent every 12 hours  vancomycin  IVPB      Zytiga 1000 milliGRAM(s) 1000 milliGRAM(s) Oral daily    PRN MEDICATIONS  acetaminophen     Tablet .. 650 milliGRAM(s) Oral every 6 hours PRN  HYDROmorphone   Tablet 2 milliGRAM(s) Oral every 3 hours PRN  HYDROmorphone   Tablet 4 milliGRAM(s) Oral every 3 hours PRN  melatonin 3 milliGRAM(s) Oral at bedtime PRN                                            ------------------------------------------------------------  VITAL SIGNS: Last 24 Hours  T(C): 36.4 (17 Dec 2021 05:21), Max: 36.4 (17 Dec 2021 05:21)  T(F): 97.5 (17 Dec 2021 05:21), Max: 97.5 (17 Dec 2021 05:21)  HR: 56 (17 Dec 2021 05:21) (56 - 91)  BP: 118/57 (17 Dec 2021 05:21) (96/53 - 135/65)  BP(mean): --  RR: 18 (17 Dec 2021 05:21) (18 - 20)  SpO2: --                                             --------------------------------------------------------------  LABS:                        8.3    5.63  )-----------( 147      ( 17 Dec 2021 04:30 )             26.4     12-17    138  |  102  |  16  ----------------------------<  127<H>  4.3   |  18  |  0.7    Ca    7.9<L>      17 Dec 2021 04:30  Mg     2.3     12-    TPro  5.1<L>  /  Alb  2.6<L>  /  TBili  0.7  /  DBili  x   /  AST  37  /  ALT  25  /  AlkPhos  1353<H>  12-17      Urinalysis Basic - ( 15 Dec 2021 10:35 )    Color: Yellow / Appearance: Clear / S.026 / pH: x  Gluc: x / Ketone: Negative  / Bili: Negative / Urobili: 3 mg/dL   Blood: x / Protein: 100 mg/dL / Nitrite: Negative   Leuk Esterase: Negative / RBC: 3 /HPF / WBC 1 /HPF   Sq Epi: x / Non Sq Epi: 1 /HPF / Bacteria: Negative              Culture - Blood (collected 15 Dec 2021 07:00)  Source: .Blood Blood  Gram Stain (16 Dec 2021 13:23):    Growth in aerobic bottle: Gram Positive Cocci in Clusters    Growth in anaerobic bottle: Gram Positive Cocci in Clusters  Preliminary Report (17 Dec 2021 08:35):    Growth in aerobic bottle: Staphylococcus aureus    Growth in anaerobic bottle: Gram Positive Cocci in Clusters    ***Blood Panel PCR results on this specimen are available    approximately 3 hours after the Gram stain result.***    Gram stain, PCR, and/or culture results may not always    correspond due to difference in methodologies.    ************************************************************    This PCR assay was performed by multiplex PCR. This    Assay tests for 66 bacterial and resistance gene targets.    Please refer to the [facility] test directory    at [email or web address] for details.  Organism: Blood Culture PCR (16 Dec 2021 07:23)  Organism: Blood Culture PCR (16 Dec 2021 07:23)    PHYSICAL EXAM:  GENERAL: AAOx2. Cachectic, laying in bed appearing in no acute distress  HEENT: No FNDs, atraumatic, normocephalic, moist mucus membranes  LUNGS: Clear to auscultation bilaterally  HEART: S1/S2. No heaves or thrills  ABD: Soft, non-tender, non-distended. Bowel sounds present in all quadrants.  EXT/NEURO: 5/5 strength in all extremity joints. Sensation and ROM grossly intact.  SKIN: No breaks, erythema, edema                                             --------------------------------------------------------------

## 2021-12-17 NOTE — PROGRESS NOTE ADULT - SUBJECTIVE AND OBJECTIVE BOX
SAMANTHA CHARLES  71y  Male  ***My note supersedes ALL resident notes that I sign.  My corrections for their notes are in my note.***    I can be reached directly on [email or web address]. My office number is [phone]. My personal cell number is [phone].    INTERVAL EVENTS: Here for f/u of bacteremia. Pt is much more awake and alert today. He gets confused w/ dilaudid 4mg. He eats w/ asst. Family wants to try to get him to chair or bed into chair mode. Used to be .    T(F): 97.5 (12-17-21 @ 05:21), Max: 97.5 (12-17-21 @ 05:21)  HR: 56 (12-17-21 @ 05:21) (56 - 91)  BP: 118/57 (12-17-21 @ 05:21) (96/53 - 135/65)  RR: 18 (12-17-21 @ 05:21) (18 - 20)  SpO2: --    Gen: comfortable; able to talk    Neck: no nodes, no JVD  chest: left AICD  lungs: clr  hrt: s1 s2 rrr no murmur  abd: soft, NT/ND  : + condom cath - urine dark yellow, clear  ext: no edema, no c/c  neuro: sleeping    LABS:                      8.3     (    98.1   5.63  )-----------( ---------      147      ( 17 Dec 2021 04:30 )             26.4    (    15.6     138   (   102   (   127      12-17-21 @ 04:30  ----------------------               4.3   (   18   (   16     AG = 18                        -----                        0.7  Ca  7.9   Mg  --    P   --     LFT  5.1  (  0.7  (  37       12-17-21 @ 04:30  -------------------------  2.6  (  1353  (  25    Culture - Blood (collected 12-15-21 @ 07:00)  Source: .Blood Blood  Gram Stain (12-16-21 @ 13:23):    Growth in aerobic bottle: Gram Positive Cocci in Clusters    Growth in anaerobic bottle: Gram Positive Cocci in Clusters  Preliminary Report (12-17-21 @ 11:21):    Growth in aerobic and anaerobic bottles: Staphylococcus aureus    Susceptibility to follow.    ***Blood Panel PCR results on this specimen are available    approximately 3 hours after the Gram stain result.***    Gram stain, PCR, and/or culture results maynot always    correspond due to difference in methodologies.    ************************************************************    This PCR assay was performed by multiplex PCR. This    Assay tests for 66 bacterial and resistance gene targets.    Please refer to the [facility] test directory    at [email or web address] for details.  Organism: Blood Culture PCR (12-16-21 @ 07:23)  Organism: Blood Culture PCR (12-16-21 @ 07:23)      -  Staphylococcus aureus: Detec Any isolate of Staphylococcus aureus from a blood culture is NOT considered a contaminant.      Method Type: PCR    RADIOLOGY & ADDITIONAL TESTS:  < from: TTE Echo Complete w/o Contrast w/ Doppler (12.17.21 @ 06:51) >  Summary:   1. Left ventricular ejection fraction, by visual estimation, is 30 to 35%.   2. Severely decreased global left ventricular systolic function.   3. Severely decreased segmental left ventricular systolic function.   4. Normal left ventricular internal cavity size.   5. The left ventricular diastolic function could not be assessed in this study.   6. Limited views. Unable to fully assess wall motion but septal and anteroseptal walls are severely hypokinetic.   7. Normal left atrial size.   8. Normal right atrial size.   9. No evidence of mitral valve regurgitation.  10. Mild tricuspid regurgitation.    < end of copied text >    MEDICATIONS:  trimethoprim  160 mG/sulfamethoxazole 800 mG 1 Tablet(s) Oral every 12 hours  vancomycin  IVPB 750 milliGRAM(s) IV Intermittent every 12 hours  vancomycin  IVPB        acetaminophen     Tablet .. 650 milliGRAM(s) Oral every 6 hours PRN  bicalutamide 50 milliGRAM(s) Oral daily  dextrose 5%. 1000 milliLiter(s) IV Continuous <Continuous>  fentaNYL   Patch  12 MICROgram(s)/Hr 1 Patch Transdermal every 72 hours  gabapentin 400 milliGRAM(s) Oral every 8 hours  HYDROmorphone   Tablet 2 milliGRAM(s) Oral every 3 hours PRN  ivabradine 5 milliGRAM(s) Oral two times a day  melatonin 3 milliGRAM(s) Oral at bedtime PRN  metoprolol succinate ER 25 milliGRAM(s) Oral daily  multivitamin 1 Tablet(s) Oral daily  predniSONE   Tablet 5 milliGRAM(s) Oral daily  sacubitril 24 mG/valsartan 26 mG 1 Tablet(s) Oral two times a day  sodium bicarbonate 650 milliGRAM(s) Oral every 8 hours  Zytiga 1000 milliGRAM(s) 1000 milliGRAM(s) Oral daily

## 2021-12-17 NOTE — PROGRESS NOTE ADULT - NUTRITIONAL ASSESSMENT
This patient has been assessed with a concern for Malnutrition and has been determined to have a diagnosis/diagnoses of Severe protein-calorie malnutrition.    This patient is being managed with:   Diet DASH/TLC-  Sodium & Cholesterol Restricted  No Pork  Beneprotein ([facility] Only)     Qty per Day:  3  Supplement Feeding Modality:  Oral  Ensure Enlive Cans or Servings Per Day:  1       Frequency:  Three Times a day  Entered: Dec  3 2021  8:02AM    
This patient has been assessed with a concern for Malnutrition and has been determined to have a diagnosis/diagnoses of Severe protein-calorie malnutrition.    This patient is being managed with:   Diet DASH/TLC-  Sodium & Cholesterol Restricted  No Pork  Beneprotein ([facility] Only)     Qty per Day:  3  Supplement Feeding Modality:  Oral  Ensure Enlive Cans or Servings Per Day:  1       Frequency:  Three Times a day  Entered: Dec  3 2021  8:02AM    
This patient has been assessed with a concern for Malnutrition and has been determined to have a diagnosis/diagnoses of Severe protein-calorie malnutrition.    This patient is being managed with:   Diet DASH/TLC-  Sodium & Cholesterol Restricted  No Pork  Beneprotein ([facility] Only)     Qty per Day:  3  Supplement Feeding Modality:  Oral  Ensure Enlive Cans or Servings Per Day:  1       Frequency:  Three Times a day  Entered: Dec  3 2021  8:02AM    
This patient has been assessed with a concern for Malnutrition and has been determined to have a diagnosis/diagnoses of Severe protein-calorie malnutrition.    This patient is being managed with:   Diet DASH/TLC-  Sodium & Cholesterol Restricted  No Pork  Beneprotein ([facility] Only)     Qty per Day:  3  Supplement Feeding Modality:  Oral  Ensure Enlive Cans or Servings Per Day:  1       Frequency:  Three Times a day  Entered: Dec  3 2021  8:02AM    
This patient has been assessed with a concern for Malnutrition and has been determined to have a diagnosis/diagnoses of Severe protein-calorie malnutrition.    This patient is being managed with:   Diet DASH/TLC-  Sodium & Cholesterol Restricted  No Pork  Beneprotein ([facility] Only)     Qty per Day:  3  Supplement Feeding Modality:  Oral  Ensure Enlive Cans or Servings Per Day:  1       Frequency:  Three Times a day  Entered: Dec  3 2021  8:02AM    
This patient has been assessed with a concern for Malnutrition and has been determined to have a diagnosis/diagnoses of Severe protein-calorie malnutrition.    This patient is being managed with:   Diet DASH/TLC-  Sodium & Cholesterol Restricted  No Pork  Beneprotein ([facility] Only)     Qty per Day:  3  Supplement Feeding Modality:  Oral  Ensure Enlive Cans or Servings Per Day:  1       Frequency:  Three Times a day  Entered: Dec  3 2021  8:02AM    
This patient has been assessed with a concern for Malnutrition and has been determined to have a diagnosis/diagnoses of Severe protein-calorie malnutrition.    This patient is being managed with:   Diet DASH/TLC-  Sodium & Cholesterol Restricted  No Pork  Beneprotein ([facility] Only)     Qty per Day:  3  Supplement Feeding Modality:  Oral  Ensure Enlive Cans or Servings Per Day:  1       Frequency:  Three Times a day  Entered: Dec  3 2021  8:02AM

## 2021-12-17 NOTE — CONSULT NOTE ADULT - ATTENDING COMMENTS
I have personally seen and examined this patient.  I have fully participated in the care of this patient.  I have reviewed all pertinent clinical information, including history, physical exam, plan and note.  I have reviewed all pertinent clinical information and reviewed all relevant imaging and diagnostic studies personally.  Corrections and edits were made wherever needed.       #MSSA bacteremia   most likely source L antecubital phlebitis from PIV from prior hospitalization  AICD in place- no evidence of infection  TTE no vegetations   No PNA  No UTI    12/15 BCX +  #Metastatic prostate ca    - repeat BCX  - find out cephalosporin allergy- if not severe change to cefazolin 2g q8h IV   - on vancomycin, - Please check vanc trough 30 min prior to 4th dose   - FREDERICK if within GOC   - once bcx cleared, if within GOC PICC x 6 weeks IV

## 2021-12-17 NOTE — CONSULT NOTE ADULT - SUBJECTIVE AND OBJECTIVE BOX
SAMANTHA CHARLES  71y, Male  Allergy: cephalexin (Flushing)  penicillin (Rash)    CHIEF COMPLAINT: placement, pain (17 Dec 2021 10:26)    HPI:  HPI:  72 yo M with PMH of CVA, afib on Eliquis, HTN, HFrEF ( EF 20-25% 2018), chronic LLE pain, presented to the ED from NH. Pt was discharged today from [facility] to [facility]. Family not happy with current NH and wants patient placed at [facility]. Pending outpt MRI as could not be done inpt 2/2 inavailability of EP/[employer] Rep for AICDS.     Hospital Course:    Patient was admitted to medicine and underwent bone biopsy by IR. He was evaluated by urology and hematology-oncology and started on hormonal therapy, casodex, while inpatient. He had a bone scan which showed multiple metastatic lesions including in the spine. Palliative care saw the patient and he was given PO dilaudid, neurontin, and 1 dose of dexamethasone for the left lower extremity pain. He has anemia of chronic disease and received 4 units pRBCs during the admission. Patient was seen by physical therapy and by dietitian regarding failure to thrive. Spoke with sister regarding poor prognosis and patient will be going to SNF for further care. He will be receiving chemotherapy outpatient at [facility]. He can get MRI of lumbar and thoracic spine outpatient and that will determine treatment by radiation oncology.     In the ED, pt hemodynamically stable. Repeat Labs stable.    Pt is a limited historian. AX02 @ baseline    (09 Dec 2021 17:49)      Infectious Diseases History:  Old Micro Data/Cultures:     FAMILY HISTORY:  No pertinent family history in first degree relatives      PAST MEDICAL & SURGICAL HISTORY:  Systolic congestive heart failure, unspecified chronicity    Cerebrovascular accident (CVA) due to bilateral embolism of posterior cerebral arteries    Essential hypertension    Atrial fibrillation, unspecified type    AICD (automatic cardioverter/defibrillator) present        SOCIAL HISTORY  Social History:  From [facility] (09 Dec 2021 17:49)      Recent Travel:  Other Exposures:     ROS  General: Denies rigors, nightsweats  HEENT: Denies headache, rhinorrhea, sore throat, eye pain  CV: Denies CP, palpitations  PULM: Denies wheezing, hemoptysis  GI: Denies hematemesis, hematochezia, melena  : Denies discharge, hematuria  MSK: Denies arthralgias, myalgias  SKIN: Denies rash, lesions  NEURO: Denies paresthesias, weakness  PSYCH: Denies depression, anxiety    VITALS:  T(F): 97.5, Max: 97.5 (12-17-21 @ 05:21)  HR: 56  BP: 118/57  RR: 18Vital Signs Last 24 Hrs  T(C): 36.4 (17 Dec 2021 05:21), Max: 36.4 (17 Dec 2021 05:21)  T(F): 97.5 (17 Dec 2021 05:21), Max: 97.5 (17 Dec 2021 05:21)  HR: 56 (17 Dec 2021 05:21) (56 - 91)  BP: 118/57 (17 Dec 2021 05:21) (96/53 - 135/65)  BP(mean): --  RR: 18 (17 Dec 2021 05:21) (18 - 20)  SpO2: --    PHYSICAL EXAM:  Gen: NAD, resting in bed, cachectic male  HEENT: Normocephalic, atraumatic  Neck: supple, no lymphadenopathy  CV: prominent left AICD, +aortic murmur  Lungs: CTAB  Abdomen: Soft, BS present  Ext: Warm, well perfused  Neuro: non focal, awake, confused  Skin: phlebitis in left antecubital fossa, no erythema or tenderness, AICD site appears clean    TESTS & MEASUREMENTS:                        8.3    5.63  )-----------( 147      ( 17 Dec 2021 04:30 )             26.4     12-17    138  |  102  |  16  ----------------------------<  127<H>  4.3   |  18  |  0.7    Ca    7.9<L>      17 Dec 2021 04:30  Mg     2.3     12-16    TPro  5.1<L>  /  Alb  2.6<L>  /  TBili  0.7  /  DBili  x   /  AST  37  /  ALT  25  /  AlkPhos  1353<H>  12-17    eGFR if : 110 mL/min/1.73M2 (12-17-21 @ 04:30)  eGFR if Non African American: 95 mL/min/1.73M2 (12-17-21 @ 04:30)    LIVER FUNCTIONS - ( 17 Dec 2021 04:30 )  Alb: 2.6 g/dL / Pro: 5.1 g/dL / ALK PHOS: 1353 U/L / ALT: 25 U/L / AST: 37 U/L / GGT: x               Culture - Blood (collected 12-15-21 @ 07:00)  Source: .Blood Blood  Gram Stain (12-16-21 @ 13:23):    Growth in aerobic bottle: Gram Positive Cocci in Clusters    Growth in anaerobic bottle: Gram Positive Cocci in Clusters  Preliminary Report (12-17-21 @ 11:21):    Growth in aerobic and anaerobic bottles: Staphylococcus aureus    Susceptibility to follow.    ***Blood Panel PCR results on this specimen are available    approximately 3 hours after the Gram stain result.***    Gram stain, PCR, and/or culture results maynot always    correspond due to difference in methodologies.    ************************************************************    This PCR assay was performed by multiplex PCR. This    Assay tests for 66 bacterial and resistance gene targets.    Please refer to the [facility] test directory    at [email or web address] for details.  Organism: Blood Culture PCR (12-16-21 @ 07:23)  Organism: Blood Culture PCR (12-16-21 @ 07:23)      -  Staphylococcus aureus: Detec Any isolate of Staphylococcus aureus from a blood culture is NOT considered a contaminant.      Method Type: PCR        Lactate, Blood: 1.2 mmol/L (12-15-21 @ 12:21)      INFECTIOUS DISEASES TESTING      RADIOLOGY & ADDITIONAL TESTS:  I have personally reviewed the last available Chest xray  CXR  Xray Chest 1 View- PORTABLE-Urgent:   ACC: [id number] EXAM:  XR CHEST PORTABLE URGENT 1V                          PROCEDURE DATE:  12/15/2021          INTERPRETATION:  Clinical History / Reason for exam: Follow-up.    Comparison : Chest radiograph December 12, 2021.    Technique/Positioning: Limited by obliquity.    Findings:    Support devices: Left-sided permanent pacemaker.    Cardiac/mediastinum/hilum: Stable.    Lung parenchyma/Pleura: Right lung opacity, unchanged. No pneumothorax is   seen.    Skeleton/soft tissues: Stable.    Impression:    Limited by obliquity.    Right lung opacity, unchanged.    Left-sided permanent pacemaker.    --- End of Report ---            IVANA SALEH MD; Attending Radiologist  This document has been electronically signed. Dec 15 2021  6:40AM (12-15-21 @ 06:46)      CT      CARDIOLOGY TESTING      All available historical records have been reviewed    MEDICATIONS  bicalutamide 50  dextrose 5%. 1000  enoxaparin Injectable 50  fentaNYL   Patch  12 MICROgram(s)/Hr 1  gabapentin 400  ivabradine 5  metoprolol succinate ER 25  multivitamin 1  predniSONE   Tablet 5  sacubitril 24 mG/valsartan 26 mG 1  sodium bicarbonate 650  trimethoprim  160 mG/sulfamethoxazole 800 mG 1  vancomycin  IVPB 750  vancomycin  IVPB   Zytiga 1000 milliGRAM(s) 1000      ANTIBIOTICS:  trimethoprim  160 mG/sulfamethoxazole 800 mG 1 Tablet(s) Oral every 12 hours  vancomycin  IVPB 750 milliGRAM(s) IV Intermittent every 12 hours  vancomycin  IVPB          All available historical data has been reviewed    ASSESSMENT  71yMale with a PMH of. CVA, a fib on elliquis, HTN, HF s/p AICD with metastatic prostate cancer. Patient had a low grade fever and blood cx on 12/15 was positive for MSSA    IMPRESSION    #bacteremia of unknown source  -CXR: unchanged RUL opacity  -UA negative 12/15; patient denies urinary symptoms  -TTE 12/17: no evidence of endocarditis; given patient's comorbidities, FREDERICK risks would outweigh benefits  -AICD on left chest so concern for infected hardwire  -physical exam notable for phlebitis in left antecubital fossa    RECOMMENDATIONS  -repeat blood cx  -IV cefazolin for 6 weeks through PICC line; cephalexin and penicillin allergy unlikely to be severe but could consider IV vancomycin as alternative  -d/c bactrim    This is a pended note. All final recommendations to follow pending discussion with ID Attending    SAMANTHA CHARLES  71y, Male  Allergy: cephalexin (Flushing)  penicillin (Rash)    CHIEF COMPLAINT: placement, pain (17 Dec 2021 10:26)    HPI:  HPI:  72 yo M with PMH of CVA, afib on Eliquis, HTN, HFrEF ( EF 20-25% 2018), chronic LLE pain, presented to the ED from NH. Pt was discharged today from [facility] to [facility]. Family not happy with current NH and wants patient placed at [facility]. Pending outpt MRI as could not be done inpt 2/2 inavailability of EP/[employer] Rep for AICDS.     Hospital Course:    Patient was admitted to medicine and underwent bone biopsy by IR. He was evaluated by urology and hematology-oncology and started on hormonal therapy, casodex, while inpatient. He had a bone scan which showed multiple metastatic lesions including in the spine. Palliative care saw the patient and he was given PO dilaudid, neurontin, and 1 dose of dexamethasone for the left lower extremity pain. He has anemia of chronic disease and received 4 units pRBCs during the admission. Patient was seen by physical therapy and by dietitian regarding failure to thrive. Spoke with sister regarding poor prognosis and patient will be going to SNF for further care. He will be receiving chemotherapy outpatient at [facility]. He can get MRI of lumbar and thoracic spine outpatient and that will determine treatment by radiation oncology.     In the ED, pt hemodynamically stable. Repeat Labs stable.    Pt is a limited historian. AX02 @ baseline    (09 Dec 2021 17:49)      Infectious Diseases History:  Old Micro Data/Cultures:     FAMILY HISTORY:  No pertinent family history in first degree relatives      PAST MEDICAL & SURGICAL HISTORY:  Systolic congestive heart failure, unspecified chronicity    Cerebrovascular accident (CVA) due to bilateral embolism of posterior cerebral arteries    Essential hypertension    Atrial fibrillation, unspecified type    AICD (automatic cardioverter/defibrillator) present        SOCIAL HISTORY  Social History:  From [facility] (09 Dec 2021 17:49)      Recent Travel:  Other Exposures:     ROS  General: Denies rigors, nightsweats  HEENT: Denies headache, rhinorrhea, sore throat, eye pain  CV: Denies CP, palpitations  PULM: Denies wheezing, hemoptysis  GI: Denies hematemesis, hematochezia, melena  : Denies discharge, hematuria  MSK: Denies arthralgias, myalgias  SKIN: Denies rash, lesions  NEURO: Denies paresthesias, weakness  PSYCH: Denies depression, anxiety    VITALS:  T(F): 97.5, Max: 97.5 (12-17-21 @ 05:21)  HR: 56  BP: 118/57  RR: 18Vital Signs Last 24 Hrs  T(C): 36.4 (17 Dec 2021 05:21), Max: 36.4 (17 Dec 2021 05:21)  T(F): 97.5 (17 Dec 2021 05:21), Max: 97.5 (17 Dec 2021 05:21)  HR: 56 (17 Dec 2021 05:21) (56 - 91)  BP: 118/57 (17 Dec 2021 05:21) (96/53 - 135/65)  BP(mean): --  RR: 18 (17 Dec 2021 05:21) (18 - 20)  SpO2: --    PHYSICAL EXAM:  Gen: NAD, resting in bed, cachectic male  HEENT: Normocephalic, atraumatic  Neck: supple, no lymphadenopathy  CV: prominent left AICD, +aortic murmur  Lungs: CTAB  Abdomen: Soft, BS present  Ext: Warm, well perfused  Neuro: non focal, awake, confused  Skin: phlebitis in left antecubital fossa, no erythema or tenderness, AICD site appears clean    TESTS & MEASUREMENTS:                        8.3    5.63  )-----------( 147      ( 17 Dec 2021 04:30 )             26.4     12-17    138  |  102  |  16  ----------------------------<  127<H>  4.3   |  18  |  0.7    Ca    7.9<L>      17 Dec 2021 04:30  Mg     2.3     12-16    TPro  5.1<L>  /  Alb  2.6<L>  /  TBili  0.7  /  DBili  x   /  AST  37  /  ALT  25  /  AlkPhos  1353<H>  12-17    eGFR if : 110 mL/min/1.73M2 (12-17-21 @ 04:30)  eGFR if Non African American: 95 mL/min/1.73M2 (12-17-21 @ 04:30)    LIVER FUNCTIONS - ( 17 Dec 2021 04:30 )  Alb: 2.6 g/dL / Pro: 5.1 g/dL / ALK PHOS: 1353 U/L / ALT: 25 U/L / AST: 37 U/L / GGT: x               Culture - Blood (collected 12-15-21 @ 07:00)  Source: .Blood Blood  Gram Stain (12-16-21 @ 13:23):    Growth in aerobic bottle: Gram Positive Cocci in Clusters    Growth in anaerobic bottle: Gram Positive Cocci in Clusters  Preliminary Report (12-17-21 @ 11:21):    Growth in aerobic and anaerobic bottles: Staphylococcus aureus    Susceptibility to follow.    ***Blood Panel PCR results on this specimen are available    approximately 3 hours after the Gram stain result.***    Gram stain, PCR, and/or culture results maynot always    correspond due to difference in methodologies.    ************************************************************    This PCR assay was performed by multiplex PCR. This    Assay tests for 66 bacterial and resistance gene targets.    Please refer to the [facility] test directory    at [email or web address] for details.  Organism: Blood Culture PCR (12-16-21 @ 07:23)  Organism: Blood Culture PCR (12-16-21 @ 07:23)      -  Staphylococcus aureus: Detec Any isolate of Staphylococcus aureus from a blood culture is NOT considered a contaminant.      Method Type: PCR        Lactate, Blood: 1.2 mmol/L (12-15-21 @ 12:21)      INFECTIOUS DISEASES TESTING      RADIOLOGY & ADDITIONAL TESTS:  I have personally reviewed the last available Chest xray  CXR  Xray Chest 1 View- PORTABLE-Urgent:   ACC: [id number] EXAM:  XR CHEST PORTABLE URGENT 1V                          PROCEDURE DATE:  12/15/2021          INTERPRETATION:  Clinical History / Reason for exam: Follow-up.    Comparison : Chest radiograph December 12, 2021.    Technique/Positioning: Limited by obliquity.    Findings:    Support devices: Left-sided permanent pacemaker.    Cardiac/mediastinum/hilum: Stable.    Lung parenchyma/Pleura: Right lung opacity, unchanged. No pneumothorax is   seen.    Skeleton/soft tissues: Stable.    Impression:    Limited by obliquity.    Right lung opacity, unchanged.    Left-sided permanent pacemaker.    --- End of Report ---            IVANA SALEH MD; Attending Radiologist  This document has been electronically signed. Dec 15 2021  6:40AM (12-15-21 @ 06:46)      CT      CARDIOLOGY TESTING      All available historical records have been reviewed    MEDICATIONS  bicalutamide 50  dextrose 5%. 1000  enoxaparin Injectable 50  fentaNYL   Patch  12 MICROgram(s)/Hr 1  gabapentin 400  ivabradine 5  metoprolol succinate ER 25  multivitamin 1  predniSONE   Tablet 5  sacubitril 24 mG/valsartan 26 mG 1  sodium bicarbonate 650  trimethoprim  160 mG/sulfamethoxazole 800 mG 1  vancomycin  IVPB 750  vancomycin  IVPB   Zytiga 1000 milliGRAM(s) 1000      ANTIBIOTICS:  trimethoprim  160 mG/sulfamethoxazole 800 mG 1 Tablet(s) Oral every 12 hours  vancomycin  IVPB 750 milliGRAM(s) IV Intermittent every 12 hours  vancomycin  IVPB          All available historical data has been reviewed

## 2021-12-17 NOTE — CONSULT NOTE ADULT - ASSESSMENT
Patient is a 71 year old gentlemen with PMH of CVA, afib on Eliquis, HTN, HFrEF (EF 20-25% 2018), chronic LLE pain, presented to the ED with worsening Left LE pain for the past 2 months, worsening fatigue, anorexia and significant weight loss.     Rad/Onc consulted to evaluate for palliative radiation therapy- metastatic (bone) prostate cancer.    Patient seen at bedside, sleeping, appears comfortable, thin and frail.  Discussed patient with  pt's RN, who reports, patient received 1 dose in the am of Dilaudid 2 mg and has been comfortable.    Will discuss patient with Dr. Orantes.  Patient to follow up as outpt with rad/onc.    Pathology & diagnostic reviewed.
ASSESSMENT  71yMale with a PMH of. CVA, a fib on elliquis, HTN, HF s/p AICD with metastatic prostate cancer. Patient had a low grade fever and blood cx on 12/15 was positive for MSSA    IMPRESSION    #MSSA bacteremia of unknown source  -CXR: unchanged RUL opacity  -UA negative 12/15; patient denies urinary symptoms  -TTE 12/17: no evidence of endocarditis; given patient's comorbidities, FREDERICK risks would outweigh benefits  -AICD on left chest cannot rule out infected hardwire  -physical exam notable for phlebitis in left antecubital fossa, most likely source    RECOMMENDATIONS  -repeat blood cx  -IV cefazolin for 6 weeks through PICC line if within GOC (only place once BCX NG 48h); cephalexin and penicillin allergy unlikely to be severe but could consider IV vancomycin as alternative  -d/c bactrim

## 2021-12-18 LAB
-  AMPICILLIN/SULBACTAM: SIGNIFICANT CHANGE UP
-  AMPICILLIN/SULBACTAM: SIGNIFICANT CHANGE UP
-  CEFAZOLIN: SIGNIFICANT CHANGE UP
-  CEFAZOLIN: SIGNIFICANT CHANGE UP
-  CLINDAMYCIN: SIGNIFICANT CHANGE UP
-  DAPTOMYCIN: SIGNIFICANT CHANGE UP
-  ERYTHROMYCIN: SIGNIFICANT CHANGE UP
-  GENTAMICIN: SIGNIFICANT CHANGE UP
-  GENTAMICIN: SIGNIFICANT CHANGE UP
-  LINEZOLID: SIGNIFICANT CHANGE UP
-  OXACILLIN: SIGNIFICANT CHANGE UP
-  OXACILLIN: SIGNIFICANT CHANGE UP
-  PENICILLIN: SIGNIFICANT CHANGE UP
-  PENICILLIN: SIGNIFICANT CHANGE UP
-  RIFAMPIN: SIGNIFICANT CHANGE UP
-  RIFAMPIN: SIGNIFICANT CHANGE UP
-  TETRACYCLINE: SIGNIFICANT CHANGE UP
-  TETRACYCLINE: SIGNIFICANT CHANGE UP
-  TRIMETHOPRIM/SULFAMETHOXAZOLE: SIGNIFICANT CHANGE UP
-  TRIMETHOPRIM/SULFAMETHOXAZOLE: SIGNIFICANT CHANGE UP
-  VANCOMYCIN: SIGNIFICANT CHANGE UP
-  VANCOMYCIN: SIGNIFICANT CHANGE UP
ALBUMIN SERPL ELPH-MCNC: 2.4 G/DL — LOW (ref 3.5–5.2)
ALP SERPL-CCNC: 1156 U/L — HIGH (ref 30–115)
ALT FLD-CCNC: 22 U/L — SIGNIFICANT CHANGE UP (ref 0–41)
ANION GAP SERPL CALC-SCNC: 17 MMOL/L — HIGH (ref 7–14)
AST SERPL-CCNC: 35 U/L — SIGNIFICANT CHANGE UP (ref 0–41)
BASOPHILS # BLD AUTO: 0.02 K/UL — SIGNIFICANT CHANGE UP (ref 0–0.2)
BASOPHILS NFR BLD AUTO: 0.4 % — SIGNIFICANT CHANGE UP (ref 0–1)
BILIRUB SERPL-MCNC: 0.7 MG/DL — SIGNIFICANT CHANGE UP (ref 0.2–1.2)
BUN SERPL-MCNC: 17 MG/DL — SIGNIFICANT CHANGE UP (ref 10–20)
CALCIUM SERPL-MCNC: 7.1 MG/DL — LOW (ref 8.5–10.1)
CHLORIDE SERPL-SCNC: 103 MMOL/L — SIGNIFICANT CHANGE UP (ref 98–110)
CO2 SERPL-SCNC: 18 MMOL/L — SIGNIFICANT CHANGE UP (ref 17–32)
CREAT SERPL-MCNC: 0.8 MG/DL — SIGNIFICANT CHANGE UP (ref 0.7–1.5)
CULTURE RESULTS: SIGNIFICANT CHANGE UP
CULTURE RESULTS: SIGNIFICANT CHANGE UP
EOSINOPHIL # BLD AUTO: 0.07 K/UL — SIGNIFICANT CHANGE UP (ref 0–0.7)
EOSINOPHIL NFR BLD AUTO: 1.4 % — SIGNIFICANT CHANGE UP (ref 0–8)
GLUCOSE SERPL-MCNC: 78 MG/DL — SIGNIFICANT CHANGE UP (ref 70–99)
HCT VFR BLD CALC: 23.8 % — LOW (ref 42–52)
HGB BLD-MCNC: 7.6 G/DL — LOW (ref 14–18)
IMM GRANULOCYTES NFR BLD AUTO: 5.4 % — HIGH (ref 0.1–0.3)
LYMPHOCYTES # BLD AUTO: 0.78 K/UL — LOW (ref 1.2–3.4)
LYMPHOCYTES # BLD AUTO: 15.2 % — LOW (ref 20.5–51.1)
MCHC RBC-ENTMCNC: 31.1 PG — HIGH (ref 27–31)
MCHC RBC-ENTMCNC: 31.9 G/DL — LOW (ref 32–37)
MCV RBC AUTO: 97.5 FL — HIGH (ref 80–94)
METHOD TYPE: SIGNIFICANT CHANGE UP
METHOD TYPE: SIGNIFICANT CHANGE UP
MONOCYTES # BLD AUTO: 0.14 K/UL — SIGNIFICANT CHANGE UP (ref 0.1–0.6)
MONOCYTES NFR BLD AUTO: 2.7 % — SIGNIFICANT CHANGE UP (ref 1.7–9.3)
NEUTROPHILS # BLD AUTO: 3.85 K/UL — SIGNIFICANT CHANGE UP (ref 1.4–6.5)
NEUTROPHILS NFR BLD AUTO: 74.9 % — SIGNIFICANT CHANGE UP (ref 42.2–75.2)
NRBC # BLD: 0 /100 WBCS — SIGNIFICANT CHANGE UP (ref 0–0)
ORGANISM # SPEC MICROSCOPIC CNT: SIGNIFICANT CHANGE UP
PLATELET # BLD AUTO: 144 K/UL — SIGNIFICANT CHANGE UP (ref 130–400)
POTASSIUM SERPL-MCNC: 4 MMOL/L — SIGNIFICANT CHANGE UP (ref 3.5–5)
POTASSIUM SERPL-SCNC: 4 MMOL/L — SIGNIFICANT CHANGE UP (ref 3.5–5)
PROT SERPL-MCNC: 4.5 G/DL — LOW (ref 6–8)
RBC # BLD: 2.44 M/UL — LOW (ref 4.7–6.1)
RBC # FLD: 15.3 % — HIGH (ref 11.5–14.5)
SODIUM SERPL-SCNC: 138 MMOL/L — SIGNIFICANT CHANGE UP (ref 135–146)
SPECIMEN SOURCE: SIGNIFICANT CHANGE UP
SPECIMEN SOURCE: SIGNIFICANT CHANGE UP
WBC # BLD: 5.14 K/UL — SIGNIFICANT CHANGE UP (ref 4.8–10.8)
WBC # FLD AUTO: 5.14 K/UL — SIGNIFICANT CHANGE UP (ref 4.8–10.8)

## 2021-12-18 PROCEDURE — 99232 SBSQ HOSP IP/OBS MODERATE 35: CPT

## 2021-12-18 RX ORDER — HYDROMORPHONE HYDROCHLORIDE 2 MG/ML
2 INJECTION INTRAMUSCULAR; INTRAVENOUS; SUBCUTANEOUS
Refills: 0 | Status: DISCONTINUED | OUTPATIENT
Start: 2021-12-18 | End: 2021-12-23

## 2021-12-18 RX ORDER — VANCOMYCIN HCL 1 G
750 VIAL (EA) INTRAVENOUS EVERY 24 HOURS
Refills: 0 | Status: DISCONTINUED | OUTPATIENT
Start: 2021-12-19 | End: 2021-12-19

## 2021-12-18 RX ORDER — VANCOMYCIN HCL 1 G
1000 VIAL (EA) INTRAVENOUS ONCE
Refills: 0 | Status: DISCONTINUED | OUTPATIENT
Start: 2021-12-18 | End: 2021-12-18

## 2021-12-18 RX ORDER — CEFAZOLIN SODIUM 1 G
2000 VIAL (EA) INJECTION EVERY 8 HOURS
Refills: 0 | Status: DISCONTINUED | OUTPATIENT
Start: 2021-12-18 | End: 2021-12-18

## 2021-12-18 RX ORDER — FENTANYL CITRATE 50 UG/ML
1 INJECTION INTRAVENOUS
Refills: 0 | Status: DISCONTINUED | OUTPATIENT
Start: 2021-12-18 | End: 2021-12-23

## 2021-12-18 RX ADMIN — ENOXAPARIN SODIUM 50 MILLIGRAM(S): 100 INJECTION SUBCUTANEOUS at 05:40

## 2021-12-18 RX ADMIN — GABAPENTIN 400 MILLIGRAM(S): 400 CAPSULE ORAL at 11:36

## 2021-12-18 RX ADMIN — Medication 25 MILLIGRAM(S): at 05:29

## 2021-12-18 RX ADMIN — GABAPENTIN 400 MILLIGRAM(S): 400 CAPSULE ORAL at 21:58

## 2021-12-18 RX ADMIN — HYDROMORPHONE HYDROCHLORIDE 2 MILLIGRAM(S): 2 INJECTION INTRAMUSCULAR; INTRAVENOUS; SUBCUTANEOUS at 16:37

## 2021-12-18 RX ADMIN — Medication 250 MILLIGRAM(S): at 23:34

## 2021-12-18 RX ADMIN — IVABRADINE 5 MILLIGRAM(S): 7.5 TABLET, FILM COATED ORAL at 17:15

## 2021-12-18 RX ADMIN — HYDROMORPHONE HYDROCHLORIDE 2 MILLIGRAM(S): 2 INJECTION INTRAMUSCULAR; INTRAVENOUS; SUBCUTANEOUS at 21:55

## 2021-12-18 RX ADMIN — FENTANYL CITRATE 1 PATCH: 50 INJECTION INTRAVENOUS at 18:03

## 2021-12-18 RX ADMIN — FENTANYL CITRATE 1 PATCH: 50 INJECTION INTRAVENOUS at 18:06

## 2021-12-18 RX ADMIN — Medication 650 MILLIGRAM(S): at 11:36

## 2021-12-18 RX ADMIN — BICALUTAMIDE 50 MILLIGRAM(S): 50 TABLET, FILM COATED ORAL at 13:15

## 2021-12-18 RX ADMIN — SACUBITRIL AND VALSARTAN 1 TABLET(S): 24; 26 TABLET, FILM COATED ORAL at 17:15

## 2021-12-18 RX ADMIN — IVABRADINE 5 MILLIGRAM(S): 7.5 TABLET, FILM COATED ORAL at 05:38

## 2021-12-18 RX ADMIN — Medication 100 MILLIGRAM(S): at 13:15

## 2021-12-18 RX ADMIN — SACUBITRIL AND VALSARTAN 1 TABLET(S): 24; 26 TABLET, FILM COATED ORAL at 05:33

## 2021-12-18 RX ADMIN — HYDROMORPHONE HYDROCHLORIDE 2 MILLIGRAM(S): 2 INJECTION INTRAMUSCULAR; INTRAVENOUS; SUBCUTANEOUS at 16:07

## 2021-12-18 RX ADMIN — Medication 1 TABLET(S): at 05:25

## 2021-12-18 RX ADMIN — HYDROMORPHONE HYDROCHLORIDE 2 MILLIGRAM(S): 2 INJECTION INTRAMUSCULAR; INTRAVENOUS; SUBCUTANEOUS at 22:01

## 2021-12-18 RX ADMIN — GABAPENTIN 400 MILLIGRAM(S): 400 CAPSULE ORAL at 05:32

## 2021-12-18 RX ADMIN — HYDROMORPHONE HYDROCHLORIDE 2 MILLIGRAM(S): 2 INJECTION INTRAMUSCULAR; INTRAVENOUS; SUBCUTANEOUS at 11:35

## 2021-12-18 RX ADMIN — Medication 5 MILLIGRAM(S): at 05:25

## 2021-12-18 RX ADMIN — ENOXAPARIN SODIUM 50 MILLIGRAM(S): 100 INJECTION SUBCUTANEOUS at 17:15

## 2021-12-18 RX ADMIN — Medication 650 MILLIGRAM(S): at 05:24

## 2021-12-18 RX ADMIN — HYDROMORPHONE HYDROCHLORIDE 2 MILLIGRAM(S): 2 INJECTION INTRAMUSCULAR; INTRAVENOUS; SUBCUTANEOUS at 12:05

## 2021-12-18 RX ADMIN — Medication 1 TABLET(S): at 11:35

## 2021-12-18 RX ADMIN — Medication 650 MILLIGRAM(S): at 21:50

## 2021-12-18 RX ADMIN — FENTANYL CITRATE 1 PATCH: 50 INJECTION INTRAVENOUS at 08:35

## 2021-12-18 RX ADMIN — Medication 250 MILLIGRAM(S): at 00:36

## 2021-12-18 NOTE — PROGRESS NOTE ADULT - ASSESSMENT
ASSESSMENT & PLAN  70 yo M with PMH of CVA, afib on Eliquis, HTN, HFrEF (EF 20-25% 2018), chronic LLE pain, presented to the ED with worsening Left LE pain for the past 2 months, worsening fatigue, anorexia and significant weight loss. Of note, pt had previously been worked up for the leg pain including a CT scan in march 2021 in [facility], which was negative.    # Neoplasm-related severe pain 2/2 diffuse bony sclerotic lesions (AP 2765) likely 2/2 prostate cancer (irregularly marginated prostate gland on CT and PSA 1400)  -CT chest/abdomen/pelvis - Innumerable diffuse sclerotic osseous lesions -  including 6x5.8x9.4cm mass L iliac wing and 6.6x4.5x4.6cm mass of left inferior pubic ramus. Expansile sclerotic lesions of R 2nd lateral rib, R 5th anterior rib.  -bone scan -> diff bone mets, including vert (kidneys not seen b/o such intense uptake by diff bone mets)  -s/p IR bx lt iliac lesion 12/3: metastatic prostate cancer  -s/p Hem Onc eval and Pall Care eval   -Casodex 50mg po daily x2wk total (stop 12/17/21)  -Lupron 22.5 mg IM q1mo (last 12/8/21)  -Abiraterone 1000 mg po daily and Prednisone 5 mg po daily  -Heme/Onc planned to  start Xgeva as outpatient to reduce bone related complications  -c/w dilaudid 2mg po q3 prn pain  -cont fentanyl patch 12 mcg/hr top q72 for longer-acting relief  -cont neurontin 300mg po q8  -bowel reg    #MSSA bacteremia  -low grade fever 12/15- UA initially positive, CXR showed unchanged RUL opacity.  -BCx (12/15)- MSSA  -c/w Bactrim x5d for UTI (12/20/21)  -s/p vanc  -c/w cefazolin 2g q8 x6wk following negative BCx  -TTE: no vegetations, will hold off on FREDERICK due to pt condition/possible decompensation with procedure  -f/u daily BCx 12/18, 12/19, 12/20  -PICC placement, d/c following negative BCx    # Failure to thrive  -dietician: severe protein-calorie malnutrition  -MVI q24  -Beneprotein 3x/day     # Symptomatic normo to macro anemia (mild pancytopenia)  -no overt bleed, s/p pRBCs last admission  -prob related to malignancy and diffuse bone mets  -, Folate nl, B12 1540  -Iron panel: iron sat 32%; ferr: 2624 = NOT SELENA  -keep hgb > 8    # FELICITY - likely dehydration (resolved); metab acid w/ high gap (prob result of high tumor burden, which is poor prog sign)  -Cr (baseline ~ 0.8)  -Entresto held- may restart after gado given for MRI, if BP will allow  -c/w NaHCO3 1300mg po q12 til HCO3 > 22    # h/o HTN  #h/o CVA  #Afib  #H/o HFrEF (EF 20-25% in 2018)  -c/w Eliquis 5mg po q12, metoprolol  -c/w aldactone, entresto, ivabradine    # Activity  -Amb as tolerated    # GI PPX  -NA    # DVT PPX  -Eliquis    # Full code  Dispo- PICC placement, dispo planning (A, no SNF)

## 2021-12-18 NOTE — PROGRESS NOTE ADULT - ASSESSMENT
SAMANTHA CHARLES 71y Male  MRN#: [id number]   CODE STATUS:full code       SUBJECTIVE  Patient is a 71y old Male who presents with a chief complaint of placement, pain (18 Dec 2021 11:32)  Currently admitted to medicine with the primary diagnosis of Prostate cancer metastatic to bone  Today is hospital day 9d, and this morning he is resting in bed and reports no overnight events.     OBJECTIVE  PAST MEDICAL & SURGICAL HISTORY  Systolic congestive heart failure, unspecified chronicity    Cerebrovascular accident (CVA) due to bilateral embolism of posterior cerebral arteries    Essential hypertension    Atrial fibrillation, unspecified type    AICD (automatic cardioverter/defibrillator) present      ALLERGIES:  cephalexin (Flushing)  penicillin (Rash)    MEDICATIONS:  STANDING MEDICATIONS  bicalutamide 50 milliGRAM(s) Oral daily  ceFAZolin   IVPB 2000 milliGRAM(s) IV Intermittent every 8 hours  enoxaparin Injectable 50 milliGRAM(s) SubCutaneous every 12 hours  fentaNYL   Patch  12 MICROgram(s)/Hr 1 Patch Transdermal every 72 hours  gabapentin 400 milliGRAM(s) Oral every 8 hours  ivabradine 5 milliGRAM(s) Oral two times a day  metoprolol succinate ER 25 milliGRAM(s) Oral daily  multivitamin 1 Tablet(s) Oral daily  predniSONE   Tablet 5 milliGRAM(s) Oral daily  sacubitril 24 mG/valsartan 26 mG 1 Tablet(s) Oral two times a day  sodium bicarbonate 650 milliGRAM(s) Oral every 8 hours  trimethoprim  160 mG/sulfamethoxazole 800 mG 1 Tablet(s) Oral every 12 hours  Zytiga 1000 milliGRAM(s) 1000 milliGRAM(s) Oral daily    PRN MEDICATIONS  acetaminophen     Tablet .. 650 milliGRAM(s) Oral every 6 hours PRN  HYDROmorphone   Tablet 2 milliGRAM(s) Oral every 3 hours PRN  melatonin 3 milliGRAM(s) Oral at bedtime PRN      VITAL SIGNS: Last 24 Hours  T(C): 36.6 (18 Dec 2021 11:45), Max: 36.6 (18 Dec 2021 11:45)  T(F): 97.8 (18 Dec 2021 11:45), Max: 97.8 (18 Dec 2021 11:45)  HR: 83 (18 Dec 2021 11:45) (76 - 90)  BP: 96/52 (18 Dec 2021 11:45) (95/46 - 112/51)  BP(mean): --  RR: 18 (18 Dec 2021 11:45) (18 - 20)  SpO2: --    LABS:                        7.6    5.14  )-----------( 144      ( 18 Dec 2021 04:30 )             23.8     12-18    138  |  103  |  17  ----------------------------<  78  4.0   |  18  |  0.8    Ca    7.1<L>      18 Dec 2021 04:30    TPro  4.5<L>  /  Alb  2.4<L>  /  TBili  0.7  /  DBili  x   /  AST  35  /  ALT  22  /  AlkPhos  1156<H>  12-18    PHYSICAL EXAM:    Gen: comfortable; not in distress  Neck: no nodes, no JVD  chest: left AICD  lungs: clr b/l   hrt: s1 s2 normal   abd: soft, NT/ND  : + condom cath - urine dark yellow, clear  ext: no edema,   skin: normal     ASSESSMENT & PLAN  70 yo M with PMH of CVA, afib on Eliquis, HTN, HFrEF (EF 20-25% 2018), chronic LLE pain, presented to the ED with worsening Left LE pain for the past 2 months, worsening fatigue, anorexia and significant weight loss. Of note, pt had previously been worked up for the leg pain including a CT scan in march 2021 in [facility], which was negative.    # 1-time fever to 100.4F; lethargy 2/2 MSSA bacteremia; NO SEPSIS  source: possibly skin (sacrum/buttocks) in pt w/ active cancer  no rise in WBC  1st U/A was pos, but the 2nd U/A was neg  f/u UCX  ALL: PCN, Cephalo minor reaction d/c bactrim and start cefazolin for 6 weeks per ID   hold A/C for eventual PICC or midline (see below)  echo (TTE) - neg for veg; prefer to NOT pursue FREDERICK in this sick pt (aware pt has AICD) - let's see if bld cx's clear first  f/u blood culture tomorrow  IR eval for PICC line next week once culture is back and clear    # Neopl-related severe pain 2/2 diffuse bony sclerotic lesions (AP elevated, improving) likely 2/2 prostate cancer (irregularly marginated prostate gland on CT and PSA 1400 and confirmed by bx)  CT chest/abdomen/pelvis - Innumerable diffuse sclerotic osseous lesions -  including 6x5.8x9.4cm mass involving the left iliac wing and 6.6x4.5x4.6cm mass involving left inferior pubic ramus. Expansile sclerotic lesions are noted to involve the right 2nd lateral rib and right 5th anterior rib.  IR s/p bx lt iliac lesion 12/3: path (Dr Erickson x [id number]): confirmed it is prostate cancer; see cyto results   bone scan -> diff bone mets, including vert (kidneys not seen on bone scan b/o such intense uptake by diff bone mets)  rad/onc eval can be done as outpt  h/o (Dr Mena) felt MRIs were not needed (and hard for pt to do) -> so MRIs cancelled  h/o beginning tx:  AP improved, but still in 1000s   Casodex 50mg po daily til 12/17  Lupron 22.5 mg IM x1 on 12/8 (done) - will be monthly  Abiraterone 1000 mg po daily and Prednisone 5 mg po daily  h/o will start Xgeva as outpatient to reduce bone related complications  c/w dilaudid 2mg po q3 prn mod-sev pain  d/c dilaudid 4mg - gets too tired w/ this  c/w fentanyl patch 12 mcg/hr top q72 for longer-acting relief  c/w neurontin 400mg po q8  bowel reg    # metab acid w/ high gap  improving  doubt starvation ketosis as u/a shows NO ketones and pt does eat  lactate was nl recently - and currently 1.7  could be result of high tumor burden, which is poor prog sign  Cr (baseline ~ 0.8)  c/w oral NaHCO3 650mg po q8    # hypernatremia - resolved     # Failure to thrive - is eating a little w/ asst (not great)  MVI q24  Ensure 3x/day  asst w/ feeds (family or staff) 1:1  no PEG or NGT    # Symptomatic normo to macro anemia (mild pancytopenia)  no overt bleed  prob related to malignancy and diffuse bone mets  s/p PRBC transfusions -> hgb better  keep hgb > 8, transfuse one PRBC today   LDH elevated  Iron panel: iron sat 32%; ferr: 2624 = NOT SELENA  Folate nl; B12: 1540    # h/o HTN; h/o CVA, Afib; H/o HFrEF (EF 20-25% in 2018);  aldactone 25mg po q24 d/everardo - K is rising and BP on lower side  Eliquis 5mg po q12 - HOLD in prep for midline or PICC line  c/w lovenox 50mg sc q12 (will go home on DOAC though)  c/w Entresto 24/26 po q12   c/w metoprolol ER 25mg po q24   c/w Ivabradine 5mg po q12  BP is starting to get low again, might need to hold entresto again    # prior DVTs from 2018  < from: [facility] Duplex Lower Ext Vein Scan, Bilat (08.17.18 @ 20:56) >  Acute-appearing deep venous thrombosis of the right common femoral, femoral, popliteal, gastrocnemius, and peroneal veins.  Thrombus is present in a left posterior tibial (calf) vein branch.  no need to rpt V Dupl  pt on a/c for afib (see above)    # Activity: pt is basically bedbound; sister does not want to pursue PT again (pt has too much pain); sister will take him home    # GI PPX: NA    # DVT PPX: see Afib above; hold eliquis; start therap LMWH (see above)    # Full code - since heading more towards a pall care route (though w/ anticancer tx), will talk w/ family about DNR and DNI options    # updated sister today; updated her with the plan. she does NOT want SNF for STR (to hard for pt); she will take pt home; needs hosp bed; will need HC for iv abx    Dispo: PICC line placement next week, f/u cultures.  current plan is to go home w/ HC next week; need hosp bed at home - f/u CM    Prog is very poor overall. Sister is very well aware. If pt expires in hospital, please note he is Yarsani and his body should NOT be touched until family is called and his Imam is called.   SAMANTHA CHARLES 71y Male  MRN#: [id number]   CODE STATUS:full code       SUBJECTIVE  Patient is a 71y old Male who presents with a chief complaint of placement, pain (18 Dec 2021 11:32)  Currently admitted to medicine with the primary diagnosis of Prostate cancer metastatic to bone  Today is hospital day 9d, and this morning he is resting in bed and reports no overnight events.     OBJECTIVE  PAST MEDICAL & SURGICAL HISTORY  Systolic congestive heart failure, unspecified chronicity    Cerebrovascular accident (CVA) due to bilateral embolism of posterior cerebral arteries    Essential hypertension    Atrial fibrillation, unspecified type    AICD (automatic cardioverter/defibrillator) present      ALLERGIES:  cephalexin (Flushing)  penicillin (Rash)    MEDICATIONS:  STANDING MEDICATIONS  bicalutamide 50 milliGRAM(s) Oral daily  ceFAZolin   IVPB 2000 milliGRAM(s) IV Intermittent every 8 hours  enoxaparin Injectable 50 milliGRAM(s) SubCutaneous every 12 hours  fentaNYL   Patch  12 MICROgram(s)/Hr 1 Patch Transdermal every 72 hours  gabapentin 400 milliGRAM(s) Oral every 8 hours  ivabradine 5 milliGRAM(s) Oral two times a day  metoprolol succinate ER 25 milliGRAM(s) Oral daily  multivitamin 1 Tablet(s) Oral daily  predniSONE   Tablet 5 milliGRAM(s) Oral daily  sacubitril 24 mG/valsartan 26 mG 1 Tablet(s) Oral two times a day  sodium bicarbonate 650 milliGRAM(s) Oral every 8 hours  trimethoprim  160 mG/sulfamethoxazole 800 mG 1 Tablet(s) Oral every 12 hours  Zytiga 1000 milliGRAM(s) 1000 milliGRAM(s) Oral daily    PRN MEDICATIONS  acetaminophen     Tablet .. 650 milliGRAM(s) Oral every 6 hours PRN  HYDROmorphone   Tablet 2 milliGRAM(s) Oral every 3 hours PRN  melatonin 3 milliGRAM(s) Oral at bedtime PRN      VITAL SIGNS: Last 24 Hours  T(C): 36.6 (18 Dec 2021 11:45), Max: 36.6 (18 Dec 2021 11:45)  T(F): 97.8 (18 Dec 2021 11:45), Max: 97.8 (18 Dec 2021 11:45)  HR: 83 (18 Dec 2021 11:45) (76 - 90)  BP: 96/52 (18 Dec 2021 11:45) (95/46 - 112/51)  BP(mean): --  RR: 18 (18 Dec 2021 11:45) (18 - 20)  SpO2: --    LABS:                        7.6    5.14  )-----------( 144      ( 18 Dec 2021 04:30 )             23.8     12-18    138  |  103  |  17  ----------------------------<  78  4.0   |  18  |  0.8    Ca    7.1<L>      18 Dec 2021 04:30    TPro  4.5<L>  /  Alb  2.4<L>  /  TBili  0.7  /  DBili  x   /  AST  35  /  ALT  22  /  AlkPhos  1156<H>  12-18    PHYSICAL EXAM:    Gen: comfortable; not in distress  Neck: no nodes, no JVD  chest: left AICD  lungs: clr b/l   hrt: s1 s2 normal   abd: soft, NT/ND  : + condom cath - urine dark yellow, clear  ext: no edema,   skin: normal     ASSESSMENT & PLAN  72 yo M with PMH of CVA, afib on Eliquis, HTN, HFrEF (EF 20-25% 2018), chronic LLE pain, presented to the ED with worsening Left LE pain for the past 2 months, worsening fatigue, anorexia and significant weight loss. Of note, pt had previously been worked up for the leg pain including a CT scan in march 2021 in [facility], which was negative.    # 1-time fever to 100.4F; lethargy 2/2 MSSA bacteremia; NO SEPSIS  source: possibly skin (sacrum/buttocks) in pt w/ active cancer  no rise in WBC  1st U/A was pos, but the 2nd U/A was neg  f/u UCX  ALL: PCN, Cephalo minor reaction d/c bactrim and start cefazolin for 6 weeks per ID   hold A/C for eventual PICC or midline (see below)  echo (TTE) - neg for veg; prefer to NOT pursue FREDERICK in this sick pt (aware pt has AICD) - let's see if bld cx's clear first  f/u blood culture tomorrow  IR eval for PICC line next week once culture is back and clear    # Neopl-related severe pain 2/2 diffuse bony sclerotic lesions (AP elevated, improving) likely 2/2 prostate cancer (irregularly marginated prostate gland on CT and PSA 1400 and confirmed by bx)  CT chest/abdomen/pelvis - Innumerable diffuse sclerotic osseous lesions -  including 6x5.8x9.4cm mass involving the left iliac wing and 6.6x4.5x4.6cm mass involving left inferior pubic ramus. Expansile sclerotic lesions are noted to involve the right 2nd lateral rib and right 5th anterior rib.  IR s/p bx lt iliac lesion 12/3: path (Dr Erickson x [id number]): confirmed it is prostate cancer; see cyto results   bone scan -> diff bone mets, including vert (kidneys not seen on bone scan b/o such intense uptake by diff bone mets)  rad/onc eval can be done as outpt  h/o (Dr Mena) felt MRIs were not needed (and hard for pt to do) -> so MRIs cancelled  h/o beginning tx:  AP improved, but still in 1000s   Casodex 50mg po daily til 12/17  Lupron 22.5 mg IM x1 on 12/8 (done) - will be monthly  Abiraterone 1000 mg po daily and Prednisone 5 mg po daily  h/o will start Xgeva as outpatient to reduce bone related complications  c/w dilaudid 2mg po q3 prn mod-sev pain  d/c dilaudid 4mg - gets too tired w/ this  c/w fentanyl patch 12 mcg/hr top q72 for longer-acting relief  c/w neurontin 400mg po q8  bowel reg    # metab acid w/ high gap  improving  doubt starvation ketosis as u/a shows NO ketones and pt does eat  lactate was nl recently - and currently 1.7  could be result of high tumor burden, which is poor prog sign  Cr (baseline ~ 0.8)  c/w oral NaHCO3 650mg po q8    # hypernatremia - resolved     # Failure to thrive - is eating a little w/ asst (not great)  MVI q24  Ensure 3x/day  asst w/ feeds (family or staff) 1:1  no PEG or NGT    # Symptomatic normo to macro anemia (mild pancytopenia)  no overt bleed  prob related to malignancy and diffuse bone mets  s/p PRBC transfusions -> hgb better  keep hgb > 8, transfuse one PRBC today   LDH elevated  Iron panel: iron sat 32%; ferr: 2624 = NOT SELENA  Folate nl; B12: 1540    # h/o HTN; h/o CVA, Afib; H/o HFrEF (EF 20-25% in 2018);  aldactone 25mg po q24 d/everardo - K is rising and BP on lower side  Eliquis 5mg po q12 - HOLD in prep for midline or PICC line  c/w lovenox 50mg sc q12 (will go home on DOAC though)  c/w Entresto 24/26 po q12   c/w metoprolol ER 25mg po q24   c/w Ivabradine 5mg po q12  BP is starting to get low again, might need to hold entresto again    # prior DVTs from 2018  < from: [facility] Duplex Lower Ext Vein Scan, Bilat (08.17.18 @ 20:56) >  Acute-appearing deep venous thrombosis of the right common femoral, femoral, popliteal, gastrocnemius, and peroneal veins.  Thrombus is present in a left posterior tibial (calf) vein branch.  no need to rpt V Dupl  pt on a/c for afib (see above)    # Activity: pt is basically bedbound; sister does not want to pursue PT again (pt has too much pain); sister will take him home    # GI PPX: NA    # DVT PPX: see Afib above; hold eliquis; start therap LMWH (see above)    # Full code - since heading more towards a pall care route (though w/ anticancer tx), will talk w/ family about DNR and DNI options    # updated sister today; updated her with the plan. she does NOT want SNF for STR (to hard for pt); she will take pt home; needs hosp bed; will need HC for iv abx    Dispo: PICC line placement next week, f/u cultures. PRBC today and repeat CBC   current plan is to go home w/ HC next week; need hosp bed at home - f/u CM    Prog is very poor overall. Sister is very well aware. If pt expires in hospital, please note he is [place]ae[place] and his body should NOT be touched until family is called and his Imam is called.

## 2021-12-18 NOTE — PROGRESS NOTE ADULT - SUBJECTIVE AND OBJECTIVE BOX
SAMANTHA CHARLES 71y Male  MRN#: [id number]   CODE STATUS:________    Hospital Day: 9d    Pt is currently admitted with the primary diagnosis of prostate cancer with likely metastasis    SUBJECTIVE  Hospital Course  -Pt admitted due to family not liking NH pt was discharged to 12/9/21. Pt had episode of lethargy overnight 12/12, found UA+ for bacteria, new opacifications on CXR (RUL/JESSICA/RLL), given lasix 40mg IV once + ciprofloxacin standing. BCx now showing S. aureus (not MRSA). Pt on bactrim for possible cystitis.  Overnight events   -no major overnight events per pt or staff  Subjective complaints   -pt feels well this AM, tired but not lethargic, having continued pain but controlled with pain regimen                                            ----------------------------------------------------------  OBJECTIVE  PAST MEDICAL & SURGICAL HISTORY  Systolic congestive heart failure, unspecified chronicity    Cerebrovascular accident (CVA) due to bilateral embolism of posterior cerebral arteries    Essential hypertension    Atrial fibrillation, unspecified type    AICD (automatic cardioverter/defibrillator) present                                              -----------------------------------------------------------  ALLERGIES:  cephalexin (Flushing)  penicillin (Rash)                                            ------------------------------------------------------------    HOME MEDICATIONS  Home Medications:  Corlanor 5 mg oral tablet: 1 tab(s) orally 2 times a day (with meals) (30 Nov 2021 17:21)  fentaNYL 12 mcg/hr transdermal film, extended release: 1 patch transdermal every 72 hours (08 Dec 2021 15:25)  gabapentin 300 mg oral capsule: 1 cap(s) orally every 8 hours (08 Dec 2021 15:25)  HYDROmorphone 2 mg oral tablet: 1 tab(s) orally every 3 hours, As needed, Severe Pain (7 - 10) (08 Dec 2021 15:25)  melatonin 3 mg oral tablet: 1 tab(s) orally once a day (at bedtime), As needed, Insomnia (08 Dec 2021 15:25)  Metoprolol Succinate ER 25 mg oral tablet, extended release: 1 tab(s) orally once a day (30 Nov 2021 17:20)  Multiple Vitamins oral tablet: 1 tab(s) orally once a day (08 Dec 2021 15:25)  predniSONE 5 mg oral tablet: 1 tab(s) orally once a day (08 Dec 2021 15:25)  sodium bicarbonate 650 mg oral tablet: 1 tab(s) orally every 12 hours (13 Dec 2021 15:30)                           MEDICATIONS:  STANDING MEDICATIONS  bicalutamide 50 milliGRAM(s) Oral daily  ceFAZolin   IVPB 2000 milliGRAM(s) IV Intermittent every 8 hours  enoxaparin Injectable 50 milliGRAM(s) SubCutaneous every 12 hours  fentaNYL   Patch  12 MICROgram(s)/Hr 1 Patch Transdermal every 72 hours  gabapentin 400 milliGRAM(s) Oral every 8 hours  ivabradine 5 milliGRAM(s) Oral two times a day  metoprolol succinate ER 25 milliGRAM(s) Oral daily  multivitamin 1 Tablet(s) Oral daily  predniSONE   Tablet 5 milliGRAM(s) Oral daily  sacubitril 24 mG/valsartan 26 mG 1 Tablet(s) Oral two times a day  sodium bicarbonate 650 milliGRAM(s) Oral every 8 hours  trimethoprim  160 mG/sulfamethoxazole 800 mG 1 Tablet(s) Oral every 12 hours  Zytiga 1000 milliGRAM(s) 1000 milliGRAM(s) Oral daily    PRN MEDICATIONS  acetaminophen     Tablet .. 650 milliGRAM(s) Oral every 6 hours PRN  HYDROmorphone   Tablet 2 milliGRAM(s) Oral every 3 hours PRN  melatonin 3 milliGRAM(s) Oral at bedtime PRN                                            ------------------------------------------------------------  VITAL SIGNS: Last 24 Hours  T(C): 35.8 (18 Dec 2021 05:16), Max: 35.9 (17 Dec 2021 20:11)  T(F): 96.5 (18 Dec 2021 05:16), Max: 96.6 (17 Dec 2021 20:11)  HR: 83 (18 Dec 2021 05:16) (76 - 90)  BP: 112/51 (18 Dec 2021 05:16) (95/46 - 112/51)  BP(mean): --  RR: 18 (18 Dec 2021 05:16) (18 - 20)  SpO2: --                                             --------------------------------------------------------------  LABS:                        7.6    5.14  )-----------( 144      ( 18 Dec 2021 04:30 )             23.8     12-18    138  |  103  |  17  ----------------------------<  78  4.0   |  18  |  0.8    Ca    7.1<L>      18 Dec 2021 04:30    TPro  4.5<L>  /  Alb  2.4<L>  /  TBili  0.7  /  DBili  x   /  AST  35  /  ALT  22  /  AlkPhos  1156<H>  12-18                                            --------------------------------------------------------------    PHYSICAL EXAM:  GENERAL: AAOx2. Cachectic, laying in bed appearing in no acute distress  HEENT: No FNDs, atraumatic, normocephalic, moist mucus membranes  LUNGS: Clear to auscultation bilaterally  HEART: S1/S2. No heaves or thrills  ABD: Soft, non-tender, non-distended. Bowel sounds present in all quadrants.  EXT/NEURO: 5/5 strength in all extremity joints. Sensation and ROM grossly intact.  SKIN: No breaks, erythema, edema                                             --------------------------------------------------------------

## 2021-12-19 LAB
ALBUMIN SERPL ELPH-MCNC: 2.6 G/DL — LOW (ref 3.5–5.2)
ALBUMIN SERPL ELPH-MCNC: 2.7 G/DL — LOW (ref 3.5–5.2)
ALP SERPL-CCNC: 1507 U/L — HIGH (ref 30–115)
ALP SERPL-CCNC: 1507 U/L — HIGH (ref 30–115)
ALT FLD-CCNC: 19 U/L — SIGNIFICANT CHANGE UP (ref 0–41)
ALT FLD-CCNC: 20 U/L — SIGNIFICANT CHANGE UP (ref 0–41)
ANION GAP SERPL CALC-SCNC: 15 MMOL/L — HIGH (ref 7–14)
ANION GAP SERPL CALC-SCNC: 16 MMOL/L — HIGH (ref 7–14)
APTT BLD: 38 SEC — SIGNIFICANT CHANGE UP (ref 27–39.2)
AST SERPL-CCNC: 30 U/L — SIGNIFICANT CHANGE UP (ref 0–41)
AST SERPL-CCNC: 33 U/L — SIGNIFICANT CHANGE UP (ref 0–41)
BASOPHILS # BLD AUTO: 0.02 K/UL — SIGNIFICANT CHANGE UP (ref 0–0.2)
BASOPHILS # BLD AUTO: 0.02 K/UL — SIGNIFICANT CHANGE UP (ref 0–0.2)
BASOPHILS NFR BLD AUTO: 0.4 % — SIGNIFICANT CHANGE UP (ref 0–1)
BASOPHILS NFR BLD AUTO: 0.4 % — SIGNIFICANT CHANGE UP (ref 0–1)
BILIRUB SERPL-MCNC: 0.7 MG/DL — SIGNIFICANT CHANGE UP (ref 0.2–1.2)
BILIRUB SERPL-MCNC: 0.7 MG/DL — SIGNIFICANT CHANGE UP (ref 0.2–1.2)
BUN SERPL-MCNC: 16 MG/DL — SIGNIFICANT CHANGE UP (ref 10–20)
BUN SERPL-MCNC: 24 MG/DL — HIGH (ref 10–20)
CALCIUM SERPL-MCNC: 7.7 MG/DL — LOW (ref 8.5–10.1)
CALCIUM SERPL-MCNC: 7.8 MG/DL — LOW (ref 8.5–10.1)
CHLORIDE SERPL-SCNC: 105 MMOL/L — SIGNIFICANT CHANGE UP (ref 98–110)
CHLORIDE SERPL-SCNC: 108 MMOL/L — SIGNIFICANT CHANGE UP (ref 98–110)
CO2 SERPL-SCNC: 20 MMOL/L — SIGNIFICANT CHANGE UP (ref 17–32)
CO2 SERPL-SCNC: 22 MMOL/L — SIGNIFICANT CHANGE UP (ref 17–32)
CREAT SERPL-MCNC: 0.8 MG/DL — SIGNIFICANT CHANGE UP (ref 0.7–1.5)
CREAT SERPL-MCNC: 0.9 MG/DL — SIGNIFICANT CHANGE UP (ref 0.7–1.5)
EOSINOPHIL # BLD AUTO: 0.05 K/UL — SIGNIFICANT CHANGE UP (ref 0–0.7)
EOSINOPHIL # BLD AUTO: 0.06 K/UL — SIGNIFICANT CHANGE UP (ref 0–0.7)
EOSINOPHIL NFR BLD AUTO: 1 % — SIGNIFICANT CHANGE UP (ref 0–8)
EOSINOPHIL NFR BLD AUTO: 1.1 % — SIGNIFICANT CHANGE UP (ref 0–8)
GLUCOSE SERPL-MCNC: 111 MG/DL — HIGH (ref 70–99)
GLUCOSE SERPL-MCNC: 91 MG/DL — SIGNIFICANT CHANGE UP (ref 70–99)
HCT VFR BLD CALC: 25.8 % — LOW (ref 42–52)
HCT VFR BLD CALC: 27.4 % — LOW (ref 42–52)
HGB BLD-MCNC: 8.3 G/DL — LOW (ref 14–18)
HGB BLD-MCNC: 9.1 G/DL — LOW (ref 14–18)
IMM GRANULOCYTES NFR BLD AUTO: 6 % — HIGH (ref 0.1–0.3)
IMM GRANULOCYTES NFR BLD AUTO: 6.8 % — HIGH (ref 0.1–0.3)
INR BLD: 1.51 RATIO — HIGH (ref 0.65–1.3)
LYMPHOCYTES # BLD AUTO: 0.9 K/UL — LOW (ref 1.2–3.4)
LYMPHOCYTES # BLD AUTO: 1.08 K/UL — LOW (ref 1.2–3.4)
LYMPHOCYTES # BLD AUTO: 16.9 % — LOW (ref 20.5–51.1)
LYMPHOCYTES # BLD AUTO: 20.8 % — SIGNIFICANT CHANGE UP (ref 20.5–51.1)
MAGNESIUM SERPL-MCNC: 2.4 MG/DL — SIGNIFICANT CHANGE UP (ref 1.8–2.4)
MCHC RBC-ENTMCNC: 30.3 PG — SIGNIFICANT CHANGE UP (ref 27–31)
MCHC RBC-ENTMCNC: 31.3 PG — HIGH (ref 27–31)
MCHC RBC-ENTMCNC: 32.2 G/DL — SIGNIFICANT CHANGE UP (ref 32–37)
MCHC RBC-ENTMCNC: 33.2 G/DL — SIGNIFICANT CHANGE UP (ref 32–37)
MCV RBC AUTO: 94.2 FL — HIGH (ref 80–94)
MCV RBC AUTO: 94.2 FL — HIGH (ref 80–94)
MONOCYTES # BLD AUTO: 0.17 K/UL — SIGNIFICANT CHANGE UP (ref 0.1–0.6)
MONOCYTES # BLD AUTO: 0.17 K/UL — SIGNIFICANT CHANGE UP (ref 0.1–0.6)
MONOCYTES NFR BLD AUTO: 3.2 % — SIGNIFICANT CHANGE UP (ref 1.7–9.3)
MONOCYTES NFR BLD AUTO: 3.3 % — SIGNIFICANT CHANGE UP (ref 1.7–9.3)
NEUTROPHILS # BLD AUTO: 3.56 K/UL — SIGNIFICANT CHANGE UP (ref 1.4–6.5)
NEUTROPHILS # BLD AUTO: 3.8 K/UL — SIGNIFICANT CHANGE UP (ref 1.4–6.5)
NEUTROPHILS NFR BLD AUTO: 68.5 % — SIGNIFICANT CHANGE UP (ref 42.2–75.2)
NEUTROPHILS NFR BLD AUTO: 71.6 % — SIGNIFICANT CHANGE UP (ref 42.2–75.2)
NRBC # BLD: 1 /100 WBCS — HIGH (ref 0–0)
NRBC # BLD: 2 /100 WBCS — HIGH (ref 0–0)
PLATELET # BLD AUTO: 155 K/UL — SIGNIFICANT CHANGE UP (ref 130–400)
PLATELET # BLD AUTO: 160 K/UL — SIGNIFICANT CHANGE UP (ref 130–400)
POTASSIUM SERPL-MCNC: 4.2 MMOL/L — SIGNIFICANT CHANGE UP (ref 3.5–5)
POTASSIUM SERPL-MCNC: 4.2 MMOL/L — SIGNIFICANT CHANGE UP (ref 3.5–5)
POTASSIUM SERPL-SCNC: 4.2 MMOL/L — SIGNIFICANT CHANGE UP (ref 3.5–5)
POTASSIUM SERPL-SCNC: 4.2 MMOL/L — SIGNIFICANT CHANGE UP (ref 3.5–5)
PROT SERPL-MCNC: 4.8 G/DL — LOW (ref 6–8)
PROT SERPL-MCNC: 4.9 G/DL — LOW (ref 6–8)
PROTHROM AB SERPL-ACNC: 17.3 SEC — HIGH (ref 9.95–12.87)
RBC # BLD: 2.74 M/UL — LOW (ref 4.7–6.1)
RBC # BLD: 2.91 M/UL — LOW (ref 4.7–6.1)
RBC # FLD: 16.5 % — HIGH (ref 11.5–14.5)
RBC # FLD: 17 % — HIGH (ref 11.5–14.5)
SODIUM SERPL-SCNC: 141 MMOL/L — SIGNIFICANT CHANGE UP (ref 135–146)
SODIUM SERPL-SCNC: 145 MMOL/L — SIGNIFICANT CHANGE UP (ref 135–146)
WBC # BLD: 5.19 K/UL — SIGNIFICANT CHANGE UP (ref 4.8–10.8)
WBC # BLD: 5.31 K/UL — SIGNIFICANT CHANGE UP (ref 4.8–10.8)
WBC # FLD AUTO: 5.19 K/UL — SIGNIFICANT CHANGE UP (ref 4.8–10.8)
WBC # FLD AUTO: 5.31 K/UL — SIGNIFICANT CHANGE UP (ref 4.8–10.8)

## 2021-12-19 PROCEDURE — 99232 SBSQ HOSP IP/OBS MODERATE 35: CPT

## 2021-12-19 RX ORDER — CEFAZOLIN SODIUM 1 G
2000 VIAL (EA) INJECTION EVERY 8 HOURS
Refills: 0 | Status: DISCONTINUED | OUTPATIENT
Start: 2021-12-19 | End: 2021-12-23

## 2021-12-19 RX ADMIN — SACUBITRIL AND VALSARTAN 1 TABLET(S): 24; 26 TABLET, FILM COATED ORAL at 05:59

## 2021-12-19 RX ADMIN — FENTANYL CITRATE 1 PATCH: 50 INJECTION INTRAVENOUS at 21:18

## 2021-12-19 RX ADMIN — Medication 650 MILLIGRAM(S): at 21:20

## 2021-12-19 RX ADMIN — GABAPENTIN 400 MILLIGRAM(S): 400 CAPSULE ORAL at 05:57

## 2021-12-19 RX ADMIN — SACUBITRIL AND VALSARTAN 1 TABLET(S): 24; 26 TABLET, FILM COATED ORAL at 17:05

## 2021-12-19 RX ADMIN — GABAPENTIN 400 MILLIGRAM(S): 400 CAPSULE ORAL at 21:21

## 2021-12-19 RX ADMIN — GABAPENTIN 400 MILLIGRAM(S): 400 CAPSULE ORAL at 14:22

## 2021-12-19 RX ADMIN — IVABRADINE 5 MILLIGRAM(S): 7.5 TABLET, FILM COATED ORAL at 06:00

## 2021-12-19 RX ADMIN — ENOXAPARIN SODIUM 50 MILLIGRAM(S): 100 INJECTION SUBCUTANEOUS at 05:58

## 2021-12-19 RX ADMIN — BICALUTAMIDE 50 MILLIGRAM(S): 50 TABLET, FILM COATED ORAL at 12:07

## 2021-12-19 RX ADMIN — ENOXAPARIN SODIUM 50 MILLIGRAM(S): 100 INJECTION SUBCUTANEOUS at 17:04

## 2021-12-19 RX ADMIN — IVABRADINE 5 MILLIGRAM(S): 7.5 TABLET, FILM COATED ORAL at 17:04

## 2021-12-19 RX ADMIN — Medication 1 TABLET(S): at 12:06

## 2021-12-19 RX ADMIN — Medication 25 MILLIGRAM(S): at 05:56

## 2021-12-19 RX ADMIN — Medication 5 MILLIGRAM(S): at 05:56

## 2021-12-19 RX ADMIN — Medication 650 MILLIGRAM(S): at 14:21

## 2021-12-19 RX ADMIN — Medication 650 MILLIGRAM(S): at 06:02

## 2021-12-19 RX ADMIN — FENTANYL CITRATE 1 PATCH: 50 INJECTION INTRAVENOUS at 07:00

## 2021-12-19 RX ADMIN — Medication 100 MILLIGRAM(S): at 21:32

## 2021-12-19 NOTE — PROGRESS NOTE ADULT - ASSESSMENT
SAMANTHA CHARLES 71y Male  MRN#: [id number]   CODE STATUS:full code       SUBJECTIVE  Patient is a 71y old Male who presents with a chief complaint of placement, pain (18 Dec 2021 11:32)  Currently admitted to medicine with the primary diagnosis of Prostate cancer metastatic to bone  Today is hospital day 10d, and this morning he is resting in bed and reports no overnight events.     OBJECTIVE  PAST MEDICAL & SURGICAL HISTORY  Systolic congestive heart failure, unspecified chronicity    Cerebrovascular accident (CVA) due to bilateral embolism of posterior cerebral arteries    Essential hypertension    Atrial fibrillation, unspecified type    AICD (automatic cardioverter/defibrillator) present      ALLERGIES:  cephalexin (Flushing)  penicillin (Rash)    MEDICATIONS:  STANDING MEDICATIONS  bicalutamide 50 milliGRAM(s) Oral daily  enoxaparin Injectable 50 milliGRAM(s) SubCutaneous every 12 hours  fentaNYL   Patch  12 MICROgram(s)/Hr 1 Patch Transdermal every 72 hours  gabapentin 400 milliGRAM(s) Oral every 8 hours  ivabradine 5 milliGRAM(s) Oral two times a day  metoprolol succinate ER 25 milliGRAM(s) Oral daily  multivitamin 1 Tablet(s) Oral daily  predniSONE   Tablet 5 milliGRAM(s) Oral daily  sacubitril 24 mG/valsartan 26 mG 1 Tablet(s) Oral two times a day  sodium bicarbonate 650 milliGRAM(s) Oral every 8 hours  vancomycin  IVPB 750 milliGRAM(s) IV Intermittent every 24 hours  Zytiga 1000 milliGRAM(s) 1000 milliGRAM(s) Oral daily    PRN MEDICATIONS  acetaminophen     Tablet .. 650 milliGRAM(s) Oral every 6 hours PRN  HYDROmorphone   Tablet 2 milliGRAM(s) Oral every 3 hours PRN  melatonin 3 milliGRAM(s) Oral at bedtime PRN      VITAL SIGNS: Last 24 Hours  T(C): 36.1 (19 Dec 2021 05:09), Max: 36.6 (18 Dec 2021 11:45)  T(F): 96.9 (19 Dec 2021 05:09), Max: 97.8 (18 Dec 2021 11:45)  HR: 82 (19 Dec 2021 05:09) (82 - 108)  BP: 103/51 (19 Dec 2021 05:09) (96/52 - 103/51)  BP(mean): 74 (18 Dec 2021 21:26) (74 - 74)  RR: 18 (19 Dec 2021 05:09) (16 - 18)  SpO2: --    LABS:                        9.1    5.31  )-----------( 160      ( 19 Dec 2021 06:53 )             27.4     12-18    138  |  103  |  17  ----------------------------<  78  4.0   |  18  |  0.8    Ca    7.1<L>      18 Dec 2021 04:30    TPro  4.5<L>  /  Alb  2.4<L>  /  TBili  0.7  /  DBili  x   /  AST  35  /  ALT  22  /  AlkPhos  1156<H>  12-18      RADIOLOGY:      PHYSICAL EXAM:  Gen: comfortable; not in distress  Neck: no nodes, no JVD  chest: left AICD  lungs: clr b/l   hrt: s1 s2 normal   abd: soft, NT/ND  : + condom cath - urine dark yellow, clear  ext: no edema,   skin: normal     ASSESSMENT & PLAN  72 yo M with PMH of CVA, afib on Eliquis, HTN, HFrEF (EF 20-25% 2018), chronic LLE pain, presented to the ED with worsening Left LE pain for the past 2 months, worsening fatigue, anorexia and significant weight loss. Of note, pt had previously been worked up for the leg pain including a CT scan in march 2021 in [facility], which was negative.    # 1-time fever to 100.4F; lethargy 2/2 MSSA bacteremia; NO SEPSIS  source: possibly skin (sacrum/buttocks) in pt w/ active cancer  no rise in WBC  1st U/A was pos, but the 2nd U/A was neg, denies urinary symptoms   ALL: PCN, Cephalo minor reaction d/c bactrim and start cefazolin for 6 weeks per ID   hold A/C for eventual PICC or midline (see below)  echo (TTE) - neg for veg; prefer to NOT pursue FREDERICK in this sick pt (aware pt has AICD) - let's see if bld cx's clear first  f/u blood culture tomorrow  IR eval for PICC line next week once culture is back and clear    # Neopl-related severe pain 2/2 diffuse bony sclerotic lesions (AP elevated, improving) likely 2/2 prostate cancer (irregularly marginated prostate gland on CT and PSA 1400 and confirmed by bx)  CT chest/abdomen/pelvis - Innumerable diffuse sclerotic osseous lesions -  including 6x5.8x9.4cm mass involving the left iliac wing and 6.6x4.5x4.6cm mass involving left inferior pubic ramus. Expansile sclerotic lesions are noted to involve the right 2nd lateral rib and right 5th anterior rib.  IR s/p bx lt iliac lesion 12/3: path (Dr Erickson x 2201): confirmed it is prostate cancer; see cyto results   bone scan -> diff bone mets, including vert (kidneys not seen on bone scan b/o such intense uptake by diff bone mets)  rad/onc eval can be done as outpt  h/o (Dr Mena) felt MRIs were not needed (and hard for pt to do) -> so MRIs cancelled  h/o beginning tx:  AP improved, but still in 1000s   Casodex 50mg po daily til 12/17  Lupron 22.5 mg IM x1 on 12/8 (done) - will be monthly  Abiraterone 1000 mg po daily and Prednisone 5 mg po daily  h/o will start Xgeva as outpatient to reduce bone related complications  c/w dilaudid 2mg po q3 prn mod-sev pain  d/c dilaudid 4mg - gets too tired w/ this  c/w fentanyl patch 12 mcg/hr top q72 for longer-acting relief  c/w neurontin 400mg po q8  bowel reg    # metab acid w/ high gap  improving  doubt starvation ketosis as u/a shows NO ketones and pt does eat  lactate was nl recently - and currently 1.7  could be result of high tumor burden, which is poor prog sign  Cr (baseline ~ 0.8)  c/w oral NaHCO3 650mg po q8    # hypernatremia - resolved     # Failure to thrive - is eating a little w/ asst (not great)  MVI q24  Ensure 3x/day  asst w/ feeds (family or staff) 1:1  no PEG or NGT    # Symptomatic normo to macro anemia (mild pancytopenia)  no overt bleed  prob related to malignancy and diffuse bone mets  s/p PRBC transfusions -> hgb better  keep hgb > 8, transfuse one PRBC today   LDH elevated  Iron panel: iron sat 32%; ferr: 2624 = NOT SELENA  Folate nl; B12: 1540    # h/o HTN; h/o CVA, Afib; H/o HFrEF (EF 20-25% in 2018);  aldactone 25mg po q24 d/everardo - K is rising and BP on lower side  Eliquis 5mg po q12 - HOLD in prep for midline or PICC line  c/w lovenox 50mg sc q12 (will go home on DOAC though)  c/w Entresto 24/26 po q12   c/w metoprolol ER 25mg po q24   c/w Ivabradine 5mg po q12  BP is starting to get low again, might need to hold entresto again    # prior DVTs from 2018  < from: [facility] Duplex Lower Ext Vein Scan, Bilat (08.17.18 @ 20:56) >  Acute-appearing deep venous thrombosis of the right common femoral, femoral, popliteal, gastrocnemius, and peroneal veins.  Thrombus is present in a left posterior tibial (calf) vein branch.  no need to rpt V Dupl  pt on a/c for afib (see above)    # Activity: pt is basically bedbound; sister does not want to pursue PT again (pt has too much pain); sister will take him home    # GI PPX: NA    # DVT PPX: see Afib above; hold eliquis; start therap LMWH (see above)    # Full code - since heading more towards a pall care route (though w/ anticancer tx), will talk w/ family about DNR and DNI options    # updated sister today; updated her with the plan. she does NOT want SNF for STR (to hard for pt); she will take pt home; needs hosp bed; will need HC for iv abx    Dispo: PICC line placement next week, f/u cultures. PRBC today and repeat CBC   current plan is to go home w/ HC next week; need hosp bed at home - f/u CM    Prog is very poor overall. Sister is very well aware. 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   SAMANTHA CHARLES 71y Male  MRN#: [id number]   CODE STATUS:full code       SUBJECTIVE  Patient is a 71y old Male who presents with a chief complaint of placement, pain (18 Dec 2021 11:32)  Currently admitted to medicine with the primary diagnosis of Prostate cancer metastatic to bone  Today is hospital day 10d, and this morning he is resting in bed and reports no overnight events.     OBJECTIVE  PAST MEDICAL & SURGICAL HISTORY  Systolic congestive heart failure, unspecified chronicity    Cerebrovascular accident (CVA) due to bilateral embolism of posterior cerebral arteries    Essential hypertension    Atrial fibrillation, unspecified type    AICD (automatic cardioverter/defibrillator) present      ALLERGIES:  cephalexin (Flushing)  penicillin (Rash)    MEDICATIONS:  STANDING MEDICATIONS  bicalutamide 50 milliGRAM(s) Oral daily  enoxaparin Injectable 50 milliGRAM(s) SubCutaneous every 12 hours  fentaNYL   Patch  12 MICROgram(s)/Hr 1 Patch Transdermal every 72 hours  gabapentin 400 milliGRAM(s) Oral every 8 hours  ivabradine 5 milliGRAM(s) Oral two times a day  metoprolol succinate ER 25 milliGRAM(s) Oral daily  multivitamin 1 Tablet(s) Oral daily  predniSONE   Tablet 5 milliGRAM(s) Oral daily  sacubitril 24 mG/valsartan 26 mG 1 Tablet(s) Oral two times a day  sodium bicarbonate 650 milliGRAM(s) Oral every 8 hours  vancomycin  IVPB 750 milliGRAM(s) IV Intermittent every 24 hours  Zytiga 1000 milliGRAM(s) 1000 milliGRAM(s) Oral daily    PRN MEDICATIONS  acetaminophen     Tablet .. 650 milliGRAM(s) Oral every 6 hours PRN  HYDROmorphone   Tablet 2 milliGRAM(s) Oral every 3 hours PRN  melatonin 3 milliGRAM(s) Oral at bedtime PRN      VITAL SIGNS: Last 24 Hours  T(C): 36.1 (19 Dec 2021 05:09), Max: 36.6 (18 Dec 2021 11:45)  T(F): 96.9 (19 Dec 2021 05:09), Max: 97.8 (18 Dec 2021 11:45)  HR: 82 (19 Dec 2021 05:09) (82 - 108)  BP: 103/51 (19 Dec 2021 05:09) (96/52 - 103/51)  BP(mean): 74 (18 Dec 2021 21:26) (74 - 74)  RR: 18 (19 Dec 2021 05:09) (16 - 18)  SpO2: --    LABS:                        9.1    5.31  )-----------( 160      ( 19 Dec 2021 06:53 )             27.4     12-18    138  |  103  |  17  ----------------------------<  78  4.0   |  18  |  0.8    Ca    7.1<L>      18 Dec 2021 04:30    TPro  4.5<L>  /  Alb  2.4<L>  /  TBili  0.7  /  DBili  x   /  AST  35  /  ALT  22  /  AlkPhos  1156<H>  12-18      RADIOLOGY:      PHYSICAL EXAM:  Gen: comfortable; not in distress  Neck: no nodes, no JVD  chest: left AICD  lungs: clr b/l   hrt: s1 s2 normal   abd: soft, NT/ND  : + condom cath - urine dark yellow, clear  ext: no edema,   skin: normal     ASSESSMENT & PLAN  70 yo M with PMH of CVA, afib on Eliquis, HTN, HFrEF (EF 20-25% 2018), chronic LLE pain, presented to the ED with worsening Left LE pain for the past 2 months, worsening fatigue, anorexia and significant weight loss. Of note, pt had previously been worked up for the leg pain including a CT scan in march 2021 in [facility], which was negative.    # 1-time fever to 100.4F; lethargy 2/2 MSSA bacteremia; NO SEPSIS  source: possibly skin (sacrum/buttocks) in pt w/ active cancer  no rise in WBC  1st U/A was pos, but the 2nd U/A was neg, denies urinary symptoms   ALL: PCN, received Ancef yesterday no reactions d/c bactrim and c/w cefazolin for 6 weeks per ID   hold A/C for eventual PICC or midline (see below)  echo (TTE) - neg for veg; prefer to NOT pursue FREDERICK in this sick pt (aware pt has AICD) - let's see if bld cx's clear first  f/u blood culture   IR eval for PICC line next week once culture is back and clear    # Neopl-related severe pain 2/2 diffuse bony sclerotic lesions (AP elevated, improving) likely 2/2 prostate cancer (irregularly marginated prostate gland on CT and PSA 1400 and confirmed by bx)  CT chest/abdomen/pelvis - Innumerable diffuse sclerotic osseous lesions -  including 6x5.8x9.4cm mass involving the left iliac wing and 6.6x4.5x4.6cm mass involving left inferior pubic ramus. Expansile sclerotic lesions are noted to involve the right 2nd lateral rib and right 5th anterior rib.  IR s/p bx lt iliac lesion 12/3: path (Dr Erickson x 0749): confirmed it is prostate cancer; see cyto results   bone scan -> diff bone mets, including vert (kidneys not seen on bone scan b/o such intense uptake by diff bone mets)  rad/onc eval can be done as outpt  h/o (Dr Mena) felt MRIs were not needed (and hard for pt to do) -> so MRIs cancelled  h/o beginning tx:  AP improved, but still in 1000s   Casodex 50mg po daily til 12/17  Lupron 22.5 mg IM x1 on 12/8 (done) - will be monthly  Abiraterone 1000 mg po daily and Prednisone 5 mg po daily  h/o will start Xgeva as outpatient to reduce bone related complications  c/w dilaudid 2mg po q3 prn mod-sev pain  d/c dilaudid 4mg - gets too tired w/ this  c/w fentanyl patch 12 mcg/hr top q72 for longer-acting relief  c/w neurontin 400mg po q8  bowel reg    # metab acid w/ high gap  improving  doubt starvation ketosis as u/a shows NO ketones and pt does eat  lactate was nl recently - and currently 1.7  could be result of high tumor burden, which is poor prog sign  Cr (baseline ~ 0.8)  c/w oral NaHCO3 650mg po q8    # hypernatremia - resolved     # Failure to thrive - is eating a little w/ asst (not great)  MVI q24  Ensure 3x/day  asst w/ feeds (family or staff) 1:1  no PEG or NGT    # Symptomatic normo to macro anemia (mild pancytopenia)  no overt bleed  prob related to malignancy and diffuse bone mets  s/p PRBC transfusions -> hgb better today  keep hgb > 8,   LDH elevated  Iron panel: iron sat 32%; ferr: 2624 = NOT SELENA  Folate nl; B12: 1540    # h/o HTN; h/o CVA, Afib; H/o HFrEF (EF 30% this admission);  aldactone 25mg po q24 d/everardo - K is rising and BP on lower side  Eliquis 5mg po q12 - HOLD in prep for midline or PICC line  c/w lovenox 50mg sc q12 (will go home on DOAC though)  c/w Entresto 24/26 po q12   c/w metoprolol ER 25mg po q24   c/w Ivabradine 5mg po q12  BP is starting to get low again, might need to hold entresto again    # prior DVTs from 2018  < from: [facility] Duplex Lower Ext Vein Scan, Bilat (08.17.18 @ 20:56) >  Acute-appearing deep venous thrombosis of the right common femoral, femoral, popliteal, gastrocnemius, and peroneal veins.  Thrombus is present in a left posterior tibial (calf) vein branch.  no need to rpt V Dupl  pt on a/c for afib (see above)    # Activity: pt is basically bedbound; sister does not want to pursue PT again (pt has too much pain); sister will take him home    # GI PPX: NA    # DVT PPX: see Afib above; hold eliquis; start therap LMWH (see above)    # Full code - since heading more towards a pall care route (though w/ anticancer tx), will talk w/ family about DNR and DNI options    # sister does NOT want SNF for STR (to hard for pt); she will take pt home; needs hosp bed; will need HC for iv abx    Dispo: PICC line placement next week, f/u cultures.   current plan is to go home w/ HC next week; need hosp bed at home - f/u CM    Prog is very poor overall. Sister is very well aware. If pt expires in hospital, please note he is [employer] and his body should NOT be touched until family is called and his Imam is called.

## 2021-12-20 DIAGNOSIS — R41.82 ALTERED MENTAL STATUS, UNSPECIFIED: ICD-10-CM

## 2021-12-20 DIAGNOSIS — Z88.0 ALLERGY STATUS TO PENICILLIN: ICD-10-CM

## 2021-12-20 DIAGNOSIS — I95.9 HYPOTENSION, UNSPECIFIED: ICD-10-CM

## 2021-12-20 DIAGNOSIS — N17.9 ACUTE KIDNEY FAILURE, UNSPECIFIED: ICD-10-CM

## 2021-12-20 DIAGNOSIS — G89.3 NEOPLASM RELATED PAIN (ACUTE) (CHRONIC): ICD-10-CM

## 2021-12-20 DIAGNOSIS — Z95.810 PRESENCE OF AUTOMATIC (IMPLANTABLE) CARDIAC DEFIBRILLATOR: ICD-10-CM

## 2021-12-20 DIAGNOSIS — R53.1 WEAKNESS: ICD-10-CM

## 2021-12-20 DIAGNOSIS — R64 CACHEXIA: ICD-10-CM

## 2021-12-20 DIAGNOSIS — I50.22 CHRONIC SYSTOLIC (CONGESTIVE) HEART FAILURE: ICD-10-CM

## 2021-12-20 DIAGNOSIS — Z79.01 LONG TERM (CURRENT) USE OF ANTICOAGULANTS: ICD-10-CM

## 2021-12-20 DIAGNOSIS — R63.0 ANOREXIA: ICD-10-CM

## 2021-12-20 DIAGNOSIS — M79.605 PAIN IN LEFT LEG: ICD-10-CM

## 2021-12-20 DIAGNOSIS — C79.51 SECONDARY MALIGNANT NEOPLASM OF BONE: ICD-10-CM

## 2021-12-20 DIAGNOSIS — K59.00 CONSTIPATION, UNSPECIFIED: ICD-10-CM

## 2021-12-20 DIAGNOSIS — Z86.73 PERSONAL HISTORY OF TRANSIENT ISCHEMIC ATTACK (TIA), AND CEREBRAL INFARCTION WITHOUT RESIDUAL DEFICITS: ICD-10-CM

## 2021-12-20 DIAGNOSIS — I48.91 UNSPECIFIED ATRIAL FIBRILLATION: ICD-10-CM

## 2021-12-20 DIAGNOSIS — I11.0 HYPERTENSIVE HEART DISEASE WITH HEART FAILURE: ICD-10-CM

## 2021-12-20 DIAGNOSIS — R62.7 ADULT FAILURE TO THRIVE: ICD-10-CM

## 2021-12-20 DIAGNOSIS — D61.818 OTHER PANCYTOPENIA: ICD-10-CM

## 2021-12-20 DIAGNOSIS — C61 MALIGNANT NEOPLASM OF PROSTATE: ICD-10-CM

## 2021-12-20 DIAGNOSIS — E43 UNSPECIFIED SEVERE PROTEIN-CALORIE MALNUTRITION: ICD-10-CM

## 2021-12-20 DIAGNOSIS — D63.8 ANEMIA IN OTHER CHRONIC DISEASES CLASSIFIED ELSEWHERE: ICD-10-CM

## 2021-12-20 DIAGNOSIS — E87.2 ACIDOSIS: ICD-10-CM

## 2021-12-20 LAB
ALBUMIN SERPL ELPH-MCNC: 2.4 G/DL — LOW (ref 3.5–5.2)
ALP SERPL-CCNC: 1618 U/L — HIGH (ref 30–115)
ALT FLD-CCNC: 16 U/L — SIGNIFICANT CHANGE UP (ref 0–41)
ANION GAP SERPL CALC-SCNC: 17 MMOL/L — HIGH (ref 7–14)
AST SERPL-CCNC: 27 U/L — SIGNIFICANT CHANGE UP (ref 0–41)
BASOPHILS # BLD AUTO: 0.02 K/UL — SIGNIFICANT CHANGE UP (ref 0–0.2)
BASOPHILS NFR BLD AUTO: 0.4 % — SIGNIFICANT CHANGE UP (ref 0–1)
BILIRUB SERPL-MCNC: 0.6 MG/DL — SIGNIFICANT CHANGE UP (ref 0.2–1.2)
BUN SERPL-MCNC: 19 MG/DL — SIGNIFICANT CHANGE UP (ref 10–20)
CALCIUM SERPL-MCNC: 7.8 MG/DL — LOW (ref 8.5–10.1)
CHLORIDE SERPL-SCNC: 105 MMOL/L — SIGNIFICANT CHANGE UP (ref 98–110)
CO2 SERPL-SCNC: 20 MMOL/L — SIGNIFICANT CHANGE UP (ref 17–32)
CREAT SERPL-MCNC: 0.7 MG/DL — SIGNIFICANT CHANGE UP (ref 0.7–1.5)
EOSINOPHIL # BLD AUTO: 0.06 K/UL — SIGNIFICANT CHANGE UP (ref 0–0.7)
EOSINOPHIL NFR BLD AUTO: 1.2 % — SIGNIFICANT CHANGE UP (ref 0–8)
GLUCOSE SERPL-MCNC: 94 MG/DL — SIGNIFICANT CHANGE UP (ref 70–99)
HCT VFR BLD CALC: 25.9 % — LOW (ref 42–52)
HGB BLD-MCNC: 8.5 G/DL — LOW (ref 14–18)
IMM GRANULOCYTES NFR BLD AUTO: 5 % — HIGH (ref 0.1–0.3)
LYMPHOCYTES # BLD AUTO: 0.97 K/UL — LOW (ref 1.2–3.4)
LYMPHOCYTES # BLD AUTO: 19.4 % — LOW (ref 20.5–51.1)
MCHC RBC-ENTMCNC: 30.8 PG — SIGNIFICANT CHANGE UP (ref 27–31)
MCHC RBC-ENTMCNC: 32.8 G/DL — SIGNIFICANT CHANGE UP (ref 32–37)
MCV RBC AUTO: 93.8 FL — SIGNIFICANT CHANGE UP (ref 80–94)
MONOCYTES # BLD AUTO: 0.16 K/UL — SIGNIFICANT CHANGE UP (ref 0.1–0.6)
MONOCYTES NFR BLD AUTO: 3.2 % — SIGNIFICANT CHANGE UP (ref 1.7–9.3)
NEUTROPHILS # BLD AUTO: 3.55 K/UL — SIGNIFICANT CHANGE UP (ref 1.4–6.5)
NEUTROPHILS NFR BLD AUTO: 70.8 % — SIGNIFICANT CHANGE UP (ref 42.2–75.2)
NRBC # BLD: 1 /100 WBCS — HIGH (ref 0–0)
PLATELET # BLD AUTO: 150 K/UL — SIGNIFICANT CHANGE UP (ref 130–400)
POTASSIUM SERPL-MCNC: 3.6 MMOL/L — SIGNIFICANT CHANGE UP (ref 3.5–5)
POTASSIUM SERPL-SCNC: 3.6 MMOL/L — SIGNIFICANT CHANGE UP (ref 3.5–5)
PROT SERPL-MCNC: 4.8 G/DL — LOW (ref 6–8)
RBC # BLD: 2.76 M/UL — LOW (ref 4.7–6.1)
RBC # FLD: 16.5 % — HIGH (ref 11.5–14.5)
SARS-COV-2 RNA SPEC QL NAA+PROBE: SIGNIFICANT CHANGE UP
SODIUM SERPL-SCNC: 142 MMOL/L — SIGNIFICANT CHANGE UP (ref 135–146)
WBC # BLD: 5.01 K/UL — SIGNIFICANT CHANGE UP (ref 4.8–10.8)
WBC # FLD AUTO: 5.01 K/UL — SIGNIFICANT CHANGE UP (ref 4.8–10.8)

## 2021-12-20 PROCEDURE — 99232 SBSQ HOSP IP/OBS MODERATE 35: CPT

## 2021-12-20 RX ADMIN — GABAPENTIN 400 MILLIGRAM(S): 400 CAPSULE ORAL at 22:08

## 2021-12-20 RX ADMIN — IVABRADINE 5 MILLIGRAM(S): 7.5 TABLET, FILM COATED ORAL at 06:48

## 2021-12-20 RX ADMIN — GABAPENTIN 400 MILLIGRAM(S): 400 CAPSULE ORAL at 06:18

## 2021-12-20 RX ADMIN — ENOXAPARIN SODIUM 50 MILLIGRAM(S): 100 INJECTION SUBCUTANEOUS at 19:07

## 2021-12-20 RX ADMIN — ENOXAPARIN SODIUM 50 MILLIGRAM(S): 100 INJECTION SUBCUTANEOUS at 06:21

## 2021-12-20 RX ADMIN — Medication 100 MILLIGRAM(S): at 13:03

## 2021-12-20 RX ADMIN — Medication 100 MILLIGRAM(S): at 06:20

## 2021-12-20 RX ADMIN — Medication 1 TABLET(S): at 11:26

## 2021-12-20 RX ADMIN — Medication 650 MILLIGRAM(S): at 06:17

## 2021-12-20 RX ADMIN — FENTANYL CITRATE 1 PATCH: 50 INJECTION INTRAVENOUS at 20:08

## 2021-12-20 RX ADMIN — SACUBITRIL AND VALSARTAN 1 TABLET(S): 24; 26 TABLET, FILM COATED ORAL at 22:08

## 2021-12-20 RX ADMIN — FENTANYL CITRATE 1 PATCH: 50 INJECTION INTRAVENOUS at 06:48

## 2021-12-20 RX ADMIN — IVABRADINE 5 MILLIGRAM(S): 7.5 TABLET, FILM COATED ORAL at 16:48

## 2021-12-20 RX ADMIN — Medication 650 MILLIGRAM(S): at 22:08

## 2021-12-20 RX ADMIN — BICALUTAMIDE 50 MILLIGRAM(S): 50 TABLET, FILM COATED ORAL at 11:27

## 2021-12-20 RX ADMIN — SACUBITRIL AND VALSARTAN 1 TABLET(S): 24; 26 TABLET, FILM COATED ORAL at 06:19

## 2021-12-20 RX ADMIN — Medication 650 MILLIGRAM(S): at 13:03

## 2021-12-20 RX ADMIN — GABAPENTIN 400 MILLIGRAM(S): 400 CAPSULE ORAL at 13:03

## 2021-12-20 RX ADMIN — Medication 5 MILLIGRAM(S): at 06:18

## 2021-12-20 RX ADMIN — Medication 100 MILLIGRAM(S): at 22:08

## 2021-12-20 RX ADMIN — Medication 25 MILLIGRAM(S): at 06:17

## 2021-12-20 NOTE — PROGRESS NOTE ADULT - TIME BILLING
spent a lot of time today taking w/ sister and his PMD twice (3 separate conversations)    care coord w/ CM (2 separate conversations)
I have personally seen and examined this patient.  I have reviewed all pertinent clinical information and reviewed all relevant imaging and diagnostic studies personally.   I counseled the patient about diagnostic testing and treatment plan. All questions were answered.  I discussed recommendations with the primary team.

## 2021-12-20 NOTE — PROGRESS NOTE ADULT - ASSESSMENT
ASSESSMENT  71yMale with a PMH of. CVA, a fib on elliquis, HTN, HF s/p AICD with metastatic prostate cancer. Patient had a low grade fever and blood cx on 12/15 was positive for MSSA    IMPRESSION  #MSSA bacteremia     12/17 BCX NGTD    12/15 BCX +  most likely source L antecubital phlebitis from PIV from prior hospitalization  AICD in place- no evidence of infection  TTE no vegetations   No PNA  No UTI  #UCX MRSA, denies urinary symptoms, asymptomatic bacteriuria    UA without significant pyuria   #Metastatic prostate ca    RECOMMENDATIONS  - f/u repeat BCX  - cefazolin 2g q8h IV   - FREDERICK if within GOC   - once bcx cleared, if within GOC PICC x 6 weeks IV (as unclear source and cardiac device in place)  - remove cephalexin from allergies     If any questions, please call or send a message on [employer] Teams  Please continue to update ID with any pertinent new laboratory or radiographic findings  Spectra [id number]  ASSESSMENT  71yMale with a PMH of. CVA, a fib on elliquis, HTN, HF s/p AICD with metastatic prostate cancer. Patient had a low grade fever and blood cx on 12/15 was positive for MSSA    IMPRESSION  #MSSA bacteremia     12/17 BCX NGTD    12/15 BCX +  most likely source L antecubital phlebitis from PIV from prior hospitalization  AICD in place- no evidence of infection  TTE no vegetations   No PNA  No UTI  #UCX MRSA, denies urinary symptoms, asymptomatic bacteriuria    UA without significant pyuria   #Metastatic prostate ca    RECOMMENDATIONS  - f/u repeat BCX  - cefazolin 2g q8h IV   - FREDERICK if within GOC , upon discussion with family, will hold off   - once bcx cleared, if within GOC PICC x 6 weeks IV from date of cleared cultures (as unclear source and cardiac device in place)  - remove cephalexin from allergies     If any questions, please call or send a message on [employer] Teams  Please continue to update ID with any pertinent new laboratory or radiographic findings  Spectra [id number]

## 2021-12-20 NOTE — PROGRESS NOTE ADULT - ASSESSMENT
72 yo M with PMH of CVA, afib on Eliquis, HTN, HFrEF (EF 20-25% 2018), chronic LLE pain, presented to the ED with worsening Left LE pain for the past 2 months, worsening fatigue, anorexia and significant weight loss. Of note, pt had previously been worked up for the leg pain including a CT scan in march 2021 in [facility], which was negative.    # Neoplasm-related severe pain 2/2 diffuse bony sclerotic lesions (AP 2765) likely 2/2 prostate cancer (irregularly marginated prostate gland on CT and PSA 1400)  -CT chest/abdomen/pelvis - Innumerable diffuse sclerotic osseous lesions -  including 6x5.8x9.4cm mass L iliac wing and 6.6x4.5x4.6cm mass of left inferior pubic ramus. Expansile sclerotic lesions of R 2nd lateral rib, R 5th anterior rib.  -bone scan -> diff bone mets, including vert (kidneys not seen b/o such intense uptake by diff bone mets)  -s/p IR bx lt iliac lesion 12/3: metastatic prostate cancer  -s/p Hem Onc eval and Pall Care eval   -Casodex 50mg po daily x2wk total (stop 12/17/21)  -Lupron 22.5 mg IM q1mo (last 12/8/21)  -Abiraterone 1000 mg po daily and Prednisone 5 mg po daily  -Heme/Onc planned to  start Xgeva as outpatient to reduce bone related complications  -c/w dilaudid 2mg po q3 prn pain  -cont fentanyl patch 12 mcg/hr top q72 for longer-acting relief  -cont neurontin 300mg po q8  -bowel reg    #MSSA bacteremia  -low grade fever 12/15- UA initially positive, CXR showed unchanged RUL opacity.  -BCx (12/15)- MSSA  -s/p Bactrim x5d for UTI (12/20/21)  -s/p vanc  -c/w cefazolin 2g q8 x6wk following negative BCx - 12/18 End date would be 1/29/22  -TTE: no vegetations, will hold off on FREDERICK due to pt condition/possible decompensation with procedure  - f/u daily BCx 12/18-negative blood culture, 12/19, 12/20,  - f/u repeat BCX  12/20  - PICC placement + d/c following negative BCx for AB x 6 weeks IV from date of cleared cultures (as unclear source and cardiac device in place)    # Failure to thrive  -dietician: severe protein-calorie malnutrition  -MVI q24  -Beneprotein 3x/day     # Symptomatic normo to macro anemia (mild pancytopenia)  -no overt bleed, s/p pRBCs last admission  -prob related to malignancy and diffuse bone mets  -, Folate nl, B12 1540  -Iron panel: iron sat 32%; ferr: 2624 = NOT SELENA  -keep hgb > 8    # FELICITY - likely dehydration (resolved); metab acid w/ high gap (prob result of high tumor burden, which is poor prog sign)  -Cr (baseline ~ 0.8)  -Entresto held- may restart after gado given for MRI, if BP will allow  -c/w NaHCO3 1300mg po q12 til HCO3 > 22    # h/o HTN  #h/o CVA  #Afib  #H/o HFrEF (EF 20-25% in 2018)  -c/w Eliquis 5mg po q12, metoprolol  -c/w aldactone, entresto, ivabradine    # Activity  -Amb as tolerated    # GI PPX  -NA    # DVT PPX  -Eliquis    # Full code    # Handoff   Fu BC negative for PICC placement and DC  Dispo planning (A, no SNF)      Dispo: PICC line placement requested   current plan is to go to snf  Prog is very poor overall. Sister is very well aware. If pt expires in hospital, please note he is [place] and his body should NOT be touched until family is called and his Imam is called.

## 2021-12-20 NOTE — PROGRESS NOTE ADULT - SUBJECTIVE AND OBJECTIVE BOX
SAMANTHA CHARLES  71y, Male  Allergy: cephalexin (Flushing)  penicillin (Rash)      LOS  11d    CHIEF COMPLAINT: placement, pain (18 Dec 2021 11:32)      INTERVAL EVENTS/HPI  - No acute events overnight  - T(F): , Max: 98.9 (12-19-21 @ 19:53)  - Tolerating medication  - WBC Count: 5.01 (12-20-21 @ 06:30)  WBC Count: 5.19 (12-19-21 @ 20:00)  - Creatinine, Serum: 0.7 (12-20-21 @ 06:30)  Creatinine, Serum: 0.8 (12-19-21 @ 20:00)       ROS  ***    VITALS:  T(F): 98.9, Max: 98.9 (12-19-21 @ 19:53)  HR: 78  BP: 101/55  RR: 18Vital Signs Last 24 Hrs  T(C): 37.2 (19 Dec 2021 19:53), Max: 37.2 (19 Dec 2021 19:53)  T(F): 98.9 (19 Dec 2021 19:53), Max: 98.9 (19 Dec 2021 19:53)  HR: 78 (20 Dec 2021 05:10) (78 - 86)  BP: 101/55 (20 Dec 2021 05:10) (96/50 - 113/53)  BP(mean): 76 (19 Dec 2021 19:53) (76 - 76)  RR: 18 (20 Dec 2021 05:10) (16 - 18)  SpO2: --    PHYSICAL EXAM:  ***    FH: Non-contributory  Social Hx: Non-contributory    TESTS & MEASUREMENTS:                        8.5    5.01  )-----------( 150      ( 20 Dec 2021 06:30 )             25.9     12-20    142  |  105  |  19  ----------------------------<  94  3.6   |  20  |  0.7    Ca    7.8<L>      20 Dec 2021 06:30  Mg     2.4     12-19    TPro  4.8<L>  /  Alb  2.4<L>  /  TBili  0.6  /  DBili  x   /  AST  27  /  ALT  16  /  AlkPhos  1618<H>  12-20    eGFR if Non African American: 95 mL/min/1.73M2 (12-20-21 @ 06:30)  eGFR if African American: 110 mL/min/1.73M2 (12-20-21 @ 06:30)  eGFR if Non African American: 90 mL/min/1.73M2 (12-19-21 @ 20:00)  eGFR if : 104 mL/min/1.73M2 (12-19-21 @ 20:00)    LIVER FUNCTIONS - ( 20 Dec 2021 06:30 )  Alb: 2.4 g/dL / Pro: 4.8 g/dL / ALK PHOS: 1618 U/L / ALT: 16 U/L / AST: 27 U/L / GGT: x               Culture - Blood (collected 12-18-21 @ 04:30)  Source: .Blood Blood-Venous  Preliminary Report (12-19-21 @ 16:01):    No growth to date.    Culture - Blood (collected 12-18-21 @ 04:30)  Source: .Blood Blood-Venous  Preliminary Report (12-19-21 @ 16:01):    No growth to date.    Culture - Urine (collected 12-15-21 @ 10:35)  Source: Catheterized Catheterized  Final Report (12-18-21 @ 17:50):    50,000 - 99,000 CFU/mL Methicillin Resistant Staphylococcus aureus  Organism: Methicillin resistant Staphylococcus aureus (12-18-21 @ 17:50)  Organism: Methicillin resistant Staphylococcus aureus (12-18-21 @ 17:50)      -  Ampicillin/Sulbactam: R 16/8      -  Cefazolin: R >16      -  Daptomycin: S 0.5      -  Gentamicin: R >8 Should not be used as monotherapy      -  Linezolid: S 2      -  Oxacillin: R >2      -  Penicillin: R >8      -  Rifampin: S <=1 Should not be used as monotherapy      -  Tetra/Doxy: S 2      -  Trimethoprim/Sulfamethoxazole: S <=0.5/9.5      -  Vancomycin: S 2      Method Type: LISBETH    Culture - Blood (collected 12-15-21 @ 07:00)  Source: .Blood Blood  Gram Stain (12-16-21 @ 13:23):    Growth in aerobic bottle: Gram Positive Cocci in Clusters    Growth in anaerobic bottle: Gram Positive Cocci in Clusters  Final Report (12-18-21 @ 10:48):    Growth in aerobic and anaerobic bottles: Staphylococcus aureus    ***Blood Panel PCR results on this specimen are available    approximately 3 hours after the Gram stain result.***    Gram stain, PCR, and/or culture results may not always    correspond dueto difference in methodologies.    ************************************************************    This PCR assay was performed by multiplex PCR. This    Assay tests for 66 bacterial and resistance gene targets.    Please refer to the [facility] test directory    at [email or web address] for details.  Organism: Blood Culture PCR  Staphylococcus aureus (12-18-21 @ 10:48)  Organism: Staphylococcus aureus (12-18-21 @ 10:48)      -  Ampicillin/Sulbactam: S <=8/4      -  Cefazolin: S <=4      -  Clindamycin: S <=0.25      -  Erythromycin: S <=0.25      -  Gentamicin: S <=1 Should not be used as monotherapy      -  Oxacillin: S <=0.25      -  Penicillin: R >8      -  Rifampin: S <=1 Should not be used as monotherapy      -  Tetra/Doxy: S <=1      -  Trimethoprim/Sulfamethoxazole: S <=0.5/9.5      -  Vancomycin: S 2      Method Type: LISBETH  Organism: Blood Culture PCR (12-18-21 @ 10:48)      -  Staphylococcus aureus: Detec Any isolate of Staphylococcus aureus from a blood culture is NOT considered a contaminant.      Method Type: PCR    Culture - Blood (collected 11-30-21 @ 11:50)  Source: .Blood Blood-Peripheral  Final Report (12-05-21 @ 23:01):    No Growth Final    Culture - Blood (collected 11-30-21 @ 11:50)  Source: .Blood Blood-Peripheral  Final Report (12-05-21 @ 23:01):    No Growth Final        Lactate, Blood: 1.2 mmol/L (12-15-21 @ 12:21)      INFECTIOUS DISEASES TESTING  Vancomycin Level, Trough: 11.8 (12-17-21 @ 17:21)  COVID-19 PCR: NotDetec (12-09-21 @ 15:49)  COVID-19 PCR: NotDetec (12-06-21 @ 13:50)  COVID-19 PCR: NotDetec (11-30-21 @ 16:08)  COVID-19 PCR: NotDetec (05-11-21 @ 00:45)      INFLAMMATORY MARKERS      RADIOLOGY & ADDITIONAL TESTS:  I have personally reviewed the last available Chest xray  CXR      CT      CARDIOLOGY TESTING      MEDICATIONS  bicalutamide 50 Oral daily  ceFAZolin   IVPB 2000 IV Intermittent every 8 hours  enoxaparin Injectable 50 SubCutaneous every 12 hours  fentaNYL   Patch  12 MICROgram(s)/Hr 1 Transdermal every 72 hours  gabapentin 400 Oral every 8 hours  ivabradine 5 Oral two times a day  metoprolol succinate ER 25 Oral daily  multivitamin 1 Oral daily  predniSONE   Tablet 5 Oral daily  sacubitril 24 mG/valsartan 26 mG 1 Oral two times a day  sodium bicarbonate 650 Oral every 8 hours  Zytiga 1000 milliGRAM(s) 1000 Oral daily      WEIGHT  Weight (kg): 54 (12-12-21 @ 20:25)  Creatinine, Serum: 0.7 mg/dL (12-20-21 @ 06:30)  Creatinine, Serum: 0.8 mg/dL (12-19-21 @ 20:00)      ANTIBIOTICS:  ceFAZolin   IVPB 2000 milliGRAM(s) IV Intermittent every 8 hours      All available historical records have been reviewed       SAMANTHA CHARLES  71y, Male  Allergy: cephalexin (Flushing)  penicillin (Rash)      LOS  11d    CHIEF COMPLAINT: placement, pain (18 Dec 2021 11:32)      INTERVAL EVENTS/HPI  - No acute events overnight  - T(F): , Max: 98.9 (12-19-21 @ 19:53)  - Tolerating medication  - WBC Count: 5.01 (12-20-21 @ 06:30)  WBC Count: 5.19 (12-19-21 @ 20:00)  - Creatinine, Serum: 0.7 (12-20-21 @ 06:30)  Creatinine, Serum: 0.8 (12-19-21 @ 20:00)       ROS  General: Denies rigors, nightsweats  HEENT: Denies headache, rhinorrhea, sore throat, eye pain  CV: Denies CP, palpitations  PULM: Denies wheezing, hemoptysis  GI: Denies hematemesis, hematochezia, melena  : Denies discharge, hematuria  MSK: Denies arthralgias, myalgias  SKIN: Denies rash, lesions  NEURO: Denies paresthesias, weakness  PSYCH: Denies depression, anxiety     VITALS:  T(F): 98.9, Max: 98.9 (12-19-21 @ 19:53)  HR: 78  BP: 101/55  RR: 18Vital Signs Last 24 Hrs  T(C): 37.2 (19 Dec 2021 19:53), Max: 37.2 (19 Dec 2021 19:53)  T(F): 98.9 (19 Dec 2021 19:53), Max: 98.9 (19 Dec 2021 19:53)  HR: 78 (20 Dec 2021 05:10) (78 - 86)  BP: 101/55 (20 Dec 2021 05:10) (96/50 - 113/53)  BP(mean): 76 (19 Dec 2021 19:53) (76 - 76)  RR: 18 (20 Dec 2021 05:10) (16 - 18)  SpO2: --    PHYSICAL EXAM:  Gen: NAD, resting in bed,c chronically ill appearing   HEENT: Normocephalic, atraumatic  Neck: supple, no lymphadenopathy  CV: Regular rate & regular rhythm  Lungs: decreased BS at bases, no fremitus  Abdomen: Soft, BS present  Ext: Warm, well perfused  Neuro: non focal, awake  Skin: no rash, no erythema  Lines: no phlebitis     FH: Non-contributory  Social Hx: Non-contributory    TESTS & MEASUREMENTS:                        8.5    5.01  )-----------( 150      ( 20 Dec 2021 06:30 )             25.9     12-20    142  |  105  |  19  ----------------------------<  94  3.6   |  20  |  0.7    Ca    7.8<L>      20 Dec 2021 06:30  Mg     2.4     12-19    TPro  4.8<L>  /  Alb  2.4<L>  /  TBili  0.6  /  DBili  x   /  AST  27  /  ALT  16  /  AlkPhos  1618<H>  12-20    eGFR if Non African American: 95 mL/min/1.73M2 (12-20-21 @ 06:30)  eGFR if African American: 110 mL/min/1.73M2 (12-20-21 @ 06:30)  eGFR if Non African American: 90 mL/min/1.73M2 (12-19-21 @ 20:00)  eGFR if : 104 mL/min/1.73M2 (12-19-21 @ 20:00)    LIVER FUNCTIONS - ( 20 Dec 2021 06:30 )  Alb: 2.4 g/dL / Pro: 4.8 g/dL / ALK PHOS: 1618 U/L / ALT: 16 U/L / AST: 27 U/L / GGT: x               Culture - Blood (collected 12-18-21 @ 04:30)  Source: .Blood Blood-Venous  Preliminary Report (12-19-21 @ 16:01):    No growth to date.    Culture - Blood (collected 12-18-21 @ 04:30)  Source: .Blood Blood-Venous  Preliminary Report (12-19-21 @ 16:01):    No growth to date.    Culture - Urine (collected 12-15-21 @ 10:35)  Source: Catheterized Catheterized  Final Report (12-18-21 @ 17:50):    50,000 - 99,000 CFU/mL Methicillin Resistant Staphylococcus aureus  Organism: Methicillin resistant Staphylococcus aureus (12-18-21 @ 17:50)  Organism: Methicillin resistant Staphylococcus aureus (12-18-21 @ 17:50)      -  Ampicillin/Sulbactam: R 16/8      -  Cefazolin: R >16      -  Daptomycin: S 0.5      -  Gentamicin: R >8 Should not be used as monotherapy      -  Linezolid: S 2      -  Oxacillin: R >2      -  Penicillin: R >8      -  Rifampin: S <=1 Should not be used as monotherapy      -  Tetra/Doxy: S 2      -  Trimethoprim/Sulfamethoxazole: S <=0.5/9.5      -  Vancomycin: S 2      Method Type: LISBETH    Culture - Blood (collected 12-15-21 @ 07:00)  Source: .Blood Blood  Gram Stain (12-16-21 @ 13:23):    Growth in aerobic bottle: Gram Positive Cocci in Clusters    Growth in anaerobic bottle: Gram Positive Cocci in Clusters  Final Report (12-18-21 @ 10:48):    Growth in aerobic and anaerobic bottles: Staphylococcus aureus    ***Blood Panel PCR results on this specimen are available    approximately 3 hours after the Gram stain result.***    Gram stain, PCR, and/or culture results may not always    correspond dueto difference in methodologies.    ************************************************************    This PCR assay was performed by multiplex PCR. This    Assay tests for 66 bacterial and resistance gene targets.    Please refer to the [facility] test directory    at [email or web address] for details.  Organism: Blood Culture PCR  Staphylococcus aureus (12-18-21 @ 10:48)  Organism: Staphylococcus aureus (12-18-21 @ 10:48)      -  Ampicillin/Sulbactam: S <=8/4      -  Cefazolin: S <=4      -  Clindamycin: S <=0.25      -  Erythromycin: S <=0.25      -  Gentamicin: S <=1 Should not be used as monotherapy      -  Oxacillin: S <=0.25      -  Penicillin: R >8      -  Rifampin: S <=1 Should not be used as monotherapy      -  Tetra/Doxy: S <=1      -  Trimethoprim/Sulfamethoxazole: S <=0.5/9.5      -  Vancomycin: S 2      Method Type: LISBETH  Organism: Blood Culture PCR (12-18-21 @ 10:48)      -  Staphylococcus aureus: Detec Any isolate of Staphylococcus aureus from a blood culture is NOT considered a contaminant.      Method Type: PCR    Culture - Blood (collected 11-30-21 @ 11:50)  Source: .Blood Blood-Peripheral  Final Report (12-05-21 @ 23:01):    No Growth Final    Culture - Blood (collected 11-30-21 @ 11:50)  Source: .Blood Blood-Peripheral  Final Report (12-05-21 @ 23:01):    No Growth Final        Lactate, Blood: 1.2 mmol/L (12-15-21 @ 12:21)      INFECTIOUS DISEASES TESTING  Vancomycin Level, Trough: 11.8 (12-17-21 @ 17:21)  COVID-19 PCR: NotDetec (12-09-21 @ 15:49)  COVID-19 PCR: NotDetec (12-06-21 @ 13:50)  COVID-19 PCR: NotDetec (11-30-21 @ 16:08)  COVID-19 PCR: NotDetec (05-11-21 @ 00:45)      INFLAMMATORY MARKERS      RADIOLOGY & ADDITIONAL TESTS:  I have personally reviewed the last available Chest xray  CXR      CT      CARDIOLOGY TESTING      MEDICATIONS  bicalutamide 50 Oral daily  ceFAZolin   IVPB 2000 IV Intermittent every 8 hours  enoxaparin Injectable 50 SubCutaneous every 12 hours  fentaNYL   Patch  12 MICROgram(s)/Hr 1 Transdermal every 72 hours  gabapentin 400 Oral every 8 hours  ivabradine 5 Oral two times a day  metoprolol succinate ER 25 Oral daily  multivitamin 1 Oral daily  predniSONE   Tablet 5 Oral daily  sacubitril 24 mG/valsartan 26 mG 1 Oral two times a day  sodium bicarbonate 650 Oral every 8 hours  Zytiga 1000 milliGRAM(s) 1000 Oral daily      WEIGHT  Weight (kg): 54 (12-12-21 @ 20:25)  Creatinine, Serum: 0.7 mg/dL (12-20-21 @ 06:30)  Creatinine, Serum: 0.8 mg/dL (12-19-21 @ 20:00)      ANTIBIOTICS:  ceFAZolin   IVPB 2000 milliGRAM(s) IV Intermittent every 8 hours      All available historical records have been reviewed

## 2021-12-20 NOTE — PROGRESS NOTE ADULT - SUBJECTIVE AND OBJECTIVE BOX
Patient is a 71y old  Male who presents with a chief complaint of placement, pain (18 Dec 2021 11:32)    72 yo M with PMH of CVA, afib on Eliquis, HTN, HFrEF ( EF 20-25% 2018), chronic LLE pain, presented to the ED from NH. Pt was discharged today from [facility] to [facility]. Family not happy with current NH and wants patient placed at [facility]. Pending outpt MRI as could not be done inpt 2/2 inavailability of EP/[employer] Rep for AICDS.     Hospital Course:    Patient was admitted to medicine and underwent bone biopsy by IR. He was evaluated by urology and hematology-oncology and started on hormonal therapy, casodex, while inpatient. He had a bone scan which showed multiple metastatic lesions including in the spine. Palliative care saw the patient and he was given PO dilaudid, neurontin, and 1 dose of dexamethasone for the left lower extremity pain. He has anemia of chronic disease and received 4 units pRBCs during the admission. Patient was seen by physical therapy and by dietitian regarding failure to thrive. Spoke with sister regarding poor prognosis and patient will be going to SNF for further care. He will be receiving chemotherapy outpatient at [facility]. He can get MRI of lumbar and thoracic spine outpatient and that will determine treatment by radiation oncology.     In the ED, pt hemodynamically stable. Repeat Labs stable.  Patient seen and examined at bedside.    ALLERGIES:  cephalexin (Flushing)  penicillin (Rash)    MEDICATIONS:  acetaminophen     Tablet .. 650 milliGRAM(s) Oral every 6 hours PRN  bicalutamide 50 milliGRAM(s) Oral daily  ceFAZolin   IVPB 2000 milliGRAM(s) IV Intermittent every 8 hours  enoxaparin Injectable 50 milliGRAM(s) SubCutaneous every 12 hours  fentaNYL   Patch  12 MICROgram(s)/Hr 1 Patch Transdermal every 72 hours  gabapentin 400 milliGRAM(s) Oral every 8 hours  HYDROmorphone   Tablet 2 milliGRAM(s) Oral every 3 hours PRN  ivabradine 5 milliGRAM(s) Oral two times a day  melatonin 3 milliGRAM(s) Oral at bedtime PRN  metoprolol succinate ER 25 milliGRAM(s) Oral daily  multivitamin 1 Tablet(s) Oral daily  predniSONE   Tablet 5 milliGRAM(s) Oral daily  sacubitril 24 mG/valsartan 26 mG 1 Tablet(s) Oral two times a day  sodium bicarbonate 650 milliGRAM(s) Oral every 8 hours  Zytiga 1000 milliGRAM(s) 1000 milliGRAM(s) Oral daily    Vital Signs Last 24 Hrs  T(F): 97.9 (20 Dec 2021 14:34), Max: 98.9 (19 Dec 2021 19:53)  HR: 72 (20 Dec 2021 14:34) (72 - 86)  BP: 94/55 (20 Dec 2021 14:34) (94/55 - 113/53)  RR: 16 (20 Dec 2021 14:34) (16 - 18)  SpO2: --  I&O's Summary    20 Dec 2021 07:01  -  20 Dec 2021 15:22  --------------------------------------------------------  IN: 0 mL / OUT: 400 mL / NET: -400 mL        PHYSICAL EXAM:  General: NAD, alert, cachexia   ENT: MMM  Neck: Supple, No JVD  Lungs: Clear to auscultation bilaterally  Cardio: RRR, S1/S2, No murmurs  Abdomen: Soft, Nontender, Nondistended; Bowel sounds present  Extremities: No cyanosis, No edema    LABS:                        8.5    5.01  )-----------( 150      ( 20 Dec 2021 06:30 )             25.9     12-20    142  |  105  |  19  ----------------------------<  94  3.6   |  20  |  0.7    Ca    7.8      20 Dec 2021 06:30  Mg     2.4     12-19    TPro  4.8  /  Alb  2.4  /  TBili  0.6  /  DBili  x   /  AST  27  /  ALT  16  /  AlkPhos  1618  12-20    eGFR if Non African American: 95 mL/min/1.73M2 (12-20-21 @ 06:30)  eGFR if African American: 110 mL/min/1.73M2 (12-20-21 @ 06:30)    PT/INR - ( 19 Dec 2021 20:00 )   PT: 17.30 sec;   INR: 1.51 ratio         PTT - ( 19 Dec 2021 20:00 )  PTT:38.0 sec                              Culture - Blood (collected 18 Dec 2021 04:30)  Source: .Blood Blood-Venous  Preliminary Report (19 Dec 2021 16:01):    No growth to date.    Culture - Blood (collected 18 Dec 2021 04:30)  Source: .Blood Blood-Venous  Preliminary Report (19 Dec 2021 16:01):    No growth to date.    Culture - Urine (collected 15 Dec 2021 10:35)  Source: Catheterized Catheterized  Final Report (18 Dec 2021 17:50):    50,000 - 99,000 CFU/mL Methicillin Resistant Staphylococcus aureus  Organism: Methicillin resistant Staphylococcus aureus (18 Dec 2021 17:50)  Organism: Methicillin resistant Staphylococcus aureus (18 Dec 2021 17:50)      -  Ampicillin/Sulbactam: R 16/8      -  Cefazolin: R >16      -  Daptomycin: S 0.5      -  Gentamicin: R >8 Should not be used as monotherapy      -  Linezolid: S 2      -  Oxacillin: R >2      -  Penicillin: R >8      -  Rifampin: S <=1 Should not be used as monotherapy      -  Tetra/Doxy: S 2      -  Trimethoprim/Sulfamethoxazole: S <=0.5/9.5      -  Vancomycin: S 2      Method Type: LISBETH    Culture - Blood (collected 15 Dec 2021 07:00)  Source: .Blood Blood  Gram Stain (16 Dec 2021 13:23):    Growth in aerobic bottle: Gram Positive Cocci in Clusters    Growth in anaerobic bottle: Gram Positive Cocci in Clusters  Final Report (18 Dec 2021 10:48):    Growth in aerobic and anaerobic bottles: Staphylococcus aureus    ***Blood Panel PCR results on this specimen are available    approximately 3 hours after the Gram stain result.***    Gram stain, PCR, and/or culture results may not always    correspond dueto difference in methodologies.    ************************************************************    This PCR assay was performed by multiplex PCR. This    Assay tests for 66 bacterial and resistance gene targets.    Please refer to the [facility] test directory    at [email or web address] for details.  Organism: Blood Culture PCR  Staphylococcus aureus (18 Dec 2021 10:48)  Organism: Staphylococcus aureus (18 Dec 2021 10:48)      -  Ampicillin/Sulbactam: S <=8/4      -  Cefazolin: S <=4      -  Clindamycin: S <=0.25      -  Erythromycin: S <=0.25      -  Gentamicin: S <=1 Should not be used as monotherapy      -  Oxacillin: S <=0.25      -  Penicillin: R >8      -  Rifampin: S <=1 Should not be used as monotherapy      -  Tetra/Doxy: S <=1      -  Trimethoprim/Sulfamethoxazole: S <=0.5/9.5      -  Vancomycin: S 2      Method Type: LISBETH  Organism: Blood Culture PCR (18 Dec 2021 10:48)      -  Staphylococcus aureus: Detec Any isolate of Staphylococcus aureus from a blood culture is NOT considered a contaminant.      Method Type: PCR      COVID-19 PCR: NotDetec (12-09-21 @ 15:49)  COVID-19 PCR: NotDetec (12-06-21 @ 13:50)  COVID-19 PCR: NotDetec (11-30-21 @ 16:08)      RADIOLOGY & ADDITIONAL TESTS:    Care Discussed with Consultants/Other Providers:

## 2021-12-20 NOTE — PROGRESS NOTE ADULT - SUBJECTIVE AND OBJECTIVE BOX
SAMANTHA CHARLES 71y Male  MRN#: [id number]     Hospital Day: 11d    Pt is currently admitted with the primary diagnosis of prostate cancer with likely metastasis    SUBJECTIVE  Hospital Course  -Pt admitted due to family not liking NH pt was discharged to 12/9/21. Pt had episode of lethargy overnight 12/12, found UA+ for bacteria, new opacifications on CXR (RUL/JESSICA/RLL), given lasix 40mg IV once + ciprofloxacin standing. BCx now showing S. aureus (not MRSA). Pt on bactrim for possible cystitis.    Overnight events   - None     Subjective complaints   - None                                            ----------------------------------------------------------  OBJECTIVE  PAST MEDICAL & SURGICAL HISTORY  Systolic congestive heart failure, unspecified chronicity  Cerebrovascular accident (CVA) due to bilateral embolism of posterior cerebral arteries  Essential hypertension  Atrial fibrillation, unspecified type  AICD (automatic cardioverter/defibrillator) present                                          -----------------------------------------------------------  ALLERGIES:  cephalexin (Flushing)  penicillin (Rash)                                            ------------------------------------------------------------    HOME MEDICATIONS  Home Medications:  Corlanor 5 mg oral tablet: 1 tab(s) orally 2 times a day (with meals) (30 Nov 2021 17:21)  fentaNYL 12 mcg/hr transdermal film, extended release: 1 patch transdermal every 72 hours (08 Dec 2021 15:25)  gabapentin 300 mg oral capsule: 1 cap(s) orally every 8 hours (08 Dec 2021 15:25)  HYDROmorphone 2 mg oral tablet: 1 tab(s) orally every 3 hours, As needed, Severe Pain (7 - 10) (08 Dec 2021 15:25)  melatonin 3 mg oral tablet: 1 tab(s) orally once a day (at bedtime), As needed, Insomnia (08 Dec 2021 15:25)  Metoprolol Succinate ER 25 mg oral tablet, extended release: 1 tab(s) orally once a day (30 Nov 2021 17:20)  Multiple Vitamins oral tablet: 1 tab(s) orally once a day (08 Dec 2021 15:25)  predniSONE 5 mg oral tablet: 1 tab(s) orally once a day (08 Dec 2021 15:25)  sodium bicarbonate 650 mg oral tablet: 1 tab(s) orally every 12 hours (13 Dec 2021 15:30)                           MEDICATIONS:  STANDING MEDICATIONS  bicalutamide 50 milliGRAM(s) Oral daily  ceFAZolin   IVPB 2000 milliGRAM(s) IV Intermittent every 8 hours  enoxaparin Injectable 50 milliGRAM(s) SubCutaneous every 12 hours  fentaNYL   Patch  12 MICROgram(s)/Hr 1 Patch Transdermal every 72 hours  gabapentin 400 milliGRAM(s) Oral every 8 hours  ivabradine 5 milliGRAM(s) Oral two times a day  metoprolol succinate ER 25 milliGRAM(s) Oral daily  multivitamin 1 Tablet(s) Oral daily  predniSONE   Tablet 5 milliGRAM(s) Oral daily  sacubitril 24 mG/valsartan 26 mG 1 Tablet(s) Oral two times a day  sodium bicarbonate 650 milliGRAM(s) Oral every 8 hours  trimethoprim  160 mG/sulfamethoxazole 800 mG 1 Tablet(s) Oral every 12 hours  Zytiga 1000 milliGRAM(s) 1000 milliGRAM(s) Oral daily    PRN MEDICATIONS  acetaminophen     Tablet .. 650 milliGRAM(s) Oral every 6 hours PRN  HYDROmorphone   Tablet 2 milliGRAM(s) Oral every 3 hours PRN  melatonin 3 milliGRAM(s) Oral at bedtime PRN                                            ------------------------------------------------------------  VITAL SIGNS: Last 24 Hours  T(C): 35.8 (18 Dec 2021 05:16), Max: 35.9 (17 Dec 2021 20:11)  T(F): 96.5 (18 Dec 2021 05:16), Max: 96.6 (17 Dec 2021 20:11)  HR: 83 (18 Dec 2021 05:16) (76 - 90)  BP: 112/51 (18 Dec 2021 05:16) (95/46 - 112/51)  BP(mean): --  RR: 18 (18 Dec 2021 05:16) (18 - 20)  SpO2: --                                             --------------------------------------------------------------  LABS:                        7.6    5.14  )-----------( 144      ( 18 Dec 2021 04:30 )             23.8     12-18    138  |  103  |  17  ----------------------------<  78  4.0   |  18  |  0.8    Ca    7.1<L>      18 Dec 2021 04:30    TPro  4.5<L>  /  Alb  2.4<L>  /  TBili  0.7  /  DBili  x   /  AST  35  /  ALT  22  /  AlkPhos  1156<H>  12-18                                            --------------------------------------------------------------    PHYSICAL EXAM:  GENERAL: AAOx2. Cachectic, laying in bed appearing in no acute distress  HEENT: No FNDs, atraumatic, normocephalic, moist mucus membranes  LUNGS: Clear to auscultation bilaterally  HEART: S1/S2. No heaves or thrills  ABD: Soft, non-tender, non-distended. Bowel sounds present in all quadrants.  EXT/NEURO: 5/5 strength in all extremity joints. Sensation and ROM grossly intact.  SKIN: No breaks, erythema, edema                                             --------------------------------------------------------------

## 2021-12-20 NOTE — PROGRESS NOTE ADULT - ASSESSMENT
ASSESSMENT & PLAN  72 yo M with PMH of CVA, afib on Eliquis, HTN, HFrEF (EF 20-25% 2018), chronic LLE pain, presented to the ED with worsening Left LE pain for the past 2 months, worsening fatigue, anorexia and significant weight loss. Of note, pt had previously been worked up for the leg pain including a CT scan in march 2021 in [facility], which was negative.    # Neoplasm-related severe pain 2/2 diffuse bony sclerotic lesions (AP 2765) likely 2/2 prostate cancer (irregularly marginated prostate gland on CT and PSA 1400)  -CT chest/abdomen/pelvis - Innumerable diffuse sclerotic osseous lesions -  including 6x5.8x9.4cm mass L iliac wing and 6.6x4.5x4.6cm mass of left inferior pubic ramus. Expansile sclerotic lesions of R 2nd lateral rib, R 5th anterior rib.  -bone scan -> diff bone mets, including vert (kidneys not seen b/o such intense uptake by diff bone mets)  -s/p IR bx lt iliac lesion 12/3: metastatic prostate cancer  -s/p Hem Onc eval and Pall Care eval   -Casodex 50mg po daily x2wk total (stop 12/17/21)  -Lupron 22.5 mg IM q1mo (last 12/8/21)  -Abiraterone 1000 mg po daily and Prednisone 5 mg po daily  -Heme/Onc planned to  start Xgeva as outpatient to reduce bone related complications  -c/w dilaudid 2mg po q3 prn pain  -cont fentanyl patch 12 mcg/hr top q72 for longer-acting relief  -cont neurontin 300mg po q8  -bowel reg    #MSSA bacteremia  -low grade fever 12/15- UA initially positive, CXR showed unchanged RUL opacity.  -BCx (12/15)- MSSA  -s/p Bactrim x5d for UTI (12/20/21)  -s/p vanc  -c/w cefazolin 2g q8 x6wk following negative BCx.  -TTE: no vegetations, will hold off on FREDERICK due to pt condition/possible decompensation with procedure  -f/u daily BCx 12/18, 12/19, 12/20,  - f/u repeat BCX  12/20  -PICC placement + d/c following negative BCx for AB x 6 weeks IV from date of cleared cultures (as unclear source and cardiac device in place)    # Failure to thrive  -dietician: severe protein-calorie malnutrition  -MVI q24  -Beneprotein 3x/day     # Symptomatic normo to macro anemia (mild pancytopenia)  -no overt bleed, s/p pRBCs last admission  -prob related to malignancy and diffuse bone mets  -, Folate nl, B12 1540  -Iron panel: iron sat 32%; ferr: 2624 = NOT SELENA  -keep hgb > 8    # FELICITY - likely dehydration (resolved); metab acid w/ high gap (prob result of high tumor burden, which is poor prog sign)  -Cr (baseline ~ 0.8)  -Entresto held- may restart after gado given for MRI, if BP will allow  -c/w NaHCO3 1300mg po q12 til HCO3 > 22    # h/o HTN  #h/o CVA  #Afib  #H/o HFrEF (EF 20-25% in 2018)  -c/w Eliquis 5mg po q12, metoprolol  -c/w aldactone, entresto, ivabradine    # Activity  -Amb as tolerated    # GI PPX  -NA    # DVT PPX  -Eliquis    # Full code    # Handoff   Fu BC negative for PICC placement and DC  Dispo planning (A, no SNF)     ASSESSMENT & PLAN  70 yo M with PMH of CVA, afib on Eliquis, HTN, HFrEF (EF 20-25% 2018), chronic LLE pain, presented to the ED with worsening Left LE pain for the past 2 months, worsening fatigue, anorexia and significant weight loss. Of note, pt had previously been worked up for the leg pain including a CT scan in march 2021 in [facility], which was negative.    # Neoplasm-related severe pain 2/2 diffuse bony sclerotic lesions (AP 2765) likely 2/2 prostate cancer (irregularly marginated prostate gland on CT and PSA 1400)  -CT chest/abdomen/pelvis - Innumerable diffuse sclerotic osseous lesions -  including 6x5.8x9.4cm mass L iliac wing and 6.6x4.5x4.6cm mass of left inferior pubic ramus. Expansile sclerotic lesions of R 2nd lateral rib, R 5th anterior rib.  -bone scan -> diff bone mets, including vert (kidneys not seen b/o such intense uptake by diff bone mets)  -s/p IR bx lt iliac lesion 12/3: metastatic prostate cancer  -s/p Hem Onc eval and Pall Care eval   -Casodex 50mg po daily x2wk total (stop 12/17/21)  -Lupron 22.5 mg IM q1mo (last 12/8/21)  -Abiraterone 1000 mg po daily and Prednisone 5 mg po daily  -Heme/Onc planned to  start Xgeva as outpatient to reduce bone related complications  -c/w dilaudid 2mg po q3 prn pain  -cont fentanyl patch 12 mcg/hr top q72 for longer-acting relief  -cont neurontin 300mg po q8  -bowel reg    #MSSA bacteremia  -low grade fever 12/15- UA initially positive, CXR showed unchanged RUL opacity.  -BCx (12/15)- MSSA  -s/p Bactrim x5d for UTI (12/20/21)  -s/p vanc  -c/w cefazolin 2g q8 x6wk following negative BCx.  -TTE: no vegetations, will hold off on FREDERICK due to pt condition/possible decompensation with procedure  - f/u daily BCx 12/18, 12/19, 12/20,  - f/u repeat BCX  12/20  - PICC placement + d/c following negative BCx for AB x 6 weeks IV from date of cleared cultures (as unclear source and cardiac device in place)    # Failure to thrive  -dietician: severe protein-calorie malnutrition  -MVI q24  -Beneprotein 3x/day     # Symptomatic normo to macro anemia (mild pancytopenia)  -no overt bleed, s/p pRBCs last admission  -prob related to malignancy and diffuse bone mets  -, Folate nl, B12 1540  -Iron panel: iron sat 32%; ferr: 2624 = NOT SELENA  -keep hgb > 8    # FELICITY - likely dehydration (resolved); metab acid w/ high gap (prob result of high tumor burden, which is poor prog sign)  -Cr (baseline ~ 0.8)  -Entresto held- may restart after gado given for MRI, if BP will allow  -c/w NaHCO3 1300mg po q12 til HCO3 > 22    # h/o HTN  #h/o CVA  #Afib  #H/o HFrEF (EF 20-25% in 2018)  -c/w Eliquis 5mg po q12, metoprolol  -c/w aldactone, entresto, ivabradine    # Activity  -Amb as tolerated    # GI PPX  -NA    # DVT PPX  -Eliquis    # Full code    # Handoff   Fu BC negative for PICC placement and DC  Dispo planning (A, no SNF)

## 2021-12-21 LAB
ALBUMIN SERPL ELPH-MCNC: 2.9 G/DL — LOW (ref 3.5–5.2)
ALP SERPL-CCNC: 2082 U/L — HIGH (ref 30–115)
ALT FLD-CCNC: 11 U/L — SIGNIFICANT CHANGE UP (ref 0–41)
ANION GAP SERPL CALC-SCNC: 17 MMOL/L — HIGH (ref 7–14)
AST SERPL-CCNC: 30 U/L — SIGNIFICANT CHANGE UP (ref 0–41)
BILIRUB SERPL-MCNC: 0.5 MG/DL — SIGNIFICANT CHANGE UP (ref 0.2–1.2)
BUN SERPL-MCNC: 17 MG/DL — SIGNIFICANT CHANGE UP (ref 10–20)
CALCIUM SERPL-MCNC: 7.9 MG/DL — LOW (ref 8.5–10.1)
CHLORIDE SERPL-SCNC: 106 MMOL/L — SIGNIFICANT CHANGE UP (ref 98–110)
CO2 SERPL-SCNC: 22 MMOL/L — SIGNIFICANT CHANGE UP (ref 17–32)
CREAT SERPL-MCNC: 0.8 MG/DL — SIGNIFICANT CHANGE UP (ref 0.7–1.5)
GLUCOSE SERPL-MCNC: 123 MG/DL — HIGH (ref 70–99)
HCT VFR BLD CALC: 29 % — LOW (ref 42–52)
HGB BLD-MCNC: 9.3 G/DL — LOW (ref 14–18)
MAGNESIUM SERPL-MCNC: 2.4 MG/DL — SIGNIFICANT CHANGE UP (ref 1.8–2.4)
MCHC RBC-ENTMCNC: 31 PG — SIGNIFICANT CHANGE UP (ref 27–31)
MCHC RBC-ENTMCNC: 32.1 G/DL — SIGNIFICANT CHANGE UP (ref 32–37)
MCV RBC AUTO: 96.7 FL — HIGH (ref 80–94)
NRBC # BLD: 0 /100 WBCS — SIGNIFICANT CHANGE UP (ref 0–0)
PLATELET # BLD AUTO: 197 K/UL — SIGNIFICANT CHANGE UP (ref 130–400)
POTASSIUM SERPL-MCNC: 3.4 MMOL/L — LOW (ref 3.5–5)
POTASSIUM SERPL-SCNC: 3.4 MMOL/L — LOW (ref 3.5–5)
PROT SERPL-MCNC: 5.2 G/DL — LOW (ref 6–8)
RBC # BLD: 3 M/UL — LOW (ref 4.7–6.1)
RBC # FLD: 16.3 % — HIGH (ref 11.5–14.5)
SODIUM SERPL-SCNC: 145 MMOL/L — SIGNIFICANT CHANGE UP (ref 135–146)
WBC # BLD: 5.72 K/UL — SIGNIFICANT CHANGE UP (ref 4.8–10.8)
WBC # FLD AUTO: 5.72 K/UL — SIGNIFICANT CHANGE UP (ref 4.8–10.8)

## 2021-12-21 PROCEDURE — 99232 SBSQ HOSP IP/OBS MODERATE 35: CPT

## 2021-12-21 PROCEDURE — 36573 INSJ PICC RS&I 5 YR+: CPT

## 2021-12-21 RX ADMIN — Medication 5 MILLIGRAM(S): at 05:53

## 2021-12-21 RX ADMIN — GABAPENTIN 400 MILLIGRAM(S): 400 CAPSULE ORAL at 13:32

## 2021-12-21 RX ADMIN — SACUBITRIL AND VALSARTAN 1 TABLET(S): 24; 26 TABLET, FILM COATED ORAL at 05:54

## 2021-12-21 RX ADMIN — BICALUTAMIDE 50 MILLIGRAM(S): 50 TABLET, FILM COATED ORAL at 13:32

## 2021-12-21 RX ADMIN — Medication 650 MILLIGRAM(S): at 21:07

## 2021-12-21 RX ADMIN — Medication 100 MILLIGRAM(S): at 21:05

## 2021-12-21 RX ADMIN — IVABRADINE 5 MILLIGRAM(S): 7.5 TABLET, FILM COATED ORAL at 05:54

## 2021-12-21 RX ADMIN — ENOXAPARIN SODIUM 50 MILLIGRAM(S): 100 INJECTION SUBCUTANEOUS at 05:52

## 2021-12-21 RX ADMIN — HYDROMORPHONE HYDROCHLORIDE 2 MILLIGRAM(S): 2 INJECTION INTRAMUSCULAR; INTRAVENOUS; SUBCUTANEOUS at 14:40

## 2021-12-21 RX ADMIN — Medication 650 MILLIGRAM(S): at 13:31

## 2021-12-21 RX ADMIN — Medication 650 MILLIGRAM(S): at 05:52

## 2021-12-21 RX ADMIN — Medication 25 MILLIGRAM(S): at 05:54

## 2021-12-21 RX ADMIN — SACUBITRIL AND VALSARTAN 1 TABLET(S): 24; 26 TABLET, FILM COATED ORAL at 18:59

## 2021-12-21 RX ADMIN — Medication 100 MILLIGRAM(S): at 05:53

## 2021-12-21 RX ADMIN — GABAPENTIN 400 MILLIGRAM(S): 400 CAPSULE ORAL at 21:06

## 2021-12-21 RX ADMIN — FENTANYL CITRATE 1 PATCH: 50 INJECTION INTRAVENOUS at 19:40

## 2021-12-21 RX ADMIN — FENTANYL CITRATE 1 PATCH: 50 INJECTION INTRAVENOUS at 18:57

## 2021-12-21 RX ADMIN — ENOXAPARIN SODIUM 50 MILLIGRAM(S): 100 INJECTION SUBCUTANEOUS at 18:59

## 2021-12-21 RX ADMIN — HYDROMORPHONE HYDROCHLORIDE 2 MILLIGRAM(S): 2 INJECTION INTRAMUSCULAR; INTRAVENOUS; SUBCUTANEOUS at 13:31

## 2021-12-21 RX ADMIN — Medication 1 TABLET(S): at 11:29

## 2021-12-21 RX ADMIN — GABAPENTIN 400 MILLIGRAM(S): 400 CAPSULE ORAL at 05:53

## 2021-12-21 RX ADMIN — IVABRADINE 5 MILLIGRAM(S): 7.5 TABLET, FILM COATED ORAL at 19:00

## 2021-12-21 RX ADMIN — FENTANYL CITRATE 1 PATCH: 50 INJECTION INTRAVENOUS at 19:00

## 2021-12-21 RX ADMIN — Medication 100 MILLIGRAM(S): at 15:20

## 2021-12-21 RX ADMIN — FENTANYL CITRATE 1 PATCH: 50 INJECTION INTRAVENOUS at 14:38

## 2021-12-21 NOTE — PROCEDURE NOTE - ADDITIONAL PROCEDURE DETAILS
Successful placement of right arm 5Fr 38cm single lumen Bard PowerPICC. Tip in SVC. PICC ok to use. Pt tolerated the procedure well.

## 2021-12-21 NOTE — CHART NOTE - NSCHARTNOTEFT_GEN_A_CORE
Patient was fully assessed on 12/8 by previous dietitian, therefore this assessment will be a follow-up    Registered Dietitian Follow-Up     Patient Profile Reviewed                           Yes [x]   No []     Nutrition History Previously Obtained        Yes [x]  No []         Pertinent Medical Interventions:    Per internal medicine:  #Neoplasm-related severe pain 2/2 diffuse bony sclerotic lesions (AP 2765) likely 2/2 prostate cancer (irregularly marginated prostate gland on CT and PSA 1400)  -s/p IR bx lt iliac lesion 12/3: metastatic prostate cancer  -s/p Hem Onc eval and Pall Care eval     #MSSA bacteremia  -low grade fever 12/15- UA initially positive, CXR showed unchanged RUL opacity.  - PICC placement + d/c following negative BCx for AB x 6 weeks IV from date of cleared cultures (as unclear source and cardiac device in place)    # Failure to thrive  -dietician: severe protein-calorie malnutrition  -MVI q24  -Beneprotein 3x/day     # Symptomatic normo to macro anemia (mild pancytopenia)  -no overt bleed, s/p pRBCs last admission  -prob related to malignancy and diffuse bone mets  -, Folate nl, B12 1540  -Iron panel: iron sat 32%; ferr: 2624 = NOT SELENA  -keep hgb > 8    # FELICITY - likely dehydration (resolved); metab acid w/ high gap (prob result of high tumor burden, which is poor prog sign)      Diet order: Diet, DASH/TLC:   Sodium & Cholesterol Restricted  No Pork  Beneprotein ([facility] Only)     Qty per Day:  3  Supplement Feeding Modality:  Oral  Ensure Enlive Cans or Servings Per Day:  1       Frequency:  Three Times a day (12-03-21 @ 08:02) [Active]    Anthropometrics:  - Ht. 180.2 cm  - Wt. 54 kg  (118.8 lbs) (12/12)   - BMI: 16.6  - IBW: 172 lbs (78.2 kg)     Pertinent Lab Data:  12/21: RBC 3.00 (L), H/H 9.3 / 29.0 (L), K+ 3.4 (L), Gluc 123 (H), Ca 7.9 (L), Alb 5.2 (L), Alk Phosphatase 2082 (H)     Pertinent Meds:  MEDICATIONS  (STANDING):  bicalutamide 50 milliGRAM(s) Oral daily  ivabradine 5 milliGRAM(s) Oral two times a day  metoprolol succinate ER 25 milliGRAM(s) Oral daily  multivitamin 1 Tablet(s) Oral daily  sacubitril 24 mG/valsartan 26 mG 1 Tablet(s) Oral two times a day  sodium bicarbonate 650 milliGRAM(s) Oral every 8 hours  Zytiga 1000 milliGRAM(s) 1000 milliGRAM(s) Oral daily    MEDICATIONS  (PRN):  HYDROmorphone   Tablet 2 milliGRAM(s) Oral every 3 hours PRN Moderate Pain (4 - 6)  melatonin 3 milliGRAM(s) Oral at bedtime PRN Insomnia    Physical Findings:  - Appearance: Severely wasted int he temple, clavicle, and the calf region  - GI function: no gastrointestinal signs/symptoms, LBM: 12/20 per RN flow sheets   - Tubes: N/A  - Oral/Mouth cavity: Does prefer softer foods  - Skin: No P/U; Skin tear - R buttocks; No Edema     Nutrition Requirements  Weight Used: weight used: 54 kg dosing weight. Will continue to monitor weights, will adjust needs prn however since pt is severely malnourished , pt needs energy and protein needs are higher regardless.      Estimated Energy Needs    Continue []  Adjust [x]  Estimated energy needs: 1620 - 1890   kcal/day (30-35 kcal/kg of dosing weight)        Estimated Protein Needs    Continue []  Adjust [x]  65 - 81  gm/day (1.2-1.5 g/kg of dosing weight)        Estimated Fluid Needs        Continue [x]  Adjust []  1 ml / kcal or per LIP     Nutrient Intake: Patient is on 1:1 feed and requires total assistance per RN flowsheet, patient has poor po intake, 0-25% at meals.      [] Previous Nutrition Diagnosis: Severe protein calorie malnutrition + Inadequate protein energy needs.             [x] Ongoing          [] Resolved     Nutrition Intervention meals and snacks, medical nutrition supplements, coordination of care, vitamin/ mineral supplement      Goal/Expected Outcome: Patient to ideally meet ~75% of estimated energy and protein needs in the next 4d.    Indicator/Monitoring: RD to monitor: diet order, body composition, energy intake, nutrition focused physical finding: high risk due in 4 days    Recommendations:  1) Continue current diet order   2) Continue ensure enlive TID ( 1050 calories, and 60 g protein) +  3 packets of bene protein per day (RD previously explained to sister to mix in with food such as soup/oat meal to provide extra protein)   3) Consider appetite stimulant if chemo to be initiated, in order to aid in increasing po intake  4) Obtain daily weights   5) If sister isn't there patient needs a 1:1 feed , he will not consume meals on his own.
Patient already received his Lupron injection 12/8. He will need his next injection in 1 month. He can stop Casodex after he completes 2 weeks, which will be on 12/17. He should continue on Zytiga and prednisone.
Saud Promise 72yo (1950)    This patient will require a hospital bed with egg shell topper for discharge to home in order to keep patients head elevated greater than 30 degrees due to multiple medical issues, including:  -Pt is not able to make position changes without assistance.   -Patient is at high risk for aspiration due to multiple medical problems.  -Patient is bedbound and cannot make position changes. Pillows and wedges have been tried and failed.  -Patient is at risk for skin breakdown.    This is secondary to incurable, metastastic prostate cancer with metastasis to bone (ICD-10 C79.51)

## 2021-12-21 NOTE — PROGRESS NOTE ADULT - SUBJECTIVE AND OBJECTIVE BOX
Patient is a 71y old  Male who presents with a chief complaint of pain (20 Dec 2021 15:22)    72 yo M with PMH of CVA, afib on Eliquis, HTN, HFrEF ( EF 20-25% 2018), chronic LLE pain, presented to the ED from NH. Pt was discharged today from [facility] to [facility]. Family not happy with current NH and wants patient placed at [facility]. Pending outpt MRI as could not be done inpt 2/2 inavailability of EP/[employer] Rep for AICDS.     Hospital Course:    Patient was admitted to medicine and underwent bone biopsy by IR. He was evaluated by urology and hematology-oncology and started on hormonal therapy, casodex, while inpatient. He had a bone scan which showed multiple metastatic lesions including in the spine. Palliative care saw the patient and he was given PO dilaudid, neurontin, and 1 dose of dexamethasone for the left lower extremity pain. He has anemia of chronic disease and received 4 units pRBCs during the admission. Patient was seen by physical therapy and by dietitian regarding failure to thrive. Spoke with sister regarding poor prognosis and patient will be going to SNF for further care. He will be receiving chemotherapy outpatient at [facility]. He can get MRI of lumbar and thoracic spine outpatient and that will determine treatment by radiation oncology.   Patient seen and examined at bedside.    ALLERGIES:  penicillin (Rash)    MEDICATIONS:  acetaminophen     Tablet .. 650 milliGRAM(s) Oral every 6 hours PRN  bicalutamide 50 milliGRAM(s) Oral daily  ceFAZolin   IVPB 2000 milliGRAM(s) IV Intermittent every 8 hours  enoxaparin Injectable 50 milliGRAM(s) SubCutaneous every 12 hours  fentaNYL   Patch  12 MICROgram(s)/Hr 1 Patch Transdermal every 72 hours  gabapentin 400 milliGRAM(s) Oral every 8 hours  HYDROmorphone   Tablet 2 milliGRAM(s) Oral every 3 hours PRN  ivabradine 5 milliGRAM(s) Oral two times a day  melatonin 3 milliGRAM(s) Oral at bedtime PRN  metoprolol succinate ER 25 milliGRAM(s) Oral daily  multivitamin 1 Tablet(s) Oral daily  predniSONE   Tablet 5 milliGRAM(s) Oral daily  sacubitril 24 mG/valsartan 26 mG 1 Tablet(s) Oral two times a day  sodium bicarbonate 650 milliGRAM(s) Oral every 8 hours  Zytiga 1000 milliGRAM(s) 1000 milliGRAM(s) Oral daily    Vital Signs Last 24 Hrs  T(F): 96 (21 Dec 2021 13:01), Max: 99 (20 Dec 2021 20:08)  HR: 73 (21 Dec 2021 13:01) (73 - 81)  BP: 115/53 (21 Dec 2021 13:01) (105/52 - 130/58)  RR: 18 (21 Dec 2021 13:01) (18 - 18)  SpO2: --  I&O's Summary    20 Dec 2021 07:01  -  21 Dec 2021 07:00  --------------------------------------------------------  IN: 0 mL / OUT: 800 mL / NET: -800 mL    21 Dec 2021 07:01  -  21 Dec 2021 16:02  --------------------------------------------------------  IN: 0 mL / OUT: 200 mL / NET: -200 mL        PHYSICAL EXAM:  General: NAD, A/O x 2, cachexia   ENT: MMM  Neck: Supple, No JVD  Lungs: Clear to auscultation bilaterally  Cardio: RRR, S1/S2, No murmurs  Abdomen: Soft, Nontender, Nondistended; Bowel sounds present  Extremities: No cyanosis, No edema    LABS:                        9.3    5.72  )-----------( 197      ( 21 Dec 2021 08:36 )             29.0     12-21    145  |  106  |  17  ----------------------------<  123  3.4   |  22  |  0.8    Ca    7.9      21 Dec 2021 08:36  Mg     2.4     12-21    TPro  5.2  /  Alb  2.9  /  TBili  0.5  /  DBili  x   /  AST  30  /  ALT  11  /  AlkPhos  2082 12-21    eGFR if Non African American: 90 mL/min/1.73M2 (12-21-21 @ 08:36)  eGFR if : 104 mL/min/1.73M2 (12-21-21 @ 08:36)    PT/INR - ( 19 Dec 2021 20:00 )   PT: 17.30 sec;   INR: 1.51 ratio         PTT - ( 19 Dec 2021 20:00 )  PTT:38.0 sec                              Culture - Blood (collected 20 Dec 2021 06:30)  Source: .Blood Blood-Venous  Preliminary Report (21 Dec 2021 14:02):    No growth to date.    Culture - Blood (collected 19 Dec 2021 06:54)  Source: .Blood None  Preliminary Report (20 Dec 2021 17:02):    No growth to date.    Culture - Blood (collected 19 Dec 2021 06:53)  Source: .Blood None  Preliminary Report (20 Dec 2021 17:01):    No growth to date.    Culture - Blood (collected 18 Dec 2021 04:30)  Source: .Blood Blood-Venous  Preliminary Report (19 Dec 2021 16:01):    No growth to date.    Culture - Blood (collected 18 Dec 2021 04:30)  Source: .Blood Blood-Venous  Preliminary Report (19 Dec 2021 16:01):    No growth to date.    Culture - Urine (collected 15 Dec 2021 10:35)  Source: Catheterized Catheterized  Final Report (18 Dec 2021 17:50):    50,000 - 99,000 CFU/mL Methicillin Resistant Staphylococcus aureus  Organism: Methicillin resistant Staphylococcus aureus (18 Dec 2021 17:50)  Organism: Methicillin resistant Staphylococcus aureus (18 Dec 2021 17:50)      -  Ampicillin/Sulbactam: R 16/8      -  Cefazolin: R >16      -  Daptomycin: S 0.5      -  Gentamicin: R >8 Should not be used as monotherapy      -  Linezolid: S 2      -  Oxacillin: R >2      -  Penicillin: R >8      -  Rifampin: S <=1 Should not be used as monotherapy      -  Tetra/Doxy: S 2      -  Trimethoprim/Sulfamethoxazole: S <=0.5/9.5      -  Vancomycin: S 2      Method Type: LISBETH    Culture - Blood (collected 15 Dec 2021 07:00)  Source: .Blood Blood  Gram Stain (16 Dec 2021 13:23):    Growth in aerobic bottle: Gram Positive Cocci in Clusters    Growth in anaerobic bottle: Gram Positive Cocci in Clusters  Final Report (18 Dec 2021 10:48):    Growth in aerobic and anaerobic bottles: Staphylococcus aureus    ***Blood Panel PCR results on this specimen are available    approximately 3 hours after the Gram stain result.***    Gram stain, PCR, and/or culture results may not always    correspond dueto difference in methodologies.    ************************************************************    This PCR assay was performed by multiplex PCR. This    Assay tests for 66 bacterial and resistance gene targets.    Please refer to the [facility] test directory    at [email or web address] for details.  Organism: Blood Culture PCR  Staphylococcus aureus (18 Dec 2021 10:48)  Organism: Staphylococcus aureus (18 Dec 2021 10:48)      -  Ampicillin/Sulbactam: S <=8/4      -  Cefazolin: S <=4      -  Clindamycin: S <=0.25      -  Erythromycin: S <=0.25      -  Gentamicin: S <=1 Should not be used as monotherapy      -  Oxacillin: S <=0.25      -  Penicillin: R >8      -  Rifampin: S <=1 Should not be used as monotherapy      -  Tetra/Doxy: S <=1      -  Trimethoprim/Sulfamethoxazole: S <=0.5/9.5      -  Vancomycin: S 2      Method Type: LISBETH  Organism: Blood Culture PCR (18 Dec 2021 10:48)      -  Staphylococcus aureus: Detec Any isolate of Staphylococcus aureus from a blood culture is NOT considered a contaminant.      Method Type: PCR      COVID-19 PCR: NotDetec (12-20-21 @ 15:29)  COVID-19 PCR: NotDetec (12-09-21 @ 15:49)  COVID-19 PCR: NotDetec (12-06-21 @ 13:50)  COVID-19 PCR: NotDetec (11-30-21 @ 16:08)      RADIOLOGY & ADDITIONAL TESTS:    Care Discussed with Consultants/Other Providers:

## 2021-12-21 NOTE — PROGRESS NOTE ADULT - REASON FOR ADMISSION
placement, pain
family did not like nursing home, weakness
placement, pain
NH placement
Placement, increased leg pains
Placement, increased pain
leg pain
pain
pain
placement, pain

## 2021-12-21 NOTE — PROCEDURE NOTE - NSPOSTPRCRAD_GEN_A_CORE
chest radiograph/depth of insertion/fluoroscopy/line adjusted to depth of insertion/line in appropriate postion/postion of catheter/ultrasound

## 2021-12-21 NOTE — PROGRESS NOTE ADULT - ASSESSMENT
72 yo M with PMH of CVA, afib on Eliquis, HTN, HFrEF (EF 20-25% 2018), chronic LLE pain, presented to the ED with worsening Left LE pain for the past 2 months, worsening fatigue, anorexia and significant weight loss. Of note, pt had previously been worked up for the leg pain including a CT scan in march 2021 in [facility], which was negative.    # Neoplasm-related severe pain 2/2 diffuse bony sclerotic lesions (AP 2765) likely 2/2 prostate cancer (irregularly marginated prostate gland on CT and PSA 1400)  -CT chest/abdomen/pelvis - Innumerable diffuse sclerotic osseous lesions -  including 6x5.8x9.4cm mass L iliac wing and 6.6x4.5x4.6cm mass of left inferior pubic ramus. Expansile sclerotic lesions of R 2nd lateral rib, R 5th anterior rib.  -bone scan -> diff bone mets, including vert (kidneys not seen b/o such intense uptake by diff bone mets)  -s/p IR bx lt iliac lesion 12/3: metastatic prostate cancer  -s/p Hem Onc eval and Pall Care eval   -Casodex 50mg po daily x2wk total (stop 12/17/21)  -Lupron 22.5 mg IM q1mo (last 12/8/21)  -Abiraterone 1000 mg po daily and Prednisone 5 mg po daily  -Heme/Onc planned to  start Xgeva as outpatient to reduce bone related complications  -c/w dilaudid 2mg po q3 prn pain  -cont fentanyl patch 12 mcg/hr top q72 for longer-acting relief  -cont neurontin 300mg po q8  -bowel reg    #MSSA bacteremia  -low grade fever 12/15- UA initially positive, CXR showed unchanged RUL opacity.  -BCx (12/15)- MSSA  -s/p Bactrim x5d for UTI (12/20/21)  -s/p vanc  -c/w cefazolin 2g q8 x6wk following negative BCx - 12/18 End date would be 1/29/22  -TTE: no vegetations, will hold off on FREDERICK due to pt condition/possible decompensation with procedure  - f/u daily BCx 12/18-negative blood culture, 12/19, 12/20,  - f/u repeat BCX  12/20  - PICC placement + d/c following negative BCx for AB x 6 weeks IV from date of cleared cultures (as unclear source and cardiac device in place)  - 12/21 spoke to IR at 16:00 who reported pt is on list for picc placement today     # Failure to thrive  -dietician: severe protein-calorie malnutrition  -MVI q24  -Beneprotein 3x/day     # Symptomatic normo to macro anemia (mild pancytopenia)  -no overt bleed, s/p pRBCs last admission  -prob related to malignancy and diffuse bone mets  -, Folate nl, B12 1540  -Iron panel: iron sat 32%; ferr: 2624 = NOT SELENA  -keep hgb > 8    # FELICITY - likely dehydration (resolved); metab acid w/ high gap (prob result of high tumor burden, which is poor prog sign)  -Cr (baseline ~ 0.8)  -Entresto held- may restart after gado given for MRI, if BP will allow  -c/w NaHCO3 1300mg po q12 til HCO3 > 22    # h/o HTN  #h/o CVA  #Afib  #H/o HFrEF (EF 20-25% in 2018)  -c/w Eliquis 5mg po q12, metoprolol  -c/w aldactone, entresto, ivabradine    # Activity  -Amb as tolerated    # GI PPX  -NA    # DVT PPX  -Eliquis    # Full code    # Handoff   Fu BC negative for PICC placement and DC  Dispo planning (A, no SNF)      Dispo: PICC line placement scheduled for 12/21  current plan is to go to snf  Prog is very poor overall. Sister is very well aware. If pt expires in hospital, please note he is Denominational and his body should NOT be touched until family is called and his Imam is called.

## 2021-12-22 LAB
ALBUMIN SERPL ELPH-MCNC: 2.8 G/DL — LOW (ref 3.5–5.2)
ALP SERPL-CCNC: 2254 U/L — HIGH (ref 30–115)
ALT FLD-CCNC: 8 U/L — SIGNIFICANT CHANGE UP (ref 0–41)
ANION GAP SERPL CALC-SCNC: 18 MMOL/L — HIGH (ref 7–14)
AST SERPL-CCNC: 33 U/L — SIGNIFICANT CHANGE UP (ref 0–41)
BILIRUB SERPL-MCNC: 0.4 MG/DL — SIGNIFICANT CHANGE UP (ref 0.2–1.2)
BUN SERPL-MCNC: 15 MG/DL — SIGNIFICANT CHANGE UP (ref 10–20)
CALCIUM SERPL-MCNC: 7.9 MG/DL — LOW (ref 8.5–10.1)
CHLORIDE SERPL-SCNC: 107 MMOL/L — SIGNIFICANT CHANGE UP (ref 98–110)
CO2 SERPL-SCNC: 20 MMOL/L — SIGNIFICANT CHANGE UP (ref 17–32)
CREAT SERPL-MCNC: 0.8 MG/DL — SIGNIFICANT CHANGE UP (ref 0.7–1.5)
GLUCOSE SERPL-MCNC: 105 MG/DL — HIGH (ref 70–99)
HCT VFR BLD CALC: 27.1 % — LOW (ref 42–52)
HGB BLD-MCNC: 8.6 G/DL — LOW (ref 14–18)
MAGNESIUM SERPL-MCNC: 2.3 MG/DL — SIGNIFICANT CHANGE UP (ref 1.8–2.4)
MCHC RBC-ENTMCNC: 30.6 PG — SIGNIFICANT CHANGE UP (ref 27–31)
MCHC RBC-ENTMCNC: 31.7 G/DL — LOW (ref 32–37)
MCV RBC AUTO: 96.4 FL — HIGH (ref 80–94)
NRBC # BLD: 1 /100 WBCS — HIGH (ref 0–0)
PLATELET # BLD AUTO: 209 K/UL — SIGNIFICANT CHANGE UP (ref 130–400)
POTASSIUM SERPL-MCNC: 3.8 MMOL/L — SIGNIFICANT CHANGE UP (ref 3.5–5)
POTASSIUM SERPL-SCNC: 3.8 MMOL/L — SIGNIFICANT CHANGE UP (ref 3.5–5)
PROT SERPL-MCNC: 5 G/DL — LOW (ref 6–8)
RBC # BLD: 2.81 M/UL — LOW (ref 4.7–6.1)
RBC # FLD: 15.8 % — HIGH (ref 11.5–14.5)
SODIUM SERPL-SCNC: 145 MMOL/L — SIGNIFICANT CHANGE UP (ref 135–146)
WBC # BLD: 5.74 K/UL — SIGNIFICANT CHANGE UP (ref 4.8–10.8)
WBC # FLD AUTO: 5.74 K/UL — SIGNIFICANT CHANGE UP (ref 4.8–10.8)

## 2021-12-22 PROCEDURE — 99232 SBSQ HOSP IP/OBS MODERATE 35: CPT

## 2021-12-22 RX ADMIN — GABAPENTIN 400 MILLIGRAM(S): 400 CAPSULE ORAL at 21:41

## 2021-12-22 RX ADMIN — BICALUTAMIDE 50 MILLIGRAM(S): 50 TABLET, FILM COATED ORAL at 11:36

## 2021-12-22 RX ADMIN — Medication 650 MILLIGRAM(S): at 05:39

## 2021-12-22 RX ADMIN — Medication 100 MILLIGRAM(S): at 21:42

## 2021-12-22 RX ADMIN — ENOXAPARIN SODIUM 50 MILLIGRAM(S): 100 INJECTION SUBCUTANEOUS at 05:47

## 2021-12-22 RX ADMIN — Medication 650 MILLIGRAM(S): at 21:41

## 2021-12-22 RX ADMIN — GABAPENTIN 400 MILLIGRAM(S): 400 CAPSULE ORAL at 05:39

## 2021-12-22 RX ADMIN — GABAPENTIN 400 MILLIGRAM(S): 400 CAPSULE ORAL at 13:15

## 2021-12-22 RX ADMIN — SACUBITRIL AND VALSARTAN 1 TABLET(S): 24; 26 TABLET, FILM COATED ORAL at 18:56

## 2021-12-22 RX ADMIN — HYDROMORPHONE HYDROCHLORIDE 2 MILLIGRAM(S): 2 INJECTION INTRAMUSCULAR; INTRAVENOUS; SUBCUTANEOUS at 10:29

## 2021-12-22 RX ADMIN — HYDROMORPHONE HYDROCHLORIDE 2 MILLIGRAM(S): 2 INJECTION INTRAMUSCULAR; INTRAVENOUS; SUBCUTANEOUS at 08:50

## 2021-12-22 RX ADMIN — SACUBITRIL AND VALSARTAN 1 TABLET(S): 24; 26 TABLET, FILM COATED ORAL at 05:41

## 2021-12-22 RX ADMIN — IVABRADINE 5 MILLIGRAM(S): 7.5 TABLET, FILM COATED ORAL at 18:54

## 2021-12-22 RX ADMIN — FENTANYL CITRATE 1 PATCH: 50 INJECTION INTRAVENOUS at 18:48

## 2021-12-22 RX ADMIN — Medication 25 MILLIGRAM(S): at 05:41

## 2021-12-22 RX ADMIN — ENOXAPARIN SODIUM 50 MILLIGRAM(S): 100 INJECTION SUBCUTANEOUS at 18:53

## 2021-12-22 RX ADMIN — Medication 100 MILLIGRAM(S): at 05:42

## 2021-12-22 RX ADMIN — Medication 100 MILLIGRAM(S): at 13:12

## 2021-12-22 RX ADMIN — FENTANYL CITRATE 1 PATCH: 50 INJECTION INTRAVENOUS at 05:47

## 2021-12-22 RX ADMIN — IVABRADINE 5 MILLIGRAM(S): 7.5 TABLET, FILM COATED ORAL at 05:47

## 2021-12-22 RX ADMIN — Medication 650 MILLIGRAM(S): at 13:15

## 2021-12-22 RX ADMIN — Medication 5 MILLIGRAM(S): at 05:39

## 2021-12-22 RX ADMIN — Medication 1 TABLET(S): at 11:36

## 2021-12-22 NOTE — PHYSICAL THERAPY INITIAL EVALUATION ADULT - PASSIVE RANGE OF MOTION EXAMINATION, REHAB EVAL
At risk for b/l LE flexion contracture, ~ -45 degrees extension lag. Attempted PROM of LE however pt grimaces, groans loudly, and adamantly requests PT to "stop" immediately upon any gentle initiation of PROM, LLE pain > RLE.

## 2021-12-22 NOTE — PROGRESS NOTE ADULT - SUBJECTIVE AND OBJECTIVE BOX
SAMANTHA CHARLES 71y Male  MRN#: [id number]   CODE STATUS:       SUBJECTIVE  Patient is a 71y old Male who presents with a chief complaint of pain (21 Dec 2021 16:01)  Currently admitted to medicine with the primary diagnosis of Prostate cancer metastatic to bone    Today is hospital day 13d, and this morning he is resting comfortably in bed. Patient states he wants to go home but he is very weak   No overnight events.     Present Today:           Sol Catheter ()No/ (x)Yes? Indication:          Central Line ()No/ (x)Yes? Indication: PICC for IV abx placed 12/21          IV Fluids (x)No/ ()Yes? Type:  Rate:  Indication:      OBJECTIVE  PAST MEDICAL & SURGICAL HISTORY  Systolic congestive heart failure, unspecified chronicity    Cerebrovascular accident (CVA) due to bilateral embolism of posterior cerebral arteries    Essential hypertension    Atrial fibrillation, unspecified type    AICD (automatic cardioverter/defibrillator) present      ALLERGIES:  penicillin (Rash)    MEDICATIONS:  STANDING MEDICATIONS  bicalutamide 50 milliGRAM(s) Oral daily  ceFAZolin   IVPB 2000 milliGRAM(s) IV Intermittent every 8 hours  enoxaparin Injectable 50 milliGRAM(s) SubCutaneous every 12 hours  fentaNYL   Patch  12 MICROgram(s)/Hr 1 Patch Transdermal every 72 hours  gabapentin 400 milliGRAM(s) Oral every 8 hours  ivabradine 5 milliGRAM(s) Oral two times a day  metoprolol succinate ER 25 milliGRAM(s) Oral daily  multivitamin 1 Tablet(s) Oral daily  predniSONE   Tablet 5 milliGRAM(s) Oral daily  sacubitril 24 mG/valsartan 26 mG 1 Tablet(s) Oral two times a day  sodium bicarbonate 650 milliGRAM(s) Oral every 8 hours  Zytiga 1000 milliGRAM(s) 1000 milliGRAM(s) Oral daily    PRN MEDICATIONS  acetaminophen     Tablet .. 650 milliGRAM(s) Oral every 6 hours PRN  HYDROmorphone   Tablet 2 milliGRAM(s) Oral every 3 hours PRN  melatonin 3 milliGRAM(s) Oral at bedtime PRN      VITAL SIGNS: Last 24 Hours  T(C): 36.9 (22 Dec 2021 05:45), Max: 36.9 (22 Dec 2021 05:45)  T(F): 98.4 (22 Dec 2021 05:45), Max: 98.4 (22 Dec 2021 05:45)  HR: 85 (22 Dec 2021 05:45) (73 - 85)  BP: 106/67 (22 Dec 2021 05:45) (106/51 - 115/53)  BP(mean): --  RR: 18 (22 Dec 2021 05:45) (17 - 18)  SpO2: --    LABS:                        8.6    5.74  )-----------( 209      ( 22 Dec 2021 04:30 )             27.1     12-22    145  |  107  |  15  ----------------------------<  105<H>  3.8   |  20  |  0.8    Ca    7.9<L>      22 Dec 2021 04:30  Mg     2.3     12-22    TPro  5.0<L>  /  Alb  2.8<L>  /  TBili  0.4  /  DBili  x   /  AST  33  /  ALT  8   /  AlkPhos  2254<H>  12-22    Culture - Blood (collected 20 Dec 2021 06:30)  Source: .Blood Blood-Venous  Preliminary Report (21 Dec 2021 14:02):    No growth to date.      PHYSICAL EXAM:  GENERAL: NAD, well-developed, AAOx3  HEENT:  Atraumatic, Normocephalic. EOMI, PERRLA, conjunctiva and sclera clear, No JVD  PULMONARY: Clear to auscultation bilaterally; No wheeze  CARDIOVASCULAR: Regular rate and rhythm; No murmurs, rubs, or gallops  GASTROINTESTINAL: Soft, Nontender, Nondistended; Bowel sounds present  MUSCULOSKELETAL:  2+ Peripheral Pulses, No clubbing, cyanosis, or edema  NEUROLOGY: non-focal  SKIN: No rashes or lesions      ASSESSMENT & PLAN  70 yo M with PMH of CVA, afib on Eliquis, HTN, HFrEF (EF 20-25% 2018), chronic LLE pain, presented to the ED with worsening Left LE pain for the past 2 months, worsening fatigue, anorexia and significant weight loss. Of note, pt had previously been worked up for the leg pain including a CT scan in march 2021 in [facility], which was negative.    # Neoplasm-related severe pain 2/2 diffuse bony sclerotic lesions from metastatic prostate CA  - Alk phos 2765, PSA 1458   -CT chest/abdomen/pelvis - Innumerable diffuse sclerotic osseous lesions -  including 6x5.8x9.4cm mass L iliac wing and 6.6x4.5x4.6cm mass of left inferior pubic ramus. Expansile sclerotic lesions of R 2nd lateral rib, R 5th anterior rib.  -bone scan: Diffusely increased bone agent uptake throughout the skeleton consistent with diffuse metastatic bone disease.  -s/p IR bx lt iliac lesion 12/3: metastatic prostate cancer  -Casodex 50mg po daily x2wk total (stop 12/17/21)  -Lupron 22.5 mg IM q1mo (last 12/8/21)  -Abiraterone 1000 mg po daily and Prednisone 5 mg po daily  -Heme/Onc planned to start Xgeva as outpatient to reduce bone related complications  -c/w dilaudid 2mg po q3 prn pain, fentanyl patch 12 mcg/hr top q72 for longer-acting relief, neurontin 300mg po q8  -bowel reg    #MSSA bacteremia  -low grade fever 12/15- UA initially positive, CXR showed unchanged RUL opacity.  -BCx (12/15)- MSSA  -s/p Bactrim x5d for UTI (12/20/21)  -c/w cefazolin 2g q8 x6wk following negative BCx - 12/18 End date would be 1/29/22  -TTE: no vegetations, will hold off on FREDERICK due to pt condition/possible decompensation with procedure  - f/u daily BCx 12/18-negative blood culture, 12/19, 12/20,  - PICC placed 12/21 by IR for 6 weeks abx     # Failure to thrive  -dietician: severe protein-calorie malnutrition  -MVI q24  -Beneprotein 3x/day     # Symptomatic normo to macro anemia (mild pancytopenia)  -no overt bleed, s/p pRBCs last admission  -prob related to malignancy and diffuse bone mets  -, Folate nl, B12 1540  -Iron panel: iron sat 32%; ferr: 2624 = NOT SELENA  -keep hgb > 8    # FELICITY - likely dehydration (resolved);   -metab acid w/ high gap (prob result of high tumor burden  -Cr (baseline ~ 0.8)  -c/w NaHCO3 1300mg po q12 til HCO3 > 22    # h/o HTN  #h/o CVA  #Afib  #H/o HFrEF (EF 20-25% in 2018)  -c/w Eliquis 5mg po q12, metoprolol  -c/w aldactone, entresto, ivabradine    # Activity-Amb as tolerated  # GI PPX-NA  # DVT PPX -Eliquis  # Full code    Dispo SNF

## 2021-12-22 NOTE — PHYSICAL THERAPY INITIAL EVALUATION ADULT - DIAGNOSIS, PT EVAL
Prostate cancer metastatic to bone.
Neoplasm-related severe pain 2/2 diffuse bony sclerotic lesions/ Prostate Ca

## 2021-12-22 NOTE — PHYSICAL THERAPY INITIAL EVALUATION ADULT - LEVEL OF INDEPENDENCE: SUPINE/SIT, REHAB EVAL
Performed extremely slowly, requiring >45 minutes of active PT education and gradual assist before reaching ~75% of supine -> sit. Pt grimacing t/o entire duration of mobility training with frequent rest breaks provided by PT. PT provided assist but minimal resistance everytime pt started pushing against PT, due to decreased bone integrity. Pt unable to maintain sitting at EOB due to poor trunk control and constant c/o LE pain upon any mobility.Unable to reach full upright sitting at EOB as pt needs to lean against elevated HOB for support./dependent (less than 25% patients effort)

## 2021-12-22 NOTE — PHYSICAL THERAPY INITIAL EVALUATION ADULT - RANGE OF MOTION, PT EVAL
1 week: Tolerate 20 - 120 degrees of knee flexion with emphasis on stretching into extension to as close to 0 as possible t/o b/l knees.

## 2021-12-22 NOTE — PHYSICAL THERAPY INITIAL EVALUATION ADULT - IMPAIRMENTS FOUND, PT EVAL
ergonomics and body mechanics/fine motor/gait, locomotion, and balance/gross motor/joint integrity and mobility/muscle strength/posture/ROM

## 2021-12-22 NOTE — PHYSICAL THERAPY INITIAL EVALUATION ADULT - GENERAL OBSERVATIONS, REHAB EVAL
1022 - 1124 Rounding team requested PT re-evaluate pt for b/s PT. Pt appears cachexic.  Pt rec sidelying to R in bed in fetal position with +condom catheter, +bed alarm, appears in NAD with sister at b/s. Pt with poor tolerance of mobility with constant grimacing and pt c/o pain upon any gentle PROM from PT. Pt forming hamstring tightness and at risk for contracture; PT attempted to slowly stretch LE into ext however pt with poor tolerance and c/o substantial pain immediately upon initiation of ROM.

## 2021-12-22 NOTE — PHYSICAL THERAPY INITIAL EVALUATION ADULT - PERTINENT HX OF CURRENT PROBLEM, REHAB EVAL
see below
70 yo M with PMH of CVA, afib on Eliquis, HTN, HFrEF (EF 20-25% 2018), chronic LLE pain, presented to the ED with worsening Left LE pain for the past 2 months, worsening fatigue, anorexia and significant weight loss. Of note, pt had previously been worked up for the leg pain including a CT scan in march 2021 in [facility], which was negative.

## 2021-12-22 NOTE — PROGRESS NOTE ADULT - PROVIDER SPECIALTY LIST ADULT
Internal Medicine
Hospitalist
Infectious Disease
Internal Medicine
Hospitalist
Hospitalist
Internal Medicine

## 2021-12-22 NOTE — PHYSICAL THERAPY INITIAL EVALUATION ADULT - ADDITIONAL COMMENTS
As per pt and sister, pt was transferring and ambulating with RW prior to admission. Pt however presents with substantial generalized weakness and poor tolerance of ROM due to pain at this time, especially in LE, and requires total assist to initiate bed mobility.

## 2021-12-22 NOTE — PROGRESS NOTE ADULT - ATTENDING COMMENTS
72 yo M with PMH of CVA, afib on Eliquis, HTN, HFrEF (EF 20-25% 2018), chronic LLE pain, presented to the ED with worsening Left LE pain for the past 2 months, worsening fatigue, anorexia and significant weight loss. Of note, pt had previously been worked up for the leg pain including a CT scan in march 2021 in [facility], which was negative. Currently awaiting final blood cultures to set stop date for antibiotics per ID. PICC line placed. Will need 6 weeks from date of final negative blood culture.    #Progress Note Handoff  Pending (specify): final blood culture growth and authorization  Family discussion: updated. in agreement with plan  Disposition: [facility], confirmed willing to accept the patient, pending financial clearance, cm tasked cma for authorization.

## 2021-12-23 VITALS
SYSTOLIC BLOOD PRESSURE: 100 MMHG | HEART RATE: 80 BPM | DIASTOLIC BLOOD PRESSURE: 54 MMHG | RESPIRATION RATE: 18 BRPM | TEMPERATURE: 97 F

## 2021-12-23 LAB
ALBUMIN SERPL ELPH-MCNC: 2.6 G/DL — LOW (ref 3.5–5.2)
ALP SERPL-CCNC: 2316 U/L — HIGH (ref 30–115)
ALT FLD-CCNC: 6 U/L — SIGNIFICANT CHANGE UP (ref 0–41)
ANION GAP SERPL CALC-SCNC: 15 MMOL/L — HIGH (ref 7–14)
AST SERPL-CCNC: 29 U/L — SIGNIFICANT CHANGE UP (ref 0–41)
BASOPHILS # BLD AUTO: 0.02 K/UL — SIGNIFICANT CHANGE UP (ref 0–0.2)
BASOPHILS NFR BLD AUTO: 0.4 % — SIGNIFICANT CHANGE UP (ref 0–1)
BILIRUB SERPL-MCNC: 0.5 MG/DL — SIGNIFICANT CHANGE UP (ref 0.2–1.2)
BUN SERPL-MCNC: 13 MG/DL — SIGNIFICANT CHANGE UP (ref 10–20)
CALCIUM SERPL-MCNC: 7.7 MG/DL — LOW (ref 8.5–10.1)
CHLORIDE SERPL-SCNC: 104 MMOL/L — SIGNIFICANT CHANGE UP (ref 98–110)
CO2 SERPL-SCNC: 23 MMOL/L — SIGNIFICANT CHANGE UP (ref 17–32)
CREAT SERPL-MCNC: 0.7 MG/DL — SIGNIFICANT CHANGE UP (ref 0.7–1.5)
CULTURE RESULTS: SIGNIFICANT CHANGE UP
CULTURE RESULTS: SIGNIFICANT CHANGE UP
EOSINOPHIL # BLD AUTO: 0.06 K/UL — SIGNIFICANT CHANGE UP (ref 0–0.7)
EOSINOPHIL NFR BLD AUTO: 1.1 % — SIGNIFICANT CHANGE UP (ref 0–8)
GLUCOSE BLDC GLUCOMTR-MCNC: 108 MG/DL — HIGH (ref 70–99)
GLUCOSE SERPL-MCNC: 99 MG/DL — SIGNIFICANT CHANGE UP (ref 70–99)
HCT VFR BLD CALC: 26 % — LOW (ref 42–52)
HGB BLD-MCNC: 8.1 G/DL — LOW (ref 14–18)
IMM GRANULOCYTES NFR BLD AUTO: 2.1 % — HIGH (ref 0.1–0.3)
LYMPHOCYTES # BLD AUTO: 0.96 K/UL — LOW (ref 1.2–3.4)
LYMPHOCYTES # BLD AUTO: 16.8 % — LOW (ref 20.5–51.1)
MAGNESIUM SERPL-MCNC: 2.3 MG/DL — SIGNIFICANT CHANGE UP (ref 1.8–2.4)
MCHC RBC-ENTMCNC: 30.1 PG — SIGNIFICANT CHANGE UP (ref 27–31)
MCHC RBC-ENTMCNC: 31.2 G/DL — LOW (ref 32–37)
MCV RBC AUTO: 96.7 FL — HIGH (ref 80–94)
MONOCYTES # BLD AUTO: 0.25 K/UL — SIGNIFICANT CHANGE UP (ref 0.1–0.6)
MONOCYTES NFR BLD AUTO: 4.4 % — SIGNIFICANT CHANGE UP (ref 1.7–9.3)
NEUTROPHILS # BLD AUTO: 4.29 K/UL — SIGNIFICANT CHANGE UP (ref 1.4–6.5)
NEUTROPHILS NFR BLD AUTO: 75.2 % — SIGNIFICANT CHANGE UP (ref 42.2–75.2)
NRBC # BLD: 1 /100 WBCS — HIGH (ref 0–0)
PLATELET # BLD AUTO: 205 K/UL — SIGNIFICANT CHANGE UP (ref 130–400)
POTASSIUM SERPL-MCNC: 3.8 MMOL/L — SIGNIFICANT CHANGE UP (ref 3.5–5)
POTASSIUM SERPL-SCNC: 3.8 MMOL/L — SIGNIFICANT CHANGE UP (ref 3.5–5)
PROT SERPL-MCNC: 4.8 G/DL — LOW (ref 6–8)
RBC # BLD: 2.69 M/UL — LOW (ref 4.7–6.1)
RBC # FLD: 15.8 % — HIGH (ref 11.5–14.5)
SODIUM SERPL-SCNC: 142 MMOL/L — SIGNIFICANT CHANGE UP (ref 135–146)
SPECIMEN SOURCE: SIGNIFICANT CHANGE UP
SPECIMEN SOURCE: SIGNIFICANT CHANGE UP
WBC # BLD: 5.7 K/UL — SIGNIFICANT CHANGE UP (ref 4.8–10.8)
WBC # FLD AUTO: 5.7 K/UL — SIGNIFICANT CHANGE UP (ref 4.8–10.8)

## 2021-12-23 PROCEDURE — 99239 HOSP IP/OBS DSCHRG MGMT >30: CPT

## 2021-12-23 RX ADMIN — Medication 1 TABLET(S): at 14:02

## 2021-12-23 RX ADMIN — IVABRADINE 5 MILLIGRAM(S): 7.5 TABLET, FILM COATED ORAL at 17:18

## 2021-12-23 RX ADMIN — Medication 650 MILLIGRAM(S): at 21:18

## 2021-12-23 RX ADMIN — Medication 650 MILLIGRAM(S): at 14:03

## 2021-12-23 RX ADMIN — BICALUTAMIDE 50 MILLIGRAM(S): 50 TABLET, FILM COATED ORAL at 14:59

## 2021-12-23 RX ADMIN — GABAPENTIN 400 MILLIGRAM(S): 400 CAPSULE ORAL at 14:02

## 2021-12-23 RX ADMIN — Medication 5 MILLIGRAM(S): at 05:55

## 2021-12-23 RX ADMIN — HYDROMORPHONE HYDROCHLORIDE 2 MILLIGRAM(S): 2 INJECTION INTRAMUSCULAR; INTRAVENOUS; SUBCUTANEOUS at 13:59

## 2021-12-23 RX ADMIN — Medication 25 MILLIGRAM(S): at 05:56

## 2021-12-23 RX ADMIN — SACUBITRIL AND VALSARTAN 1 TABLET(S): 24; 26 TABLET, FILM COATED ORAL at 17:16

## 2021-12-23 RX ADMIN — HYDROMORPHONE HYDROCHLORIDE 2 MILLIGRAM(S): 2 INJECTION INTRAMUSCULAR; INTRAVENOUS; SUBCUTANEOUS at 14:31

## 2021-12-23 RX ADMIN — Medication 100 MILLIGRAM(S): at 05:59

## 2021-12-23 RX ADMIN — FENTANYL CITRATE 1 PATCH: 50 INJECTION INTRAVENOUS at 17:30

## 2021-12-23 RX ADMIN — Medication 650 MILLIGRAM(S): at 05:56

## 2021-12-23 RX ADMIN — ENOXAPARIN SODIUM 50 MILLIGRAM(S): 100 INJECTION SUBCUTANEOUS at 05:57

## 2021-12-23 RX ADMIN — ENOXAPARIN SODIUM 50 MILLIGRAM(S): 100 INJECTION SUBCUTANEOUS at 17:17

## 2021-12-23 RX ADMIN — Medication 100 MILLIGRAM(S): at 14:59

## 2021-12-23 RX ADMIN — Medication 100 MILLIGRAM(S): at 21:18

## 2021-12-23 RX ADMIN — FENTANYL CITRATE 1 PATCH: 50 INJECTION INTRAVENOUS at 08:20

## 2021-12-23 RX ADMIN — GABAPENTIN 400 MILLIGRAM(S): 400 CAPSULE ORAL at 21:18

## 2021-12-23 RX ADMIN — GABAPENTIN 400 MILLIGRAM(S): 400 CAPSULE ORAL at 05:56

## 2021-12-23 RX ADMIN — IVABRADINE 5 MILLIGRAM(S): 7.5 TABLET, FILM COATED ORAL at 06:00

## 2021-12-23 RX ADMIN — SACUBITRIL AND VALSARTAN 1 TABLET(S): 24; 26 TABLET, FILM COATED ORAL at 05:55

## 2021-12-24 LAB
CULTURE RESULTS: SIGNIFICANT CHANGE UP
CULTURE RESULTS: SIGNIFICANT CHANGE UP
SPECIMEN SOURCE: SIGNIFICANT CHANGE UP
SPECIMEN SOURCE: SIGNIFICANT CHANGE UP

## 2021-12-25 LAB
CULTURE RESULTS: SIGNIFICANT CHANGE UP
SPECIMEN SOURCE: SIGNIFICANT CHANGE UP

## 2021-12-26 LAB
CULTURE RESULTS: SIGNIFICANT CHANGE UP
SPECIMEN SOURCE: SIGNIFICANT CHANGE UP

## 2021-12-27 LAB
CULTURE RESULTS: SIGNIFICANT CHANGE UP
SPECIMEN SOURCE: SIGNIFICANT CHANGE UP

## 2021-12-28 LAB
CULTURE RESULTS: SIGNIFICANT CHANGE UP
SPECIMEN SOURCE: SIGNIFICANT CHANGE UP

## 2022-07-21 NOTE — ED ADULT TRIAGE NOTE - ARRIVAL FROM
Home
Rivaroxaban/Xarelto increases your risk for bleeding. Notify your doctor if you experience any of the following side effects: unusual bleeding or bruising, vomiting blood or coffee ground-like material, red or black stool, itching or hives, chest tightness, trouble breathing, swelling in your face or hands, swelling in your mouth or throat, change in how much or how often you urinate, red or brown urine, heavy menstrual or vaginal bleeding, or blistering or peeling skin. When Rivaroxaban/Xarelto is taken with other medicines, they can affect how it works. Taking other medications such as aspirin, antibiotics, antifungals, blood thinners, nonsteroidal anti-inflammatories, and medications that treat depression can increase your risk of bleeding. It is very important to tell your health care provider about all of the other medicines, including over-the-counter medications, herbs, and vitamins you are taking.  DO NOT start, stop, or change the dosage of any medicine, including over-the-counter medicines, vitamins, and herbal products without your doctor’s approval.  Any products containing aspirin or are nonsteroidal anti-inflammatories lessen the blood’s ability to form clots and adds to the effect of Rivaroxaban/Xarelto. Never take aspirin or medicines that contain aspirin without speaking to your doctor.

## 2022-09-09 NOTE — PHYSICAL THERAPY INITIAL EVALUATION ADULT - PLANNED THERAPY INTERVENTIONS, PT EVAL
Detail Level: Detailed Consent: The risks of atrophy were reviewed with the patient. X Size Of Lesion In Cm (Optional): 0 Total Volume (Ccs): 1.0 Include Z78.9 (Other Specified Conditions Influencing Health Status) As An Associated Diagnosis?: No Kenalog Preparation: Kenalog Concentration Of Kenalog Solution Injected (Mg/Ml): 10.0 Treatment Number (Optional): 2 Medical Necessity Clause: This procedure was medically necessary because the lesions that were treated were: Ndc# For Kenalog Only: 7508-8135-72 Expiration Date For Kenalog (Optional): MAY 2023 Lot # For Kenalog (Optional): [id number] Validate Note Data When Using Inventory: Yes Administered By (Optional): Dr. Janeen Brand balance training/bed mobility training/gait training/strengthening/transfer training

## 2023-02-27 NOTE — ED PROVIDER NOTE - ATTENDING WITH...
[General Appearance - Alert] : alert [General Appearance - Well Nourished] : well nourished [Oriented To Time, Place, And Person] : oriented to person, place, and time [Full ROM] : full ROM [Normal] : normal [Able to toe walk] : the patient was able to toe walk [Able to heel walk] : the patient was able to heel walk [Intact] : all reflexes within normal limits bilaterally [Straight-Leg Raise Test - Left] : straight leg raise of the left leg was negative [Straight-Leg Raise Test - Right] : straight leg raise  of the right leg was negative ACP

## 2023-04-03 NOTE — PHYSICAL THERAPY INITIAL EVALUATION ADULT - PRECAUTIONS/LIMITATIONS, REHAB EVAL
On Vitamin D   Due for repeat in 10/2023  Exercises regularly    cardiac precautions/fall precautions

## 2023-05-24 NOTE — ED ADULT TRIAGE NOTE - ESI TRIAGE ACUITY LEVEL, MLM
Isreal Sy MD VISIT & TX  DR Jovana Leavitt IN TO SEE PT  ORDERS RECEIVED  CHEMO AFTER LABS  RV 2 WEEKS WITH SCANS PRIOR  CT C/A/P W/CONTRAST SCHEDULED WITH Nava Aaron 75 FOR 06/01/23 @11AM ARRIVE BY 9:45AM @ [address]  CT TIBIA FIBULA RT W/CONTRAST SCHEDULED FOR 06/01/23 @10AM ARRIVE BY 9:45AM [address]  [facility] SHE WILL CALL PT TO SEE IF PT HAS ANY METAL HARDWARE IN LEG (WRITER UNSURE)  MD VISIT 06/07/23  9:30AM [email or web address]  AVS PRINTED AND GIVEN TO PATIENT WITH INSTRUCTIONS  PATIENT REMAINS IN [address] [place] [address]
3

## 2023-09-08 ENCOUNTER — NON-APPOINTMENT (OUTPATIENT)
Age: 73
End: 2023-09-08

## 2023-09-08 DIAGNOSIS — I49.9 CARDIAC ARRHYTHMIA, UNSPECIFIED: ICD-10-CM

## 2023-09-08 DIAGNOSIS — R42 DIZZINESS AND GIDDINESS: ICD-10-CM

## 2023-09-08 DIAGNOSIS — I50.9 HEART FAILURE, UNSPECIFIED: ICD-10-CM

## 2023-09-08 DIAGNOSIS — H54.3 UNQUALIFIED VISUAL LOSS, BOTH EYES: ICD-10-CM

## 2023-09-08 DIAGNOSIS — R41.844 FRONTAL LOBE AND EXECUTIVE FUNCTION DEFICIT: ICD-10-CM

## 2023-10-03 NOTE — PHYSICAL THERAPY INITIAL EVALUATION ADULT - WEIGHT-BEARING RESTRICTIONS: STAND/SIT, REHAB EVAL
full weight-bearing Rhofade Counseling: Rhofade is a topical medication which can decrease superficial blood flow where applied. Side effects are uncommon and include stinging, redness and allergic reactions.

## 2024-02-13 NOTE — DIETITIAN INITIAL EVALUATION ADULT. - HEIGHT IN FEET
Edward C Behnke is here for elevated PSA.    Urine sample requested by Dr. Hooper for evaluation for presenting urinary issues.    PMD Daron Blanton MD    Prostate Specific Antigen (ng/mL)   Date Value   02/01/2024 10.70 (H)       Denies known Latex allergy or symptoms of Latex sensitivity.  Medications reviewed and updated.  Allergies reviewed.  Social History     Tobacco Use   Smoking Status Never   Smokeless Tobacco Never       Hematuria: No  Burning: No  Incontinence: Yes, urge, not new, not worsening  Pain:  No    See International Prostate Symptom Score (I-PSS) Questionnaire for further urological assessment.    Past urological problems/procedures: Microhematuria, elevated PSA, benign prostate biopsy   5

## 2024-03-26 NOTE — ED BEHAVIORAL HEALTH ASSESSMENT NOTE - NS ED BHA HOMICIDALITY PRESENT AGGRESSION PROPERTY LIFETIME
Chronic   - Continue home medication Omeprazole 40mg per day None known Chronic   - Continue home medication Omeprazole 40mg per day-- converted to pantoprazole 40mg daily DVT ppx: on Eliquis 5mg BID for hx of PE DVT ppx: SCD's. Hold Eliquis due to hematuria    Hx of PE: takes Eliquis 5mg BID, will hold tonight and f/u urology and cardio recs if safe to resume

## 2024-08-07 NOTE — PATIENT PROFILE ADULT. - AS SC BRADEN ACTIVITY
" LOS: 2 days   Patient Care Team:  Blair Dhaliwal MD as PCP - General (Internal Medicine)  Blair Packer MD as Consulting Physician (Cardiology)  Michelle Aponte MD (Nephrology)    Chief Complaint: ESRD    Subjective     HD today. Intra-dialytic hypotension during HD. UF 1.1liter.     Subjective    History taken from: patient    Objective     Vital Sign Min/Max for last 24 hours  Temp  Min: 96.6 °F (35.9 °C)  Max: 98.1 °F (36.7 °C)   BP  Min: 69/42  Max: 201/72   Pulse  Min: 66  Max: 78   Resp  Min: 16  Max: 19   SpO2  Min: 87 %  Max: 100 %   No data recorded   No data recorded     Flowsheet Rows      Flowsheet Row First Filed Value   Admission Height 167.6 cm (66\") Documented at 08/05/2024 0854   Admission Weight 52.6 kg (116 lb) Documented at 08/05/2024 0854            I/O this shift:  In: 300 [Other:300]  Out: 1400   I/O last 3 completed shifts:  In: -   Out: 1040 [Urine:40]    Objective:  General Appearance:  Comfortable.    Vital signs: (most recent): Blood pressure 122/59, pulse 69, temperature 96.6 °F (35.9 °C), temperature source Axillary, resp. rate 18, height 167.6 cm (66\"), weight 52.6 kg (116 lb), SpO2 100%.  Vital signs are normal.    HEENT: Normal HEENT exam.    Lungs:  Normal effort and normal respiratory rate.  Breath sounds clear to auscultation.    Heart: Normal rate.  Regular rhythm.  S1 normal and S2 normal.    Abdomen: Abdomen is soft and non-distended.  There are no signs of ascites.  Bowel sounds are normal.     Extremities: Normal range of motion.  There is no dependent edema.    Pulses: Distal pulses are intact.    Neurological: Patient is alert and oriented to person, place and time.  Normal strength.    Pupils:  Pupils are equal, round, and reactive to light.    Skin:  Warm.                Results Review:     I reviewed the patient's new clinical results.    WBC WBC   Date Value Ref Range Status   08/07/2024 7.52 3.40 - 10.80 10*3/mm3 Final   08/05/2024 4.72 3.40 - 10.80 " "10*3/mm3 Final      HGB Hemoglobin   Date Value Ref Range Status   08/07/2024 11.3 (L) 13.0 - 17.7 g/dL Final   08/07/2024 11.6 (L) 13.0 - 17.7 g/dL Final   08/07/2024 12.4 (L) 13.0 - 17.7 g/dL Final   08/06/2024 12.1 (L) 13.0 - 17.7 g/dL Final   08/06/2024 12.1 (L) 13.0 - 17.7 g/dL Final   08/05/2024 12.1 (L) 13.0 - 17.7 g/dL Final   08/05/2024 12.1 (L) 13.0 - 17.7 g/dL Final      HCT Hematocrit   Date Value Ref Range Status   08/07/2024 35.8 (L) 37.5 - 51.0 % Final   08/07/2024 35.2 (L) 37.5 - 51.0 % Final   08/07/2024 36.4 (L) 37.5 - 51.0 % Final   08/06/2024 35.2 (L) 37.5 - 51.0 % Final   08/06/2024 34.6 (L) 37.5 - 51.0 % Final   08/05/2024 36.7 (L) 37.5 - 51.0 % Final   08/05/2024 36.5 (L) 37.5 - 51.0 % Final      Platlets No results found for: \"LABPLAT\"   MCV MCV   Date Value Ref Range Status   08/07/2024 103.4 (H) 79.0 - 97.0 fL Final   08/05/2024 104.3 (H) 79.0 - 97.0 fL Final          Sodium Sodium   Date Value Ref Range Status   08/07/2024 135 (L) 136 - 145 mmol/L Final   08/06/2024 136 136 - 145 mmol/L Final   08/05/2024 137 136 - 145 mmol/L Final      Potassium Potassium   Date Value Ref Range Status   08/07/2024 4.5 3.5 - 5.2 mmol/L Final     Comment:     Slight hemolysis detected by analyzer. Result may be falsely elevated.   08/06/2024 5.0 3.5 - 5.2 mmol/L Final     Comment:     Slight hemolysis detected by analyzer. Result may be falsely elevated.   08/05/2024 5.7 (H) 3.5 - 5.2 mmol/L Final     Comment:     Slight hemolysis detected by analyzer. Result may be falsely elevated.      Chloride Chloride   Date Value Ref Range Status   08/07/2024 96 (L) 98 - 107 mmol/L Final   08/06/2024 93 (L) 98 - 107 mmol/L Final   08/05/2024 93 (L) 98 - 107 mmol/L Final      CO2 CO2   Date Value Ref Range Status   08/07/2024 25.0 22.0 - 29.0 mmol/L Final   08/06/2024 28.0 22.0 - 29.0 mmol/L Final   08/05/2024 32.0 (H) 22.0 - 29.0 mmol/L Final      BUN BUN   Date Value Ref Range Status   08/07/2024 25 (H) 8 - 23 mg/dL " "Final   08/06/2024 52 (H) 8 - 23 mg/dL Final   08/05/2024 41 (H) 8 - 23 mg/dL Final      Creatinine Creatinine   Date Value Ref Range Status   08/07/2024 4.33 (H) 0.76 - 1.27 mg/dL Final   08/06/2024 6.77 (H) 0.76 - 1.27 mg/dL Final   08/05/2024 6.04 (H) 0.76 - 1.27 mg/dL Final      Calcium Calcium   Date Value Ref Range Status   08/07/2024 9.7 8.6 - 10.5 mg/dL Final   08/06/2024 9.3 8.6 - 10.5 mg/dL Final   08/05/2024 9.7 8.6 - 10.5 mg/dL Final      PO4 No results found for: \"CAPO4\"   Albumin Albumin   Date Value Ref Range Status   08/07/2024 4.3 3.5 - 5.2 g/dL Final      Magnesium No results found for: \"MG\"   Uric Acid No results found for: \"URICACID\"     Medication Review: Yes    Assessment & Plan       ESRD (end stage renal disease)    (HFpEF) heart failure with preserved ejection fraction    Benign prostatic hyperplasia    Depression    Major depressive disorder, single episode, moderate    Mixed hyperlipidemia    Paroxysmal atrial fibrillation    Hyperkalemia      Assessment & Plan    ESRD:  On MWF saray. [facility]. RUE AV fistula.   -Bruised AV fistula.  Vascular consulted.      Bruised /swollen AV fistula:   -AV fistula functional. Likely infiltrated on last attempt. Now has TDC right IJ     Anemia: Hemoglobin at goal.      Volume status :  Stable         Recs  HD per MWF [facility].   Now has TDC due to infiltration of AV access.         Thank you for the consult, will follow with you closely    Pancho Carpenter MD  08/07/24  16:40 EDT            " (3) walks occasionally

## 2024-11-08 NOTE — ED PROVIDER NOTE - CADM POA CENTRAL LINE
You were evaluated in the Emergency Department today for fever, cough.  You were diagnosed with pneumonia. Your evaluation has shown no signs of medical conditions requiring treatment in the hospital at this time, however we recommend that you follow up with pediatrician as soon as possible.      You were prescribed two different antibiotics, take both as prescribed until finished. Tylenol or motrin as needed for fever. Encourage plenty of fluids.     Return to the Emergency Department if your symptoms change or worsen, including persistent fever after 72 hours of antibiotics, refusing all fluids, urinates less than once every 8 hours, difficulty breathing, or any other concerning symptoms.      ??????? ??? ??????????? ? ????????? ?????????? ?????? ?? ??????? ??????????? ? ?????.  ? ??? ??????????????? ?????????. ? ????????? ????? ???? ???????????? ?? ??????? ??????? ????????? ???????????, ????????? ??????? ? ????????, ?????? ?? ??????????? ??? ??? ????? ?????? ?????????? ? ????????.      ??? ????????? ??? ?????? ???????????, ?????????? ???, ??? ??????????, ?? ??? ???, ???? ?? ????????? ?????. ???????? ??? ?????? ??? ?????????. ????????? ???????????? ???????? ?????????? ????????.     ????????? ? ????????? ?????????? ??????, ???? ???? ???????? ????????? ??? ?????????, ??????? ?????????? ????????? ????? 72 ????? ?????? ????????????, ????? ?? ????? ????????, ?????????????? ???? ?????? ???? ? 8 ?????, ???????????? ??????? ??? ????? ?????? ????????? ????????.  
No

## 2024-11-13 NOTE — DISCHARGE NOTE ADULT - NS AS DC FU [facility] LV FUNCTION AFTER DELIVERY
Problem: PAIN - ADULT  Goal: Verbalizes/displays adequate comfort level or baseline comfort level  Description: Interventions:  - Encourage patient to monitor pain and request assistance  - Assess pain using appropriate pain scale  - Administer analgesics based on type and severity of pain and evaluate response  - Implement non-pharmacological measures as appropriate and evaluate response  - Consider cultural and social influences on pain and pain management  - Notify physician/advanced practitioner if interventions unsuccessful or patient reports new pain  Outcome: Progressing     Problem: INFECTION - ADULT  Goal: Absence or prevention of progression during hospitalization  Description: INTERVENTIONS:  - Assess and monitor for signs and symptoms of infection  - Monitor lab/diagnostic results  - Monitor all insertion sites, i e  indwelling lines, tubes, and drains  - Monitor endotracheal if appropriate and nasal secretions for changes in amount and color  - Ranier appropriate cooling/warming therapies per order  - Administer medications as ordered  - Instruct and encourage patient and family to use good hand hygiene technique  - Identify and instruct in appropriate isolation precautions for identified infection/condition  Outcome: Progressing     Problem: SAFETY ADULT  Goal: Patient will remain free of falls  Description: INTERVENTIONS:  - Educate patient/family on patient safety including physical limitations  - Instruct patient to call for assistance with activity   - Consult OT/PT to assist with strengthening/mobility   - Keep Call bell within reach  - Keep bed low and locked with side rails adjusted as appropriate  - Keep care items and personal belongings within reach  - Initiate and maintain comfort rounds  - Make Fall Risk Sign visible to staff  - Apply yellow socks and bracelet for high fall risk patients  - Consider moving patient to room near nurses station  Outcome: Progressing  Goal: Maintain or Sent another PA request   return to baseline ADL function  Description: INTERVENTIONS:  -  Assess patient's ability to carry out ADLs; assess patient's baseline for ADL function and identify physical deficits which impact ability to perform ADLs (bathing, care of mouth/teeth, toileting, grooming, dressing, etc )  - Assess/evaluate cause of self-care deficits   - Assess range of motion  - Assess patient's mobility; develop plan if impaired  - Assess patient's need for assistive devices and provide as appropriate  - Encourage maximum independence but intervene and supervise when necessary  - Involve family in performance of ADLs  - Assess for home care needs following discharge   - Consider OT consult to assist with ADL evaluation and planning for discharge  - Provide patient education as appropriate  Outcome: Progressing  Goal: Maintains/Returns to pre admission functional level  Description: INTERVENTIONS:  - Perform BMAT or MOVE assessment daily    - Set and communicate daily mobility goal to care team and patient/family/caregiver     - Collaborate with rehabilitation services on mobility goals if consulted  - Out of bed for toileting  - Record patient progress and toleration of activity level   Outcome: Progressing     Problem: DISCHARGE PLANNING  Goal: Discharge to home or other facility with appropriate resources  Description: INTERVENTIONS:  - Identify barriers to discharge w/patient and caregiver  - Arrange for needed discharge resources and transportation as appropriate  - Identify discharge learning needs (meds, wound care, etc )  - Arrange for interpretive services to assist at discharge as needed  - Refer to Case Management Department for coordinating discharge planning if the patient needs post-hospital services based on physician/advanced practitioner order or complex needs related to functional status, cognitive ability, or social support system  Outcome: Progressing     Problem: Knowledge Deficit  Goal: Patient/family/caregiver demonstrates understanding of disease process, treatment plan, medications, and discharge instructions  Description: Complete learning assessment and assess knowledge base  Interventions:  - Provide teaching at level of understanding  - Provide teaching via preferred learning methods  Outcome: Progressing     Problem: MOBILITY - ADULT  Goal: Maintain or return to baseline ADL function  Description: INTERVENTIONS:  -  Assess patient's ability to carry out ADLs; assess patient's baseline for ADL function and identify physical deficits which impact ability to perform ADLs (bathing, care of mouth/teeth, toileting, grooming, dressing, etc )  - Assess/evaluate cause of self-care deficits   - Assess range of motion  - Assess patient's mobility; develop plan if impaired  - Assess patient's need for assistive devices and provide as appropriate  - Encourage maximum independence but intervene and supervise when necessary  - Involve family in performance of ADLs  - Assess for home care needs following discharge   - Consider OT consult to assist with ADL evaluation and planning for discharge  - Provide patient education as appropriate  Outcome: Progressing  Goal: Maintains/Returns to pre admission functional level  Description: INTERVENTIONS:  - Perform BMAT or MOVE assessment daily    - Set and communicate daily mobility goal to care team and patient/family/caregiver     - Collaborate with rehabilitation services on mobility goals if consulted  - Out of bed for toileting  - Record patient progress and toleration of activity level   Outcome: Progressing     Problem: Potential for Falls  Goal: Patient will remain free of falls  Description: INTERVENTIONS:  - Educate patient/family on patient safety including physical limitations  - Instruct patient to call for assistance with activity   - Consult OT/PT to assist with strengthening/mobility   - Keep Call bell within reach  - Keep bed low and locked with side rails adjusted as appropriate  - Keep care items and personal belongings within reach  - Initiate and maintain comfort rounds  - Make Fall Risk Sign visible to staff  - Apply yellow socks and bracelet for high fall risk patients  - Consider moving patient to room near nurses station  Outcome: Progressing     Problem: Prexisting or High Potential for Compromised Skin Integrity  Goal: Skin integrity is maintained or improved  Description: INTERVENTIONS:  - Identify patients at risk for skin breakdown  - Assess and monitor skin integrity  - Assess and monitor nutrition and hydration status  - Monitor labs   - Assess for incontinence   - Turn and reposition patient  - Assist with mobility/ambulation  - Relieve pressure over bony prominences  - Avoid friction and shearing  - Provide appropriate hygiene as needed including keeping skin clean and dry  - Evaluate need for skin moisturizer/barrier cream  - Collaborate with interdisciplinary team   - Patient/family teaching  - Consider wound care consult   Outcome: Progressing yes

## 2025-02-24 NOTE — CONSULT NOTE ADULT - SUBJECTIVE AND OBJECTIVE BOX
Hugo Bailey  : 1949  Primary: [employer] (Medicare Managed)  Secondary:  [facility] @ [address]la  [facility] [address]  Phone: [phone]  Fax: [phone] Plan Frequency: 2x/wk for 90 days  Plan of Care/Certification Expiration Date: 25        Plan of Care/Certification Expiration Date:  Plan of Care/Certification Expiration Date: 25    Frequency/Duration: Plan Frequency: 2x/wk for 90 days      Time In/Out:   Time In: 1358  Time Out: 1436      PT Visit Info:    Progress Note Due Date: 25  Total # of Visits to Date: 3  Progress Note Counter: 3      Visit Count:  3    OUTPATIENT PHYSICAL THERAPY:   Treatment Note 2025       Episode  (PT: Axonal Polyneuropathy)               Treatment Diagnosis:    Muscle weakness (generalized)  Difficulty in walking, not elsewhere classified  Balance disorder  Medical/Referring Diagnosis:    Axonal polyneuropathy    Referring Physician:  Quoc Hayes DO MD Orders:  PT Eval and Treat   Return MD Appt:  25   Date of Onset:  Chronic LE weakness and decreased balance  Allergies:   Patient has no known allergies.  Restrictions/Precautions:   None      Interventions Planned (Treatment may consist of any combination of the following):     See Assessment Note    Subjective Comments:   Pt states he is doing ok today.  Initial Pain Level:     0/10  Post Session Pain Level:      0/10  Medications Last Reviewed: 2025  Updated Objective Findings:  None Today  Treatment   THERAPEUTIC EXERCISE: (38 minutes):    Exercises per grid below to improve mobility and strength.  Required minimal verbal cues to promote proper body alignment and promote proper body mechanics.  Progressed resistance and repetitions as indicated.   Date:  02-10-25 Date:  25 Date:  25   Activity/Exercise Parameters Parameters Parameters   Seated Heel/Toe Raises 15 reps  B LE's  (HEP) 20 reps  B LE's 20 reps  B  INTERVENTIONAL RADIOLOGY CONSULT:     HPI:  70 yo M with PMH of CVA, afib on Eliquis, HTN, HFrEF ( EF 20-25% 2018), chronic LLE pain, presented to the ED with worsening Left LE pain for the past 2 months, worsening fatigue, anorexia and significant weight loss. Of note pt has previously been worked up for the leg pain including a CT scan in march 2021 in [facility] which was negative. Patient has been getting progressively weak and is unable to ambulate now. He denies any chest pain, shortness of breath, fever, cough. Pt was being seen by a GI w/ planned colonoscopy today but on arrival to office, his GI physician was concerned about his cachetic appearance and rapid weight loss, so he sent him into ED immediately for evaluation.     IR consulted for image guided tissue biopsy.     PAST MEDICAL & SURGICAL HISTORY:  Systolic congestive heart failure, unspecified chronicity  Cerebrovascular accident (CVA) due to bilateral embolism of posterior cerebral arteries  Essential hypertension  Atrial fibrillation, unspecified type  AICD (automatic cardioverter/defibrillator) present    MEDICATIONS  (STANDING):  dexAMETHasone     Tablet 2 milliGRAM(s) Oral every 8 hours  gabapentin 300 milliGRAM(s) Oral every 12 hours  influenza  Vaccine (HIGH DOSE) 0.7 milliLiter(s) IntraMuscular once  ivabradine 5 milliGRAM(s) Oral two times a day  lactated ringers. 1000 milliLiter(s) (50 mL/Hr) IV Continuous <Continuous>  metoprolol succinate ER 25 milliGRAM(s) Oral daily  polyethylene glycol 3350 17 Gram(s) Oral daily  senna 2 Tablet(s) Oral at bedtime    MEDICATIONS  (PRN):  acetaminophen     Tablet .. 650 milliGRAM(s) Oral every 6 hours PRN Temp greater or equal to 38C (100.4F), Mild Pain (1 - 3)  aluminum hydroxide/magnesium hydroxide/simethicone Suspension 30 milliLiter(s) Oral every 4 hours PRN Dyspepsia  HYDROmorphone   Tablet 2 milliGRAM(s) Oral every 3 hours PRN Severe Pain (7 - 10)  melatonin 3 milliGRAM(s) Oral at bedtime PRN Insomnia  ondansetron Injectable 4 milliGRAM(s) IV Push every 8 hours PRN Nausea and/or Vomiting      Allergies  cephalexin (Flushing)  penicillin (Rash)    Intolerances    Physical Exam:   Vital Signs Last 24 Hrs  T(C): 36.2 (02 Dec 2021 04:10), Max: 36.4 (01 Dec 2021 14:33)  T(F): 97.1 (02 Dec 2021 04:10), Max: 97.6 (01 Dec 2021 14:33)  HR: 81 (02 Dec 2021 04:10) (81 - 89)  BP: 113/52 (02 Dec 2021 04:10) (87/38 - 113/56)  BP(mean): --  RR: 16 (02 Dec 2021 04:10) (16 - 19)  SpO2: --    Labs:                         7.8    3.88  )-----------( 115      ( 02 Dec 2021 08:00 )             24.4     12-02    143  |  110  |  29<H>  ----------------------------<  109<H>  4.7   |  17  |  1.1    Ca    7.9<L>      02 Dec 2021 08:00  Phos  3.1     11-30  Mg     2.0     12-02    TPro  5.3<L>  /  Alb  3.0<L>  /  TBili  0.4  /  DBili  x   /  AST  49<H>  /  ALT  9   /  AlkPhos  2097<H>  12-02        Pertinent labs:                      7.8    3.88  )-----------( 115      ( 02 Dec 2021 08:00 )             24.4       12-02    143  |  110  |  29<H>  ----------------------------<  109<H>  4.7   |  17  |  1.1    Ca    7.9<L>      02 Dec 2021 08:00  Phos  3.1     11-30  Mg     2.0     12-02    TPro  5.3<L>  /  Alb  3.0<L>  /  TBili  0.4  /  DBili  x   /  AST  49<H>  /  ALT  9   /  AlkPhos  2097<H>  12-02    Radiology imaging reviewed.     ASSESSMENT/PLAN:     72yo Male admitted for management of symptomatic anemia, FELICITY and likely metastatic disease, suspected prostate CA. No tissue biopsy to date. IR consulted for tissue biopsy.     Recommend MRI of thoracic and lumbar spine with IV contrast for further assessment of bone mets and possible pathologic fractures. Will wait for results then schedule outpatient biopsy. Patient does not have to stay in the hospital unless biopsy will affect inpatient management.     Thank you for the courtesy of this consult, please call [phone] with any further questions. [phone] after 5pm.    INTERVENTIONAL RADIOLOGY CONSULT:     HPI:  72 yo M with PMH of CVA, afib on Eliquis, HTN, HFrEF ( EF 20-25% 2018), chronic LLE pain, presented to the ED with worsening Left LE pain for the past 2 months, worsening fatigue, anorexia and significant weight loss. Of note pt has previously been worked up for the leg pain including a CT scan in march 2021 in [facility] which was negative. Patient has been getting progressively weak and is unable to ambulate now. He denies any chest pain, shortness of breath, fever, cough. Pt was being seen by a GI w/ planned colonoscopy today but on arrival to office, his GI physician was concerned about his cachetic appearance and rapid weight loss, so he sent him into ED immediately for evaluation.     IR consulted for image guided tissue biopsy.     PAST MEDICAL & SURGICAL HISTORY:  Systolic congestive heart failure, unspecified chronicity  Cerebrovascular accident (CVA) due to bilateral embolism of posterior cerebral arteries  Essential hypertension  Atrial fibrillation, unspecified type  AICD (automatic cardioverter/defibrillator) present    MEDICATIONS  (STANDING):  dexAMETHasone     Tablet 2 milliGRAM(s) Oral every 8 hours  gabapentin 300 milliGRAM(s) Oral every 12 hours  influenza  Vaccine (HIGH DOSE) 0.7 milliLiter(s) IntraMuscular once  ivabradine 5 milliGRAM(s) Oral two times a day  lactated ringers. 1000 milliLiter(s) (50 mL/Hr) IV Continuous <Continuous>  metoprolol succinate ER 25 milliGRAM(s) Oral daily  polyethylene glycol 3350 17 Gram(s) Oral daily  senna 2 Tablet(s) Oral at bedtime    MEDICATIONS  (PRN):  acetaminophen     Tablet .. 650 milliGRAM(s) Oral every 6 hours PRN Temp greater or equal to 38C (100.4F), Mild Pain (1 - 3)  aluminum hydroxide/magnesium hydroxide/simethicone Suspension 30 milliLiter(s) Oral every 4 hours PRN Dyspepsia  HYDROmorphone   Tablet 2 milliGRAM(s) Oral every 3 hours PRN Severe Pain (7 - 10)  melatonin 3 milliGRAM(s) Oral at bedtime PRN Insomnia  ondansetron Injectable 4 milliGRAM(s) IV Push every 8 hours PRN Nausea and/or Vomiting      Allergies  cephalexin (Flushing)  penicillin (Rash)    Intolerances    Physical Exam:   Vital Signs Last 24 Hrs  T(C): 36.2 (02 Dec 2021 04:10), Max: 36.4 (01 Dec 2021 14:33)  T(F): 97.1 (02 Dec 2021 04:10), Max: 97.6 (01 Dec 2021 14:33)  HR: 81 (02 Dec 2021 04:10) (81 - 89)  BP: 113/52 (02 Dec 2021 04:10) (87/38 - 113/56)  BP(mean): --  RR: 16 (02 Dec 2021 04:10) (16 - 19)  SpO2: --    Labs:                         7.8    3.88  )-----------( 115      ( 02 Dec 2021 08:00 )             24.4     12-02    143  |  110  |  29<H>  ----------------------------<  109<H>  4.7   |  17  |  1.1    Ca    7.9<L>      02 Dec 2021 08:00  Phos  3.1     11-30  Mg     2.0     12-02    TPro  5.3<L>  /  Alb  3.0<L>  /  TBili  0.4  /  DBili  x   /  AST  49<H>  /  ALT  9   /  AlkPhos  2097<H>  12-02        Pertinent labs:                      7.8    3.88  )-----------( 115      ( 02 Dec 2021 08:00 )             24.4       12-02    143  |  110  |  29<H>  ----------------------------<  109<H>  4.7   |  17  |  1.1    Ca    7.9<L>      02 Dec 2021 08:00  Phos  3.1     11-30  Mg     2.0     12-02    TPro  5.3<L>  /  Alb  3.0<L>  /  TBili  0.4  /  DBili  x   /  AST  49<H>  /  ALT  9   /  AlkPhos  2097<H>  12-02    Radiology imaging reviewed.     ASSESSMENT/PLAN:     70yo Male admitted for management of symptomatic anemia, FELICITY and likely metastatic disease, suspected prostate CA. No tissue biopsy to date. IR consulted for tissue biopsy. Recommend MRI of thoracic and lumbar spine with IV contrast for further assessment of bone mets and possible pathologic fractures. Will wait for results then schedule outpatient biopsy. Patient does not have to stay in the hospital unless biopsy will affect inpatient management.     Please contact IR 1-2 days prior to discharge to set up outpatient appointment for biopsy. Plan discussed with medicine team.    Thank you for the courtesy of this consult, please call [phone] with any further questions. [phone] after 5pm.    INTERVENTIONAL RADIOLOGY CONSULT:     HPI:  70 yo M with PMH of CVA, afib on Eliquis, HTN, HFrEF ( EF 20-25% 2018), chronic LLE pain, presented to the ED with worsening Left LE pain for the past 2 months, worsening fatigue, anorexia and significant weight loss. Of note pt has previously been worked up for the leg pain including a CT scan in march 2021 in [facility] which was negative. Patient has been getting progressively weak and is unable to ambulate now. He denies any chest pain, shortness of breath, fever, cough. Pt was being seen by a GI w/ planned colonoscopy today but on arrival to office, his GI physician was concerned about his cachetic appearance and rapid weight loss, so he sent him into ED immediately for evaluation.     IR consulted for image guided tissue biopsy.     PAST MEDICAL & SURGICAL HISTORY:  Systolic congestive heart failure, unspecified chronicity  Cerebrovascular accident (CVA) due to bilateral embolism of posterior cerebral arteries  Essential hypertension  Atrial fibrillation, unspecified type  AICD (automatic cardioverter/defibrillator) present    MEDICATIONS  (STANDING):  dexAMETHasone     Tablet 2 milliGRAM(s) Oral every 8 hours  gabapentin 300 milliGRAM(s) Oral every 12 hours  influenza  Vaccine (HIGH DOSE) 0.7 milliLiter(s) IntraMuscular once  ivabradine 5 milliGRAM(s) Oral two times a day  lactated ringers. 1000 milliLiter(s) (50 mL/Hr) IV Continuous <Continuous>  metoprolol succinate ER 25 milliGRAM(s) Oral daily  polyethylene glycol 3350 17 Gram(s) Oral daily  senna 2 Tablet(s) Oral at bedtime    MEDICATIONS  (PRN):  acetaminophen     Tablet .. 650 milliGRAM(s) Oral every 6 hours PRN Temp greater or equal to 38C (100.4F), Mild Pain (1 - 3)  aluminum hydroxide/magnesium hydroxide/simethicone Suspension 30 milliLiter(s) Oral every 4 hours PRN Dyspepsia  HYDROmorphone   Tablet 2 milliGRAM(s) Oral every 3 hours PRN Severe Pain (7 - 10)  melatonin 3 milliGRAM(s) Oral at bedtime PRN Insomnia  ondansetron Injectable 4 milliGRAM(s) IV Push every 8 hours PRN Nausea and/or Vomiting      Allergies  cephalexin (Flushing)  penicillin (Rash)    Intolerances    Physical Exam:   Vital Signs Last 24 Hrs  T(C): 36.2 (02 Dec 2021 04:10), Max: 36.4 (01 Dec 2021 14:33)  T(F): 97.1 (02 Dec 2021 04:10), Max: 97.6 (01 Dec 2021 14:33)  HR: 81 (02 Dec 2021 04:10) (81 - 89)  BP: 113/52 (02 Dec 2021 04:10) (87/38 - 113/56)  BP(mean): --  RR: 16 (02 Dec 2021 04:10) (16 - 19)  SpO2: --    Labs:                         7.8    3.88  )-----------( 115      ( 02 Dec 2021 08:00 )             24.4     12-02    143  |  110  |  29<H>  ----------------------------<  109<H>  4.7   |  17  |  1.1    Ca    7.9<L>      02 Dec 2021 08:00  Phos  3.1     11-30  Mg     2.0     12-02    TPro  5.3<L>  /  Alb  3.0<L>  /  TBili  0.4  /  DBili  x   /  AST  49<H>  /  ALT  9   /  AlkPhos  2097<H>  12-02        Pertinent labs:                      7.8    3.88  )-----------( 115      ( 02 Dec 2021 08:00 )             24.4       12-02    143  |  110  |  29<H>  ----------------------------<  109<H>  4.7   |  17  |  1.1    Ca    7.9<L>      02 Dec 2021 08:00  Phos  3.1     11-30  Mg     2.0     12-02    TPro  5.3<L>  /  Alb  3.0<L>  /  TBili  0.4  /  DBili  x   /  AST  49<H>  /  ALT  9   /  AlkPhos  2097<H>  12-02    Radiology imaging reviewed.     ASSESSMENT/PLAN:     72yo Male admitted for management of symptomatic anemia, FELICITY and likely metastatic disease, suspected prostate CA. No tissue biopsy to date. IR consulted for tissue biopsy. Recommend MRI of thoracic and lumbar spine with IV contrast for further assessment of bone mets and possible pathologic fractures.    Plan for inpatient tissue biopsy tomorrow 12/3/21. Patient seen and assessed at bedside. Spoke with sister Barb Carreon who is agreeable with procedure:  - NPO after midnight   - CBC, CMP, coags in am   - Hold eliquis, ketorolac     Nursing made aware.     Thank you for the courtesy of this consult, please call [phone] with any further questions. [phone] after 5pm.

## 2025-06-06 NOTE — PROGRESS NOTE ADULT - ASSESSMENT
Airway  Reason: elective    Date/Time: 6/6/2025 12:07 PM  Airway not difficult    General Information and Staff    Patient location during procedure: OR  Anesthesiologist: Rossi Spears MD  CRNA/CAA: Nelson Hernández CRNA    Indications and Patient Condition  Indications for airway management: airway protection    Preoxygenated: yes  MILS not maintained throughout    Mask difficulty assessment: 1 - vent by mask    Final Airway Details    Final airway type: endotracheal airway      Successful airway: ETT and EBT - double lumen left  Cuffed: yes   Successful intubation technique: direct laryngoscopy  Adjuncts used in placement: intubating stylet  Endotracheal tube insertion site: oral  Blade: Joseph  Blade size: 3  ETT size (mm): 7.0  EBT DL size (fr): 37  Cormack-Lehane Classification: grade IIb - view of arytenoids or posterior of glottis only  Placement verified by: chest auscultation, bronchoscopy and capnometry   Cuff volume (mL): 10  Measured from: teeth  ETT/EBT  to teeth (cm): 22  Number of attempts at approach: 1  Assessment: lips, teeth, and gum same as pre-op and atraumatic intubation             68 yo M with PMH of HTN, b/l occipital stroke-recent d/c from hospital on 4/20, sCHF(EF 25%) on [facility] presented with complaint of blurry vision in both eyes which started the morning of presentation when he woke up. In ED, Stroke Code was called. NIHSS 0. No tPA was given. Patient was admitted to telemetry as per neurology for further monitoring.    IMPRESSION:    # Acute CVA vs TIA:  Neurology is following: NIHSS: 0 on admission  Continue  mg PO QD and Lipitor 80mg PO QHS  CT Brain Stroke Protocol (04.24.18): Since CT head 4/15/2018. No acute hemorrhage, mass effect or midline shift. Stable chronic left occipital infarction.  CT Angio Head w/ IV Cont (04.24.18): Essentially unremarkable CT angiogram of the neck and head. No significant vascular stenosis or occlusion.  MRI of brain. Performed, pending results  MRA of head and neck. Performed, pending results  Transthoracic Echocardiogram (04.20.18): Contrast Echo with Lumason shows severe global hypokinesia on apical views and EF<25%. No thrombus seen.    # Transaminitis with elevated INR: possibly 2/2 Lipitor  Continue Lipitor  RUQ Sonogram: Unremarkable. Gallbladder polyp noted. Recommend follow up in 12 months.   Repeat BMP within 1 week of discharge    # DM:  Hemoglobin A1c (4/18/18): 6.3  Monitor FS  ISS if needed  Continue carb consistent diet    # sCHF: stable  Left pleural effusion on CT  Lasix has been increased to 40mg po q24h  Continue with Metoprolol, Spironolactone, and Lisinopril  Continue with Lifevest for now.   EP recommendations noted and appreciated: ICD implant once neurologic workup is completed.     # HTN: Continue with Metoprolol, Spironolactone, and Lisinopril    # DVT Px: Lovenox    # Code status